# Patient Record
Sex: MALE | Race: WHITE | NOT HISPANIC OR LATINO | Employment: UNEMPLOYED | URBAN - METROPOLITAN AREA
[De-identification: names, ages, dates, MRNs, and addresses within clinical notes are randomized per-mention and may not be internally consistent; named-entity substitution may affect disease eponyms.]

---

## 2021-05-31 ENCOUNTER — RECORDS - HEALTHEAST (OUTPATIENT)
Dept: ADMINISTRATIVE | Facility: CLINIC | Age: 41
End: 2021-05-31

## 2021-06-02 ENCOUNTER — RECORDS - HEALTHEAST (OUTPATIENT)
Dept: ADMINISTRATIVE | Facility: CLINIC | Age: 41
End: 2021-06-02

## 2022-11-01 ENCOUNTER — TRANSFERRED RECORDS (OUTPATIENT)
Dept: HEALTH INFORMATION MANAGEMENT | Facility: CLINIC | Age: 42
End: 2022-11-01

## 2022-11-01 ENCOUNTER — MEDICAL CORRESPONDENCE (OUTPATIENT)
Dept: HEALTH INFORMATION MANAGEMENT | Facility: CLINIC | Age: 42
End: 2022-11-01

## 2022-11-21 ENCOUNTER — TRANSCRIBE ORDERS (OUTPATIENT)
Dept: OTHER | Age: 42
End: 2022-11-21

## 2022-11-21 DIAGNOSIS — D63.8 ANEMIA, CHRONIC DISEASE: ICD-10-CM

## 2022-11-21 DIAGNOSIS — I10 HYPERTENSION, UNSPECIFIED TYPE: Primary | ICD-10-CM

## 2022-11-21 DIAGNOSIS — H40.043 BORDERLINE STEROID-INDUCED GLAUCOMA OF BOTH EYES: ICD-10-CM

## 2023-01-03 ENCOUNTER — TELEPHONE (OUTPATIENT)
Dept: WOUND CARE | Facility: CLINIC | Age: 43
End: 2023-01-03
Payer: COMMERCIAL

## 2023-01-03 NOTE — TELEPHONE ENCOUNTER
"Consult received via fax from [facility] for \"second opinion for nonhealing right gluteal wound s/p fasciocutaneous falp then recurrent abscess pockets.\" History paraplegia.    Please schedule with providers Halina or Litzy at [facility] [place] for next available appointment.    Is patient a RONDA lift? PLEASE INQUIRE WHEN MAKING THE APPOINTMENT AND PUT IN APPOINTMENT NOTES    Routing to  Wound Healing Scheduling.  Fax placed in shredder (as it was only a referral with no other documents)  "

## 2023-01-26 ENCOUNTER — HOSPITAL ENCOUNTER (OUTPATIENT)
Dept: WOUND CARE | Facility: CLINIC | Age: 43
Discharge: HOME | End: 2023-01-26
Attending: SURGERY
Payer: MEDICARE

## 2023-01-26 VITALS — TEMPERATURE: 97.9 F | SYSTOLIC BLOOD PRESSURE: 141 MMHG | HEART RATE: 109 BPM | DIASTOLIC BLOOD PRESSURE: 92 MMHG

## 2023-01-26 DIAGNOSIS — L89.153 PRESSURE INJURY OF SACRAL REGION, STAGE 3 (H): ICD-10-CM

## 2023-01-26 PROCEDURE — 99203 OFFICE O/P NEW LOW 30 MIN: CPT | Performed by: SURGERY

## 2023-01-26 NOTE — ADDENDUM NOTE
Encounter addended by: Jeri Rutherford RN on: 1/26/2023 1:08 PM   Actions taken: Flowsheet accepted, Order list changed

## 2023-01-26 NOTE — PROGRESS NOTES
[facility] [place] Progress Note    Subject: Saqib Bishop consultation for nonhealing low back wound proximal to sacral, stage III, multiple surgical procedures, insensate, nonambulatory, work-related injury in .  Has had cares in the [place], [place].  Does not have a group 2 mattress at home.  Does not utilize cigarettes, nicotine patch.  Sleeps primarily in a decubitus position though occasionally does sleep on his back.  Roho cushion in his chair that is well-inflated and properly maintained.  Colostomy.  Denies fevers chills sweats.    PMH:   Past Medical History:   Diagnosis Date     Degenerative joint disease      Gastro-oesophageal reflux disease      Patient Active Problem List   Diagnosis     Removal of pin, plate, kristi, or screw     Social Hx:   Social History     Socioeconomic History     Marital status: Single     Spouse name: Not on file     Number of children: Not on file     Years of education: Not on file     Highest education level: Not on file   Occupational History     Not on file   Tobacco Use     Smoking status: Former     Types: Cigarettes     Quit date: 2011     Years since quittin.2     Smokeless tobacco: Not on file   Substance and Sexual Activity     Alcohol use: No     Drug use: No     Sexual activity: Not on file   Other Topics Concern     Not on file   Social History Narrative     Not on file     Social Determinants of Health     Financial Resource Strain: Not on file   Food Insecurity: Not on file   Transportation Needs: Not on file   Physical Activity: Not on file   Stress: Not on file   Social Connections: Not on file   Intimate Partner Violence: Not on file   Housing Stability: Not on file       Surgical Hx:   Past Surgical History:   Procedure Laterality Date     BACK SURGERY      lumbar fusion     EXPLORE SPINE, REMOVE HARDWARE, COMBINED  2012    Procedure:COMBINED EXPLORE SPINE, REMOVE HARDWARE; EXPLORE SPINE, REMOVE HARDWARE  L4-S1; Surgeon:FAM GONZALEZ; Location:[facility] OR     ORTHOPEDIC SURGERY      right foot surgery       Allergies:    Allergies   Allergen Reactions     Droperidol Anaphylaxis     Prednisone      vomiting       Medications:   Current Outpatient Medications   Medication     nicotine (NICODERM CQ) 7 MG/24HR patch 2h hr     oxycodone (OXY-IR) 5 MG capsule     oxycodone (OXYCONTIN) 10 MG 12 hr tablet     No current facility-administered medications for this encounter.       Labs: No results for input(s): ALBUMIN, HGB, INR, WBC, A1C, CRP in the last 37967 hours.    Invalid input(s): PREALBUMIN,  GLUCOSE, MICROBIO  No results found for: CR  No results found for: GFRESTIMATED  No results found for: GFRESTBLACK  No results found for: WBC  No results found for: RBC  No results found for: HGB  No results found for: HCT  No components found for: MCT  No results found for: MCV  No results found for: MCH  No results found for: MCHC  No results found for: RDW  No results found for: PLT       Nutrition requirements were discussed with patient today.  Objective:  BP (!) 141/92 (BP Location: Right arm)   Pulse 109   Temp 97.9  F (36.6  C)   Wound (used by OP [facility] only) 01/26/23 0905 Right gluteal (Active)   Base pink;slough 01/26/23 0900   Periwound intact 01/26/23 0900   Periwound Temperature warm 01/26/23 0900   Periwound Skin Turgor soft 01/26/23 0900   Edges open 01/26/23 0900   Length (cm) 0.8 01/26/23 0900   Width (cm) 2.6 01/26/23 0900   Depth (cm) 6.3 01/26/23 0900   Wound (cm^2) 2.08 cm^2 01/26/23 0900   Wound Volume (cm^3) 13.1 cm^3 01/26/23 0900   Drainage Characteristics/Odor tan;serosanguineous 01/26/23 0900   Drainage Amount moderate 01/26/23 0900   Care, Wound non-select wound debridement performed 01/26/23 0900       Incision/Surgical Site 01/13/12 Back (Active)        General:  Patient is alert and orientated, no acute distress.  Conversant.  States 3 pressure ulceration on, depth of approximately 6 cm, no bone  exposure, CT imaging recent reviewed, does not demonstrate osteomyelitis.  No abscess.  No odor.  Mild periwound dermatitis.  No coccyx ulceration.  Wound was filled with insensate for approximately T12 distal.              Impression: Chronic low back pressure ulceration status post multiple flap procedures, no osteomyelitis based on recent CT imaging      Plan:  We will dress the wounds with hypochlorous acid Vashe filling of the wound, approximately 30 cc, then placed gauze, change twice a day to eliminate biofilm.  Group 2 mattress indicated, will work with DME to obtain.  Adjunctive micronutrients, B12, B6, folic acid, Rahul or arginine supplementation, allergic to vitamin D, has kidney stones therefore no vitamin C.  He has a functional well-maintained Roho cushion in his wheelchair.  Advised not to be in his wheelchair for more than 90 minutes 3 times a day.  Begin decubitus position or prone position and group 2 mattress.  Obtain group 2 mattress ASAP.  Patient will return to the clinic in 4 weeks time.   Patient will need group 2, low air loss mattress due to large, stage 3 ulceration on the patient's pelvis that has failed to improve on a normal mattress despite regular wound cares and repositioning. A group 1 mattress will not be adequate to offload these severe ulcerations. Patient has impaired sensation and is unable to reposition independently.      Further instructions from your care team       Saqib Bishop      1980    A DME order was not completed because the supplies are ordered by home care or at a care facility      [facility] - Comes twice per week Phone: [phone]  Fax:[phone]     Medications/supplements to aid in healin. Vitamin B Complex with folic acid take 1 tablet daily   2. Vitamin B 12 1000 mcg daily    Try staying away from sugar.     Rahul Supplement, one packet into your favorite beverage TWICE a day. You may purchase at any [facility]  or online. A website we recommend is [email or web address]. Alternatives to Rahul is Argiment AT, Abintra, Arginaid, or Medline Active Critical Care.    Group 2 Mattress will be ordered for through [facility]    Wound Dressing Change: Right Gluteal wound  -Cleanse wound with Cetaphil liquid or bar soap   -Pour 30cc of vashe into the wound bed and let sit fo 15 minutes  -then place 3 4x4 gauze into the wound to absorb the vashe  -cover with 2 5x9 ABD pad and medipore tape or utilize the Durahug dressing  -change twice daily     Repositioning:    Bed: Reposition MINIMALLY every 1-2 hours in bed to relieve pressure and promote perfusion to tissue.    Chair: When up to the chair, do not sit for longer than one hour total before returning to bed for at least 60 minutes to relieve pressure and promote perfusion to the tissue.  Completely recline/tilt for 15 minutes each hour.  o Sit on a chair cushion when up to the chair.       Patient will need group 2, low air loss mattress due to large, stage 3 ulceration on the patient's pelvis that has failed to improve on a normal mattress despite regular wound cares and repositioning. A group 1 mattress will not be adequate to offload these severe ulcerations. Patient has impaired sensation and is unable to reposition independently.       BABITA Mcghee M.D. January 26, 2023    Call us at [phone] if you have any questions about your wounds, have redness or swelling around your wound, have a fever of 101 or greater or if you have any other problems or concerns. We answer the phone Monday through Friday 8 am to 4 pm, please leave a message as we check the voicemail frequently throughout the day.     If you had a positive experience please indicate that on your patient satisfaction survey form that  [facility] will be sending you.    It was a pleasure meeting with you today.  Thank you for allowing me and my team the privilege of caring for you today.  YOU are the reason  we are here, and I truly hope we provided you with the excellent service you deserve.  Please let us know if there is anything else we can do for you so that we can be sure you are leaving completely satisfied with your care experience.      If you have any billing related questions please call the [facility] Business office at [phone]. The clinic staff does not handle billing related matters.    If you are scheduled to have a follow up appointment, you will receive a reminder call the day before your visit. On the appointment day please arrive 15 minutes prior to your appointment time. If you are unable to keep that appointment, please call the clinic to cancel or reschedule. If you are more than 10 minutes late or greater for your appointment, the clinic policy is that you may be asked to reschedule            David Mcghee MD on 1/26/2023 at 10:34 AM        Dictated using Dragon voice recognition software which may result in transcription errors   Yes

## 2023-01-26 NOTE — ADDENDUM NOTE
Encounter addended by: Keena Roger RN on: 1/26/2023 4:15 PM   Actions taken: Order list changed, Diagnosis association updated

## 2023-01-26 NOTE — PROGRESS NOTES
Patient arrived for wound care visit. Certified Wound Care Nurse time spent evaluating patient record, completed a full evaluation and documented wound(s) & ivette-wound skin; provided recommendation based on treatment plan. Applied dressing, reviewed discharge instructions, patient education, and discussed plan of care with appropriate medical team staff members and patient and/or family members.

## 2023-01-26 NOTE — DISCHARGE INSTRUCTIONS
Saqib Bishop      1980    A DME order was not completed because the supplies are ordered by home care or at a care facility      [facility] - Comes twice per week Phone: [phone]  Fax:[phone]     Medications/supplements to aid in healing:  Vitamin B Complex with folic acid take 1 tablet daily   Vitamin B 12 1000 mcg daily    Try staying away from sugar.     Rahul Supplement, one packet into your favorite beverage TWICE a day. You may purchase at any [facility] or online. A website we recommend is [email or web address] [email or web address]. Alternatives to Rahul is Argiment AT, Abintra, Arginaid, or Medline Active Critical Care.    Group 2 Mattress will be ordered for through [facility]    Wound Dressing Change: Right Gluteal wound  -Cleanse wound with Cetaphil liquid or bar soap   -Pour 30cc of vashe into the wound bed and let sit fo 15 minutes  -then place 3 4x4 gauze into the wound to absorb the vashe  -cover with 2 5x9 ABD pad and medipore tape or utilize the Durahug dressing  -change twice daily     Repositioning:  Bed: Reposition MINIMALLY every 1-2 hours in bed to relieve pressure and promote perfusion to tissue.  Chair: When up to the chair, do not sit for longer than one hour total before returning to bed for at least 60 minutes to relieve pressure and promote perfusion to the tissue.  Completely recline/tilt for 15 minutes each hour.  Sit on a chair cushion when up to the chair.       Patient will need group 2, low air loss mattress due to large, stage 3 ulceration on the patient's pelvis that has failed to improve on a normal mattress despite regular wound cares and repositioning. A group 1 mattress will not be adequate to offload these severe ulcerations. Patient has impaired sensation and is unable to reposition independently.       BABITA Mcghee M.D. January 26, 2023    Call us at [phone] if you have any questions about your wounds, have redness or swelling around your  wound, have a fever of 101 or greater or if you have any other problems or concerns. We answer the phone Monday through Friday 8 am to 4 pm, please leave a message as we check the voicemail frequently throughout the day.     If you had a positive experience please indicate that on your patient satisfaction survey form that [facility] will be sending you.    It was a pleasure meeting with you today.  Thank you for allowing me and my team the privilege of caring for you today.  YOU are the reason we are here, and I truly hope we provided you with the excellent service you deserve.  Please let us know if there is anything else we can do for you so that we can be sure you are leaving completely satisfied with your care experience.      If you have any billing related questions please call the [facility] Business office at [phone]. The clinic staff does not handle billing related matters.    If you are scheduled to have a follow up appointment, you will receive a reminder call the day before your visit. On the appointment day please arrive 15 minutes prior to your appointment time. If you are unable to keep that appointment, please call the clinic to cancel or reschedule. If you are more than 10 minutes late or greater for your appointment, the clinic policy is that you may be asked to reschedule

## 2023-01-30 NOTE — ADDENDUM NOTE
Encounter addended by: Keena Roger RN on: 1/30/2023 8:43 AM   Actions taken: Order list changed, Diagnosis association updated

## 2023-02-02 ENCOUNTER — TELEPHONE (OUTPATIENT)
Dept: WOUND CARE | Facility: CLINIC | Age: 43
End: 2023-02-02
Payer: MEDICARE

## 2023-02-02 DIAGNOSIS — L89.153 PRESSURE INJURY OF SACRAL REGION, STAGE 3 (H): Primary | ICD-10-CM

## 2023-02-02 NOTE — TELEPHONE ENCOUNTER
Returned call to Amanda. She states the patient will no longer be on Medicare starting tomorrow and will be medical assistance. Supplies will able to be ordered tomorrow and the patient wishes for the supplies to be sent to [facility] in [place]. Fax number [phone]. Will order supplies 2/3 and fax to [facility].

## 2023-02-02 NOTE — TELEPHONE ENCOUNTER
Home health care nurse called, [facility] needs prescription for wound care supplies to be able to fill the order.  [place] [phone]

## 2023-02-03 ENCOUNTER — MEDICAL CORRESPONDENCE (OUTPATIENT)
Dept: HEALTH INFORMATION MANAGEMENT | Facility: CLINIC | Age: 43
End: 2023-02-03

## 2023-02-05 ENCOUNTER — HEALTH MAINTENANCE LETTER (OUTPATIENT)
Age: 43
End: 2023-02-05

## 2023-02-06 ENCOUNTER — MEDICAL CORRESPONDENCE (OUTPATIENT)
Dept: HEALTH INFORMATION MANAGEMENT | Facility: CLINIC | Age: 43
End: 2023-02-06
Payer: MEDICARE

## 2023-03-13 ENCOUNTER — HOSPITAL ENCOUNTER (OUTPATIENT)
Dept: WOUND CARE | Facility: CLINIC | Age: 43
Discharge: HOME | End: 2023-03-13
Attending: SURGERY | Admitting: SURGERY
Payer: MEDICARE

## 2023-03-13 VITALS — HEART RATE: 99 BPM | TEMPERATURE: 97 F | DIASTOLIC BLOOD PRESSURE: 77 MMHG | SYSTOLIC BLOOD PRESSURE: 144 MMHG

## 2023-03-13 DIAGNOSIS — T14.8XXA OPEN WOUND: ICD-10-CM

## 2023-03-13 DIAGNOSIS — L89.153 PRESSURE INJURY OF SACRAL REGION, STAGE 3 (H): Primary | ICD-10-CM

## 2023-03-13 PROCEDURE — 97602 WOUND(S) CARE NON-SELECTIVE: CPT

## 2023-03-13 PROCEDURE — 99213 OFFICE O/P EST LOW 20 MIN: CPT | Performed by: SURGERY

## 2023-03-13 NOTE — PROGRESS NOTES
[facility] [place] Progress Note    Subject: Saqib Bishop chronic low back wound Doximity sacral, stage III, multiple previous surgical procedures, insensate in this region, nonambulatory, work-related injury in 2008.  He states he is getting a new mattress as his mattress provide support, we had recommended a group 2 mattress at the original consultation of January 26, 2023.  His primary wheelchair has not been functioning correctly, he is using his alternative wheelchair, they are assessing his cushion on a daily basis, he is attempting to be in his chair more than 60 to 90 minutes 3 times a day.  The Rahul recommendation was too expensive, they are utilizing the remainder of micronutrients to assist in resolution.  Current dressings have been hypochlorous acid moist gauze dressing changes twice per day.    Patient Active Problem List   Diagnosis     Removal of pin, plate, kristi, or screw     Past Medical History:   Diagnosis Date     Degenerative joint disease      Gastro-oesophageal reflux disease      Exam:  BP (!) 144/77 (BP Location: Right arm, Patient Position: Sitting, Cuff Size: Adult Regular)   Pulse 99   Temp 97  F (36.1  C) (Temporal)   Wound (used by OP [facility] only) 01/26/23 0905 Right gluteal (Active)   Thickness/Stage Stage 3 03/13/23 1333   Base pink;slough 03/13/23 1333   Periwound intact 03/13/23 1333   Periwound Temperature warm 03/13/23 1333   Periwound Skin Turgor soft 03/13/23 1333   Edges open 03/13/23 1333   Length (cm) 0.6 03/13/23 1333   Width (cm) 1.2 03/13/23 1333   Depth (cm) 5.5 03/13/23 1333   Wound (cm^2) 0.72 cm^2 03/13/23 1333   Wound Volume (cm^3) 3.96 cm^3 03/13/23 1333   Wound healing % 65.38 03/13/23 1333   Undermining [Depth (cm)/Location] 7-9 o'clock / 6.2cm 03/13/23 1333   Drainage Characteristics/Odor tan;serosanguineous 03/13/23 1333   Drainage Amount scant 03/13/23 1333   Care, Wound non-select wound debridement performed 03/13/23 1333        Incision/Surgical Site 01/13/12 Back (Active)   Wound is noted to be approximately 60% improved, no odor, periwound hygiene intact.  Wound was probed with Q-tips, no bone or tendon exposure.        Impression: Chronic low back ulceration, insensate, paraplegic.    Plan: Transition to feeling wound with hypochlorous acid Vashe for approximately 15 minutes, then application of SkinTegrity hydrogel pH 6.0, perform on a daily basis.  Continue adjunctive micronutrients.  Requires group 2 mattress and group to bed frame, and he states he is obtaining repair to his primary wheelchair, we have discussed not being in the wheelchair greater than 60 to 90 minutes 3 times per day. Patient will return to the clinic in 4 weeks time  Patient will need group 2, low air loss mattress due to large, stage 3 ulceration on the patient's pelvis that has failed to improve on a normal mattress despite regular wound cares and repositioning. A group 1 mattress will not be adequate to offload these severe ulcerations. Patient has impaired sensation and is unable to reposition independently.  Also requires a group 2 mattress frame to support to group 2 mattress given the stage III pressure ulceration.  Wound Dressing Change: Right Gluteal wound  -Irrigate with 10-20mL Vashe  -Pour 10-20mL of vashe into the wound bed and let sit for 15 minutes  -then pat with 4x4 gauze to absorb the vashe  -Insert 5-10mL Skintegrity Gel into wound  -cover with 2 4x4 gauze and medipore tape or utilize the Durahug dressing  change daily    Repositioning:  Bed: Reposition MINIMALLY every 1-2 hours in bed to relieve pressure and promote perfusion to tissue. Avoid pressure to area!  Chair: When up to the chair, do not sit for longer than one hour total before returning to bed for at least 60 minutes to relieve pressure and promote perfusion to the tissue. Completely recline/tilt for 15 minutes each hour. Sit on a chair cushion when up to the chair.      Further  instructions from your care team       Saqib Bishop      1980    A DME order was not completed because the supplies are ordered by home care or at a care facility        Further instructions from your care team       Saqib Bishop      1980    A DME order for supplies has been placed to [facility]. If there are any issues with your order including not receiving the order please call [facility] at [phone] option 3.  However, Medicare may request all supplies go through home care. Please inquire when you .    [facility] - comes twice per week Phone: [phone] Fax: [phone]    Medications/supplements to aid in healing:  Vitamin B Complex with folic acid take 1 tablet daily  Vitamin B 12 1000 mcg daily  Try staying away from sugar.  Rahul Supplement, one packet into your favorite beverage TWICE a day. You may  purchase at any [facility] or online. A website we recommend is  [email or web address]. Alternatives to Rahul is Argiment AT, Abintra, Arginaid, or Medline Active Critical Care.    Group 2 Mattress ordered for through [facility]    Wound Dressing Change: Right Gluteal wound  -Irrigate with 10-20mL Vashe  -Pour 10-20mL of vashe into the wound bed and let sit for 15 minutes  -then pat with 4x4 gauze to absorb the vashe  -Insert 5-10mL Skintegrity Gel into wound  -cover with 2 4x4 gauze and medipore tape or utilize the Durahug dressing  change daily    Repositioning:  Bed: Reposition MINIMALLY every 1-2 hours in bed to relieve pressure and promote perfusion to tissue. Avoid pressure to area!  Chair: When up to the chair, do not sit for longer than one hour total before returning to bed for at least 60 minutes to relieve pressure and promote perfusion to the tissue. Completely recline/tilt for 15 minutes each hour. Sit on a chair cushion when up to the chair.    Patient will need group 2, low air loss mattress due to large, stage 3  ulceration on the patient's pelvis that has failed to  improve on a normal mattress despite regular wound cares and repositioning. A group 1 mattress will not be adequate     BBAITA Mcghee M.D. March 13, 2023    Call us at [phone] if you have any questions about your wounds, have redness or swelling around your wound, have a fever of 101 or greater or if you have any other problems or concerns. We answer the phone Monday through Friday 8 am to 4 pm, please leave a message as we check the voicemail frequently throughout the day.     If you had a positive experience please indicate that on your patient satisfaction survey form that [facility] will be sending you.    It was a pleasure meeting with you today.  Thank you for allowing me and my team the privilege of caring for you today.  YOU are the reason we are here, and I truly hope we provided you with the excellent service you deserve.  Please let us know if there is anything else we can do for you so that we can be sure you are leaving completely satisfied with your care experience.      If you have any billing related questions please call the [facility] Business office at [phone]. The clinic staff does not handle billing related matters.    If you are scheduled to have a follow up appointment, you will receive a reminder call the day before your visit. On the appointment day please arrive 15 minutes prior to your appointment time. If you are unable to keep that appointment, please call the clinic to cancel or reschedule. If you are more than 10 minutes late or greater for your appointment, the clinic policy is that you may be asked to reschedule.          David Mcghee MD on 3/13/2023 at 2:20 PM    Dictated using Dragon voice recognition software which may result in transcription errors

## 2023-03-15 ENCOUNTER — TELEPHONE (OUTPATIENT)
Dept: WOUND CARE | Facility: CLINIC | Age: 43
End: 2023-03-15
Payer: MEDICARE

## 2023-03-15 DIAGNOSIS — L89.153 PRESSURE INJURY OF SACRAL REGION, STAGE 3 (H): Primary | ICD-10-CM

## 2023-03-15 NOTE — TELEPHONE ENCOUNTER
Annie from [facility] called regarding order. They received it but where patient resides is out of their service area. If needed, call Annie at [phone] ext [phone]

## 2023-03-16 NOTE — TELEPHONE ENCOUNTER
Did not hear from [facility]. Hospital bed and group 2 order faxed to [facility].

## 2023-03-31 ENCOUNTER — TELEPHONE (OUTPATIENT)
Dept: WOUND CARE | Facility: CLINIC | Age: 43
End: 2023-03-31
Payer: MEDICARE

## 2023-03-31 NOTE — TELEPHONE ENCOUNTER
[facility] cannot fulfill the skintegrity order as [facility] has been supplying the patients supplies. The patient also has home care. Voicemail left with patient explaining that [facility] cannot provide the skintegrity for him.

## 2023-03-31 NOTE — TELEPHONE ENCOUNTER
Patient having trouble getting Skintegrity from [facility] and is now out.  Looking for assistance in getting some.

## 2023-03-31 NOTE — TELEPHONE ENCOUNTER
Patient called again and was able to speak with him. He bought 1 skintegrity when he was at his last appointment out of pocket. He has not received his 1 that is covered by insurance. He would also like to purchase 3 more out of pocket. Message sent to [facility] to ship these to him.

## 2023-04-03 NOTE — TELEPHONE ENCOUNTER
Patient returned call to the clinic. He did not receive the voicemail left by Keena Roger RN and has a few questions. Please return call to patient to discuss.

## 2023-04-03 NOTE — TELEPHONE ENCOUNTER
Returned call to patient. He states he has not been able to get skintegrity from [facility] because they do not have it available for them to order. Asked [facility] if they can provide the skintegrity only and are awaiting an answer. Will call patient back when [facility] responds.

## 2023-04-04 ENCOUNTER — TELEPHONE (OUTPATIENT)
Dept: WOUND CARE | Facility: CLINIC | Age: 43
End: 2023-04-04
Payer: MEDICARE

## 2023-04-04 NOTE — TELEPHONE ENCOUNTER
Mireille with [facility] is asking if a prescription for the Skintegrity Hydrogel can be sent to the patient's preferred [facility] pharmacy? His DME supplier has been having trouble getting the item. If able, please send it to:    [facility] [place]  [address]  [place] [address]    Phone: [phone]  Fax: [phone]    Mireille can be reached at [phone]

## 2023-04-04 NOTE — TELEPHONE ENCOUNTER
[facility] is able to fill the skintegrity. They called and spoke with the patient.

## 2023-04-04 NOTE — TELEPHONE ENCOUNTER
Returned call to Mireille and informed her [facility] was able to dispense 1 skintegrity to patient. No further questions or concerns.

## 2023-05-01 ENCOUNTER — HOSPITAL ENCOUNTER (OUTPATIENT)
Dept: WOUND CARE | Facility: CLINIC | Age: 43
Discharge: HOME | End: 2023-05-01
Attending: SURGERY | Admitting: SURGERY
Payer: MEDICARE

## 2023-05-01 VITALS — SYSTOLIC BLOOD PRESSURE: 153 MMHG | DIASTOLIC BLOOD PRESSURE: 85 MMHG | HEART RATE: 95 BPM | TEMPERATURE: 98.5 F

## 2023-05-01 DIAGNOSIS — L89.153 PRESSURE INJURY OF SACRAL REGION, STAGE 3 (H): Primary | ICD-10-CM

## 2023-05-01 PROCEDURE — 99214 OFFICE O/P EST MOD 30 MIN: CPT | Performed by: SURGERY

## 2023-05-01 PROCEDURE — 97602 WOUND(S) CARE NON-SELECTIVE: CPT

## 2023-05-01 NOTE — PROGRESS NOTES
[facility] [place] Progress Note    Subject: Saqib Bishop nonhealing low back wound proximal to sacral, stage III, multiple surgical procedures, including flap surgical procedures, extensive scars at the coccyx, sacral region, insensate, nonambulatory, work-related injury in 2008.  Has had cares in the [place], [place].  They live approximately 3 hours north of the [facility], closer to [place].  We have discussed performance of MRI imaging, they would like to do it at [facility] and follow-up with us same day, MRI with and without gadolinium contrast is indicated given that the depth of the wound has not changed despite that the overall volume has continued to decrease.  He still has not received their group 2 mattress despite the fact that this is standard of care for stage III pressure injury, we will reach out again to have this filled    Most recent imaging was performed October 2022, CT imaging.  IMPRESSION:     1.  Interval debridement of a large sacral decubitus ulcer. There is a large open posterior sacral wound which extends to the coccyx. No drainable fluid collection identified. The distal coccyx remains absent and may be resected and/or rotated from osteomyelitis. Rectal contrast was requested but refused by the patient.   2. Mild subcutaneous stranding overlying the left posterior hip. Correlate for any clinical evidence of decubitus ulcer in this location.   3. Left lower quadrant colostomy with large fat and bowel-containing parastomal hernia.   4. Retroperitoneal lymphadenopathy is present but is decreased in size since the prior exam.   5. Dyer catheter.   6. Bilateral nephrolithiasis.     Finalized by: Luis Neal MD on 10/9/2022 12:24 AM CDT      Patient/Procedure Information:    [facility]      Given the persistence of the depth of the wound, concern for potential underlying osteomyelitis, MRI with and without gadolinium enhancement will be  ordered.  He does utilize a Roho cushion in his wheelchair.      Patient Active Problem List   Diagnosis     Removal of pin, plate, kristi, or screw     Past Medical History:   Diagnosis Date     Degenerative joint disease      Gastro-oesophageal reflux disease      Exam:  BP (!) 153/85 (BP Location: Left arm)   Pulse 95   Temp 98.5  F (36.9  C)   Wound (used by OP [facility] only) 01/26/23 0905 Right gluteal (Active)   Thickness/Stage Stage 3 05/01/23 1400   Base pink;slough 05/01/23 1400   Periwound intact 05/01/23 1400   Periwound Temperature warm 05/01/23 1400   Periwound Skin Turgor soft 05/01/23 1400   Edges open 05/01/23 1400   Length (cm) 0.5 05/01/23 1400   Width (cm) 1.5 05/01/23 1400   Depth (cm) 4.5 05/01/23 1400   Wound (cm^2) 0.75 cm^2 05/01/23 1400   Wound Volume (cm^3) 3.38 cm^3 05/01/23 1400   Wound healing % 63.94 05/01/23 1400   Undermining [Depth (cm)/Location] 9 o clock/6.8 cm 05/01/23 1400   Drainage Characteristics/Odor serosanguineous 05/01/23 1400   Drainage Amount moderate 05/01/23 1400   Care, Wound non-select wound debridement performed 03/13/23 1333       Incision/Surgical Site 01/13/12 Back (Active)     Volume of the wound is approximately 15 cc wound filled with Vashe, depth is approximately 7.5 cm with medial based undermining, no clear bone exposure though clinical concern for underlying potential osteomyelitis.  We will surgical scars from previous surgery, he has a colostomy.      Impression: Stage III pressure injury, history of multiple flaps, colostomy, insensate, history of work-related injury, nonambulatory    Plan: Clinical concern for osteomyelitis, obtain MRI with and without gadolinium of coccyx and sacrum, will be performed at [facility] and will follow-up in wound clinic on same day, they traveled approximately 3 hours away, close to [place].  We will dress the wounds with hypochlorous acid Vashe filling of the wound, currently approximately 15 cc, then hydrogel pH 6.0 on  antimicrobial Endoform placed in the wound, fill the remainder of the wound with hydrogel pH 6.0, can change on a daily basis..  Insurance company has failed yet to complete coverage for a group 2 mattress which is standard of care in this situation.  Patient will need group 2, low air loss mattress due to large, stage 3 ulceration on the patient's pelvis that has failed to improve on a normal mattress despite regular wound cares and repositioning. A group 1 mattress will not be adequate to offload these severe ulcerations. Patient has impaired sensation and is unable to reposition independently.  Hayde Yin from [facility] is requesting additional information for the approval of a group 2 pressure mattress,  Height is 5 foot 9 inches, weight 225 pounds  I am certifying as the wound clinic physician that the patient requires positioning in ways that are not feasible in a standard bed to alleviate pain and the pressure resulting in the persistence of the pressure ulceration of his stage III pressure ulceration of the gluteus region in the setting of multiple previous flap surgical procedures, he has had a colostomy.  He is nonambulatory.  I am certifying as the wound clinic physician that the patient requires frequent and/or immediate need for repositioning due to the presence of a stage III pressure ulceration.  Patient will return to the clinic in 4 weeks time      Further instructions from your care team       Saqib Bishop      1980    A DME order was not completed because supplies were not needed    [facility] [employer], Phone: [phone] Fax: [phone]    MRI just prior to next visit.    Medications/supplements to aid in healing:  Vitamin B Complex with folic acid take 1 tablet daily  Vitamin B 12 1000 mcg daily  Rahul Supplement, one packet into your favorite beverage TWICE a day. You may  purchase at any [facility] or online. A website we recommend is  www.[email or web address]. Alternatives to Rahul is Argiment AT, Abintra, Arginaid, or Medline Active Critical Care.  A diet high in protein is important for wound healing, we recommend getting 90 grams of protein per day. Taking protein shakes or bars are a good way to get extra protein in your diet.     Group [facility] Mattress [facility]23. We will contact [facility] about this. Please also call them at [phone].    Wound Dressing Change: Right Gluteal wound  -Irrigate with 10-20mL clean water  -Pour 15mL of Vashe into the wound bed and let sit for 15 minutes  -Then roll on side or pat with 4x4 gauze to absorb excess Vashe  -Apply generous amount of Skintegrity gel to Endoform Antimicrobial, then pack undermining at 9:00   -Cover with two 4x4 gauze and medipore tape or utilize the Durahug dressing  Change every other day    Repositioning:  Bed: Reposition MINIMALLY every 1-2 hours in bed to relieve pressure and promote  perfusion to tissue. Avoid pressure to area!  Chair: When up to the chair, do not sit for longer than one hour total before returning  to bed for at least 60 minutes to relieve pressure and promote perfusion to the tissue.  Completely recline/tilt for 15 minutes each hour. Sit on a chair cushion when up to the chair.    Patient will need group 2, low air loss mattress due to large, stage 3 ulceration on the patient's pelvis that has failed to improve on a normal mattress despite regular wound cares and repositioning. A group 1 mattress will not be adequate to offload these severe ulcerations. Patient has impaired sensation and is unable to reposition independently.      BABITA Mcghee M.D. May 1, 2023    Call us at [phone] if you have any questions about your wounds, have redness or swelling around your wound, have a fever of 101 or greater or if you have any other problems or concerns. We answer the phone Monday through Friday 8 am to 4 pm, please leave a message as we check  the voicemail frequently throughout the day.     If you had a positive experience please indicate that on your patient satisfaction survey form that [facility] will be sending you.    It was a pleasure meeting with you today.  Thank you for allowing me and my team the privilege of caring for you today.  YOU are the reason we are here, and I truly hope we provided you with the excellent service you deserve.  Please let us know if there is anything else we can do for you so that we can be sure you are leaving completely satisfied with your care experience.      If you have any billing related questions please call the [facility] Business office at [phone]. The clinic staff does not handle billing related matters.    If you are scheduled to have a follow up appointment, you will receive a reminder call the day before your visit. On the appointment day please arrive 15 minutes prior to your appointment time. If you are unable to keep that appointment, please call the clinic to cancel or reschedule. If you are more than 10 minutes late or greater for your appointment, the clinic policy is that you may be asked to reschedule.           David Mcghee MD on 5/1/2023 at 2:30 PM    Dictated using Dragon voice recognition software which may result in transcription errors

## 2023-05-01 NOTE — DISCHARGE INSTRUCTIONS
Saqib Bishop      1980    A DME order was not completed because supplies were not needed    [facility] [employer], Phone: [phone] Fax: [phone]    MRI just prior to next visit.    Medications/supplements to aid in healing:  Vitamin B Complex with folic acid take 1 tablet daily  Vitamin B 12 1000 mcg daily  Rahul Supplement, one packet into your favorite beverage TWICE a day. You may  purchase at any [facility] or online. A website we recommend is [email or web address]. Alternatives to Rahul is Argiment AT, Abintra, Arginaid, or Medline Active Critical Care.  A diet high in protein is important for wound healing, we recommend getting 90 grams of protein per day. Taking protein shakes or bars are a good way to get extra protein in your diet.     Group 2 Mattress ordered through [employer] March 2023. We will contact [employer] about this. Please also call them at [phone].    Wound Dressing Change: Right Gluteal wound  -Irrigate with 10-20mL clean water  -Pour 15mL of Vashe into the wound bed and let sit for 15 minutes  -Then roll on side or pat with 4x4 gauze to absorb excess Vashe  -Apply generous amount of Skintegrity gel to Endoform Antimicrobial, then pack undermining at 9:00   -Cover with 1/2 ABD pad and Medipore tape  Change every other day    Repositioning:  Bed: Reposition MINIMALLY every 1-2 hours in bed to relieve pressure and promote  perfusion to tissue. Avoid pressure to area.  Chair: When up to the chair, do not sit for longer than one hour total before returning  to bed for at least 60 minutes to relieve pressure and promote perfusion to the tissue.  Completely recline/tilt for 15 minutes each hour. Sit on a chair cushion when up to the chair.    Patient will need group 2, low air loss mattress due to large, stage 3 ulceration on the patient's pelvis that has failed to improve on a normal mattress despite regular wound cares and repositioning. A group 1 mattress will  not be adequate to offload these severe ulcerations. Patient has impaired sensation and is unable to reposition independently.      BABITA Mcghee M.D. May 1, 2023    Call us at [phone] if you have any questions about your wounds, have redness or swelling around your wound, have a fever of 101 or greater or if you have any other problems or concerns. We answer the phone Monday through Friday 8 am to 4 pm, please leave a message as we check the voicemail frequently throughout the day.     If you had a positive experience please indicate that on your patient satisfaction survey form that [facility] will be sending you.    It was a pleasure meeting with you today.  Thank you for allowing me and my team the privilege of caring for you today.  YOU are the reason we are here, and I truly hope we provided you with the excellent service you deserve.  Please let us know if there is anything else we can do for you so that we can be sure you are leaving completely satisfied with your care experience.      If you have any billing related questions please call the [facility] Business office at [phone]. The clinic staff does not handle billing related matters.    If you are scheduled to have a follow up appointment, you will receive a reminder call the day before your visit. On the appointment day please arrive 15 minutes prior to your appointment time. If you are unable to keep that appointment, please call the clinic to cancel or reschedule. If you are more than 10 minutes late or greater for your appointment, the clinic policy is that you may be asked to reschedule.

## 2023-05-02 ENCOUNTER — TELEPHONE (OUTPATIENT)
Dept: WOUND CARE | Facility: CLINIC | Age: 43
End: 2023-05-02
Payer: MEDICARE

## 2023-05-02 NOTE — TELEPHONE ENCOUNTER
Amanda with [facility] is asking for a prescription for Endoform to be faxed to Raymon Pérez as the patient does not have any (he's never used it). Please send IT ATTN: KENNEDY Pérez fax # [phone]    Amanda phone number: [phone]

## 2023-05-04 ENCOUNTER — TELEPHONE (OUTPATIENT)
Dept: WOUND CARE | Facility: CLINIC | Age: 43
End: 2023-05-04
Payer: MEDICARE

## 2023-05-04 NOTE — TELEPHONE ENCOUNTER
Emailed [facility] on Monday about group 2 mattress & bed that was ordered in March.  Handi responded that patient is outside of their service area.  Order re-printed to be signed then faxed to [facility].  Left message for patient to inform.    Is This A New Presentation, Or A Follow-Up?: Skin Lesion How Severe Is Your Skin Lesion?: mild Has Your Skin Lesion Been Treated?: been treated

## 2023-05-10 ENCOUNTER — MEDICAL CORRESPONDENCE (OUTPATIENT)
Dept: HEALTH INFORMATION MANAGEMENT | Facility: CLINIC | Age: 43
End: 2023-05-10
Payer: MEDICARE

## 2023-05-17 ENCOUNTER — TRANSFERRED RECORDS (OUTPATIENT)
Dept: HEALTH INFORMATION MANAGEMENT | Facility: CLINIC | Age: 43
End: 2023-05-17

## 2023-05-25 ENCOUNTER — HOSPITAL ENCOUNTER (OUTPATIENT)
Dept: MRI IMAGING | Facility: CLINIC | Age: 43
Discharge: HOME | End: 2023-05-25
Attending: SURGERY
Payer: MEDICARE

## 2023-05-25 ENCOUNTER — HOSPITAL ENCOUNTER (OUTPATIENT)
Dept: WOUND CARE | Facility: CLINIC | Age: 43
Discharge: HOME | End: 2023-05-25
Attending: SURGERY
Payer: MEDICARE

## 2023-05-25 VITALS — SYSTOLIC BLOOD PRESSURE: 162 MMHG | HEART RATE: 109 BPM | DIASTOLIC BLOOD PRESSURE: 75 MMHG | TEMPERATURE: 97.7 F

## 2023-05-25 DIAGNOSIS — L89.153 PRESSURE INJURY OF SACRAL REGION, STAGE 3 (H): ICD-10-CM

## 2023-05-25 DIAGNOSIS — L89.153 PRESSURE INJURY OF SACRAL REGION, STAGE 3 (H): Primary | ICD-10-CM

## 2023-05-25 DIAGNOSIS — S81.802A OPEN WOUND OF LOWER LEG, LEFT, INITIAL ENCOUNTER: ICD-10-CM

## 2023-05-25 PROCEDURE — A9585 GADOBUTROL INJECTION: HCPCS | Performed by: SURGERY

## 2023-05-25 PROCEDURE — 72197 MRI PELVIS W/O & W/DYE: CPT | Mod: MG

## 2023-05-25 PROCEDURE — 97602 WOUND(S) CARE NON-SELECTIVE: CPT

## 2023-05-25 PROCEDURE — 99213 OFFICE O/P EST LOW 20 MIN: CPT | Performed by: SURGERY

## 2023-05-25 PROCEDURE — 255N000002 HC RX 255 OP 636: Performed by: SURGERY

## 2023-05-25 RX ORDER — GADOBUTROL 604.72 MG/ML
10 INJECTION INTRAVENOUS ONCE
Status: COMPLETED | OUTPATIENT
Start: 2023-05-25 | End: 2023-05-25

## 2023-05-25 RX ADMIN — GADOBUTROL 10 ML: 604.72 INJECTION INTRAVENOUS at 13:04

## 2023-05-25 NOTE — PROGRESS NOTES
[facility] Wound Healing [place] Progress Note    Subject: Saqib Bishop patient travels from 3 hours away, MRI performed today to evaluate for potential osteomyelitis of the coccyx, sacrum, results not available at 5 PM.  Patient will be contacted by our office on May 26, 2023 regarding results, if osteomyelitis, patient would need to see Vanesa Meade and Dr. Barbara Zavala for further discussion.  He was recently hospitalized for urosepsis, was not on a group 2 mattress during his hospitalization, sacral wound did enlarge and greater undermining and depth and periwound maceration.  Also has a new traumatic injury to the left anterior ankle mortise where he struck his foot on his wheelchair, insensate, chronic left lower extremity lymphedema, chronic dorsiflexion of the foot.  2 mattress has not arrived at his house yet, apparently was also be delivered yesterday, was not delivered.  We have been working on a group 2 mattress since original consultation date of January 26, 2023, group 2 mattress and a stage III pressure ulceration certainly standard of care and should be a covered benefit for maximizing management.    Patient Active Problem List   Diagnosis     Removal of pin, plate, kristi, or screw     Open wound of lower leg, left, initial encounter     Past Medical History:   Diagnosis Date     Degenerative joint disease      Gastro-oesophageal reflux disease      Exam:  BP (!) 162/75 (BP Location: Right arm, Patient Position: Chair)   Pulse 109   Temp 97.7  F (36.5  C) (Temporal)   Wound (used by [facility] only) 01/26/23 0905 Right gluteal pressure injury (Active)   Thickness/Stage Stage 3 05/25/23 1428   Base red 05/25/23 1428   Periwound intact 05/25/23 1428   Periwound Temperature warm 05/25/23 1428   Periwound Skin Turgor soft 05/25/23 1428   Edges open 05/25/23 1428   Length (cm) 0.4 05/25/23 1428   Width (cm) 2.5 05/25/23 1428   Depth (cm) 5.6 05/25/23 1428   Wound (cm^2) 1 cm^2 05/25/23 1428   Wound  Volume (cm^3) 5.6 cm^3 05/25/23 1428   Wound healing % 51.92 05/25/23 1428   Undermining [Depth (cm)/Location] 8.5cm @ 9 o'clock 05/25/23 1428   Drainage Characteristics/Odor serosanguineous 05/25/23 1428   Drainage Amount moderate 05/25/23 1428   Care, Wound non-select wound debridement performed 05/25/23 1428       Wound (used by [facility] only) 05/25/23 1434 Left dorsal foot pressure injury (Active)   Thickness/Stage unstageable 05/25/23 1428   Base necrotic;red;slough 05/25/23 1428   Periwound intact;redness;swelling 05/25/23 1428   Periwound Temperature warm 05/25/23 1428   Periwound Skin Turgor soft 05/25/23 1428   Edges open 05/25/23 1428   Length (cm) 1.9 05/25/23 1428   Width (cm) 4.1 05/25/23 1428   Depth (cm) 0.3 05/25/23 1428   Wound (cm^2) 7.79 cm^2 05/25/23 1428   Wound Volume (cm^3) 2.34 cm^3 05/25/23 1428   Drainage Characteristics/Odor serosanguineous 05/25/23 1428   Drainage Amount moderate 05/25/23 1428   Care, Wound non-select wound debridement performed 05/25/23 1428       Wound (used by [facility] only) 05/25/23 1435 Left medial leg pressure injury (Active)   Thickness/Stage Stage 3 05/25/23 1428   Periwound intact;swelling 05/25/23 1428   Periwound Temperature warm 05/25/23 1428   Periwound Skin Turgor soft 05/25/23 1428   Edges open 05/25/23 1428   Length (cm) 0.6 05/25/23 1428   Width (cm) 0.6 05/25/23 1428   Depth (cm) 0.1 05/25/23 1428   Wound (cm^2) 0.36 cm^2 05/25/23 1428   Wound Volume (cm^3) 0.04 cm^3 05/25/23 1428   Drainage Characteristics/Odor serosanguineous 05/25/23 1428   Drainage Amount moderate 05/25/23 1428   Care, Wound non-select wound debridement performed 05/25/23 1428     Traumatic wound left anterior ankle mortise, no bone or tendon exposure, no undermining, early granulation tissue present, no heel ulceration, palpable left distal anterior tibial artery, chronic left lower extremity lymphedema.  Sacral ulceration has greater depth as compared to prior visit, undermining at the  9 o'clock position, no odor  Patient is a significant ventral hernia which she states has previously been operated on once though has worsened since that time.        Impression: Stage III pressure injury pending results of MRI of the pelvis performed today, large ventral hernia, traumatic left anterior ankle mortise injury, chronic left lower extremity lymphedema, traumatic paraplegic, work-related injury 2008    Plan: Our office will contact him tomorrow regarding results of MRI of the pelvis, if stage IV pressure injury, will need follow-up with Vanesa Meade and Dr. Barbara Zavala.  We will dress the wounds with 1 inch Nu Gauze wick into sacral wound, moistened with hypochlorous acid Vashe, cover with silicone-based dressing given periwound irritation, change twice daily.  Antimicrobial Endoform and PluroGel after hypochlorous acid Vashe application to left anterior ankle mortise injury, can change every other day, EdemaWear yellow stripe base of toe to knee, Tubigrip, elevate leg as able.  We have been requesting a group 2 pressure-relief mattress since 25th 2023, apparently was post be delivered yesterday, has yet to be delivered, family will contact the delivery service 1 more time to try to coordinate.  Consultation [facility], Dr. Nii Mancilla, general surgery for evaluation of large ventral hernia repair..  Patient will return to the clinic in 4 weeks time      Further instructions from your care team       Saqib Bishop      1980    A DME order was not completed because the supplies are ordered by home care or at a care facility    [facility], Phone: [phone] Fax: [phone]      Referral has been sent to [facility] clinics and surgery center for an abdominal wall reconstructive surgery with Dr. Nii Mancilla  Please call to schedule your appointment at [phone].      Medications/supplements to aid in healing:  Vitamin B Complex with folic acid take 1 tablet daily  Vitamin  "B 12 1000 mcg daily  Rahul Supplement, one packet into your favorite beverage TWICE a day. You may  purchase at any [facility] or online. A website we recommend is www.TravelTipz.ru.ClickBus. Alternatives to Rahul is Argiment AT, Abintra, Arginaid, or Medline Active Critical Care.  A diet high in protein is important for wound healing, we recommend getting 90 grams of protein per day. Taking protein shakes or bars are a good way to get extra protein in your diet.      Group 2 Mattress ordered through [facility]. Supposed to drop it off on 5/24 but never showed up. Please contact [facility] to see if their was a delay in shipment.       Wound Dressing Change: Right Gluteal wound  -Tuck 1\" NuGauze strip into depth of wound and undermining at 9 o'clock  -Pour 15mL of Vashe into the wound bed so it wets the Nugauze strip  -Then roll on side or pat with 4x4 gauze to absorb excess Vashe  -Cover with 1/2 ABD pad and Medipore tape (Try Ecylpse dressing and silicone tape products and see if you like them)  -Change twice per day (can pull ABD pad back and just change out the NuGauze strip and then place ABD pad back on for the second dressing change)    Wound Dressing Change: left dorsal foot and left medial lower leg  -Cleanse with mild unscented soap and water (such as Cetaphil, Cerave or Dove)   -After cleansing with mild unscented soap (such as Cetaphil, Cerave or Dove) and water, Apply small amount of VASHE on gauze, lay into wound bed, let sit for 5-10 minutes, remove gauze (do not rinse) then apply dressing:  -apply plurogel to wound bed or to piece of endoform AM  -apply 1/2 piece endoform AM to dorsal foot and 1/4 piece to left medial lower leg  -Pull up yellow stripe edemawear from toes to knee  -Cover with 4x4 gauze or 1/4 ABD pad  -secure with 1 roll gauze and medipore tape  -Apply velcro foot wrap   -change every other day        Then apply Yellow Stripe EdemaWear from toes to knee. " EdemaWear should be worn 24/7 unless bathing/showering or changing the dressing. You will wash and reuse the EdemaWear. DO NOT CUT THE EDEMAWEAR. IF IT IS TOO LONG THEN CUFF THE EDEMAWEAR (the EdemaWear can shrink length wise with washing).      Put plurogel in the fridge where it becomes thinner. You will use less of the plurogel this way, extending the use of the tube, and the plurogel will be more soothing.    To Order more plurogel: Visit www.[employer] or call [phone] to place an order for 0.7 oz (20gm) tube    Repositioning:  Bed: Reposition MINIMALLY every 1-2 hours in bed to relieve pressure and promote  perfusion to tissue. Avoid pressure to area.  Chair: When up to the chair, do not sit for longer than one hour total before returning  to bed for at least 60 minutes to relieve pressure and promote perfusion to the tissue.  Completely recline/tilt for 15 minutes each hour. Sit on a chair cushion when up to the chair.     Patient will need group 2, low air loss mattress due to large, stage 3 ulceration on the patient's pelvis that has failed to improve on a normal mattress despite regular wound cares and repositioning. A group 1 mattress will not be adequate to offload these severe ulcerations. Patient has impaired sensation and is unable to reposition independently.      BABITA Mcghee M.D. May 25, 2023    Call us at [phone] if you have any questions about your wounds, have redness or swelling around your wound, have a fever of 101 or greater or if you have any other problems or concerns. We answer the phone Monday through Friday 8 am to 4 pm, please leave a message as we check the voicemail frequently throughout the day.     If you had a positive experience please indicate that on your patient satisfaction survey form that [facility] will be sending you.    It was a pleasure meeting with you today.  Thank you for allowing me and my team the privilege of caring for you today.  YOU are  the reason we are here, and I truly hope we provided you with the excellent service you deserve.  Please let us know if there is anything else we can do for you so that we can be sure you are leaving completely satisfied with your care experience.      If you have any billing related questions please call the [facility] Business office at [phone]. The clinic staff does not handle billing related matters.    If you are scheduled to have a follow up appointment, you will receive a reminder call the day before your visit. On the appointment day please arrive 15 minutes prior to your appointment time. If you are unable to keep that appointment, please call the clinic to cancel or reschedule. If you are more than 10 minutes late or greater for your appointment, the clinic policy is that you may be asked to reschedule.          Durable Medical Equipment Wound Care Orders     Wound Care Order for DME - ONLY FOR DME   As directed      DME Provider: amari Maza Comment - [phone]    Start Date: 5/25/2023    Wound Supply Order Options: Complex Wound    Optional: .dmewound can be used to pull in order specific information into documentation    Wound Number: Wound 1    Wound 1 Location: Left dorsal foot    Wound 1 Dressing Change Frequency: BID    Wound 1 Length of Need: 30 days    Wound 1 - Dressing Supplies: Tape/Securing    Wound 1 - Tubular Dressing Type: Edema Wear Stocking    Wound 1 - Stocking Type: Regular    Wound 1 - Edema Wear Regular Size: Medium (1 per pkg)    Wound 1 - Edema Wear Reg (Med) Stocking Quantity: 2    Open wound of lower leg, left, initial encounter        David Mcghee MD on 5/25/2023 at 4:54 PM          Dictated using Dragon voice recognition software which may result in transcription errors

## 2023-05-25 NOTE — DISCHARGE INSTRUCTIONS
"Saqib Bishop      1980    A DME order was faxed to [facility]: Fax # [phone]. Home care does not have an open Medicare episode. They are billing under Medical assistance so [facility] should order supplies.    [facility], Phone: [phone] Fax: [phone]      Referral has been sent to [facility] clinics and surgery center for an abdominal wall reconstructive surgery with Dr. Nii Mancilla  Please call to schedule your appointment at [phone].      Medications/supplements to aid in healing:  Vitamin B Complex with folic acid take 1 tablet daily  Vitamin B 12 1000 mcg daily  Rahul Supplement, one packet into your favorite beverage TWICE a day. You may  purchase at any [facility] or online. A website we recommend is www.[employer]. Alternatives to Rahul is Argiment AT, Abintra, Arginaid, or Medline Active Critical Care.  A diet high in protein is important for wound healing, we recommend getting 90 grams of protein per day. Taking protein shakes or bars are a good way to get extra protein in your diet.      Group 2 Mattress ordered through [facility]. Supposed to drop it off on 5/24 but never showed up. Please contact [facility] to see if their was a delay in shipment.       Wound Dressing Change: Right Gluteal wound  -Tuck 1\" NuGauze strip into depth of wound and undermining at 9 o'clock  -Pour 15mL of Vashe into the wound bed so it wets the Nugauze strip  -Then roll on side or pat with 4x4 gauze to absorb excess Vashe  -Cover with 1/2 ABD pad and Medipore tape (Try Ecylpse dressing and silicone tape products and see if you like them)  -Change twice per day (can pull ABD pad back and just change out the NuGauze strip and then place ABD pad back on for the second dressing change)    Wound Dressing Change: left dorsal foot and left medial lower leg  -Cleanse with mild unscented soap and water (such as Cetaphil, Cerave or Dove)   -After cleansing with mild " unscented soap (such as Cetaphil, Cerave or Dove) and water, Apply small amount of VASHE on gauze, lay into wound bed, let sit for 5-10 minutes, remove gauze (do not rinse) then apply dressing:  -apply plurogel to wound bed or to piece of endoform AM  -apply 1/2 piece endoform AM to dorsal foot and 1/4 piece to left medial lower leg  -Pull up yellow stripe edemawear from toes to knee  -Cover with 4x4 gauze or 1/4 ABD pad  -secure with 1 roll gauze and medipore tape  -Apply velcro foot wrap   -change every other day        Then apply Yellow Stripe EdemaWear from toes to knee. EdemaWear should be worn 24/7 unless bathing/showering or changing the dressing. You will wash and reuse the EdemaWear. DO NOT CUT THE EDEMAWEAR. IF IT IS TOO LONG THEN CUFF THE EDEMAWEAR (the EdemaWear can shrink length wise with washing).      Put plurogel in the fridge where it becomes thinner. You will use less of the plurogel this way, extending the use of the tube, and the plurogel will be more soothing.    To Order more plurogel: Visit www.[employer] or call [phone] to place an order for 0.7 oz (20gm) tube    Repositioning:  Bed: Reposition MINIMALLY every 1-2 hours in bed to relieve pressure and promote  perfusion to tissue. Avoid pressure to area.  Chair: When up to the chair, do not sit for longer than one hour total before returning  to bed for at least 60 minutes to relieve pressure and promote perfusion to the tissue.  Completely recline/tilt for 15 minutes each hour. Sit on a chair cushion when up to the chair.     Patient will need group 2, low air loss mattress due to large, stage 3 ulceration on the patient's pelvis that has failed to improve on a normal mattress despite regular wound cares and repositioning. A group 1 mattress will not be adequate to offload these severe ulcerations. Patient has impaired sensation and is unable to reposition independently.      BABITA Mcghee M.D. May 25, 2023    Call us at  [phone] if you have any questions about your wounds, have redness or swelling around your wound, have a fever of 101 or greater or if you have any other problems or concerns. We answer the phone Monday through Friday 8 am to 4 pm, please leave a message as we check the voicemail frequently throughout the day.     If you had a positive experience please indicate that on your patient satisfaction survey form that [facility] will be sending you.    It was a pleasure meeting with you today.  Thank you for allowing me and my team the privilege of caring for you today.  YOU are the reason we are here, and I truly hope we provided you with the excellent service you deserve.  Please let us know if there is anything else we can do for you so that we can be sure you are leaving completely satisfied with your care experience.      If you have any billing related questions please call the [facility] Business office at [phone]. The clinic staff does not handle billing related matters.    If you are scheduled to have a follow up appointment, you will receive a reminder call the day before your visit. On the appointment day please arrive 15 minutes prior to your appointment time. If you are unable to keep that appointment, please call the clinic to cancel or reschedule. If you are more than 10 minutes late or greater for your appointment, the clinic policy is that you may be asked to reschedule.

## 2023-05-30 ENCOUNTER — TELEPHONE (OUTPATIENT)
Dept: WOUND CARE | Facility: CLINIC | Age: 43
End: 2023-05-30
Payer: MEDICARE

## 2023-05-30 DIAGNOSIS — L97.922 ULCER OF LEFT LOWER EXTREMITY WITH FAT LAYER EXPOSED (H): ICD-10-CM

## 2023-05-30 DIAGNOSIS — L89.153 PRESSURE INJURY OF SACRAL REGION, STAGE 3 (H): Primary | ICD-10-CM

## 2023-05-30 DIAGNOSIS — S81.802A OPEN WOUND OF LOWER LEG, LEFT, INITIAL ENCOUNTER: ICD-10-CM

## 2023-05-30 DIAGNOSIS — L97.522 ULCER OF LEFT FOOT WITH FAT LAYER EXPOSED (H): ICD-10-CM

## 2023-05-30 NOTE — TELEPHONE ENCOUNTER
Amanda with [facility] is requesting an order for gauze and 4x4 border gauze to be sent to [facility]. Fax # [phone]    Amanda [phone]

## 2023-05-30 NOTE — TELEPHONE ENCOUNTER
Returned call to Amanda. She states the patient is needing nugauze ordered and the patient and family is requesting a silicone dressing over the ABD to help the dressing stay better. Discussed with Amanda that only 1 secondary dressing is allowed. The patient does prefer the ABD dressing. The only supply needing ordered right now is Nugauze. Order faxed to [facility].

## 2023-05-31 NOTE — TELEPHONE ENCOUNTER
Amanda called the clinic again this morning 5/31/23 stating that she just learned that the patient will need a brand new prescription sent in to [facility] every month before the 3rd of the month. He currently needs all new prescriptions faxed in for any prescriptive supplies he is currently using. Patient is also specifically in need of 1 inch gauze.

## 2023-05-31 NOTE — ADDENDUM NOTE
Encounter addended by: Keena Roger RN on: 5/31/2023 12:31 PM   Actions taken: Edited Discharge Instructions

## 2023-06-13 ENCOUNTER — TELEPHONE (OUTPATIENT)
Dept: WOUND CARE | Facility: CLINIC | Age: 43
End: 2023-06-13
Payer: MEDICARE

## 2023-06-13 NOTE — TELEPHONE ENCOUNTER
Home care nurse called, they have stopped packing the wound and gone back to the gel as he seems to be having a reaction.  Amanda

## 2023-06-26 ENCOUNTER — TRANSFERRED RECORDS (OUTPATIENT)
Dept: HEALTH INFORMATION MANAGEMENT | Facility: CLINIC | Age: 43
End: 2023-06-26

## 2023-07-06 NOTE — TELEPHONE ENCOUNTER
REFERRAL INFORMATION:  Referring Provider: Dr. David Mcghee  Referring Clinic: [facility]  Reason for Visit/Diagnosis: Parastomal Hernia, abdominal wall reconstruction       FUTURE VISIT INFORMATION:  Appointment Date: 7/21/2023  Appointment Time: 10 AM     NOTES RECORD STATUS  DETAILS   OFFICE NOTE from Referring Provider Internal [facility]:  5/25/23, 5/1/23 - WOUND OV with Dr. Mcghee   OFFICE NOTE from Other Specialists Care Everywhere / Internal [facility]:  7/18/23 - WOUND OV with Jayleen Meade RN    [place]:  1/17/23 - PLASTIC OV with Luba Chua NP  10/12/22 - WOUND OV with Anitha Reyes RN    [facility]:  8/18/21 - SURG OV with JENNIFER Atwood   HOSPITAL DISCHARGE SUMMARY/ ED VISITS  Care Everywhere [place]:  6/26/23 - ED OV with Dr. Lewis  5/17/23 - Admission with Dr. yHman  5/17/23 - ED OV with Dr. Loya    [facility]:  10/30/19 - ED OV with Dr. Gomez  * Additional in Care Everywhere   OPERATIVE REPORT Care Everywhere [place]:  9/29/22 - OP Note for RIGHT GLUTEUS I&D with Dr. Howard  8/1/22 - OP Note for RIGHT GLUTEUS WOUND DEBRIDEMENT with Dr. Howard  3/8/22 - OP Note for I&D SACRAL ABSCESS with Dr. Howard  * Additional in Care Everywhere    [facility]:  8/2/21 - OP Note for PARASTOMAL HERNIA REPAIR WITH MESH with Dr. Jeffers  9/13/17 - OP Note for LAPAROSCOPIC CREATION DIVERTING COLOSTOMY, UMBILICAL HERNIA REPAIR with Dr. Aguilera   ENDOSCOPY (EGD)  N/A    PERTINENT LABS Care Everywhere    PATHOLOGY REPORTS (RELATED) Care Everywhere [facility]:  9/29/22 - Right Sacral Skin  8/1/22 - Sacral Skin  1/28/22 - Sacral Skin  - Additional in Care Everywhere   IMAGING (CT, MRI, US, XR)  In process / Internal [place]:  6/26/23 - CT Chest/Abd/Pelvis  5/17/23, 10/6/22, 2/14/22 - CT Abd/Pelvis  5/17/23 - XR Pelvis  3/7/22, 2/11/22 - CT Pelvis  9/26/21 - US Abdomen    [facility]:  5/25/23 - MRI Pelvic     Records Requested    Facility  [facility]  Fax: [phone]    Outcome * 7/6/23 3:32 PM Faxed req to [facility] for images to be pushed to [facility]. - Brissa    07-11-23 spoke w/[facility] to check status but was informed they are not able to push images to us. Faxed request/FedEx label to [phone]. Radha@ 8:37am    07-17-23 [facility] w/[facility] regarding request.      07-27-23 called and spoke to [facility] regarding request. They stated they have not revd it, informed to send again. 4th request for images. Radha@ 11:28am

## 2023-07-18 ENCOUNTER — HOSPITAL ENCOUNTER (OUTPATIENT)
Dept: WOUND CARE | Facility: CLINIC | Age: 43
Discharge: HOME | End: 2023-07-18
Attending: PHYSICIAN ASSISTANT | Admitting: PHYSICIAN ASSISTANT
Payer: MEDICARE

## 2023-07-18 DIAGNOSIS — S81.802A OPEN WOUND OF LOWER LEG, LEFT, INITIAL ENCOUNTER: Primary | ICD-10-CM

## 2023-07-18 DIAGNOSIS — L89.314 PRESSURE INJURY OF RIGHT BUTTOCK, STAGE 4 (H): ICD-10-CM

## 2023-07-18 LAB
CRP SERPL-MCNC: 31.94 MG/L
ERYTHROCYTE [SEDIMENTATION RATE] IN BLOOD BY WESTERGREN METHOD: 30 MM/HR (ref 0–15)

## 2023-07-18 PROCEDURE — 86140 C-REACTIVE PROTEIN: CPT | Performed by: PHYSICIAN ASSISTANT

## 2023-07-18 PROCEDURE — 99215 OFFICE O/P EST HI 40 MIN: CPT | Mod: 25 | Performed by: PHYSICIAN ASSISTANT

## 2023-07-18 PROCEDURE — 11042 DBRDMT SUBQ TIS 1ST 20SQCM/<: CPT | Performed by: PHYSICIAN ASSISTANT

## 2023-07-18 PROCEDURE — 36415 COLL VENOUS BLD VENIPUNCTURE: CPT | Performed by: PHYSICIAN ASSISTANT

## 2023-07-18 PROCEDURE — 85652 RBC SED RATE AUTOMATED: CPT | Performed by: PHYSICIAN ASSISTANT

## 2023-07-18 PROCEDURE — 84134 ASSAY OF PREALBUMIN: CPT | Performed by: PHYSICIAN ASSISTANT

## 2023-07-18 NOTE — DISCHARGE INSTRUCTIONS
"Saqib NEGRETE Angelesmateuszmary      1980    A DME order was faxed to [facility]: Fax # [phone]. Home care does not have an open Medicare episode. They are billing under Medical assistance so [facility] should order supplies.    - [facility], Phone: [phone] Fax: [phone]  Attention Home Care: Please have patient pressure mapped with your home care agency, if you do not have ability to do this, please contact us at [phone] so we can send orders to another location      To do list for possible surgery:  1. Get Group 2 Mattress. Order sent to [employer] Fax #[phone]  2. Get Pressure Mapped with home care or contact wound healing institute if they are unable to map your seat  3. MRI Complete  4.Your nutrition values must be with in normal range. A diet high in protein is important for wound healing, we recommend getting  grams of protein per day. Taking protein shakes or bars are a good way to get extra protein in your diet.   5. Continue to not smoke  6. Colostomy in place  7. Indwelling Catheter  8. Labs drawn today   9. Apt at [facility] and surgery center for an abdominal wall reconstructive surgery with Dr. Nii Mancilla 7-21-23     Wound Dressing Change: Right Gluteal wound  -Cleanse with mild unscented soap (such as Cetaphil, Cerave or Dove) and water  -Apply small amount of VASHE on gauze, lay into wound bed, let sit for 5-10 minutes, remove gauze (do not rinse) then apply dressing: -  -Lightly pack with 1/10 of 4\" AMD roll gauze  -Cover with 1/4 of 6x10\" ABD pad and 2\" Medipore tape   Change 1-2x a day and as needed for soilage     Wound Dressing Change: left medial plantar foot  -Cleanse with mild unscented soap such as Cetaphil, Cerave or Dove) and water  -Apply small amount of VASHE on gauze, lay into wound bed, let sit for 5-10 minutes, remove gauze (do not rinse) then apply dressing:  -Apply 1/10 of one 4x5\" Ioplex to wound bed   -Cover with 1/4 of 6x10\" ABD " "  -Secure with one 4\" conforming roll gauze and 2\" medipore tape  -Apply lower extremity compression from toes to just below the knee  Change once a day and as needed for soilage    Okay to leave left dorsal foot open to air     Repositioning:  Bed: Reposition MINIMALLY every 1-2 hours in bed to relieve pressure and promote  perfusion to tissue. Avoid pressure to area.  Chair: When up to the chair, do not sit for longer than one hour total before returning  to bed for at least 60 minutes to relieve pressure and promote perfusion to the tissue.  Completely recline/tilt for 15 minutes each hour. Sit on a chair cushion when up to the chair.     Group 2 Mattress:  Patient will need group 2, low air loss mattress due to large, stage 3 ulceration on the patient's pelvis that has failed to improve on a normal mattress despite regular wound cares and repositioning. A group 1 mattress will not be adequate to offload these severe ulcerations. Patient has impaired sensation and is unable to reposition independently.     Jayleen Meade PA-C July 18, 2023    Call us at [phone] if you have any questions about your wounds, have redness or swelling around your wound, have a fever of 101 degrees Fahrenheit or greater or if you have any other problems or concerns. We answer the phone Monday through Friday 8 am to 4 pm, please leave a message as we check the voicemail frequently throughout the day.     If you had a positive experience please indicate that on your patient satisfaction survey form that [facility] will be sending you.    It was a pleasure meeting with you today.  Thank you for allowing me and my team the privilege of caring for you today.  YOU are the reason we are here, and I truly hope we provided you with the excellent service you deserve.  Please let us know if there is anything else we can do for you so that we can be sure you are leaving completely satisfied with your care experience.      If you " have any billing related questions please call the [facility] Business office at [phone]. The clinic staff does not handle billing related matters.    If you are scheduled to have a follow up appointment, you will receive a reminder call the day before your visit. On the appointment day please arrive 15 minutes prior to your appointment time. If you are unable to keep that appointment, please call the clinic to cancel or reschedule. If you are more than 10 minutes late or greater for your scheduled appointment time, the clinic policy is that you may be asked to reschedule.

## 2023-07-18 NOTE — PROGRESS NOTES
Patient Active Problem List   Diagnosis     Removal of pin, plate, kristi, or screw     Open wound of lower leg, left, initial encounter     Past Medical History:   Diagnosis Date     Degenerative joint disease      Gastro-oesophageal reflux disease      Labs: No results for input(s): ALBUMIN, HGB, INR, WBC, A1C, CRP in the last 07065 hours.  Invalid input(s): PREALBUMIN,  GLUCOSE, MICROBIO  Nutrition requirements were discussed with patient today.  Vitals:  There were no vitals taken for this visit.  Wound:   Wound (used by OP I only) 01/26/23 0905 Right gluteal pressure injury (Active)   Thickness/Stage Stage 3 07/18/23 1310   Base red 07/18/23 1310   Periwound intact 07/18/23 1310   Periwound Temperature warm 07/18/23 1310   Periwound Skin Turgor soft 07/18/23 1310   Edges open 07/18/23 1310   Length (cm) 0.6 07/18/23 1310   Width (cm) 2.1 07/18/23 1310   Depth (cm) 4.9 07/18/23 1310   Wound (cm^2) 1.26 cm^2 07/18/23 1310   Wound Volume (cm^3) 6.17 cm^3 07/18/23 1310   Wound healing % 39.42 07/18/23 1310   Tunneling [Depth (cm)/Location] 9 o'clock 6.0cm 07/18/23 1310   Undermining [Depth (cm)/Location] 8.5cm @ 9 o'clock 05/25/23 1428   Drainage Characteristics/Odor serosanguineous 07/18/23 1310   Drainage Amount moderate 07/18/23 1310   Care, Wound debrided;chemical cautery applied 07/18/23 1310       Wound (used by OP I only) 05/25/23 1434 Left dorsal foot pressure injury (Active)   Thickness/Stage unstageable 07/18/23 1310   Base necrotic;red;slough 07/18/23 1310   Periwound intact;redness;swelling 07/18/23 1310   Periwound Temperature warm 07/18/23 1310   Periwound Skin Turgor soft 07/18/23 1310   Edges open 07/18/23 1310   Length (cm) 0.5 07/18/23 1310   Width (cm) 0.7 07/18/23 1310   Depth (cm) 0.1 07/18/23 1310   Wound (cm^2) 0.35 cm^2 07/18/23 1310   Wound Volume (cm^3) 0.04 cm^3 07/18/23 1310   Wound healing % 95.51 07/18/23 1310   Drainage Characteristics/Odor serosanguineous 07/18/23 1310   Drainage  Amount moderate 07/18/23 1310   Care, Wound non-select wound debridement performed 07/18/23 1310       Wound (used by OP I only) 07/18/23 1318 Left plantar;medial foot unspecified (Active)   Thickness/Stage unstageable 07/18/23 1310   Base necrotic;slough;red 07/18/23 1310   Periwound pink 07/18/23 1310   Periwound Temperature warm 07/18/23 1310   Periwound Skin Turgor soft 07/18/23 1310   Length (cm) 2 07/18/23 1310   Width (cm) 2.3 07/18/23 1310   Depth (cm) 0.3 07/18/23 1310   Wound (cm^2) 4.6 cm^2 07/18/23 1310   Wound Volume (cm^3) 1.38 cm^3 07/18/23 1310   Drainage Characteristics/Odor serosanguineous 07/18/23 1310   Drainage Amount moderate 07/18/23 1310   Care, Wound debrided;chemical cautery applied 07/18/23 1310   Photo:     Further instructions from your care team       Saqib Bishop      1980    A DME order was faxed to [employer]: Fax # [phone]. Home care does not have an open Medicare episode. They are billing under Medical assistance so [facility] should order supplies.    - [facility], Phone: [phone] Fax: [phone]  Attention Home Care: Please have patient pressure mapped with your home care agency, if you do not have ability to do this, please contact us at [phone] so we can send orders to another location      To do list for possible surgery:  1. Get Group 2 Mattress. Order sent to [employer] Fax #[phone]  2. Get Pressure Mapped with home care or contact wound healing institute if they are unable to map your seat  3. MRI Complete  4.Your nutrition values must be with in normal range. A diet high in protein is important for wound healing, we recommend getting  grams of protein per day. Taking protein shakes or bars are a good way to get extra protein in your diet.   5. Continue to not smoke  6. Colostomy in place  7. Indwelling Catheter  8. Labs drawn today   9. Apt at [facility] and surgery center for an abdominal wall reconstructive  "surgery with Dr. Nii Mancilla 7-21-23     Wound Dressing Change: Right Gluteal wound  -Cleanse with mild unscented soap (such as Cetaphil, Cerave or Dove) and water  -Apply small amount of VASHE on gauze, lay into wound bed, let sit for 5-10 minutes, remove gauze (do not rinse) then apply dressing: -  -Lightly pack with 1/10 of 4\" AMD roll gauze  -Cover with 1/4 of 6x10\" ABD pad and 2\" Medipore tape   Change 1-2x a day and as needed for soilage     Wound Dressing Change: left medial plantar foot  -Cleanse with mild unscented soap such as Cetaphil, Cerave or Dove) and water  -Apply small amount of VASHE on gauze, lay into wound bed, let sit for 5-10 minutes, remove gauze (do not rinse) then apply dressing:  -Apply 1/10 of one 4x5\" Ioplex to wound bed   -Cover with 1/4 of 6x10\" ABD   -Secure with one 4\" conforming roll gauze and 2\" medipore tape  -Apply lower extremity compression from toes to just below the knee  Change once a day and as needed for soilage    Okay to leave left dorsal foot open to air     Repositioning:  Bed: Reposition MINIMALLY every 1-2 hours in bed to relieve pressure and promote  perfusion to tissue. Avoid pressure to area.  Chair: When up to the chair, do not sit for longer than one hour total before returning  to bed for at least 60 minutes to relieve pressure and promote perfusion to the tissue.  Completely recline/tilt for 15 minutes each hour. Sit on a chair cushion when up to the chair.     Group 2 Mattress:  Patient will need group 2, low air loss mattress due to large, stage 3 ulceration on the patient's pelvis that has failed to improve on a normal mattress despite regular wound cares and repositioning. A group 1 mattress will not be adequate to offload these severe ulcerations. Patient has impaired sensation and is unable to reposition independently.     Jayleen Meade PA-C July 18, 2023    "
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
## 2023-07-19 ENCOUNTER — TELEPHONE (OUTPATIENT)
Dept: WOUND CARE | Facility: CLINIC | Age: 43
End: 2023-07-19
Payer: MEDICARE

## 2023-07-19 LAB — PREALB SERPL IA-MCNC: 20 MG/DL (ref 15–45)

## 2023-07-19 NOTE — TELEPHONE ENCOUNTER
Spoke with [employer] and confirmed they do not service patients area. Group 2 mattress order sent to [employer] to see if they are able to order group 2 mattress for patient as they are handling patients wound care supplies at this time.    Awaiting reply from [employer] if able to complete order.   Gudelia Guzman RN

## 2023-07-19 NOTE — TELEPHONE ENCOUNTER
Domitila with [facility] [employer] left a vm 7/18/23 after clinic hours stating that they received the order for the patient but he lives out of their service area. She did request a call back. [phone]

## 2023-07-20 ENCOUNTER — PATIENT OUTREACH (OUTPATIENT)
Dept: SURGERY | Facility: CLINIC | Age: 43
End: 2023-07-20
Payer: MEDICARE

## 2023-07-20 NOTE — PROGRESS NOTES
Patient is scheduled for a clinic visit with Dr. Mancilla on 7/21 which needs to be rescheduled as the provider is out ill.  Patient can be rescheduled to 8/4.  Attempted to reach the patient x 2 with no answer. VM left x 2.  Patient has active MyChart but does not read messages.

## 2023-07-21 ENCOUNTER — PRE VISIT (OUTPATIENT)
Dept: SURGERY | Facility: CLINIC | Age: 43
End: 2023-07-21

## 2023-07-28 NOTE — TELEPHONE ENCOUNTER
REFERRAL INFORMATION:  Referring Provider: Dr. David Mcghee  Referring Clinic: [facility] - Wound  Reason for Visit/Diagnosis: Parastomal Hernia       FUTURE VISIT INFORMATION:  Appointment Date: 8/4/2023  Appointment Time: 1:30 PM     NOTES RECORD STATUS  DETAILS   OFFICE NOTE from Referring Provider Internal [facility]:  5/25/23, 5/1/23 - WOUND OV with Dr. Mcghee   OFFICE NOTE from Other Specialists Care Everywhere / Internal [facility]:  7/18/23 - WOUND OV with Jayleen Meade RN     [facility]:  1/17/23 - PLASTIC OV with Luba Chua NP  10/12/22 - WOUND OV with Anitha Reyes RN     [facility]:  8/18/21 - SURG OV with JENNIFER Atwood   HOSPITAL DISCHARGE SUMMARY/ ED VISITS  Care Everywhere [facility]:  6/26/23 - ED OV with Dr. Lewis  5/17/23 - Admission with Dr. Hyman  5/17/23 - ED OV with Dr. Loya     [facility]:  10/30/19 - ED OV with Dr. Gomez  * Additional in Care Everywhere   OPERATIVE REPORT Care Everywhere [facility]:  9/29/22 - OP Note for RIGHT GLUTEUS I&D with Dr. Howard  8/1/22 - OP Note for RIGHT GLUTEUS WOUND DEBRIDEMENT with Dr. Howard  3/8/22 - OP Note for I&D SACRAL ABSCESS with Dr. Howard  * Additional in Care Everywhere     [facility]:  8/2/21 - OP Note for PARASTOMAL HERNIA REPAIR WITH MESH with Dr. Jeffers  9/13/17 - OP Note for LAPAROSCOPIC CREATION DIVERTING COLOSTOMY, UMBILICAL HERNIA REPAIR with Dr. Aguilera   ENDOSCOPY (EGD)  N/A    PERTINENT LABS Care Everywhere / Internal    PATHOLOGY REPORTS (RELATED) Care Everywhere [facility]:  9/29/22 - Right Sacral Skin  8/1/22 - Sacral Skin  1/28/22 - Sacral Skin  - Additional in Care Everywhere   IMAGING (CT, MRI, US, XR)  recvd / Internal [facility]:  6/26/23 - CT Chest/Abd/Pelvis  5/17/23, 10/6/22, 2/14/22 - CT Abd/Pelvis  5/17/23 - XR Pelvis  3/7/22, 2/11/22 - CT Pelvis  9/26/21 - US Abdomen     [facility]:  5/25/23 - MRI Pelvic     Records Requested    Facility  [facility]  Fax: [phone]   Outcome *  23 12:21 PM Faxed urg req to [facility] for imaging disc to be Fed'Exd out. - Brissa    Trackin    23 recvd images, sent for scanning. Radha@ 12:50pm

## 2023-08-03 ENCOUNTER — PATIENT OUTREACH (OUTPATIENT)
Dept: SURGERY | Facility: CLINIC | Age: 43
End: 2023-08-03
Payer: MEDICARE

## 2023-08-03 NOTE — PROGRESS NOTES
Patient Telephone Reminder Call    Date of call:  08/03/23  Phone numbers:  Home number on file [phone] (home)    Reached patient/confirmed appointment:  Yes  Appointment with:   Dr. Nii Mancilla  Reason for visit:  Hernia  Remind patient that this visit is a consultation only, NO procedure:  No  Can this visit be changed to a video visit:  No

## 2023-08-04 ENCOUNTER — OFFICE VISIT (OUTPATIENT)
Dept: SURGERY | Facility: CLINIC | Age: 43
End: 2023-08-04
Attending: SURGERY
Payer: MEDICARE

## 2023-08-04 ENCOUNTER — PRE VISIT (OUTPATIENT)
Dept: SURGERY | Facility: CLINIC | Age: 43
End: 2023-08-04

## 2023-08-04 VITALS
WEIGHT: 275 LBS | SYSTOLIC BLOOD PRESSURE: 123 MMHG | OXYGEN SATURATION: 96 % | BODY MASS INDEX: 40.73 KG/M2 | HEIGHT: 69 IN | DIASTOLIC BLOOD PRESSURE: 85 MMHG | HEART RATE: 95 BPM

## 2023-08-04 DIAGNOSIS — K43.2 INCISIONAL HERNIA, WITHOUT OBSTRUCTION OR GANGRENE: Primary | ICD-10-CM

## 2023-08-04 PROCEDURE — 99203 OFFICE O/P NEW LOW 30 MIN: CPT | Performed by: SURGERY

## 2023-08-04 RX ORDER — ACETAMINOPHEN 500 MG
1000 TABLET ORAL 3 TIMES DAILY PRN
Status: ON HOLD | COMMUNITY
End: 2023-11-16

## 2023-08-04 RX ORDER — LOSARTAN POTASSIUM AND HYDROCHLOROTHIAZIDE 12.5; 5 MG/1; MG/1
0.5 TABLET ORAL DAILY
COMMUNITY
Start: 2023-05-31

## 2023-08-04 RX ORDER — MAGNESIUM OXIDE 400 MG/1
400 TABLET ORAL DAILY
Status: ON HOLD | COMMUNITY
End: 2023-08-28

## 2023-08-04 RX ORDER — PREDNISOLONE ACETATE 10 MG/ML
1 SUSPENSION/ DROPS OPHTHALMIC 2 TIMES DAILY
COMMUNITY

## 2023-08-04 RX ORDER — LEVOFLOXACIN 750 MG/1
1 TABLET, FILM COATED ORAL
Status: ON HOLD | COMMUNITY
Start: 2022-09-17 | End: 2023-08-28

## 2023-08-04 RX ORDER — DULOXETIN HYDROCHLORIDE 60 MG/1
60 CAPSULE, DELAYED RELEASE ORAL 2 TIMES DAILY
COMMUNITY

## 2023-08-04 RX ORDER — BUSPIRONE HYDROCHLORIDE 10 MG/1
1 TABLET ORAL 2 TIMES DAILY
COMMUNITY
Start: 2023-04-18

## 2023-08-04 RX ORDER — CYCLOBENZAPRINE HCL 10 MG
10 TABLET ORAL 3 TIMES DAILY PRN
COMMUNITY
Start: 2022-11-01

## 2023-08-04 RX ORDER — BUPROPION HYDROCHLORIDE 150 MG/1
450 TABLET ORAL DAILY
Status: ON HOLD | COMMUNITY
End: 2023-11-16

## 2023-08-04 RX ORDER — TAMSULOSIN HYDROCHLORIDE 0.4 MG/1
CAPSULE ORAL
Status: ON HOLD | COMMUNITY
Start: 2023-04-13 | End: 2023-08-28

## 2023-08-04 RX ORDER — LORATADINE 10 MG/1
1 TABLET ORAL DAILY
COMMUNITY

## 2023-08-04 RX ORDER — OMEPRAZOLE 40 MG/1
80 CAPSULE, DELAYED RELEASE ORAL
COMMUNITY

## 2023-08-04 RX ORDER — AMOXICILLIN 250 MG
1 CAPSULE ORAL DAILY
COMMUNITY
Start: 2023-07-03

## 2023-08-04 RX ORDER — PREGABALIN 150 MG/1
150 CAPSULE ORAL 3 TIMES DAILY
Status: ON HOLD | COMMUNITY
End: 2023-09-08

## 2023-08-04 RX ORDER — HYDROXYCHLOROQUINE SULFATE 200 MG/1
2 TABLET, FILM COATED ORAL DAILY
Status: ON HOLD | COMMUNITY
Start: 2023-05-30 | End: 2023-09-14

## 2023-08-04 RX ORDER — IRON POLYSACCHARIDE COMPLEX 150 MG
CAPSULE ORAL
Status: ON HOLD | COMMUNITY
End: 2023-09-14

## 2023-08-04 ASSESSMENT — PAIN SCALES - GENERAL: PAINLEVEL: MODERATE PAIN (5)

## 2023-08-04 NOTE — LETTER
8/4/2023       RE: Saqib Bishop  [id number] [place] [address]       Dear Colleague,    Thank you for referring your patient, Saqib Bishop, to the [facility] GENERAL SURGERY CLINIC [place] at [facility]. Please see a copy of my visit note below.    General Surgery Clinic;    The patient was evaluated in the Surgery Clinic. The patient is a 43-year-old male who presents with a large recurrent parastomal hernia. The patient has recurrent open wounds that continue to break down over the sacrum due to his need to stay in the wheelchair. The patient has a large recurrent parastomal hernia. The patient has not had leakage at the ostomy site. The patient does not have severe pain at the hernia. The patient has not had obstructive symptoms.      PAST MEDICAL HISTORY:  Past Medical History:   Diagnosis Date    Degenerative joint disease     Gastro-oesophageal reflux disease      PAST SURGICAL HISTORY:  Past Surgical History:   Procedure Laterality Date    BACK SURGERY      lumbar fusion    EXPLORE SPINE, REMOVE HARDWARE, COMBINED  1/13/2012    Procedure:COMBINED EXPLORE SPINE, REMOVE HARDWARE; EXPLORE SPINE, REMOVE HARDWARE L4-S1; Surgeon:FAM GONZALEZ; Location:[facility] OR    ORTHOPEDIC SURGERY      right foot surgery     MEDICATIONS:  Current Outpatient Medications   Medication    acetaminophen (TYLENOL) 500 MG tablet    buPROPion (WELLBUTRIN XL) 150 MG 24 hr tablet    busPIRone (BUSPAR) 10 MG tablet    Cranberry 400 MG CAPS    cyclobenzaprine (FLEXERIL) 10 MG tablet    DULoxetine (CYMBALTA) 60 MG capsule    FERREX 150 150 MG capsule    hydroxychloroquine (PLAQUENIL) 200 MG tablet    levofloxacin (LEVAQUIN) 750 MG tablet    loratadine (CLARITIN) 10 MG tablet    losartan-hydrochlorothiazide (HYZAAR) 50-12.5 MG tablet    magnesium oxide 400 MG tablet    naloxone (NARCAN) 4 MG/0.1ML nasal spray    omeprazole (PRILOSEC) 40 MG DR capsule    oxycodone (OXY-IR)  "5 MG capsule    oxycodone (OXYCONTIN) 10 MG 12 hr tablet    polyethylene glycol-propylene glycol (SYSTANE ULTRA) 0.4-0.3 % SOLN ophthalmic solution    prednisoLONE acetate (PRED FORTE) 1 % ophthalmic suspension    pregabalin (LYRICA) 150 MG capsule    senna-docusate (SENOKOT-S/PERICOLACE) 8.6-50 MG tablet    tamsulosin (FLOMAX) 0.4 MG capsule    nicotine (NICODERM CQ) 7 MG/24HR patch 2h hr     No current facility-administered medications for this visit.     ALLERGIES:  Allergies   Allergen Reactions    Adhesive Tape Hives     From tape from surgery    Benzoin Hives and Rash    Droperidol Anaphylaxis    Prednisone      vomiting    Vitamin D Rash     flairs up his sarcoidosis     SOCIAL HISTORY:  Social History     Socioeconomic History    Marital status: Single     Spouse name: None    Number of children: None    Years of education: None    Highest education level: None   Tobacco Use    Smoking status: Former     Types: Cigarettes     Quit date: 2011     Years since quittin.7   Substance and Sexual Activity    Alcohol use: No    Drug use: No     FAMILY HISTORY:  No family history on file.     PHYSICAL EXAM:  Vital Signs: /85 (BP Location: Right arm, Patient Position: Sitting, Cuff Size: Adult Large)   Pulse 95   Ht 1.753 m (5' 9\")   Wt 124.7 kg (275 lb)   SpO2 96%   BMI 40.61 kg/m    ABD: soft and non-tender. The patient has a non-reducible parastomal hernia. There is no erythema, and there are no other hernias.      EXT> warm and well perfused. Excellent muscle mass.    A/P\": The patient will be reassessed in the next 3 to 6 months.  Parastomal hernia would require weight loss of approximately 36 pounds as this would constitute 20% weight loss. The patient is interested in working with the weight loss clinic; he will pursue a virtual meeting with the medical weight loss group at the [facility].        Again, thank you for allowing me to participate in the care of your patient.  "     Sincerely,    Nii Mancilla MD

## 2023-08-04 NOTE — NURSING NOTE
Patient is going to ask his PCP for a referral to an ostomy nurse near his area to help him get fitted for an ostomy binder/belt. He will call the General Surgery Nurse Line if his PCP is not willing to do this for him or if there aren't any ostomy nurses near his area. In that case, he can be scheduled with the CWON at the [facility].

## 2023-08-04 NOTE — PATIENT INSTRUCTIONS
You met with Dr. Nii Mancilla.      Today's visit instructions:    We have referred you to Weight Management. Your goal weight is 220lb (current weigh 275lb). Return to see Dr. Mancilla via video as scheduled.        If you have questions please contact Yamilet RN or Ann RN during regular clinic hours, Monday through Friday 7:30 AM - 4:00 PM, or you can contact us via [employer] at anytime.       If you have urgent needs after-hours, weekends, or holidays please call the hospital at [phone] and ask to speak with our on-call General Surgery Team.    Appointment [phone]  Nurse Advice (Yamilet or Ann): [phone]   Surgery Scheduler (Zahida): [phone]  Fax: [phone]

## 2023-08-04 NOTE — NURSING NOTE
"Chief Complaint   Patient presents with    New Patient     Parastomal hernia       Vitals:    08/04/23 1320   BP: 123/85   BP Location: Right arm   Patient Position: Sitting   Cuff Size: Adult Large   Pulse: 95   SpO2: 96%   Height: 1.753 m (5' 9\")       There is no height or weight on file to calculate BMI.                          Jasmeet David, EMT    "

## 2023-08-06 NOTE — PROGRESS NOTES
General Surgery Clinic;    The patient was evaluated in the Surgery Clinic. The patient is a 43-year-old male who presents with a large recurrent parastomal hernia. The patient has recurrent open wounds that continue to break down over the sacrum due to his need to stay in the wheelchair. The patient has a large recurrent parastomal hernia. The patient has not had leakage at the ostomy site. The patient does not have severe pain at the hernia. The patient has not had obstructive symptoms.      PAST MEDICAL HISTORY:  Past Medical History:   Diagnosis Date    Degenerative joint disease     Gastro-oesophageal reflux disease      PAST SURGICAL HISTORY:  Past Surgical History:   Procedure Laterality Date    BACK SURGERY      lumbar fusion    EXPLORE SPINE, REMOVE HARDWARE, COMBINED  1/13/2012    Procedure:COMBINED EXPLORE SPINE, REMOVE HARDWARE; EXPLORE SPINE, REMOVE HARDWARE L4-S1; Surgeon:FAM GONZALEZ; Location:[facility] OR    ORTHOPEDIC SURGERY      right foot surgery     MEDICATIONS:  Current Outpatient Medications   Medication    acetaminophen (TYLENOL) 500 MG tablet    buPROPion (WELLBUTRIN XL) 150 MG 24 hr tablet    busPIRone (BUSPAR) 10 MG tablet    Cranberry 400 MG CAPS    cyclobenzaprine (FLEXERIL) 10 MG tablet    DULoxetine (CYMBALTA) 60 MG capsule    FERREX 150 150 MG capsule    hydroxychloroquine (PLAQUENIL) 200 MG tablet    levofloxacin (LEVAQUIN) 750 MG tablet    loratadine (CLARITIN) 10 MG tablet    losartan-hydrochlorothiazide (HYZAAR) 50-12.5 MG tablet    magnesium oxide 400 MG tablet    naloxone (NARCAN) 4 MG/0.1ML nasal spray    omeprazole (PRILOSEC) 40 MG DR capsule    oxycodone (OXY-IR) 5 MG capsule    oxycodone (OXYCONTIN) 10 MG 12 hr tablet    polyethylene glycol-propylene glycol (SYSTANE ULTRA) 0.4-0.3 % SOLN ophthalmic solution    prednisoLONE acetate (PRED FORTE) 1 % ophthalmic suspension    pregabalin (LYRICA) 150 MG capsule    senna-docusate (SENOKOT-S/PERICOLACE) 8.6-50 MG tablet     "tamsulosin (FLOMAX) 0.4 MG capsule    nicotine (NICODERM CQ) 7 MG/24HR patch 2h hr     No current facility-administered medications for this visit.     ALLERGIES:  Allergies   Allergen Reactions    Adhesive Tape Hives     From tape from surgery    Benzoin Hives and Rash    Droperidol Anaphylaxis    Prednisone      vomiting    Vitamin D Rash     flairs up his sarcoidosis     SOCIAL HISTORY:  Social History     Socioeconomic History    Marital status: Single     Spouse name: None    Number of children: None    Years of education: None    Highest education level: None   Tobacco Use    Smoking status: Former     Types: Cigarettes     Quit date: 2011     Years since quittin.7   Substance and Sexual Activity    Alcohol use: No    Drug use: No     FAMILY HISTORY:  No family history on file.     PHYSICAL EXAM:  Vital Signs: /85 (BP Location: Right arm, Patient Position: Sitting, Cuff Size: Adult Large)   Pulse 95   Ht 1.753 m (5' 9\")   Wt 124.7 kg (275 lb)   SpO2 96%   BMI 40.61 kg/m    ABD: soft and non-tender. The patient has a non-reducible parastomal hernia. There is no erythema, and there are no other hernias.      EXT> warm and well perfused. Excellent muscle mass.    A/P\": The patient will be reassessed in the next 3 to 6 months.  Parastomal hernia would require weight loss of approximately 36 pounds as this would constitute 20% weight loss. The patient is interested in working with the weight loss clinic; he will pursue a virtual meeting with the medical weight loss group at the [facility].  "

## 2023-08-07 ENCOUNTER — TELEPHONE (OUTPATIENT)
Dept: ENDOCRINOLOGY | Facility: CLINIC | Age: 43
End: 2023-08-07
Payer: MEDICARE

## 2023-08-07 NOTE — TELEPHONE ENCOUNTER
Contacted pt to reschedule due to scheduling error.     Per nurse, needs to reschedule to Viv Traylor, there is an opening slot for 8/10/23 at 12;30pm virtual, please use this slot if pt calls back.     Left k pod#  and also sending myc

## 2023-08-17 ENCOUNTER — HOSPITAL ENCOUNTER (OUTPATIENT)
Dept: WOUND CARE | Facility: CLINIC | Age: 43
Discharge: HOME | End: 2023-08-17
Attending: SURGERY | Admitting: SURGERY
Payer: MEDICARE

## 2023-08-17 VITALS — SYSTOLIC BLOOD PRESSURE: 104 MMHG | TEMPERATURE: 97.1 F | HEART RATE: 91 BPM | DIASTOLIC BLOOD PRESSURE: 67 MMHG

## 2023-08-17 DIAGNOSIS — L89.153 PRESSURE INJURY OF SACRAL REGION, STAGE 3 (H): ICD-10-CM

## 2023-08-17 DIAGNOSIS — S81.802A OPEN WOUND OF LOWER LEG, LEFT, INITIAL ENCOUNTER: Primary | ICD-10-CM

## 2023-08-17 DIAGNOSIS — L89.159 PRESSURE INJURY OF SKIN OF SACRAL REGION, UNSPECIFIED INJURY STAGE: ICD-10-CM

## 2023-08-17 PROCEDURE — 99204 OFFICE O/P NEW MOD 45 MIN: CPT | Performed by: SURGERY

## 2023-08-17 PROCEDURE — 97602 WOUND(S) CARE NON-SELECTIVE: CPT

## 2023-08-17 NOTE — PROGRESS NOTES
Patient Active Problem List   Diagnosis    Removal of pin, plate, kristi, or screw    Open wound of lower leg, left, initial encounter    Pressure ulcer of sacral region, unspecified stage     Past Medical History:   Diagnosis Date    Degenerative joint disease     Gastro-oesophageal reflux disease    Labs: No results for input(s): ALBUMIN, HGB, INR, WBC, A1C, CRP in the last 46276 hours.  Invalid input(s): PREALBUMIN,  GLUCOSE, MICROBIO  Nutrition requirements were discussed with patient today.  Vitals:  /67 (BP Location: Right arm, Patient Position: Chair, Cuff Size: Adult Regular)   Pulse 91   Temp 97.1  F (36.2  C) (Temporal)   Wound:   Wound (used by OP I only) 01/26/23 0905 Right gluteal pressure injury (Active)   Thickness/Stage Stage 3 08/17/23 1423   Base granulating 08/17/23 1423   Periwound intact;contracted 08/17/23 1423   Periwound Temperature warm 08/17/23 1423   Periwound Skin Turgor soft 08/17/23 1423   Edges open;rolled/closed 08/17/23 1423   Length (cm) 0.5 08/17/23 1423   Width (cm) 1.7 08/17/23 1423   Depth (cm) 2.8 08/17/23 1423   Wound (cm^2) 0.85 cm^2 08/17/23 1423   Wound Volume (cm^3) 2.38 cm^3 08/17/23 1423   Wound healing % 59.13 08/17/23 1423   Tunneling [Depth (cm)/Location] 9 oclock/ 7.5 cm 08/17/23 1423   Undermining [Depth (cm)/Location] 8.5cm @ 9 o'clock 05/25/23 1428   Drainage Characteristics/Odor serosanguineous 08/17/23 1423   Drainage Amount moderate 08/17/23 1423   Care, Wound non-select wound debridement performed 08/17/23 1423       Wound (used by OP I only) 05/25/23 1434 Left dorsal foot pressure injury (Active)   Thickness/Stage unstageable 08/17/23 1423   Base necrotic;red;slough 08/17/23 1423   Periwound intact;redness;swelling 08/17/23 1423   Periwound Temperature warm 08/17/23 1423   Periwound Skin Turgor soft 08/17/23 1423   Edges open 08/17/23 1423   Length (cm) 0.5 08/17/23 1423   Width (cm) 0.7 08/17/23 1423   Depth (cm) 0.1 08/17/23 1423   Wound (cm^2)  "0.35 cm^2 08/17/23 1423   Wound Volume (cm^3) 0.04 cm^3 08/17/23 1423   Wound healing % 95.51 08/17/23 1423   Drainage Characteristics/Odor serosanguineous 08/17/23 1423   Drainage Amount moderate 08/17/23 1423   Care, Wound non-select wound debridement performed 08/17/23 1423       Wound (used by OP [facility]I only) 07/18/23 1318 Left plantar;medial foot unspecified (Active)   Thickness/Stage unstageable 08/17/23 1423   Base necrotic;slough;red 08/17/23 1423   Periwound pink 08/17/23 1423   Periwound Temperature warm 08/17/23 1423   Periwound Skin Turgor soft 08/17/23 1423   Edges open 08/17/23 1423   Length (cm) 2 08/17/23 1423   Width (cm) 2.3 08/17/23 1423   Depth (cm) 0.3 08/17/23 1423   Wound (cm^2) 4.6 cm^2 08/17/23 1423   Wound Volume (cm^3) 1.38 cm^3 08/17/23 1423   Wound healing % 0 08/17/23 1423   Drainage Characteristics/Odor serosanguineous 08/17/23 1423   Drainage Amount moderate 08/17/23 1423   Care, Wound non-select wound debridement performed 08/17/23 1423    Photo:   Further instructions from your care team         Saqib Bishop      1980  A DME order was faxed to [facility]: Fax # [phone]. Home care does not have an open Medicare episode. They are billing under Medical assistance so [facility] should order supplies.  [facility], Phone: [phone] Fax: [phone]     To do list for FLAP surgery:  1. Get Group 2 Mattress: [facility] P: [phone]  Fax: [phone]   2. Get Pressure Mapped: Need HARD bottom/ NOT SLING  3. DONE MRI   4.Continue diet high in protein is important for wound healing, we recommend getting  grams of protein per day. Taking protein shakes or bars are a good way to get extra protein in your diet.   5. NO TOBACCO  6. Colostomy in place  7. Indwelling Catheter  8. H&P with  Primary Care MD 30 days prior to surgery  9. Abdominal wall reconstructive surgery with Dr. Nii Mancilla- need to \"lose weight\" before surgery     Wound Dressing Change: " "Right Gluteal wound  -Cleanse with mild unscented soap (such as Cetaphil, Cerave or Dove) and water  -Apply small amount of VASHE on gauze, lay into wound bed, let sit for 5-10 minutes, remove gauze (do not rinse) then apply dressing: -  -Lightly pack with 1/4 of 4\" AMD roll gauze down into 7.5cm pocket  -Cover with 1/2 of 5x9\" ABD pad and 2\" Medipore tape   Change 1-2x a day and as needed for soilage     Wound Dressing Change: Left medial plantar foot  -Cleanse with mild unscented soap such as Cetaphil, Cerave or Dove) and water  -Apply small amount of VASHE on gauze, lay into wound bed, let sit for 5-10 minutes, remove gauze   -Apply 1/10 of one 4x5\" Ioplex to wound bed   -Cover with 1/2 of 5x9\" ABD   -Secure with 1/2 4\" Kerlix roll gauze and 2\" medipore tape  -Apply lower extremity compression from toes to just below the knee  Change twice a day and as needed for excessive drainage      Okay to leave left dorsal foot open to air     Repositioning:  Bed: Reposition MINIMALLY every 1-2 hours in bed to relieve pressure and promote perfusion to tissue. Avoid pressure to area.  Chair: When up to the chair, do not sit for longer than one hour total before returning to bed for at least 60 minutes to relieve pressure and promote perfusion to the tissue. Completely recline/tilt for 15 minutes each hour. Sit on a chair cushion when up to the chair.     Group 2 Mattress: [facility]  Patient will need group 2, low air loss mattress due to large, stage 4 ulceration on the patient's pelvis that has failed to improve on a normal mattress despite regular wound cares and repositioning. A group 1 mattress will not be adequate to offload these severe ulcerations. Patient has impaired sensation and is unable to reposition independently.       Jayashree Zavala M.D. August 17, 2023    "

## 2023-08-17 NOTE — DISCHARGE INSTRUCTIONS
"Saqib Bishop      1980  A DME order was faxed to [facility]: Fax # [phone]. Home care does not have an open Medicare episode. They are billing under Medical assistance so [facility] should order supplies.  [facility], Phone: [phone] Fax: [phone]     To do list for FLAP surgery:  1. Get Group 2 Mattress: [facility] P: [phone]  Fax: [phone]   2. Get Pressure Mapped: Need HARD bottom/ NOT SLING  3. DONE MRI   4.Continue diet high in protein is important for wound healing, we recommend getting  grams of protein per day. Taking protein shakes or bars are a good way to get extra protein in your diet.   5. NO TOBACCO  6. Colostomy in place  7. Indwelling Catheter  8. H&P with  Primary Care MD 30 days prior to surgery  9. Abdominal wall reconstructive surgery with Dr. Nii Mancilla- need to \"lose weight\" before surgery     Wound Dressing Change: Right Gluteal wound  -Cleanse with mild unscented soap (such as Cetaphil, Cerave or Dove) and water  -Apply small amount of VASHE on gauze, lay into wound bed, let sit for 5-10 minutes, remove gauze (do not rinse) then apply dressing: -  -Lightly pack with 1/4 of 4\" AMD roll gauze down into 7.5cm pocket  -Cover with 1/2 of 5x9\" ABD pad and 2\" Medipore tape   Change 1-2x a day and as needed for soilage     Wound Dressing Change: Left medial plantar foot  -Cleanse with mild unscented soap such as Cetaphil, Cerave or Dove) and water  -Apply small amount of VASHE on gauze, lay into wound bed, let sit for 5-10 minutes, remove gauze   -Apply 1/10 of one 4x5\" Ioplex to wound bed   -Cover with 1/2 of 5x9\" ABD   -Secure with 1/2 4\" Kerlix roll gauze and 2\" medipore tape  -Apply lower extremity compression from toes to just below the knee  Change twice a day and as needed for excessive drainage      Okay to leave left dorsal foot open to air     Repositioning:  Bed: Reposition MINIMALLY every 1-2 hours in bed to relieve pressure and " promote perfusion to tissue. Avoid pressure to area.  Chair: When up to the chair, do not sit for longer than one hour total before returning to bed for at least 60 minutes to relieve pressure and promote perfusion to the tissue. Completely recline/tilt for 15 minutes each hour. Sit on a chair cushion when up to the chair.     Group 2 Mattress: [facility]  Patient will need group 2, low air loss mattress due to large, stage 4 ulceration on the patient's pelvis that has failed to improve on a normal mattress despite regular wound cares and repositioning. A group 1 mattress will not be adequate to offload these severe ulcerations. Patient has impaired sensation and is unable to reposition independently.       Jayashree Zavala M.D. August 17, 2023    Call us at [phone] if you have any questions about your wounds, have redness or swelling around your wound, have a fever of 101 degrees Fahrenheit or greater or if you have any other problems or concerns. We answer the phone Monday through Friday 8 am to 4 pm, please leave a message as we check the voicemail frequently throughout the day.     If you had a positive experience please indicate that on your patient satisfaction survey form that [facility] will be sending you. It was a pleasure meeting with you today.  Thank you for allowing me and my team the privilege of caring for you today.  YOU are the reason we are here, and I truly hope we provided you with the excellent service you deserve.  Please let us know if there is anything else we can do for you so that we can be sure you are leaving completely satisfied with your care experience.      If you have any billing related questions please call the [facility] Business office at [phone]. The clinic staff does not handle billing related matters.    If you are scheduled to have a follow up appointment, you will receive a reminder call the day before your visit. On the appointment day please  arrive 15 minutes prior to your appointment time. If you are unable to keep that appointment, please call the clinic to cancel or reschedule. If you are more than 10 minutes late or greater for your scheduled appointment time, the clinic policy is that you may be asked to reschedule.
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
## 2023-08-27 ENCOUNTER — APPOINTMENT (OUTPATIENT)
Dept: GENERAL RADIOLOGY | Facility: CLINIC | Age: 43
DRG: 853 | End: 2023-08-27
Attending: STUDENT IN AN ORGANIZED HEALTH CARE EDUCATION/TRAINING PROGRAM
Payer: MEDICARE

## 2023-08-27 ENCOUNTER — APPOINTMENT (OUTPATIENT)
Dept: GENERAL RADIOLOGY | Facility: CLINIC | Age: 43
DRG: 853 | End: 2023-08-27
Attending: INTERNAL MEDICINE
Payer: MEDICARE

## 2023-08-27 ENCOUNTER — ANESTHESIA EVENT (OUTPATIENT)
Dept: SURGERY | Facility: CLINIC | Age: 43
DRG: 853 | End: 2023-08-27
Payer: MEDICARE

## 2023-08-27 ENCOUNTER — TRANSFERRED RECORDS (OUTPATIENT)
Dept: HEALTH INFORMATION MANAGEMENT | Facility: CLINIC | Age: 43
End: 2023-08-27

## 2023-08-27 ENCOUNTER — ANESTHESIA (OUTPATIENT)
Dept: SURGERY | Facility: CLINIC | Age: 43
DRG: 853 | End: 2023-08-27
Payer: MEDICARE

## 2023-08-27 ENCOUNTER — HOSPITAL ENCOUNTER (OUTPATIENT)
Age: 43
End: 2023-08-27
Payer: MEDICARE

## 2023-08-27 ENCOUNTER — HOSPITAL ENCOUNTER (INPATIENT)
Facility: CLINIC | Age: 43
LOS: 13 days | Discharge: NURSING, REHAB OR LONG-TERM CARE | DRG: 853 | End: 2023-09-09
Attending: INTERNAL MEDICINE | Admitting: SURGERY
Payer: MEDICARE

## 2023-08-27 DIAGNOSIS — R68.2 DRY MOUTH: ICD-10-CM

## 2023-08-27 DIAGNOSIS — G47.00 INSOMNIA, UNSPECIFIED TYPE: ICD-10-CM

## 2023-08-27 DIAGNOSIS — S81.802A OPEN WOUND OF LOWER LEG, LEFT, INITIAL ENCOUNTER: ICD-10-CM

## 2023-08-27 DIAGNOSIS — M72.6 NECROTIZING FASCIITIS OF LOWER LEG (H): Primary | ICD-10-CM

## 2023-08-27 DIAGNOSIS — Z43.3 COLOSTOMY CARE (H): ICD-10-CM

## 2023-08-27 DIAGNOSIS — Z43.3 ATTENTION TO COLOSTOMY (H): ICD-10-CM

## 2023-08-27 DIAGNOSIS — L89.159 PRESSURE INJURY OF SKIN OF SACRAL REGION, UNSPECIFIED INJURY STAGE: ICD-10-CM

## 2023-08-27 DIAGNOSIS — K21.00 GASTROESOPHAGEAL REFLUX DISEASE WITH ESOPHAGITIS, UNSPECIFIED WHETHER HEMORRHAGE: ICD-10-CM

## 2023-08-27 DIAGNOSIS — Z89.512 S/P BKA (BELOW KNEE AMPUTATION), LEFT (H): ICD-10-CM

## 2023-08-27 LAB
ABO/RH(D): NORMAL
ALBUMIN SERPL BCG-MCNC: 2.7 G/DL (ref 3.5–5.2)
ALBUMIN SERPL BCG-MCNC: 2.8 G/DL (ref 3.5–5.2)
ALLEN'S TEST: NO
ALP SERPL-CCNC: 148 U/L (ref 40–129)
ALP SERPL-CCNC: 164 U/L (ref 40–129)
ALT SERPL W P-5'-P-CCNC: 28 U/L (ref 0–70)
ALT SERPL W P-5'-P-CCNC: 29 U/L (ref 0–70)
ALT SERPL-CCNC: 35 U/L (ref 4–50)
ANION GAP SERPL CALCULATED.3IONS-SCNC: 15 MMOL/L (ref 7–15)
ANION GAP SERPL CALCULATED.3IONS-SCNC: 17 MMOL/L (ref 7–15)
ANTIBODY SCREEN: NEGATIVE
APTT PPP: 31 SECONDS (ref 22–38)
AST SERPL W P-5'-P-CCNC: 51 U/L (ref 0–45)
AST SERPL W P-5'-P-CCNC: 52 U/L (ref 0–45)
AST SERPL-CCNC: 57 U/L (ref 17–59)
BASE EXCESS BLDA CALC-SCNC: -7.1 MMOL/L (ref -9–1.8)
BASE EXCESS BLDA CALC-SCNC: -7.9 MMOL/L (ref -9.6–2)
BASOPHILS # BLD AUTO: 0 10E3/UL (ref 0–0.2)
BASOPHILS NFR BLD AUTO: 0 %
BILIRUB DIRECT SERPL-MCNC: 0.61 MG/DL (ref 0–0.3)
BILIRUB SERPL-MCNC: 0.8 MG/DL
BILIRUB SERPL-MCNC: 0.8 MG/DL
BUN SERPL-MCNC: 44.1 MG/DL (ref 6–20)
BUN SERPL-MCNC: 44.3 MG/DL (ref 6–20)
CA-I BLD-MCNC: 4.1 MG/DL (ref 4.4–5.2)
CALCIUM SERPL-MCNC: 7.6 MG/DL (ref 8.6–10)
CALCIUM SERPL-MCNC: 7.9 MG/DL (ref 8.6–10)
CHLORIDE SERPL-SCNC: 93 MMOL/L (ref 98–107)
CHLORIDE SERPL-SCNC: 95 MMOL/L (ref 98–107)
CK SERPL-CCNC: 1339 U/L (ref 39–308)
CREAT SERPL-MCNC: 2.89 MG/DL (ref 0.67–1.17)
CREAT SERPL-MCNC: 2.95 MG/DL (ref 0.67–1.17)
CREATININE (EXTERNAL): 3.4 MG/DL (ref 0.66–1.25)
CRP SERPL-MCNC: 325 MG/L
DEPRECATED HCO3 PLAS-SCNC: 18 MMOL/L (ref 22–29)
DEPRECATED HCO3 PLAS-SCNC: 19 MMOL/L (ref 22–29)
EOSINOPHIL # BLD AUTO: 0.1 10E3/UL (ref 0–0.7)
EOSINOPHIL NFR BLD AUTO: 0 %
ERYTHROCYTE [DISTWIDTH] IN BLOOD BY AUTOMATED COUNT: 16.3 % (ref 10–15)
FIBRINOGEN PPP-MCNC: 863 MG/DL (ref 170–490)
GFR ESTIMATED (EXTERNAL): 22.2 ML/MIN/1.73M2
GFR SERPL CREATININE-BSD FRML MDRD: 26 ML/MIN/1.73M2
GFR SERPL CREATININE-BSD FRML MDRD: 27 ML/MIN/1.73M2
GLUCOSE (EXTERNAL): 54 MG/DL (ref 70–100)
GLUCOSE BLD-MCNC: 86 MG/DL (ref 70–99)
GLUCOSE BLDC GLUCOMTR-MCNC: 58 MG/DL (ref 70–99)
GLUCOSE BLDC GLUCOMTR-MCNC: 82 MG/DL (ref 70–99)
GLUCOSE BLDC GLUCOMTR-MCNC: 98 MG/DL (ref 70–99)
GLUCOSE SERPL-MCNC: 76 MG/DL (ref 70–99)
GLUCOSE SERPL-MCNC: 79 MG/DL (ref 70–99)
HCO3 BLD-SCNC: 20 MMOL/L (ref 21–28)
HCO3 BLDA-SCNC: 19 MMOL/L (ref 21–28)
HCT VFR BLD AUTO: 36.4 % (ref 40–53)
HGB BLD-MCNC: 11.2 G/DL (ref 13.3–17.7)
HGB BLD-MCNC: 11.6 G/DL (ref 13.3–17.7)
IMM GRANULOCYTES # BLD: 0.2 10E3/UL
IMM GRANULOCYTES NFR BLD: 1 %
INR PPP: 1.25 (ref 0.85–1.15)
LACTATE BLD-SCNC: 0.4 MMOL/L
LACTATE SERPL-SCNC: 0.6 MMOL/L (ref 0.7–2)
LYMPHOCYTES # BLD AUTO: 0.9 10E3/UL (ref 0.8–5.3)
LYMPHOCYTES NFR BLD AUTO: 5 %
MAGNESIUM SERPL-MCNC: 1.2 MG/DL (ref 1.7–2.3)
MCH RBC QN AUTO: 25.4 PG (ref 26.5–33)
MCHC RBC AUTO-ENTMCNC: 31.9 G/DL (ref 31.5–36.5)
MCV RBC AUTO: 80 FL (ref 78–100)
MONOCYTES # BLD AUTO: 1.6 10E3/UL (ref 0–1.3)
MONOCYTES NFR BLD AUTO: 10 %
NEUTROPHILS # BLD AUTO: 14 10E3/UL (ref 1.6–8.3)
NEUTROPHILS NFR BLD AUTO: 84 %
NRBC # BLD AUTO: 0 10E3/UL
NRBC BLD AUTO-RTO: 0 /100
O2/TOTAL GAS SETTING VFR VENT: 2 %
O2/TOTAL GAS SETTING VFR VENT: 60 %
OXYHGB MFR BLD: 90 % (ref 92–100)
PCO2 BLD: 46 MM HG (ref 35–45)
PCO2 BLDA: 45 MM HG (ref 35–45)
PH BLD: 7.25 [PH] (ref 7.35–7.45)
PH BLDA: 7.24 [PH] (ref 7.35–7.45)
PHOSPHATE SERPL-MCNC: 4.3 MG/DL (ref 2.5–4.5)
PLATELET # BLD AUTO: 287 10E3/UL (ref 150–450)
PO2 BLD: 67 MM HG (ref 80–105)
PO2 BLDA: 76 MM HG (ref 80–105)
POTASSIUM (EXTERNAL): 4.5 MMOL/L (ref 3.5–5.1)
POTASSIUM BLD-SCNC: 3.4 MMOL/L (ref 3.5–5)
POTASSIUM SERPL-SCNC: 4 MMOL/L (ref 3.4–5.3)
POTASSIUM SERPL-SCNC: 4 MMOL/L (ref 3.4–5.3)
PROT SERPL-MCNC: 6.7 G/DL (ref 6.4–8.3)
PROT SERPL-MCNC: 6.7 G/DL (ref 6.4–8.3)
RBC # BLD AUTO: 4.57 10E6/UL (ref 4.4–5.9)
SARS-COV-2 RNA RESP QL NAA+PROBE: NEGATIVE
SODIUM BLD-SCNC: 130 MMOL/L (ref 133–144)
SODIUM SERPL-SCNC: 128 MMOL/L (ref 136–145)
SODIUM SERPL-SCNC: 129 MMOL/L (ref 136–145)
SPECIMEN EXPIRATION DATE: NORMAL
WBC # BLD AUTO: 16.7 10E3/UL (ref 4–11)

## 2023-08-27 PROCEDURE — 73560 X-RAY EXAM OF KNEE 1 OR 2: CPT | Mod: LT

## 2023-08-27 PROCEDURE — 250N000009 HC RX 250

## 2023-08-27 PROCEDURE — 36556 INSERT NON-TUNNEL CV CATH: CPT | Performed by: INTERNAL MEDICINE

## 2023-08-27 PROCEDURE — 94660 CPAP INITIATION&MGMT: CPT

## 2023-08-27 PROCEDURE — 82962 GLUCOSE BLOOD TEST: CPT

## 2023-08-27 PROCEDURE — 999N000157 HC STATISTIC RCP TIME EA 10 MIN

## 2023-08-27 PROCEDURE — 87635 SARS-COV-2 COVID-19 AMP PRB: CPT

## 2023-08-27 PROCEDURE — 93010 ELECTROCARDIOGRAM REPORT: CPT | Performed by: INTERNAL MEDICINE

## 2023-08-27 PROCEDURE — 999N000065 XR CHEST PORT 1 VIEW

## 2023-08-27 PROCEDURE — 73560 X-RAY EXAM OF KNEE 1 OR 2: CPT | Mod: 26 | Performed by: RADIOLOGY

## 2023-08-27 PROCEDURE — 250N000011 HC RX IP 250 OP 636: Mod: JZ

## 2023-08-27 PROCEDURE — 83605 ASSAY OF LACTIC ACID: CPT

## 2023-08-27 PROCEDURE — 258N000003 HC RX IP 258 OP 636: Performed by: STUDENT IN AN ORGANIZED HEALTH CARE EDUCATION/TRAINING PROGRAM

## 2023-08-27 PROCEDURE — 84100 ASSAY OF PHOSPHORUS: CPT

## 2023-08-27 PROCEDURE — 85730 THROMBOPLASTIN TIME PARTIAL: CPT

## 2023-08-27 PROCEDURE — 360N000076 HC SURGERY LEVEL 3, PER MIN: Performed by: SURGERY

## 2023-08-27 PROCEDURE — 93005 ELECTROCARDIOGRAM TRACING: CPT

## 2023-08-27 PROCEDURE — 258N000003 HC RX IP 258 OP 636

## 2023-08-27 PROCEDURE — 73630 X-RAY EXAM OF FOOT: CPT | Mod: 26 | Performed by: RADIOLOGY

## 2023-08-27 PROCEDURE — 84132 ASSAY OF SERUM POTASSIUM: CPT

## 2023-08-27 PROCEDURE — 02HV33Z INSERTION OF INFUSION DEVICE INTO SUPERIOR VENA CAVA, PERCUTANEOUS APPROACH: ICD-10-PCS | Performed by: INTERNAL MEDICINE

## 2023-08-27 PROCEDURE — 80053 COMPREHEN METABOLIC PANEL: CPT | Performed by: STUDENT IN AN ORGANIZED HEALTH CARE EDUCATION/TRAINING PROGRAM

## 2023-08-27 PROCEDURE — 82310 ASSAY OF CALCIUM: CPT

## 2023-08-27 PROCEDURE — 82550 ASSAY OF CK (CPK): CPT | Performed by: STUDENT IN AN ORGANIZED HEALTH CARE EDUCATION/TRAINING PROGRAM

## 2023-08-27 PROCEDURE — 86850 RBC ANTIBODY SCREEN: CPT

## 2023-08-27 PROCEDURE — 250N000025 HC SEVOFLURANE, PER MIN: Performed by: SURGERY

## 2023-08-27 PROCEDURE — 99291 CRITICAL CARE FIRST HOUR: CPT | Mod: 25 | Performed by: INTERNAL MEDICINE

## 2023-08-27 PROCEDURE — 85610 PROTHROMBIN TIME: CPT

## 2023-08-27 PROCEDURE — 370N000017 HC ANESTHESIA TECHNICAL FEE, PER MIN: Performed by: SURGERY

## 2023-08-27 PROCEDURE — 73590 X-RAY EXAM OF LOWER LEG: CPT | Mod: 26 | Performed by: RADIOLOGY

## 2023-08-27 PROCEDURE — 200N000002 HC R&B ICU UMMC

## 2023-08-27 PROCEDURE — 82248 BILIRUBIN DIRECT: CPT | Performed by: STUDENT IN AN ORGANIZED HEALTH CARE EDUCATION/TRAINING PROGRAM

## 2023-08-27 PROCEDURE — 71045 X-RAY EXAM CHEST 1 VIEW: CPT | Mod: 26 | Performed by: RADIOLOGY

## 2023-08-27 PROCEDURE — 83735 ASSAY OF MAGNESIUM: CPT

## 2023-08-27 PROCEDURE — 73590 X-RAY EXAM OF LOWER LEG: CPT | Mod: LT

## 2023-08-27 PROCEDURE — 85384 FIBRINOGEN ACTIVITY: CPT

## 2023-08-27 PROCEDURE — 82805 BLOOD GASES W/O2 SATURATION: CPT

## 2023-08-27 PROCEDURE — 85025 COMPLETE CBC W/AUTO DIFF WBC: CPT | Performed by: STUDENT IN AN ORGANIZED HEALTH CARE EDUCATION/TRAINING PROGRAM

## 2023-08-27 PROCEDURE — 73630 X-RAY EXAM OF FOOT: CPT | Mod: LT

## 2023-08-27 PROCEDURE — 258N000001 HC RX 258

## 2023-08-27 PROCEDURE — 86140 C-REACTIVE PROTEIN: CPT

## 2023-08-27 PROCEDURE — 99207 PR NO BILLABLE SERVICE THIS VISIT: CPT | Performed by: STUDENT IN AN ORGANIZED HEALTH CARE EDUCATION/TRAINING PROGRAM

## 2023-08-27 PROCEDURE — 250N000009 HC RX 250: Performed by: STUDENT IN AN ORGANIZED HEALTH CARE EDUCATION/TRAINING PROGRAM

## 2023-08-27 PROCEDURE — 272N000001 HC OR GENERAL SUPPLY STERILE: Performed by: SURGERY

## 2023-08-27 RX ORDER — DEXTROSE MONOHYDRATE 25 G/50ML
INJECTION, SOLUTION INTRAVENOUS
Status: COMPLETED
Start: 2023-08-27 | End: 2023-08-27

## 2023-08-27 RX ORDER — ONDANSETRON 4 MG/1
4 TABLET, ORALLY DISINTEGRATING ORAL EVERY 6 HOURS PRN
Status: DISCONTINUED | OUTPATIENT
Start: 2023-08-27 | End: 2023-08-30

## 2023-08-27 RX ORDER — CLINDAMYCIN PHOSPHATE 900 MG/50ML
900 INJECTION, SOLUTION INTRAVENOUS EVERY 8 HOURS
Status: DISCONTINUED | OUTPATIENT
Start: 2023-08-27 | End: 2023-08-31

## 2023-08-27 RX ORDER — NICOTINE POLACRILEX 4 MG
15-30 LOZENGE BUCCAL
Status: DISCONTINUED | OUTPATIENT
Start: 2023-08-27 | End: 2023-09-09 | Stop reason: HOSPADM

## 2023-08-27 RX ORDER — OXYCODONE HYDROCHLORIDE 5 MG/1
5-10 TABLET ORAL
Status: DISCONTINUED | OUTPATIENT
Start: 2023-08-27 | End: 2023-08-28

## 2023-08-27 RX ORDER — SODIUM CHLORIDE, SODIUM LACTATE, POTASSIUM CHLORIDE, CALCIUM CHLORIDE 600; 310; 30; 20 MG/100ML; MG/100ML; MG/100ML; MG/100ML
INJECTION, SOLUTION INTRAVENOUS CONTINUOUS
Status: DISCONTINUED | OUTPATIENT
Start: 2023-08-27 | End: 2023-08-30

## 2023-08-27 RX ORDER — NALOXONE HYDROCHLORIDE 0.4 MG/ML
0.2 INJECTION, SOLUTION INTRAMUSCULAR; INTRAVENOUS; SUBCUTANEOUS
Status: DISCONTINUED | OUTPATIENT
Start: 2023-08-27 | End: 2023-09-09 | Stop reason: HOSPADM

## 2023-08-27 RX ORDER — ACETAMINOPHEN 325 MG/1
325 TABLET ORAL EVERY 8 HOURS PRN
Status: DISCONTINUED | OUTPATIENT
Start: 2023-08-27 | End: 2023-08-28

## 2023-08-27 RX ORDER — ETOMIDATE 2 MG/ML
INJECTION INTRAVENOUS PRN
Status: DISCONTINUED | OUTPATIENT
Start: 2023-08-27 | End: 2023-08-28

## 2023-08-27 RX ORDER — HYDROXYZINE HYDROCHLORIDE 25 MG/1
25-50 TABLET, FILM COATED ORAL 3 TIMES DAILY PRN
Status: DISCONTINUED | OUTPATIENT
Start: 2023-08-27 | End: 2023-08-28

## 2023-08-27 RX ORDER — PROCHLORPERAZINE MALEATE 10 MG
10 TABLET ORAL EVERY 6 HOURS PRN
Status: DISCONTINUED | OUTPATIENT
Start: 2023-08-27 | End: 2023-09-09 | Stop reason: HOSPADM

## 2023-08-27 RX ORDER — NALOXONE HYDROCHLORIDE 0.4 MG/ML
0.4 INJECTION, SOLUTION INTRAMUSCULAR; INTRAVENOUS; SUBCUTANEOUS
Status: DISCONTINUED | OUTPATIENT
Start: 2023-08-27 | End: 2023-09-09 | Stop reason: HOSPADM

## 2023-08-27 RX ORDER — DEXTROSE MONOHYDRATE 25 G/50ML
25-50 INJECTION, SOLUTION INTRAVENOUS
Status: DISCONTINUED | OUTPATIENT
Start: 2023-08-27 | End: 2023-09-09 | Stop reason: HOSPADM

## 2023-08-27 RX ORDER — FLUORIDE TOOTHPASTE
15 TOOTHPASTE DENTAL 4 TIMES DAILY PRN
Status: DISCONTINUED | OUTPATIENT
Start: 2023-08-27 | End: 2023-09-09 | Stop reason: HOSPADM

## 2023-08-27 RX ORDER — PIPERACILLIN SODIUM, TAZOBACTAM SODIUM 3; .375 G/15ML; G/15ML
3.38 INJECTION, POWDER, LYOPHILIZED, FOR SOLUTION INTRAVENOUS EVERY 6 HOURS
Status: DISCONTINUED | OUTPATIENT
Start: 2023-08-27 | End: 2023-08-28

## 2023-08-27 RX ORDER — ACETAMINOPHEN 325 MG/1
975 TABLET ORAL EVERY 8 HOURS
Status: DISCONTINUED | OUTPATIENT
Start: 2023-08-27 | End: 2023-08-28

## 2023-08-27 RX ORDER — NOREPINEPHRINE BITARTRATE 0.06 MG/ML
.01-.6 INJECTION, SOLUTION INTRAVENOUS CONTINUOUS
Status: DISCONTINUED | OUTPATIENT
Start: 2023-08-27 | End: 2023-08-28

## 2023-08-27 RX ORDER — HYDROMORPHONE HCL IN WATER/PF 6 MG/30 ML
.2-.4 PATIENT CONTROLLED ANALGESIA SYRINGE INTRAVENOUS
Status: DISCONTINUED | OUTPATIENT
Start: 2023-08-27 | End: 2023-08-28

## 2023-08-27 RX ORDER — PROCHLORPERAZINE 25 MG
25 SUPPOSITORY, RECTAL RECTAL EVERY 12 HOURS PRN
Status: DISCONTINUED | OUTPATIENT
Start: 2023-08-27 | End: 2023-09-09 | Stop reason: HOSPADM

## 2023-08-27 RX ORDER — LIDOCAINE HYDROCHLORIDE 20 MG/ML
INJECTION, SOLUTION INFILTRATION; PERINEURAL PRN
Status: DISCONTINUED | OUTPATIENT
Start: 2023-08-27 | End: 2023-08-28

## 2023-08-27 RX ORDER — ONDANSETRON 2 MG/ML
4 INJECTION INTRAMUSCULAR; INTRAVENOUS EVERY 6 HOURS PRN
Status: DISCONTINUED | OUTPATIENT
Start: 2023-08-27 | End: 2023-08-30

## 2023-08-27 RX ADMIN — ONDANSETRON 4 MG: 2 INJECTION INTRAMUSCULAR; INTRAVENOUS at 22:22

## 2023-08-27 RX ADMIN — SODIUM CHLORIDE, POTASSIUM CHLORIDE, SODIUM LACTATE AND CALCIUM CHLORIDE: 600; 310; 30; 20 INJECTION, SOLUTION INTRAVENOUS at 23:23

## 2023-08-27 RX ADMIN — LIDOCAINE HYDROCHLORIDE 100 MG: 20 INJECTION, SOLUTION INFILTRATION; PERINEURAL at 23:31

## 2023-08-27 RX ADMIN — PIPERACILLIN AND TAZOBACTAM 3.38 G: 3; .375 INJECTION, POWDER, FOR SOLUTION INTRAVENOUS at 22:25

## 2023-08-27 RX ADMIN — CALCIUM CHLORIDE INJECTION 1 G: 100 INJECTION, SOLUTION INTRAVENOUS at 23:47

## 2023-08-27 RX ADMIN — DEXTROSE MONOHYDRATE 25 ML: 25 INJECTION, SOLUTION INTRAVENOUS at 20:20

## 2023-08-27 RX ADMIN — SODIUM CHLORIDE, POTASSIUM CHLORIDE, SODIUM LACTATE AND CALCIUM CHLORIDE 1000 ML: 600; 310; 30; 20 INJECTION, SOLUTION INTRAVENOUS at 22:47

## 2023-08-27 RX ADMIN — Medication 100 MG: at 23:31

## 2023-08-27 RX ADMIN — Medication 0.03 MCG/KG/MIN: at 22:29

## 2023-08-27 RX ADMIN — ETOMIDATE 16 MG: 40 INJECTION, SOLUTION INTRAVENOUS at 23:31

## 2023-08-27 RX ADMIN — VASOPRESSIN 2.4 UNITS/HR: 20 INJECTION, SOLUTION INTRAMUSCULAR; SUBCUTANEOUS at 22:12

## 2023-08-27 RX ADMIN — SODIUM CHLORIDE, POTASSIUM CHLORIDE, SODIUM LACTATE AND CALCIUM CHLORIDE 1000 ML: 600; 310; 30; 20 INJECTION, SOLUTION INTRAVENOUS at 21:31

## 2023-08-27 RX ADMIN — CLINDAMYCIN IN 5 PERCENT DEXTROSE 900 MG: 18 INJECTION, SOLUTION INTRAVENOUS at 21:50

## 2023-08-27 ASSESSMENT — ACTIVITIES OF DAILY LIVING (ADL)
ADLS_ACUITY_SCORE: 57
ADLS_ACUITY_SCORE: 41

## 2023-08-27 NOTE — PROGRESS NOTES
Transfer Type: [facility]  Transfer Triage Note    Date of call: 08/27/23  Time of call: 5:06 PM    Current Patient Location:  [facility]   Current Level of Care: ED    Vitals: BP:89/44 HR: 114 Temp - afebrile, on room air   Diagnosis: Sepsis 2/2 necrotic wound  Is COVID-19 a concern? No  Reason for requested transfer: Further diagnostic work up, management, and consultation for specialized care   Isolation Needs: Contact    Outside Records: Not available  Additional records may be faxed to [phone].    Transfer accepted: No.    Rationale for declining transfer or suggested follow-up: ER MD is going to contact [facility] and do an ED to ED transfer and admit to [facility].     Recommendations for Management and Stabilization: Given    Additional Comments:     43 yo M     Foot swelling and red. Necrotic, weeping wound - malodorous.     Follows with  wound clinic in [place]. Has dropping BP but normal lactic. Started on vanc and cefepime.         Clarissa Pulliam MD

## 2023-08-28 ENCOUNTER — APPOINTMENT (OUTPATIENT)
Dept: GENERAL RADIOLOGY | Facility: CLINIC | Age: 43
DRG: 853 | End: 2023-08-28
Attending: INTERNAL MEDICINE
Payer: MEDICARE

## 2023-08-28 ENCOUNTER — ANESTHESIA EVENT (OUTPATIENT)
Dept: SURGERY | Facility: CLINIC | Age: 43
DRG: 853 | End: 2023-08-28
Payer: MEDICARE

## 2023-08-28 ENCOUNTER — APPOINTMENT (OUTPATIENT)
Dept: GENERAL RADIOLOGY | Facility: CLINIC | Age: 43
DRG: 853 | End: 2023-08-28
Payer: MEDICARE

## 2023-08-28 LAB
ALLEN'S TEST: ABNORMAL
ALLEN'S TEST: ABNORMAL
ANION GAP SERPL CALCULATED.3IONS-SCNC: 14 MMOL/L (ref 7–15)
BASE EXCESS BLDA CALC-SCNC: -6.7 MMOL/L (ref -9–1.8)
BASE EXCESS BLDA CALC-SCNC: -6.8 MMOL/L (ref -9–1.8)
BASE EXCESS BLDV CALC-SCNC: -4.9 MMOL/L (ref -7.7–1.9)
BUN SERPL-MCNC: 36.9 MG/DL (ref 6–20)
CALCIUM SERPL-MCNC: 7.7 MG/DL (ref 8.6–10)
CHLORIDE SERPL-SCNC: 100 MMOL/L (ref 98–107)
CREAT SERPL-MCNC: 2.25 MG/DL (ref 0.67–1.17)
DEPRECATED HCO3 PLAS-SCNC: 19 MMOL/L (ref 22–29)
ERYTHROCYTE [DISTWIDTH] IN BLOOD BY AUTOMATED COUNT: 16.4 % (ref 10–15)
GFR SERPL CREATININE-BSD FRML MDRD: 36 ML/MIN/1.73M2
GLUCOSE BLDC GLUCOMTR-MCNC: 120 MG/DL (ref 70–99)
GLUCOSE BLDC GLUCOMTR-MCNC: 121 MG/DL (ref 70–99)
GLUCOSE BLDC GLUCOMTR-MCNC: 125 MG/DL (ref 70–99)
GLUCOSE BLDC GLUCOMTR-MCNC: 134 MG/DL (ref 70–99)
GLUCOSE BLDC GLUCOMTR-MCNC: 161 MG/DL (ref 70–99)
GLUCOSE BLDC GLUCOMTR-MCNC: 94 MG/DL (ref 70–99)
GLUCOSE BLDC GLUCOMTR-MCNC: 99 MG/DL (ref 70–99)
GLUCOSE SERPL-MCNC: 112 MG/DL (ref 70–99)
HCO3 BLD-SCNC: 21 MMOL/L (ref 21–28)
HCO3 BLD-SCNC: 21 MMOL/L (ref 21–28)
HCO3 BLDV-SCNC: 23 MMOL/L (ref 21–28)
HCT VFR BLD AUTO: 36.4 % (ref 40–53)
HGB BLD-MCNC: 11.3 G/DL (ref 13.3–17.7)
LACTATE SERPL-SCNC: 0.5 MMOL/L (ref 0.7–2)
MAGNESIUM SERPL-MCNC: 1.2 MG/DL (ref 1.7–2.3)
MAGNESIUM SERPL-MCNC: 2.2 MG/DL (ref 1.7–2.3)
MCH RBC QN AUTO: 25 PG (ref 26.5–33)
MCHC RBC AUTO-ENTMCNC: 31 G/DL (ref 31.5–36.5)
MCV RBC AUTO: 81 FL (ref 78–100)
O2/TOTAL GAS SETTING VFR VENT: 40 %
O2/TOTAL GAS SETTING VFR VENT: 65 %
O2/TOTAL GAS SETTING VFR VENT: 65 %
OXYHGB MFR BLD: 94 % (ref 92–100)
OXYHGB MFR BLD: 97 % (ref 92–100)
OXYHGB MFR BLDV: 83 % (ref 70–75)
PCO2 BLD: 51 MM HG (ref 35–45)
PCO2 BLD: 53 MM HG (ref 35–45)
PCO2 BLDV: 57 MM HG (ref 40–50)
PH BLD: 7.21 [PH] (ref 7.35–7.45)
PH BLD: 7.23 [PH] (ref 7.35–7.45)
PH BLDV: 7.22 [PH] (ref 7.32–7.43)
PHOSPHATE SERPL-MCNC: 5.3 MG/DL (ref 2.5–4.5)
PLATELET # BLD AUTO: 321 10E3/UL (ref 150–450)
PO2 BLD: 120 MM HG (ref 80–105)
PO2 BLD: 92 MM HG (ref 80–105)
PO2 BLDV: 53 MM HG (ref 25–47)
POTASSIUM SERPL-SCNC: 3.6 MMOL/L (ref 3.4–5.3)
RBC # BLD AUTO: 4.52 10E6/UL (ref 4.4–5.9)
SODIUM SERPL-SCNC: 133 MMOL/L (ref 136–145)
SODIUM SERPL-SCNC: 133 MMOL/L (ref 136–145)
SODIUM SERPL-SCNC: 135 MMOL/L (ref 136–145)
SODIUM SERPL-SCNC: 135 MMOL/L (ref 136–145)
SODIUM SERPL-SCNC: 136 MMOL/L (ref 136–145)
SODIUM SERPL-SCNC: 138 MMOL/L (ref 136–145)
SODIUM SERPL-SCNC: 141 MMOL/L (ref 136–145)
VANCOMYCIN SERPL-MCNC: 8.7 UG/ML
WBC # BLD AUTO: 19.1 10E3/UL (ref 4–11)

## 2023-08-28 PROCEDURE — 250N000009 HC RX 250

## 2023-08-28 PROCEDURE — 83735 ASSAY OF MAGNESIUM: CPT | Performed by: SURGERY

## 2023-08-28 PROCEDURE — 999N000157 HC STATISTIC RCP TIME EA 10 MIN

## 2023-08-28 PROCEDURE — 83735 ASSAY OF MAGNESIUM: CPT | Performed by: STUDENT IN AN ORGANIZED HEALTH CARE EDUCATION/TRAINING PROGRAM

## 2023-08-28 PROCEDURE — 250N000011 HC RX IP 250 OP 636: Mod: JZ

## 2023-08-28 PROCEDURE — 84295 ASSAY OF SERUM SODIUM: CPT | Performed by: STUDENT IN AN ORGANIZED HEALTH CARE EDUCATION/TRAINING PROGRAM

## 2023-08-28 PROCEDURE — 250N000011 HC RX IP 250 OP 636

## 2023-08-28 PROCEDURE — 94002 VENT MGMT INPAT INIT DAY: CPT

## 2023-08-28 PROCEDURE — 200N000002 HC R&B ICU UMMC

## 2023-08-28 PROCEDURE — G0463 HOSPITAL OUTPT CLINIC VISIT: HCPCS

## 2023-08-28 PROCEDURE — 999N000065 XR CHEST PORT 1 VIEW

## 2023-08-28 PROCEDURE — 250N000011 HC RX IP 250 OP 636: Performed by: SURGERY

## 2023-08-28 PROCEDURE — 0Y6J0Z3 DETACHMENT AT LEFT LOWER LEG, LOW, OPEN APPROACH: ICD-10-PCS | Performed by: SURGERY

## 2023-08-28 PROCEDURE — 999N000063 XR CHEST PORT 1 VIEW

## 2023-08-28 PROCEDURE — 36415 COLL VENOUS BLD VENIPUNCTURE: CPT | Performed by: STUDENT IN AN ORGANIZED HEALTH CARE EDUCATION/TRAINING PROGRAM

## 2023-08-28 PROCEDURE — 250N000013 HC RX MED GY IP 250 OP 250 PS 637

## 2023-08-28 PROCEDURE — 82805 BLOOD GASES W/O2 SATURATION: CPT

## 2023-08-28 PROCEDURE — 250N000013 HC RX MED GY IP 250 OP 250 PS 637: Performed by: PHARMACIST

## 2023-08-28 PROCEDURE — 71045 X-RAY EXAM CHEST 1 VIEW: CPT | Mod: 26 | Performed by: RADIOLOGY

## 2023-08-28 PROCEDURE — 85027 COMPLETE CBC AUTOMATED: CPT

## 2023-08-28 PROCEDURE — 88311 DECALCIFY TISSUE: CPT | Mod: 26 | Performed by: PATHOLOGY

## 2023-08-28 PROCEDURE — 250N000011 HC RX IP 250 OP 636: Performed by: STUDENT IN AN ORGANIZED HEALTH CARE EDUCATION/TRAINING PROGRAM

## 2023-08-28 PROCEDURE — 87040 BLOOD CULTURE FOR BACTERIA: CPT | Performed by: STUDENT IN AN ORGANIZED HEALTH CARE EDUCATION/TRAINING PROGRAM

## 2023-08-28 PROCEDURE — C9113 INJ PANTOPRAZOLE SODIUM, VIA: HCPCS | Mod: JZ | Performed by: SURGERY

## 2023-08-28 PROCEDURE — 27882 AMPUTATION OF LOWER LEG: CPT | Mod: LT | Performed by: SURGERY

## 2023-08-28 PROCEDURE — 83605 ASSAY OF LACTIC ACID: CPT

## 2023-08-28 PROCEDURE — 999N000065 XR TIBIA & FIBULA PORT LEFT 2 VIEWS

## 2023-08-28 PROCEDURE — 258N000003 HC RX IP 258 OP 636: Performed by: STUDENT IN AN ORGANIZED HEALTH CARE EDUCATION/TRAINING PROGRAM

## 2023-08-28 PROCEDURE — 250N000011 HC RX IP 250 OP 636: Mod: JZ | Performed by: STUDENT IN AN ORGANIZED HEALTH CARE EDUCATION/TRAINING PROGRAM

## 2023-08-28 PROCEDURE — 258N000003 HC RX IP 258 OP 636: Performed by: SURGERY

## 2023-08-28 PROCEDURE — 250N000013 HC RX MED GY IP 250 OP 250 PS 637: Performed by: STUDENT IN AN ORGANIZED HEALTH CARE EDUCATION/TRAINING PROGRAM

## 2023-08-28 PROCEDURE — 3E043XZ INTRODUCTION OF VASOPRESSOR INTO CENTRAL VEIN, PERCUTANEOUS APPROACH: ICD-10-PCS | Performed by: SURGERY

## 2023-08-28 PROCEDURE — 88307 TISSUE EXAM BY PATHOLOGIST: CPT | Mod: 26 | Performed by: PATHOLOGY

## 2023-08-28 PROCEDURE — 80202 ASSAY OF VANCOMYCIN: CPT

## 2023-08-28 PROCEDURE — 88311 DECALCIFY TISSUE: CPT | Mod: TC | Performed by: SURGERY

## 2023-08-28 PROCEDURE — 80051 ELECTROLYTE PANEL: CPT

## 2023-08-28 PROCEDURE — 84100 ASSAY OF PHOSPHORUS: CPT | Performed by: STUDENT IN AN ORGANIZED HEALTH CARE EDUCATION/TRAINING PROGRAM

## 2023-08-28 PROCEDURE — 73590 X-RAY EXAM OF LOWER LEG: CPT | Mod: LT

## 2023-08-28 PROCEDURE — 73590 X-RAY EXAM OF LOWER LEG: CPT | Mod: 26 | Performed by: RADIOLOGY

## 2023-08-28 PROCEDURE — 250N000011 HC RX IP 250 OP 636: Mod: JZ | Performed by: SURGERY

## 2023-08-28 RX ORDER — BUPROPION HYDROCHLORIDE 150 MG/1
450 TABLET ORAL DAILY
Status: DISCONTINUED | OUTPATIENT
Start: 2023-08-28 | End: 2023-09-09 | Stop reason: HOSPADM

## 2023-08-28 RX ORDER — NOREPINEPHRINE BITARTRATE 0.06 MG/ML
.01-.6 INJECTION, SOLUTION INTRAVENOUS CONTINUOUS
Status: DISCONTINUED | OUTPATIENT
Start: 2023-08-28 | End: 2023-08-30

## 2023-08-28 RX ORDER — PROPOFOL 10 MG/ML
5-75 INJECTION, EMULSION INTRAVENOUS CONTINUOUS
Status: DISCONTINUED | OUTPATIENT
Start: 2023-08-28 | End: 2023-08-28

## 2023-08-28 RX ORDER — CALCIUM GLUCONATE 20 MG/ML
2 INJECTION, SOLUTION INTRAVENOUS ONCE
Status: COMPLETED | OUTPATIENT
Start: 2023-08-28 | End: 2023-08-28

## 2023-08-28 RX ORDER — PSEUDOEPHEDRINE HCL 30 MG
1 TABLET ORAL 2 TIMES DAILY
Status: ON HOLD | COMMUNITY
Start: 2023-05-26 | End: 2023-09-14

## 2023-08-28 RX ORDER — OXYCODONE HYDROCHLORIDE 5 MG/1
10 CAPSULE ORAL EVERY 6 HOURS PRN
Status: ON HOLD | COMMUNITY
End: 2023-09-08

## 2023-08-28 RX ORDER — MAGNESIUM SULFATE HEPTAHYDRATE 40 MG/ML
4 INJECTION, SOLUTION INTRAVENOUS ONCE
Status: COMPLETED | OUTPATIENT
Start: 2023-08-28 | End: 2023-08-28

## 2023-08-28 RX ORDER — MAGNESIUM SULFATE HEPTAHYDRATE 40 MG/ML
4 INJECTION, SOLUTION INTRAVENOUS ONCE
Status: DISCONTINUED | OUTPATIENT
Start: 2023-08-28 | End: 2023-08-28

## 2023-08-28 RX ORDER — LIDOCAINE 40 MG/G
CREAM TOPICAL
Status: DISCONTINUED | OUTPATIENT
Start: 2023-08-28 | End: 2023-09-01

## 2023-08-28 RX ORDER — PREDNISOLONE ACETATE 10 MG/ML
1 SUSPENSION/ DROPS OPHTHALMIC 2 TIMES DAILY
Status: DISCONTINUED | OUTPATIENT
Start: 2023-08-28 | End: 2023-09-09 | Stop reason: HOSPADM

## 2023-08-28 RX ORDER — CALCIUM CHLORIDE 100 MG/ML
INJECTION INTRAVENOUS; INTRAVENTRICULAR PRN
Status: DISCONTINUED | OUTPATIENT
Start: 2023-08-27 | End: 2023-08-28

## 2023-08-28 RX ORDER — DULOXETIN HYDROCHLORIDE 60 MG/1
60 CAPSULE, DELAYED RELEASE ORAL 2 TIMES DAILY
Status: DISCONTINUED | OUTPATIENT
Start: 2023-08-28 | End: 2023-09-09 | Stop reason: HOSPADM

## 2023-08-28 RX ORDER — FLUTICASONE PROPIONATE 50 MCG
1 SPRAY, SUSPENSION (ML) NASAL DAILY
COMMUNITY

## 2023-08-28 RX ORDER — AMOXICILLIN 250 MG
2 CAPSULE ORAL 2 TIMES DAILY
Status: DISCONTINUED | OUTPATIENT
Start: 2023-08-28 | End: 2023-08-30

## 2023-08-28 RX ORDER — SODIUM CHLORIDE, SODIUM LACTATE, POTASSIUM CHLORIDE, CALCIUM CHLORIDE 600; 310; 30; 20 MG/100ML; MG/100ML; MG/100ML; MG/100ML
INJECTION, SOLUTION INTRAVENOUS CONTINUOUS PRN
Status: DISCONTINUED | OUTPATIENT
Start: 2023-08-27 | End: 2023-08-28

## 2023-08-28 RX ORDER — OXYCODONE HYDROCHLORIDE 5 MG/1
5-10 TABLET ORAL EVERY 4 HOURS PRN
Status: DISCONTINUED | OUTPATIENT
Start: 2023-08-28 | End: 2023-09-09 | Stop reason: HOSPADM

## 2023-08-28 RX ORDER — HYDROMORPHONE HYDROCHLORIDE 1 MG/ML
0.5 INJECTION, SOLUTION INTRAMUSCULAR; INTRAVENOUS; SUBCUTANEOUS EVERY 6 HOURS PRN
Status: DISCONTINUED | OUTPATIENT
Start: 2023-08-28 | End: 2023-08-28

## 2023-08-28 RX ORDER — POTASSIUM CHLORIDE 29.8 MG/ML
20 INJECTION INTRAVENOUS ONCE
Status: COMPLETED | OUTPATIENT
Start: 2023-08-28 | End: 2023-08-28

## 2023-08-28 RX ORDER — ACETAMINOPHEN 325 MG/1
975 TABLET ORAL EVERY 8 HOURS
Status: DISCONTINUED | OUTPATIENT
Start: 2023-08-28 | End: 2023-08-31

## 2023-08-28 RX ORDER — FENTANYL CITRATE 50 UG/ML
INJECTION, SOLUTION INTRAMUSCULAR; INTRAVENOUS PRN
Status: DISCONTINUED | OUTPATIENT
Start: 2023-08-28 | End: 2023-08-28

## 2023-08-28 RX ORDER — HYDROMORPHONE HYDROCHLORIDE 1 MG/ML
0.5 INJECTION, SOLUTION INTRAMUSCULAR; INTRAVENOUS; SUBCUTANEOUS
Status: DISCONTINUED | OUTPATIENT
Start: 2023-08-28 | End: 2023-08-29

## 2023-08-28 RX ORDER — HEPARIN SODIUM 5000 [USP'U]/.5ML
5000 INJECTION, SOLUTION INTRAVENOUS; SUBCUTANEOUS EVERY 8 HOURS
Status: DISCONTINUED | OUTPATIENT
Start: 2023-08-28 | End: 2023-09-09 | Stop reason: HOSPADM

## 2023-08-28 RX ORDER — ASCORBIC ACID 500 MG
500 TABLET ORAL DAILY
Status: ON HOLD | COMMUNITY
End: 2023-08-28

## 2023-08-28 RX ORDER — CLINDAMYCIN PHOSPHATE 11.9 MG/ML
SOLUTION TOPICAL 2 TIMES DAILY PRN
Status: ON HOLD | COMMUNITY
End: 2023-11-07

## 2023-08-28 RX ORDER — PROPOFOL 10 MG/ML
INJECTION, EMULSION INTRAVENOUS CONTINUOUS PRN
Status: DISCONTINUED | OUTPATIENT
Start: 2023-08-28 | End: 2023-08-28

## 2023-08-28 RX ORDER — CHLORHEXIDINE GLUCONATE ORAL RINSE 1.2 MG/ML
15 SOLUTION DENTAL EVERY 12 HOURS
Status: DISCONTINUED | OUTPATIENT
Start: 2023-08-28 | End: 2023-08-28

## 2023-08-28 RX ORDER — BUSPIRONE HYDROCHLORIDE 10 MG/1
10 TABLET ORAL 2 TIMES DAILY
Status: DISCONTINUED | OUTPATIENT
Start: 2023-08-28 | End: 2023-09-09 | Stop reason: HOSPADM

## 2023-08-28 RX ORDER — OXYCODONE HYDROCHLORIDE 5 MG/1
5 TABLET ORAL EVERY 4 HOURS PRN
Status: DISCONTINUED | OUTPATIENT
Start: 2023-08-28 | End: 2023-08-28

## 2023-08-28 RX ORDER — POLYETHYLENE GLYCOL 3350 17 G/17G
1 POWDER, FOR SOLUTION ORAL 2 TIMES DAILY
Status: ON HOLD | COMMUNITY
End: 2023-09-14

## 2023-08-28 RX ORDER — OXYCODONE HCL 20 MG/1
20 TABLET, FILM COATED, EXTENDED RELEASE ORAL EVERY 12 HOURS
Status: ON HOLD | COMMUNITY
End: 2023-09-08

## 2023-08-28 RX ORDER — PIPERACILLIN SODIUM, TAZOBACTAM SODIUM 4; .5 G/20ML; G/20ML
4.5 INJECTION, POWDER, LYOPHILIZED, FOR SOLUTION INTRAVENOUS EVERY 6 HOURS
Status: DISCONTINUED | OUTPATIENT
Start: 2023-08-28 | End: 2023-09-09 | Stop reason: HOSPADM

## 2023-08-28 RX ADMIN — BUPROPION HYDROCHLORIDE 450 MG: 150 TABLET, FILM COATED, EXTENDED RELEASE ORAL at 14:13

## 2023-08-28 RX ADMIN — HEPARIN SODIUM 5000 UNITS: 5000 INJECTION, SOLUTION INTRAVENOUS; SUBCUTANEOUS at 17:49

## 2023-08-28 RX ADMIN — VANCOMYCIN HYDROCHLORIDE 1750 MG: 1 INJECTION, POWDER, LYOPHILIZED, FOR SOLUTION INTRAVENOUS at 20:00

## 2023-08-28 RX ADMIN — VASOPRESSIN 2.4 UNITS/HR: 20 INJECTION, SOLUTION INTRAMUSCULAR; SUBCUTANEOUS at 05:26

## 2023-08-28 RX ADMIN — PREDNISOLONE ACETATE 1 DROP: 10 SUSPENSION/ DROPS OPHTHALMIC at 12:38

## 2023-08-28 RX ADMIN — ACETAMINOPHEN 975 MG: 325 TABLET, FILM COATED ORAL at 23:57

## 2023-08-28 RX ADMIN — PIPERACILLIN AND TAZOBACTAM 3.38 G: 3; .375 INJECTION, POWDER, FOR SOLUTION INTRAVENOUS at 04:22

## 2023-08-28 RX ADMIN — CHLORHEXIDINE GLUCONATE 15 ML: 1.2 SOLUTION ORAL at 07:41

## 2023-08-28 RX ADMIN — FENTANYL CITRATE 50 MCG: 50 INJECTION, SOLUTION INTRAMUSCULAR; INTRAVENOUS at 01:20

## 2023-08-28 RX ADMIN — CLINDAMYCIN IN 5 PERCENT DEXTROSE 900 MG: 18 INJECTION, SOLUTION INTRAVENOUS at 14:27

## 2023-08-28 RX ADMIN — PROPOFOL 20 MCG/KG/MIN: 10 INJECTION, EMULSION INTRAVENOUS at 03:59

## 2023-08-28 RX ADMIN — PANTOPRAZOLE SODIUM 40 MG: 40 INJECTION, POWDER, FOR SOLUTION INTRAVENOUS at 07:41

## 2023-08-28 RX ADMIN — SODIUM CHLORIDE, POTASSIUM CHLORIDE, SODIUM LACTATE AND CALCIUM CHLORIDE: 600; 310; 30; 20 INJECTION, SOLUTION INTRAVENOUS at 02:05

## 2023-08-28 RX ADMIN — PIPERACILLIN AND TAZOBACTAM 4.5 G: 4; .5 INJECTION, POWDER, FOR SOLUTION INTRAVENOUS at 22:49

## 2023-08-28 RX ADMIN — PHENYLEPHRINE HYDROCHLORIDE 100 MCG: 10 INJECTION INTRAVENOUS at 00:42

## 2023-08-28 RX ADMIN — PREGABALIN 150 MG: 50 CAPSULE ORAL at 14:13

## 2023-08-28 RX ADMIN — CLINDAMYCIN IN 5 PERCENT DEXTROSE 900 MG: 18 INJECTION, SOLUTION INTRAVENOUS at 06:18

## 2023-08-28 RX ADMIN — PROPOFOL 40 MCG/KG/MIN: 10 INJECTION, EMULSION INTRAVENOUS at 01:51

## 2023-08-28 RX ADMIN — ACETAMINOPHEN 975 MG: 325 TABLET, FILM COATED ORAL at 16:28

## 2023-08-28 RX ADMIN — PHENYLEPHRINE HYDROCHLORIDE 100 MCG: 10 INJECTION INTRAVENOUS at 01:01

## 2023-08-28 RX ADMIN — PREDNISOLONE ACETATE 1 DROP: 10 SUSPENSION/ DROPS OPHTHALMIC at 20:14

## 2023-08-28 RX ADMIN — DULOXETINE HYDROCHLORIDE 60 MG: 60 CAPSULE, DELAYED RELEASE ORAL at 20:14

## 2023-08-28 RX ADMIN — Medication 0.03 MCG/KG/MIN: at 16:37

## 2023-08-28 RX ADMIN — PREGABALIN 150 MG: 50 CAPSULE ORAL at 20:14

## 2023-08-28 RX ADMIN — SODIUM CHLORIDE, POTASSIUM CHLORIDE, SODIUM LACTATE AND CALCIUM CHLORIDE: 600; 310; 30; 20 INJECTION, SOLUTION INTRAVENOUS at 05:18

## 2023-08-28 RX ADMIN — MAGNESIUM SULFATE IN WATER 4 G: 40 INJECTION, SOLUTION INTRAVENOUS at 06:13

## 2023-08-28 RX ADMIN — PIPERACILLIN AND TAZOBACTAM 3.38 G: 3; .375 INJECTION, POWDER, FOR SOLUTION INTRAVENOUS at 10:04

## 2023-08-28 RX ADMIN — PROPOFOL 50 MCG/KG/MIN: 10 INJECTION, EMULSION INTRAVENOUS at 01:13

## 2023-08-28 RX ADMIN — FENTANYL CITRATE 50 MCG: 50 INJECTION, SOLUTION INTRAMUSCULAR; INTRAVENOUS at 00:17

## 2023-08-28 RX ADMIN — PIPERACILLIN AND TAZOBACTAM 4.5 G: 4; .5 INJECTION, POWDER, FOR SOLUTION INTRAVENOUS at 16:28

## 2023-08-28 RX ADMIN — MIDAZOLAM 4 MG: 1 INJECTION INTRAMUSCULAR; INTRAVENOUS at 01:20

## 2023-08-28 RX ADMIN — CLINDAMYCIN IN 5 PERCENT DEXTROSE 900 MG: 18 INJECTION, SOLUTION INTRAVENOUS at 22:09

## 2023-08-28 RX ADMIN — HEPARIN SODIUM 5000 UNITS: 5000 INJECTION, SOLUTION INTRAVENOUS; SUBCUTANEOUS at 10:04

## 2023-08-28 RX ADMIN — POLYETHYLENE GLYCOL 400 AND PROPYLENE GLYCOL 1 DROP: 4; 3 SOLUTION/ DROPS OPHTHALMIC at 17:01

## 2023-08-28 RX ADMIN — POTASSIUM CHLORIDE 20 MEQ: 29.8 INJECTION, SOLUTION INTRAVENOUS at 03:59

## 2023-08-28 RX ADMIN — BUSPIRONE HYDROCHLORIDE 10 MG: 10 TABLET ORAL at 20:14

## 2023-08-28 RX ADMIN — Medication 50 MCG/HR: at 02:16

## 2023-08-28 RX ADMIN — CALCIUM GLUCONATE 2 G: 20 INJECTION, SOLUTION INTRAVENOUS at 02:23

## 2023-08-28 RX ADMIN — Medication 15 ML: at 23:57

## 2023-08-28 RX ADMIN — Medication 30 MG: at 00:42

## 2023-08-28 ASSESSMENT — ACTIVITIES OF DAILY LIVING (ADL)
ADLS_ACUITY_SCORE: 57
DIFFICULTY_EATING/SWALLOWING: NO
ADLS_ACUITY_SCORE: 45
ADLS_ACUITY_SCORE: 57
WALKING_OR_CLIMBING_STAIRS: AMBULATION DIFFICULTY, DEPENDENT
TRANSFERRING: 1-->ASSISTANCE (EQUIPMENT/PERSON) NEEDED
ADLS_ACUITY_SCORE: 57
ADLS_ACUITY_SCORE: 45
BATHING: 0-->INDEPENDENT
ADLS_ACUITY_SCORE: 45
DOING_ERRANDS_INDEPENDENTLY_DIFFICULTY: YES
WALKING_OR_CLIMBING_STAIRS_DIFFICULTY: YES
CONCENTRATING,_REMEMBERING_OR_MAKING_DECISIONS_DIFFICULTY: NO
TOILETING_ISSUES: NO
FALL_HISTORY_WITHIN_LAST_SIX_MONTHS: YES
NUMBER_OF_TIMES_PATIENT_HAS_FALLEN_WITHIN_LAST_SIX_MONTHS: 1
DRESSING/BATHING: BATHING DIFFICULTY, ASSISTANCE 1 PERSON
WEAR_GLASSES_OR_BLIND: NO
ADLS_ACUITY_SCORE: 57
TRANSFERRING: 0-->ASSISTANCE NEEDED (DEVELOPMENTALLY APPROPRIATE)
DRESS: 0-->INDEPENDENT
ADLS_ACUITY_SCORE: 57
DRESS: 0-->INDEPENDENT
ADLS_ACUITY_SCORE: 57
DRESSING/BATHING_DIFFICULTY: YES
ADLS_ACUITY_SCORE: 45
CHANGE_IN_FUNCTIONAL_STATUS_SINCE_ONSET_OF_CURRENT_ILLNESS/INJURY: YES

## 2023-08-28 NOTE — BRIEF OP NOTE
[facility]    Brief Operative Note    Pre-operative diagnosis: * No pre-op diagnosis entered *  Post-operative diagnosis Necrotizing soft tissue infection    Procedure: Procedure(s):  Left below knee Guillotine Amputation  Surgeon: Surgeon(s) and Role:     * Sesar Barth MD - Primary     * Hal Mejia MD - Assisting     * Lana Humphrey MD - Resident - Assisting     * Keya Ball MD - Resident - Assisting  Anesthesia: General   Estimated Blood Loss: 200 ml    Drains: None  Specimens:   ID Type Source Tests Collected by Time Destination   1 : Left BKA Tissue Leg, Below Knee, Left SURGICAL PATHOLOGY EXAM Sesar Barth MD 8/28/2023 12:07 AM      Findings:   Large area of necrosis on plantar surface of foot. Purulency found in lateral aspect of foot/ankle. Medial and lateral fascia  readily, edema present but no cloudy fluid within the superficial compartments. Atrophic muscle not reactive to electrocautery.   Complications: None.  Implants: * No implants in log *    Keya Ball MD  PGY-7, General Surgery

## 2023-08-28 NOTE — ANESTHESIA PROCEDURE NOTES
Arterial Line Procedure Note    Pre-Procedure   Staff -        Anesthesiologist:  Ja Martínez MD       Resident/Fellow: Jud Maxwell MD       Performed By: with residents       Procedure performed by resident/fellow/CRNA in presence of a teaching physician.         Location: OR       Pre-Anesthestic Checklist: patient identified, IV checked, risks and benefits discussed, informed consent, monitors and equipment checked, pre-op evaluation and at physician/surgeon's request  Timeout:       Correct Patient: Yes        Correct Procedure: Yes        Correct Site: Yes        Correct Position: Yes   Line Placement:   This line was placed Post Induction  Procedure   Procedure: arterial line       Laterality: left       Insertion Site: radial.  Sterile Prep        Standard elements of sterile barrier followed       Skin prep: Chloraprep  Insertion/Injection        Technique: ultrasound guided        1. Ultrasound was used to evaluate the access site.       2. Artery evaluated via ultrasound for patency/adequacy.       3. Using real-time ultrasound the needle/catheter was observed entering the artery/vein.       Catheter Type/Size: 20 G, 12 cm  Narrative         Secured by: suture       Tegaderm dressing used.       Complications: None apparent,        Arterial waveform: Yes        IBP within 10% of NIBP: Yes

## 2023-08-28 NOTE — PROGRESS NOTES
Orthopaedic Surgery Progress Note 08/28/2023    Subjective  Emergently taken to OR overnight for LLE NSTI. Remains intubated this morning. Remains on x2 pressor support. Remains on broad spectrum antibiotics.     Objective  Temp: 99.2  F (37.3  C) Temp src: Axillary BP: 118/67 Pulse: 70   Resp: 18 SpO2: 98 % O2 Device: Mechanical Ventilator      Exam:  Gen: Intubated, sedated  Resp: Intubated  Cardio: Regular rate via peripheral pulse  MSK:    LLE:  - Wound VAC holding suction  - No new expanding erythema of residual limb  - Neurovascular exam limited by sedation/intubation    VAC output: NR    Recent Labs   Lab 08/28/23  0221 08/27/23  2357 08/27/23  2115   WBC 19.1*  --  16.7*   HGB 11.3* 11.2* 11.6*     --  287       Assessment: Saqib Bishop is a 42 year old male s/p emergent left transtibial guillotine amputation for LLE NSTI. Plan for likely revision amputation within next 48 hours.     Plan:  SICU Primary  Activity: bedrest for now  Weight bearing status: NWB LLE  Antibiotics: Clindamycin, Vacomycin, Zosyn, per SICU  Diet: Regular from ortho perspective when able  DVT prophylaxis: per primary  Drains: Document VAC output  Pain management: Multimodal, per primary  X-rays: complete  Cultures: Surgical pathology results pending  Follow-up: TBD    Disposition: Pending revision amputation, medical stability, pain control    Red Mejia MD  Orthopaedic Surgery PGY-2
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
## 2023-08-28 NOTE — PLAN OF CARE
Major Shift Events:  Pt went down to OR for LBKA, back to unit around 0130 sedated and intubated. RASS of -5, SICU team notified, no new orders. Baseline pt is a paraplegic with some sensation in thighs but none below knees. Uppers prior to surgery 5/5. SR/ST 70s-110s. MAP goal >70 - titrating levo with vaso @ 2.4units/hr. Hypothermic, ngozi hugger applied. CMV settings 65 % with minimal secretions. Lungs dim. Dyer in place with adequate UOP. Colostomy with moderate OP. Wound vac to LLE, scant amount of serosanguinous OP. Mg and phos replaced. On prop and fentanyl for sedation.   Plan: Continue with plan of care.   For vital signs and complete assessments, please see documentation flowsheets.        0615 - pt developed large cuff leak. SICU at bedside. Respiratory unable to fix leak but plans to try and extubate this AM. Sedation turned off.

## 2023-08-28 NOTE — H&P
"  [facility]    H&P Note - General Surgery Service  Date of Admission:  8/27/2023  Admitting Physician: Dr. Barth  Reason for Admission: NSTI of the L Foot    Assessment & Plan: Surgery   Saqib Bishop is a 41 y/o male with paraplegia, chronic sacral decubitus ulcer, s/p flap and treatment for osteomyelitis December 2020, then with dehiscence, open wound and infected sacral decubitus ulceration October 2021. He completed six weeks of abx for osteomyelitis, and is now s/p an elective flap procedure early 2023, which failed and resulted in a chronic sacral ulcer. He now presents as a direct SICU admission d/t NSTI of the LLE, predominately at the foot but extending up the shin.    - Admit to general surgery  - IV Pip-Tazo, Vanc, Clindamycin  - Ongoing resuscitation  - Labs pending  - Plan for incision and drainage, possible amputation in OR tonight  - Consult orthopedic surgery if amputation is deemed necessary  - Remainder of cares per SICU       Drains: None     Code Status: Full Code    Clinically Significant Risk Factors Present on Admission                  # Hypertension: Home medication list includes antihypertensive(s)      # Severe Obesity: Estimated body mass index is 44.86 kg/m  as calculated from the following:    Height as of 8/4/23: 1.753 m (5' 9\").    Weight as of this encounter: 137.8 kg (303 lb 12.7 oz).                The patient's care was discussed with the staff surgeon, Dr. Barth.  Du Villasenor MD  General Surgery Resident  [facility]  Text page via [facility] Paging/Directory    ______________________________________________________________________    Chief Complaint   NSTI of LLE    History is obtained from the patient and the patient's chart.     History of Present Illness   Saqib Bishop is a 41 y/o male with paraplegia, chronic sacral decubitus ulcer, s/p flap and treatment for osteomyelitis " December 2020, then with dehiscence, open wound and infected sacral decubitus ulceration October 2021. He completed six weeks of abx for osteomyelitis, and is now s/p an elective flap procedure early 2023, which failed and resulted in a chronic sacral ulcer.     He intially presented to his regular wound clinic for cares of a chronic L foot wound (2/2 wheelchair trauma), where he was found to be hypotensive. He was directed to his local ED and started n vanc and cefepime d/t worsening infection of the LLE. + Nausea, - Emesis, + Fevers/Chills, Denies specific pains but feels unwell. Reports it started becoming malodorous yesterday.    He now presents as a direct SICU admission d/t NSTI of the LLE, predominately at the foot but extending up the shin.      Past Medical History    Past Medical History:   Diagnosis Date    Degenerative joint disease     Gastro-oesophageal reflux disease        Past Surgical History   Past Surgical History:   Procedure Laterality Date    BACK SURGERY      lumbar fusion    EXPLORE SPINE, REMOVE HARDWARE, COMBINED  1/13/2012    Procedure:COMBINED EXPLORE SPINE, REMOVE HARDWARE; EXPLORE SPINE, REMOVE HARDWARE L4-S1; Surgeon:FAM GONZALEZ; Location:[facility] OR    ORTHOPEDIC SURGERY      right foot surgery       Prior to Admission Medications   I have reviewed this patient's current medications  Current Outpatient Medications on File Prior to Encounter   Medication Sig Dispense Refill    acetaminophen (TYLENOL) 500 MG tablet Take 1,000 mg by mouth      buPROPion (WELLBUTRIN XL) 150 MG 24 hr tablet Take 450 mg by mouth daily      busPIRone (BUSPAR) 10 MG tablet Take 1 tablet by mouth 2 times daily      Cranberry 400 MG CAPS Take 250 mg by mouth      cyclobenzaprine (FLEXERIL) 10 MG tablet Take 10 mg by mouth      DULoxetine (CYMBALTA) 60 MG capsule Take 60 mg by mouth 2 times daily      FERREX 150 150 MG capsule TAKE 1 CAPSULE BY MOUTH ONCE DAILY WITH BREAKFAST      hydroxychloroquine  (PLAQUENIL) 200 MG tablet Take 2 tablets by mouth daily      levofloxacin (LEVAQUIN) 750 MG tablet Take 1 tablet by mouth daily at 2 pm      loratadine (CLARITIN) 10 MG tablet Take 1 tablet by mouth daily      losartan-hydrochlorothiazide (HYZAAR) 50-12.5 MG tablet Take 0.5 tablets by mouth daily      magnesium oxide 400 MG tablet Take 400 mg by mouth      naloxone (NARCAN) 4 MG/0.1ML nasal spray Spray 1 spray (4 mg) into one nostril 1 time; may repeat in opposite nostril in 2 to 3 minutes if person does not respond.      nicotine (NICODERM CQ) 7 MG/24HR patch 2h hr Place 1 patch onto the skin every 24 hours. (Patient not taking: Reported on 2023)      omeprazole (PRILOSEC) 40 MG DR capsule Take 40 mg by mouth      oxycodone (OXY-IR) 5 MG capsule Take 1-2 capsules by mouth every 4 hours as needed. 90 capsule 0    oxycodone (OXYCONTIN) 10 MG 12 hr tablet Take 1 tablet by mouth every 12 hours. 30 tablet 0    polyethylene glycol-propylene glycol (SYSTANE ULTRA) 0.4-0.3 % SOLN ophthalmic solution Apply 1 drop to eye      prednisoLONE acetate (PRED FORTE) 1 % ophthalmic suspension Apply 1 drop to eye      pregabalin (LYRICA) 150 MG capsule Take 150 mg by mouth      senna-docusate (SENOKOT-S/PERICOLACE) 8.6-50 MG tablet Take 2 tablets by mouth 2 times daily      tamsulosin (FLOMAX) 0.4 MG capsule             Review of Systems    The 10 point Review of Systems is negative other than noted in the HPI or here.     Social History   I have reviewed this patient's social history and updated it with pertinent information if needed.  Social History     Tobacco Use    Smoking status: Former     Types: Cigarettes     Quit date: 2011     Years since quittin.8   Substance Use Topics    Alcohol use: No    Drug use: No         Family History           Allergies   Allergies   Allergen Reactions    Adhesive Tape Hives     From tape from surgery    Benzoin Hives and Rash    Droperidol Anaphylaxis    Prednisone      vomiting     Vitamin D Rash     flairs up his sarcoidosis        Physical Exam   Vitals: Most Recent  Ranges (Last 24 hours)   Temp: (!) 101.1  F (38.4  C)  Pulse: 120  BP: 105/89  Resp: 26  SpO2: 95 %  O2 Device:   Temp  Min: 101.1  F (38.4  C)  Max: 101.1  F (38.4  C)  Pulse  Min: 109  Max: 122  BP  Min: 63/51  Max: 127/63  Resp  Min: 18  Max: 26  SpO2  Min: 93 %  Max: 100 %     Physical Exam:  Gen: Appears stated age, Uncomfortable, Diaphoretic, Febrile  HEENT: NC/AT, PERRL, EOMI, Sclera Anicteric, Hearing intact  Neuro: AO, Mildly confused, no focal deficits. Incomplete analgesia and paralysis below the waist.  Psych: Affect appropriate, Behavior appropriate, Cooperative  CV: Normotensive w/ NorEpi, Tachycardic  Pulm: NWOB on NC  ABD: Soft, NT, ND. Colostomy in place.  MSK: Baseline paraesthesia and   Skin: LLE soft tissue infection with areas of purulence and necrosis. Malodorous. + blanching            Data     I have personally reviewed the following data over the past 24 hrs:    16.7 (H)  \   11.6 (L)   / 287     N/A N/A N/A /  82   N/A N/A N/A \       Imaging results reviewed over the past 24 hrs:   Recent Results (from the past 24 hour(s))   XR Chest Port 1 View    Impression    RESIDENT PRELIMINARY INTERPRETATION  Impression: Left IJ PICC with the tip terminating at the distal SVC.

## 2023-08-28 NOTE — ANESTHESIA PROCEDURE NOTES
Airway       Patient location during procedure: OR       Procedure Start/Stop Times: 8/27/2023 11:33 PM  Staff -        Anesthesiologist:  Ja Martínez MD       Resident/Fellow: Suzy Cee MD       Performed By: with residents       Procedure performed by resident/fellow/CRNA in presence of a teaching physician.    Consent for Airway        Urgency: emergent       Consent: The procedure was performed in an emergent situation.  Indications and Patient Condition       Indications for airway management: ivette-procedural       Induction type:RSI       Mask difficulty assessment: 0 - not attempted    Final Airway Details       Final airway type: endotracheal airway       Successful airway: ETT - single and Oral  Endotracheal Airway Details        ETT size (mm): 7.5       Cuffed: yes       Successful intubation technique: video laryngoscopy       VL Blade Size: MAC 4       Grade View of Cords: 1       Adjucts: stylet       Position: Right       Measured from: lips       Secured at (cm): 23       Bite block used: None    Post intubation assessment        Placement verified by: capnometry, equal breath sounds and chest rise        Number of attempts at approach: 1       Number of other approaches attempted: 0       Secured with: pink tape       Ease of procedure: easy       Dentition: Intact    Medication(s) Administered   Medication Administration Time: 8/27/2023 11:33 PM    Additional Comments       Routine intubation.

## 2023-08-28 NOTE — PROGRESS NOTES
Orthopaedic Surgery Progress Note 08/28/2023    S: Remained intubated post op. Weaning pressor requirements. Otherwise no acute events.     O:  Temp: 97.4  F (36.3  C) Temp src: Axillary BP: 118/67 Pulse: 65   Resp: 18 SpO2: 98 % O2 Device: Mechanical Ventilator      Exam:  Gen: No acute distress, resting comfortably in bed.  Resp: Intubated, mechanically ventilated.   MSK:  LLE:  - Faint erythema within previously marked lines, slight recession of erythema posterolaterally.   - Wound/incisional VAC remains in place, holding suction.   - No crepitus, no bullae.     Recent Labs   Lab 08/28/23  0221 08/27/23  2357 08/27/23  2115   WBC 19.1*  --  16.7*   HGB 11.3* 11.2* 11.6*     --  287     Radiographs reviewed.     Assessment: Saqib Bishop is a 42 year old male who presented on 8/27/2023 with NSTI and subsequently underwent transtibial guillotine amputation.     Overnight to this morning was able to come down on norepinephrine this morning, per ICU team, planning to repeat ABG and possibly extubated today.     Pending medical stability, will plan for repeat I&D with possible revision amputation tomorrow, 8/29 with orthopedics. Timing and staff TBD.     Plan:   - Continue broad spectrum antibiotics   - appreciate excellent SICU cares   - NPO at midnight   - Preop labs to be obtained.     If changes in skin, erythema occur, please page orthopedics for repeat evaluation.     Lana Humphrey MD  [phone]

## 2023-08-28 NOTE — PHARMACY-VANCOMYCIN DOSING SERVICE
Pharmacy Vancomycin Note  Date of Service 2023  Patient's  1980   42 year old, male    Indication:  Necrotizing SSTI  Day of Therapy: 2  Current vancomycin regimen:  Intermittent dosing  Current vancomycin monitoring method: Trough (Method 2 = manual dose calculation)  Current vancomycin therapeutic monitoring goal: 15-20 mg/L    Current estimated CrCl = Estimated Creatinine Clearance: 62.1 mL/min (A) (based on SCr of 2.25 mg/dL (H)).    Creatinine for last 3 days  2023:  9:15 PM Creatinine 2.95 mg/dL;  9:15 PM Creatinine 2.89 mg/dL  2023:  2:21 AM Creatinine 2.25 mg/dL    Recent Vancomycin Levels (past 3 days)  2023:  4:25 PM Vancomycin 8.7 ug/mL    Vancomycin IV Administrations (past 72 hours)        No vancomycin orders with administrations in past 72 hours.                    Nephrotoxins and other renal medications (From now, onward)      Start     Dose/Rate Route Frequency Ordered Stop    23  vancomycin (VANCOCIN) 1,750 mg in sodium chloride 0.9 % 500 mL intermittent infusion         1,750 mg  over 120 Minutes Intravenous EVERY 24 HOURS 23 1819      23 1630  norepinephrine (LEVOPHED) 16 mg in  mL infusion MAX CONC CENTRAL LINE         0.01-0.6 mcg/kg/min × 136.8 kg (Dosing Weight)  1.3-77 mL/hr  Intravenous CONTINUOUS 23 1618      23 1600  piperacillin-tazobactam (ZOSYN) 4.5 g vial to attach to  mL bag         4.5 g  over 30 Minutes Intravenous EVERY 6 HOURS 23 1127                 Contrast Orders - past 72 hours (72h ago, onward)      None            Interpretation of levels and current regimen:  Vancomycin level is reflective of subtherapeutic level    Has serum creatinine changed greater than 50% in last 72 hours: No    Urine output:  good urine output    Renal Function: Improving    InsightRX Prediction of Planned New Vancomycin Regimen  Loading dose: N/A  Regimen: 1750 mg IV every 24 hours.  Start time: 18:18 on  08/28/2023  Exposure target: AUC24 (range)400-600 mg/L.hr   AUC24,ss: 492 mg/L.hr  Probability of AUC24 > 400: 82 %  Ctrough,ss: 15.9 mg/L  Probability of Ctrough,ss > 20: 23 %  Probability of nephrotoxicity (Lodise ANNE 2009): 11 %    Plan:  Start IV vancomycin 1750 mg Q24H  Vancomycin monitoring method: AUC  Vancomycin therapeutic monitoring goal: 400-600 mg*h/L  Pharmacy will check vancomycin levels as appropriate in 1-3 Days.  Serum creatinine levels will be ordered daily for the first week of therapy and at least twice weekly for subsequent weeks.    Dallin Corea [facility]

## 2023-08-28 NOTE — OP NOTE
Date of Service: 08/28/23      Surgeon:  Gilda Barth    Assistants:    MD Keya Lemos MD Roberto Guzman, MD      Preoperative Diagnosis:     1.  Necrotizing soft tissue infection of left lower extremity   2. Sepsis      Postoperative Diagnosis:     1.  Necrotizing soft tissue infection of left lower extremity   2. Sepsis      Procedures:   1.  Guillotine Amputation transtibial transfibular       Anesthesia:  General endotracheal.     EBL:  200 mL.    Complications:  None apparent.     Implants: None    Specimens: Gross; left foot    Preoperative antibiotics: Clindamycin, Zosyn, Vancomycin perioperatively     Tourniquet time: 16 minutes and 25 minutes      Indications: Saqib Bishop is a 42 year old male who presented with to an outside hospital this afternoon with a left foot wound. Per report, he injured his left foot approximately two days ago, and noticed worsening pain, erythema, warmth, malodor, and general malaise after the last twenty-four hours. He subsequently presented to an outside emergency department where he was found to be in septic shock, with hypotension, tachycardia, acute kidney failure, and altered mental status. He was subsequently directed admitted to the SICU and found to have a necrotizing soft tissue infection. The decision was thus made to proceed with the operative intervention for the necrotizing soft tissue infection. Orthopedics was asked to be present and available for possible amputation. Emergent consent was performed, additionally family (mother and father) were       Details:  A preoperative brief with the surgical team was performed.  The patient was brought back to the operating room and placed supine.  A safety strap was applied and all bony prominences were well-padded.  General anesthesia was administered.  The patient was prepped and draped in the usual sterile fashion.  Surgical timeout was performed prior to incision confirming the correct patient,  laterality, and procedure to be performed.  All members of the surgical team were in agreement.      A medial incision was made along the distal posterior border of the tibia extending from just proximal to the medial malleolus to the medial forefoot. Electrocautery was used for hemostasis. Dissection was carried down the the level of the fascia where dishwater-like fluid was encountered. Distally the skin, subcutaneous fat and fascia was noted to easily bluntly dissect off the underlying musculature. Next attention was turned to the lateral side was an incision was made centered over the lateral distal fibula extending to the lateral midfoot. Electrocautery was used to achieve hemostasis. Sharp dissection was carried down to the level of the fibula. Dishwater fluid was encountered subcutaneously and noted to dissect distally along the subcutaneous fascia where extensive, malodorous, purulent fluid was encountered. At this time, the decision was made to proceed with an amputation. The two incisions were connected both anteriorly and posteriorly in a fish-mouth fashion. Using a Metzenbaum scissors, the vascular structures medially, anteriorly and posterolaterally were dissected out and subsequently tied of using 0-silk ties. Sharp dissection carried down to both the tibia and fibula using a #10 blade. A Ruvalcaba elevator was used to subperiosteally dissect the soft tissues form the bone. An oscillating saw was used to make a transtibial and transfibular amputation. The amputated left limb was subsequently placed on the back table. At this time, the remaining soft tissues were evaluated for additional subfascial dissection and infection and were found to be intact. Thus, it was felt the level of the amputation was appropriate and not needed to be carried any further. The tourniquet was released and hemostasis was achieved with a series of 0-silk ties and electrocautery. The wound was irrigated with saline. Attention was  then turned to closure. 0-PDS was used to suture the achilles tendon to the tibialis anterior tendon and extensor digitorum tendons to provide soft tissue coverage of the bony end. 0-PDS was then used to loosely approximate the remainder subcutaneous tissue. The incisions were loosely closed with 0-PDS and 2-0 PDS. An incisional VAC was then applied to the residual limb.      Dr. Barth was scrubbed and present for all critical portions of the procedure      Postoperative plan:    - SICU   - Antibiotics per SICU, recommend continuing broad spectrum and clindamycin.   - RTOR to be determined.     Lana Humphrey MD  PGY-4, Orthopaedic Surgery
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
## 2023-08-28 NOTE — ANESTHESIA PREPROCEDURE EVALUATION
Anesthesia Pre-Procedure Evaluation    Patient: Saqib Bishop   MRN: [id number] : 1980        Procedure : Procedure(s):  Incision and drainage lower extremity, combined  Amputate leg below knee          Past Medical History:   Diagnosis Date    Degenerative joint disease     Gastro-oesophageal reflux disease       Past Surgical History:   Procedure Laterality Date    BACK SURGERY      lumbar fusion    EXPLORE SPINE, REMOVE HARDWARE, COMBINED  2012    Procedure:COMBINED EXPLORE SPINE, REMOVE HARDWARE; EXPLORE SPINE, REMOVE HARDWARE L4-S1; Surgeon:FAM GONZALEZ; Location:[facility] OR    ORTHOPEDIC SURGERY      right foot surgery      Allergies   Allergen Reactions    Adhesive Tape Hives     From tape from surgery    Benzoin Hives and Rash    Droperidol Anaphylaxis    Prednisone      vomiting    Vitamin D Rash     flairs up his sarcoidosis      Social History     Tobacco Use    Smoking status: Former     Types: Cigarettes     Quit date: 2011     Years since quittin.8    Smokeless tobacco: Not on file   Substance Use Topics    Alcohol use: No      Wt Readings from Last 1 Encounters:   23 137.8 kg (303 lb 12.7 oz)        Anesthesia Evaluation   Pt has had prior anesthetic. Type: General.        ROS/MED HX  ENT/Pulmonary:  - neg pulmonary ROS     Neurologic:  - neg neurologic ROS     Cardiovascular:  - neg cardiovascular ROS     METS/Exercise Tolerance:     Hematologic:  - neg hematologic  ROS     Musculoskeletal: Comment: S/p lumbar fusion      GI/Hepatic:     (+) GERD,                   Renal/Genitourinary:  - neg Renal ROS     Endo:  - neg endo ROS     Psychiatric/Substance Use:  - neg psychiatric ROS     Infectious Disease: Comment: Necrotizing fasciitis s/p I&D on 23      Malignancy:       Other:  - neg other ROS          Physical Exam    Airway        Mallampati: III   TM distance: > 3 FB   Neck ROM: full   Mouth opening: > 3 cm    Respiratory Devices and Support         Dental        (+) Modest Abnormalities - crowns, retainers, 1 or 2 missing teeth      Cardiovascular   cardiovascular exam normal          Pulmonary   pulmonary exam normal                OUTSIDE LABS:  CBC:   Lab Results   Component Value Date    WBC 16.7 (H) 08/27/2023    HGB 11.6 (L) 08/27/2023    HCT 36.4 (L) 08/27/2023     08/27/2023     BMP:   Lab Results   Component Value Date     (L) 08/27/2023     (L) 08/27/2023    POTASSIUM 4.0 08/27/2023    POTASSIUM 4.0 08/27/2023    CHLORIDE 95 (L) 08/27/2023    CHLORIDE 93 (L) 08/27/2023    CO2 19 (L) 08/27/2023    CO2 18 (L) 08/27/2023    BUN 44.3 (H) 08/27/2023    BUN 44.1 (H) 08/27/2023    CR 2.95 (H) 08/27/2023    CR 2.89 (H) 08/27/2023    GLC 79 08/27/2023    GLC 76 08/27/2023     COAGS:   Lab Results   Component Value Date    PTT 31 08/27/2023    INR 1.25 (H) 08/27/2023    FIBR 863 (H) 08/27/2023     POC: No results found for: BGM, HCG, HCGS  HEPATIC:   Lab Results   Component Value Date    ALBUMIN 2.8 (L) 08/27/2023    ALBUMIN 2.7 (L) 08/27/2023    PROTTOTAL 6.7 08/27/2023    PROTTOTAL 6.7 08/27/2023    ALT 29 08/27/2023    ALT 28 08/27/2023    AST 51 (H) 08/27/2023    AST 52 (H) 08/27/2023    ALKPHOS 148 (H) 08/27/2023    ALKPHOS 164 (H) 08/27/2023    BILITOTAL 0.8 08/27/2023    BILITOTAL 0.8 08/27/2023     OTHER:   Lab Results   Component Value Date    PH 7.25 (L) 08/27/2023    LACT 0.6 (L) 08/27/2023    HILDA 7.9 (L) 08/27/2023    HILDA 7.6 (L) 08/27/2023    PHOS 4.3 08/27/2023    MAG 1.2 (L) 08/27/2023    SED 30 (H) 07/18/2023       Anesthesia Plan    ASA Status:  4, emergent    NPO Status:  ELEVATED Aspiration Risk/Unknown    Anesthesia Type: General.     - Airway: ETT   Induction: Intravenous, RSI.   Maintenance: Balanced.   Techniques and Equipment:     - Airway: Video-Laryngoscope     - Lines/Monitors: 2nd IV, CVL in situ     Consents    Anesthesia Plan(s) and associated risks, benefits, and realistic alternatives discussed. Questions answered and  patient/representative(s) expressed understanding.     - Discussed: Risks, Benefits and Alternatives for BOTH SEDATION and the PROCEDURE were discussed     - Discussed with:  Patient      - Extended Intubation/Ventilatory Support Discussed: No.      - Patient is DNR/DNI Status: No     Use of blood products discussed: No .     Postoperative Care    Pain management: IV analgesics, Oral pain medications, Multi-modal analgesia.   PONV prophylaxis: Ondansetron (or other 5HT-3)     Comments:                MD Kavon Regaladotalib Bishop is a 41 y/o male with paraplegia, chronic sacral decubitus ulcer, s/p flap and treatment for osteomyelitis December 2020, then with dehiscence, open wound and infected sacral decubitus ulceration October 2021. He completed six weeks of abx for osteomyelitis, and is now s/p an elective flap procedure early 2023, which failed and resulted in a chronic sacral ulcer. He now presents as a direct SICU admission d/t NSTI of the LLE, predominately at the foot but extending up the shin.

## 2023-08-28 NOTE — PLAN OF CARE
Admitted/transferred from: OSH  Reason for admission/transfer: Sepsis/necrotizing fascititis   2 RN skin assessment: completed by Nubia GRIFFITHS and Berta HOWARD  Result of skin assessment and interventions/actions: Left lower foot wound/LLE cellulitis, R lower back wound, scattered scabs and bruising  Height, weight, drug calc weight: Done  Patient belongings (see Flowsheet)  MDRO education added to care planYes  ?

## 2023-08-28 NOTE — PROGRESS NOTES
[facility]    Progress Note - EGS Service       Date of Admission:  8/27/2023    Assessment & Plan: Surgery   Saqib Bishop is a 43 y/o male with paraplegia, chronic sacral decubitus ulcer, s/p flap and treatment for osteomyelitis December 2020, then with dehiscence, open wound and infected sacral decubitus ulceration October 2021. He completed six weeks of abx for osteomyelitis, and is now s/p an elective flap procedure early 2023, which failed and resulted in a chronic sacral ulcer. He presented with a NSTI of LLE and is s/p left below the knee guillotine amputation     - multimodal pain mgmt  - wean pressors as tolerated  - CLD, NPO @ [facility]  - continue zosyn, Vanc, Clindamycin  - appreciate SICU cares  - appreciate ortho cares  - RTOR on 8/29       Diet: Advance Diet as Tolerated: Clear Liquid Diet  NPO for Medical/Clinical Reasons Except for: Meds    DVT Prophylaxis: Heparin SQ  Dyer Catheter: PRESENT, indication: Other (Comment) (chronic)  Lines: PRESENT      CVC Triple Lumen Left Internal jugular-Site Assessment: WDL  Arterial Line 08/27/23 Radial-Site Assessment: WDL      Drains: PRESENT          - May have additional drains, review Avatar  Cardiac Monitoring: ACTIVE order. Indication: ICU  Code Status: Full Code      Clinically Significant Risk Factors Present on Admission         # Hyponatremia: Lowest Na = 128 mmol/L in last 2 days, will monitor as appropriate  # Hypocalcemia: Lowest iCa = 4.1 mg/dL in last 2 days, will monitor and replace as appropriate   # Hypomagnesemia: Lowest Mg = 1.2 mg/dL in last 2 days, will replace as needed   # Hypoalbuminemia: Lowest albumin = 2.7 g/dL at 8/27/2023  9:15 PM, will monitor as appropriate  # Coagulation Defect: INR = 1.25 (Ref range: 0.85 - 1.15) and/or PTT = 31 Seconds (Ref range: 22 - 38 Seconds), will monitor for bleeding    # Hypertension: Home medication list includes antihypertensive(s)   # Circulatory Shock:  "currently requiring pressors for blood pressure support  # Acute Respiratory Failure: Documented O2 saturation < 91%.  Continue supplemental oxygen as needed     # Obesity: Estimated body mass index is 39.79 kg/m  as calculated from the following:    Height as of this encounter: 1.854 m (6' 1\").    Weight as of this encounter: 136.8 kg (301 lb 9.4 oz).              Disposition Plan      Expected Discharge Date: 09/03/2023                 The patient's care was discussed with the attending on service, Dr. Mancilla.    Nuzhat Hinton MD  [facility]  Non-urgent messages: Securely message with [employer] (more info)  Text page via [facility] Paging/Directory     ______________________________________________________________________    Interval History   Pt underwent L below the knee guillotine amputation early this morning. He remains on low dose dual pressors and is currently intubated.     Physical Exam   Vital Signs: Temp: 97.4  F (36.3  C) Temp src: Axillary BP: 118/67 Pulse: 79   Resp: 18 SpO2: 100 % O2 Device: Oxymask Oxygen Delivery: 4 LPM  Weight: 301 lbs 9.43 oz  Intake/Output Summary (Last 24 hours) at 8/28/2023 1418  Last data filed at 8/28/2023 1300  Gross per 24 hour   Intake 7616.25 ml   Output 3925 ml   Net 3691.25 ml     GENERAL: NAD, resting comfortably in bed  HEENT: atraumatic, normocephalic, no scleral icterus  CARDIO: normal rate and regular rhythm, no LE edema  PULM: no increased WOB, CTAB  ABD: non-distended, soft, and non-tender  NEURO: CN II-XII grossly intact, no focal neuro deficits  EXT: LLE stump with wound vac in place with serosang output  PSYCH: appropriate affect and behavior    Data     I have personally reviewed the following data over the past 24 hrs:    19.1 (H)  \   11.3 (L)   / 321     135 (L) 100 36.9 (H) /  134 (H)   3.6 19 (L) 2.25 (H) \     ALT: 29; 28 AST: 51 (H); 52 (H) AP: 148 (H); 164 (H) TBILI: 0.8; 0.8   ALB: 2.8 (L); 2.7 (L) TOT " PROTEIN: 6.7; 6.7 LIPASE: N/A     Procal: N/A CRP: 325.00 (H) Lactic Acid: 0.5 (L)       INR:  1.25 (H) PTT:  31   D-dimer:  N/A Fibrinogen:  863 (H)       Imaging results reviewed over the past 24 hrs:   Recent Results (from the past 24 hour(s))   XR Chest Port 1 View    Narrative    Exam: XR CHEST PORT 1 VIEW, 8/27/2023 9:18 PM    Indication: cvc placement    Comparison: Outside CT chest abdomen and pelvis 6/26/2023    Findings: AP portable chest radiograph. Left IJ PICC with the tip  terminating at the distal SVC. Partially visualized spinal hardware  within the mid lower thorax. Trachea is midline. Cardiac silhouette is  within normal limits. Left costophrenic angle is out of the field of  view. Right costophrenic angle is sharp. No appreciable pneumothorax.  No distinct consolidative pulmonary opacities. No acute osseous  abnormalities.      Impression    Impression: Left IJ PICC with the tip terminating at the distal SVC.    I have personally reviewed the examination and initial interpretation  and I agree with the findings.    MAGALIE GONCALVES MD         SYSTEM ID:  [id number]   XR Knee Port Left 1/2 Views    Narrative    EXAM: XR KNEE PORT LEFT 1/2 VIEWS  LOCATION: [facility]  DATE: 8/27/2023    INDICATION: NSTI  COMPARISON: None.      Impression    IMPRESSION: Image osseous structures are demineralized. No acute fracture, dislocation or knee joint effusion. No definite soft tissue gas or radiopaque foreign body. No cortical erosion to suggest underlying osteomyelitis.   XR Tibia & Fibula Port Left 2 Views    Narrative    EXAM: XR TIBIA and FIBULA PORT LEFT 2 VIEWS  LOCATION: [facility]  DATE: 8/27/2023    INDICATION: NSTI  COMPARISON: None.      Impression    IMPRESSION: Image osseous structures are demineralized. Tibia and fibula are intact without acute fractures or cortical erosions. Significant subcutaneous gas is  seen in the plantar soft tissue of the imaged foot.   XR Foot Port Left 3 Views    Narrative    EXAM: XR FOOT PORT LEFT 3 VIEWS  LOCATION: [facility]  DATE: 8/27/2023    INDICATION: NSTI  COMPARISON: None.      Impression    IMPRESSION: Extensive soft tissue gas and swelling noted throughout the left foot and ankle compatible with soft tissue infection. Thinning of the skin at the fifth metatarsal phalangeal joint which may represent an area of ulceration. The fifth proximal   phalanx may be subluxed or dislocated medially at the metatarsal phalangeal joint. Diffuse osteopenia. This and the extensive soft tissue gas limits evaluation for fracture and/or bony erosion / osteomyelitis. If there is clinical concern for these   entities, MRI could be considered for further characterization.    XR Chest Port 1 View    Narrative    XR CHEST PORT 1 VIEW  8/28/2023 2:58 AM     HISTORY:  Endotracheal tube positioning       COMPARISON:  8/27/2023    FINDINGS:   Frontal view of the chest. Endotracheal tube tip projects 4.6 cm above  the danette. Left IJ central venous catheter tip projects over superior  cavoatrial junction.    Stable enlarged cardiac silhouette. Low lung volumes. Mildly increased  bilateral perihilar and bibasilar/retrocardiac interstitial opacities.  Mild blunting of the left costophrenic angle. Partially visualized  thoracic fusion hardware.      Impression    IMPRESSION:   1. Endotracheal tube tip projects 4.6 cm above the danette.  2. Low lung volumes with increased bilateral perihilar and  bibasilar/retrocardiac opacities, which may represent  atelectasis/pulmonary edema.  3. Small left pleural effusion.    I have personally reviewed the examination and initial interpretation  and I agree with the findings.    MAGALIE GONCALVES MD         SYSTEM ID:  [id number]   XR Tibia & Fibula Port Left 2 Views    Narrative    EXAM: XR TIBIA and FIBULA PORT LEFT 2  VIEWS  LOCATION: [facility]  DATE: 8/28/2023    INDICATION: s p amputation  COMPARISON: None.      Impression    IMPRESSION: There are postoperative changes from amputation of the left lower extremity at the level of the distal tibia/fibular diaphysis. No radiographic evidence of osteomyelitis is seen. No fracture. There is some soft tissue swelling over the   visualized lower extremity.   XR Chest Port 1 View    Narrative    EXAM: XR CHEST PORT 1 VIEW  8/28/2023 8:40 AM     HISTORY:  ET tube placement       COMPARISON:  Same-day chest radiograph    TECHNIQUE: Single portable semiupright view of the chest    FINDINGS:   The endotracheal tube tip is in the high thoracic trachea, slightly  retracted from prior. Left IJ cvc with tip in the distal SVC.  Partially visualized spinal fusion hardware.    The trachea is midline. The cardiomediastinal silhouette is stably   enlarged. The lungs. No appreciable pneumothorax or pleural effusion,  with the left costophrenic angle not completely included in the  field-of-view.. Dense retrocardiac opacity. Silhouetting of the left  hemidiaphragm again present. Streaky perihilar and bibasilar opacities  are not significantly changed. Bilateral peribronchial cuffing. No  acute osseous abnormality.      Impression    IMPRESSION:  1. Endotracheal tube tip has been slightly retracted, now on the high  thoracic trachea approximately 8.5 cm above the danette.  2. Low lung volumes with continued perihilar/bibasilar atelectasis.    I have personally reviewed the examination and initial interpretation  and I agree with the findings.    ARELY CRUZ MD         SYSTEM ID:  [id number]

## 2023-08-28 NOTE — CONSULTS
[facility]  ORTHOPAEDIC SURGERY CONSULT - HISTORY AND PHYSICAL    DATE OF CONSULT: 8/28/2023 2:55 AM    REQUESTING PROVIDER: Sesar Barth MD, MD - [facility] Staff.    CC: Left lower extremity necrotizing soft tissue infection    DATE OF INJURY: Unknown, possibly 8/25/2023    HISTORY OF PRESENT ILLNESS:   The orthopaedic surgery service was consulted by Sesar Andres MD for evaluation and treatment recommendations of necrotizing soft tissue infection of the left lower extremity.    Saqib Bishop is a 42 year old male with pmhx of paraplegia, chronic sacral decubitus ulcer status post flap treatment and 2020 with dehiscence and repeat flap coverage and early 2023 who sustained a left lower extremity wound after a recent hospitalization at an outside hospital about 2 months prior to presentation.  Per the patient's parents, the patient left the prior hospitalization with a known wound and is receiving appropriate wound cares.  His father typically checks on the patient given that he has limited ability to care for self given his physical limitations.  He also has a home health care aide that comes and helps him with his daily activities on most days.  According to the father, the wound had been treated appropriately and his sacral wound was also being treated appropriately.  However, there is possibly an incident about 1 week ago when the patient accidentally ran over his own foot with his wheelchair.  They believe that since then his wound had perhaps worsened.  The patient did note to his parents about 2 or 3 days prior to presentation that he began smelling a foul odor which she believed to be coming from his left lower extremity wound.  Given the unexpected change in the appearance of his wound it was recommended to him to seek care at his wound care facility.  The patient did seek care there, but upon evaluation is recommended that he be seen by an emergency department nearby.   When he was evaluated at this emergency department there was concern for necrotizing soft tissue infection that was rapidly progressing and therefore he was transferred to [facility] for escalated level of care.  The patient was admitted through the general surgery service which consulted the orthopedic surgery service for help with evaluation and management of the infection.    By the time of initial evaluation, the patient's mental status was no longer enough to have an appropriate conversation with him.  He was no longer following commands so physical exam was limited.  The patient did appear to be in pain with regards to the left lower extremity in particular in the foot.  According to the family the patient's mental status has been appropriate up until the day of presentation.    PAST MEDICAL HISTORY:   Past Medical History:   Diagnosis Date    Degenerative joint disease     Gastro-oesophageal reflux disease      [Patient denies any personal history of bleeding disorders, clotting disorders, or adverse reactions to anesthesia].    PAST SURGICAL HISTORY:    Past Surgical History:   Procedure Laterality Date    BACK SURGERY      lumbar fusion    EXPLORE SPINE, REMOVE HARDWARE, COMBINED  1/13/2012    Procedure:COMBINED EXPLORE SPINE, REMOVE HARDWARE; EXPLORE SPINE, REMOVE HARDWARE L4-S1; Surgeon:FAM GONZALEZ; Location: OR    ORTHOPEDIC SURGERY      right foot surgery       MEDICATIONS:   Prior to Admission medications    Medication Sig Last Dose Taking? Auth Provider Long Term End Date   acetaminophen (TYLENOL) 500 MG tablet Take 1,000 mg by mouth   Reported, Patient     buPROPion (WELLBUTRIN XL) 150 MG 24 hr tablet Take 450 mg by mouth daily   Reported, Patient Yes    busPIRone (BUSPAR) 10 MG tablet Take 1 tablet by mouth 2 times daily   Reported, Patient     Cranberry 400 MG CAPS Take 250 mg by mouth   Reported, Patient     cyclobenzaprine (FLEXERIL) 10 MG tablet Take 10 mg by mouth   Reported, Patient      DULoxetine (CYMBALTA) 60 MG capsule Take 60 mg by mouth 2 times daily   Reported, Patient     FERREX 150 150 MG capsule TAKE 1 CAPSULE BY MOUTH ONCE DAILY WITH BREAKFAST   Reported, Patient     hydroxychloroquine (PLAQUENIL) 200 MG tablet Take 2 tablets by mouth daily   Reported, Patient     levofloxacin (LEVAQUIN) 750 MG tablet Take 1 tablet by mouth daily at 2 pm   Reported, Patient     loratadine (CLARITIN) 10 MG tablet Take 1 tablet by mouth daily   Reported, Patient     losartan-hydrochlorothiazide (HYZAAR) 50-12.5 MG tablet Take 0.5 tablets by mouth daily   Reported, Patient Yes    magnesium oxide 400 MG tablet Take 400 mg by mouth   Reported, Patient     naloxone (NARCAN) 4 MG/0.1ML nasal spray Spray 1 spray (4 mg) into one nostril 1 time; may repeat in opposite nostril in 2 to 3 minutes if person does not respond.   Reported, Patient     nicotine (NICODERM CQ) 7 MG/24HR patch 2h hr Place 1 patch onto the skin every 24 hours.  Patient not taking: Reported on 8/4/2023   Reported, Patient     omeprazole (PRILOSEC) 40 MG DR capsule Take 40 mg by mouth   Reported, Patient     oxycodone (OXY-IR) 5 MG capsule Take 1-2 capsules by mouth every 4 hours as needed.   Elie Carcamo PA-C     oxycodone (OXYCONTIN) 10 MG 12 hr tablet Take 1 tablet by mouth every 12 hours.   Elie Carcamo PA-C     polyethylene glycol-propylene glycol (SYSTANE ULTRA) 0.4-0.3 % SOLN ophthalmic solution Apply 1 drop to eye   Reported, Patient     prednisoLONE acetate (PRED FORTE) 1 % ophthalmic suspension Apply 1 drop to eye   Reported, Patient     pregabalin (LYRICA) 150 MG capsule Take 150 mg by mouth   Reported, Patient     senna-docusate (SENOKOT-S/PERICOLACE) 8.6-50 MG tablet Take 2 tablets by mouth 2 times daily   Reported, Patient     tamsulosin (FLOMAX) 0.4 MG capsule    Reported, Patient         ALLERGIES:   Adhesive tape, Benzoin, Droperidol, Prednisone, and Vitamin d    SOCIAL HISTORY:   Social History      Socioeconomic History    Marital status: Single     Spouse name: Not on file    Number of children: Not on file    Years of education: Not on file    Highest education level: Not on file   Occupational History    Not on file   Tobacco Use    Smoking status: Former     Types: Cigarettes     Quit date: 2011     Years since quittin.8    Smokeless tobacco: Not on file   Substance and Sexual Activity    Alcohol use: No    Drug use: No    Sexual activity: Not on file   Other Topics Concern    Not on file   Social History Narrative    Not on file     Social Determinants of Health     Financial Resource Strain: Not on file   Food Insecurity: Not on file   Transportation Needs: Not on file   Physical Activity: Not on file   Stress: Not on file   Social Connections: Not on file   Intimate Partner Violence: Not on file   Housing Stability: Not on file     Living situation: Patient lives [place] of the [facility], is independent but has the help of a home health nurse    FAMILY HISTORY:  No family history on file.    Patient denies known family history of bleeding, clotting, or anesthesia related complications.     REVIEW OF SYSTEMS:   10-point reviews of systems was negative except as noted above in the HPI.     PHYSICAL EXAM:   Vitals:    23 0147 23 0200 23 0215 23 0230   BP:       BP Location:       Pulse: 102 101 96 91   Resp: 15 18     Temp:       TempSrc:       SpO2:  93% 96% 100%   Weight:       Height:         General: Laying in SICU bed, minimally interactive, altered mental status  Lungs: Labored breathing is appreciated  Heart/Cardiovascular: Regular pulse, no peripheral cyanosis.  Right Lower Extremity: There is some ecchymosis and ulcerations found within the right foot.  There is also marked edema throughout the right foot.  No signs of [place] necrosis of the tissues.  Motor and sensory exam is very limited secondary to altered mental status.  Pulses are present.  The  toes are warm and well-perfused.  Left Lower Extremity: There is marked edema of the left lower extremity, there is erythematous streaking which is extending proximally to the level of the lower leg.  There is necrotic ulceration over the plantar aspect of the forefoot and the medial aspect of the forefoot and midfoot.  No active purulent drainage from the ulcerations is apparent on exam.  Motor and sensory exam are limited due to the patient's altered mental status.  Pulses are palpable but weak, toes are warm and well-perfused.    LABS:  Hemoglobin   Date Value Ref Range Status   08/28/2023 11.3 (L) 13.3 - 17.7 g/dL Final   08/27/2023 11.6 (L) 13.3 - 17.7 g/dL Final     Hemoglobin POCT   Date Value Ref Range Status   08/27/2023 11.2 (L) 13.3 - 17.7 g/dL Final     WBC Count   Date Value Ref Range Status   08/28/2023 19.1 (H) 4.0 - 11.0 10e3/uL Final     Platelet Count   Date Value Ref Range Status   08/28/2023 321 150 - 450 10e3/uL Final     INR   Date Value Ref Range Status   08/27/2023 1.25 (H) 0.85 - 1.15 Final     Creatinine   Date Value Ref Range Status   08/27/2023 2.95 (H) 0.67 - 1.17 mg/dL Final   08/27/2023 2.89 (H) 0.67 - 1.17 mg/dL Final     Glucose   Date Value Ref Range Status   08/27/2023 79 70 - 99 mg/dL Final   08/27/2023 76 70 - 99 mg/dL Final     GLUCOSE BY METER POCT   Date Value Ref Range Status   08/28/2023 120 (H) 70 - 99 mg/dL Final     Glucose Whole Blood POCT   Date Value Ref Range Status   08/27/2023 86 70 - 99 mg/dL Final     No results found for: CRP  Erythrocyte Sedimentation Rate   Date Value Ref Range Status   07/18/2023 30 (H) 0 - 15 mm/hr Final         IMAGING:  X-ray imaging of the left foot demonstrates subcutaneous gas within the medial aspect of the midfoot.  There are no fractures or dislocations or other osseous abnormalities appreciated    IMPRESSION:   Saqib Bishpo is a 42 year old male with past medical history of paraplegia who is wheelchair-bound and has a chronic  history of sacral decubitus ulcers that have been treated with recurrent flap coverage and antibiotics for osteomyelitis currently well controlled who developed necrotizing soft tissue infection of the left foot and lower leg presumably from worsening of known left foot wound after recent hospitalization.  It is uncertain exactly what caused the rapid decline and worsening of his known foot wounds as these were previously being treated with appropriate wound cares and appeared to be responding well.  However, there is possibly an incidence from about a week ago when the patient ran over his foot that may have set off the events leading up to the necrotizing soft tissue infection.    The orthopedic surgery service was urgently consulted for evaluation and management recommendations given the patient's rapidly progressive condition.  The resident team quickly went to evaluate the patient at the hospital however by this time the patient's mental status was markedly altered and the patient was requiring pressor support and was on broad-spectrum antibiotics given the rapidly progressive infection. Notably, patient had a LRINEC score of 10 at the time of evaluation.  Discussion was had with the general surgery service as they were requesting emergent help in the operating room.  As staff for the orthopedic surgery service was not immediately available, it was suggested to the general surgery service that the patient be taken to the operating room for an irrigation and debridement and that a guillotine left lower extremity amputation be performed if this was judged to be necessary intraoperatively, with the understanding that the orthopedic service could take the patient back to the operating room in the coming days for a revision amputation.  However, it was noted by the general surgery staff that given their lack of comfort level with a left lower extremity amputation they would be unable to take the patient to the  operating room if the orthopedic service was not present during the surgery.  Given that the patient was in a clinically unstable situation and the need for source control the infection was seen as emergently necessary, the orthopedics resident team decided to be present and assist with the irrigation and debridement and subsequent left lower extremity amputation.  The patient underwent this procedure without any further decline in his hemodynamic status.  He subsequently returned to the surgical ICU where care is to continue per ICU management.    RECOMMENDATIONS:   -SICU primary  - Plan for OR: Status post transtibial guillotine amputation, plan for revision amputation in the coming days   -Consent: Pending   -Pre-op labs: Pending   -Medicine clearance: Pending  - Anticoagulation/DVT Prophylaxis: Per SICU  - Antibiotics/Tetanus: Broad-spectrum, per SICU  - X-rays/Imaging: X-ray left tibia, fibula pending  - Activity: Bedrest for now  - Weight bearing: Nonweightbearing left lower extremity  - Pain control: Multimodal, per SICU  - Diet: Regular from Ortho perspective  - Follow-up: To be determined  - Disposition: Pending revision amputation, medical stability, pain control    Assessment and Plan discussed with Dr. Humphrey, orthopedic senior resident.  Staff for this patient is Dr. Gates.    Red Mejia MD  PGY-2  Orthopaedic Surgery

## 2023-08-28 NOTE — ANESTHESIA CARE TRANSFER NOTE
Patient: Saqib Bishop    Procedure: Procedure(s):  Left below knee Guillotine Amputation       Diagnosis: * No pre-op diagnosis entered *  Diagnosis Additional Information: No value filed.    Anesthesia Type:   General     Note:    Oropharynx: endotracheal tube in place  Level of Consciousness: iatrogenic sedation      Independent Airway: airway patency not satisfactory and stable  Dentition: dentition unchanged  Vital Signs Stable: post-procedure vital signs reviewed and stable  Report to RN Given: handoff report given  Patient transferred to: ICU    ICU Handoff: Call for PAUSE to initiate/utilize ICU HANDOFF, Identified Patient, Identified Responsible Provider, Reviewed the Pertinent Medical History, Discussed Surgical Course, Reviewed Intra-OP Anesthesia Management and Issues during Anesthesia, Set Expectations for Post Procedure Period and Allowed Opportunity for Questions and Acknowledgement of Understanding      Vitals:  Vitals Value Taken Time   /57 08/28/23 0134   Temp 37.3  C (99.2  F) 08/28/23 0144   Pulse 102 08/28/23 0146   Resp 15 08/28/23 0146   SpO2 94 % 08/28/23 0146   Vitals shown include unvalidated device data.    Electronically Signed By: Suzy Cee MD  August 28, 2023  1:47 AM

## 2023-08-28 NOTE — PROCEDURES
[facility]    Central line    Date/Time: 8/28/2023 12:07 AM    Performed by: Du Villasenor MD  Authorized by: Betty Goff MD  Indications: vascular access      UNIVERSAL PROTOCOL   Site Marked: Yes  Prior Images Obtained and Reviewed:  Yes  Required items: Required blood products, implants, devices and special equipment available    Patient identity confirmed:  Verbally with patient  NA - No sedation, light sedation, or local anesthesia  Confirmation Checklist:  Relevant allergies, procedure was appropriate and matched the consent or emergent situation and correct equipment/implants were available  Universal Protocol: the Joint Commission Universal Protocol was followed    Preparation: Patient was prepped and draped in usual sterile fashion    ESBL (mL):  2     ANESTHESIA    Local Anesthetic:  Lidocaine 1% without epinephrine  Anesthetic Total (mL):  5      SEDATION    Patient Sedated: No      Preparation: skin prepped with ChloraPrep  Skin prep agent dried: skin prep agent completely dried prior to procedure  Sterile barriers: all five maximum sterile barriers used - cap, mask, sterile gown, sterile gloves, and large sterile sheet  Hand hygiene: hand hygiene performed prior to central venous catheter insertion  Patient position: flat  Catheter type: triple lumen  Catheter size: 7 Fr  Pre-procedure: landmarks identified  Ultrasound guidance: yes  Sterile ultrasound techniques: sterile gel and sterile probe covers were used  Number of attempts: 1  Successful placement: yes  Post-procedure: line sutured and dressing applied  Assessment: blood return through all ports, free fluid flow, placement verified by x-ray and no pneumothorax on x-ray      PROCEDURE    Patient Tolerance:  Patient tolerated the procedure well with no immediate complications  Length of time physician/provider present for 1:1 monitoring during sedation: 0

## 2023-08-28 NOTE — PLAN OF CARE
A/O x4. Obeys commands. Left foot amputation. No sensation below thighs. Intermittent bradycardia @ 40's. MAP goal > 65, maintained with levo. Extubated this AM, on nasal canula 2L. Ostomy bag changed once. Dyer with high urine output. Wound vac on LLE with minimal output. Wound nurse consulted for sacral deep wound cares.     Plan to return to OR @ 1 pm tomorrow 8/29. Initiate NPO at midnight.   Plan of care ongoing.     Nancy Lopez RN on 8/28/2023 at 6:36 PM

## 2023-08-28 NOTE — CONSULTS
[facility]  WO Nurse Inpatient Assessment     Consulted for: Pressure Injury, LLE NSTI, Colostomy    Summary: OR 8/28/23 for Guillotine Amputation transtibial transfibular     Patient History (according to Surgery Resident provider note(s)8/27/23:    Saqib Bishop is a 43 y/o male with paraplegia, chronic sacral decubitus ulcer, s/p flap and treatment for osteomyelitis December 2020, then with dehiscence, open wound and infected sacral decubitus ulceration October 2021. He completed six weeks of abx for osteomyelitis, and is now s/p an elective flap procedure early 2023, which failed and resulted in a chronic sacral ulcer. He now presents as a direct SICU admission d/t NSTI of the LLE, predominately at the foot but extending up the shin.     - Admit to general surgery  - IV Pip-Tazo, Vanc, Clindamycin  - Ongoing resuscitation  - Labs pending  - Plan for incision and drainage, possible amputation in OR tonight  - Consult orthopedic surgery if amputation is deemed necessary  - Remainder of cares per SICU    Per Orthopedics Resident Note 8/27/23:   Saqib Bishop is a 42 year old male s/p emergent left transtibial guillotine amputation for LLE NSTI. Plan for likely revision amputation within next 48 hours.     Per Wound care MD 8/17/23: Pressure Injury of sacral region, stage 4  Assessment:      Areas visualized during today's visit: Focused:, Sacrum/coccyx, Lower extremities , Abdomen, and Colostomy    Wound location: Sacral      Last photo: 8/28/23  Wound due to: Pressure Injury  Wound history/plan of care: Per Plastics MD,Stage 4 of sacral/gluteal cleft pressure injury, appears chronic. Scar tissue present. Large areas of tunneling under small oval shaped opening. Receiving prior cares at [facility] outpatient wound clinic.    Wound base: unable to visualize wound base, wound edges area 100% blanchable  and granulation tissue,      Palpation of the wound  "bed: normal      Drainage: small     Description of drainage: yellow     Measurements (length x width x depth, in cm): 4  x 1.5  x  6.5 cm      Tunneling: up to 7.5 cm from 4 o'clock, 7.3 cm at 9 o'clock, large open cavity under the skin, not able to visulalize     Periwound skin: Erythema- blanchable, Scar tissue, and scattered areas of re-pigmented skin      Color: pink      Temperature: normal   Odor: none  Pain: no grimacing or signs of discomfort, unable to assess due to  falling asleep, and during dressing change, none  Pain interventions prior to dressing change: patient tolerated well, soaking, and slow and gentle cares   Treatment goal: Infection control/prevention, Maintain (prevention of deterioration), Protection, and Prepare wound bed for flap/graft  STATUS: initial assessment  Supplies ordered: ordered 1/4\" Nuguaze packing strip (# [id number]). ABDs, Uliaze tray 4x4 ( # [id number]) Medipore tape  ( # [id number]) and discussed with patient      Wound history/plan of care: OR 8/28/23: emergent left transtibial guillotine amputation for LLE NSTI. Plan for likely revision amputation within next 48 hours. NPWT in place ~125mmHg.     Negative pressure wound therapy applied to: LLE    Wound due to: Surgical Wound   Wound history/plan of care: OR 8/28/23: emergent left transtibial guillotine amputation for LLE NSTI. Plan for likely revision amputation within next 48 hours. NPWT in place ~125mmHg.    Surgical date: 8/28/23   Service following: Orthopedics  Date Negative Pressure Wound Therapy initiated: 8/28/23   Interventions in place: repositioning, pressure redistribution with device or dressing, moisture/incontinence management, offloading, and elevation  Is patient s nutritional status compromised? no   If yes, what interventions are in place? Special diet  Reason for initiating vac therapy? Presence of co-morbidities, High risk of infections, and Need for accelerated granulation " tissue  Which?of?the?following?co-morbidities?apply? Diabetes and Immobility  If diabetic is patient on a diabetic management program? Yes   Is osteomyelitis present in wound? no   If yes what treatments are in place? IV antibiotic cleocin      Wound base: Vac placed in OR, wound base not visualized     Palpation of the wound bed:  Vac not changed 8/28     Drainage: scant      Volume in cannister: <50ml     Last cannister change date: 8/28     Description of drainage: bloody      Measurements (length x width x depth, in cm) not measured 8/28        Periwound skin: Intact, Edematous, and Erythema- blanchable       Color: pink and red , marked per Nursing      Temperature: warm   Odor: none   Pain: no grimacing or signs of discomfort, none   Pain intervention prior to dressing change: N/A  Treatment goal: Drainage control, Infection control/prevention, Maintain (prevention of deterioration), and Protection  STATUS: initial assessment   Supplies ordered: discussed with RN    Number of foam pieces removed from a wound (excluding foam for bridge) :  Vac in place, placed 8/28/23, WOC did not removed at assessment.    Verified this matched the number of foam pieces applied last dressing change: N/A   Number of foam pieces packed into wound (excluding foam for bridge) :  Vac in place, placed 8/28/23, WOC did not removed at assessment.        Assessment of Established end Colostomy:    How long has patient had ostomy: prior to 2016,  Stoma: red, oval, moist, good turgor, flat, and retracted  Mucutaneous junction: intact,  Peristomal skin: hernia , some patchy redness, skin intact  Output: gas, semi-formed, and brown    Is patient independent with ostomy care?: Yes and with minimal assist, post operatively.  Home pouching system: Elena closed end 70mm 2 piece pouching system  Pouching issues and/or educational needs:Stoma assessment, Pouching system assessment , Evaluate leakage issue, and Adjustment of pouching  "plan  Interventions completed today: Stoma assessment, Pouching system assessment , and Evaluate leakage issue  Pouching system in use while hospitalized:  Elena one piece  Supplies location: ordered [place] 70 mm Fecal pouch(# [id number]) 70mm  Flat barrier (# [id number]) and discussed with RN     Treatment Plan:   Sacral wound(s):   Cleanse wound bed with Vashe moistened 4 x 4 guaze, allow to soak wound for 5-10 minutes, no need to rinse or dry.  Cut a piece of 1/4\" Nuguaze packing strip (# [id number]) and pack it to depth of wound approx 7.5cm.  Ensure to use only one piece. Leave a 1-2 inch \"tail\" exposed out of packed wound for ease of removal.   Cover with ABD padthat has been cut in half.   Secure with Medipore tape  ( # [id number]).  Change dressing daily or PRN if soiled or loose.    LLE  wound(s):  Per Orthopedics, possible revision in 48 hours   Negative pressure wound therapy plan:  Wound location: LLE    Change Days:  Per orthopedics     Supplies (including all accessories) used: large Black foam   Cleanse with MicroKlenz prior to replacing VAC    Suction setting: -125   Methods used:  Placed in OR, changes per Orthopedics team,    Staff RN to assess integrity of dressing and ensure suction is set at appropriate level every shift.   Date canister. Chart canister output every shift. Change cannister weekly and PRN if full/occluded     Remove foam dressing and replace with BID normal saline moist gauze dressing if:   -a dressing failure which cannot be repaired within 2 hours   -patient is discharging to home without a home pump   -patient is discharging to a facility outside the local area   -if a dressing is a \"Silver Foam\", remove before Radiation Therapy or MRI     The hospital VAC pump is not to be discharged with the patient.?Ensure to disconnect patient from machine prior to discharge. Then,    - If a home KCI VAC pump has been delivered, connect home cannister to dressing tubing then connect cannister to " home pump and turn on machine    - If transferring to a nearby facility with a KCI vac, can disconnect and clamp tubing then cover end with glove so can be reconnected within 2 hours        LUQ Colostomy  Encourage patient participation with ostomy care,  ~ Empty pouch when 1/3 to 1/2 full,   ~ Notify WOC for ongoing ostomy pouch leakage,  ~ Document stoma output volume, color, consistency EVERY shift  ~ Supplies used    ~ Pouch: Elena 70 FECAL ([id number])   ~ Flange/Barrier/Wafer: [place] 70mm FLAT ([id number])   ~ Accessories: Powder (007105) and No Sting (665537)    WOC follow up plan: Twice weekly, As needed , and Notify WOC if leakage occurs  Bedside RN interventions: Change pouch PRN if leaking using the supplies above, Empty pouch when 1/3 to 1/2 full, ensure to clean pouch outlet after emptying to prevent odor, Notify WOC for ongoing pouch leakage, Document stoma appearance and output volume, color, and consistency every shift, Encourage patient to empty pouch with assist, Assist patient to measure and record output, and Patient unable to participate in cares     Orders: Written      RECOMMEND PRIMARY TEAM ORDER: None, at this time  Education provided: importance of repositioning, plan of care, wound progress, Infection prevention , Moisture management, Hygiene, and Off-loading pressure  Discussed plan of care with: Patient and Nurse  WOC nurse follow-up plan: twice weekly and prn  Notify WOC if wound(s) deteriorate.  Nursing to notify the Provider(s) and re-consult the WOC Nurse if new skin concern.    DATA:     Current support surface: Standard  Low air loss (ELKE pump, Isolibrium, Pulsate, skin guard, etc)  Containment of urine/stool: Incontinent pad in bed, Indwelling catheter, and Colostomy pouch  BMI: Body mass index is 39.79 kg/m .   Active diet order: Orders Placed This Encounter      NPO for Medical/Clinical Reasons Except for: Meds     Output: I/O last 3 completed shifts:  In: 2208.29  [I.V.:2208.29]  Out: 2975 [Urine:2775; Blood:200]     Labs:   Recent Labs   Lab 08/28/23  0221 08/27/23  2357 08/27/23 2115   ALBUMIN  --   --  2.8*  2.7*   HGB 11.3*   < > 11.6*   INR  --   --  1.25*   WBC 19.1*  --  16.7*    < > = values in this interval not displayed.     Pressure injury risk assessment:   Sensory Perception: 3-->slightly limited  Moisture: 3-->occasionally moist  Activity: 1-->bedfast  Mobility: 2-->very limited  Nutrition: 2-->probably inadequate  Friction and Shear: 2-->potential problem  Long Score: 13    Yamilet Marcano RN, BSN, CWON   Pager no longer is use, please contact through [facility] group: [facility] Nurse [place]  Dept. Office Number: [phone]

## 2023-08-28 NOTE — PROGRESS NOTES
Brief Orthopedic Note    Urgently called by alfonso on call regarding assistance/consult from general surgery due to necrotizing soft tissue infection of the left foot. General surgery planning for OR with irrigation and debridement, possible amputation and requesting orthopedics assistance for possible amputation.     Patient is a 42 year old male with paraplegia. Per chart review, patient presented around 4 pm after 24-48 hours of feeling unwell. He reportedly ran over his foot a couple of days ago and since has started to unwell and noticed a left foot wound.     He was emergently admitted to the SICU for sepsis and planning for OR with general surgery for I&D, possible amputation.     Attempted to call and orthopedic staff surgeon on call without return of call/pages.     Discussed case with Dr. Barth who plans to proceed with OR, and orthopedic residents available to assist.     Lana Humphrey MD

## 2023-08-28 NOTE — PHARMACY-ADMISSION MEDICATION HISTORY
Pharmacist Admission Medication History    Admission medication history is complete. The information provided in this note is only as accurate as the sources available at the time of the update.    Medication reconciliation/reorder completed by provider prior to medication history? No    Information Source(s):  [facility].    Pertinent Information: medication list updated per patient [facility], located under media tab on 8/16/2023, and medication fill history.    Changes made to PTA medication list:  Added: docusate  Deleted: lactulose, magnesium, tamsulosin  Changed: None    Medication Affordability: not accessed      Allergies reviewed with patient and updates made in EHR: no, accessed by medical resident.    Medication History Completed By: Berta Sterling [facility] 8/28/2023 10:53 AM    Prior to Admission medications    Medication Sig Last Dose Taking? Auth Provider Long Term End Date   acetaminophen (TYLENOL) 500 MG tablet Take 1,000 mg by mouth 3 times daily as needed for fever or mild pain  Yes Reported, Patient     buPROPion (WELLBUTRIN XL) 150 MG 24 hr tablet Take 450 mg by mouth daily  Yes Reported, Patient Yes    busPIRone (BUSPAR) 10 MG tablet Take 1 tablet by mouth 2 times daily  Yes Reported, Patient     clindamycin (CLEOCIN T) 1 % external solution Apply topically 2 times daily as needed (to face and scalp for acne and folliculitis)  Yes Unknown, Entered By History     Cranberry 400 MG CAPS Take 250 mg by mouth daily  Yes Reported, Patient     cyclobenzaprine (FLEXERIL) 10 MG tablet Take 10 mg by mouth 3 times daily as needed for muscle spasms  Yes Reported, Patient     diphenhydrAMINE-zinc acetate (BENADRYL) 1-0.1 % external cream Apply topically 4 times daily as needed for itching or other (rash)  Yes Unknown, Entered By History      MG capsule Take 1 capsule by mouth 2 times daily  Yes Unknown, Entered By History     DULoxetine  (CYMBALTA) 60 MG capsule Take 60 mg by mouth 2 times daily  Yes Reported, Patient     FERREX 150 150 MG capsule TAKE 1 CAPSULE BY MOUTH ONCE DAILY WITH BREAKFAST  Yes Reported, Patient     fluticasone (FLONASE) 50 MCG/ACT nasal spray Spray 1 spray into both nostrils 2 times daily as needed for rhinitis  Yes Unknown, Entered By History     hydroxychloroquine (PLAQUENIL) 200 MG tablet Take 2 tablets by mouth daily  Yes Reported, Patient     loratadine (CLARITIN) 10 MG tablet Take 1 tablet by mouth daily  Yes Reported, Patient     losartan-hydrochlorothiazide (HYZAAR) 50-12.5 MG tablet Take 0.5 tablets by mouth daily  Yes Reported, Patient Yes    naloxone (NARCAN) 4 MG/0.1ML nasal spray Spray 1 spray (4 mg) into one nostril 1 time; may repeat in opposite nostril in 2 to 3 minutes if person does not respond.  Yes Reported, Patient     omeprazole (PRILOSEC) 40 MG DR capsule Take 40 mg by mouth 2 times daily  Yes Reported, Patient     oxyCODONE (OXY-IR) 5 MG capsule Take 10 mg by mouth every 6 hours as needed for breakthrough pain  Yes Unknown, Entered By History     oxyCODONE (OXYCONTIN) 20 MG 12 hr tablet Take 20 mg by mouth every 12 hours  Yes Unknown, Entered By History     polyethylene glycol (MIRALAX) 17 g packet Take 1 packet by mouth 2 times daily  Yes Unknown, Entered By History     polyethylene glycol-propylene glycol (SYSTANE ULTRA) 0.4-0.3 % SOLN ophthalmic solution Apply 1 drop to eye 4 times daily as needed for dry eyes  Yes Reported, Patient     prednisoLONE acetate (PRED FORTE) 1 % ophthalmic suspension Place 1 drop into both eyes 2 times daily Per taper  Yes Reported, Patient     pregabalin (LYRICA) 150 MG capsule Take 150 mg by mouth 3 times daily  Yes Reported, Patient     senna-docusate (SENOKOT-S/PERICOLACE) 8.6-50 MG tablet Take 2 tablets by mouth 2 times daily  Yes Reported, Patient

## 2023-08-28 NOTE — PHARMACY-VANCOMYCIN DOSING SERVICE
Pharmacy Vancomycin Initial Note  Date of Service 2023  Patient's  1980  42 year old, male    Indication:  Necrotizing STI    Current estimated CrCl = CrCl cannot be calculated (No successful lab value found.).    Creatinine for last 3 days  No results found for requested labs within last 3 days.    Recent Vancomycin Level(s) for last 3 days  No results found for requested labs within last 3 days.      Vancomycin IV Administrations (past 72 hours)        No vancomycin orders with administrations in past 72 hours.                    Nephrotoxins and other renal medications (From now, onward)      Start     Dose/Rate Route Frequency Ordered Stop    23  piperacillin-tazobactam (ZOSYN) 3.375 g vial to attach to  mL bag         3.375 g  over 30 Minutes Intravenous EVERY 6 HOURS 23  norepinephrine (LEVOPHED) 16 mg in  mL infusion MAX CONC CENTRAL LINE         0.01-0.6 mcg/kg/min × 137.8 kg  1.3-77.5 mL/hr  Intravenous CONTINUOUS 23  vasopressin (VASOSTRICT) 20 Units in sodium chloride 0.9 % 100 mL standard conc infusion         2.4 Units/hr  12 mL/hr  Intravenous CONTINUOUS 23  vancomycin place kim - receiving intermittent dosing         1 each Intravenous SEE ADMIN INSTRUCTIONS 23              Contrast Orders - past 72 hours (72h ago, onward)      None                  Plan:  Vancomycin 2500mg IV administered x1 at OSH ED 23 at 1715. Will dose intermittently based on levels for now given DODIE  Vancomycin monitoring method: Trough (Method 2 = manual dose calculation)  Vancomycin therapeutic monitoring goal: 15-20 mg/L  Pharmacy will check vancomycin levels as appropriate in 1-3 Days.    Serum creatinine levels will be ordered daily for the first week of therapy and at least twice weekly for subsequent weeks.      Deon Ibarra, PharmD, BCPS

## 2023-08-28 NOTE — SIGNIFICANT EVENT
Pt had large cuff leak - anesthesia checked tube at bedside & chose not to tube exchange. X ray showed tube was out 10 cm - RT advanced.    At 1015 - pt began only pulling volumes in 100-200s on vent. RT called to bedside & cuff unable to be inflated. Anesthesia called & decision to extubate was made.    Pt extubated to 8L oxy mask.

## 2023-08-28 NOTE — ANESTHESIA POSTPROCEDURE EVALUATION
Patient: Saqib Bishop    Procedure: Procedure(s):  Left below knee Guillotine Amputation       Anesthesia Type:  General    Note:     Postop Pain Control:             Sign Out: unable to assess.   PONV:    Neuro/Psych:             Sign Out: intubated and sedated.   Airway/Respiratory:             Sign Out: AIRWAY IN SITU/Resp. Support               Airway in situ/Resp. Support: ETT                 Reason: Planned Pre-op   CV/Hemodynamics:             Sign Out: Detailed CV status               Blood Pressure: HypOtension               Rate/Rhythm: Tachycardia               Perfusion:  Inotropes   Other NRE: NONE   DID A NON-ROUTINE EVENT OCCUR?            Last vitals:  Vitals:    08/28/23 0144 08/28/23 0145 08/28/23 0147   BP:      Pulse:  104 102   Resp:  16 15   Temp: 37.3  C (99.2  F)     SpO2:          Electronically Signed By: Ja Martínez MD  August 28, 2023  2:08 AM

## 2023-08-29 ENCOUNTER — ANESTHESIA (OUTPATIENT)
Dept: SURGERY | Facility: CLINIC | Age: 43
DRG: 853 | End: 2023-08-29
Payer: MEDICARE

## 2023-08-29 LAB
ANION GAP SERPL CALCULATED.3IONS-SCNC: 12 MMOL/L (ref 7–15)
ATRIAL RATE - MUSE: 112 BPM
BUN SERPL-MCNC: 27.6 MG/DL (ref 6–20)
CALCIUM SERPL-MCNC: 7.8 MG/DL (ref 8.6–10)
CHLORIDE SERPL-SCNC: 107 MMOL/L (ref 98–107)
CREAT SERPL-MCNC: 1.24 MG/DL (ref 0.67–1.17)
DEPRECATED HCO3 PLAS-SCNC: 25 MMOL/L (ref 22–29)
DIASTOLIC BLOOD PRESSURE - MUSE: NORMAL MMHG
ERYTHROCYTE [DISTWIDTH] IN BLOOD BY AUTOMATED COUNT: 16.4 % (ref 10–15)
GFR SERPL CREATININE-BSD FRML MDRD: 74 ML/MIN/1.73M2
GLUCOSE BLDC GLUCOMTR-MCNC: 122 MG/DL (ref 70–99)
GLUCOSE BLDC GLUCOMTR-MCNC: 123 MG/DL (ref 70–99)
GLUCOSE BLDC GLUCOMTR-MCNC: 126 MG/DL (ref 70–99)
GLUCOSE BLDC GLUCOMTR-MCNC: 158 MG/DL (ref 70–99)
GLUCOSE BLDC GLUCOMTR-MCNC: 89 MG/DL (ref 70–99)
GLUCOSE BLDC GLUCOMTR-MCNC: 92 MG/DL (ref 70–99)
GLUCOSE SERPL-MCNC: 144 MG/DL (ref 70–99)
HCT VFR BLD AUTO: 34.8 % (ref 40–53)
HGB BLD-MCNC: 10.7 G/DL (ref 13.3–17.7)
INTERPRETATION ECG - MUSE: NORMAL
MAGNESIUM SERPL-MCNC: 1.9 MG/DL (ref 1.7–2.3)
MCH RBC QN AUTO: 24.7 PG (ref 26.5–33)
MCHC RBC AUTO-ENTMCNC: 30.7 G/DL (ref 31.5–36.5)
MCV RBC AUTO: 80 FL (ref 78–100)
P AXIS - MUSE: 62 DEGREES
PHOSPHATE SERPL-MCNC: 4.4 MG/DL (ref 2.5–4.5)
PLATELET # BLD AUTO: 283 10E3/UL (ref 150–450)
POTASSIUM SERPL-SCNC: 3.8 MMOL/L (ref 3.4–5.3)
PR INTERVAL - MUSE: 182 MS
QRS DURATION - MUSE: 98 MS
QT - MUSE: 318 MS
QTC - MUSE: 434 MS
R AXIS - MUSE: 34 DEGREES
RBC # BLD AUTO: 4.33 10E6/UL (ref 4.4–5.9)
SODIUM SERPL-SCNC: 142 MMOL/L (ref 136–145)
SODIUM SERPL-SCNC: 144 MMOL/L (ref 136–145)
SODIUM SERPL-SCNC: 144 MMOL/L (ref 136–145)
SODIUM SERPL-SCNC: 145 MMOL/L (ref 136–145)
SYSTOLIC BLOOD PRESSURE - MUSE: NORMAL MMHG
T AXIS - MUSE: 49 DEGREES
VENTRICULAR RATE- MUSE: 112 BPM
WBC # BLD AUTO: 6.5 10E3/UL (ref 4–11)

## 2023-08-29 PROCEDURE — 84295 ASSAY OF SERUM SODIUM: CPT | Performed by: STUDENT IN AN ORGANIZED HEALTH CARE EDUCATION/TRAINING PROGRAM

## 2023-08-29 PROCEDURE — 250N000011 HC RX IP 250 OP 636

## 2023-08-29 PROCEDURE — 85027 COMPLETE CBC AUTOMATED: CPT | Performed by: STUDENT IN AN ORGANIZED HEALTH CARE EDUCATION/TRAINING PROGRAM

## 2023-08-29 PROCEDURE — 83735 ASSAY OF MAGNESIUM: CPT | Performed by: STUDENT IN AN ORGANIZED HEALTH CARE EDUCATION/TRAINING PROGRAM

## 2023-08-29 PROCEDURE — 99233 SBSQ HOSP IP/OBS HIGH 50: CPT | Mod: 24 | Performed by: SURGERY

## 2023-08-29 PROCEDURE — 250N000011 HC RX IP 250 OP 636: Performed by: STUDENT IN AN ORGANIZED HEALTH CARE EDUCATION/TRAINING PROGRAM

## 2023-08-29 PROCEDURE — 250N000012 HC RX MED GY IP 250 OP 636 PS 637: Performed by: STUDENT IN AN ORGANIZED HEALTH CARE EDUCATION/TRAINING PROGRAM

## 2023-08-29 PROCEDURE — 250N000013 HC RX MED GY IP 250 OP 250 PS 637

## 2023-08-29 PROCEDURE — 258N000003 HC RX IP 258 OP 636

## 2023-08-29 PROCEDURE — 84100 ASSAY OF PHOSPHORUS: CPT | Performed by: STUDENT IN AN ORGANIZED HEALTH CARE EDUCATION/TRAINING PROGRAM

## 2023-08-29 PROCEDURE — 80048 BASIC METABOLIC PNL TOTAL CA: CPT | Performed by: STUDENT IN AN ORGANIZED HEALTH CARE EDUCATION/TRAINING PROGRAM

## 2023-08-29 PROCEDURE — 99207 PR NO BILLABLE SERVICE THIS VISIT: CPT | Performed by: STUDENT IN AN ORGANIZED HEALTH CARE EDUCATION/TRAINING PROGRAM

## 2023-08-29 PROCEDURE — 200N000002 HC R&B ICU UMMC

## 2023-08-29 PROCEDURE — 250N000013 HC RX MED GY IP 250 OP 250 PS 637: Performed by: PHARMACIST

## 2023-08-29 RX ORDER — POTASSIUM CHLORIDE 750 MG/1
20 TABLET, EXTENDED RELEASE ORAL ONCE
Status: COMPLETED | OUTPATIENT
Start: 2023-08-29 | End: 2023-08-29

## 2023-08-29 RX ORDER — MAGNESIUM SULFATE HEPTAHYDRATE 40 MG/ML
2 INJECTION, SOLUTION INTRAVENOUS ONCE
Status: COMPLETED | OUTPATIENT
Start: 2023-08-29 | End: 2023-08-29

## 2023-08-29 RX ORDER — PANTOPRAZOLE SODIUM 40 MG/1
40 TABLET, DELAYED RELEASE ORAL
Status: DISCONTINUED | OUTPATIENT
Start: 2023-08-29 | End: 2023-09-09 | Stop reason: HOSPADM

## 2023-08-29 RX ORDER — HYDROMORPHONE HCL IN WATER/PF 6 MG/30 ML
.2-.4 PATIENT CONTROLLED ANALGESIA SYRINGE INTRAVENOUS
Status: DISCONTINUED | OUTPATIENT
Start: 2023-08-29 | End: 2023-08-30

## 2023-08-29 RX ADMIN — HEPARIN SODIUM 5000 UNITS: 5000 INJECTION, SOLUTION INTRAVENOUS; SUBCUTANEOUS at 10:28

## 2023-08-29 RX ADMIN — BUSPIRONE HYDROCHLORIDE 10 MG: 10 TABLET ORAL at 20:42

## 2023-08-29 RX ADMIN — PREDNISOLONE ACETATE 1 DROP: 10 SUSPENSION/ DROPS OPHTHALMIC at 20:43

## 2023-08-29 RX ADMIN — OXYCODONE HYDROCHLORIDE 5 MG: 10 TABLET ORAL at 00:16

## 2023-08-29 RX ADMIN — PREDNISOLONE ACETATE 1 DROP: 10 SUSPENSION/ DROPS OPHTHALMIC at 08:13

## 2023-08-29 RX ADMIN — DULOXETINE HYDROCHLORIDE 60 MG: 60 CAPSULE, DELAYED RELEASE ORAL at 08:12

## 2023-08-29 RX ADMIN — BUPROPION HYDROCHLORIDE 450 MG: 150 TABLET, FILM COATED, EXTENDED RELEASE ORAL at 08:10

## 2023-08-29 RX ADMIN — SODIUM CHLORIDE, POTASSIUM CHLORIDE, SODIUM LACTATE AND CALCIUM CHLORIDE: 600; 310; 30; 20 INJECTION, SOLUTION INTRAVENOUS at 12:35

## 2023-08-29 RX ADMIN — ACETAMINOPHEN 975 MG: 325 TABLET, FILM COATED ORAL at 18:27

## 2023-08-29 RX ADMIN — VANCOMYCIN HYDROCHLORIDE 1500 MG: 10 INJECTION, POWDER, LYOPHILIZED, FOR SOLUTION INTRAVENOUS at 12:34

## 2023-08-29 RX ADMIN — SODIUM CHLORIDE, POTASSIUM CHLORIDE, SODIUM LACTATE AND CALCIUM CHLORIDE: 600; 310; 30; 20 INJECTION, SOLUTION INTRAVENOUS at 00:30

## 2023-08-29 RX ADMIN — POTASSIUM CHLORIDE 20 MEQ: 750 TABLET, EXTENDED RELEASE ORAL at 05:31

## 2023-08-29 RX ADMIN — CLINDAMYCIN IN 5 PERCENT DEXTROSE 900 MG: 18 INJECTION, SOLUTION INTRAVENOUS at 14:44

## 2023-08-29 RX ADMIN — PIPERACILLIN AND TAZOBACTAM 4.5 G: 4; .5 INJECTION, POWDER, FOR SOLUTION INTRAVENOUS at 10:28

## 2023-08-29 RX ADMIN — PIPERACILLIN AND TAZOBACTAM 4.5 G: 4; .5 INJECTION, POWDER, FOR SOLUTION INTRAVENOUS at 03:56

## 2023-08-29 RX ADMIN — HEPARIN SODIUM 5000 UNITS: 5000 INJECTION, SOLUTION INTRAVENOUS; SUBCUTANEOUS at 03:09

## 2023-08-29 RX ADMIN — CLINDAMYCIN IN 5 PERCENT DEXTROSE 900 MG: 18 INJECTION, SOLUTION INTRAVENOUS at 05:31

## 2023-08-29 RX ADMIN — OXYCODONE HYDROCHLORIDE 5 MG: 10 TABLET ORAL at 18:27

## 2023-08-29 RX ADMIN — PREGABALIN 150 MG: 50 CAPSULE ORAL at 13:46

## 2023-08-29 RX ADMIN — OXYCODONE HYDROCHLORIDE 5 MG: 10 TABLET ORAL at 12:49

## 2023-08-29 RX ADMIN — OXYCODONE HYDROCHLORIDE 10 MG: 10 TABLET ORAL at 22:45

## 2023-08-29 RX ADMIN — PIPERACILLIN AND TAZOBACTAM 4.5 G: 4; .5 INJECTION, POWDER, FOR SOLUTION INTRAVENOUS at 22:34

## 2023-08-29 RX ADMIN — INSULIN ASPART 1 UNITS: 100 INJECTION, SOLUTION INTRAVENOUS; SUBCUTANEOUS at 04:00

## 2023-08-29 RX ADMIN — MAGNESIUM SULFATE IN WATER 2 G: 40 INJECTION, SOLUTION INTRAVENOUS at 06:06

## 2023-08-29 RX ADMIN — BUSPIRONE HYDROCHLORIDE 10 MG: 10 TABLET ORAL at 08:10

## 2023-08-29 RX ADMIN — HYDROMORPHONE HYDROCHLORIDE 0.5 MG: 1 INJECTION, SOLUTION INTRAMUSCULAR; INTRAVENOUS; SUBCUTANEOUS at 16:26

## 2023-08-29 RX ADMIN — PANTOPRAZOLE SODIUM 40 MG: 40 TABLET, DELAYED RELEASE ORAL at 18:27

## 2023-08-29 RX ADMIN — DULOXETINE HYDROCHLORIDE 60 MG: 60 CAPSULE, DELAYED RELEASE ORAL at 20:43

## 2023-08-29 RX ADMIN — PIPERACILLIN AND TAZOBACTAM 4.5 G: 4; .5 INJECTION, POWDER, FOR SOLUTION INTRAVENOUS at 16:20

## 2023-08-29 RX ADMIN — PREGABALIN 150 MG: 50 CAPSULE ORAL at 20:42

## 2023-08-29 RX ADMIN — PREGABALIN 150 MG: 50 CAPSULE ORAL at 09:46

## 2023-08-29 RX ADMIN — ACETAMINOPHEN 975 MG: 325 TABLET, FILM COATED ORAL at 08:10

## 2023-08-29 RX ADMIN — CLINDAMYCIN IN 5 PERCENT DEXTROSE 900 MG: 18 INJECTION, SOLUTION INTRAVENOUS at 22:31

## 2023-08-29 ASSESSMENT — ACTIVITIES OF DAILY LIVING (ADL)
ADLS_ACUITY_SCORE: 45
ADLS_ACUITY_SCORE: 45
ADLS_ACUITY_SCORE: 43
ADLS_ACUITY_SCORE: 45
ADLS_ACUITY_SCORE: 43
ADLS_ACUITY_SCORE: 45
ADLS_ACUITY_SCORE: 43
ADLS_ACUITY_SCORE: 43
ADLS_ACUITY_SCORE: 45
ADLS_ACUITY_SCORE: 45
ADLS_ACUITY_SCORE: 43
ADLS_ACUITY_SCORE: 45

## 2023-08-29 NOTE — PROGRESS NOTES
"[facility]    Progress Note - EGS Service       Date of Admission:  8/27/2023    Assessment & Plan: Surgery   Saqib Bishop is a 43 y/o male with paraplegia, chronic sacral decubitus ulcer, s/p flap and treatment for osteomyelitis December 2020, then with dehiscence, open wound and infected sacral decubitus ulceration October 2021. He completed six weeks of abx for osteomyelitis, and is now s/p an elective flap procedure early 2023, which failed and resulted in a chronic sacral ulcer. He presented with a NSTI of LLE and is s/p left below the knee guillotine amputation     - multimodal pain mgmt  - wean pressors as tolerated  - continue zosyn and Vanc, and clindamycin   - appreciate SICU cares  - appreciate ortho cares  - RTOR on 8/29       Diet: NPO for Medical/Clinical Reasons Except for: Meds    DVT Prophylaxis: Heparin SQ  Dyer Catheter: PRESENT, indication: Other (Comment) (Chronic)  Lines: PRESENT      CVC Triple Lumen Left Internal jugular-Site Assessment: WDL  Arterial Line 08/27/23 Radial-Site Assessment: WDL      Drains: PRESENT          - May have additional drains, review [facility]  Cardiac Monitoring: ACTIVE order. Indication: ICU  Code Status: Full Code      Clinically Significant Risk Factors         # Hyponatremia: Lowest Na = 128 mmol/L in last 2 days, will monitor as appropriate  # Hypocalcemia: Lowest iCa = 4.1 mg/dL in last 2 days, will monitor and replace as appropriate   # Hypomagnesemia: Lowest Mg = 1.2 mg/dL in last 2 days, will replace as needed   # Hypoalbuminemia: Lowest albumin = 2.7 g/dL at 8/27/2023  9:15 PM, will monitor as appropriate    # Coagulation Defect: INR = 1.25 (Ref range: 0.85 - 1.15) and/or PTT = 31 Seconds (Ref range: 22 - 38 Seconds), will monitor for bleeding                 # Obesity: Estimated body mass index is 39.79 kg/m  as calculated from the following:    Height as of this encounter: 1.854 m (6' 1\").    Weight as " of this encounter: 136.8 kg (301 lb 9.4 oz).  , PRESENT ON ADMISSION            Disposition Plan     Expected Discharge Date: 09/03/2023      Destination: home with family           The patient's care was discussed with the attending on service, Dr. Mancilla.    Nuzhat Hinton MD  [facility]  Non-urgent messages: Securely message with [employer] (more info)  Text page via [facility] Paging/Directory     ______________________________________________________________________    Interval History   NAEO. Pt is currently on pressors.     Physical Exam   Vital Signs: Temp: 97.5  F (36.4  C) Temp src: Oral BP: 104/52 Pulse: 78   Resp: 14 SpO2: 95 % O2 Device: None (Room air) Oxygen Delivery: 2 LPM  Weight: 301 lbs 9.43 oz  Intake/Output Summary (Last 24 hours) at 8/28/2023 1418  Last data filed at 8/28/2023 1300  Gross per 24 hour   Intake 7616.25 ml   Output 3925 ml   Net 3691.25 ml     GENERAL: NAD, resting comfortably in bed  HEENT: atraumatic, normocephalic, no scleral icterus  CARDIO: normal rate and regular rhythm, no LE edema  PULM: no increased WOB  ABD: non-distended, soft, and non-tender  NEURO: CN II-XII grossly intact, no focal neuro deficits  EXT: LLE stump with wound vac in place with serosang output  PSYCH: appropriate affect and behavior    Data     I have personally reviewed the following data over the past 24 hrs:    6.5  \   10.7 (L)   / 283     145 107 27.6 (H) /  122 (H)   3.8 25 1.24 (H) \       Imaging results reviewed over the past 24 hrs:   No results found for this or any previous visit (from the past 24 hour(s)).

## 2023-08-29 NOTE — PLAN OF CARE
Major Shift Events:  Aox4, flat and lethargic most of day. Afebrile. SR. Levo weaned off, MAPs >65. Satting well on RA. NPO beside meds for procedure today. AUO in bui. Colsotomy in place. Refused to sit in chair.       Plan: Continue with POC, notify primary team with any changes in pt condition.       For vital signs and complete assessments, please see documentation flowsheets.

## 2023-08-29 NOTE — PROGRESS NOTES
Orthopaedic Surgery Progress Note 08/29/2023    Subjective  Extubated yesterday. Weaned off pressors yesterday, restarted 0.02 NE overnight. Doing well this AM. Feeling well. Remains afebrile.     Objective  Temp: 97.4  F (36.3  C) Temp src: Oral BP: 90/59 Pulse: 78   Resp: 16 SpO2: 97 % O2 Device: Nasal cannula Oxygen Delivery: 2 LPM    Exam:  Gen: Awake, alert, NAD  Resp: Nonlabored breathing  Cardio: Regular rate via peripheral pulse  MSK:    LLE:  - Wound VAC holding suction  - No new expanding erythema of residual limb, faint erythema within lines. No crepitus on exam. Minimally ttp. No bullae.     VAC output: 0cc    Recent Labs   Lab 08/29/23  0353 08/28/23  0221 08/27/23  2357 08/27/23  2115   WBC 6.5 19.1*  --  16.7*   HGB 10.7* 11.3* 11.2* 11.6*    321  --  287       Assessment: Saqib Bishop is a 42 year old male s/p emergent left transtibial guillotine amputation for LLE NSTI on 8/27-8/28. Plan for revision amputation today w/ Dr. Ellis after clinic (~4 pm).     WBC downtrending, less pressor requirements, extubated, DODIE improving.     If acutely worsening, please page orthopedics to discuss possible earlier OR, otherwise will plan to proceed with revision amputation ad I&D after clinic today.   - NPO for OR today   - Type and screen completed on 8/27   - CBC/BMP complete this AM   - Please check PT/INR for OR today     Plan:  SICU Primary, appreciate excellent cares.   Activity: bedrest for now  Weight bearing status: NWB LLE  Antibiotics: Clindamycin, Vacomycin, Zosyn, per SICU  Diet: NPO for OR  DVT prophylaxis: per primary, hold for OR today.   Drains: Document VAC output  Pain management: Multimodal, per primary  X-rays: complete  Cultures: Surgical pathology results pending  Follow-up: TBD    Disposition: Pending revision amputation, medical stability, pain control    Lana Humphrey MD

## 2023-08-29 NOTE — PHARMACY-VANCOMYCIN DOSING SERVICE
Pharmacy [facility] Dose Change Per Policy    Original Dose Ordered: 1750 mg Q24H  Loading dose: N/A  Regimen: 1750 mg IV every 24 hours.  Start time: 20:00 on 08/29/2023  Exposure target: AUC24 (range)400-600 mg/L.hr   AUC24,ss: 311 mg/L.hr  Probability of AUC24 > 400: 16 %  Ctrough,ss: 8.7 mg/L  Probability of Ctrough,ss > 20: 2 %  Probability of nephrotoxicity (Lodise ANNE 2009): 5 %    Dose Changed To: 1500 mg Q12H  Loading dose: N/A  Regimen: 1500 mg IV every 12 hours.  Start time: 20:00 on 08/29/2023  Exposure target: AUC24 (range)400-600 mg/L.hr   AUC24,ss: 517 mg/L.hr  Probability of AUC24 > 400: 86 %  Ctrough,ss: 17.7 mg/L  Probability of Ctrough,ss > 20: 35 %  Probability of nephrotoxicity (Lodise ANNE 2009): 14 %    This dose change was based on the pharmacist's assessment of this patient's age, weight, concurrent drug therapy, treatment goals, whether patient's creatinine clearance adequately indicates renal function (factoring in age, muscle mass, fluid and clinical status), and, if applicable, prior pharmacokinetic data.    Estimated Creatinine Clearance: 112.7 mL/min (A) (based on SCr of 1.24 mg/dL (H)).  Will continue to follow and modify dosage according to levels, organ function and clinical condition   Berta Sterling [facility] on 8/29/2023 at 12:04 PM

## 2023-08-29 NOTE — ANESTHESIA PREPROCEDURE EVALUATION
Anesthesia Pre-Procedure Evaluation    Patient: Saqib Bishop   MRN: [id number] : 1980        Procedure : Procedure(s):  Amputate revision stump lower extremity - LEFT          Past Medical History:   Diagnosis Date    Degenerative joint disease     Gastro-oesophageal reflux disease       Past Surgical History:   Procedure Laterality Date    BACK SURGERY      lumbar fusion    EXPLORE SPINE, REMOVE HARDWARE, COMBINED  2012    Procedure:COMBINED EXPLORE SPINE, REMOVE HARDWARE; EXPLORE SPINE, REMOVE HARDWARE L4-S1; Surgeon:FAM GONZALEZ; Location:[facility] OR    ORTHOPEDIC SURGERY      right foot surgery      Allergies   Allergen Reactions    Adhesive Tape Hives     From tape from surgery    Benzoin Hives and Rash    Droperidol Anaphylaxis    Prednisone      vomiting    Vitamin D Rash     flairs up his sarcoidosis      Social History     Tobacco Use    Smoking status: Former     Types: Cigarettes     Quit date: 2011     Years since quittin.8    Smokeless tobacco: Not on file   Substance Use Topics    Alcohol use: No      Wt Readings from Last 1 Encounters:   23 136.8 kg (301 lb 9.4 oz)      Saqib Bishop is a 42 year old male with paraplegia, chronic sacral decubitus ulcer, s/p flap and treatment for osteomyelitis 2020, then with dehiscence, open wound and infected sacral decubitus ulceration 2021. He completed six weeks of abx for osteomyelitis, and is now s/p an elective flap procedure early , which failed and resulted in a chronic sacral ulcer. S/p BKA on , requiring wound exploration for concern with infection.        Anesthesia Evaluation   Pt has had prior anesthetic. Type: General.        ROS/MED HX  ENT/Pulmonary: Comment: Acute hypoxic respiratory failure    On 2L NC        Neurologic:     (+) delerium,                    Spinal cord injury,           Cardiovascular:       METS/Exercise Tolerance:     Hematologic:       Musculoskeletal:   (+)   arthritis,             GI/Hepatic: Comment: PTA colostomy    (+) GERD,                   Renal/Genitourinary:       Endo:       Psychiatric/Substance Use:     (+) psychiatric history anxiety and depression  H/O chronic opiod use .     Infectious Disease: Comment: Septic shock       Malignancy:       Other:               OUTSIDE LABS:  CBC:   Lab Results   Component Value Date    WBC 19.1 (H) 08/28/2023    WBC 16.7 (H) 08/27/2023    HGB 11.3 (L) 08/28/2023    HGB 11.2 (L) 08/27/2023    HCT 36.4 (L) 08/28/2023    HCT 36.4 (L) 08/27/2023     08/28/2023     08/27/2023     BMP:   Lab Results   Component Value Date     08/28/2023     08/28/2023    POTASSIUM 3.6 08/28/2023    POTASSIUM 3.4 (L) 08/27/2023    CHLORIDE 100 08/28/2023    CHLORIDE 95 (L) 08/27/2023    CHLORIDE 93 (L) 08/27/2023    CO2 19 (L) 08/28/2023    CO2 19 (L) 08/27/2023    CO2 18 (L) 08/27/2023    BUN 36.9 (H) 08/28/2023    BUN 44.3 (H) 08/27/2023    BUN 44.1 (H) 08/27/2023    CR 2.25 (H) 08/28/2023    CR 2.95 (H) 08/27/2023    CR 2.89 (H) 08/27/2023     (H) 08/28/2023    GLC 94 08/28/2023     COAGS:   Lab Results   Component Value Date    PTT 31 08/27/2023    INR 1.25 (H) 08/27/2023    FIBR 863 (H) 08/27/2023     POC: No results found for: BGM, HCG, HCGS  HEPATIC:   Lab Results   Component Value Date    ALBUMIN 2.8 (L) 08/27/2023    ALBUMIN 2.7 (L) 08/27/2023    PROTTOTAL 6.7 08/27/2023    PROTTOTAL 6.7 08/27/2023    ALT 29 08/27/2023    ALT 28 08/27/2023    AST 51 (H) 08/27/2023    AST 52 (H) 08/27/2023    ALKPHOS 148 (H) 08/27/2023    ALKPHOS 164 (H) 08/27/2023    BILITOTAL 0.8 08/27/2023    BILITOTAL 0.8 08/27/2023     OTHER:   Lab Results   Component Value Date    PH 7.23 (L) 08/28/2023    LACT 0.5 (L) 08/28/2023    HILDA 7.7 (L) 08/28/2023    PHOS 5.3 (H) 08/28/2023    MAG 2.2 08/28/2023    SED 30 (H) 07/18/2023       Anesthesia Plan    ASA Status:  3    NPO Status:  NPO Appropriate    Anesthesia Type: General.     -  Airway: ETT   Induction: Intravenous.   Maintenance: Inhalation.        Consents    Anesthesia Plan(s) and associated risks, benefits, and realistic alternatives discussed. Questions answered and patient/representative(s) expressed understanding.     - Discussed:     - Discussed with:  Patient      - Extended Intubation/Ventilatory Support Discussed: Yes.      - Patient is DNR/DNI Status: No     Use of blood products discussed: Yes.     - Discussed with: Patient.     Postoperative Care    Pain management: IV analgesics.   PONV prophylaxis: Ondansetron (or other 5HT-3), Dexamethasone or Solumedrol     Comments:                Tony Almodovar MD

## 2023-08-29 NOTE — PLAN OF CARE
1233 - 4950  ICU End of Shift Summary. See flowsheets for vital signs and detailed assessment.    Changes this shift:   Patients neuro exams remain intact, intermittent c/o of pain, given PRN oxy, afebrile, lower extremities red and warm to touch, redness within markings. Sinus Rhythm with one janice + hypertensive episode. 2L NC, NPO since midnight, bui in place with adequate uop. Colostomy pouch in place, emptied. Mag and K+ replaced. Levo @ 0.02, LR @ 75ml/hr    Plan: Continue with POC, Return to OR scheduled for today 8/29 @ 1300    Goal Outcome Evaluation:  Outcome Evaluation: Pain controlled with minimal PRN medications, titrating levophedm 2L NC

## 2023-08-29 NOTE — PROGRESS NOTES
Orthopaedic Surgery Progress Note 08/29/2023    Subjective  Extubated and off pressor support. Pain controlled. Aware of plan for revision amputation today.     Objective  Temp: 97.4  F (36.3  C) Temp src: Oral BP: 118/67 Pulse: 79   Resp: 14 SpO2: 98 % O2 Device: Nasal cannula Oxygen Delivery: 2 LPM    Exam:  Gen: Awake, alert, NAD  Resp: Nonlabored breathing  Cardio: Regular rate via peripheral pulse  MSK:    LLE:  - Wound VAC holding suction  - No new expanding erythema of residual limb  - Neurovascular status grossly intact in residual limb    VAC output: 0cc    Recent Labs   Lab 08/29/23  0353 08/28/23  0221 08/27/23  2357 08/27/23  2115   WBC 6.5 19.1*  --  16.7*   HGB 10.7* 11.3* 11.2* 11.6*    321  --  287       Assessment: Saqib Bishop is a 42 year old male s/p emergent left transtibial guillotine amputation for LLE NSTI. Plan for revision amputation today w/ Dr. Ellis.    Plan:  SICU Primary  Activity: bedrest for now  Weight bearing status: NWB LLE  Antibiotics: Clindamycin, Vacomycin, Zosyn, per SICU  Diet: NPO for OR  DVT prophylaxis: per primary  Drains: Document VAC output  Pain management: Multimodal, per primary  X-rays: complete  Cultures: Surgical pathology results pending  Follow-up: TBD    Disposition: Pending revision amputation, medical stability, pain control    Red Mejia MD  Orthopaedic Surgery PGY-2

## 2023-08-29 NOTE — PROGRESS NOTES
Brief Medicine Progress Note    Called for SICU transfer after guillotine amputation with revision from Ortho for osteomyelitis with dehiscence and ulceration. Currently on Vanc/zosyn/clinda. Patient still requiring very low dose norepi for blood pressure support, so unable to take to medicine service as transfer at this point. Discussed possible transfer to medicine primary service with ortho following on 8/30 if patient is able to wean off of norepi overnight.     Clarissa Pluliam MD
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
## 2023-08-30 LAB
ANION GAP SERPL CALCULATED.3IONS-SCNC: 9 MMOL/L (ref 7–15)
APTT PPP: 33 SECONDS (ref 22–38)
BUN SERPL-MCNC: 19.5 MG/DL (ref 6–20)
CALCIUM SERPL-MCNC: 7.9 MG/DL (ref 8.6–10)
CHLORIDE SERPL-SCNC: 109 MMOL/L (ref 98–107)
CREAT SERPL-MCNC: 1.01 MG/DL (ref 0.67–1.17)
DEPRECATED HCO3 PLAS-SCNC: 26 MMOL/L (ref 22–29)
ERYTHROCYTE [DISTWIDTH] IN BLOOD BY AUTOMATED COUNT: 16.7 % (ref 10–15)
GFR SERPL CREATININE-BSD FRML MDRD: >90 ML/MIN/1.73M2
GLUCOSE BLDC GLUCOMTR-MCNC: 111 MG/DL (ref 70–99)
GLUCOSE BLDC GLUCOMTR-MCNC: 112 MG/DL (ref 70–99)
GLUCOSE BLDC GLUCOMTR-MCNC: 116 MG/DL (ref 70–99)
GLUCOSE BLDC GLUCOMTR-MCNC: 118 MG/DL (ref 70–99)
GLUCOSE BLDC GLUCOMTR-MCNC: 82 MG/DL (ref 70–99)
GLUCOSE SERPL-MCNC: 107 MG/DL (ref 70–99)
GRAM STAIN RESULT: NORMAL
GRAM STAIN RESULT: NORMAL
HCT VFR BLD AUTO: 34.3 % (ref 40–53)
HGB BLD-MCNC: 10.6 G/DL (ref 13.3–17.7)
INR PPP: 1.07 (ref 0.85–1.15)
MAGNESIUM SERPL-MCNC: 2 MG/DL (ref 1.7–2.3)
MCH RBC QN AUTO: 24.9 PG (ref 26.5–33)
MCHC RBC AUTO-ENTMCNC: 30.9 G/DL (ref 31.5–36.5)
MCV RBC AUTO: 81 FL (ref 78–100)
PHOSPHATE SERPL-MCNC: 2.9 MG/DL (ref 2.5–4.5)
PLATELET # BLD AUTO: 298 10E3/UL (ref 150–450)
POTASSIUM SERPL-SCNC: 3.5 MMOL/L (ref 3.4–5.3)
RBC # BLD AUTO: 4.26 10E6/UL (ref 4.4–5.9)
SODIUM SERPL-SCNC: 144 MMOL/L (ref 136–145)
WBC # BLD AUTO: 4.3 10E3/UL (ref 4–11)

## 2023-08-30 PROCEDURE — 87102 FUNGUS ISOLATION CULTURE: CPT | Performed by: ORTHOPAEDIC SURGERY

## 2023-08-30 PROCEDURE — 250N000011 HC RX IP 250 OP 636: Performed by: ANESTHESIOLOGY

## 2023-08-30 PROCEDURE — 272N000001 HC OR GENERAL SUPPLY STERILE: Performed by: ORTHOPAEDIC SURGERY

## 2023-08-30 PROCEDURE — 0QDH0ZZ EXTRACTION OF LEFT TIBIA, OPEN APPROACH: ICD-10-PCS | Performed by: ORTHOPAEDIC SURGERY

## 2023-08-30 PROCEDURE — 250N000011 HC RX IP 250 OP 636: Mod: JZ

## 2023-08-30 PROCEDURE — 250N000013 HC RX MED GY IP 250 OP 250 PS 637

## 2023-08-30 PROCEDURE — 87176 TISSUE HOMOGENIZATION CULTR: CPT | Performed by: ORTHOPAEDIC SURGERY

## 2023-08-30 PROCEDURE — 250N000009 HC RX 250: Performed by: ANESTHESIOLOGY

## 2023-08-30 PROCEDURE — 250N000013 HC RX MED GY IP 250 OP 250 PS 637: Performed by: PHARMACIST

## 2023-08-30 PROCEDURE — 360N000075 HC SURGERY LEVEL 2, PER MIN: Performed by: ORTHOPAEDIC SURGERY

## 2023-08-30 PROCEDURE — 85730 THROMBOPLASTIN TIME PARTIAL: CPT

## 2023-08-30 PROCEDURE — 710N000010 HC RECOVERY PHASE 1, LEVEL 2, PER MIN: Performed by: ORTHOPAEDIC SURGERY

## 2023-08-30 PROCEDURE — 250N000011 HC RX IP 250 OP 636: Performed by: ORTHOPAEDIC SURGERY

## 2023-08-30 PROCEDURE — 83735 ASSAY OF MAGNESIUM: CPT | Performed by: STUDENT IN AN ORGANIZED HEALTH CARE EDUCATION/TRAINING PROGRAM

## 2023-08-30 PROCEDURE — 250N000011 HC RX IP 250 OP 636

## 2023-08-30 PROCEDURE — 87075 CULTR BACTERIA EXCEPT BLOOD: CPT | Performed by: ORTHOPAEDIC SURGERY

## 2023-08-30 PROCEDURE — 999N000141 HC STATISTIC PRE-PROCEDURE NURSING ASSESSMENT: Performed by: ORTHOPAEDIC SURGERY

## 2023-08-30 PROCEDURE — 11044 DBRDMT BONE 1ST 20 SQ CM/<: CPT | Mod: 58 | Performed by: ORTHOPAEDIC SURGERY

## 2023-08-30 PROCEDURE — 200N000002 HC R&B ICU UMMC

## 2023-08-30 PROCEDURE — 250N000011 HC RX IP 250 OP 636: Performed by: STUDENT IN AN ORGANIZED HEALTH CARE EDUCATION/TRAINING PROGRAM

## 2023-08-30 PROCEDURE — 999N000127 HC STATISTIC PERIPHERAL IV START W US GUIDANCE

## 2023-08-30 PROCEDURE — 11047 DBRDMT BONE EACH ADDL: CPT | Mod: 58 | Performed by: ORTHOPAEDIC SURGERY

## 2023-08-30 PROCEDURE — 250N000025 HC SEVOFLURANE, PER MIN: Performed by: ORTHOPAEDIC SURGERY

## 2023-08-30 PROCEDURE — 258N000003 HC RX IP 258 OP 636: Performed by: ANESTHESIOLOGY

## 2023-08-30 PROCEDURE — 370N000017 HC ANESTHESIA TECHNICAL FEE, PER MIN: Performed by: ORTHOPAEDIC SURGERY

## 2023-08-30 PROCEDURE — 97606 NEG PRS WND THER DME>50 SQCM: CPT | Mod: 58 | Performed by: ORTHOPAEDIC SURGERY

## 2023-08-30 PROCEDURE — 80048 BASIC METABOLIC PNL TOTAL CA: CPT | Performed by: STUDENT IN AN ORGANIZED HEALTH CARE EDUCATION/TRAINING PROGRAM

## 2023-08-30 PROCEDURE — 85027 COMPLETE CBC AUTOMATED: CPT | Performed by: STUDENT IN AN ORGANIZED HEALTH CARE EDUCATION/TRAINING PROGRAM

## 2023-08-30 PROCEDURE — 0QDK0ZZ EXTRACTION OF LEFT FIBULA, OPEN APPROACH: ICD-10-PCS | Performed by: ORTHOPAEDIC SURGERY

## 2023-08-30 PROCEDURE — 258N000003 HC RX IP 258 OP 636

## 2023-08-30 PROCEDURE — 84100 ASSAY OF PHOSPHORUS: CPT | Performed by: STUDENT IN AN ORGANIZED HEALTH CARE EDUCATION/TRAINING PROGRAM

## 2023-08-30 PROCEDURE — F08G5YZ WOUND MANAGEMENT TREATMENT OF INTEGUMENTARY SYSTEM - LOWER BACK / LOWER EXTREMITY USING OTHER EQUIPMENT: ICD-10-PCS | Performed by: ORTHOPAEDIC SURGERY

## 2023-08-30 PROCEDURE — 87205 SMEAR GRAM STAIN: CPT | Performed by: ORTHOPAEDIC SURGERY

## 2023-08-30 PROCEDURE — 250N000011 HC RX IP 250 OP 636: Mod: JZ | Performed by: STUDENT IN AN ORGANIZED HEALTH CARE EDUCATION/TRAINING PROGRAM

## 2023-08-30 PROCEDURE — 85610 PROTHROMBIN TIME: CPT

## 2023-08-30 PROCEDURE — 99232 SBSQ HOSP IP/OBS MODERATE 35: CPT | Mod: GC | Performed by: STUDENT IN AN ORGANIZED HEALTH CARE EDUCATION/TRAINING PROGRAM

## 2023-08-30 RX ORDER — ONDANSETRON 2 MG/ML
INJECTION INTRAMUSCULAR; INTRAVENOUS PRN
Status: DISCONTINUED | OUTPATIENT
Start: 2023-08-30 | End: 2023-08-30

## 2023-08-30 RX ORDER — LORATADINE 10 MG/1
10 TABLET ORAL DAILY
Status: DISCONTINUED | OUTPATIENT
Start: 2023-08-30 | End: 2023-08-30

## 2023-08-30 RX ORDER — AMOXICILLIN 250 MG
1 CAPSULE ORAL 2 TIMES DAILY
Status: DISCONTINUED | OUTPATIENT
Start: 2023-08-30 | End: 2023-09-09 | Stop reason: HOSPADM

## 2023-08-30 RX ORDER — POTASSIUM CHLORIDE 29.8 MG/ML
20 INJECTION INTRAVENOUS ONCE
Status: COMPLETED | OUTPATIENT
Start: 2023-08-30 | End: 2023-08-30

## 2023-08-30 RX ORDER — PROPOFOL 10 MG/ML
INJECTION, EMULSION INTRAVENOUS PRN
Status: DISCONTINUED | OUTPATIENT
Start: 2023-08-30 | End: 2023-08-30

## 2023-08-30 RX ORDER — BISACODYL 10 MG
10 SUPPOSITORY, RECTAL RECTAL DAILY PRN
Status: DISCONTINUED | OUTPATIENT
Start: 2023-08-30 | End: 2023-09-09 | Stop reason: HOSPADM

## 2023-08-30 RX ORDER — LIDOCAINE HYDROCHLORIDE 20 MG/ML
INJECTION, SOLUTION INFILTRATION; PERINEURAL PRN
Status: DISCONTINUED | OUTPATIENT
Start: 2023-08-30 | End: 2023-08-30

## 2023-08-30 RX ORDER — VANCOMYCIN HYDROCHLORIDE 1 G/20ML
INJECTION, POWDER, LYOPHILIZED, FOR SOLUTION INTRAVENOUS PRN
Status: DISCONTINUED | OUTPATIENT
Start: 2023-08-30 | End: 2023-08-30 | Stop reason: HOSPADM

## 2023-08-30 RX ORDER — ONDANSETRON 4 MG/1
4 TABLET, ORALLY DISINTEGRATING ORAL EVERY 6 HOURS PRN
Status: DISCONTINUED | OUTPATIENT
Start: 2023-08-30 | End: 2023-09-09 | Stop reason: HOSPADM

## 2023-08-30 RX ORDER — LORATADINE 10 MG/1
10 TABLET ORAL DAILY PRN
Status: DISCONTINUED | OUTPATIENT
Start: 2023-08-30 | End: 2023-09-09 | Stop reason: HOSPADM

## 2023-08-30 RX ORDER — SODIUM CHLORIDE, SODIUM LACTATE, POTASSIUM CHLORIDE, CALCIUM CHLORIDE 600; 310; 30; 20 MG/100ML; MG/100ML; MG/100ML; MG/100ML
INJECTION, SOLUTION INTRAVENOUS CONTINUOUS PRN
Status: DISCONTINUED | OUTPATIENT
Start: 2023-08-30 | End: 2023-08-30

## 2023-08-30 RX ORDER — CYCLOBENZAPRINE HCL 5 MG
10 TABLET ORAL 3 TIMES DAILY PRN
Status: DISCONTINUED | OUTPATIENT
Start: 2023-08-30 | End: 2023-09-09 | Stop reason: HOSPADM

## 2023-08-30 RX ORDER — HYDROMORPHONE HCL IN WATER/PF 6 MG/30 ML
.2-.4 PATIENT CONTROLLED ANALGESIA SYRINGE INTRAVENOUS
Status: DISCONTINUED | OUTPATIENT
Start: 2023-08-30 | End: 2023-09-08

## 2023-08-30 RX ORDER — FENTANYL CITRATE 50 UG/ML
INJECTION, SOLUTION INTRAMUSCULAR; INTRAVENOUS PRN
Status: DISCONTINUED | OUTPATIENT
Start: 2023-08-30 | End: 2023-08-30

## 2023-08-30 RX ORDER — MAGNESIUM SULFATE HEPTAHYDRATE 40 MG/ML
2 INJECTION, SOLUTION INTRAVENOUS ONCE
Status: COMPLETED | OUTPATIENT
Start: 2023-08-30 | End: 2023-08-30

## 2023-08-30 RX ORDER — KETAMINE HYDROCHLORIDE 10 MG/ML
INJECTION INTRAMUSCULAR; INTRAVENOUS PRN
Status: DISCONTINUED | OUTPATIENT
Start: 2023-08-30 | End: 2023-08-30

## 2023-08-30 RX ORDER — HYDROXYCHLOROQUINE SULFATE 200 MG/1
400 TABLET, FILM COATED ORAL DAILY
Status: DISCONTINUED | OUTPATIENT
Start: 2023-08-30 | End: 2023-09-09 | Stop reason: HOSPADM

## 2023-08-30 RX ORDER — ONDANSETRON 2 MG/ML
4 INJECTION INTRAMUSCULAR; INTRAVENOUS EVERY 6 HOURS PRN
Status: DISCONTINUED | OUTPATIENT
Start: 2023-08-30 | End: 2023-09-09 | Stop reason: HOSPADM

## 2023-08-30 RX ORDER — POLYETHYLENE GLYCOL 3350 17 G/17G
17 POWDER, FOR SOLUTION ORAL DAILY
Status: DISCONTINUED | OUTPATIENT
Start: 2023-08-31 | End: 2023-09-09 | Stop reason: HOSPADM

## 2023-08-30 RX ORDER — PROCHLORPERAZINE MALEATE 5 MG
10 TABLET ORAL EVERY 6 HOURS PRN
Status: DISCONTINUED | OUTPATIENT
Start: 2023-08-30 | End: 2023-08-30

## 2023-08-30 RX ORDER — FLUTICASONE PROPIONATE 50 MCG
1 SPRAY, SUSPENSION (ML) NASAL 2 TIMES DAILY PRN
Status: DISCONTINUED | OUTPATIENT
Start: 2023-08-30 | End: 2023-09-09 | Stop reason: HOSPADM

## 2023-08-30 RX ORDER — OXYCODONE HCL 20 MG/1
20 TABLET, FILM COATED, EXTENDED RELEASE ORAL EVERY 12 HOURS
Status: DISCONTINUED | OUTPATIENT
Start: 2023-08-30 | End: 2023-09-09 | Stop reason: HOSPADM

## 2023-08-30 RX ADMIN — SODIUM CHLORIDE, POTASSIUM CHLORIDE, SODIUM LACTATE AND CALCIUM CHLORIDE: 600; 310; 30; 20 INJECTION, SOLUTION INTRAVENOUS at 07:55

## 2023-08-30 RX ADMIN — PROPOFOL 50 MG: 10 INJECTION, EMULSION INTRAVENOUS at 09:32

## 2023-08-30 RX ADMIN — DULOXETINE HYDROCHLORIDE 60 MG: 60 CAPSULE, DELAYED RELEASE ORAL at 19:57

## 2023-08-30 RX ADMIN — POTASSIUM CHLORIDE 20 MEQ: 29.8 INJECTION, SOLUTION INTRAVENOUS at 05:22

## 2023-08-30 RX ADMIN — FENTANYL CITRATE 150 MCG: 50 INJECTION, SOLUTION INTRAMUSCULAR; INTRAVENOUS at 08:12

## 2023-08-30 RX ADMIN — PIPERACILLIN AND TAZOBACTAM 4.5 G: 4; .5 INJECTION, POWDER, FOR SOLUTION INTRAVENOUS at 18:00

## 2023-08-30 RX ADMIN — BUPROPION HYDROCHLORIDE 450 MG: 150 TABLET, FILM COATED, EXTENDED RELEASE ORAL at 12:43

## 2023-08-30 RX ADMIN — OXYCODONE HYDROCHLORIDE 5 MG: 10 TABLET ORAL at 16:57

## 2023-08-30 RX ADMIN — HEPARIN SODIUM 5000 UNITS: 5000 INJECTION, SOLUTION INTRAVENOUS; SUBCUTANEOUS at 18:00

## 2023-08-30 RX ADMIN — PHENYLEPHRINE HYDROCHLORIDE 200 MCG: 10 INJECTION INTRAVENOUS at 08:40

## 2023-08-30 RX ADMIN — SENNOSIDES AND DOCUSATE SODIUM 1 TABLET: 50; 8.6 TABLET ORAL at 19:57

## 2023-08-30 RX ADMIN — PHENYLEPHRINE HYDROCHLORIDE 200 MCG: 10 INJECTION INTRAVENOUS at 09:17

## 2023-08-30 RX ADMIN — Medication 50 MG: at 08:14

## 2023-08-30 RX ADMIN — PHENYLEPHRINE HYDROCHLORIDE 200 MCG: 10 INJECTION INTRAVENOUS at 09:06

## 2023-08-30 RX ADMIN — SODIUM CHLORIDE, POTASSIUM CHLORIDE, SODIUM LACTATE AND CALCIUM CHLORIDE: 600; 310; 30; 20 INJECTION, SOLUTION INTRAVENOUS at 09:29

## 2023-08-30 RX ADMIN — VANCOMYCIN HYDROCHLORIDE 1500 MG: 10 INJECTION, POWDER, LYOPHILIZED, FOR SOLUTION INTRAVENOUS at 12:47

## 2023-08-30 RX ADMIN — PIPERACILLIN AND TAZOBACTAM 4.5 G: 4; .5 INJECTION, POWDER, FOR SOLUTION INTRAVENOUS at 12:48

## 2023-08-30 RX ADMIN — CLINDAMYCIN IN 5 PERCENT DEXTROSE 900 MG: 18 INJECTION, SOLUTION INTRAVENOUS at 21:51

## 2023-08-30 RX ADMIN — PREGABALIN 150 MG: 50 CAPSULE ORAL at 19:57

## 2023-08-30 RX ADMIN — PROPOFOL 170 MG: 10 INJECTION, EMULSION INTRAVENOUS at 08:12

## 2023-08-30 RX ADMIN — PREGABALIN 150 MG: 50 CAPSULE ORAL at 14:25

## 2023-08-30 RX ADMIN — PHENYLEPHRINE HYDROCHLORIDE 200 MCG: 10 INJECTION INTRAVENOUS at 09:22

## 2023-08-30 RX ADMIN — MAGNESIUM SULFATE IN WATER 2 G: 40 INJECTION, SOLUTION INTRAVENOUS at 05:24

## 2023-08-30 RX ADMIN — SUGAMMADEX 200 MG: 100 INJECTION, SOLUTION INTRAVENOUS at 09:29

## 2023-08-30 RX ADMIN — FENTANYL CITRATE 100 MCG: 50 INJECTION, SOLUTION INTRAMUSCULAR; INTRAVENOUS at 09:11

## 2023-08-30 RX ADMIN — OXYCODONE HYDROCHLORIDE 5 MG: 10 TABLET ORAL at 12:55

## 2023-08-30 RX ADMIN — PHENYLEPHRINE HYDROCHLORIDE 200 MCG: 10 INJECTION INTRAVENOUS at 09:40

## 2023-08-30 RX ADMIN — CLINDAMYCIN IN 5 PERCENT DEXTROSE 900 MG: 18 INJECTION, SOLUTION INTRAVENOUS at 14:27

## 2023-08-30 RX ADMIN — OXYCODONE HYDROCHLORIDE 20 MG: 20 TABLET, FILM COATED, EXTENDED RELEASE ORAL at 19:57

## 2023-08-30 RX ADMIN — Medication 30 MG: at 08:12

## 2023-08-30 RX ADMIN — HYDROMORPHONE HYDROCHLORIDE 0.4 MG: 0.2 INJECTION, SOLUTION INTRAMUSCULAR; INTRAVENOUS; SUBCUTANEOUS at 06:25

## 2023-08-30 RX ADMIN — LIDOCAINE HYDROCHLORIDE 100 MG: 20 INJECTION, SOLUTION INFILTRATION; PERINEURAL at 08:12

## 2023-08-30 RX ADMIN — ACETAMINOPHEN 975 MG: 325 TABLET, FILM COATED ORAL at 00:13

## 2023-08-30 RX ADMIN — CLINDAMYCIN IN 5 PERCENT DEXTROSE 900 MG: 18 INJECTION, SOLUTION INTRAVENOUS at 06:27

## 2023-08-30 RX ADMIN — PHENYLEPHRINE HYDROCHLORIDE 200 MCG: 10 INJECTION INTRAVENOUS at 08:59

## 2023-08-30 RX ADMIN — PREDNISOLONE ACETATE 1 DROP: 10 SUSPENSION/ DROPS OPHTHALMIC at 19:58

## 2023-08-30 RX ADMIN — ONDANSETRON 4 MG: 2 INJECTION INTRAMUSCULAR; INTRAVENOUS at 09:13

## 2023-08-30 RX ADMIN — MIDAZOLAM 2 MG: 1 INJECTION INTRAMUSCULAR; INTRAVENOUS at 07:52

## 2023-08-30 RX ADMIN — VANCOMYCIN HYDROCHLORIDE 1500 MG: 10 INJECTION, POWDER, LYOPHILIZED, FOR SOLUTION INTRAVENOUS at 01:11

## 2023-08-30 RX ADMIN — PHENYLEPHRINE HYDROCHLORIDE 100 MCG: 10 INJECTION INTRAVENOUS at 09:02

## 2023-08-30 RX ADMIN — BUSPIRONE HYDROCHLORIDE 10 MG: 10 TABLET ORAL at 19:57

## 2023-08-30 RX ADMIN — PHENYLEPHRINE HYDROCHLORIDE 200 MCG: 10 INJECTION INTRAVENOUS at 09:27

## 2023-08-30 RX ADMIN — ACETAMINOPHEN 975 MG: 325 TABLET, FILM COATED ORAL at 16:57

## 2023-08-30 RX ADMIN — PHENYLEPHRINE HYDROCHLORIDE 100 MCG: 10 INJECTION INTRAVENOUS at 09:10

## 2023-08-30 RX ADMIN — PHENYLEPHRINE HYDROCHLORIDE 200 MCG: 10 INJECTION INTRAVENOUS at 08:52

## 2023-08-30 RX ADMIN — PROPOFOL 30 MG: 10 INJECTION, EMULSION INTRAVENOUS at 09:11

## 2023-08-30 RX ADMIN — PANTOPRAZOLE SODIUM 40 MG: 40 TABLET, DELAYED RELEASE ORAL at 16:57

## 2023-08-30 RX ADMIN — PIPERACILLIN AND TAZOBACTAM 4.5 G: 4; .5 INJECTION, POWDER, FOR SOLUTION INTRAVENOUS at 04:17

## 2023-08-30 ASSESSMENT — ACTIVITIES OF DAILY LIVING (ADL)
ADLS_ACUITY_SCORE: 43

## 2023-08-30 NOTE — PROGRESS NOTES
Orthopaedic Surgery Attending    I personally saw and examined this pleasant 42 year old patient this morning preoperatively in the preoperative area. Relevant portions of the chart, imaging, and labs were reviewed. The plan for debridement and irrigation of his left lower extremity amputation wound, with possible VAC dressing change and possible revision left below-knee-amputation, including the nature of the surgery, the risks, benefits, and alternatives, and postoperative plan, were all discussed in layman's terms. All questions were answered. The patient verbalized understanding of our discussion and agreement with the plan. I marked the surgical site with patient participation.

## 2023-08-30 NOTE — PLAN OF CARE
Goal Outcome Evaluation:    Major Shift Events: Aox4, follows commands, able to make needs known. Afebrile. Levo off at midnight. Room air. NPO since midnight for RTOR today. Chronic bui patent with adequate output. Colostomy in place. Pain managed with PRN and scheduled medications. Wound vac in place. LR at 75. Electrolytes replaced per protocol.    Plan: Return to OR today. Manage pain. Continue plan of care and notify team with change in patient condition.     For vital signs and complete assessments, please see documentation flowsheets.

## 2023-08-30 NOTE — ANESTHESIA POSTPROCEDURE EVALUATION
Patient: Saqib Bishop    Procedure: Procedure(s):  Irrigation and debridement lower extremity, combined and wound vac exchange       Anesthesia Type:  General    Note:  Disposition: Admission   Postop Pain Control: Uneventful            Sign Out: Well controlled pain   PONV: No   Neuro/Psych: Uneventful            Sign Out: Acceptable/Baseline neuro status   Airway/Respiratory: Uneventful            Sign Out: Acceptable/Baseline resp. status   CV/Hemodynamics: Uneventful            Sign Out: Acceptable CV status   Other NRE: NONE   DID A NON-ROUTINE EVENT OCCUR? No           Last vitals:  Vitals Value Taken Time   /68 08/30/23 1101   Temp 36.7  C (98.1  F) 08/30/23 1045   Pulse 74 08/30/23 1112   Resp 12 08/30/23 1100   SpO2 95 % 08/30/23 1109   Vitals shown include unvalidated device data.    Electronically Signed By: Christopher J. Behrens, MD  August 30, 2023  1:22 PM

## 2023-08-30 NOTE — BRIEF OP NOTE
[facility]    Brief Operative Note    Pre-operative diagnosis: Necrotizing fasciitis (H) [M72.6]  Post-operative diagnosis Necrotizing fasciitis (H) [M72.6]    Procedure: Procedure(s):  Irrigation and debridement lower extremity, combined and wound vac exchange  Surgeon: Surgeon(s) and Role:     * Etienne Maier MD - Primary     * Wilfred Elaine MD - Resident - Assisting  Anesthesia: * No anesthesia type entered *   Estimated Blood Loss: 25 mL from 8/30/2023  7:53 AM to 8/30/2023  9:54 AM      Drains: VAC  Specimens:   ID Type Source Tests Collected by Time Destination   A : Deep Fascia, Left Leg Tissue Leg, left ANAEROBIC BACTERIAL CULTURE ROUTINE, GRAM STAIN, FUNGAL OR YEAST CULTURE ROUTINE, AEROBIC BACTERIAL CULTURE ROUTINE Etienne Maier MD 8/30/2023  9:07 AM      Findings:   Ongoing cellulitis proximal to level of planned eventual definitive below-knee amputation site .  Complications: None.  Implants: * No implants in log *    Plan:  Continue preoperative cares  Follow wound cultures, tailor abx therapy as needed  VAC to 125 mm Hg continuous  Will need repeat I&D vs revision BKA to definitive level in the next 2-3 days pending medical stability and OR resource availability.  I spoke with the patient's parents immediately postoperatively in the family waiting area. Findings and discussed. All questions answered.      Addendum:  The portion of the surgery involving the bone ends of the tibia and fibula was a non-excisional debridement.  Etienne Maier MD  Attending surgeon  Orthopaedic Surgery  Date of Addendum: 08/31/23

## 2023-08-30 NOTE — PROVIDER NOTIFICATION
[facility] called re: 08/27/2023 Blood Culture results.       [place] 2 MD Dr. Velez Reyes was notified of Gram + Blood Culture. Updated via telephone.

## 2023-08-30 NOTE — PROGRESS NOTES
Orthopaedic Surgery Progress Note 08/30/2023    Subjective  AFVSS. Weaned off Norepi drip ytd. Unable to take to OR ytd due to OR availability. AM WBC 4.3.     Denies numbness, tingling, weakness. Denies fever, chills.     Objective  Temp: 98.1  F (36.7  C) Temp src: Oral BP: 107/58 Pulse: 93   Resp: 18 SpO2: 97 % O2 Device: None (Room air)      Exam:  Gen: Awake, alert, NAD  Resp: Nonlabored breathing  Cardio: Regular rate via peripheral pulse  MSK:    LLE:  - Wound VAC holding suction  - No new expanding erythema of residual limb, faint erythema within lines. No crepitus on exam. Minimally ttp. No bullae.  - Has numbness below knee. Fires quad and intact sensation above knee.      VAC output: 0cc    Recent Labs   Lab 08/30/23  0404 08/29/23  0353 08/28/23  0221   WBC 4.3 6.5 19.1*   HGB 10.6* 10.7* 11.3*    283 321         Assessment: Saqib Bishop is a 42 year old male s/p emergent left transtibial guillotine amputation for LLE NSTI on 8/27-8/28. Plan for revision amputation today w/ Dr. Ellis after clinic (~4 pm).     WBC downtrending, off pressor requirements, extubated    If acutely worsening, please page orthopedics to discuss possible earlier OR, otherwise will plan to proceed with revision amputation and I&D as an add-on to the OR schedule today  - NPO for OR today   - Type and screen completed on 8/27   - CBC/BMP complete this AM   - Please check PT/INR for OR today     Plan:  SICU Primary, appreciate excellent cares.   Activity: bedrest for now  Weight bearing status: NWB LLE  Antibiotics: Clindamycin, Vacomycin, Zosyn, per SICU  Diet: NPO for OR  DVT prophylaxis: per primary, hold for OR today.   Drains: Document VAC output  Pain management: Multimodal, per primary  X-rays: complete  Cultures: Surgical pathology results pending  Follow-up: TBD    Disposition: Pending revision amputation, medical stability, pain control    Respectfully,    Wilfred Elaine MD  Orthopedic Surgery  PGY1  [phone]    Please page me directly with any questions/concerns during regular weekday hours before 5 pm. If there is no response, if it is a weekend, or if it is during evening hours then please page the orthopedic surgery resident on call.

## 2023-08-30 NOTE — ANESTHESIA PROCEDURE NOTES
Airway       Patient location during procedure: OR       Procedure Start/Stop Times: 8/30/2023 8:15 AM  Staff -        CRNA: Arik Cohn APRN CRNA       Performed By: CRNA  Consent for Airway        Urgency: elective  Indications and Patient Condition       Indications for airway management: ivette-procedural       Induction type:intravenous       Mask difficulty assessment: 1 - vent by mask    Final Airway Details       Final airway type: endotracheal airway       Successful airway: ETT - single  Endotracheal Airway Details        ETT size (mm): 8.0       Cuffed: yes       Successful intubation technique: direct laryngoscopy       DL Blade Type: MAC 4       Grade View of Cords: 2       Adjucts: stylet       Position: Right       Measured from: gums/teeth       Secured at (cm): 24       Bite block used: None    Post intubation assessment        Placement verified by: capnometry, equal breath sounds and chest rise        Number of attempts at approach: 1       Number of other approaches attempted: 0       Secured with: pink tape       Ease of procedure: easy       Dentition: Intact and Unchanged    Medication(s) Administered   Medication Administration Time: 8/30/2023 8:15 AM

## 2023-08-30 NOTE — PROGRESS NOTES
Brief orthopedic care note    Due to limited OR availability, unable to move forward with revision amputation tonight. Will tentatively plan for revision amputation LLE tomorrow, 8/30/23. Ok for diet rest of night, NPO after midnight. Rest of cares to resume as previously planned.     Red Mejia MD  Orthopedic Surgery, PGY-2

## 2023-08-30 NOTE — ANESTHESIA CARE TRANSFER NOTE
Patient: Saqib Bishop    Procedure: Procedure(s):  Irrigation and debridement lower extremity, combined and wound vac exchange       Diagnosis: Necrotizing fasciitis (H) [M72.6]  Diagnosis Additional Information: No value filed.    Anesthesia Type:   General     Note:    Oropharynx: oropharynx clear of all foreign objects  Level of Consciousness: drowsy  Oxygen Supplementation: nasal cannula  Level of Supplemental Oxygen (L/min / FiO2): 2  Independent Airway: airway patency satisfactory and stable  Dentition: dentition unchanged  Vital Signs Stable: post-procedure vital signs reviewed and stable  Report to RN Given: handoff report given  Patient transferred to: PACU    Handoff Report: Identifed the Patient, Identified the Reponsible Provider, Reviewed the pertinent medical history, Discussed the surgical course, Reviewed Intra-OP anesthesia mangement and issues during anesthesia, Set expectations for post-procedure period and Allowed opportunity for questions and acknowledgement of understanding      Vitals:  Vitals Value Taken Time   /57 08/30/23 1000   Temp 36.6  C (97.9  F) 08/30/23 1000   Pulse 90 08/30/23 1011   Resp 10 08/30/23 1000   SpO2 94 % 08/30/23 1011   Vitals shown include unvalidated device data.    Electronically Signed By: RL Jung CRNA  August 30, 2023  10:13 AM

## 2023-08-30 NOTE — OP NOTE
DATE OF SURGERY: 08/30/2023.     PREOPERATIVE DIAGNOSIS: Left lower extremity necrotizing fasciitis (status post guillotine amputation through distal leg).    POSTOPERATIVE DIAGNOSIS: Left lower extremity necrotizing fasciitis (status post guillotine amputation through distal leg).    PROCEDURE: Irrigation and debridement of left lower extremity residual limb.     PRIMARY SURGEON: Etienne Maier MD.     ASSISTANT SURGEON: Wilfred Elaine MD.     ANESTHESIA: General endotracheal.     COMPLICATIONS: None apparent intraoperatively or immediately postoperatively.     SIGNIFICANT FINDINGS: No grossly visible purulent or dirty dishwater appearing fluid. Skin erythema significantly receded from previous skin markings, but still proximal to the level of the planned definitive below knee amputation site. Portions of the skin margins appeared not grossly necrotic but relatively devitalized. Bone and muscle appeared grossly clean.  Technique: Sharp.  Instruments: Scalpel, electrocautery, Ruvalcaba elevator, curet.  Nature of debrided tissue: Devitalized, erythematous skin and subcutaneous tissue. Small amounts of loose/friable subcutaneous tissues, muscle, and fascia.  Appearance of wound after debridement: Down to healthy, bleeding tissue.  Area of debridement: One contiguous wound with a medial incision approximately 15 cm long x 3 cm wide x 2 cm deep, a distal / apical roughly circular area approximately 12 cm long x 12 cm wide x 3 cm deep, and a small lateral incision approximately 4 cm long x 2 cm wide x 2 cm deep.  Depth of debridement: Down to and including bone (tibia/fibula).     SPECIMENS: Tissue culture from deep fascia sent to microbiology for gram stain, aerobic culture, anaerobic culture, and fungal culture.    DRAINS: Wound VAC with one WhiteFoam sponge and one tubing, 125 mm Hg continuous.     ESTIMATED BLOOD LOSS: Approximately 25 mL.     INDICATIONS:                          Saqib Bishop is a 42 year old patient  with a history of T12 level spinal cord injury and paraplegia with multiple other associated medical problems, who presented with a necrotizing soft tissue infection of his left lower extremity about 2 months after development of a left foot wound and about 1 week after accidentally running over his own foot with a wheelchair. He underwent an emergent guillotine-type below knee amputation of his left lower extremity through the distal leg level, in anticipation of a planned eventual conversion to a more proximal definitive below knee amputation. He presents now for a planned repeat debridement and irrigation with VAC dressing change versus revision below knee amputation. The diagnosis, prognosis, nature of the recommended procedure, the risks, benefits, and alternatives to surgery, and the postoperative plan were all discussed with the patient preoperatively in layman's terms. The potential need for multiple future procedures was included in this discussion. No guarantees were expressed or implied as to outcome. The patient had the opportunity to have all questions at the time asked and answered appropriately, verbalized understanding of this discussion, and verbalized the wish to proceed with the planned procedure. Written informed consent was obtained.     DESCRIPTION OF PROCEDURE:             The patient, Saqib Bishop, was met in the preoperative area where the correct surgical site was identified with patient participation and marked by the primary surgeon. All questions were answered. The patient was then brought to the operating room and carefully transferred into the supine position on the operating room table with all bony prominences well padded and a safety strap across the waist. The patient was then placed under general anesthesia by the anesthesia team, and an endotracheal tube was placed. He was already on an ongoing course of IV antibiotic therapy preoperatively. A tourniquet was placed on the  proximal left thigh. The patient's left lower extremity was then prepped with betadine scrub and paint and draped free in the usual sterile fashion. The surgeon initials were clearly visualized at the surgical site on the residual left lower extremity. Prior to proceeding with the operation a timeout was held per hospital policy correctly identifying the patient, procedure to be performed, and operative site including laterality. All in the room agreed.    The previous sutures closing the amputation site were removed. The wound margins and the wound itself were thoroughly and carefully inspected. Portions of the skin margins and subcutaneous tissues appeared to be relatively devitalized with superficial slough and blistering, although there appeared to be no [place] necrosis. The skin erythema appeared to be significantly receded distal to the level of the skin markings showing the original proximal extent of the erythema, although there was still erythema in the mid and distal left leg, proximal to the level of the planned eventual definitive below knee amputation site, thus precluding the safe performance of a definitive below knee amputation with wound closure at this time. The decision was made to perform a repeat debridement and irrigation with VAC dressing exchange. There was no malodor, grossly visible purulent fluid, dirty dishwater appearing fluid, or abnormal delamination of fascial planes. The nonviable appearing skin margins and subcutaneous tissues, as well as small amounts of friable/loose muscle and deep fascia were sharply debrided with a scalpel and electrocautery. The remainder of the wound appeared healthy and viable, including the bone ends of the distal tibia and fibula. The ligated neurovascular structures appeared to be appropriately thrombosed with no visible bleeding. The muscle surfaces and bone ends were lightly scrubbed with a Ruvalcaba elevator and curet. The entire wound was thoroughly and  carefully irrigated with 6 L of sterile normal saline using cystoscopy tubing. Meticulous hemostasis was achieved. The skin margins were cleansed and dried, and one WhiteFoam VAC sponge was applied to the wound and dressed.  The tubing was positioned to exit the wound anteriorly. Suction was applied using the patient's existing DME VAC machine with 125 mm Hg continuous suction. A good seal without leak was achieved. Sterile cast padding and an Ace wrap were then applied to keep the tubing off of the skin.     All surgical counts were reported as correct at the end of the case. There was no evidence for neurovascular injury noted. The patient was then carefully and safely transferred to the [facility] in the supine position, extubated, and then taken to the recovery room in stable condition.     I was present and scrubbed in for the entire case.    The patient will need repeat irrigation and debridement(s) with VAC dressing change(s) until the wound is suitable for performing the revision below knee amputation with wound closure at the definitive level. The next procedure will likely be in the next 2-3 days pending medical stability and OR resource availability. He will remain under ICU team care for now with antibiotic therapy, tailoring the antibiotics as needed based on culture results. Continue wound VAC at 125 mm Hg continuous suction. Nonweightbearing and elevate left lower extremity. Keep dressings clean, dry, and intact.       Etienne Maier MD  Attending surgeon  Orthopaedic Surgery

## 2023-08-30 NOTE — PROGRESS NOTES
Culture results ([facility]):    8/27/23 - Blood culture: Aerobic gram positive cocci 3+  8/27/23 - Wound culture: Pseudomonas aeruginosa (susceptible to cefepime, ceftazidime/Avibactam), Ciprofloxacin, Gentamicin, Levofloxacin), Ceftazidime- inducible beta lactamase.    Discussed with the care coordinator to facilitate the documents to be available in EPIC.    KAYLA Betts  PGY1 General Surgery  [facility]
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
## 2023-08-31 ENCOUNTER — APPOINTMENT (OUTPATIENT)
Dept: CARDIOLOGY | Facility: CLINIC | Age: 43
DRG: 853 | End: 2023-08-31
Payer: MEDICARE

## 2023-08-31 LAB
ALBUMIN SERPL BCG-MCNC: 2.4 G/DL (ref 3.5–5.2)
ALP SERPL-CCNC: 126 U/L (ref 40–129)
ALT SERPL W P-5'-P-CCNC: 19 U/L (ref 0–70)
ANION GAP SERPL CALCULATED.3IONS-SCNC: 9 MMOL/L (ref 7–15)
AST SERPL W P-5'-P-CCNC: 31 U/L (ref 0–45)
BASOPHILS # BLD AUTO: 0 10E3/UL (ref 0–0.2)
BASOPHILS NFR BLD AUTO: 1 %
BILIRUB SERPL-MCNC: 0.2 MG/DL
BUN SERPL-MCNC: 14.4 MG/DL (ref 6–20)
BURR CELLS BLD QL SMEAR: ABNORMAL
CA-I BLD-MCNC: 4.1 MG/DL (ref 4.4–5.2)
CALCIUM SERPL-MCNC: 7.7 MG/DL (ref 8.6–10)
CHLORIDE SERPL-SCNC: 112 MMOL/L (ref 98–107)
CREAT SERPL-MCNC: 1.04 MG/DL (ref 0.67–1.17)
DEPRECATED CALCIDIOL+CALCIFEROL SERPL-MC: 15 UG/L (ref 20–75)
DEPRECATED HCO3 PLAS-SCNC: 24 MMOL/L (ref 22–29)
EOSINOPHIL # BLD AUTO: 0.1 10E3/UL (ref 0–0.7)
EOSINOPHIL NFR BLD AUTO: 3 %
ERYTHROCYTE [DISTWIDTH] IN BLOOD BY AUTOMATED COUNT: 17 % (ref 10–15)
GFR SERPL CREATININE-BSD FRML MDRD: >90 ML/MIN/1.73M2
GLUCOSE BLDC GLUCOMTR-MCNC: 103 MG/DL (ref 70–99)
GLUCOSE BLDC GLUCOMTR-MCNC: 105 MG/DL (ref 70–99)
GLUCOSE BLDC GLUCOMTR-MCNC: 109 MG/DL (ref 70–99)
GLUCOSE BLDC GLUCOMTR-MCNC: 115 MG/DL (ref 70–99)
GLUCOSE BLDC GLUCOMTR-MCNC: 125 MG/DL (ref 70–99)
GLUCOSE BLDC GLUCOMTR-MCNC: 134 MG/DL (ref 70–99)
GLUCOSE SERPL-MCNC: 114 MG/DL (ref 70–99)
HCT VFR BLD AUTO: 34.2 % (ref 40–53)
HGB BLD-MCNC: 10 G/DL (ref 13.3–17.7)
IMM GRANULOCYTES # BLD: 0.1 10E3/UL
IMM GRANULOCYTES NFR BLD: 2 %
LVEF ECHO: NORMAL
LYMPHOCYTES # BLD AUTO: 1.1 10E3/UL (ref 0.8–5.3)
LYMPHOCYTES NFR BLD AUTO: 31 %
MAGNESIUM SERPL-MCNC: 1.8 MG/DL (ref 1.7–2.3)
MCH RBC QN AUTO: 24.9 PG (ref 26.5–33)
MCHC RBC AUTO-ENTMCNC: 29.2 G/DL (ref 31.5–36.5)
MCV RBC AUTO: 85 FL (ref 78–100)
MONOCYTES # BLD AUTO: 0.5 10E3/UL (ref 0–1.3)
MONOCYTES NFR BLD AUTO: 14 %
MRSA DNA SPEC QL NAA+PROBE: NEGATIVE
NEUTROPHILS # BLD AUTO: 1.8 10E3/UL (ref 1.6–8.3)
NEUTROPHILS NFR BLD AUTO: 49 %
NRBC # BLD AUTO: 0 10E3/UL
NRBC BLD AUTO-RTO: 0 /100
PATH REPORT.COMMENTS IMP SPEC: NORMAL
PATH REPORT.COMMENTS IMP SPEC: NORMAL
PATH REPORT.FINAL DX SPEC: NORMAL
PATH REPORT.GROSS SPEC: NORMAL
PATH REPORT.MICROSCOPIC SPEC OTHER STN: NORMAL
PATH REPORT.RELEVANT HX SPEC: NORMAL
PHOSPHATE SERPL-MCNC: 3.5 MG/DL (ref 2.5–4.5)
PHOTO IMAGE: NORMAL
PLAT MORPH BLD: ABNORMAL
PLATELET # BLD AUTO: 296 10E3/UL (ref 150–450)
POTASSIUM SERPL-SCNC: 3.9 MMOL/L (ref 3.4–5.3)
PROT SERPL-MCNC: 6.1 G/DL (ref 6.4–8.3)
PTH-INTACT SERPL-MCNC: 44 PG/ML (ref 15–65)
RBC # BLD AUTO: 4.02 10E6/UL (ref 4.4–5.9)
RBC MORPH BLD: ABNORMAL
SA TARGET DNA: POSITIVE
SODIUM SERPL-SCNC: 145 MMOL/L (ref 136–145)
VANCOMYCIN SERPL-MCNC: 25.5 UG/ML
WBC # BLD AUTO: 3.7 10E3/UL (ref 4–11)
WBC # BLD AUTO: 3.7 10E3/UL (ref 4–11)

## 2023-08-31 PROCEDURE — 258N000003 HC RX IP 258 OP 636

## 2023-08-31 PROCEDURE — 250N000011 HC RX IP 250 OP 636: Mod: JZ

## 2023-08-31 PROCEDURE — 82306 VITAMIN D 25 HYDROXY: CPT

## 2023-08-31 PROCEDURE — 250N000011 HC RX IP 250 OP 636: Mod: JZ | Performed by: STUDENT IN AN ORGANIZED HEALTH CARE EDUCATION/TRAINING PROGRAM

## 2023-08-31 PROCEDURE — 82330 ASSAY OF CALCIUM: CPT

## 2023-08-31 PROCEDURE — 87641 MR-STAPH DNA AMP PROBE: CPT

## 2023-08-31 PROCEDURE — 80202 ASSAY OF VANCOMYCIN: CPT | Performed by: SURGERY

## 2023-08-31 PROCEDURE — 99232 SBSQ HOSP IP/OBS MODERATE 35: CPT | Mod: GC | Performed by: STUDENT IN AN ORGANIZED HEALTH CARE EDUCATION/TRAINING PROGRAM

## 2023-08-31 PROCEDURE — 84100 ASSAY OF PHOSPHORUS: CPT | Performed by: STUDENT IN AN ORGANIZED HEALTH CARE EDUCATION/TRAINING PROGRAM

## 2023-08-31 PROCEDURE — 250N000013 HC RX MED GY IP 250 OP 250 PS 637

## 2023-08-31 PROCEDURE — 83970 ASSAY OF PARATHORMONE: CPT

## 2023-08-31 PROCEDURE — 36415 COLL VENOUS BLD VENIPUNCTURE: CPT

## 2023-08-31 PROCEDURE — 255N000002 HC RX 255 OP 636: Performed by: INTERNAL MEDICINE

## 2023-08-31 PROCEDURE — 85027 COMPLETE CBC AUTOMATED: CPT | Performed by: STUDENT IN AN ORGANIZED HEALTH CARE EDUCATION/TRAINING PROGRAM

## 2023-08-31 PROCEDURE — 36415 COLL VENOUS BLD VENIPUNCTURE: CPT | Performed by: SURGERY

## 2023-08-31 PROCEDURE — 999N000208 ECHOCARDIOGRAM COMPLETE

## 2023-08-31 PROCEDURE — 250N000011 HC RX IP 250 OP 636: Performed by: STUDENT IN AN ORGANIZED HEALTH CARE EDUCATION/TRAINING PROGRAM

## 2023-08-31 PROCEDURE — 120N000002 HC R&B MED SURG/OB UMMC

## 2023-08-31 PROCEDURE — 80053 COMPREHEN METABOLIC PANEL: CPT

## 2023-08-31 PROCEDURE — 250N000013 HC RX MED GY IP 250 OP 250 PS 637: Performed by: PHARMACIST

## 2023-08-31 PROCEDURE — 93306 TTE W/DOPPLER COMPLETE: CPT | Mod: 26 | Performed by: INTERNAL MEDICINE

## 2023-08-31 PROCEDURE — G0463 HOSPITAL OUTPT CLINIC VISIT: HCPCS | Mod: 25

## 2023-08-31 PROCEDURE — 250N000011 HC RX IP 250 OP 636: Mod: JZ | Performed by: SURGERY

## 2023-08-31 PROCEDURE — 258N000003 HC RX IP 258 OP 636: Performed by: SURGERY

## 2023-08-31 PROCEDURE — 83735 ASSAY OF MAGNESIUM: CPT | Performed by: STUDENT IN AN ORGANIZED HEALTH CARE EDUCATION/TRAINING PROGRAM

## 2023-08-31 RX ORDER — ACETAMINOPHEN 325 MG/1
650 TABLET ORAL EVERY 4 HOURS PRN
Status: DISCONTINUED | OUTPATIENT
Start: 2023-09-03 | End: 2023-09-08

## 2023-08-31 RX ORDER — LIDOCAINE 40 MG/G
CREAM TOPICAL
Status: DISCONTINUED | OUTPATIENT
Start: 2023-08-31 | End: 2023-09-07

## 2023-08-31 RX ORDER — SODIUM CHLORIDE, SODIUM LACTATE, POTASSIUM CHLORIDE, CALCIUM CHLORIDE 600; 310; 30; 20 MG/100ML; MG/100ML; MG/100ML; MG/100ML
INJECTION, SOLUTION INTRAVENOUS CONTINUOUS
Status: DISCONTINUED | OUTPATIENT
Start: 2023-08-31 | End: 2023-09-09 | Stop reason: HOSPADM

## 2023-08-31 RX ORDER — CALCIUM GLUCONATE 20 MG/ML
2 INJECTION, SOLUTION INTRAVENOUS ONCE
Status: COMPLETED | OUTPATIENT
Start: 2023-08-31 | End: 2023-08-31

## 2023-08-31 RX ORDER — MAGNESIUM SULFATE HEPTAHYDRATE 40 MG/ML
2 INJECTION, SOLUTION INTRAVENOUS ONCE
Status: COMPLETED | OUTPATIENT
Start: 2023-08-31 | End: 2023-08-31

## 2023-08-31 RX ORDER — CALCIUM GLUCONATE 20 MG/ML
2 INJECTION, SOLUTION INTRAVENOUS ONCE
Status: DISCONTINUED | OUTPATIENT
Start: 2023-08-31 | End: 2023-08-31

## 2023-08-31 RX ORDER — ACETAMINOPHEN 325 MG/1
975 TABLET ORAL EVERY 8 HOURS
Status: COMPLETED | OUTPATIENT
Start: 2023-08-31 | End: 2023-09-03

## 2023-08-31 RX ADMIN — OXYCODONE HYDROCHLORIDE 20 MG: 20 TABLET, FILM COATED, EXTENDED RELEASE ORAL at 23:39

## 2023-08-31 RX ADMIN — ACETAMINOPHEN 975 MG: 325 TABLET, FILM COATED ORAL at 15:28

## 2023-08-31 RX ADMIN — DULOXETINE HYDROCHLORIDE 60 MG: 60 CAPSULE, DELAYED RELEASE ORAL at 08:26

## 2023-08-31 RX ADMIN — DULOXETINE HYDROCHLORIDE 60 MG: 60 CAPSULE, DELAYED RELEASE ORAL at 22:15

## 2023-08-31 RX ADMIN — PIPERACILLIN AND TAZOBACTAM 4.5 G: 4; .5 INJECTION, POWDER, FOR SOLUTION INTRAVENOUS at 23:40

## 2023-08-31 RX ADMIN — PANTOPRAZOLE SODIUM 40 MG: 40 TABLET, DELAYED RELEASE ORAL at 08:27

## 2023-08-31 RX ADMIN — OXYCODONE HYDROCHLORIDE 10 MG: 10 TABLET ORAL at 04:31

## 2023-08-31 RX ADMIN — SENNOSIDES AND DOCUSATE SODIUM 1 TABLET: 50; 8.6 TABLET ORAL at 22:16

## 2023-08-31 RX ADMIN — HEPARIN SODIUM 5000 UNITS: 5000 INJECTION, SOLUTION INTRAVENOUS; SUBCUTANEOUS at 01:09

## 2023-08-31 RX ADMIN — CLINDAMYCIN IN 5 PERCENT DEXTROSE 900 MG: 18 INJECTION, SOLUTION INTRAVENOUS at 06:06

## 2023-08-31 RX ADMIN — PIPERACILLIN AND TAZOBACTAM 4.5 G: 4; .5 INJECTION, POWDER, FOR SOLUTION INTRAVENOUS at 06:02

## 2023-08-31 RX ADMIN — ACETAMINOPHEN 975 MG: 325 TABLET, FILM COATED ORAL at 08:27

## 2023-08-31 RX ADMIN — HUMAN ALBUMIN MICROSPHERES AND PERFLUTREN 5 ML: 10; .22 INJECTION, SOLUTION INTRAVENOUS at 09:20

## 2023-08-31 RX ADMIN — PREGABALIN 150 MG: 50 CAPSULE ORAL at 08:27

## 2023-08-31 RX ADMIN — OXYCODONE HYDROCHLORIDE 10 MG: 10 TABLET ORAL at 00:31

## 2023-08-31 RX ADMIN — ACETAMINOPHEN 975 MG: 325 TABLET, FILM COATED ORAL at 00:21

## 2023-08-31 RX ADMIN — VANCOMYCIN HYDROCHLORIDE 1250 MG: 10 INJECTION, POWDER, LYOPHILIZED, FOR SOLUTION INTRAVENOUS at 17:55

## 2023-08-31 RX ADMIN — PIPERACILLIN AND TAZOBACTAM 4.5 G: 4; .5 INJECTION, POWDER, FOR SOLUTION INTRAVENOUS at 00:21

## 2023-08-31 RX ADMIN — HEPARIN SODIUM 5000 UNITS: 5000 INJECTION, SOLUTION INTRAVENOUS; SUBCUTANEOUS at 17:55

## 2023-08-31 RX ADMIN — HEPARIN SODIUM 5000 UNITS: 5000 INJECTION, SOLUTION INTRAVENOUS; SUBCUTANEOUS at 10:10

## 2023-08-31 RX ADMIN — MAGNESIUM SULFATE HEPTAHYDRATE 2 G: 40 INJECTION, SOLUTION INTRAVENOUS at 06:02

## 2023-08-31 RX ADMIN — BUPROPION HYDROCHLORIDE 450 MG: 150 TABLET, FILM COATED, EXTENDED RELEASE ORAL at 08:26

## 2023-08-31 RX ADMIN — PIPERACILLIN AND TAZOBACTAM 4.5 G: 4; .5 INJECTION, POWDER, FOR SOLUTION INTRAVENOUS at 11:16

## 2023-08-31 RX ADMIN — PREGABALIN 150 MG: 50 CAPSULE ORAL at 14:39

## 2023-08-31 RX ADMIN — PIPERACILLIN AND TAZOBACTAM 4.5 G: 4; .5 INJECTION, POWDER, FOR SOLUTION INTRAVENOUS at 17:55

## 2023-08-31 RX ADMIN — OXYCODONE HYDROCHLORIDE 20 MG: 20 TABLET, FILM COATED, EXTENDED RELEASE ORAL at 08:27

## 2023-08-31 RX ADMIN — VANCOMYCIN HYDROCHLORIDE 1500 MG: 10 INJECTION, POWDER, LYOPHILIZED, FOR SOLUTION INTRAVENOUS at 01:05

## 2023-08-31 RX ADMIN — OXYCODONE HYDROCHLORIDE 10 MG: 10 TABLET ORAL at 15:28

## 2023-08-31 RX ADMIN — PREGABALIN 150 MG: 50 CAPSULE ORAL at 22:15

## 2023-08-31 RX ADMIN — PREDNISOLONE ACETATE 1 DROP: 10 SUSPENSION/ DROPS OPHTHALMIC at 22:20

## 2023-08-31 RX ADMIN — SODIUM CHLORIDE, POTASSIUM CHLORIDE, SODIUM LACTATE AND CALCIUM CHLORIDE: 600; 310; 30; 20 INJECTION, SOLUTION INTRAVENOUS at 22:16

## 2023-08-31 RX ADMIN — BUSPIRONE HYDROCHLORIDE 10 MG: 10 TABLET ORAL at 22:16

## 2023-08-31 RX ADMIN — ACETAMINOPHEN 975 MG: 325 TABLET, FILM COATED ORAL at 22:16

## 2023-08-31 RX ADMIN — PANTOPRAZOLE SODIUM 40 MG: 40 TABLET, DELAYED RELEASE ORAL at 15:30

## 2023-08-31 RX ADMIN — PREDNISOLONE ACETATE 1 DROP: 10 SUSPENSION/ DROPS OPHTHALMIC at 08:29

## 2023-08-31 RX ADMIN — CALCIUM GLUCONATE 2 G: 20 INJECTION, SOLUTION INTRAVENOUS at 08:27

## 2023-08-31 RX ADMIN — BUSPIRONE HYDROCHLORIDE 10 MG: 10 TABLET ORAL at 08:27

## 2023-08-31 ASSESSMENT — ACTIVITIES OF DAILY LIVING (ADL)
ADLS_ACUITY_SCORE: 43

## 2023-08-31 NOTE — PLAN OF CARE
Goal Outcome Evaluation:      Plan of Care Reviewed With: patient    Overall Patient Progress: no changeOverall Patient Progress: no change    Outcome Evaluation: Pt with incrased needs for wound healing, see RD note 8/31 for full assessment

## 2023-08-31 NOTE — PLAN OF CARE
Major Shift Events: No acute event. Neuro's unchanged. VSS. RA. Chronic bui with good output.    Plan: Transfer to [facility]. Possible I&D tomorrow.   For vital signs and complete assessments, please see documentation flowsheets.

## 2023-08-31 NOTE — PROGRESS NOTES
Antimicrobial Stewardship Team Note    Antimicrobial Stewardship Program - A joint venture between [facility] and  Physicians to optimize antibiotic management.  NOT a formal consult - Restricted Antimicrobial Review     Patient: Saqib Bishop  MRN: [id number]  Allergies: Adhesive tape, Benzoin, Droperidol, Prednisone, and Vitamin d    Brief Summary: Saqib Bishop is a 42 year old male with PMHx of paraplegia, chronic sacral decubitus ulcer, neurogenic bladder with chronic bui, and treatment for osteomyelitis 12/2020 and 10/2021 s/p a failed flap procedure early 2023. He was transferred from an OSH on 8/27/23 with concerns for NSTI of the LLE.     History of Present Illness: He presented to presented to his wound clinic on 8/27 for his chronic L foot wound 2/2 wheelchair trauma and was found to be hypotensive. He noted that he ran over his foot a couple of days prior to admission. He presented at his local ED in [place], and vancomycin and cefepime were started due to worsening infection of the LLE, nausea, emesis, and fever/chills. He denied specific pains but generally felt unwell. He did report that it was malodorous starting on 8/26/23. The infection was noted as predominately at the foot but extending to the shin with necrosis and purulent drainage. He was transferred to [facility] for escalated care. Upon presentation at [facility], he was febrile at 101.1, tachycardic at 122, and hypotensive at 63/51 requiring vasopressors. Initial labs included a WBC of 16.7, ANC of 14, sCr of 2.95 (baseline around 1), CRP of 325, CK of 1339, and lactic acid of 0.6. Blood cultures on 8/28 and 8/30 are no growth to date. Wound culture on 8/27 at the OSH grew pan-sensitive Pseudomonas. Blood cultures on 8/27 are in progress but grew Gram positive rods and Gram positive bacilli in 2 of 2 tubes. Surgical pathology from 8/28 is pending. Fungal and anaerobic culture from leg tissue on 8/30 is no growth to date. Gram  stain of the tissue showed no organisms and 3+ WBC. MRSA nasal PCR is negative (positive for MSSA). Left knee xray on 8/27 showed no acute fracture, dislocation, or knee joint effusion as well as no definite soft tissue gas or radiopaque foreign body. Xray of the left tibia and fibula showed significant subcutaneous gas in the plantar soft tissue of the imaged foot. Xray of the left foot showed extensive soft tissue gas and swelling noted throughout the left foot and ankle. Thinning of the skin at the fifth metatarsal phalangeal joint. He underwent irrigation and debridement as well as transtibial guillotine LLE amputation on 8/28 and revision on 8/30. Vancomycin was continued and Zosyn and clindamycin were started on 8/27.          Active Anti-infective Medications   (From admission, onward)                 Start     Stop    08/30/23 1400  [Held by provider]  hydroxychloroquine  400 mg,   Oral,   DAILY        (Held by provider since Wed 8/30/2023 at 1336 by Velez Reyes, German, MD.Hold Reason: Other)   Inflammatory disease       --    08/29/23 1300  vancomycin  1,500 mg,   Intravenous,   EVERY 12 HOURS        Skin and Soft Tissue Infection   necrotizing infection       --    08/28/23 1600  piperacillin-tazobactam  4.5 g,   Intravenous,   EVERY 6 HOURS        NSTI       --    08/27/23 2100  clindamycin  900 mg,   Intravenous,   100 mL/hr,   EVERY 8 HOURS        NSTI       --                  Assessment: Gram positive bacteremia in the setting of necrotizing fascitis s/p transtibial guillotine LLE amputation  Today is day 5 of clindamycin, Zosyn, and vancomycin therapy.  He is afebrile, normotensive without pressor use, and otherwise vitally stable. Labs today include a WBC of 3.7, ANC of 1.8, and sCr of 1.04. Provider notes indicate that surgical margins have remained clear of infection without expanding erythema of the limb. Given apparent source control and low suspicion of continued necrosis based on provider  notes, recommend stopping clindamycin. Recommend continuing Zosyn until results of blood cultures at OSH have finalized due to its anaerobic coverage. Given Gram positive bacteremia that is still in progress at OSH, recommend continuing vancomycin. He does have a history of MRSA noted in Care Everywhere on 8/2018 cultured from his buttocks and right ischial bone. Aside from his bui, he does not have history of any lines or prosthetic material prior to admission. Agree with TTE to help rule out endocarditis. If abnormal findings are seen, recommend GWENDOLYN. Recommend daily blood cultures as PICC should not be placed until repeat blood cultures have remained negative for at least 48 hours.     Recommendations:  Stop clindamycin  Continue Zosyn and vancomycin  Agree with obtaining TTE to rule out endocarditis. If abnormal findings are seen, get GWENDOLYN   Get repeat blood cultures daily. PICC should not be placed until blood cultures have remained negative for at least 48 hours    Pharmacy took the following actions: Called/paged provider, Electronic note created.    Discussed with ID Staff: David Ames MD, Queenie Smalls, PharmD, BCIDP, and Solitario Pittman, PharmD    Zahida Velázquez, 2024 PharmD Candidate  Phone: [phone]    Vital Signs/Clinical Features:  Vitals         08/29 0700  08/30 0659 08/30 0700  08/31 0659 08/31 0700  08/31 1240   Most Recent      Temp ( F) 97.5 -  98.1    97.3 -  98.6    97.7 -  98.1     97.7 (36.5) 08/31 1200    Pulse 76 -  104    60 -  97    73 -  75     75 08/31 0800    Resp 12 -  26    10 -  22    14 -  16     16 08/31 1100    BP 89/72 -  118/58    100/63 -  142/76    120/80 -  122/77     122/77 08/31 0800    SpO2 (%) 87 -  100    92 -  100    94 -  99     94 08/31 1100            Labs  Estimated Creatinine Clearance: 134.4 mL/min (based on SCr of 1.04 mg/dL).  Recent Labs   Lab Test 08/27/23  2115 08/28/23  0221 08/29/23  0353 08/30/23  0404 08/31/23  0433   CR 2.95*  2.89* 2.25*  1.24* 1.01 1.04       Recent Labs   Lab Test 08/27/23 2115 08/27/23 2357 08/28/23  0221 08/29/23  0353 08/30/23  0404 08/31/23  0433   WBC 16.7*  --  19.1* 6.5 4.3 3.7*  3.7*   HGB 11.6* 11.2* 11.3* 10.7* 10.6* 10.0*   HCT 36.4*  --  36.4* 34.8* 34.3* 34.2*   MCV 80  --  81 80 81 85     --  321 283 298 296       Recent Labs   Lab Test 08/27/23 2115 08/31/23  0433   BILITOTAL 0.8  0.8 0.2   ALKPHOS 148*  164* 126   ALBUMIN 2.8*  2.7* 2.4*   AST 51*  52* 31   ALT 29  28 19       Recent Labs   Lab Test 07/18/23  1359 08/27/23 2115 08/27/23 2128 08/27/23 2357 08/28/23  0222   LACT  --   --  0.6* 0.4 0.5*   CRPI 31.94* 325.00*  --   --   --    SED 30*  --   --   --   --        Recent Labs   Lab Test 08/28/23  1625   VANCOMYCIN 8.7       Culture Results:  7-Day Micro Results       Procedure Component Value Units Date/Time    MRSA MSSA PCR, Nasal Swab [[id number]] Collected: 08/31/23 0841    Order Status: Completed Lab Status: Final result Updated: 08/31/23 1155    Specimen: Swab from Nares, Bilateral      MRSA Target DNA Negative     SA Target DNA Positive    Narrative:      The [employer]  Xpert SA Nasal Complete assay performed in the GeneXpert  Dx System is a qualitative in vitro diagnostic test designed for rapid detection of Staphylococcus aureus (SA) and methicillin-resistant Staphylococcus aureus (MRSA) from nasal swabs in patients at risk for nasal colonization. The test utilizes automated real-time polymerase chain reaction (PCR) to detect MRSA/SA DNA. The Xpert SA Nasal Complete assay is intended to aid in the prevention and control of MRSA/SA infections in healthcare settings. The assay is not intended to diagnose, guide or monitor treatment for MRSA/SA infections, or provide results of susceptibility to methicillin. A negative result does not preclude MRSA/SA nasal colonization.     Anaerobic Bacterial Culture Routine [[id number]] Collected: 08/30/23 0907    Order Status: Completed Lab  Status: Preliminary result Updated: 23 103    Specimen: Tissue from Leg, left      Culture No anaerobic organisms isolated after 1 day    Gram Stain [[id number]] Collected: 23 09    Order Status: Completed Lab Status: Final result Updated: 23 104    Specimen: Tissue from Leg, left      Gram Stain Result No organisms seen      3+ WBC seen     Comment: Predominantly PMNs       Fungal or Yeast Culture Routine [[id number]] Collected: 23 0907    Order Status: Completed Lab Status: Preliminary result Updated: 23 103    Specimen: Tissue from Leg, left      Culture No growth after 1 day    Tissue Aerobic Bacterial Culture Routine [[id number]] Collected: 23 09    Order Status: Completed Lab Status: Preliminary result Updated: 23    Specimen: Tissue from Leg, left      Culture No growth, less than 1 day    Blood Culture Hand, Left [[id number]]  (Normal) Collected: 23 1033    Order Status: Completed Lab Status: Preliminary result Updated: 23 133    Specimen: Blood from Hand, Left      Culture No growth after 2 days    Blood Culture Arm, Right [[id number]]  (Normal) Collected: 23 1024    Order Status: Completed Lab Status: Preliminary result Updated: 23 133    Specimen: Blood from Arm, Right      Culture No growth after 2 days    Blood Culture Peripheral Blood     Order Status: Canceled Lab Status: No result     Specimen: Peripheral Blood     Blood Culture Peripheral Blood     Order Status: Canceled Lab Status: No result     Specimen: Peripheral Blood                                     Imaging: Echocardiogram Complete    Result Date: 2023  [id number]^VELEZ REYES^[facility] [address] [place] [id number]  Name: SUNG RAMESH MRN: [id number] : 1980 Study Date: 2023 08:59 AM Age: 42 yrs Gender: Male Patient Location:  [id number] Reason For Study: Bacteremia Ordering Physician: VELEZ REYES, GERMAN Performed By: Cathleen Camara  BSA: 2.6 m2 Height: 73 in Weight: 301 lb HR: 65 BP: 122/77 mmHg ______________________________________________________________________________ Procedure Complete Portable Echo Adult. Contrast Optison. Optison (NDC #2440-5203-27) given intravenously. Patient was given 5 ml mixture of 3 ml Optison and 6 ml saline. 5 ml wasted. Optison Expiration 11/19/2024 . Optison Lot # [id number] . ______________________________________________________________________________ Interpretation Summary Left ventricular size, wall motion and function are normal. The ejection fraction is 60-65%. Right ventricular function, chamber size, wall motion, and thickness are normal. IVC diameter >2.1 cm collapsing <50% with sniff suggests a high RA pressure estimated at 15 mmHg or greater. No pericardial effusion is present.  There is no prior study for direct comparison. ______________________________________________________________________________ Left Ventricle Left ventricular size, wall motion and function are normal. The ejection fraction is 60-65%. Left ventricular diastolic function is normal. No regional wall motion abnormalities are seen.  Right Ventricle Right ventricular function, chamber size, wall motion, and thickness are normal.  Atria Both atria appear normal.  Mitral Valve The mitral valve is normal. Trace mitral insufficiency is present.  Aortic Valve Aortic valve is normal in structure and function. The aortic valve is tricuspid. No aortic regurgitation is present.  Tricuspid Valve The tricuspid valve is normal. Trace tricuspid insufficiency is present. The peak velocity of the tricuspid regurgitant jet is not obtainable.  Pulmonic Valve The pulmonic valve is normal.  Vessels The aorta root is normal. IVC diameter >2.1 cm collapsing <50% with sniff suggests a high RA pressure estimated at 15 mmHg or greater. Dilation of  the inferior vena cava is present with abnormal respiratory variation in diameter.  Pericardium No pericardial effusion is present.  Compared to Previous Study There is no prior study for direct comparison. ______________________________________________________________________________ MMode/2D Measurements & Calculations  IVSd: 0.94 cm LVIDd: 5.9 cm LVIDs: 4.4 cm LVPWd: 0.94 cm FS: 25.8 % LV mass(C)d: 219.0 grams LV mass(C)dI: 85.5 grams/m2 Ao root diam: 3.7 cm asc Aorta Diam: 3.7 cm LVOT diam: 2.7 cm LVOT area: 5.7 cm2 Ao root diam Index (cm/m2): 1.4 asc Aorta Diam Index (cm/m2): 1.4 LA Volume (BP): 66.3 ml  LA Volume Index (BP): 25.9 ml/m2 RWT: 0.32 TAPSE: 2.6 cm  Doppler Measurements & Calculations MV E max christopher: 88.3 cm/sec MV A max christopher: 66.0 cm/sec MV E/A: 1.3 MV dec time: 0.21 sec Ao V2 max: 136.0 cm/sec Ao max P.4 mmHg Ao V2 mean: 91.8 cm/sec Ao mean P.0 mmHg Ao V2 VTI: 29.0 cm MARTITA(I,D): 4.8 cm2 MARTITA(V,D): 5.0 cm2 LV V1 max P.7 mmHg LV V1 max: 119.0 cm/sec LV V1 VTI: 24.2 cm SV(LVOT): 138.6 ml SI(LVOT): 54.1 ml/m2 AV Christopher Ratio (DI): 0.88 MARTITA Index (cm2/m2): 1.9 E/E' av.3 Lateral E/e': 5.1 Medial E/e': 7.4 RV S Christopher: 12.5 cm/sec  ______________________________________________________________________________ Report approved by: Hunter HOLT 2023 11:28 AM       XR Chest Port 1 View    Result Date: 2023  EXAM: XR CHEST PORT 1 VIEW  2023 8:40 AM HISTORY:  ET tube placement   COMPARISON:  Same-day chest radiograph TECHNIQUE: Single portable semiupright view of the chest FINDINGS: The endotracheal tube tip is in the high thoracic trachea, slightly retracted from prior. Left IJ cvc with tip in the distal SVC. Partially visualized spinal fusion hardware. The trachea is midline. The cardiomediastinal silhouette is stably enlarged. The lungs. No appreciable pneumothorax or pleural effusion, with the left costophrenic angle not completely included in the field-of-view.. Dense  retrocardiac opacity. Silhouetting of the left hemidiaphragm again present. Streaky perihilar and bibasilar opacities are not significantly changed. Bilateral peribronchial cuffing. No acute osseous abnormality.     IMPRESSION: 1. Endotracheal tube tip has been slightly retracted, now on the high thoracic trachea approximately 8.5 cm above the danette. 2. Low lung volumes with continued perihilar/bibasilar atelectasis. I have personally reviewed the examination and initial interpretation and I agree with the findings. ARELY CRUZ MD   SYSTEM ID:  [id number]    XR Chest Port 1 View    Result Date: 8/28/2023  Exam: XR CHEST PORT 1 VIEW, 8/27/2023 9:18 PM Indication: cvc placement Comparison: Outside CT chest abdomen and pelvis 6/26/2023 Findings: AP portable chest radiograph. Left IJ PICC with the tip terminating at the distal SVC. Partially visualized spinal hardware within the mid lower thorax. Trachea is midline. Cardiac silhouette is within normal limits. Left costophrenic angle is out of the field of view. Right costophrenic angle is sharp. No appreciable pneumothorax. No distinct consolidative pulmonary opacities. No acute osseous abnormalities.     Impression: Left IJ PICC with the tip terminating at the distal SVC. I have personally reviewed the examination and initial interpretation and I agree with the findings. MAGALIE GONCALVES MD   SYSTEM ID:  [id number]    XR Tibia & Fibula Port Left 2 Views    Result Date: 8/28/2023  EXAM: XR TIBIA and FIBULA PORT LEFT 2 VIEWS LOCATION: [facility] DATE: 8/28/2023 INDICATION: s p amputation COMPARISON: None.     IMPRESSION: There are postoperative changes from amputation of the left lower extremity at the level of the distal tibia/fibular diaphysis. No radiographic evidence of osteomyelitis is seen. No fracture. There is some soft tissue swelling over the visualized lower extremity.    XR Chest Port 1 View    Result Date:  8/28/2023  XR CHEST PORT 1 VIEW  8/28/2023 2:58 AM HISTORY:  Endotracheal tube positioning   COMPARISON:  8/27/2023 FINDINGS: Frontal view of the chest. Endotracheal tube tip projects 4.6 cm above the danette. Left IJ central venous catheter tip projects over superior cavoatrial junction. Stable enlarged cardiac silhouette. Low lung volumes. Mildly increased bilateral perihilar and bibasilar/retrocardiac interstitial opacities. Mild blunting of the left costophrenic angle. Partially visualized thoracic fusion hardware.     IMPRESSION: 1. Endotracheal tube tip projects 4.6 cm above the danette. 2. Low lung volumes with increased bilateral perihilar and bibasilar/retrocardiac opacities, which may represent atelectasis/pulmonary edema. 3. Small left pleural effusion. I have personally reviewed the examination and initial interpretation and I agree with the findings. MAGALIE GONCALVES MD   SYSTEM ID:  [id number]    XR Tibia & Fibula Port Left 2 Views    Result Date: 8/27/2023  EXAM: XR TIBIA and FIBULA PORT LEFT 2 VIEWS LOCATION: [facility] DATE: 8/27/2023 INDICATION: NSTI COMPARISON: None.     IMPRESSION: Image osseous structures are demineralized. Tibia and fibula are intact without acute fractures or cortical erosions. Significant subcutaneous gas is seen in the plantar soft tissue of the imaged foot.    XR Foot Port Left 3 Views    Result Date: 8/27/2023  EXAM: XR FOOT PORT LEFT 3 VIEWS LOCATION: [facility] DATE: 8/27/2023 INDICATION: NSTI COMPARISON: None.     IMPRESSION: Extensive soft tissue gas and swelling noted throughout the left foot and ankle compatible with soft tissue infection. Thinning of the skin at the fifth metatarsal phalangeal joint which may represent an area of ulceration. The fifth proximal  phalanx may be subluxed or dislocated medially at the metatarsal phalangeal joint. Diffuse osteopenia. This and the  extensive soft tissue gas limits evaluation for fracture and/or bony erosion / osteomyelitis. If there is clinical concern for these entities, MRI could be considered for further characterization.     XR Knee Port Left 1/2 Views    Result Date: 8/27/2023  EXAM: XR KNEE PORT LEFT 1/2 VIEWS LOCATION: [facility] DATE: 8/27/2023 INDICATION: NSTI COMPARISON: None.     IMPRESSION: Image osseous structures are demineralized. No acute fracture, dislocation or knee joint effusion. No definite soft tissue gas or radiopaque foreign body. No cortical erosion to suggest underlying osteomyelitis.    XR FOOT 1-2 VIEWS LEFT    Result Date: 8/27/2023  XR FOOT 1-2 VIEWS LEFT HISTORY: Infection.  Edema. FINDINGS: Gas within the soft tissues of the lateral foot. Findings concerning for gas producing organism. Destructive change at the fifth distal metatarsal concerning for osteomyelitis. Recommend MRI follow-up. Electronically Signed by Bill Leavitt MD on 8/27/2023 8:08 PM

## 2023-08-31 NOTE — CONSULTS
[facility]  WO Nurse Inpatient Assessment     Consulted for: Pressure Injury, LLE NSTI, Colostomy    Summary: OR 8/28/23 for Guillotine Amputation transtibial transfibular     Patient History (according to Surgery Resident provider note(s)8/27/23:    Saqib Bishop is a 43 y/o male with paraplegia, chronic sacral decubitus ulcer, s/p flap and treatment for osteomyelitis December 2020, then with dehiscence, open wound and infected sacral decubitus ulceration October 2021. He completed six weeks of abx for osteomyelitis, and is now s/p an elective flap procedure early 2023, which failed and resulted in a chronic sacral ulcer. He now presents as a direct SICU admission d/t NSTI of the LLE, predominately at the foot but extending up the shin.     - Admit to general surgery  - IV Pip-Tazo, Vanc, Clindamycin  - Ongoing resuscitation  - Labs pending  - Plan for incision and drainage, possible amputation in OR tonight  - Consult orthopedic surgery if amputation is deemed necessary  - Remainder of cares per SICU    Per Orthopedics Resident Note 8/27/23:   Saqib Bishop is a 42 year old male s/p emergent left transtibial guillotine amputation for LLE NSTI. Plan for likely revision amputation within next 48 hours.     Per Wound care MD 8/17/23: Pressure Injury of sacral region, stage 4  Assessment:      Areas visualized during today's visit: Focused:, Sacrum/coccyx, and Abdomen    Wound location: Sacral      Last photo: 8/31/23  Wound due to: Pressure Injury  Wound history/plan of care: Per Plastics MD,Stage 4 of sacral/gluteal cleft pressure injury, appears chronic. Scar tissue present. Large areas of tunneling under small oval shaped opening. Receiving prior cares at [facility] outpatient wound clinic.    Wound base: unable to visualize wound base, wound edges area 100% non-granular tissue and pale pink smooth tissue ,      Palpation of the wound bed: normal       Drainage: small     Description of drainage: yellow     Measurements (length x width x depth, in cm): 1.7  x 4.5  x  6.5 cm      Tunneling: up to 7.5 cm from 4 o'clock, 7.3 cm at 9 o'clock, large open cavity under the skin, not able to visulalize     Periwound skin: Scar tissue and scattered areas of re-pigmented skin      Color: pink      Temperature: normal   Odor: none  Pain: denies  and during dressing change, none  Pain interventions prior to dressing change: patient tolerated well, soaking, and slow and gentle cares   Treatment goal: Infection control/prevention, Maintain (prevention of deterioration), Protection, and Prepare wound bed for flap/graft  STATUS: stable and follow up  Supplies ordered: at bedside, discussed with RN, and discussed with patient      Wound history/plan of care: OR 8/28/23: emergent left transtibial guillotine amputation for LLE NSTI. Plan for likely revision amputation within next 48 hours. NPWT in place ~125mmHg.     Negative pressure wound therapy applied to: LLE  (NPWT present on 8/31)  Wound due to: Surgical Wound   Wound history/plan of care: OR 8/28/23: emergent left transtibial guillotine amputation for LLE NSTI. Plan for likely revision amputation within next 48 hours. NPWT in place ~125mmHg.    Surgical date: 8/28/23   Service following: Orthopedics  Date Negative Pressure Wound Therapy initiated: 8/28/23   Interventions in place: repositioning, pressure redistribution with device or dressing, moisture/incontinence management, offloading, and elevation  Is patient s nutritional status compromised? no   If yes, what interventions are in place? Special diet  Reason for initiating vac therapy? Presence of co-morbidities, High risk of infections, and Need for accelerated granulation tissue  Which?of?the?following?co-morbidities?apply? Diabetes and Immobility  If diabetic is patient on a diabetic management program? Yes   Is osteomyelitis present in wound? no   If yes what  treatments are in place? IV antibiotic cleocin      Wound base: Vac placed in OR, wound base not visualized     Palpation of the wound bed:  Vac not changed 8/28     Drainage: scant      Volume in cannister: <50ml     Last cannister change date: 8/28     Description of drainage: bloody      Measurements (length x width x depth, in cm) not measured 8/28        Periwound skin: Intact, Edematous, and Erythema- blanchable       Color: pink and red , marked per Nursing      Temperature: warm   Odor: none   Pain: no grimacing or signs of discomfort, none   Pain intervention prior to dressing change: N/A  Treatment goal: Drainage control, Infection control/prevention, Maintain (prevention of deterioration), and Protection  STATUS: initial assessment   Supplies ordered: discussed with RN    Number of foam pieces removed from a wound (excluding foam for bridge) :  Vac in place, placed 8/28/23, WOC did not removed at assessment.    Verified this matched the number of foam pieces applied last dressing change: N/A   Number of foam pieces packed into wound (excluding foam for bridge) :  Vac in place, placed 8/28/23, WOC did not removed at assessment.        Assessment of Established end Colostomy: (not assessed on 8/31- staff to manage) no issues per pt and staff    How long has patient had ostomy: prior to 2016,  Stoma: red, oval, moist, good turgor, flat, and retracted  Mucutaneous junction: intact,  Peristomal skin: hernia , some patchy redness, skin intact  Output: gas, semi-formed, and brown    Is patient independent with ostomy care?: Yes and with minimal assist, post operatively.  Home pouching system: Ozark closed end 70mm 2 piece pouching system  Pouching issues and/or educational needs:Stoma assessment, Pouching system assessment , Evaluate leakage issue, and Adjustment of pouching plan  Interventions completed today: Stoma assessment, Pouching system assessment , and Evaluate leakage issue  Pouching system in use  "while hospitalized:  Elena one piece  Supplies location: ordered Hollster 70 mm Fecal pouch(# [id number]) 70mm  Flat barrier (# [id number]) and discussed with RN     Treatment Plan:   Sacral wound(s):   Cleanse wound bed with Vashe moistened 4 x 4 guaze, allow to soak wound for 5-10 minutes, no need to rinse or dry.  Pack wound with AMD kerlix  Cover with ABD pad that has been cut in half.   Secure with Medipore tape  ( # [id number]).  Change dressing daily or PRN if soiled or loose.    LLE  wound(s):  Per Orthopedics, possible revision in 48 hours   Negative pressure wound therapy plan:  Wound location: LLE    Change Days:  Per orthopedics     Supplies (including all accessories) used: large Black foam   Cleanse with MicroKlenz prior to replacing VAC    Suction setting: -125   Methods used:  Placed in OR, changes per Orthopedics team,    Staff RN to assess integrity of dressing and ensure suction is set at appropriate level every shift.   Date canister. Chart canister output every shift. Change cannister weekly and PRN if full/occluded     Remove foam dressing and replace with BID normal saline moist gauze dressing if:   -a dressing failure which cannot be repaired within 2 hours   -patient is discharging to home without a home pump   -patient is discharging to a facility outside the local area   -if a dressing is a \"Silver Foam\", remove before Radiation Therapy or MRI     The hospital VAC pump is not to be discharged with the patient.?Ensure to disconnect patient from machine prior to discharge. Then,    - If a home KCI VAC pump has been delivered, connect home cannister to dressing tubing then connect cannister to home pump and turn on machine    - If transferring to a nearby facility with a KCI vac, can disconnect and clamp tubing then cover end with glove so can be reconnected within 2 hours        LUQ Colostomy  Encourage patient participation with ostomy care,  ~ Empty pouch when 1/3 to 1/2 full,   ~ Notify WO for " ongoing ostomy pouch leakage,  ~ Document stoma output volume, color, consistency EVERY shift  ~ Supplies used    ~ Pouch: Belden 70 FECAL ([id number])   ~ Flange/Barrier/Wafer: Elena 70mm FLAT ([id number])   ~ Accessories: Powder ([id number]) and No Sting ([id number])    WOC follow up plan: As needed  and Notify WOC if leakage occurs  Bedside RN interventions: Change pouch PRN if leaking using the supplies above, Empty pouch when 1/3 to 1/2 full, ensure to clean pouch outlet after emptying to prevent odor, Notify WOC for ongoing pouch leakage, Document stoma appearance and output volume, color, and consistency every shift, Encourage patient to empty pouch with assist, Assist patient to measure and record output, and Patient unable to participate in cares     Orders: Written      RECOMMEND PRIMARY TEAM ORDER: None, at this time  Education provided: importance of repositioning, plan of care, wound progress, Infection prevention , Moisture management, Hygiene, and Off-loading pressure  Discussed plan of care with: Patient and Nurse  WOC nurse follow-up plan: weekly  Notify WOC if wound(s) deteriorate.  Nursing to notify the Provider(s) and re-consult the WOC Nurse if new skin concern.    DATA:     Current support surface: Standard  Low air loss (ELKE pump, Isolibrium, Pulsate, skin guard, etc)  Containment of urine/stool: Incontinent pad in bed, Indwelling catheter, and Colostomy pouch  BMI: Body mass index is 39.79 kg/m .   Active diet order: Orders Placed This Encounter      Advance Diet as Tolerated: Regular Diet Adult     Output: I/O last 3 completed shifts:  In: 3278 [P.O.:1320; I.V.:1958]  Out: 3745 [Urine:2850; Drains:20; Stool:850; Blood:25]     Labs:   Recent Labs   Lab 08/31/23  0433 08/30/23  0515   ALBUMIN 2.4*  --    HGB 10.0*  --    INR  --  1.07   WBC 3.7*  3.7*  --        Pressure injury risk assessment:   Sensory Perception: 3-->slightly limited  Moisture: 3-->occasionally moist  Activity:  1-->bedfast  Mobility: 2-->very limited  Nutrition: 2-->probably inadequate  Friction and Shear: 2-->potential problem  Long Score: 13    Pager no longer is use, please contact through Spotify   Agustin group: [facility] Nurse [place]  Dept. Office Number: [phone]

## 2023-08-31 NOTE — PLAN OF CARE
Major Shift Events:    Returned from OR around 1130. Aox4. Pain controlled with prn oxy and scheduled dilaudid. No sensation below thighs. Afebrile. VSS. Satting well on RA. Tolerating regular diet. Colostomy and bui in place with good output. To be seen by PT/OT chrissie. Art line and CVC pulled. Mom and stepdad at bedside most the day, supportive of pt and cares. Father updated via phone call.        Plan: Continue with POC, tx to floor when bed becomes available.     For vital signs and complete assessments, please see documentation flowsheets.
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
## 2023-08-31 NOTE — PROGRESS NOTES
Orthopaedic Surgery Progress Note 08/31/2023    Subjective  AFVSS. NAEO. 08/27 OSH wound cx grew Pseudomonas, blood cx grew aerobic gram positive cocci.     Denies numbness, tingling, weakness. Denies fever, chills.     Objective  Temp: 98  F (36.7  C) Temp src: Axillary BP: 114/75 Pulse: 73   Resp: 15 SpO2: 98 % O2 Device: None (Room air) Oxygen Delivery: 2 LPM    Exam:  Gen: Awake, alert, NAD  Resp: Nonlabored breathing  Cardio: Regular rate via peripheral pulse    Drains:  20cc output last 24h  Output by Drain (mL) 08/29/23 0700 - 08/29/23 1459 08/29/23 1500 - 08/29/23 2259 08/29/23 2300 - 08/30/23 0659 08/30/23 0700 - 08/30/23 1459 08/30/23 1500 - 08/30/23 2259 08/30/23 2300 - 08/31/23 0457   Negative Pressure Wound Therapy Leg Left;Lower 0 0 0 20 0         MSK:    LLE:  - Wound VAC holding suction  - No new expanding erythema of residual limb. No crepitus on exam. No ttp. No bullae on skin visible above dressings.  - Has numbness and loss of sensation below knee. Intact sensation above knee.        Recent Labs   Lab 08/30/23  0404 08/29/23  0353 08/28/23  0221   WBC 4.3 6.5 19.1*   HGB 10.6* 10.7* 11.3*    283 321       Cultures:  08/30 Cx Pending  08/28 Cx NGTD    Assessment: Saqib Bishop is a 42 year old male s/p emergent left transtibial guillotine amputation for LLE NSTI on 8/27-8/28. RTOR on 08/30 for revision amputation. Skin erythema was still proximal to level of planned definitive BKA. Irrigation and debridement of residual limb was performed with wound vacc exchange.     We will plan to RTOR on 09/01 or 09/02 for repeat I/D with wound vacc exchange vs definitive BKA. We [place] with Dr. Mancilla [place] [facility] [place].     Plan for today:  Continue abx  Follow cultures  [place] [facility]  Surgical planning    Plan:  General Surgery Primary   Activity: bedrest for now  Weight bearing status: NWB LLE  Antibiotics: Clindamycin, Vacomycin, Zosyn  Diet:  Cleared from our standpoint  DVT prophylaxis: Per primary  Drains: Document VAC output  Pain management: Multimodal, per primary  X-rays: complete  Cultures: 08/28 cx NGTD, 08/30 cx pending  Follow-up: TBD    Disposition: Pending revision amputation, medical stability, pain control    Respectfully,    Wilferd Elaine MD  Orthopedic Surgery PGY1  [phone]    Please page me directly with any questions/concerns during regular weekday hours before 5 pm. If there is no response, if it is a weekend, or if it is during evening hours then please page the orthopedic surgery resident on call.

## 2023-08-31 NOTE — PROGRESS NOTES
"CLINICAL NUTRITION SERVICES - ASSESSMENT NOTE     Nutrition Prescription    Malnutrition Status:    Patient does not meet two of the established criteria necessary for diagnosing malnutrition but is at risk for malnutrition    Recommendations already ordered by Registered Dietitian (RD):  - Expedite cup daily (wound healing supplement)  - Chocolate Ensure Max BID    Future/Additional Recommendations:  - Monitor po adequacy and acceptance of ONS     REASON FOR ASSESSMENT  Saqib Bishop is a/an 42 year old male assessed by the dietitian for Provider Order - Concern for malnutrition    Pt with history of paraplegia, chronic sacral decubitus ulcer, s/p flap and treatment for osteomyelitis December 2020, then with dehiscence, open wound and infected sacral decubitus ulceration October 2021. He completed six weeks of abx for osteomyelitis, and is now s/p an elective flap procedure early 2023, which failed and resulted in a chronic sacral ulcer. History of substance abuse. He presented with a NSTI of LLE and is s/p left below the knee guillotine amputation.     HPI   Multiple OR trips: 8/27-28 emergent amputation, 8/30 revision amputation.  Plan for RTOR 9/1 or 9/2 for repeat I/D with wound vac exchange vs definitive BKA    NUTRITION HISTORY  Pt has not had a meal since admit - review of [employer] reveals one dinner tray was sent 8/28.  Currently awaiting breakfast meal of omelet and milk x 2, tater tots and manriquez. Pt reports his is hungry. Pt provides a vague history and has a flat affect.  Denies appetite or weight changes PTA - unaware of usual body weight.  Has tried ensure in the past, dislikes Rahul.    CURRENT NUTRITION ORDERS  Diet: Regular    LABS  Labs reviewed, BG 110s to 140s.  No hx of DM    MEDICATIONS  Medications reviewed and notable for high resistance novolog    ANTHROPOMETRICS  Height: 185.4 cm (6' 1\")  Most Recent Weight: 136.8 kg (301 lb 9.4 oz)    IBW: 73 kg (adjusted for paraplegia and " BKA)  BMI: Obesity Grade II BMI 35-39.9  Weight History:     Wt Readings from Last 20 Encounters:   08/27/23 136.8 kg (301 lb 9.4 oz)   08/04/23 124.7 kg (275 lb)   01/13/12 102.1 kg (225 lb)       Dosing Weight: 73 kg (IBW)    ASSESSED NUTRITION NEEDS  Estimated Energy Needs: 1825 - 2190 kcals/day (25 - 30 kcals/kg)  Justification: paraplegia and Post-op  Estimated Protein Needs: 146 - 183 grams protein/day (2 - 2.5 grams of pro/kg)  Justification: Post-op and Wound healing  Estimated Fluid Needs: 1 mL/kcal  Justification: Maintenance    PHYSICAL FINDINGS  Non-healing wound  - sacral stage 4  New left BKA     MALNUTRITION  % Intake: </= 50% for >/= 5 days (severe)  % Weight Loss: None noted  Subcutaneous Fat Loss: None observed  Muscle Loss: LE losses consistent with paraplegia  Fluid Accumulation/Edema: Does not meet criteria  Malnutrition Diagnosis: Patient does not meet two of the established criteria necessary for diagnosing malnutrition but is at risk for malnutrition    NUTRITION DIAGNOSIS  Inadequate oral intake related to frequent NPO status in the setting of incresed nutrient needs as evidenced by no po intake since admit 4 days ago.      INTERVENTIONS  Implementation  Nutrition Education: Discussed importance of nutrition in wound healing.  Encouraged protein dense food choices and reviewed available supplements   Medical food supplement therapy     Goals  Patient to consume % of nutritionally adequate meal trays TID, or the equivalent with supplements/snacks.     Monitoring/Evaluation  Progress toward goals will be monitored and evaluated per protocol.  Teresita William, RD, LD, CNSC  [address]E SICU RD pager: [phone]  Ascom: [phone]  Weekend/Holiday RD pager [phone]

## 2023-08-31 NOTE — PLAN OF CARE
Aox4. Pain controlled with prn oxy. No sensation below thighs. Afebrile. VSS. Satting well on RA. Regular diet. Colostomy and bui in place with good output.    Plan: Continue with POC, tx to floor when bed becomes available.

## 2023-08-31 NOTE — PHARMACY-VANCOMYCIN DOSING SERVICE
Pharmacy Vancomycin Note  Date of Service 2023  Patient's  1980   42 year old, male    Indication: Bacteremia and Skin and Soft Tissue Infection  Day of Therapy: 5  Current vancomycin regimen:  1500 mg IV q12h  Current vancomycin monitoring method: AUC  Current vancomycin therapeutic monitoring goal: 400-600 mg*h/L    InsightRX Prediction of Current Vancomycin Regimen  Loading dose: N/A  Regimen: 1500 mg IV every 12 hours.  Start time: 13:54 on 2023  Exposure target: AUC24 (range)400-600 mg/L.hr   AUC24,ss: 626 mg/L.hr  Probability of AUC24 > 400: 100 %  Ctrough,ss: 22 mg/L  Probability of Ctrough,ss > 20: 67 %  Probability of nephrotoxicity (Lodise ANNE ): 21 %    Current estimated CrCl = Estimated Creatinine Clearance: 134.4 mL/min (based on SCr of 1.04 mg/dL).    Creatinine for last 3 days  2023:  3:53 AM Creatinine 1.24 mg/dL  2023:  4:04 AM Creatinine 1.01 mg/dL  2023:  4:33 AM Creatinine 1.04 mg/dL    Recent Vancomycin Levels (past 3 days)  2023:  4:25 PM Vancomycin 8.7 ug/mL  2023: 12:02 PM Vancomycin 25.5 ug/mL    Vancomycin IV Administrations (past 72 hours)                     vancomycin (VANCOCIN) 1,500 mg in 0.9% NaCl 250 mL intermittent infusion (mg) 1,500 mg New Bag 23 0105     1,500 mg New Bag 23 1247     1,500 mg New Bag  0111     1,500 mg New Bag 23 1234    vancomycin (VANCOCIN) 1,750 mg in sodium chloride 0.9 % 500 mL intermittent infusion (mg) 1,750 mg New Bag 23 2000                  Nephrotoxins and other renal medications (From now, onward)      Start     Dose/Rate Route Frequency Ordered Stop    23 1800  vancomycin (VANCOCIN) 1,250 mg in 0.9% NaCl 250 mL intermittent infusion         1,250 mg  over 90 Minutes Intravenous EVERY 12 HOURS 23 1358      23 1600  piperacillin-tazobactam (ZOSYN) 4.5 g vial to attach to  mL bag         4.5 g  over 30 Minutes Intravenous EVERY 6 HOURS 23 7268              Contrast Orders - past 72 hours (72h ago, onward)      Start     Dose/Rate Route Frequency Stop    08/31/23 0930  perflutren diluted 1mL to 2mL with saline (OPTISON) diluted injection 5 mL         5 mL Intravenous ONCE 08/31/23 0920          Interpretation of levels and current regimen:  Vancomycin level is reflective of AUC greater than 600    Has serum creatinine changed greater than 50% in last 72 hours: No    Urine output:  good urine output    Renal Function: Improving    InsightRX Prediction of Planned New Vancomycin Regimen  Loading dose: N/A  Regimen: 1250 mg IV every 12 hours.  Start time: 13:54 on 08/31/2023  Exposure target: AUC24 (range)400-600 mg/L.hr   AUC24,ss: 526 mg/L.hr  Probability of AUC24 > 400: 95 %  Ctrough,ss: 18.5 mg/L  Probability of Ctrough,ss > 20: 36 %  Probability of nephrotoxicity (Lodise ANNE 2009): 15 %    Plan:  Decrease Dose to 1250 mg Q12H  Vancomycin monitoring method: AUC  Vancomycin therapeutic monitoring goal: 400-600 mg*h/L  Pharmacy will check vancomycin levels as appropriate in 1-3 Days.  Serum creatinine levels will be ordered daily for the first week of therapy and at least twice weekly for subsequent weeks.    Berta Sterling, [facility]

## 2023-09-01 LAB
ABO/RH(D): NORMAL
ALBUMIN SERPL BCG-MCNC: 2.5 G/DL (ref 3.5–5.2)
ALP SERPL-CCNC: 125 U/L (ref 40–129)
ALT SERPL W P-5'-P-CCNC: 25 U/L (ref 0–70)
ANION GAP SERPL CALCULATED.3IONS-SCNC: 8 MMOL/L (ref 7–15)
ANTIBODY SCREEN: NEGATIVE
AST SERPL W P-5'-P-CCNC: 32 U/L (ref 0–45)
BILIRUB SERPL-MCNC: 0.2 MG/DL
BUN SERPL-MCNC: 14.4 MG/DL (ref 6–20)
CA-I BLD-MCNC: 4.4 MG/DL (ref 4.4–5.2)
CALCIUM SERPL-MCNC: 8.8 MG/DL (ref 8.6–10)
CHLORIDE SERPL-SCNC: 108 MMOL/L (ref 98–107)
CREAT SERPL-MCNC: 1 MG/DL (ref 0.67–1.17)
CRP SERPL-MCNC: 14.16 MG/L
DEPRECATED HCO3 PLAS-SCNC: 27 MMOL/L (ref 22–29)
ERYTHROCYTE [DISTWIDTH] IN BLOOD BY AUTOMATED COUNT: 16.7 % (ref 10–15)
ERYTHROCYTE [SEDIMENTATION RATE] IN BLOOD BY WESTERGREN METHOD: 78 MM/HR (ref 0–15)
GFR SERPL CREATININE-BSD FRML MDRD: >90 ML/MIN/1.73M2
GLUCOSE BLDC GLUCOMTR-MCNC: 104 MG/DL (ref 70–99)
GLUCOSE BLDC GLUCOMTR-MCNC: 112 MG/DL (ref 70–99)
GLUCOSE BLDC GLUCOMTR-MCNC: 117 MG/DL (ref 70–99)
GLUCOSE BLDC GLUCOMTR-MCNC: 129 MG/DL (ref 70–99)
GLUCOSE BLDC GLUCOMTR-MCNC: 85 MG/DL (ref 70–99)
GLUCOSE SERPL-MCNC: 113 MG/DL (ref 70–99)
HCT VFR BLD AUTO: 34.1 % (ref 40–53)
HGB BLD-MCNC: 10.5 G/DL (ref 13.3–17.7)
MAGNESIUM SERPL-MCNC: 1.5 MG/DL (ref 1.7–2.3)
MAGNESIUM SERPL-MCNC: 2 MG/DL (ref 1.7–2.3)
MCH RBC QN AUTO: 24.8 PG (ref 26.5–33)
MCHC RBC AUTO-ENTMCNC: 30.8 G/DL (ref 31.5–36.5)
MCV RBC AUTO: 81 FL (ref 78–100)
PHOSPHATE SERPL-MCNC: 3.9 MG/DL (ref 2.5–4.5)
PLATELET # BLD AUTO: 328 10E3/UL (ref 150–450)
POTASSIUM SERPL-SCNC: 4.1 MMOL/L (ref 3.4–5.3)
PROT SERPL-MCNC: 6.5 G/DL (ref 6.4–8.3)
RBC # BLD AUTO: 4.23 10E6/UL (ref 4.4–5.9)
SODIUM SERPL-SCNC: 143 MMOL/L (ref 136–145)
SPECIMEN EXPIRATION DATE: NORMAL
WBC # BLD AUTO: 4.5 10E3/UL (ref 4–11)

## 2023-09-01 PROCEDURE — 250N000011 HC RX IP 250 OP 636: Mod: JZ

## 2023-09-01 PROCEDURE — 80053 COMPREHEN METABOLIC PANEL: CPT

## 2023-09-01 PROCEDURE — 250N000013 HC RX MED GY IP 250 OP 250 PS 637

## 2023-09-01 PROCEDURE — 86850 RBC ANTIBODY SCREEN: CPT

## 2023-09-01 PROCEDURE — 83735 ASSAY OF MAGNESIUM: CPT | Performed by: SURGERY

## 2023-09-01 PROCEDURE — 250N000011 HC RX IP 250 OP 636: Mod: JZ | Performed by: SURGERY

## 2023-09-01 PROCEDURE — 99232 SBSQ HOSP IP/OBS MODERATE 35: CPT | Performed by: INTERNAL MEDICINE

## 2023-09-01 PROCEDURE — 85027 COMPLETE CBC AUTOMATED: CPT | Performed by: STUDENT IN AN ORGANIZED HEALTH CARE EDUCATION/TRAINING PROGRAM

## 2023-09-01 PROCEDURE — 120N000002 HC R&B MED SURG/OB UMMC

## 2023-09-01 PROCEDURE — 85652 RBC SED RATE AUTOMATED: CPT

## 2023-09-01 PROCEDURE — 83735 ASSAY OF MAGNESIUM: CPT | Performed by: STUDENT IN AN ORGANIZED HEALTH CARE EDUCATION/TRAINING PROGRAM

## 2023-09-01 PROCEDURE — 82330 ASSAY OF CALCIUM: CPT

## 2023-09-01 PROCEDURE — 36415 COLL VENOUS BLD VENIPUNCTURE: CPT | Performed by: SURGERY

## 2023-09-01 PROCEDURE — 86901 BLOOD TYPING SEROLOGIC RH(D): CPT

## 2023-09-01 PROCEDURE — 36415 COLL VENOUS BLD VENIPUNCTURE: CPT | Performed by: STUDENT IN AN ORGANIZED HEALTH CARE EDUCATION/TRAINING PROGRAM

## 2023-09-01 PROCEDURE — 258N000003 HC RX IP 258 OP 636: Performed by: SURGERY

## 2023-09-01 PROCEDURE — 86140 C-REACTIVE PROTEIN: CPT

## 2023-09-01 PROCEDURE — 250N000011 HC RX IP 250 OP 636: Performed by: STUDENT IN AN ORGANIZED HEALTH CARE EDUCATION/TRAINING PROGRAM

## 2023-09-01 PROCEDURE — 84100 ASSAY OF PHOSPHORUS: CPT | Performed by: STUDENT IN AN ORGANIZED HEALTH CARE EDUCATION/TRAINING PROGRAM

## 2023-09-01 RX ORDER — MAGNESIUM SULFATE HEPTAHYDRATE 40 MG/ML
4 INJECTION, SOLUTION INTRAVENOUS ONCE
Status: COMPLETED | OUTPATIENT
Start: 2023-09-01 | End: 2023-09-01

## 2023-09-01 RX ADMIN — PIPERACILLIN AND TAZOBACTAM 4.5 G: 4; .5 INJECTION, POWDER, FOR SOLUTION INTRAVENOUS at 18:24

## 2023-09-01 RX ADMIN — HEPARIN SODIUM 5000 UNITS: 5000 INJECTION, SOLUTION INTRAVENOUS; SUBCUTANEOUS at 02:23

## 2023-09-01 RX ADMIN — BUSPIRONE HYDROCHLORIDE 10 MG: 10 TABLET ORAL at 10:01

## 2023-09-01 RX ADMIN — ACETAMINOPHEN 975 MG: 325 TABLET, FILM COATED ORAL at 22:41

## 2023-09-01 RX ADMIN — OXYCODONE HYDROCHLORIDE 10 MG: 10 TABLET ORAL at 18:24

## 2023-09-01 RX ADMIN — BUSPIRONE HYDROCHLORIDE 10 MG: 10 TABLET ORAL at 21:14

## 2023-09-01 RX ADMIN — ACETAMINOPHEN 975 MG: 325 TABLET, FILM COATED ORAL at 08:53

## 2023-09-01 RX ADMIN — PIPERACILLIN AND TAZOBACTAM 4.5 G: 4; .5 INJECTION, POWDER, FOR SOLUTION INTRAVENOUS at 11:14

## 2023-09-01 RX ADMIN — PREGABALIN 150 MG: 50 CAPSULE ORAL at 21:14

## 2023-09-01 RX ADMIN — PANTOPRAZOLE SODIUM 40 MG: 40 TABLET, DELAYED RELEASE ORAL at 08:53

## 2023-09-01 RX ADMIN — DULOXETINE HYDROCHLORIDE 60 MG: 60 CAPSULE, DELAYED RELEASE ORAL at 21:14

## 2023-09-01 RX ADMIN — OXYCODONE HYDROCHLORIDE 10 MG: 10 TABLET ORAL at 02:27

## 2023-09-01 RX ADMIN — PREDNISOLONE ACETATE 1 DROP: 10 SUSPENSION/ DROPS OPHTHALMIC at 21:15

## 2023-09-01 RX ADMIN — OXYCODONE HYDROCHLORIDE 20 MG: 20 TABLET, FILM COATED, EXTENDED RELEASE ORAL at 08:54

## 2023-09-01 RX ADMIN — MAGNESIUM SULFATE HEPTAHYDRATE 4 G: 40 INJECTION, SOLUTION INTRAVENOUS at 11:37

## 2023-09-01 RX ADMIN — SENNOSIDES AND DOCUSATE SODIUM 1 TABLET: 50; 8.6 TABLET ORAL at 21:14

## 2023-09-01 RX ADMIN — OXYCODONE HYDROCHLORIDE 10 MG: 10 TABLET ORAL at 22:41

## 2023-09-01 RX ADMIN — VANCOMYCIN HYDROCHLORIDE 1250 MG: 10 INJECTION, POWDER, LYOPHILIZED, FOR SOLUTION INTRAVENOUS at 18:24

## 2023-09-01 RX ADMIN — HEPARIN SODIUM 5000 UNITS: 5000 INJECTION, SOLUTION INTRAVENOUS; SUBCUTANEOUS at 18:32

## 2023-09-01 RX ADMIN — SENNOSIDES AND DOCUSATE SODIUM 1 TABLET: 50; 8.6 TABLET ORAL at 08:53

## 2023-09-01 RX ADMIN — PREGABALIN 150 MG: 50 CAPSULE ORAL at 08:54

## 2023-09-01 RX ADMIN — BUPROPION HYDROCHLORIDE 450 MG: 150 TABLET, FILM COATED, EXTENDED RELEASE ORAL at 08:54

## 2023-09-01 RX ADMIN — VANCOMYCIN HYDROCHLORIDE 1250 MG: 10 INJECTION, POWDER, LYOPHILIZED, FOR SOLUTION INTRAVENOUS at 06:08

## 2023-09-01 RX ADMIN — PREGABALIN 150 MG: 50 CAPSULE ORAL at 14:25

## 2023-09-01 RX ADMIN — OXYCODONE HYDROCHLORIDE 10 MG: 10 TABLET ORAL at 14:25

## 2023-09-01 RX ADMIN — OXYCODONE HYDROCHLORIDE 5 MG: 10 TABLET ORAL at 10:00

## 2023-09-01 RX ADMIN — HEPARIN SODIUM 5000 UNITS: 5000 INJECTION, SOLUTION INTRAVENOUS; SUBCUTANEOUS at 10:01

## 2023-09-01 RX ADMIN — PREDNISOLONE ACETATE 1 DROP: 10 SUSPENSION/ DROPS OPHTHALMIC at 08:58

## 2023-09-01 RX ADMIN — ACETAMINOPHEN 975 MG: 325 TABLET, FILM COATED ORAL at 14:25

## 2023-09-01 RX ADMIN — DULOXETINE HYDROCHLORIDE 60 MG: 60 CAPSULE, DELAYED RELEASE ORAL at 08:53

## 2023-09-01 RX ADMIN — PIPERACILLIN AND TAZOBACTAM 4.5 G: 4; .5 INJECTION, POWDER, FOR SOLUTION INTRAVENOUS at 05:22

## 2023-09-01 RX ADMIN — PANTOPRAZOLE SODIUM 40 MG: 40 TABLET, DELAYED RELEASE ORAL at 18:24

## 2023-09-01 RX ADMIN — OXYCODONE HYDROCHLORIDE 20 MG: 20 TABLET, FILM COATED, EXTENDED RELEASE ORAL at 21:14

## 2023-09-01 ASSESSMENT — ACTIVITIES OF DAILY LIVING (ADL)
ADLS_ACUITY_SCORE: 43

## 2023-09-01 NOTE — CONSULTS
Care Management Initial Consult    General Information  Assessment completed with: Patient, Family,    Type of CM/SW Visit: Initial Assessment    Primary Care Provider verified and updated as needed: Yes   Readmission within the last 30 days: no previous admission in last 30 days      Reason for Consult: discharge planning  Advance Care Planning: Advance Care Planning Reviewed: no concerns identified          Communication Assessment  Patient's communication style: spoken language (English or Bilingual)    Hearing Difficulty or Deaf: no   Wear Glasses or Blind: no    Cognitive  Cognitive/Neuro/Behavioral: WDL  Level of Consciousness: alert  Arousal Level: opens eyes spontaneously  Orientation: oriented x 4  Mood/Behavior: calm, cooperative  Best Language: 0 - No aphasia  Speech: clear, spontaneous, logical    Living Environment:   People in home: alone     Current living Arrangements: mobile home      Able to return to prior arrangements: yes       Family/Social Support:  Care provided by: parent(s), homecare agency  Provides care for: no one, unable/limited ability to care for self  Marital Status: Single             Description of Support System: Supportive, Involved    Support Assessment: Adequate family and caregiver support    Current Resources:   Patient receiving home care services: Yes  Skilled Home Care Services: Skilled Nursing  Community Resources: PCA, Home Care  Equipment currently used at home: shower chair, wheelchair, power  Supplies currently used at home: None    Employment/Financial:  Employment Status: disabled        Financial Concerns:             Does the patient's insurance plan have a 3 day qualifying hospital stay waiver?  No    Lifestyle & Psychosocial Needs:  Social Determinants of Health     Tobacco Use: Medium Risk (8/31/2023)    Patient History     Smoking Tobacco Use: Former     Smokeless Tobacco Use: Unknown     Passive Exposure: Not on file   Alcohol Use: Not on file   Financial  "Resource Strain: Not on file   Food Insecurity: Not on file   Transportation Needs: Not on file   Physical Activity: Not on file   Stress: Not on file   Social Connections: Not on file   Intimate Partner Violence: Not on file   Depression: Not at risk (8/4/2023)    PHQ-2     PHQ-2 Score: 2   Housing Stability: Not on file       Functional Status:  Prior to admission patient needed assistance:   Dependent ADLs:: Wheelchair-independent  Dependent IADLs:: Cooking, Shopping, Meal Preparation, Cleaning       Mental Health Status:  Mental Health Status: Current Concern  Mental Health Management: Other (see comment) (R/t new amputation. Provider notified for referral)    Chemical Dependency Status:  Chemical Dependency Status: No Current Concerns             Values/Beliefs:  Spiritual, Cultural Beliefs, Advent Practices, Values that affect care: no               Additional Information:  Per H&P \"Saqib Bishop is a 42 year old male s/p emergent left transtibial guillotine amputation for LLE NSTI on 8/27-8/28. RTOR on 08/30 for revision amputation. Skin erythema was still proximal to level of planned definitive BKA. Irrigation and debridement of residual limb was performed with wound vacc exchange. We will plan to RTOR on 09/01 or 09/02 for repeat I/D with wound vacc exchange vs definitive BKA. We will update primary as surgical plans are made definitive.\"    RNCC met with pt and pt's parents at bedside to introduce care management role and assess for discharge needs r/t readmission risk score and transfer from OSH. Pt reports he lives alone in a mobile home with his dog, Pola. At baseline, pt uses a wheelchair for mobility. Home is wheelchair accessible. Pt is currently disabled and has income through Social Security.     Pt well connected with services including a home RN through [facility]. PCA through [facility] (5 hours per day, 5 days per week with an additional 2 hours to help with shopping). " Pt's father is his PCA. CADI Waiver. See contact details below. PCP confirmed and updated in chart.     Pt denies financial and CD concerns. Does endorse some MH concerns r/t recent amputation. Provider notified for referral. Pt declined  visit.     Pt may return to OR this week for repeat I&D. Not medically ready for discharge at this time. Pt has transportation benefits through insurance or parents are willing to transport. Pt to decide at time of discharge. Pt has wound vac on at this time. Unsure what discharge plan is for wound care. Care management team will continue to follow.    [facility] - RN  Ph: [phone]  Fax: [phone]    [facility]   [facility]  Ph: [phone]      Samia Khan, CELINA   Nurse Coordinator    Covering for: 5 M/S  Phone: *[phone]    Social Work and Care Management Department       SEARCHABLE in [facility] - search CARE COORDINATOR       [place] ([phone]) Saturday & Sunday; ([phone]) [facility] Recognized Holidays     Units: 5A, 5B & 5C  Pager: [phone]    Units: 6B, 6C & 6D    Pager: [phone]    Units: 7A, 7B, 7C & 7D    Pager: [phone]    Units: 6A & ICU   Pager: [phone]    Units: 5 Ortho, 5MS & WB ED Pager: [phone]    Units: 6MS, 8A & 10 ICU  Pager [phone]

## 2023-09-01 NOTE — PROGRESS NOTES
Orthopaedic Surgery Progress Note 09/01/2023    Subjective  AFVSS. NAEO. [place] [facility] [place]. NPO since 01:30AM in preparation for OR today. WBC today 4.5.    Denies numbness, tingling, weakness. Denies fever, chills.     Objective  Temp: 97.6  F (36.4  C) Temp src: Oral BP: (!) 142/89 Pulse: 74   Resp: 20 SpO2: 96 % O2 Device: None (Room air)      Exam:  Gen: Awake, alert, NAD  Resp: Nonlabored breathing  Cardio: Regular rate via peripheral pulse    Drains:  0cc output last 24h  Output by Drain (mL) 08/30/23 0700 - 08/30/23 1459 08/30/23 1500 - 08/30/23 2259 08/30/23 2300 - 08/31/23 0659 08/31/23 0700 - 08/31/23 1459 08/31/23 1500 - 08/31/23 2259 08/31/23 2300 - 09/01/23 0659 09/01/23 0700 - 09/01/23 0940   Negative Pressure Wound Therapy Leg Left;Lower 20 0  0 0          MSK:    LLE:  - Wound VAC holding suction  - No new expanding erythema of residual limb. No crepitus on exam. No ttp. No bullae on skin visible above dressings.  - Has numbness and loss of sensation below knee. Intact sensation above knee.        Recent Labs   Lab 09/01/23  0818 08/31/23  0433 08/30/23  0404   WBC 4.5 3.7*  3.7* 4.3   HGB 10.5* 10.0* 10.6*    296 298     Cultures:  08/30 Cx NGTD  08/28 Cx NGTD    Assessment: Saqib Bishop is a 42 year old male s/p emergent left transtibial guillotine amputation for LLE NSTI on 8/27-8/28. RTOR on 08/30 for revision amputation. Skin erythema was still proximal to level of planned definitive BKA. Irrigation and debridement of residual limb was performed with wound vacc exchange.     We will plan to RTOR on 09/01 or 09/02 for repeat I/D with wound vacc exchange vs definitive BKA. We will update primary as surgical plans are made definitive.     Plan for today:  Continue abx  Follow cultures  [place] [facility]  Surgical planning    Plan:  General Surgery Primary   Activity: bedrest for now  Weight bearing status: NWB LLE  Antibiotics: Vacomycin and  Zosyn per medicine  Diet: NPO for now but will update primary with surgical plans today  DVT prophylaxis: Per primary  Drains: Document VAC output  Pain management: Multimodal, per primary  X-rays: complete  Cultures: 08/28 cx NGTD, 08/30 cx NGTD  Follow-up: TBD    Disposition: Pending revision amputation, medical stability, pain control    Respectfully,    Wilfred Elaine MD  Orthopedic Surgery PGY1  [phone]    Please page me directly with any questions/concerns during regular weekday hours before 5 pm. If there is no response, if it is a weekend, or if it is during evening hours then please page the orthopedic surgery resident on call.

## 2023-09-01 NOTE — PROGRESS NOTES
A&Ox4.VSS. RA Transferred to [facility]  @1945. Belongings clothes,cell phone, sent with him.

## 2023-09-01 NOTE — PLAN OF CARE
"Goal Outcome Evaluation:    BP (!) 142/89 (BP Location: Right arm)   Pulse 74   Temp 97.6  F (36.4  C) (Oral)   Resp 20   Ht 1.854 m (6' 1\")   Wt 136.8 kg (301 lb 9.4 oz)   SpO2 96%   BMI 39.79 kg/m       Pt remains alert and oriented x4 ; On RA.   Pt reports no SOB, CP, N/T,N/V, Fever and/or chills.  Pt remains bedrest; NWB to LLE. Paraplegic.  Mg replaced today; K and DANA WNL. Labs reordered for AM tomorrow.  Sacral PI wound dressing changes completed as ordered.  Colostomy bag changed today x 1.  BG checks before meals and bedtime now.    Plan: Continue with the current POC/ OR tomorrow for I&D with wound vacc. exchange.NPO after midnight.      "

## 2023-09-01 NOTE — PROGRESS NOTES
Transfer acceptance    Saqib Bishop is a 42 year old male with paraplegia, He now presents as a direct SICU admission d/t NSTI of the LLE, predominately at the foot but extending up the manning. Now s/p L BKA and I&D.  He arrived to  overnight for further ortho cares.    I made him NPO for possible surgery. If no surgery on 9//1, could allow a diet again.    He is having BG checked q4 hr as he had hypoglycemia on arrival to the SICU. While NPO, he can continue to have his BG checked q4 hrs, but as soon as he eats again, this can likely be stopped. I stopped his correction scale, as he doesn't have DM and has only been low with his blood glucoses.    No other changes in his plan.    Madalyn Choudhary MD   9/1/2023

## 2023-09-01 NOTE — PLAN OF CARE
"/74 (BP Location: Left arm, Cuff Size: Adult Large)   Pulse 74   Temp 98.1  F (36.7  C) (Oral)   Resp 20   Ht 1.854 m (6' 1\")   Wt 136.8 kg (301 lb 9.4 oz)   SpO2 94%   BMI 39.79 kg/m      Arrived to the unit from [facility] at 2030.   Neuro:Alert and oriented x 4 . Paraplegic- LLE BKA . No sensation below the knee  Resp/Cardiac:On RA. VSS. On Cont pulse Ox.   2 RN skin assessment done with RN Bill Quiroga.  Skin: Chronic sacral ulcer, wound dressing done 8/31. Daily dressing change. Placed 2 mepilex dressing on RLE. Nose scab . Small marked erythema on residual LLE .   Diet:Has being NPO after 0100 for possible I & D tomorrow .  Drains;Wound vac to the LLE . Chronic bui in draining well .  Lines:2 Right PIVs - lower one  infusing LR continuous. Upper saline locked with IV abx in between    Endocrine: BG checks Q 4 hr for hypoglycemia concern  Activity: Lift device used for transfer    "
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
## 2023-09-02 ENCOUNTER — ANESTHESIA (OUTPATIENT)
Dept: SURGERY | Facility: CLINIC | Age: 43
DRG: 853 | End: 2023-09-02
Payer: MEDICARE

## 2023-09-02 ENCOUNTER — ANESTHESIA EVENT (OUTPATIENT)
Dept: SURGERY | Facility: CLINIC | Age: 43
DRG: 853 | End: 2023-09-02
Payer: MEDICARE

## 2023-09-02 LAB
ALBUMIN SERPL BCG-MCNC: 2.7 G/DL (ref 3.5–5.2)
ALP SERPL-CCNC: 128 U/L (ref 40–129)
ALT SERPL W P-5'-P-CCNC: 22 U/L (ref 0–70)
ANION GAP SERPL CALCULATED.3IONS-SCNC: 8 MMOL/L (ref 7–15)
AST SERPL W P-5'-P-CCNC: 25 U/L (ref 0–45)
BACTERIA BLD CULT: NO GROWTH
BACTERIA BLD CULT: NO GROWTH
BILIRUB SERPL-MCNC: 0.2 MG/DL
BUN SERPL-MCNC: 14.4 MG/DL (ref 6–20)
CALCIUM SERPL-MCNC: 8.7 MG/DL (ref 8.6–10)
CHLORIDE SERPL-SCNC: 107 MMOL/L (ref 98–107)
CREAT SERPL-MCNC: 0.86 MG/DL (ref 0.67–1.17)
CRP SERPL-MCNC: 8.01 MG/L
DEPRECATED HCO3 PLAS-SCNC: 27 MMOL/L (ref 22–29)
ERYTHROCYTE [DISTWIDTH] IN BLOOD BY AUTOMATED COUNT: 16.6 % (ref 10–15)
ERYTHROCYTE [SEDIMENTATION RATE] IN BLOOD BY WESTERGREN METHOD: 59 MM/HR (ref 0–15)
GFR SERPL CREATININE-BSD FRML MDRD: >90 ML/MIN/1.73M2
GLUCOSE BLDC GLUCOMTR-MCNC: 105 MG/DL (ref 70–99)
GLUCOSE BLDC GLUCOMTR-MCNC: 112 MG/DL (ref 70–99)
GLUCOSE BLDC GLUCOMTR-MCNC: 94 MG/DL (ref 70–99)
GLUCOSE SERPL-MCNC: 130 MG/DL (ref 70–99)
HCT VFR BLD AUTO: 35.2 % (ref 40–53)
HGB BLD-MCNC: 11 G/DL (ref 13.3–17.7)
MAGNESIUM SERPL-MCNC: 1.9 MG/DL (ref 1.7–2.3)
MCH RBC QN AUTO: 25.2 PG (ref 26.5–33)
MCHC RBC AUTO-ENTMCNC: 31.3 G/DL (ref 31.5–36.5)
MCV RBC AUTO: 81 FL (ref 78–100)
PHOSPHATE SERPL-MCNC: 3.8 MG/DL (ref 2.5–4.5)
PLATELET # BLD AUTO: 335 10E3/UL (ref 150–450)
POTASSIUM SERPL-SCNC: 4.1 MMOL/L (ref 3.4–5.3)
PROT SERPL-MCNC: 6.7 G/DL (ref 6.4–8.3)
RBC # BLD AUTO: 4.36 10E6/UL (ref 4.4–5.9)
SODIUM SERPL-SCNC: 142 MMOL/L (ref 136–145)
VANCOMYCIN SERPL-MCNC: 26.6 UG/ML
WBC # BLD AUTO: 6.7 10E3/UL (ref 4–11)

## 2023-09-02 PROCEDURE — 11047 DBRDMT BONE EACH ADDL: CPT | Mod: 58 | Performed by: STUDENT IN AN ORGANIZED HEALTH CARE EDUCATION/TRAINING PROGRAM

## 2023-09-02 PROCEDURE — 370N000017 HC ANESTHESIA TECHNICAL FEE, PER MIN: Performed by: STUDENT IN AN ORGANIZED HEALTH CARE EDUCATION/TRAINING PROGRAM

## 2023-09-02 PROCEDURE — 0QBK0ZZ EXCISION OF LEFT FIBULA, OPEN APPROACH: ICD-10-PCS | Performed by: STUDENT IN AN ORGANIZED HEALTH CARE EDUCATION/TRAINING PROGRAM

## 2023-09-02 PROCEDURE — 0QBH0ZZ EXCISION OF LEFT TIBIA, OPEN APPROACH: ICD-10-PCS | Performed by: STUDENT IN AN ORGANIZED HEALTH CARE EDUCATION/TRAINING PROGRAM

## 2023-09-02 PROCEDURE — 250N000013 HC RX MED GY IP 250 OP 250 PS 637

## 2023-09-02 PROCEDURE — 36415 COLL VENOUS BLD VENIPUNCTURE: CPT | Performed by: SURGERY

## 2023-09-02 PROCEDURE — 97606 NEG PRS WND THER DME>50 SQCM: CPT | Mod: 58 | Performed by: STUDENT IN AN ORGANIZED HEALTH CARE EDUCATION/TRAINING PROGRAM

## 2023-09-02 PROCEDURE — 250N000011 HC RX IP 250 OP 636

## 2023-09-02 PROCEDURE — 80202 ASSAY OF VANCOMYCIN: CPT | Performed by: SURGERY

## 2023-09-02 PROCEDURE — 250N000011 HC RX IP 250 OP 636: Performed by: SURGERY

## 2023-09-02 PROCEDURE — 360N000076 HC SURGERY LEVEL 3, PER MIN: Performed by: STUDENT IN AN ORGANIZED HEALTH CARE EDUCATION/TRAINING PROGRAM

## 2023-09-02 PROCEDURE — 99232 SBSQ HOSP IP/OBS MODERATE 35: CPT | Performed by: INTERNAL MEDICINE

## 2023-09-02 PROCEDURE — 250N000011 HC RX IP 250 OP 636: Performed by: STUDENT IN AN ORGANIZED HEALTH CARE EDUCATION/TRAINING PROGRAM

## 2023-09-02 PROCEDURE — 11044 DBRDMT BONE 1ST 20 SQ CM/<: CPT | Mod: 58 | Performed by: STUDENT IN AN ORGANIZED HEALTH CARE EDUCATION/TRAINING PROGRAM

## 2023-09-02 PROCEDURE — 272N000001 HC OR GENERAL SUPPLY STERILE: Performed by: STUDENT IN AN ORGANIZED HEALTH CARE EDUCATION/TRAINING PROGRAM

## 2023-09-02 PROCEDURE — 250N000011 HC RX IP 250 OP 636: Mod: JZ

## 2023-09-02 PROCEDURE — 710N000010 HC RECOVERY PHASE 1, LEVEL 2, PER MIN: Performed by: STUDENT IN AN ORGANIZED HEALTH CARE EDUCATION/TRAINING PROGRAM

## 2023-09-02 PROCEDURE — 250N000025 HC SEVOFLURANE, PER MIN: Performed by: STUDENT IN AN ORGANIZED HEALTH CARE EDUCATION/TRAINING PROGRAM

## 2023-09-02 PROCEDURE — 83735 ASSAY OF MAGNESIUM: CPT | Performed by: STUDENT IN AN ORGANIZED HEALTH CARE EDUCATION/TRAINING PROGRAM

## 2023-09-02 PROCEDURE — 250N000009 HC RX 250: Performed by: NURSE ANESTHETIST, CERTIFIED REGISTERED

## 2023-09-02 PROCEDURE — 80053 COMPREHEN METABOLIC PANEL: CPT

## 2023-09-02 PROCEDURE — 258N000003 HC RX IP 258 OP 636: Performed by: NURSE ANESTHETIST, CERTIFIED REGISTERED

## 2023-09-02 PROCEDURE — 250N000011 HC RX IP 250 OP 636: Performed by: NURSE ANESTHETIST, CERTIFIED REGISTERED

## 2023-09-02 PROCEDURE — 120N000002 HC R&B MED SURG/OB UMMC

## 2023-09-02 PROCEDURE — 85652 RBC SED RATE AUTOMATED: CPT

## 2023-09-02 PROCEDURE — 36415 COLL VENOUS BLD VENIPUNCTURE: CPT

## 2023-09-02 PROCEDURE — 258N000001 HC RX 258: Performed by: STUDENT IN AN ORGANIZED HEALTH CARE EDUCATION/TRAINING PROGRAM

## 2023-09-02 PROCEDURE — 258N000003 HC RX IP 258 OP 636: Performed by: SURGERY

## 2023-09-02 PROCEDURE — 86140 C-REACTIVE PROTEIN: CPT

## 2023-09-02 PROCEDURE — F08G5YZ WOUND MANAGEMENT TREATMENT OF INTEGUMENTARY SYSTEM - LOWER BACK / LOWER EXTREMITY USING OTHER EQUIPMENT: ICD-10-PCS | Performed by: STUDENT IN AN ORGANIZED HEALTH CARE EDUCATION/TRAINING PROGRAM

## 2023-09-02 PROCEDURE — 85014 HEMATOCRIT: CPT | Performed by: STUDENT IN AN ORGANIZED HEALTH CARE EDUCATION/TRAINING PROGRAM

## 2023-09-02 PROCEDURE — 999N000141 HC STATISTIC PRE-PROCEDURE NURSING ASSESSMENT: Performed by: STUDENT IN AN ORGANIZED HEALTH CARE EDUCATION/TRAINING PROGRAM

## 2023-09-02 PROCEDURE — 250N000011 HC RX IP 250 OP 636: Performed by: ANESTHESIOLOGY

## 2023-09-02 PROCEDURE — 84100 ASSAY OF PHOSPHORUS: CPT | Performed by: STUDENT IN AN ORGANIZED HEALTH CARE EDUCATION/TRAINING PROGRAM

## 2023-09-02 RX ORDER — ONDANSETRON 2 MG/ML
INJECTION INTRAMUSCULAR; INTRAVENOUS PRN
Status: DISCONTINUED | OUTPATIENT
Start: 2023-09-02 | End: 2023-09-02

## 2023-09-02 RX ORDER — FENTANYL CITRATE 50 UG/ML
INJECTION, SOLUTION INTRAMUSCULAR; INTRAVENOUS PRN
Status: DISCONTINUED | OUTPATIENT
Start: 2023-09-02 | End: 2023-09-02

## 2023-09-02 RX ORDER — FENTANYL CITRATE 50 UG/ML
25 INJECTION, SOLUTION INTRAMUSCULAR; INTRAVENOUS EVERY 5 MIN PRN
Status: DISCONTINUED | OUTPATIENT
Start: 2023-09-02 | End: 2023-09-02 | Stop reason: HOSPADM

## 2023-09-02 RX ORDER — PROPOFOL 10 MG/ML
INJECTION, EMULSION INTRAVENOUS PRN
Status: DISCONTINUED | OUTPATIENT
Start: 2023-09-02 | End: 2023-09-02

## 2023-09-02 RX ORDER — SODIUM CHLORIDE, SODIUM LACTATE, POTASSIUM CHLORIDE, CALCIUM CHLORIDE 600; 310; 30; 20 MG/100ML; MG/100ML; MG/100ML; MG/100ML
INJECTION, SOLUTION INTRAVENOUS CONTINUOUS
Status: DISCONTINUED | OUTPATIENT
Start: 2023-09-02 | End: 2023-09-02 | Stop reason: HOSPADM

## 2023-09-02 RX ORDER — ONDANSETRON 4 MG/1
4 TABLET, ORALLY DISINTEGRATING ORAL EVERY 30 MIN PRN
Status: DISCONTINUED | OUTPATIENT
Start: 2023-09-02 | End: 2023-09-02 | Stop reason: HOSPADM

## 2023-09-02 RX ORDER — HYDROMORPHONE HYDROCHLORIDE 1 MG/ML
0.2 INJECTION, SOLUTION INTRAMUSCULAR; INTRAVENOUS; SUBCUTANEOUS EVERY 5 MIN PRN
Status: DISCONTINUED | OUTPATIENT
Start: 2023-09-02 | End: 2023-09-02 | Stop reason: HOSPADM

## 2023-09-02 RX ORDER — MAGNESIUM SULFATE HEPTAHYDRATE 40 MG/ML
2 INJECTION, SOLUTION INTRAVENOUS ONCE
Status: COMPLETED | OUTPATIENT
Start: 2023-09-02 | End: 2023-09-02

## 2023-09-02 RX ORDER — LIDOCAINE HYDROCHLORIDE 20 MG/ML
INJECTION, SOLUTION INFILTRATION; PERINEURAL PRN
Status: DISCONTINUED | OUTPATIENT
Start: 2023-09-02 | End: 2023-09-02

## 2023-09-02 RX ORDER — FENTANYL CITRATE 50 UG/ML
50 INJECTION, SOLUTION INTRAMUSCULAR; INTRAVENOUS EVERY 5 MIN PRN
Status: DISCONTINUED | OUTPATIENT
Start: 2023-09-02 | End: 2023-09-02 | Stop reason: HOSPADM

## 2023-09-02 RX ORDER — ONDANSETRON 2 MG/ML
4 INJECTION INTRAMUSCULAR; INTRAVENOUS EVERY 30 MIN PRN
Status: DISCONTINUED | OUTPATIENT
Start: 2023-09-02 | End: 2023-09-02 | Stop reason: HOSPADM

## 2023-09-02 RX ORDER — SODIUM CHLORIDE, SODIUM LACTATE, POTASSIUM CHLORIDE, CALCIUM CHLORIDE 600; 310; 30; 20 MG/100ML; MG/100ML; MG/100ML; MG/100ML
INJECTION, SOLUTION INTRAVENOUS CONTINUOUS PRN
Status: DISCONTINUED | OUTPATIENT
Start: 2023-09-02 | End: 2023-09-02

## 2023-09-02 RX ORDER — DEXMEDETOMIDINE HYDROCHLORIDE 4 UG/ML
INJECTION, SOLUTION INTRAVENOUS PRN
Status: DISCONTINUED | OUTPATIENT
Start: 2023-09-02 | End: 2023-09-02

## 2023-09-02 RX ORDER — GLYCOPYRROLATE 0.2 MG/ML
INJECTION, SOLUTION INTRAMUSCULAR; INTRAVENOUS PRN
Status: DISCONTINUED | OUTPATIENT
Start: 2023-09-02 | End: 2023-09-02

## 2023-09-02 RX ORDER — HYDROMORPHONE HYDROCHLORIDE 1 MG/ML
0.4 INJECTION, SOLUTION INTRAMUSCULAR; INTRAVENOUS; SUBCUTANEOUS EVERY 5 MIN PRN
Status: DISCONTINUED | OUTPATIENT
Start: 2023-09-02 | End: 2023-09-02 | Stop reason: HOSPADM

## 2023-09-02 RX ADMIN — OXYCODONE HYDROCHLORIDE 10 MG: 10 TABLET ORAL at 15:22

## 2023-09-02 RX ADMIN — SUGAMMADEX 100 MG: 100 INJECTION, SOLUTION INTRAVENOUS at 14:49

## 2023-09-02 RX ADMIN — PROPOFOL 50 MG: 10 INJECTION, EMULSION INTRAVENOUS at 13:40

## 2023-09-02 RX ADMIN — VANCOMYCIN HYDROCHLORIDE 1250 MG: 10 INJECTION, POWDER, LYOPHILIZED, FOR SOLUTION INTRAVENOUS at 06:35

## 2023-09-02 RX ADMIN — PIPERACILLIN AND TAZOBACTAM 4.5 G: 4; .5 INJECTION, POWDER, FOR SOLUTION INTRAVENOUS at 06:35

## 2023-09-02 RX ADMIN — LIDOCAINE HYDROCHLORIDE 100 MG: 20 INJECTION, SOLUTION INFILTRATION; PERINEURAL at 13:39

## 2023-09-02 RX ADMIN — PIPERACILLIN AND TAZOBACTAM 4.5 G: 4; .5 INJECTION, POWDER, FOR SOLUTION INTRAVENOUS at 20:01

## 2023-09-02 RX ADMIN — PIPERACILLIN AND TAZOBACTAM 4.5 G: 4; .5 INJECTION, POWDER, FOR SOLUTION INTRAVENOUS at 01:21

## 2023-09-02 RX ADMIN — OXYCODONE HYDROCHLORIDE 10 MG: 10 TABLET ORAL at 09:16

## 2023-09-02 RX ADMIN — HEPARIN SODIUM 5000 UNITS: 5000 INJECTION, SOLUTION INTRAVENOUS; SUBCUTANEOUS at 18:04

## 2023-09-02 RX ADMIN — BUPROPION HYDROCHLORIDE 450 MG: 150 TABLET, FILM COATED, EXTENDED RELEASE ORAL at 08:32

## 2023-09-02 RX ADMIN — BUSPIRONE HYDROCHLORIDE 10 MG: 10 TABLET ORAL at 08:32

## 2023-09-02 RX ADMIN — SENNOSIDES AND DOCUSATE SODIUM 1 TABLET: 50; 8.6 TABLET ORAL at 08:32

## 2023-09-02 RX ADMIN — PHENYLEPHRINE HYDROCHLORIDE 0.5 MCG/KG/MIN: 10 INJECTION INTRAVENOUS at 14:19

## 2023-09-02 RX ADMIN — PHENYLEPHRINE HYDROCHLORIDE 100 MCG: 10 INJECTION INTRAVENOUS at 14:07

## 2023-09-02 RX ADMIN — SENNOSIDES AND DOCUSATE SODIUM 1 TABLET: 50; 8.6 TABLET ORAL at 20:01

## 2023-09-02 RX ADMIN — BUSPIRONE HYDROCHLORIDE 10 MG: 10 TABLET ORAL at 20:01

## 2023-09-02 RX ADMIN — PREDNISOLONE ACETATE 1 DROP: 10 SUSPENSION/ DROPS OPHTHALMIC at 08:35

## 2023-09-02 RX ADMIN — DULOXETINE HYDROCHLORIDE 60 MG: 60 CAPSULE, DELAYED RELEASE ORAL at 08:32

## 2023-09-02 RX ADMIN — GLYCOPYRROLATE 0.1 MG: 0.2 INJECTION, SOLUTION INTRAMUSCULAR; INTRAVENOUS at 13:57

## 2023-09-02 RX ADMIN — ACETAMINOPHEN 975 MG: 325 TABLET, FILM COATED ORAL at 15:18

## 2023-09-02 RX ADMIN — HYDROMORPHONE HYDROCHLORIDE 0.2 MG: 0.2 INJECTION, SOLUTION INTRAMUSCULAR; INTRAVENOUS; SUBCUTANEOUS at 01:21

## 2023-09-02 RX ADMIN — SODIUM CHLORIDE, POTASSIUM CHLORIDE, SODIUM LACTATE AND CALCIUM CHLORIDE: 600; 310; 30; 20 INJECTION, SOLUTION INTRAVENOUS at 13:36

## 2023-09-02 RX ADMIN — VANCOMYCIN HYDROCHLORIDE 1250 MG: 10 INJECTION, POWDER, LYOPHILIZED, FOR SOLUTION INTRAVENOUS at 18:05

## 2023-09-02 RX ADMIN — Medication 8 MCG: at 14:37

## 2023-09-02 RX ADMIN — PANTOPRAZOLE SODIUM 40 MG: 40 TABLET, DELAYED RELEASE ORAL at 16:37

## 2023-09-02 RX ADMIN — HEPARIN SODIUM 5000 UNITS: 5000 INJECTION, SOLUTION INTRAVENOUS; SUBCUTANEOUS at 09:16

## 2023-09-02 RX ADMIN — PREGABALIN 150 MG: 50 CAPSULE ORAL at 20:01

## 2023-09-02 RX ADMIN — MAGNESIUM SULFATE 2 G: 2 INJECTION INTRAVENOUS at 09:17

## 2023-09-02 RX ADMIN — SODIUM CHLORIDE, POTASSIUM CHLORIDE, SODIUM LACTATE AND CALCIUM CHLORIDE: 600; 310; 30; 20 INJECTION, SOLUTION INTRAVENOUS at 14:26

## 2023-09-02 RX ADMIN — SUGAMMADEX 200 MG: 100 INJECTION, SOLUTION INTRAVENOUS at 14:46

## 2023-09-02 RX ADMIN — PANTOPRAZOLE SODIUM 40 MG: 40 TABLET, DELAYED RELEASE ORAL at 08:32

## 2023-09-02 RX ADMIN — ONDANSETRON 4 MG: 2 INJECTION INTRAMUSCULAR; INTRAVENOUS at 14:30

## 2023-09-02 RX ADMIN — HEPARIN SODIUM 5000 UNITS: 5000 INJECTION, SOLUTION INTRAVENOUS; SUBCUTANEOUS at 01:21

## 2023-09-02 RX ADMIN — PROPOFOL 200 MG: 10 INJECTION, EMULSION INTRAVENOUS at 13:39

## 2023-09-02 RX ADMIN — FENTANYL CITRATE 50 MCG: 50 INJECTION, SOLUTION INTRAMUSCULAR; INTRAVENOUS at 13:39

## 2023-09-02 RX ADMIN — ACETAMINOPHEN 975 MG: 325 TABLET, FILM COATED ORAL at 22:21

## 2023-09-02 RX ADMIN — PIPERACILLIN AND TAZOBACTAM 4.5 G: 4; .5 INJECTION, POWDER, FOR SOLUTION INTRAVENOUS at 14:00

## 2023-09-02 RX ADMIN — PHENYLEPHRINE HYDROCHLORIDE 100 MCG: 10 INJECTION INTRAVENOUS at 13:59

## 2023-09-02 RX ADMIN — FENTANYL CITRATE 25 MCG: 50 INJECTION INTRAMUSCULAR; INTRAVENOUS at 15:18

## 2023-09-02 RX ADMIN — DULOXETINE HYDROCHLORIDE 60 MG: 60 CAPSULE, DELAYED RELEASE ORAL at 20:00

## 2023-09-02 RX ADMIN — HYDROMORPHONE HYDROCHLORIDE 0.4 MG: 0.2 INJECTION, SOLUTION INTRAMUSCULAR; INTRAVENOUS; SUBCUTANEOUS at 18:27

## 2023-09-02 RX ADMIN — PHENYLEPHRINE HYDROCHLORIDE 100 MCG: 10 INJECTION INTRAVENOUS at 14:19

## 2023-09-02 RX ADMIN — OXYCODONE HYDROCHLORIDE 10 MG: 10 TABLET ORAL at 20:42

## 2023-09-02 RX ADMIN — Medication 8 MCG: at 14:42

## 2023-09-02 RX ADMIN — PREGABALIN 150 MG: 50 CAPSULE ORAL at 08:32

## 2023-09-02 RX ADMIN — MIDAZOLAM 2 MG: 1 INJECTION INTRAMUSCULAR; INTRAVENOUS at 13:21

## 2023-09-02 RX ADMIN — OXYCODONE HYDROCHLORIDE 20 MG: 20 TABLET, FILM COATED, EXTENDED RELEASE ORAL at 20:00

## 2023-09-02 RX ADMIN — PREDNISOLONE ACETATE 1 DROP: 10 SUSPENSION/ DROPS OPHTHALMIC at 20:41

## 2023-09-02 RX ADMIN — Medication 50 MG: at 13:41

## 2023-09-02 RX ADMIN — OXYCODONE HYDROCHLORIDE 20 MG: 20 TABLET, FILM COATED, EXTENDED RELEASE ORAL at 08:35

## 2023-09-02 RX ADMIN — ACETAMINOPHEN 975 MG: 325 TABLET, FILM COATED ORAL at 06:34

## 2023-09-02 RX ADMIN — FENTANYL CITRATE 50 MCG: 50 INJECTION, SOLUTION INTRAMUSCULAR; INTRAVENOUS at 15:04

## 2023-09-02 ASSESSMENT — ACTIVITIES OF DAILY LIVING (ADL)
ADLS_ACUITY_SCORE: 43
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
## 2023-09-02 NOTE — PLAN OF CARE
"Goal Outcome Evaluation:    /80 (BP Location: Left arm)   Pulse 94   Temp 97.6  F (36.4  C) (Oral)   Resp 14   Ht 1.854 m (6' 1\")   Wt 136.8 kg (301 lb 9.4 oz)   SpO2 92%   BMI 39.79 kg/m       Pt had  I&D his LLE residual limb  and wound vac exchange done today. Arrived from PACU at around 1600.Pt remains alert and oriented x4 ; On RA.   Reports no SOB, CP, N/T,N/V, Fever and/or chills.  Pt remains bedrest; NWB to LLE. Paraplegic.  Mg replaced AM today; K and DANA WNL. Labs reordered for AM tomorrow.  Sacral PI wound dressing changes completed as ordered. Wound vac in place.  BG checks before meals and bedtime now.    Plan: Continue with the current POC/   "

## 2023-09-02 NOTE — ANESTHESIA POSTPROCEDURE EVALUATION
Patient: Saqib Bishop    Procedure: Procedure(s):  Irrigation and Debridement leg below knee, wound vac application       Anesthesia Type:  General    Note:  Disposition: Inpatient   Postop Pain Control: Uneventful            Sign Out: Well controlled pain   PONV: No   Neuro/Psych: Uneventful            Sign Out: Acceptable/Baseline neuro status   Airway/Respiratory: Uneventful            Sign Out: Acceptable/Baseline resp. status   CV/Hemodynamics: Uneventful            Sign Out: Acceptable CV status; No obvious hypovolemia; No obvious fluid overload   Other NRE:    DID A NON-ROUTINE EVENT OCCUR?            Last vitals:  Vitals Value Taken Time   /95 09/02/23 1530   Temp 36.4  C (97.5  F) 09/02/23 1502   Pulse 81 09/02/23 1530   Resp 20 09/02/23 1530   SpO2 92 % 09/02/23 1548   Vitals shown include unvalidated device data.    Electronically Signed By: Dede Oliveira MD  September 2, 2023  5:08 PM

## 2023-09-02 NOTE — PROGRESS NOTES
Orthopaedic Surgery Progress Note 09/02/2023    Subjective  AFVSS. NAEO. Feeling well this AM. Discussed POC. NPO for OR today.     Objective  Temp: 97.8  F (36.6  C) Temp src: Oral BP: (!) 140/88 Pulse: 77   Resp: 18 SpO2: 98 % O2 Device: None (Room air)      Exam:  Gen: Awake, alert, NAD  Resp: Nonlabored breathing  Cardio: Regular rate via peripheral pulse    Drains:  0cc output last 24h  Output by Drain (mL) 08/31/23 0700 - 08/31/23 1459 08/31/23 1500 - 08/31/23 2259 08/31/23 2300 - 09/01/23 0659 09/01/23 0700 - 09/01/23 1459 09/01/23 1500 - 09/01/23 2259 09/01/23 2300 - 09/02/23 0659 09/02/23 0700 - 09/02/23 0740   Negative Pressure Wound Therapy Leg Left;Lower 0 0             MSK:    LLE:  - Wound VAC holding suction  - No new expanding erythema of residual limb. No crepitus on exam. No ttp. No bullae on skin visible above dressings.  - Has numbness and loss of sensation below knee. Intact sensation above knee.        Recent Labs   Lab 09/02/23  0358 09/01/23  0818 08/31/23  0433   WBC 6.7 4.5 3.7*  3.7*   HGB 11.0* 10.5* 10.0*    328 296     Cultures:  08/30 Cx NGTD  08/28 Cx NGTD    Assessment: Saqib Bishop is a 42 year old male s/p emergent left transtibial guillotine amputation for LLE NSTI on 8/27-8/28. RTOR on 08/30 for revision amputation. Skin erythema was still proximal to level of planned definitive BKA. Irrigation and debridement of residual limb was performed with wound vacc exchange.     We will plan to RTOR on 09/02 for repeat I/D with wound vacc exchange vs definitive BKA.   - NPO for OR today  - Preop labs obtained    Plan:  General Surgery Primary   Activity:Per primary, okay for UOOB with assist   Weight bearing status: NWB LLE  Antibiotics: Vacomycin and Zosyn per medicine  Diet: NPO for OR today   DVT prophylaxis: Held for OR today   Drains: Document VAC output  Pain management: Multimodal, per primary  X-rays: complete  Cultures: 08/28 cx NGTD, 08/30 cx NGTD  Follow-up:  TBD    Disposition: Pending revision amputation, medical stability, pain control
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
## 2023-09-02 NOTE — ANESTHESIA PREPROCEDURE EVALUATION
Anesthesia Pre-Procedure Evaluation    Patient: Saqib Bishop   MRN: [id number] : 1980        Procedure : Procedure(s):  Irrigation and Debridement vs Revision Amputate leg below knee          Past Medical History:   Diagnosis Date    Degenerative joint disease     Gastro-oesophageal reflux disease       Past Surgical History:   Procedure Laterality Date    AMPUTATE LEG BELOW KNEE Left 2023    Procedure: Left below knee Guillotine Amputation;  Surgeon: Sesar Barth MD;  Location: [facility] OR    BACK SURGERY      lumbar fusion    EXPLORE SPINE, REMOVE HARDWARE, COMBINED  2012    Procedure:COMBINED EXPLORE SPINE, REMOVE HARDWARE; EXPLORE SPINE, REMOVE HARDWARE L4-S1; Surgeon:FAM GONZALEZ; Location:[facility] OR    IRRIGATION AND DEBRIDEMENT LOWER EXTREMITY, COMBINED Left 2023    Procedure: Irrigation and debridement lower extremity, combined and wound vac exchange;  Surgeon: Etienne Maier MD;  Location: [facility] OR    ORTHOPEDIC SURGERY      right foot surgery      Allergies   Allergen Reactions    Adhesive Tape Hives     From tape from surgery    Benzoin Hives and Rash    Droperidol Anaphylaxis    Prednisone      vomiting    Vitamin D Rash     flairs up his sarcoidosis      Social History     Tobacco Use    Smoking status: Former     Types: Cigarettes     Quit date: 2011     Years since quittin.8    Smokeless tobacco: Not on file   Substance Use Topics    Alcohol use: No      Wt Readings from Last 1 Encounters:   23 136.8 kg (301 lb 9.4 oz)        Anesthesia Evaluation   Pt has had prior anesthetic. Type: General.        ROS/MED HX  ENT/Pulmonary:     (+) sleep apnea, doesn't use CPAP,   IAN risk factors,   obese,                               Neurologic:       Cardiovascular:       METS/Exercise Tolerance:     Hematologic:       Musculoskeletal:       GI/Hepatic:     (+) GERD,                   Renal/Genitourinary:     (+) renal disease, type: ARF, Pt does not require  dialysis,           Endo:     (+)               Obesity,       Psychiatric/Substance Use:     (+) psychiatric history depression and anxiety       Infectious Disease:     (+)   MRSA,         Malignancy:       Other:            Physical Exam    Airway        Mallampati: III   TM distance: > 3 FB   Neck ROM: full   Mouth opening: < 3 cm    Respiratory Devices and Support         Dental       (+) Minor Abnormalities - some fillings, tiny chips      Cardiovascular   cardiovascular exam normal       Rhythm and rate: regular     Pulmonary   pulmonary exam normal                OUTSIDE LABS:  CBC:   Lab Results   Component Value Date    WBC 6.7 09/02/2023    WBC 4.5 09/01/2023    HGB 11.0 (L) 09/02/2023    HGB 10.5 (L) 09/01/2023    HCT 35.2 (L) 09/02/2023    HCT 34.1 (L) 09/01/2023     09/02/2023     09/01/2023     BMP:   Lab Results   Component Value Date     09/02/2023     09/01/2023    POTASSIUM 4.1 09/02/2023    POTASSIUM 4.1 09/01/2023    CHLORIDE 107 09/02/2023    CHLORIDE 108 (H) 09/01/2023    CO2 27 09/02/2023    CO2 27 09/01/2023    BUN 14.4 09/02/2023    BUN 14.4 09/01/2023    CR 0.86 09/02/2023    CR 1.00 09/01/2023     (H) 09/02/2023     (H) 09/02/2023     COAGS:   Lab Results   Component Value Date    PTT 33 08/30/2023    INR 1.07 08/30/2023    FIBR 863 (H) 08/27/2023     POC: No results found for: BGM, HCG, HCGS  HEPATIC:   Lab Results   Component Value Date    ALBUMIN 2.7 (L) 09/02/2023    PROTTOTAL 6.7 09/02/2023    ALT 22 09/02/2023    AST 25 09/02/2023    ALKPHOS 128 09/02/2023    BILITOTAL 0.2 09/02/2023     OTHER:   Lab Results   Component Value Date    PH 7.23 (L) 08/28/2023    LACT 0.5 (L) 08/28/2023    HILDA 8.7 09/02/2023    PHOS 3.8 09/02/2023    MAG 1.9 09/02/2023    SED 59 (H) 09/02/2023       Anesthesia Plan    ASA Status:  3    NPO Status:  NPO Appropriate    Anesthesia Type: General.   Induction: Intravenous.   Maintenance: Balanced.   Techniques and  Equipment:     - Lines/Monitors: 2nd IV     Consents    Anesthesia Plan(s) and associated risks, benefits, and realistic alternatives discussed. Questions answered and patient/representative(s) expressed understanding.     - Discussed:     - Discussed with:  Patient      - Extended Intubation/Ventilatory Support Discussed: No.      - Patient is DNR/DNI Status: No     Use of blood products discussed: No .     Postoperative Care    Pain management: IV analgesics.        Comments:                Dede Oliveira MD

## 2023-09-02 NOTE — ANESTHESIA CARE TRANSFER NOTE
Patient: Saqib Bishop    Procedure: Procedure(s):  Irrigation and Debridement leg below knee, wound vac application       Diagnosis: Necrotizing fasciitis (H) [M72.6]  Diagnosis Additional Information: No value filed.    Anesthesia Type:   General     Note:    Oropharynx: oropharynx clear of all foreign objects and spontaneously breathing  Level of Consciousness: drowsy  Oxygen Supplementation: face mask  Level of Supplemental Oxygen (L/min / FiO2): 8  Independent Airway: airway patency satisfactory and stable  Dentition: dentition unchanged  Vital Signs Stable: post-procedure vital signs reviewed and stable  Report to RN Given: handoff report given  Patient transferred to: PACU    Handoff Report: Identifed the Patient, Identified the Reponsible Provider, Reviewed the pertinent medical history, Discussed the surgical course, Reviewed Intra-OP anesthesia mangement and issues during anesthesia, Set expectations for post-procedure period and Allowed opportunity for questions and acknowledgement of understanding      Vitals:  Vitals Value Taken Time   /75 09/02/23 1502   Temp 36.4C    Pulse 88 09/02/23 1505   Resp 22 09/02/23 1505   SpO2 96 % 09/02/23 1505   Vitals shown include unvalidated device data.    Electronically Signed By: RL Edwards CRNA  September 2, 2023  3:06 PM

## 2023-09-02 NOTE — BRIEF OP NOTE
[facility]    Brief Operative Note    Pre-operative diagnosis: Necrotizing fasciitis (H) [M72.6]  Post-operative diagnosis Same as pre-operative diagnosis    Procedure: Procedure(s):  Irrigation and Debridement leg below knee, wound vac application  Surgeon: Surgeon(s) and Role:     * Juan Rehman MD - Primary     * Joby Kumar MD - Resident - Assisting     * Semaj Ibrahim MD - Resident - Assisting  Anesthesia: General   Estimated Blood Loss: 50mL    Drains: None  Specimens: * No specimens in log *  Findings:   See dictated op report.  Complications: None.  Implants: * No implants in log *      Assessment: Saqib Bishop is a 42 year old male s/p emergent left transtibial guillotine amputation for LLE NSTI on 8/27-8/28. RTOR  for I&D and wound vac exchange on 08/30 and 9/2. Anticipate will able to undergo definitive BKA at next surgery.     Plan:  General Surgery Primary   Activity: Per primary, okay for UOOB with assist   Weight bearing status: NWB LLE  Antibiotics: Vacomycin and Zosyn per medicine  Diet: ADAT  DVT prophylaxis: per primary  Drains: Document VAC output  Pain management: Multimodal, per primary  X-rays: complete  Cultures: 08/28 cx NGTD, 08/30 cx NGTD  Follow-up: TBD     Disposition: RTOR for definitive amputation TBD     Joby Kumar MD  Orthopaedic Surgery, PGY-4  Pager: [phone]

## 2023-09-02 NOTE — PLAN OF CARE
Goal Outcome Evaluation:  PRN oxycodone and dilaudid given.     Orientation:Aox4  Bowel: Dyer  Bladder: Colostomy  Ambulation/Transfers: bedbound  Diet/ Liquids:Regular, NPO at midnight  Tubes/ Lines/ Drains: 2 L PIV TKO  Tube Feeding: n/a   Oxygen: RA  Skin: R heel and foot mepilex, sacral wound, L BKA Stump dressing

## 2023-09-02 NOTE — PHARMACY-VANCOMYCIN DOSING SERVICE
Pharmacy Vancomycin Note  Date of Service 2023  Patient's  1980   42 year old, male    Indication: Bacteremia and Skin and Soft Tissue Infection  Day of Therapy: 7 (since )  Current vancomycin regimen:  1250 mg IV q12h  Current vancomycin monitoring method: AUC  Current vancomycin therapeutic monitoring goal: 400-600 mg*h/L    InsightRX Prediction of Current Vancomycin Regimen  Loading dose: N/A  Regimen: 1250 mg IV every 12 hours.  Start time: 18:35 on 2023  Exposure target: AUC24 (range)400-600 mg/L.hr   AUC24,ss: 562 mg/L.hr  Probability of AUC24 > 400: 100 %  Ctrough,ss: 19.8 mg/L  Probability of Ctrough,ss > 20: 48 %  Probability of nephrotoxicity (Lodise ANNE ): 17 %      Current estimated CrCl = Estimated Creatinine Clearance: 162.5 mL/min (based on SCr of 0.86 mg/dL).    Creatinine for last 3 days  2023:  4:33 AM Creatinine 1.04 mg/dL  2023:  8:18 AM Creatinine 1.00 mg/dL  2023:  3:58 AM Creatinine 0.86 mg/dL    Recent Vancomycin Levels (past 3 days)  2023: 12:02 PM Vancomycin 25.5 ug/mL  2023: 12:59 PM Vancomycin 26.6 ug/mL    Vancomycin IV Administrations (past 72 hours)                     vancomycin (VANCOCIN) 1,250 mg in 0.9% NaCl 250 mL intermittent infusion (mg) 1,250 mg New Bag 23 0635     1,250 mg New Bag 23 1824     1,250 mg New Bag  0608     1,250 mg New Bag 23 1755    vancomycin (VANCOCIN) 1,500 mg in 0.9% NaCl 250 mL intermittent infusion (mg) 1,500 mg New Bag 23 0105                    Nephrotoxins and other renal medications (From now, onward)      Start     Dose/Rate Route Frequency Ordered Stop    23 1800  [Auto Hold]  vancomycin (VANCOCIN) 1,250 mg in 0.9% NaCl 250 mL intermittent infusion        (Auto Hold since Sat 2023 at 1049.Hold Reason: Transfer to a procedural area)    1,250 mg  over 90 Minutes Intravenous EVERY 12 HOURS 23 1358      23 1600  [Auto Hold]  piperacillin-tazobactam  (ZOSYN) 4.5 g vial to attach to  mL bag        (Auto Hold since Sat 9/2/2023 at 1049.Hold Reason: Transfer to a procedural area)    4.5 g  over 30 Minutes Intravenous EVERY 6 HOURS 08/28/23 1127                 Contrast Orders - past 72 hours (72h ago, onward)      Start     Dose/Rate Route Frequency Stop    08/31/23 0930  perflutren diluted 1mL to 2mL with saline (OPTISON) diluted injection 5 mL         5 mL Intravenous ONCE 08/31/23 0920            Interpretation of levels and current regimen:  Vancomycin level is reflective of -600    Has serum creatinine changed greater than 50% in last 72 hours: No    Urine output:  good urine output    Renal Function: Improving      Plan:  Continue Current Dose  Vancomycin monitoring method: AUC  Vancomycin therapeutic monitoring goal: 400-600 mg*h/L  Pharmacy will check vancomycin levels as appropriate in 3-5 Days.  Serum creatinine levels will be ordered daily for the first week of therapy and at least twice weekly for subsequent weeks.    Ivana Woods, [facility]

## 2023-09-02 NOTE — ANESTHESIA PROCEDURE NOTES
Airway       Patient location during procedure: OR       Procedure Start/Stop Times: 9/2/2023 1:46 PM  Staff -        CRNA: Teressa Solomon APRN CRNA       Performed By: CRNAIndications and Patient Condition       Indications for airway management: ivette-procedural       Induction type:intravenous       Mask difficulty assessment: 2 - vent by mask + OA or adjuvant +/- NMBA    Final Airway Details       Final airway type: endotracheal airway       Successful airway: ETT - single  Endotracheal Airway Details        ETT size (mm): 7.5       Successful intubation technique: video laryngoscopy       VL Blade Size: MAC D Blade       Grade View of Cords: 2       Adjucts: stylet       Position: Right       Measured from: lips       Secured at (cm): 23       Bite block used: None    Post intubation assessment        Placement verified by: capnometry and chest rise        Number of attempts at approach: 2 (First DL Grade 3 view; 2nd DL with CMAC D blade Grade 1 view.)       Number of other approaches attempted: 0       Secured with: silk tape       Ease of procedure: easy       Dentition: Unchanged and Intact    Medication(s) Administered   Medication Administration Time: 9/2/2023 1:46 PM

## 2023-09-02 NOTE — PROGRESS NOTES
PACU to Inpatient Nursing Handoff    Patient Saqib Bishop is a 42 year old male who speaks English.   Procedure Procedure(s):  Irrigation and Debridement leg below knee, wound vac application   Surgeon(s) Primary: Juan Rehman MD  Resident - Assisting: Semaj Ibrahim MD; Joby Kumar MD     Allergies   Allergen Reactions    Adhesive Tape Hives     From tape from surgery    Benzoin Hives and Rash    Droperidol Anaphylaxis    Prednisone      vomiting    Vitamin D Rash     flairs up his sarcoidosis       Isolation  Contact     Past Medical History   has a past medical history of Degenerative joint disease and Gastro-oesophageal reflux disease.    Anesthesia General   Dermatome Level     Preop Meds Not applicable   Nerve block Not applicable   Intraop Meds dexmedetomidine (Precedex): 16 mcg total  fentanyl (Sublimaze): 100 mcg total   Local Meds No   Antibiotics piperacillin-tazobactam (Zosyn) - last given at 1400     Pain Patient Currently in Pain: yes   PACU meds  Fentanly 25mcg aqt 1515  Oxycodone 10mg at 1515  Tylenol 975mg at 1515   PCA / epidural No   Capnography Respiratory Monitoring (EtCO2): 15 mmHg   Telemetry ECG Rhythm: Normal sinus rhythm   Inpatient Telemetry Monitor Ordered? No        Labs Glucose Lab Results   Component Value Date    GLC 94 09/02/2023       Hgb Lab Results   Component Value Date    HGB 11.0 09/02/2023       INR Lab Results   Component Value Date    INR 1.07 08/30/2023      PACU Imaging Not applicable     Wound/Incision Negative Pressure Wound Therapy Leg Left;Lower (Active)   Wound Type Surgical 09/02/23 1502   Dressing Pieces Applied (# of Each Type) Black foam;4 09/02/23 1502   Cycle Continuous 09/02/23 1502   Target Pressure (mmHg) 125 09/02/23 1502   Drainage Color/Characteristics Serosanguineous 09/02/23 1502   Number of days: 0       Wound Pressure injury community acquired (Active)   Wound Bed Trumann 08/31/23 1600   Vanessa-wound Assessment Warm;Dusky  08/31/23 1600   Estimated Circumference ( if not measured quarter sized 08/30/23 1800   Drainage Amount Small 08/31/23 1600   Drainage Color/Characteristics UTV 08/31/23 1200   Wound Care/Cleansing Barrier applied ;Wound cleanser 08/31/23 1600   Dressing Moist gauze;Protective barrier 08/31/23 1600   Dressing Status Changed;Clean, dry, intact 08/31/23 1600   Dressing Change Due 09/01/23 08/31/23 1600   Number of days: 6       Wound (used by OP [facility] only) 01/26/23 0905 Right gluteal pressure injury (Active)   Thickness/Stage Stage 3 08/17/23 1423   Base granulating 08/17/23 1423   Periwound intact;contracted 08/17/23 1423   Periwound Temperature warm 08/17/23 1423   Periwound Skin Turgor soft 08/17/23 1423   Edges open;rolled/closed 08/17/23 1423   Length (cm) 0.5 08/17/23 1423   Width (cm) 1.7 08/17/23 1423   Depth (cm) 2.8 08/17/23 1423   Wound (cm^2) 0.85 cm^2 08/17/23 1423   Wound Volume (cm^3) 2.38 cm^3 08/17/23 1423   Wound healing % 59.13 08/17/23 1423   Tunneling [Depth (cm)/Location] 9 oclock/ 7.5 cm 08/17/23 1423   Drainage Characteristics/Odor serosanguineous 08/17/23 1423   Drainage Amount moderate 08/17/23 1423   Care, Wound non-select wound debridement performed 08/17/23 1423   Number of days: 219       Incision/Surgical Site 08/28/23 Left Leg (Active)   Incision Assessment UTV 08/31/23 1600   Vanessa-Incision Assessment Warm 08/30/23 1200   Closure Other (Comment) 08/30/23 1045   Incision Drainage Amount Scant 08/29/23 0400   Drainage Description Serosanguinous 08/31/23 0400   Incision Care Therapy - negative pressure 08/31/23 1600   Dressing Intervention Clean, dry, intact 08/31/23 1600   Number of days: 5       Incision/Surgical Site 09/02/23 Left;Lower Leg (Active)   Dressing Intervention New dressing applied 09/02/23 1446   Number of days: 0      CMS        Equipment Wound Vac   Other LDA         IV Access Peripheral IV 09/02/23 Right Antecubital fossa (Active)   Site Assessment WDL 09/02/23 5894    Line Status Infusing 09/02/23 1502   Dressing Transparent 09/02/23 1502   Phlebitis Scale 0-->no symptoms 09/02/23 1502   Infiltration? no 09/02/23 1502   Number of days: 0      Blood Products Not applicable EBL 50 mL   Intake/Output Date 09/02/23 0700 - 09/03/23 0659   Shift 3922-3071 7141-5329 4078-4807 24 Hour Total   INTAKE   I.V. 1009.58   1009.58   Shift Total(mL/kg) 1009.58(7.38)   1009.58(7.38)   OUTPUT   Urine 1150   1150   Blood 50   50   Shift Total(mL/kg) 1200(8.77)   1200(8.77)   Weight (kg) 136.8 136.8 136.8 136.8      Drains / Dyer Negative Pressure Wound Therapy Leg Left;Lower (Active)   Wound Type Surgical 09/02/23 1502   Dressing Pieces Applied (# of Each Type) Black foam;4 09/02/23 1502   Cycle Continuous 09/02/23 1502   Target Pressure (mmHg) 125 09/02/23 1502   Drainage Color/Characteristics Serosanguineous 09/02/23 1502   Number of days: 0       Colostomy (Active)   Stomal Appliance 2 piece 09/02/23 1502   Stoma Assessment UTV 09/02/23 1502   Peristomal Assessment Ulceration 09/02/23 1502   Stool Amount Small 08/31/23 1600   Output (ml) 0 ml 08/31/23 0400   Number of days: 6       Urethral Catheter 08/27/23 (Active)   Tube Description Positional 09/01/23 1900   Catheter Care Done 09/01/23 1900   Collection Container Standard 09/02/23 1502   Securement Method Securing device (Describe) 09/02/23 1502   Rationale for Continued Use Neurogenic Bladder 09/02/23 1502   Urine Output 650 mL 09/02/23 1100   Number of days: 6      Time of void PreOp Time of Void Prior to Procedure: 1100 (09/02/23 1051)    PostOp Voided (mL): 500ml emptied in OR 1500    Diapered? NA   Bladder Scan  NA    mL (08/31/23 1200)  tolerating sips and water     Vitals    B/P: 137/75  T: 97.5  F (36.4  C)    Temp src: Oral  P:  Pulse: 87 (09/02/23 1515)          R: 12  O2:  SpO2: 93 %    O2 Device: Simple face mask (09/02/23 1518)    Oxygen Delivery: 6 LPM (09/02/23 1518)    FiO2 (%): 2 % (08/28/23 1600)    Family/support  present NA   Patient belongings  Remains in patients room on medsurg   Patient transported on bed   DC meds/scripts (obs/outpt) Not applicable   Inpatient Pain Meds Released? Yes       Special needs/considerations None   Tasks needing completion None       Chrissy Lebron, RN  [facility] [id number]

## 2023-09-03 ENCOUNTER — APPOINTMENT (OUTPATIENT)
Dept: PHYSICAL THERAPY | Facility: CLINIC | Age: 43
DRG: 853 | End: 2023-09-03
Attending: INTERNAL MEDICINE
Payer: MEDICARE

## 2023-09-03 LAB
ALBUMIN SERPL BCG-MCNC: 2.5 G/DL (ref 3.5–5.2)
ALP SERPL-CCNC: 118 U/L (ref 40–129)
ALT SERPL W P-5'-P-CCNC: 20 U/L (ref 0–70)
ANION GAP SERPL CALCULATED.3IONS-SCNC: 7 MMOL/L (ref 7–15)
AST SERPL W P-5'-P-CCNC: 25 U/L (ref 0–45)
BILIRUB SERPL-MCNC: 0.2 MG/DL
BUN SERPL-MCNC: 16.7 MG/DL (ref 6–20)
CALCIUM SERPL-MCNC: 8.6 MG/DL (ref 8.6–10)
CHLORIDE SERPL-SCNC: 105 MMOL/L (ref 98–107)
CREAT SERPL-MCNC: 0.95 MG/DL (ref 0.67–1.17)
CRP SERPL-MCNC: 11.82 MG/L
DEPRECATED HCO3 PLAS-SCNC: 27 MMOL/L (ref 22–29)
ERYTHROCYTE [DISTWIDTH] IN BLOOD BY AUTOMATED COUNT: 17.4 % (ref 10–15)
ERYTHROCYTE [SEDIMENTATION RATE] IN BLOOD BY WESTERGREN METHOD: 1 MM/HR (ref 0–15)
GFR SERPL CREATININE-BSD FRML MDRD: >90 ML/MIN/1.73M2
GLUCOSE BLDC GLUCOMTR-MCNC: 114 MG/DL (ref 70–99)
GLUCOSE BLDC GLUCOMTR-MCNC: 94 MG/DL (ref 70–99)
GLUCOSE BLDC GLUCOMTR-MCNC: 96 MG/DL (ref 70–99)
GLUCOSE BLDC GLUCOMTR-MCNC: 97 MG/DL (ref 70–99)
GLUCOSE SERPL-MCNC: 107 MG/DL (ref 70–99)
HCT VFR BLD AUTO: 34.8 % (ref 40–53)
HGB BLD-MCNC: 10.9 G/DL (ref 13.3–17.7)
MAGNESIUM SERPL-MCNC: 1.6 MG/DL (ref 1.7–2.3)
MCH RBC QN AUTO: 25.6 PG (ref 26.5–33)
MCHC RBC AUTO-ENTMCNC: 31.3 G/DL (ref 31.5–36.5)
MCV RBC AUTO: 82 FL (ref 78–100)
PHOSPHATE SERPL-MCNC: 4 MG/DL (ref 2.5–4.5)
PLATELET # BLD AUTO: 307 10E3/UL (ref 150–450)
POTASSIUM SERPL-SCNC: 4.6 MMOL/L (ref 3.4–5.3)
PROT SERPL-MCNC: 6.7 G/DL (ref 6.4–8.3)
RBC # BLD AUTO: 4.25 10E6/UL (ref 4.4–5.9)
SODIUM SERPL-SCNC: 139 MMOL/L (ref 136–145)
WBC # BLD AUTO: 7.6 10E3/UL (ref 4–11)

## 2023-09-03 PROCEDURE — 97162 PT EVAL MOD COMPLEX 30 MIN: CPT | Mod: GP

## 2023-09-03 PROCEDURE — 250N000011 HC RX IP 250 OP 636: Performed by: SURGERY

## 2023-09-03 PROCEDURE — 97530 THERAPEUTIC ACTIVITIES: CPT | Mod: GP

## 2023-09-03 PROCEDURE — 80053 COMPREHEN METABOLIC PANEL: CPT

## 2023-09-03 PROCEDURE — 250N000011 HC RX IP 250 OP 636: Performed by: STUDENT IN AN ORGANIZED HEALTH CARE EDUCATION/TRAINING PROGRAM

## 2023-09-03 PROCEDURE — 250N000013 HC RX MED GY IP 250 OP 250 PS 637

## 2023-09-03 PROCEDURE — 99232 SBSQ HOSP IP/OBS MODERATE 35: CPT | Performed by: INTERNAL MEDICINE

## 2023-09-03 PROCEDURE — 84100 ASSAY OF PHOSPHORUS: CPT | Performed by: STUDENT IN AN ORGANIZED HEALTH CARE EDUCATION/TRAINING PROGRAM

## 2023-09-03 PROCEDURE — 83735 ASSAY OF MAGNESIUM: CPT | Performed by: STUDENT IN AN ORGANIZED HEALTH CARE EDUCATION/TRAINING PROGRAM

## 2023-09-03 PROCEDURE — 36415 COLL VENOUS BLD VENIPUNCTURE: CPT

## 2023-09-03 PROCEDURE — 250N000011 HC RX IP 250 OP 636: Mod: JZ

## 2023-09-03 PROCEDURE — 120N000002 HC R&B MED SURG/OB UMMC

## 2023-09-03 PROCEDURE — 86140 C-REACTIVE PROTEIN: CPT

## 2023-09-03 PROCEDURE — 99223 1ST HOSP IP/OBS HIGH 75: CPT | Mod: 24 | Performed by: INTERNAL MEDICINE

## 2023-09-03 PROCEDURE — 258N000003 HC RX IP 258 OP 636: Performed by: SURGERY

## 2023-09-03 PROCEDURE — 85027 COMPLETE CBC AUTOMATED: CPT | Performed by: STUDENT IN AN ORGANIZED HEALTH CARE EDUCATION/TRAINING PROGRAM

## 2023-09-03 PROCEDURE — 85652 RBC SED RATE AUTOMATED: CPT

## 2023-09-03 PROCEDURE — 250N000011 HC RX IP 250 OP 636: Mod: JZ | Performed by: INTERNAL MEDICINE

## 2023-09-03 RX ORDER — MAGNESIUM SULFATE HEPTAHYDRATE 40 MG/ML
2 INJECTION, SOLUTION INTRAVENOUS ONCE
Status: COMPLETED | OUTPATIENT
Start: 2023-09-03 | End: 2023-09-03

## 2023-09-03 RX ADMIN — ACETAMINOPHEN 975 MG: 325 TABLET, FILM COATED ORAL at 16:57

## 2023-09-03 RX ADMIN — PREGABALIN 150 MG: 50 CAPSULE ORAL at 08:47

## 2023-09-03 RX ADMIN — HEPARIN SODIUM 5000 UNITS: 5000 INJECTION, SOLUTION INTRAVENOUS; SUBCUTANEOUS at 19:06

## 2023-09-03 RX ADMIN — PIPERACILLIN AND TAZOBACTAM 4.5 G: 4; .5 INJECTION, POWDER, FOR SOLUTION INTRAVENOUS at 13:43

## 2023-09-03 RX ADMIN — OXYCODONE HYDROCHLORIDE 20 MG: 20 TABLET, FILM COATED, EXTENDED RELEASE ORAL at 08:48

## 2023-09-03 RX ADMIN — OXYCODONE HYDROCHLORIDE 10 MG: 10 TABLET ORAL at 13:44

## 2023-09-03 RX ADMIN — PREDNISOLONE ACETATE 1 DROP: 10 SUSPENSION/ DROPS OPHTHALMIC at 20:54

## 2023-09-03 RX ADMIN — PIPERACILLIN AND TAZOBACTAM 4.5 G: 4; .5 INJECTION, POWDER, FOR SOLUTION INTRAVENOUS at 01:15

## 2023-09-03 RX ADMIN — SENNOSIDES AND DOCUSATE SODIUM 1 TABLET: 50; 8.6 TABLET ORAL at 19:51

## 2023-09-03 RX ADMIN — PANTOPRAZOLE SODIUM 40 MG: 40 TABLET, DELAYED RELEASE ORAL at 08:47

## 2023-09-03 RX ADMIN — PIPERACILLIN AND TAZOBACTAM 4.5 G: 4; .5 INJECTION, POWDER, FOR SOLUTION INTRAVENOUS at 08:46

## 2023-09-03 RX ADMIN — OXYCODONE HYDROCHLORIDE 10 MG: 10 TABLET ORAL at 19:06

## 2023-09-03 RX ADMIN — DULOXETINE HYDROCHLORIDE 60 MG: 60 CAPSULE, DELAYED RELEASE ORAL at 08:48

## 2023-09-03 RX ADMIN — PREGABALIN 150 MG: 50 CAPSULE ORAL at 19:50

## 2023-09-03 RX ADMIN — PANTOPRAZOLE SODIUM 40 MG: 40 TABLET, DELAYED RELEASE ORAL at 16:57

## 2023-09-03 RX ADMIN — BUSPIRONE HYDROCHLORIDE 10 MG: 10 TABLET ORAL at 19:51

## 2023-09-03 RX ADMIN — HYDROMORPHONE HYDROCHLORIDE 0.2 MG: 0.2 INJECTION, SOLUTION INTRAMUSCULAR; INTRAVENOUS; SUBCUTANEOUS at 16:57

## 2023-09-03 RX ADMIN — SENNOSIDES AND DOCUSATE SODIUM 1 TABLET: 50; 8.6 TABLET ORAL at 08:48

## 2023-09-03 RX ADMIN — VANCOMYCIN HYDROCHLORIDE 1250 MG: 10 INJECTION, POWDER, LYOPHILIZED, FOR SOLUTION INTRAVENOUS at 19:06

## 2023-09-03 RX ADMIN — ACETAMINOPHEN 975 MG: 325 TABLET, FILM COATED ORAL at 08:47

## 2023-09-03 RX ADMIN — HEPARIN SODIUM 5000 UNITS: 5000 INJECTION, SOLUTION INTRAVENOUS; SUBCUTANEOUS at 09:08

## 2023-09-03 RX ADMIN — OXYCODONE HYDROCHLORIDE 10 MG: 10 TABLET ORAL at 09:08

## 2023-09-03 RX ADMIN — OXYCODONE HYDROCHLORIDE 10 MG: 10 TABLET ORAL at 23:00

## 2023-09-03 RX ADMIN — PREGABALIN 150 MG: 50 CAPSULE ORAL at 13:44

## 2023-09-03 RX ADMIN — BUPROPION HYDROCHLORIDE 450 MG: 150 TABLET, FILM COATED, EXTENDED RELEASE ORAL at 08:48

## 2023-09-03 RX ADMIN — OXYCODONE HYDROCHLORIDE 20 MG: 20 TABLET, FILM COATED, EXTENDED RELEASE ORAL at 19:51

## 2023-09-03 RX ADMIN — MAGNESIUM SULFATE HEPTAHYDRATE 2 G: 40 INJECTION, SOLUTION INTRAVENOUS at 10:19

## 2023-09-03 RX ADMIN — OXYCODONE HYDROCHLORIDE 10 MG: 10 TABLET ORAL at 01:15

## 2023-09-03 RX ADMIN — DULOXETINE HYDROCHLORIDE 60 MG: 60 CAPSULE, DELAYED RELEASE ORAL at 19:50

## 2023-09-03 RX ADMIN — HEPARIN SODIUM 5000 UNITS: 5000 INJECTION, SOLUTION INTRAVENOUS; SUBCUTANEOUS at 01:15

## 2023-09-03 RX ADMIN — PIPERACILLIN AND TAZOBACTAM 4.5 G: 4; .5 INJECTION, POWDER, FOR SOLUTION INTRAVENOUS at 20:52

## 2023-09-03 RX ADMIN — BUSPIRONE HYDROCHLORIDE 10 MG: 10 TABLET ORAL at 08:47

## 2023-09-03 RX ADMIN — VANCOMYCIN HYDROCHLORIDE 1250 MG: 10 INJECTION, POWDER, LYOPHILIZED, FOR SOLUTION INTRAVENOUS at 05:31

## 2023-09-03 RX ADMIN — PREDNISOLONE ACETATE 1 DROP: 10 SUSPENSION/ DROPS OPHTHALMIC at 09:08

## 2023-09-03 ASSESSMENT — ACTIVITIES OF DAILY LIVING (ADL)
ADLS_ACUITY_SCORE: 43

## 2023-09-03 NOTE — PLAN OF CARE
VSS. Slept well overnight.    Lungs clear. Denies SO or chest pain.    A/O x4.    Managed with PRN Oxy.    Not OOB paraplegic. Lift.    Has colostomy & bui for neurogenic bladder.    Woundvac to BKA on L leg.    Continue POC.

## 2023-09-03 NOTE — PROGRESS NOTES
Orthopaedic Surgery Progress Note 09/03/2023    Subjective  AFVSS. NAEO. Doing well.      Objective  Temp: 98.4  F (36.9  C) Temp src: Oral BP: 135/68 Pulse: 70   Resp: 20 SpO2: 93 % O2 Device: None (Room air) Oxygen Delivery: 6 LPM    Exam:  Gen: Sleeping  Resp: Nonlabored breathing  Cardio: Ext wwp     Drains:  Minimal output in cansiter.     MSK:    LLE:  - Wound VAC holding suction      Recent Labs   Lab 09/03/23  0323 09/02/23  0358 09/01/23  0818   WBC 7.6 6.7 4.5   HGB 10.9* 11.0* 10.5*    335 328     Cultures:  08/30 Cx: + Parvimonas micra   08/28 Cx NGTD    Assessment:   Assessment: Saqib Bishop is a 42 year old male s/p emergent left transtibial guillotine amputation for LLE NSTI on 8/27-8/28. RTOR  for I&D and wound vac exchange on 08/30 and 9/2. Anticipate will able to undergo definitive BKA at next surgery.    Cultures from 8/30 + Parvimonas micra, currently on vancomycin and zosyn per medicine.     RTOR TBD.      Plan:  Medicine Primary   Activity: Per primary, okay for UOOB with assist   Weight bearing status: NWB LLE  Antibiotics: Vacomycin and Zosyn per medicine  Diet: ADAT  DVT prophylaxis: per primary, okay to start chemoprophylaxis from orthopedic perspective, on SQH 5000u.   Labs: trend CRP, WBC   Drains: Document VAC output  Pain management: Multimodal, per primary  X-rays: complete  Cultures: 08/28 cx NGTD, 08/30 cx + Parvimonas micra   Follow-up: TBD     Disposition: RTOR for definitive amputation TBD    Lana Humphrey MD

## 2023-09-03 NOTE — CONSULTS
[facility] [place] GENERAL INFECTIOUS DISEASES CONSULTATION     Patient:  Saqib Bishop   Date of birth 1980, Medical record number [id number]  Date of Visit:  09/03/2023  Date of Admission: 8/27/2023  Consult Requested by:Nii Mancilla MD  Reason for Consult:  Blood cultures at outside hospital growing Shigella sonnei          Assessment and Recommendations:     Saqib Bishop is a 41 yo male with  a past medical history of T12 level spinal cord injury and paraplegia, colostomy, DJD, GERD, chronic sacral decubitus ulcer s/p flap and treatment for osteomyelitis Dec-2020, complicated by dehiscence, open wound and sacral decubitus ulceration Oct-2021, s/p an elective flap procedure in early 2023 which failed and resulted in a chronic sacral ulcer.      He presented to [facility] on 27-Aug-2023 with a foul odour and increased drainage from his left foot. He was found to be in septic alo with acute kidney injury and left foot necrotizing fasciitis.  He  was admitted to the SICU at [facility]. He underwent left below knee guillotine amputation on 28-Aug-2023, irrigation and debridement of left lower extremity residual limb on 30-Aug-2023. left leg tissue grew 1+ Parvimonas micra.   He was transferred out of ICU on 30-Aug-2023 and transferred to [facility] on 31-Aug-2023.     Bacteremia ( per OSH lab - no Shigella sonnei isolated )   - Blood cultures done at OSH ( [facility])  turned positive for Shigella sonnei 2/2 on 8/27/23 with gram stain showing 3+ gram positive cocci  ( called and spoke with Microbiology staff JOAO LevinB 1/4 ( felt to be a contaminant) and GPC 2/4 , lab not able to identify and specimen is sent to [place] lab for identificantion. She said there was no Shigella isolated from blood cultures) ? error in care everywhere , furthermore, patient did not have GI symptoms   -  Blood cultures on 8/28/23 done here are negative x 2 so far.     Left foot necrotizing fasciitis   -  s/p left below knee guillotine amputation on 28-Aug-2023  - irrigation and debridement of left lower extremity residual limb on 30-Aug-2023. left leg tissue grew 1+ Parvimonas micra.    - wound culture 8/27/23 - Pseudomonasa aeruginosa     3. Sacral wounds for many years   -  chronic sacral decubitus ulcer s/p flap and treatment for osteomyelitis Dec-2020  - complicated by dehiscence, open wound and sacral decubitus ulceration Oct-2021  -  s/p an elective flap procedure in early 2023 which failed and resulted in a chronic sacral ulcer.     4. Paraplegia    5. s/p colostomy       RECOMMENDATION:  - no Shigella sonnei isolated in OSH lab. unclear how this was reported in care everywhere   - sacral wound deep culture due to persistent drainage  - called micro lab and requested suscptibility testing on Parvimonas   - Continue Zosyn and Vancomycin IV for now  - follow- up blood cxs from OSH - GPC , possibly Parvimonas, an anerobic gram positive cocci.     Plan discussed with Dr Whitfield / hospitalist     Thank you for allowing us to participate in the care of this patient    Spencer Vera MD, M.Med.Sc  Staff, Infectious Diseases          History of Present Illness:     Saqib Bishop is a 41 yo male with  a past medical history of T12 level spinal cord injury and paraplegia, colostomy, DJD, GERD, chronic sacral decubitus ulcer s/p flap and treatment for osteomyelitis Dec-2020, complicated by dehiscence, open wound and sacral decubitus ulceration Oct-2021, s/p an elective flap procedure in early 2023 which failed and resulted in a chronic sacral ulcer.      He presented to [facility] on 27-Aug-2023 with a foul odour and increased drainage from his left foot. He was found to be in septic alo with acute kidney injury and left foot necrotizing fasciitis.  He  was admitted to the SICU at [facility]. He underwent left below knee guillotine amputation on 28-Aug-2023, irrigation and debridement of left  lower extremity residual limb on 30-Aug-2023. left leg tissue grew 1+ Parvimonas micra.   He was transferred out of ICU on 30-Aug-2023 and transferred to [facility] on 31-Aug-2023.      wound culture 8/27/23 - Pseudomonasa aeruginosa   wound culture on 8/30/23 - 1+ Parvimonas micra     Blood cultures done at OSH turned positive for Shigella sonnei 2/2 on 8/27/23 with gram stain showing 3+ gram positive cocci  ( called and spoke with Microbiology staff Poonam GPB 1/4 ( felt to be a contaminant) and [facility]PC 2/4 , lab not able to identify and specimen is sent to [place] lab for identificantion. She said there was no Shigella isolated from blood cultures)     Blood cultures on 8/28/23 done here are negative x 2 so far.     Current antimicrobials : Pip/tazobactam since 8/27/23) and vancomycin IV ( since 8/28/23) , he was initially on Clindamycin IV 8/27-8/31) Pip/tazo 8/27-8/28    Saqib is feeling fine, no fever, chills and pain is controlled. he denies any nausea, vomiting, diarrhea recently. has good appetite.   has sacral wounds for many years. denies headaches minimal discharged from sacral wound. he has PCA/ sister who helps with wound dressing daily and goes to wound clinic every month. he says he will see wound care in [place] in Mid September.      Imagings:  Xray left knee 8/27/23   IMPRESSION: Image osseous structures are demineralized. No acute fracture, dislocation or knee joint effusion. No definite soft tissue gas or radiopaque foreign body. No cortical erosion to suggest underlying osteomyelitis.     Xray left foot 8/27/23  IMPRESSION: Extensive soft tissue gas and swelling noted throughout the left foot and ankle compatible with soft tissue infection. Thinning of the skin at the fifth metatarsal phalangeal joint which may represent an area of ulceration. The fifth proximal   phalanx may be subluxed or dislocated medially at the metatarsal phalangeal joint. Diffuse osteopenia. This and the extensive soft tissue  gas limits evaluation for fracture and/or bony erosion / osteomyelitis. If there is clinical concern for these   entities, MRI could be considered for further characterization.     Xray left  tibia and fibula 8/27/23  IMPRESSION: Image osseous structures are demineralized. Tibia and fibula are intact without acute fractures or cortical erosions. Significant subcutaneous gas is seen in the plantar soft tissue of the imaged foot.     Xray left tibia and fibula 8/28/23  IMPRESSION: There are postoperative changes from amputation of the left lower extremity at the level of the distal tibia/fibular diaphysis. No radiographic evidence of osteomyelitis is seen. No fracture. There is some soft tissue swelling over the   visualized lower extremity.    CXR 8/28/23  IMPRESSION:   1. Endotracheal tube tip projects 4.6 cm above the danette.  2. Low lung volumes with increased bilateral perihilar and bibasilar/retrocardiac opacities, which may represent atelectasis/pulmonary edema.  3. Small left pleural effusion.         Review of Systems:   CONSTITUTIONAL:  No fevers or chills  EYES: negative for icterus  ENT:  negative for hearing loss, tinnitus and sore throat  RESPIRATORY:  negative for cough with sputum and dyspnea  CARDIOVASCULAR:  negative for chest pain, dyspnea  GASTROINTESTINAL:  negative for nausea, vomiting, diarrhea and constipation  GENITOURINARY:  negative for dysuria  HEME:  No easy bruising  INTEGUMENT:  see HPI   NEURO:  Negative for headache         Past Medical History:     Past Medical History:   Diagnosis Date    Degenerative joint disease     Gastro-oesophageal reflux disease             Past Surgical History:     Past Surgical History:   Procedure Laterality Date    AMPUTATE LEG BELOW KNEE Left 8/27/2023    Procedure: Left below knee Guillotine Amputation;  Surgeon: Sesar Barth MD;  Location: [facility] OR    BACK SURGERY      lumbar fusion    EXPLORE SPINE, REMOVE HARDWARE, COMBINED  1/13/2012     Procedure:COMBINED EXPLORE SPINE, REMOVE HARDWARE; EXPLORE SPINE, REMOVE HARDWARE L4-S1; Surgeon:FAM GONZALEZ; Location:[facility] OR    IRRIGATION AND DEBRIDEMENT LOWER EXTREMITY, COMBINED Left 2023    Procedure: Irrigation and debridement lower extremity, combined and wound vac exchange;  Surgeon: Etienne Maier MD;  Location: [facility] OR    ORTHOPEDIC SURGERY      right foot surgery            Family History:   History reviewed. No pertinent family history.         Social History:     Social History     Tobacco Use    Smoking status: Former     Types: Cigarettes     Quit date: 2011     Years since quittin.8    Smokeless tobacco: Not on file   Substance Use Topics    Alcohol use: No     History   Sexual Activity    Sexual activity: Not on file            Current Medications (antimicrobials listed in bold):      acetaminophen  975 mg Oral Q8H    buPROPion  450 mg Oral Daily    busPIRone  10 mg Oral BID    DULoxetine  60 mg Oral BID    heparin ANTICOAGULANT  5,000 Units Subcutaneous Q8H    [Held by provider] hydroxychloroquine  400 mg Oral Daily    oxyCODONE  20 mg Oral Q12H    pantoprazole  40 mg Oral BID AC    piperacillin-tazobactam  4.5 g Intravenous Q6H    polyethylene glycol  17 g Oral Daily    prednisoLONE acetate  1 drop Both Eyes BID    pregabalin  150 mg Oral TID    senna-docusate  1 tablet Oral BID    sodium chloride (PF)  3 mL Intracatheter Q8H    vancomycin  1,250 mg Intravenous Q12H          Allergies:     Allergies   Allergen Reactions    Adhesive Tape Hives     From tape from surgery    Benzoin Hives and Rash    Droperidol Anaphylaxis    Prednisone      vomiting    Vitamin D Rash     flairs up his sarcoidosis          Physical Exam:   Vitals were reviewed  Patient Vitals for the past 24 hrs:   BP Temp Temp src Pulse Resp SpO2   23 0755 134/88 98  F (36.7  C) Oral -- 18 95 %   23 0400 135/68 -- -- 70 20 93 %   23 0000 120/75 98.4  F (36.9  C) Oral 87 16 94 %   23 2242  130/88 97.7  F (36.5  C) Oral 88 18 97 %   09/02/23 1900 118/74 -- -- 76 20 94 %   09/02/23 1800 131/84 -- -- 98 -- 93 %   09/02/23 1730 127/83 -- -- 97 -- 93 %   09/02/23 1712 132/85 -- -- 99 -- 93 %   09/02/23 1630 128/81 -- -- 97 -- --   09/02/23 1559 124/80 97.6  F (36.4  C) Oral 94 14 --   09/02/23 1530 (!) 133/95 -- -- 81 13 92 %   09/02/23 1518 -- -- -- -- -- 93 %   09/02/23 1515 137/75 -- -- 87 12 90 %   09/02/23 1502 113/75 97.5  F (36.4  C) Oral 82 16 96 %     Ranges for his vital signs:  Temp:  [97.5  F (36.4  C)-98.4  F (36.9  C)] 98  F (36.7  C)  Pulse:  [70-99] 70  Resp:  [12-20] 18  BP: (113-137)/(68-95) 134/88  SpO2:  [90 %-97 %] 95 %  Physical Examination:  GENERAL:  well-developed, well-nourished, alert, oriented, in bed in no acute distress.   HEENT:  Head is normocephalic, atraumatic   EYES:  Eyes have anicteric sclerae without conjunctival injection.    ENT:  Oropharynx is moist without exudates or ulcers. Tongue is midline  NECK:  Supple.   LUNGS:  Clear to auscultation bilateral.   CARDIOVASCULAR:  Regular rate and rhythm with no murmurs  ABDOMEN:  Normal bowel sounds, soft, nontender. No appreciable hepatosplenomegaly colostomy. soft stools.   SKIN:  left BKA - site is covered.   Line(s) are in place without any surrounding erythema or exudate. No stigmata of endocarditis.  NEUROLOGIC: paraplegic   ureteral catheter          Laboratory Data:     Inflammatory Markers    Recent Labs   Lab Test 09/03/23  0323 09/02/23  0358 09/01/23  0818 07/18/23  1359   SED 1 59* 78* 30*     Hematology Studies    Recent Labs   Lab Test 09/03/23  0323 09/02/23  0358 09/01/23  0818 08/31/23  0433 08/30/23  0404 08/29/23  0353   WBC 7.6 6.7 4.5 3.7*  3.7* 4.3 6.5   HGB 10.9* 11.0* 10.5* 10.0* 10.6* 10.7*   MCV 82 81 81 85 81 80    335 328 296 298 283     Metabolic Studies     Recent Labs   Lab Test 09/03/23  0323 09/02/23  0358 09/01/23  0818 08/31/23  0433 08/30/23  0404    142 143 145 144    POTASSIUM 4.6 4.1 4.1 3.9 3.5   CHLORIDE 105 107 108* 112* 109*   CO2 27 27 27 24 26   BUN 16.7 14.4 14.4 14.4 19.5   CR 0.95 0.86 1.00 1.04 1.01   GFRESTIMATED >90 >90 >90 >90 >90     Hepatic Studies    Recent Labs   Lab Test 09/03/23  0323 09/02/23  0358 09/01/23  0818 08/31/23  0433 08/27/23  2115   BILITOTAL 0.2 0.2 0.2 0.2 0.8  0.8   ALKPHOS 118 128 125 126 148*  164*   ALBUMIN 2.5* 2.7* 2.5* 2.4* 2.8*  2.7*   AST 25 25 32 31 51*  52*   ALT 20 22 25 19 29  28     Microbiology:  Culture Micro   Date Value Ref Range Status   01/12/2005 No Beta Streptococcus isolated  Final     Vancomycin Levels    Recent Labs   Lab Test 09/02/23  1259 08/31/23  1202 08/28/23  1625   VANCOMYCIN 26.6* 25.5* 8.7     Hepatitis C Testing     Hepatitis C Antibody   Date Value Ref Range Status   04/06/2005 Negative NEG Final

## 2023-09-03 NOTE — PROGRESS NOTES
"   09/03/23 0900   Appointment Info   Signing Clinician's Name / Credentials (PT) Federica Reddy, PT, DPT   Rehab Comments (PT) bring slideboard and w/c   Living Environment   People in Home alone   Current Living Arrangements mobile home   Home Accessibility wheelchair accessible   Transportation Anticipated car, drives self   Living Environment Comments Pt reports his mobile home has a ramp to enter. Pt reports his sister lives nearby and can help him. He receives 5 hours/day of PCA services and has a home care RN who comes 1x/week. Pt reports he has a school-age son who is back living w/ mother for the school year, as well as a 20-year-old daughter who lives out of state.   Self-Care   Equipment Currently Used at Home transfer board;wheelchair, manual   Fall history within last six months yes   Number of times patient has fallen within last six months 3   Activity/Exercise/Self-Care Comment Pt reports he transfers via slideboard at baseline. Pt denies significant injury from his falls. He reports the falls usually happen when transferring. Pt reports the frame of his w/c is broken and he is in the process of getting a new w/c.   General Information   Onset of Illness/Injury or Date of Surgery 08/27/23   Referring Physician Chele Steward MD   Patient/Family Therapy Goals Statement (PT) not stated   Pertinent History of Current Problem (include personal factors and/or comorbidities that impact the POC) Per chart: \"Patient is a 43 y/o man who has a past medical history of T12 level spinal cord injury and paraplegia. Patient has a chronic sacral decubitus ulcer s/p flap and treatment for osteomyelitis Dec-2020, then with dehiscence, open wound and sacral decubitus ulceration Oct-2021. Patient reportedly underwent an elective flap procedure in early 2023 which failed and resulted in a chronic sacral ulcer. Patient presented to [facility] on 27-Aug-2023 with a foul odour and increased welling with drainage " "from his left foot. Patient was found to be septic with acute kidney injury and septic shock. Patient was admitted to the SICU at [facility]. Patient was found to have necrotizing soft tissue infection on the left foot and underwent left below knee guillotine amputation on 28-Aug-2023. Patient underwent irrigation and debridement of left lower extremity residual limb on 30-Aug-2023 for left lower extremity necrotizing fasciitis. Patient was transferred out of ICU on 30-Aug-2023. Patient was transferred to [facility] on 31-Aug-2023. Patient underwent irrigation and debridement of left lower extremity residual limb and wound vac exchange yesterday for left lower extremity necrotizing fasciitis.\"   Existing Precautions/Restrictions fall;weight bearing   Weight-Bearing Status - LUE full weight-bearing   Weight-Bearing Status - RUE full weight-bearing   Weight-Bearing Status - LLE nonweight-bearing   Weight-Bearing Status - RLE full weight-bearing   General Observations Pt is awake and supine in bed w/ HOB elevated   Cognition   Affect/Mental Status (Cognition) WNL   Orientation Status (Cognition) oriented x 4   Follows Commands (Cognition) WNL   Integumentary/Edema   Integumentary/Edema Comments Pt has a wound vac on his L LE covered by bandaging.   Posture    Posture Forward head position;Protracted shoulders   Range of Motion (ROM)   ROM Comment Did not formally assess ROM, but pt's overall ROM is functional for bed mobility. Pt's R ankle rests in PF and inversion when supine in bed and sitting at EOB, limited mobility into DF to neutral.   Strength (Manual Muscle Testing)   Strength (Manual Muscle Testing) strength is WFL   Strength Comments Pt demonstrates UE strength WFL.   Bed Mobility   Comment, (Bed Mobility) supine<>sit SBA   Transfers   Comment, (Transfers) did not assess   Gait/Stairs (Locomotion)   Comment, (Gait/Stairs) did not assess; uses manual w/c at baseline   Balance   Balance Comments Static " sitting balance at EOB maintained w/ SBA.   Sensory Examination   Sensory Perception Comments Pt denies sensory changes in the R LE.   Coordination   Coordination no deficits were identified   Muscle Tone   Muscle Tone Comments hypotonia in B LE 2/2 history of SCI   Clinical Impression   Criteria for Skilled Therapeutic Intervention Yes, treatment indicated   PT Diagnosis (PT) impaired functional mobility   Influenced by the following impairments new L BKA, weakness, pain   Functional limitations due to impairments transfers, w/c mobility   Clinical Presentation (PT Evaluation Complexity) Evolving/Changing   Clinical Presentation Rationale per clinical judgement; anticipate return to OR to undergo definitive BKA   Clinical Decision Making (Complexity) moderate complexity   Planned Therapy Interventions (PT) balance training;bed mobility training;home exercise program;neuromuscular re-education;patient/family education;ROM (range of motion);strengthening;transfer training;wheelchair management/propulsion training;progressive activity/exercise;risk factor education;home program guidelines   Risk & Benefits of therapy have been explained evaluation/treatment results reviewed;care plan/treatment goals reviewed;risks/benefits reviewed;current/potential barriers reviewed;participants voiced agreement with care plan;participants included;patient   Clinical Impression Comments Pt will benefit from skilled inpatient PT to progress safety and independence with functional mobility in the setting of L BKA in order to facilitate return to PLOF.   PT Total Evaluation Time   PT Eval, Moderate Complexity Minutes (70879) 6   Plan of Care Review   Plan of Care Reviewed With patient   Physical Therapy Goals   PT Frequency Daily   PT Predicted Duration/Target Date for Goal Attainment 09/18/23   PT Goals Bed Mobility;Transfers;Wheelchair Mobility   PT: Bed Mobility Independent;Supine to/from sit;Rolling;Within precautions   PT:  Transfers Modified independent;Bed to/from chair;Assistive device  (slideboard)   PT: Wheelchair Mobility 150 feet;Caregiver SBA;manual wheelchair   Interventions   Interventions Quick Adds Therapeutic Activity   Therapeutic Activity   Therapeutic Activities: dynamic activities to improve functional performance Minutes (26935) 23   Treatment Detail/Skilled Intervention Pt in bed upon arrival, agreeable to PT session. Pt reports his slideboard and manual w/c are not here w/ him, which he uses at baseline for OOB mobility. Facilitated supine<>sit w/ SBA, only providing A for line management. Pt demo'd partial LAQ while seated EOB, but primarily only able to swing R LE and slightly move L LE. Pt tolerated sitting EOB x ~6 minutes w/o dizziness or increased pain. Upon returning to supine, dependently repositioned R foot to neutral position as pt's foot initially stuck in inversion; facilitated scooting up in bed w/ verbal cues and SBA for safer positioning of LEs. Also provided verbal and tactile cues for achieving neutral L hip rotation as pt demo'd excess ER initially. Engaged pt in d/c planning discussion and current recommendation for ARU; pt is open to continuing this discussion as he progresses through this hospitalization. Pt is very motivated to return to PLOF and independence. Pt left supine in bed, needs met.   PT Discharge Planning   PT Plan daily; trial slideboard transfer to manual w/c per pt's baseline, flat bed mobility, therex   PT Discharge Recommendation (DC Rec) [facility]-Motivated patient will benefit from intensive, interdisciplinary therapy.  Anticipate will be able to tolerate 3 hours of therapy per day   PT Rationale for DC Rec Pt is currently unsafe to d/c home, but he is very motivated to return to functional independence. Pending progress and safe performance of baseline slideboard transfers, pt may progress to being safe to return home. Recommend ARU at this time to facilitate return  to PLOF.   PT Brief overview of current status SBA-CGA bed mobility   Total Session Time   Timed Code Treatment Minutes 23   Total Session Time (sum of timed and untimed services) 29
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
## 2023-09-04 LAB
ALBUMIN SERPL BCG-MCNC: 3.1 G/DL (ref 3.5–5.2)
ALP SERPL-CCNC: 122 U/L (ref 40–129)
ALT SERPL W P-5'-P-CCNC: 19 U/L (ref 0–70)
ANION GAP SERPL CALCULATED.3IONS-SCNC: 7 MMOL/L (ref 7–15)
AST SERPL W P-5'-P-CCNC: 26 U/L (ref 0–45)
BILIRUB SERPL-MCNC: 0.2 MG/DL
BUN SERPL-MCNC: 17.9 MG/DL (ref 6–20)
CALCIUM SERPL-MCNC: 9.4 MG/DL (ref 8.6–10)
CHLORIDE SERPL-SCNC: 104 MMOL/L (ref 98–107)
CREAT SERPL-MCNC: 0.9 MG/DL (ref 0.67–1.17)
CRP SERPL-MCNC: 12.81 MG/L
DEPRECATED HCO3 PLAS-SCNC: 27 MMOL/L (ref 22–29)
ERYTHROCYTE [DISTWIDTH] IN BLOOD BY AUTOMATED COUNT: 17.8 % (ref 10–15)
ERYTHROCYTE [SEDIMENTATION RATE] IN BLOOD BY WESTERGREN METHOD: 57 MM/HR (ref 0–15)
GFR SERPL CREATININE-BSD FRML MDRD: >90 ML/MIN/1.73M2
GLUCOSE BLDC GLUCOMTR-MCNC: 108 MG/DL (ref 70–99)
GLUCOSE BLDC GLUCOMTR-MCNC: 109 MG/DL (ref 70–99)
GLUCOSE SERPL-MCNC: 100 MG/DL (ref 70–99)
HCT VFR BLD AUTO: 35.7 % (ref 40–53)
HGB BLD-MCNC: 11.2 G/DL (ref 13.3–17.7)
MAGNESIUM SERPL-MCNC: 1.8 MG/DL (ref 1.7–2.3)
MCH RBC QN AUTO: 25.9 PG (ref 26.5–33)
MCHC RBC AUTO-ENTMCNC: 31.4 G/DL (ref 31.5–36.5)
MCV RBC AUTO: 82 FL (ref 78–100)
PHOSPHATE SERPL-MCNC: 4 MG/DL (ref 2.5–4.5)
PLATELET # BLD AUTO: 312 10E3/UL (ref 150–450)
POTASSIUM SERPL-SCNC: 4.7 MMOL/L (ref 3.4–5.3)
PROT SERPL-MCNC: 7.5 G/DL (ref 6.4–8.3)
RBC # BLD AUTO: 4.33 10E6/UL (ref 4.4–5.9)
SODIUM SERPL-SCNC: 138 MMOL/L (ref 136–145)
WBC # BLD AUTO: 6.1 10E3/UL (ref 4–11)

## 2023-09-04 PROCEDURE — 85652 RBC SED RATE AUTOMATED: CPT

## 2023-09-04 PROCEDURE — 250N000013 HC RX MED GY IP 250 OP 250 PS 637

## 2023-09-04 PROCEDURE — 86140 C-REACTIVE PROTEIN: CPT

## 2023-09-04 PROCEDURE — 85014 HEMATOCRIT: CPT | Performed by: STUDENT IN AN ORGANIZED HEALTH CARE EDUCATION/TRAINING PROGRAM

## 2023-09-04 PROCEDURE — 120N000002 HC R&B MED SURG/OB UMMC

## 2023-09-04 PROCEDURE — 83735 ASSAY OF MAGNESIUM: CPT | Performed by: STUDENT IN AN ORGANIZED HEALTH CARE EDUCATION/TRAINING PROGRAM

## 2023-09-04 PROCEDURE — 250N000013 HC RX MED GY IP 250 OP 250 PS 637: Performed by: PEDIATRICS

## 2023-09-04 PROCEDURE — 84100 ASSAY OF PHOSPHORUS: CPT | Performed by: STUDENT IN AN ORGANIZED HEALTH CARE EDUCATION/TRAINING PROGRAM

## 2023-09-04 PROCEDURE — 250N000011 HC RX IP 250 OP 636: Performed by: SURGERY

## 2023-09-04 PROCEDURE — 250N000011 HC RX IP 250 OP 636: Mod: JZ

## 2023-09-04 PROCEDURE — 99232 SBSQ HOSP IP/OBS MODERATE 35: CPT | Performed by: INTERNAL MEDICINE

## 2023-09-04 PROCEDURE — 36415 COLL VENOUS BLD VENIPUNCTURE: CPT | Performed by: STUDENT IN AN ORGANIZED HEALTH CARE EDUCATION/TRAINING PROGRAM

## 2023-09-04 PROCEDURE — 80053 COMPREHEN METABOLIC PANEL: CPT

## 2023-09-04 PROCEDURE — 250N000011 HC RX IP 250 OP 636: Performed by: STUDENT IN AN ORGANIZED HEALTH CARE EDUCATION/TRAINING PROGRAM

## 2023-09-04 PROCEDURE — 258N000003 HC RX IP 258 OP 636: Performed by: SURGERY

## 2023-09-04 RX ORDER — MAGNESIUM OXIDE 400 MG/1
400 TABLET ORAL EVERY 4 HOURS
Status: COMPLETED | OUTPATIENT
Start: 2023-09-04 | End: 2023-09-04

## 2023-09-04 RX ADMIN — BUPROPION HYDROCHLORIDE 450 MG: 150 TABLET, FILM COATED, EXTENDED RELEASE ORAL at 08:08

## 2023-09-04 RX ADMIN — OXYCODONE HYDROCHLORIDE 10 MG: 10 TABLET ORAL at 18:12

## 2023-09-04 RX ADMIN — DULOXETINE HYDROCHLORIDE 60 MG: 60 CAPSULE, DELAYED RELEASE ORAL at 08:08

## 2023-09-04 RX ADMIN — OXYCODONE HYDROCHLORIDE 20 MG: 20 TABLET, FILM COATED, EXTENDED RELEASE ORAL at 08:07

## 2023-09-04 RX ADMIN — PIPERACILLIN AND TAZOBACTAM 4.5 G: 4; .5 INJECTION, POWDER, FOR SOLUTION INTRAVENOUS at 13:52

## 2023-09-04 RX ADMIN — HYDROMORPHONE HYDROCHLORIDE 0.2 MG: 0.2 INJECTION, SOLUTION INTRAMUSCULAR; INTRAVENOUS; SUBCUTANEOUS at 17:03

## 2023-09-04 RX ADMIN — BUSPIRONE HYDROCHLORIDE 10 MG: 10 TABLET ORAL at 21:36

## 2023-09-04 RX ADMIN — PANTOPRAZOLE SODIUM 40 MG: 40 TABLET, DELAYED RELEASE ORAL at 16:11

## 2023-09-04 RX ADMIN — PREDNISOLONE ACETATE 1 DROP: 10 SUSPENSION/ DROPS OPHTHALMIC at 21:50

## 2023-09-04 RX ADMIN — OXYCODONE HYDROCHLORIDE 20 MG: 20 TABLET, FILM COATED, EXTENDED RELEASE ORAL at 21:36

## 2023-09-04 RX ADMIN — HEPARIN SODIUM 5000 UNITS: 5000 INJECTION, SOLUTION INTRAVENOUS; SUBCUTANEOUS at 08:10

## 2023-09-04 RX ADMIN — OXYCODONE HYDROCHLORIDE 10 MG: 10 TABLET ORAL at 13:51

## 2023-09-04 RX ADMIN — BUSPIRONE HYDROCHLORIDE 10 MG: 10 TABLET ORAL at 08:08

## 2023-09-04 RX ADMIN — PREGABALIN 150 MG: 50 CAPSULE ORAL at 13:51

## 2023-09-04 RX ADMIN — DULOXETINE HYDROCHLORIDE 60 MG: 60 CAPSULE, DELAYED RELEASE ORAL at 21:36

## 2023-09-04 RX ADMIN — PIPERACILLIN AND TAZOBACTAM 4.5 G: 4; .5 INJECTION, POWDER, FOR SOLUTION INTRAVENOUS at 08:09

## 2023-09-04 RX ADMIN — VANCOMYCIN HYDROCHLORIDE 1250 MG: 10 INJECTION, POWDER, LYOPHILIZED, FOR SOLUTION INTRAVENOUS at 18:12

## 2023-09-04 RX ADMIN — PREGABALIN 150 MG: 50 CAPSULE ORAL at 21:35

## 2023-09-04 RX ADMIN — SENNOSIDES AND DOCUSATE SODIUM 1 TABLET: 50; 8.6 TABLET ORAL at 08:08

## 2023-09-04 RX ADMIN — SENNOSIDES AND DOCUSATE SODIUM 1 TABLET: 50; 8.6 TABLET ORAL at 21:36

## 2023-09-04 RX ADMIN — PIPERACILLIN AND TAZOBACTAM 4.5 G: 4; .5 INJECTION, POWDER, FOR SOLUTION INTRAVENOUS at 21:37

## 2023-09-04 RX ADMIN — HYDROMORPHONE HYDROCHLORIDE 0.2 MG: 0.2 INJECTION, SOLUTION INTRAMUSCULAR; INTRAVENOUS; SUBCUTANEOUS at 02:09

## 2023-09-04 RX ADMIN — PIPERACILLIN AND TAZOBACTAM 4.5 G: 4; .5 INJECTION, POWDER, FOR SOLUTION INTRAVENOUS at 02:05

## 2023-09-04 RX ADMIN — OXYCODONE HYDROCHLORIDE 10 MG: 10 TABLET ORAL at 23:39

## 2023-09-04 RX ADMIN — MAGNESIUM OXIDE TAB 400 MG (241.3 MG ELEMENTAL MG) 400 MG: 400 (241.3 MG) TAB at 05:51

## 2023-09-04 RX ADMIN — PREDNISOLONE ACETATE 1 DROP: 10 SUSPENSION/ DROPS OPHTHALMIC at 09:28

## 2023-09-04 RX ADMIN — OXYCODONE HYDROCHLORIDE 10 MG: 10 TABLET ORAL at 09:28

## 2023-09-04 RX ADMIN — OXYCODONE HYDROCHLORIDE 10 MG: 10 TABLET ORAL at 05:17

## 2023-09-04 RX ADMIN — HEPARIN SODIUM 5000 UNITS: 5000 INJECTION, SOLUTION INTRAVENOUS; SUBCUTANEOUS at 16:11

## 2023-09-04 RX ADMIN — VANCOMYCIN HYDROCHLORIDE 1250 MG: 10 INJECTION, POWDER, LYOPHILIZED, FOR SOLUTION INTRAVENOUS at 05:11

## 2023-09-04 RX ADMIN — HEPARIN SODIUM 5000 UNITS: 5000 INJECTION, SOLUTION INTRAVENOUS; SUBCUTANEOUS at 23:39

## 2023-09-04 RX ADMIN — PANTOPRAZOLE SODIUM 40 MG: 40 TABLET, DELAYED RELEASE ORAL at 08:08

## 2023-09-04 RX ADMIN — PREGABALIN 150 MG: 50 CAPSULE ORAL at 08:08

## 2023-09-04 RX ADMIN — ACETAMINOPHEN 650 MG: 325 TABLET, FILM COATED ORAL at 18:12

## 2023-09-04 RX ADMIN — MAGNESIUM OXIDE TAB 400 MG (241.3 MG ELEMENTAL MG) 400 MG: 400 (241.3 MG) TAB at 09:28

## 2023-09-04 ASSESSMENT — ACTIVITIES OF DAILY LIVING (ADL)
ADLS_ACUITY_SCORE: 43
ADLS_ACUITY_SCORE: 39
ADLS_ACUITY_SCORE: 43
ADLS_ACUITY_SCORE: 43

## 2023-09-04 NOTE — PROGRESS NOTES
Orthopaedic Surgery Progress Note 09/04/2023    Subjective  AFVSS. NAEO. Doing well.      Objective  Temp: 98.4  F (36.9  C) Temp src: Oral BP: (!) 153/92 Pulse: 76   Resp: 18 SpO2: 98 % O2 Device: None (Room air)      Exam:  Gen: Sleeping  Resp: Nonlabored breathing  Cardio: Ext wwp     Drains:  Minimal output in cansiter.     MSK:    LLE:  - Wound VAC holding suction      Recent Labs   Lab 09/04/23  0408 09/03/23  0323 09/02/23  0358   WBC 6.1 7.6 6.7   HGB 11.2* 10.9* 11.0*    307 335     Cultures:  08/30 Cx: + Parvimonas micra   08/28 Cx NGTD    Assessment:   Assessment: Saqib Bishop is a 42 year old male s/p emergent left transtibial guillotine amputation for LLE NSTI on 8/27-8/28. RTOR  for I&D and wound vac exchange on 08/30 and 9/2. Anticipate will able to undergo definitive BKA at next surgery.    Cultures from 8/30 + Parvimonas micra, currently on vancomycin and zosyn per medicine.     RTOR TBD.      Plan:  Medicine Primary   Activity: Per primary, okay for UOOB with assist   Weight bearing status: NWB LLE  Antibiotics: Vacomycin and Zosyn per medicine/infectious disease  Diet: ADAT  DVT prophylaxis: per primary, okay to start chemoprophylaxis from orthopedic perspective, on SQH 5000u.   Labs: trend CRP, WBC   Drains: Document VAC output  Pain management: Multimodal, per primary  X-rays: complete  Cultures: 08/28 cx NGTD, 08/30 cx + Parvimonas micra   Follow-up: TBD     Disposition: RTOR for definitive amputation TBD    Lana Humphrey MD
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
## 2023-09-04 NOTE — PLAN OF CARE
"Goal Outcome Evaluation:    BP (!) 142/80 (BP Location: Left arm)   Pulse 83   Temp 98.4  F (36.9  C) (Oral)   Resp 16   Ht 1.854 m (6' 1\")   Wt 136.8 kg (301 lb 9.4 oz)   SpO2 96%   BMI 39.79 kg/m       Pt remains alert and oriented x4 ; On RA.   Reports no SOB, CP, N/T,N/V, Fever and/or chills.  Pt remains bedrest; NWB to LLE. Paraplegic.  Pt worked with PT/OT today with plan to try sliding board tranfers next time.  Mg replaced AM today; K and DANA WNL. Labs reordered for AM tomorrow.  Sacral PI wound dressing changes completed as ordered.  Pt rates pain at 5-6/10; Managed with PRN oxycodone and dilaudid.  Wound vac in place; Sanguinous output marked at 150 ml this shift.  BG checks before meals and bedtime.     Plan: Continue with the current POC/     "

## 2023-09-04 NOTE — PLAN OF CARE
No changes overnight. VVS.    Denies SOB or chest pain.     A/O x4. Able to make needs known    Managed with Oxy/ one does of IV Dil.     Not OOB/ Lift. Pt able to reposition independently in bed.    Colostomy & bui in place.    L foot scabs FLAKO, chronic scal ulcer dressing CDI, and BKA to woundvac.    L PIV TKO.    RN Mag protocol ordered. One dose replaced     Continue POC

## 2023-09-04 NOTE — PLAN OF CARE
"Nursing Assessment:  VT: BP (!) 150/88 (BP Location: Left arm, Patient Position: Semi-Anguiano's)   Pulse 80   Temp 97.6  F (36.4  C) (Oral)   Resp 18   Ht 1.854 m (6' 1\")   Wt 138 kg (304 lb 3.8 oz)   SpO2 95%   BMI 40.14 kg/m       Skin: sacral wound dressing completed in AM    GI/: bui patent, cares provided in AM. Colostomy changed by patient independently around 1812    Activity: patient turns well in bed independently    Pain Management:  Lower back pain and left foot pain reported. PRN oxy, IV dilaudid, and tylenol administered    Nursing Plan:  Continue POC, pain management    Discharge Disposition:  pending  "

## 2023-09-04 NOTE — PROGRESS NOTES
Care Management Follow Up    Length of Stay (days): 8    Expected Discharge Date: 09/08/2023     Concerns to be Addressed: Discharge planning       Patient plan of care discussed at interdisciplinary rounds: Yes    Anticipated Discharge Disposition: ARU vs LTACH vs Home with Home Care     Anticipated Discharge Services: PCA    Anticipated Discharge DME: None    Patient/family educated on Medicare website which has current facility and service quality ratings:  Pt declined.    Education Provided on the Discharge Plan: Yes    Patient/Family in Agreement with the Plan: yes    Referrals Placed by CM/SW: n/a    Private pay costs discussed: Not applicable    Additional Information: SW saw that PT currently recommends an ARU stay.    SW met with pt at bedside and presented him with Medicare Care Compare list for ARU near his home zip code. The closest one is [facility] in [place], [place].    Pt reported he's not interested in going to ARU closer to home. He would like to be considered for [facility] AR[facility], if possible.    Liaison for ARU is not available today due to holiday. Unit SW to follow up with [facility] rehab liaison tomorrow.    ANDRES Buckner   9/4/2023       Social Work and Care Management Department     SEARCHABLE in [employer] - search SOCIAL WORK     [place] (0800 - 1630) Saturday and Sunday     Units: 4A, 4C, & 4E Pager: [phone]     Units: 5A, 5B, & 5C Pager: [phone]     Units: 6A & 6B   Pager: [phone]     Units: 6C & 6D Pager: [phone]     Units: 7A & 7B  Pager: [phone]     Units: 7C & 7D Pager: [phone]     Unit: [place] [facility] Pager: [phone]      [facility] (0663-0221) Saturday and Sunday      Units: 5 Ortho, 5 Med/Surg & WB ED  Pager:[phone]     Units: 6 Med/Surg, 8A, & 10A ICU  Pager: [phone]     After hours (1630 - 0000) Saturday & Sunday; (2072-5559) Mon-Fri; ([id number]) [facility] Recognized Holidays     Units: ALL  Pager: [phone]

## 2023-09-05 ENCOUNTER — APPOINTMENT (OUTPATIENT)
Dept: PHYSICAL THERAPY | Facility: CLINIC | Age: 43
DRG: 853 | End: 2023-09-05
Attending: INTERNAL MEDICINE
Payer: MEDICARE

## 2023-09-05 LAB
ALBUMIN SERPL BCG-MCNC: 3.2 G/DL (ref 3.5–5.2)
ALP SERPL-CCNC: 126 U/L (ref 40–129)
ALT SERPL W P-5'-P-CCNC: 21 U/L (ref 0–70)
ANION GAP SERPL CALCULATED.3IONS-SCNC: 11 MMOL/L (ref 7–15)
AST SERPL W P-5'-P-CCNC: 33 U/L (ref 0–45)
BILIRUB SERPL-MCNC: 0.2 MG/DL
BUN SERPL-MCNC: 18.2 MG/DL (ref 6–20)
CALCIUM SERPL-MCNC: 9.3 MG/DL (ref 8.6–10)
CHLORIDE SERPL-SCNC: 107 MMOL/L (ref 98–107)
CREAT SERPL-MCNC: 1.01 MG/DL (ref 0.67–1.17)
CRP SERPL-MCNC: 9.87 MG/L
DEPRECATED HCO3 PLAS-SCNC: 24 MMOL/L (ref 22–29)
ERYTHROCYTE [DISTWIDTH] IN BLOOD BY AUTOMATED COUNT: 18.3 % (ref 10–15)
ERYTHROCYTE [SEDIMENTATION RATE] IN BLOOD BY WESTERGREN METHOD: 1 MM/HR (ref 0–15)
GFR SERPL CREATININE-BSD FRML MDRD: >90 ML/MIN/1.73M2
GLUCOSE BLDC GLUCOMTR-MCNC: 121 MG/DL (ref 70–99)
GLUCOSE SERPL-MCNC: 111 MG/DL (ref 70–99)
HCT VFR BLD AUTO: 36.9 % (ref 40–53)
HGB BLD-MCNC: 11.5 G/DL (ref 13.3–17.7)
MAGNESIUM SERPL-MCNC: 1.6 MG/DL (ref 1.7–2.3)
MAGNESIUM SERPL-MCNC: 1.6 MG/DL (ref 1.7–2.3)
MCH RBC QN AUTO: 26.1 PG (ref 26.5–33)
MCHC RBC AUTO-ENTMCNC: 31.2 G/DL (ref 31.5–36.5)
MCV RBC AUTO: 84 FL (ref 78–100)
PHOSPHATE SERPL-MCNC: 3.8 MG/DL (ref 2.5–4.5)
PHOSPHATE SERPL-MCNC: 4.1 MG/DL (ref 2.5–4.5)
PLATELET # BLD AUTO: 326 10E3/UL (ref 150–450)
POTASSIUM SERPL-SCNC: 4.8 MMOL/L (ref 3.4–5.3)
POTASSIUM SERPL-SCNC: 5 MMOL/L (ref 3.4–5.3)
PROT SERPL-MCNC: 7.4 G/DL (ref 6.4–8.3)
RBC # BLD AUTO: 4.4 10E6/UL (ref 4.4–5.9)
SODIUM SERPL-SCNC: 142 MMOL/L (ref 136–145)
VANCOMYCIN SERPL-MCNC: 21.3 UG/ML
WBC # BLD AUTO: 7 10E3/UL (ref 4–11)

## 2023-09-05 PROCEDURE — 250N000011 HC RX IP 250 OP 636: Mod: JZ

## 2023-09-05 PROCEDURE — 87102 FUNGUS ISOLATION CULTURE: CPT | Performed by: INTERNAL MEDICINE

## 2023-09-05 PROCEDURE — 80053 COMPREHEN METABOLIC PANEL: CPT

## 2023-09-05 PROCEDURE — 80202 ASSAY OF VANCOMYCIN: CPT

## 2023-09-05 PROCEDURE — 84100 ASSAY OF PHOSPHORUS: CPT | Performed by: PEDIATRICS

## 2023-09-05 PROCEDURE — 86140 C-REACTIVE PROTEIN: CPT

## 2023-09-05 PROCEDURE — 87075 CULTR BACTERIA EXCEPT BLOOD: CPT | Performed by: INTERNAL MEDICINE

## 2023-09-05 PROCEDURE — 99232 SBSQ HOSP IP/OBS MODERATE 35: CPT | Mod: 24 | Performed by: INTERNAL MEDICINE

## 2023-09-05 PROCEDURE — 97110 THERAPEUTIC EXERCISES: CPT | Mod: GP

## 2023-09-05 PROCEDURE — 250N000011 HC RX IP 250 OP 636: Performed by: SURGERY

## 2023-09-05 PROCEDURE — 85652 RBC SED RATE AUTOMATED: CPT

## 2023-09-05 PROCEDURE — 97606 NEG PRS WND THER DME>50 SQCM: CPT

## 2023-09-05 PROCEDURE — 84100 ASSAY OF PHOSPHORUS: CPT | Performed by: STUDENT IN AN ORGANIZED HEALTH CARE EDUCATION/TRAINING PROGRAM

## 2023-09-05 PROCEDURE — 83735 ASSAY OF MAGNESIUM: CPT | Performed by: PEDIATRICS

## 2023-09-05 PROCEDURE — 258N000003 HC RX IP 258 OP 636: Performed by: SURGERY

## 2023-09-05 PROCEDURE — 84132 ASSAY OF SERUM POTASSIUM: CPT | Performed by: PEDIATRICS

## 2023-09-05 PROCEDURE — 250N000013 HC RX MED GY IP 250 OP 250 PS 637: Performed by: INTERNAL MEDICINE

## 2023-09-05 PROCEDURE — 97530 THERAPEUTIC ACTIVITIES: CPT | Mod: GP

## 2023-09-05 PROCEDURE — 87070 CULTURE OTHR SPECIMN AEROBIC: CPT | Performed by: INTERNAL MEDICINE

## 2023-09-05 PROCEDURE — 83735 ASSAY OF MAGNESIUM: CPT | Performed by: STUDENT IN AN ORGANIZED HEALTH CARE EDUCATION/TRAINING PROGRAM

## 2023-09-05 PROCEDURE — G0463 HOSPITAL OUTPT CLINIC VISIT: HCPCS | Mod: 25

## 2023-09-05 PROCEDURE — 120N000002 HC R&B MED SURG/OB UMMC

## 2023-09-05 PROCEDURE — 250N000013 HC RX MED GY IP 250 OP 250 PS 637

## 2023-09-05 PROCEDURE — 36415 COLL VENOUS BLD VENIPUNCTURE: CPT | Performed by: STUDENT IN AN ORGANIZED HEALTH CARE EDUCATION/TRAINING PROGRAM

## 2023-09-05 PROCEDURE — 85027 COMPLETE CBC AUTOMATED: CPT | Performed by: STUDENT IN AN ORGANIZED HEALTH CARE EDUCATION/TRAINING PROGRAM

## 2023-09-05 PROCEDURE — 36415 COLL VENOUS BLD VENIPUNCTURE: CPT

## 2023-09-05 PROCEDURE — 250N000011 HC RX IP 250 OP 636: Performed by: STUDENT IN AN ORGANIZED HEALTH CARE EDUCATION/TRAINING PROGRAM

## 2023-09-05 PROCEDURE — 258N000003 HC RX IP 258 OP 636

## 2023-09-05 RX ORDER — HYDROMORPHONE HCL IN WATER/PF 6 MG/30 ML
0.2 PATIENT CONTROLLED ANALGESIA SYRINGE INTRAVENOUS ONCE
Status: COMPLETED | OUTPATIENT
Start: 2023-09-05 | End: 2023-09-05

## 2023-09-05 RX ORDER — CEFAZOLIN SODIUM 1 G/50ML
1750 SOLUTION INTRAVENOUS EVERY 24 HOURS
Status: DISCONTINUED | OUTPATIENT
Start: 2023-09-06 | End: 2023-09-09 | Stop reason: HOSPADM

## 2023-09-05 RX ORDER — MAGNESIUM OXIDE 400 MG/1
400 TABLET ORAL EVERY 4 HOURS
Status: COMPLETED | OUTPATIENT
Start: 2023-09-05 | End: 2023-09-05

## 2023-09-05 RX ORDER — SODIUM CHLORIDE 9 MG/ML
INJECTION, SOLUTION INTRAVENOUS
Status: COMPLETED
Start: 2023-09-05 | End: 2023-09-05

## 2023-09-05 RX ADMIN — PIPERACILLIN AND TAZOBACTAM 4.5 G: 4; .5 INJECTION, POWDER, FOR SOLUTION INTRAVENOUS at 10:39

## 2023-09-05 RX ADMIN — PIPERACILLIN AND TAZOBACTAM 4.5 G: 4; .5 INJECTION, POWDER, FOR SOLUTION INTRAVENOUS at 02:56

## 2023-09-05 RX ADMIN — OXYCODONE HYDROCHLORIDE 10 MG: 10 TABLET ORAL at 15:37

## 2023-09-05 RX ADMIN — DULOXETINE HYDROCHLORIDE 60 MG: 60 CAPSULE, DELAYED RELEASE ORAL at 08:20

## 2023-09-05 RX ADMIN — BUSPIRONE HYDROCHLORIDE 10 MG: 10 TABLET ORAL at 21:02

## 2023-09-05 RX ADMIN — MAGNESIUM OXIDE TAB 400 MG (241.3 MG ELEMENTAL MG) 400 MG: 400 (241.3 MG) TAB at 13:11

## 2023-09-05 RX ADMIN — OXYCODONE HYDROCHLORIDE 10 MG: 10 TABLET ORAL at 09:53

## 2023-09-05 RX ADMIN — DULOXETINE HYDROCHLORIDE 60 MG: 60 CAPSULE, DELAYED RELEASE ORAL at 21:02

## 2023-09-05 RX ADMIN — MAGNESIUM OXIDE TAB 400 MG (241.3 MG ELEMENTAL MG) 400 MG: 400 (241.3 MG) TAB at 08:19

## 2023-09-05 RX ADMIN — PIPERACILLIN AND TAZOBACTAM 4.5 G: 4; .5 INJECTION, POWDER, FOR SOLUTION INTRAVENOUS at 22:23

## 2023-09-05 RX ADMIN — OXYCODONE HYDROCHLORIDE 10 MG: 10 TABLET ORAL at 22:23

## 2023-09-05 RX ADMIN — HEPARIN SODIUM 5000 UNITS: 5000 INJECTION, SOLUTION INTRAVENOUS; SUBCUTANEOUS at 23:27

## 2023-09-05 RX ADMIN — PANTOPRAZOLE SODIUM 40 MG: 40 TABLET, DELAYED RELEASE ORAL at 15:37

## 2023-09-05 RX ADMIN — PREDNISOLONE ACETATE 1 DROP: 10 SUSPENSION/ DROPS OPHTHALMIC at 08:57

## 2023-09-05 RX ADMIN — PREGABALIN 150 MG: 50 CAPSULE ORAL at 21:02

## 2023-09-05 RX ADMIN — SENNOSIDES AND DOCUSATE SODIUM 1 TABLET: 50; 8.6 TABLET ORAL at 08:20

## 2023-09-05 RX ADMIN — PREDNISOLONE ACETATE 1 DROP: 10 SUSPENSION/ DROPS OPHTHALMIC at 21:08

## 2023-09-05 RX ADMIN — OXYCODONE HYDROCHLORIDE 20 MG: 20 TABLET, FILM COATED, EXTENDED RELEASE ORAL at 08:20

## 2023-09-05 RX ADMIN — PIPERACILLIN AND TAZOBACTAM 4.5 G: 4; .5 INJECTION, POWDER, FOR SOLUTION INTRAVENOUS at 16:16

## 2023-09-05 RX ADMIN — HYDROMORPHONE HYDROCHLORIDE 0.2 MG: 0.2 INJECTION, SOLUTION INTRAMUSCULAR; INTRAVENOUS; SUBCUTANEOUS at 11:34

## 2023-09-05 RX ADMIN — HYDROMORPHONE HYDROCHLORIDE 0.2 MG: 0.2 INJECTION, SOLUTION INTRAMUSCULAR; INTRAVENOUS; SUBCUTANEOUS at 12:41

## 2023-09-05 RX ADMIN — SODIUM CHLORIDE 500 ML: 9 INJECTION, SOLUTION INTRAVENOUS at 23:27

## 2023-09-05 RX ADMIN — OXYCODONE HYDROCHLORIDE 10 MG: 10 TABLET ORAL at 04:50

## 2023-09-05 RX ADMIN — PREGABALIN 150 MG: 50 CAPSULE ORAL at 13:11

## 2023-09-05 RX ADMIN — HYDROMORPHONE HYDROCHLORIDE 0.4 MG: 0.2 INJECTION, SOLUTION INTRAMUSCULAR; INTRAVENOUS; SUBCUTANEOUS at 03:03

## 2023-09-05 RX ADMIN — HEPARIN SODIUM 5000 UNITS: 5000 INJECTION, SOLUTION INTRAVENOUS; SUBCUTANEOUS at 15:37

## 2023-09-05 RX ADMIN — PREGABALIN 150 MG: 50 CAPSULE ORAL at 08:20

## 2023-09-05 RX ADMIN — VANCOMYCIN HYDROCHLORIDE 1250 MG: 10 INJECTION, POWDER, LYOPHILIZED, FOR SOLUTION INTRAVENOUS at 07:10

## 2023-09-05 RX ADMIN — HEPARIN SODIUM 5000 UNITS: 5000 INJECTION, SOLUTION INTRAVENOUS; SUBCUTANEOUS at 08:57

## 2023-09-05 RX ADMIN — OXYCODONE HYDROCHLORIDE 20 MG: 20 TABLET, FILM COATED, EXTENDED RELEASE ORAL at 21:03

## 2023-09-05 RX ADMIN — BUPROPION HYDROCHLORIDE 450 MG: 150 TABLET, FILM COATED, EXTENDED RELEASE ORAL at 08:58

## 2023-09-05 RX ADMIN — PANTOPRAZOLE SODIUM 40 MG: 40 TABLET, DELAYED RELEASE ORAL at 08:20

## 2023-09-05 RX ADMIN — BUSPIRONE HYDROCHLORIDE 10 MG: 10 TABLET ORAL at 08:21

## 2023-09-05 ASSESSMENT — ACTIVITIES OF DAILY LIVING (ADL)
ADLS_ACUITY_SCORE: 39

## 2023-09-05 NOTE — CARE PLAN
NURSING ASSESSMENT  Patient is AOX4, Cognition same as baseline. Patient able to let needs known. VSS within normal limit at RA.     PAIN AND ACTIVITY  Patient reported Moderate generalized back and LLE pain. PRN pain management ADM, Patient voice some relief. Staff encouraged patient to get up but Patient refused. Patient was able to shift body weigh independently.     SKIN  Dressing change completed on Sacral wound scant amount of serosanguineous noted on previous dressing. Wound culture specimen sent to labs. Dressing on LLE changed by WOC nurse.  IV line changed by IV nurse. New line in place on  RA is Patent.     EDUCATION:  Provide on alternative non medical pain management . Patient voice understanding and declined offers.

## 2023-09-05 NOTE — PROGRESS NOTES
[facility] GENERAL INFECTIOUS DISEASES PROGRESS NOTE     Patient:  Saqib Bishop   Date of birth 1980, Medical record number [id number]  Date of Visit:  09/05/2023  Date of Admission: 8/27/2023  Consult Requested by:Nii Mancilla MD  Reason for Consult:  Blood cultures at outside hospital growing Shigella sonnei          Assessment and Recommendations:     Saqib Bishop is a 43 yo male with  a past medical history of T12 level spinal cord injury and paraplegia, colostomy, DJD, GERD, chronic sacral decubitus ulcer s/p flap and treatment for osteomyelitis Dec-2020, complicated by dehiscence, open wound and sacral decubitus ulceration Oct-2021, s/p an elective flap procedure in early 2023 which failed and resulted in a chronic sacral ulcer.      He presented to [facility] on 27-Aug-2023 with a foul odour and increased drainage from his left foot. He was found to be in septic alo with acute kidney injury and left foot necrotizing fasciitis.  He  was admitted to the SICU at [facility]. He underwent left below knee guillotine amputation on 28-Aug-2023, irrigation and debridement of left lower extremity residual limb on 30-Aug-2023. left leg tissue grew 1+ Parvimonas micra.   He was transferred out of ICU on 30-Aug-2023 and transferred to [facility] on 31-Aug-2023.     Bacteremia ( per OSH lab - no Shigella sonnei isolated )   - Blood cultures done at OSH ( [facility])  turned positive for Shigella sonnei 2/2 on 8/27/23 with gram stain showing 3+ gram positive cocci  ( called and spoke with Microbiology staff Poonam GPB 1/4 ( felt to be a contaminant) and GPC 2/4 , lab not able to identify and specimen is sent to [place] lab for identificantion. She said there was no Shigella isolated from blood cultures) ? error in care everywhere , furthermore, patient did not have GI symptoms   -  Blood cultures on 8/28/23 done here are negative x 2 so far.     Left foot necrotizing fasciitis   -  s/p left below knee guillotine amputation on 28-Aug-2023  - irrigation and debridement of left lower extremity residual limb on 30-Aug-2023. left leg tissue grew 1+ Parvimonas micra.    - wound culture 8/27/23 - Pseudomonasa aeruginosa   - RTOR this week or irrigation and debridement of LLE with vacc exchange vs. definitive BKA Thursday, 09/07 per Ortho    3. Sacral wounds for many years with pain   -  chronic sacral decubitus ulcer s/p flap and treatment for osteomyelitis Dec-2020  - complicated by dehiscence, open wound and sacral decubitus ulceration Oct-2021  -  s/p an elective flap procedure in early 2023 which failed and resulted in a chronic sacral ulcer.     4. Paraplegia    5. s/p colostomy       RECOMMENDATION:  - no Shigella sonnei isolated in OSH lab. unclear how this was reported in care everywhere   - worsening pain in left groin and lower back. pls sacral wound deep culture due to persistent drainage - gram stain, aerobic, anaerobic, fungal culture.   - called micro lab and requested suscptibility testing on Parvimonas   - Continue Zosyn and Vancomycin IV for now  - follow- up blood cxs from OSH - GPC , possibly Parvimonas, an anerobic gram positive cocci.   - continue wound care     Spencer Vera MD, M.Med.Sc  Staff, Infectious Diseases     Interval History  Saqib c/o pain in left groin and lower back. no fever, chills. no abdominal pain,            History of Present Illness:     Saqib Bishop is a 43 yo male with  a past medical history of T12 level spinal cord injury and paraplegia, colostomy, DJD, GERD, chronic sacral decubitus ulcer s/p flap and treatment for osteomyelitis Dec-2020, complicated by dehiscence, open wound and sacral decubitus ulceration Oct-2021, s/p an elective flap procedure in early 2023 which failed and resulted in a chronic sacral ulcer.      He presented to [facility] on 27-Aug-2023 with a foul odour and increased drainage from his left foot. He was  found to be in septic alo with acute kidney injury and left foot necrotizing fasciitis.  He  was admitted to the SICU at [facility]. He underwent left below knee guillotine amputation on 28-Aug-2023, irrigation and debridement of left lower extremity residual limb on 30-Aug-2023. left leg tissue grew 1+ Parvimonas micra.   He was transferred out of ICU on 30-Aug-2023 and transferred to [facility] on 31-Aug-2023.      wound culture 8/27/23 - Pseudomonasa aeruginosa   wound culture on 8/30/23 - 1+ Parvimonas micra     Blood cultures done at OSH turned positive for Shigella sonnei 2/2 on 8/27/23 with gram stain showing 3+ gram positive cocci  ( called and spoke with Microbiology staff GLENNY Levin 1/4 ( felt to be a contaminant) and OLIVIA 2/4 , lab not able to identify and specimen is sent to [facility] lab for identificantion. She said there was no Shigella isolated from blood cultures)     Blood cultures on 8/28/23 done here are negative x 2 so far.     Current antimicrobials : Pip/tazobactam since 8/27/23) and vancomycin IV ( since 8/28/23) , he was initially on Clindamycin IV 8/27-8/31) Pip/tazo 8/27-8/28    Saqib is feeling fine, no fever, chills and pain is controlled. he denies any nausea, vomiting, diarrhea recently. has good appetite.   has sacral wounds for many years. denies headaches minimal discharged from sacral wound. he has PCA/ sister who helps with wound dressing daily and goes to wound clinic every month. he says he will see wound care in [place] in Mid September.      Imagings:  Xray left knee 8/27/23   IMPRESSION: Image osseous structures are demineralized. No acute fracture, dislocation or knee joint effusion. No definite soft tissue gas or radiopaque foreign body. No cortical erosion to suggest underlying osteomyelitis.     Xray left foot 8/27/23  IMPRESSION: Extensive soft tissue gas and swelling noted throughout the left foot and ankle compatible with soft tissue infection. Thinning of the skin  at the fifth metatarsal phalangeal joint which may represent an area of ulceration. The fifth proximal   phalanx may be subluxed or dislocated medially at the metatarsal phalangeal joint. Diffuse osteopenia. This and the extensive soft tissue gas limits evaluation for fracture and/or bony erosion / osteomyelitis. If there is clinical concern for these   entities, MRI could be considered for further characterization.     Xray left  tibia and fibula 8/27/23  IMPRESSION: Image osseous structures are demineralized. Tibia and fibula are intact without acute fractures or cortical erosions. Significant subcutaneous gas is seen in the plantar soft tissue of the imaged foot.     Xray left tibia and fibula 8/28/23  IMPRESSION: There are postoperative changes from amputation of the left lower extremity at the level of the distal tibia/fibular diaphysis. No radiographic evidence of osteomyelitis is seen. No fracture. There is some soft tissue swelling over the   visualized lower extremity.    CXR 8/28/23  IMPRESSION:   1. Endotracheal tube tip projects 4.6 cm above the danette.  2. Low lung volumes with increased bilateral perihilar and bibasilar/retrocardiac opacities, which may represent atelectasis/pulmonary edema.  3. Small left pleural effusion.         Review of Systems:   CONSTITUTIONAL:  No fevers or chills  EYES: negative for icterus  ENT:  negative for hearing loss, tinnitus and sore throat  RESPIRATORY:  negative for cough with sputum and dyspnea  CARDIOVASCULAR:  negative for chest pain, dyspnea  GASTROINTESTINAL:  negative for nausea, vomiting, diarrhea and constipation  GENITOURINARY:  negative for dysuria  HEME:  No easy bruising  INTEGUMENT:  see HPI   NEURO:  Negative for headache         Past Medical History:     Past Medical History:   Diagnosis Date    Degenerative joint disease     Gastro-oesophageal reflux disease             Past Surgical History:     Past Surgical History:   Procedure Laterality Date     AMPUTATE LEG BELOW KNEE Left 2023    Procedure: Left below knee Guillotine Amputation;  Surgeon: Sesar Barth MD;  Location: [facility] OR    BACK SURGERY      lumbar fusion    EXPLORE SPINE, REMOVE HARDWARE, COMBINED  2012    Procedure:COMBINED EXPLORE SPINE, REMOVE HARDWARE; EXPLORE SPINE, REMOVE HARDWARE L4-S1; Surgeon:FAM GONZALEZ; Location:[facility] OR    IRRIGATION AND DEBRIDEMENT LOWER EXTREMITY, COMBINED Left 2023    Procedure: Irrigation and debridement lower extremity, combined and wound vac exchange;  Surgeon: Etienne Maier MD;  Location: [facility] OR    ORTHOPEDIC SURGERY      right foot surgery            Family History:   History reviewed. No pertinent family history.         Social History:     Social History     Tobacco Use    Smoking status: Former     Types: Cigarettes     Quit date: 2011     Years since quittin.8    Smokeless tobacco: Not on file   Substance Use Topics    Alcohol use: No     History   Sexual Activity    Sexual activity: Not on file            Current Medications (antimicrobials listed in bold):      buPROPion  450 mg Oral Daily    busPIRone  10 mg Oral BID    DULoxetine  60 mg Oral BID    heparin ANTICOAGULANT  5,000 Units Subcutaneous Q8H    [Held by provider] hydroxychloroquine  400 mg Oral Daily    magnesium oxide  400 mg Oral Q4H    oxyCODONE  20 mg Oral Q12H    pantoprazole  40 mg Oral BID AC    piperacillin-tazobactam  4.5 g Intravenous Q6H    polyethylene glycol  17 g Oral Daily    prednisoLONE acetate  1 drop Both Eyes BID    pregabalin  150 mg Oral TID    senna-docusate  1 tablet Oral BID    sodium chloride (PF)  3 mL Intracatheter Q8H    [START ON 2023] vancomycin  1,750 mg Intravenous Q24H          Allergies:     Allergies   Allergen Reactions    Adhesive Tape Hives     From tape from surgery    Benzoin Hives and Rash    Droperidol Anaphylaxis    Prednisone      vomiting    Vitamin D Rash     flairs up his sarcoidosis          Physical Exam:    Vitals were reviewed  Patient Vitals for the past 24 hrs:   BP Temp Temp src Pulse Resp SpO2   09/04/23 1351 (!) 150/88 97.6  F (36.4  C) Oral 80 18 95 %     Ranges for his vital signs:  Temp:  [97.6  F (36.4  C)] 97.6  F (36.4  C)  Pulse:  [80] 80  Resp:  [18] 18  BP: (150)/(88) 150/88  SpO2:  [95 %] 95 %  Physical Examination:  GENERAL:  well-developed, well-nourished, alert, oriented, in bed in no acute distress.   HEENT:  Head is normocephalic, atraumatic   EYES:  Eyes have anicteric sclerae without conjunctival injection.    NECK:  Supple.   LUNGS:  Clear to auscultation bilateral.   CARDIOVASCULAR:  Regular rate and rhythm with no murmurs  ABDOMEN:  Normal bowel sounds, soft, nontender. No appreciable hepatosplenomegaly colostomy. soft stools.   SKIN:  left BKA - site is covered.   Line(s) are in place without any surrounding erythema or exudate.   NEUROLOGIC: paraplegic   ureteral catheter          Laboratory Data:     Inflammatory Markers    Recent Labs   Lab Test 09/05/23  0029 09/04/23  0408 09/03/23  0323 09/02/23  0358 09/01/23  0818 07/18/23  1359   SED 1 57* 1 59* 78* 30*     Hematology Studies    Recent Labs   Lab Test 09/05/23  0029 09/04/23  0408 09/03/23  0323 09/02/23  0358 09/01/23  0818 08/31/23  0433   WBC 7.0 6.1 7.6 6.7 4.5 3.7*  3.7*   HGB 11.5* 11.2* 10.9* 11.0* 10.5* 10.0*   MCV 84 82 82 81 81 85    312 307 335 328 296     Metabolic Studies     Recent Labs   Lab Test 09/05/23  0636 09/05/23  0029 09/04/23  0408 09/03/23  0323 09/02/23  0358 09/01/23  0818   NA  --  142 138 139 142 143   POTASSIUM 4.8 5.0 4.7 4.6 4.1 4.1   CHLORIDE  --  107 104 105 107 108*   CO2  --  24 27 27 27 27   BUN  --  18.2 17.9 16.7 14.4 14.4   CR  --  1.01 0.90 0.95 0.86 1.00   GFRESTIMATED  --  >90 >90 >90 >90 >90     Hepatic Studies    Recent Labs   Lab Test 09/05/23  0029 09/04/23  0408 09/03/23  0323 09/02/23  0358 09/01/23  0818 08/31/23  0433   BILITOTAL 0.2 0.2 0.2 0.2 0.2 0.2   ALKPHOS 126 122  118 128 125 126   ALBUMIN 3.2* 3.1* 2.5* 2.7* 2.5* 2.4*   AST 33 26 25 25 32 31   ALT 21 19 20 22 25 19     Microbiology:  Culture Micro   Date Value Ref Range Status   01/12/2005 No Beta Streptococcus isolated  Final     Vancomycin Levels    Recent Labs   Lab Test 09/05/23  0636 09/02/23  1259 08/31/23  1202   VANCOMYCIN 21.3 26.6* 25.5*     Hepatitis C Testing     Hepatitis C Antibody   Date Value Ref Range Status   04/06/2005 Negative NEG Final

## 2023-09-05 NOTE — PROGRESS NOTES
Orthopaedic Surgery Progress Note 09/05/2023    Subjective  AFVSS. NAEO. Denies fever, chills. Denies any new numbness, tingling, weakness. Had BM ytd.     Objective  Temp: 97.6  F (36.4  C) Temp src: Oral BP: (!) 150/88 Pulse: 80   Resp: 18 SpO2: 95 % O2 Device: None (Room air)      Exam:  Gen: Sleeping  Resp: Nonlabored breathing  Cardio: Ext wwp     Drains:  250cc output in canister.     MSK:    LLE:  - Wound VAC holding suction and appropriate  - No sensation to light touch below knee, intact in femoral/LFCN above the knee  - Dressings c/d/I      Recent Labs   Lab 09/05/23  0029 09/04/23  0408 09/03/23  0323   WBC 7.0 6.1 7.6   HGB 11.5* 11.2* 10.9*    312 307       Cultures:  08/30 Cx: + Parvimonas micra   08/28 Cx NGTD    Assessment:   Assessment: Saqib Bishop is a 42 year old male s/p emergent left transtibial guillotine amputation for LLE NSTI on 8/27-8/28. RTOR  for I&D and wound vac exchange on 08/30 and 9/2. Anticipate will able to undergo definitive BKA at next surgery.    Cultures from 8/30 + Parvimonas micra and anaerobic gram positive bacilli in broth, currently on vancomycin and zosyn per medicine.     Plan for today:  - Surgical Planning, likely RTOR for irrigation and debridement of LLE with vacc exchange vs. definitive BKA Thursday, 09/07  - NPO at MN 09/06 in anticipation for OR 09/07  - Please hold 09/07 AM SQ Heparin dose in anticipation of OR     Plan:  Medicine Primary   Activity: Per primary, okay for UOOB with assist   Weight bearing status: NWB LLE  Antibiotics: Vacomycin and Zosyn per medicine/infectious disease  Diet: NPO at MN 09/06  DVT prophylaxis: per primary, okay to start chemoprophylaxis from orthopedic perspective, on SQH 5000u. Please hold 09/07 AM dose  Labs: trend CRP, WBC   Drains: Document VAC output  Pain management: Multimodal, per primary  X-rays: complete  Cultures: 08/28 cx NGTD, 08/30 cx + Parvimonas micra   Follow-up: TBD     Disposition: RTOR for  definitive amputation TBD    Respectfully,    Wilfred Elaine MD  Orthopedic Surgery PGY1  [phone]    Please page me directly with any questions/concerns during regular weekday hours before 5 pm. If there is no response, if it is a weekend, or if it is during evening hours then please page the orthopedic surgery resident on call.
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
## 2023-09-05 NOTE — CARE PLAN
"  Shift: 1900 - 0730    BP (!) 150/88 (BP Location: Left arm, Patient Position: Semi-Anguiano's)   Pulse 80   Temp 97.6  F (36.4  C) (Oral)   Resp 18   Ht 1.854 m (6' 1\")   Wt 138 kg (304 lb 3.8 oz)   SpO2 95%   BMI 40.14 kg/m      Patient is alert and oriented x 4 this shift, denies SOB,numbness and tingling, VSS, pain managed with Oxycodone and dilaudid this shift. Colostomy and bui in place. Left PIV infusing intermittent abx. NPO midnight 9/6  Pt. Is able call appropriately using call light and able to make needs known. No acute change this shift. Continue with POC  "
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
## 2023-09-05 NOTE — PHARMACY-VANCOMYCIN DOSING SERVICE
Pharmacy Vancomycin Note  Date of Service 2023  Patient's  1980   42 year old, male    Indication: Skin and Soft Tissue Infection  Day of Therapy: Started on 23  Current vancomycin regimen:  1250 mg IV q12h  Current vancomycin monitoring method: AUC  Current vancomycin therapeutic monitoring goal: 400-600 mg*h/L    InsightRX Prediction of Current Vancomycin Regimen  Loading dose: N/A  Regimen: 1250 mg IV every 12 hours.  Start time: 19:10 on 2023  Exposure target: AUC24 (range)400-600 mg/L.hr   AUC24,ss: 627 mg/L.hr  Probability of AUC24 > 400: 100 %  Ctrough,ss: 22.4 mg/L  Probability of Ctrough,ss > 20: 97 %  Probability of nephrotoxicity (Lodise ANNE ): 22 %    Current estimated CrCl = Estimated Creatinine Clearance: 138.9 mL/min (based on SCr of 1.01 mg/dL).    Creatinine for last 3 days  9/3/2023:  3:23 AM Creatinine 0.95 mg/dL  2023:  4:08 AM Creatinine 0.90 mg/dL  2023: 12:29 AM Creatinine 1.01 mg/dL    Recent Vancomycin Levels (past 3 days)  2023: 12:59 PM Vancomycin 26.6 ug/mL  2023:  6:36 AM Vancomycin 21.3 ug/mL    Vancomycin IV Administrations (past 72 hours)                     vancomycin (VANCOCIN) 1,250 mg in 0.9% NaCl 250 mL intermittent infusion (mg) 1,250 mg New Bag 23 0710     1,250 mg New Bag 23 1812     1,250 mg New Bag  0511     1,250 mg New Bag 23 1906     1,250 mg New Bag  0531     1,250 mg New Bag 23 1805                    Nephrotoxins and other renal medications (From now, onward)      Start     Dose/Rate Route Frequency Ordered Stop    23 1800  vancomycin (VANCOCIN) 1,250 mg in 0.9% NaCl 250 mL intermittent infusion         1,250 mg  over 90 Minutes Intravenous EVERY 12 HOURS 23 1358      23 1600  piperacillin-tazobactam (ZOSYN) 4.5 g vial to attach to  mL bag         4.5 g  over 30 Minutes Intravenous EVERY 6 HOURS 23 1127                 Contrast Orders - past 72 hours (72h ago,  onward)      None            Interpretation of levels and current regimen:  Vancomycin level is reflective of AUC greater than 600    Has serum creatinine changed greater than 50% in last 72 hours: No    Urine output:  unable to determine    Renal Function: Stable    InsightRX Prediction of Planned New Vancomycin Regimen  Loading dose: N/A  Regimen: 1750 mg IV every 24 hours.  Start time: 19:10 on 09/05/2023  Exposure target: AUC24 (range)400-600 mg/L.hr   AUC24,ss: 466 mg/L.hr  Probability of AUC24 > 400: 100 %  Ctrough,ss: 14.3 mg/L  Probability of Ctrough,ss > 20: 1 %  Probability of nephrotoxicity (Lodise ANNE 2009): 9 %    Plan:  Decrease Dose to 1750 mg IV q24h  Vancomycin monitoring method: AUC  Vancomycin therapeutic monitoring goal: 400-600 mg*h/L  Pharmacy will check vancomycin levels as appropriate in 1-3 Days.  Serum creatinine levels will be ordered a minimum of twice weekly.    Shantell David, PharmD, BCPS

## 2023-09-06 LAB
ALBUMIN SERPL BCG-MCNC: 3.6 G/DL (ref 3.5–5.2)
ALP SERPL-CCNC: 128 U/L (ref 40–129)
ALT SERPL W P-5'-P-CCNC: 22 U/L (ref 0–70)
ANION GAP SERPL CALCULATED.3IONS-SCNC: 11 MMOL/L (ref 7–15)
AST SERPL W P-5'-P-CCNC: 31 U/L (ref 0–45)
BACTERIA TISS BX CULT: ABNORMAL
BACTERIA TISS BX CULT: ABNORMAL
BILIRUB SERPL-MCNC: 0.2 MG/DL
BUN SERPL-MCNC: 22 MG/DL (ref 6–20)
CALCIUM SERPL-MCNC: 10 MG/DL (ref 8.6–10)
CHLORIDE SERPL-SCNC: 103 MMOL/L (ref 98–107)
CREAT SERPL-MCNC: 1.05 MG/DL (ref 0.67–1.17)
CRP SERPL-MCNC: 7.98 MG/L
DEPRECATED HCO3 PLAS-SCNC: 24 MMOL/L (ref 22–29)
ERYTHROCYTE [DISTWIDTH] IN BLOOD BY AUTOMATED COUNT: 18.5 % (ref 10–15)
ERYTHROCYTE [SEDIMENTATION RATE] IN BLOOD BY WESTERGREN METHOD: 46 MM/HR (ref 0–15)
GFR SERPL CREATININE-BSD FRML MDRD: >90 ML/MIN/1.73M2
GLUCOSE BLDC GLUCOMTR-MCNC: 93 MG/DL (ref 70–99)
GLUCOSE BLDC GLUCOMTR-MCNC: 99 MG/DL (ref 70–99)
GLUCOSE SERPL-MCNC: 98 MG/DL (ref 70–99)
HBA1C MFR BLD: 5.5 %
HCT VFR BLD AUTO: 38.8 % (ref 40–53)
HGB BLD-MCNC: 12 G/DL (ref 13.3–17.7)
MAGNESIUM SERPL-MCNC: 1.6 MG/DL (ref 1.7–2.3)
MCH RBC QN AUTO: 25.9 PG (ref 26.5–33)
MCHC RBC AUTO-ENTMCNC: 30.9 G/DL (ref 31.5–36.5)
MCV RBC AUTO: 84 FL (ref 78–100)
PHOSPHATE SERPL-MCNC: 4.6 MG/DL (ref 2.5–4.5)
PLATELET # BLD AUTO: 363 10E3/UL (ref 150–450)
POTASSIUM SERPL-SCNC: 5.1 MMOL/L (ref 3.4–5.3)
PROT SERPL-MCNC: 7.9 G/DL (ref 6.4–8.3)
RBC # BLD AUTO: 4.63 10E6/UL (ref 4.4–5.9)
SODIUM SERPL-SCNC: 138 MMOL/L (ref 136–145)
WBC # BLD AUTO: 8.8 10E3/UL (ref 4–11)

## 2023-09-06 PROCEDURE — 250N000013 HC RX MED GY IP 250 OP 250 PS 637

## 2023-09-06 PROCEDURE — 36415 COLL VENOUS BLD VENIPUNCTURE: CPT | Performed by: INTERNAL MEDICINE

## 2023-09-06 PROCEDURE — 84100 ASSAY OF PHOSPHORUS: CPT | Performed by: STUDENT IN AN ORGANIZED HEALTH CARE EDUCATION/TRAINING PROGRAM

## 2023-09-06 PROCEDURE — 99233 SBSQ HOSP IP/OBS HIGH 50: CPT | Performed by: INTERNAL MEDICINE

## 2023-09-06 PROCEDURE — 83735 ASSAY OF MAGNESIUM: CPT | Performed by: STUDENT IN AN ORGANIZED HEALTH CARE EDUCATION/TRAINING PROGRAM

## 2023-09-06 PROCEDURE — 86140 C-REACTIVE PROTEIN: CPT

## 2023-09-06 PROCEDURE — 120N000002 HC R&B MED SURG/OB UMMC

## 2023-09-06 PROCEDURE — 250N000011 HC RX IP 250 OP 636: Mod: JZ

## 2023-09-06 PROCEDURE — 85652 RBC SED RATE AUTOMATED: CPT

## 2023-09-06 PROCEDURE — 250N000013 HC RX MED GY IP 250 OP 250 PS 637: Performed by: INTERNAL MEDICINE

## 2023-09-06 PROCEDURE — 999N000127 HC STATISTIC PERIPHERAL IV START W US GUIDANCE

## 2023-09-06 PROCEDURE — 36415 COLL VENOUS BLD VENIPUNCTURE: CPT

## 2023-09-06 PROCEDURE — 85014 HEMATOCRIT: CPT | Performed by: STUDENT IN AN ORGANIZED HEALTH CARE EDUCATION/TRAINING PROGRAM

## 2023-09-06 PROCEDURE — 83036 HEMOGLOBIN GLYCOSYLATED A1C: CPT | Performed by: INTERNAL MEDICINE

## 2023-09-06 PROCEDURE — 258N000003 HC RX IP 258 OP 636: Performed by: SURGERY

## 2023-09-06 PROCEDURE — 80053 COMPREHEN METABOLIC PANEL: CPT

## 2023-09-06 PROCEDURE — 250N000011 HC RX IP 250 OP 636: Performed by: SURGERY

## 2023-09-06 RX ORDER — MAGNESIUM OXIDE 400 MG/1
400 TABLET ORAL EVERY 4 HOURS
Status: COMPLETED | OUTPATIENT
Start: 2023-09-06 | End: 2023-09-06

## 2023-09-06 RX ADMIN — HYDROMORPHONE HYDROCHLORIDE 0.2 MG: 0.2 INJECTION, SOLUTION INTRAMUSCULAR; INTRAVENOUS; SUBCUTANEOUS at 10:29

## 2023-09-06 RX ADMIN — PIPERACILLIN AND TAZOBACTAM 4.5 G: 4; .5 INJECTION, POWDER, FOR SOLUTION INTRAVENOUS at 12:10

## 2023-09-06 RX ADMIN — PREDNISOLONE ACETATE 1 DROP: 10 SUSPENSION/ DROPS OPHTHALMIC at 20:25

## 2023-09-06 RX ADMIN — PIPERACILLIN AND TAZOBACTAM 4.5 G: 4; .5 INJECTION, POWDER, FOR SOLUTION INTRAVENOUS at 23:02

## 2023-09-06 RX ADMIN — DULOXETINE HYDROCHLORIDE 60 MG: 60 CAPSULE, DELAYED RELEASE ORAL at 19:29

## 2023-09-06 RX ADMIN — HYDROMORPHONE HYDROCHLORIDE 0.4 MG: 0.2 INJECTION, SOLUTION INTRAMUSCULAR; INTRAVENOUS; SUBCUTANEOUS at 22:57

## 2023-09-06 RX ADMIN — ACETAMINOPHEN 650 MG: 325 TABLET, FILM COATED ORAL at 04:12

## 2023-09-06 RX ADMIN — SENNOSIDES AND DOCUSATE SODIUM 1 TABLET: 50; 8.6 TABLET ORAL at 19:29

## 2023-09-06 RX ADMIN — DULOXETINE HYDROCHLORIDE 60 MG: 60 CAPSULE, DELAYED RELEASE ORAL at 09:01

## 2023-09-06 RX ADMIN — BUSPIRONE HYDROCHLORIDE 10 MG: 10 TABLET ORAL at 07:59

## 2023-09-06 RX ADMIN — OXYCODONE HYDROCHLORIDE 10 MG: 10 TABLET ORAL at 19:29

## 2023-09-06 RX ADMIN — MAGNESIUM OXIDE TAB 400 MG (241.3 MG ELEMENTAL MG) 400 MG: 400 (241.3 MG) TAB at 14:08

## 2023-09-06 RX ADMIN — OXYCODONE HYDROCHLORIDE 10 MG: 10 TABLET ORAL at 01:37

## 2023-09-06 RX ADMIN — OXYCODONE HYDROCHLORIDE 20 MG: 20 TABLET, FILM COATED, EXTENDED RELEASE ORAL at 19:29

## 2023-09-06 RX ADMIN — OXYCODONE HYDROCHLORIDE 5 MG: 10 TABLET ORAL at 06:21

## 2023-09-06 RX ADMIN — PREGABALIN 150 MG: 50 CAPSULE ORAL at 14:08

## 2023-09-06 RX ADMIN — VANCOMYCIN HYDROCHLORIDE 1750 MG: 10 INJECTION, POWDER, LYOPHILIZED, FOR SOLUTION INTRAVENOUS at 06:16

## 2023-09-06 RX ADMIN — PIPERACILLIN AND TAZOBACTAM 4.5 G: 4; .5 INJECTION, POWDER, FOR SOLUTION INTRAVENOUS at 18:30

## 2023-09-06 RX ADMIN — PANTOPRAZOLE SODIUM 40 MG: 40 TABLET, DELAYED RELEASE ORAL at 07:58

## 2023-09-06 RX ADMIN — BUSPIRONE HYDROCHLORIDE 10 MG: 10 TABLET ORAL at 19:29

## 2023-09-06 RX ADMIN — OXYCODONE HYDROCHLORIDE 10 MG: 10 TABLET ORAL at 12:10

## 2023-09-06 RX ADMIN — PIPERACILLIN AND TAZOBACTAM 4.5 G: 4; .5 INJECTION, POWDER, FOR SOLUTION INTRAVENOUS at 04:12

## 2023-09-06 RX ADMIN — OXYCODONE HYDROCHLORIDE 20 MG: 20 TABLET, FILM COATED, EXTENDED RELEASE ORAL at 07:59

## 2023-09-06 RX ADMIN — PREDNISOLONE ACETATE 1 DROP: 10 SUSPENSION/ DROPS OPHTHALMIC at 07:58

## 2023-09-06 RX ADMIN — BUPROPION HYDROCHLORIDE 450 MG: 150 TABLET, FILM COATED, EXTENDED RELEASE ORAL at 07:58

## 2023-09-06 RX ADMIN — PANTOPRAZOLE SODIUM 40 MG: 40 TABLET, DELAYED RELEASE ORAL at 16:07

## 2023-09-06 RX ADMIN — MAGNESIUM OXIDE TAB 400 MG (241.3 MG ELEMENTAL MG) 400 MG: 400 (241.3 MG) TAB at 10:30

## 2023-09-06 RX ADMIN — OXYCODONE HYDROCHLORIDE 10 MG: 10 TABLET ORAL at 16:07

## 2023-09-06 RX ADMIN — HYDROMORPHONE HYDROCHLORIDE 0.2 MG: 0.2 INJECTION, SOLUTION INTRAMUSCULAR; INTRAVENOUS; SUBCUTANEOUS at 04:12

## 2023-09-06 RX ADMIN — SENNOSIDES AND DOCUSATE SODIUM 1 TABLET: 50; 8.6 TABLET ORAL at 07:59

## 2023-09-06 RX ADMIN — ACETAMINOPHEN 650 MG: 325 TABLET, FILM COATED ORAL at 22:57

## 2023-09-06 RX ADMIN — PREGABALIN 150 MG: 50 CAPSULE ORAL at 07:58

## 2023-09-06 RX ADMIN — PREGABALIN 150 MG: 50 CAPSULE ORAL at 19:30

## 2023-09-06 ASSESSMENT — ACTIVITIES OF DAILY LIVING (ADL)
ADLS_ACUITY_SCORE: 39

## 2023-09-06 NOTE — PLAN OF CARE
Received alert and oriented x4, in room air  Denies N/V, no SOB, no sensation B/L Lower ext as baseline  Complained of pain, with PRN Oxycodone, Tylenol  Left foot amputee - wound dressing is clean and intact with wound vac. Sacral wound - no drainage  With Colostomy - intact & Dyer catheter, draining well  NPO post MN, for I&D. CHG bath provided  Able to move on the bed for repositioning  PIV line at right hand, saline locked  Keep on the same care plan.

## 2023-09-06 NOTE — PROGRESS NOTES
Orthopaedic Surgery Progress Note 09/06/2023    Subjective  AFVSS. NAEO. 09/05 WBC 7 CRP 9.9 ESR 1. WOC exchanged vacc ytd. Denies fever, chills. Denies any new numbness, tingling, weakness. Had BM ytd.     Objective  Temp: 97.7  F (36.5  C) Temp src: Oral BP: (!) 152/90 Pulse: 90   Resp: 18 SpO2: 95 % O2 Device: None (Room air)      Exam:  Gen: Sleeping  Resp: Nonlabored breathing  Cardio: Ext wwp     Drains:  300cc output in canister.     MSK:    LLE:  - Wound VAC holding suction and appropriate  - No sensation to light touch below knee, intact in femoral/LFCN above the knee  - Dressings c/d/I      Recent Labs   Lab 09/05/23  0029 09/04/23  0408 09/03/23  0323   WBC 7.0 6.1 7.6   HGB 11.5* 11.2* 10.9*    312 307       Cultures:  08/30 Cx: + Parvimonas micra   08/28 Cx NGTD    Assessment:   Assessment: Saqib Bishop is a 42 year old male s/p emergent left transtibial guillotine amputation for LLE NSTI on 8/27-8/28. RTOR  for I&D and wound vac exchange on 08/30 and 9/2. Anticipate will able to undergo definitive BKA at next surgery.    Cultures from 8/30 + Parvimonas micra and anaerobic gram positive bacilli in broth, currently on vancomycin and zosyn per medicine.     Plan for today:  - RTOR for irrigation and debridement of LLE with vacc exchange vs. definitive BKA tomorrow  - NPO at MN in anticipation for OR  - Please hold 09/07 AM SQ Heparin dose in anticipation of OR     Plan:  Medicine Primary   Activity: Per primary, okay for UOOB with assist   Weight bearing status: NWB LLE  Antibiotics: Vacomycin and Zosyn per medicine/infectious disease  Diet: NPO at MN   DVT prophylaxis: per primary, okay to start chemoprophylaxis from orthopedic perspective, on SQH 5000u. Please hold 09/07 AM dose  Labs: trend CRP, WBC   Drains: Document VAC output  Pain management: Multimodal, per primary  X-rays: complete  Cultures: 08/28 cx NGTD, 08/30 cx + Parvimonas micra   Follow-up: TBD     Disposition: RTOR for  definitive amputation 09/07    Respectfully,    Wilfred Elaine MD  Orthopedic Surgery PGY1  [phone]    Please page me directly with any questions/concerns during regular weekday hours before 5 pm. If there is no response, if it is a weekend, or if it is during evening hours then please page the orthopedic surgery resident on call.
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
## 2023-09-06 NOTE — PROGRESS NOTES
Care Management Follow Up    Length of Stay (days): 10    Expected Discharge Date: TBD pending surgery      Concerns to be Addressed: Discharge planning     Patient plan of care discussed at interdisciplinary rounds: Yes    Anticipated Discharge Disposition: Acute Rehab     Anticipated Discharge Services: None  Anticipated Discharge DME: None    Patient/family educated on Medicare website which has current facility and service quality ratings: No  Education Provided on the Discharge Plan: No, did not speak with patient at this time  Patient/Family in Agreement with the Plan: Yes    Referrals Placed by CM/SW: Not applicable   Private pay costs discussed: Not applicable    Additional Information:  Social work informed in IDT rounds that patient is not medically ready as he will need to undergo a surgery. This likely will take place tomorrow. Per [facility], they will continue to follow patient for ARU placement. Social work will continue to follow.     JARAD Camargo, LGSW  5 Med Surg and 10 ICU   [facility]  Phone: [phone]  Pager: [phone]

## 2023-09-06 NOTE — CARE PLAN
NURSING ASSESSMENT  Patient is AOX4, Cognition same as baseline. Patient able to let needs known. VSS within normal limit at RA.      PAIN AND ACTIVITY  Patient reported Moderate generalized back and LLE pain. PRN pain management ADM, Patient voice some relief. Staff encouraged patient to get up but Patient refused. Patient was able to shift body weigh independently.      SKIN  Dressing change completed on Sacral wound scant amount of serous drainage noted on previous dressing. Dressing on LLE clean and intact. Wound VACC in place and canister changed during shift.  PIV line changed by IV nurse x2 during shift. Current PIV in place in RA is patent and infusing.       EDUCATION:  Provide on alternative non medical pain management . Patient voice understanding and declined offers.      PLAN:  Patient is scheduled for Surgery tomorrow and CBG bath  and NPO protocol needs to be completed prior . Patient and Family updated. Will continue to monitor.

## 2023-09-07 ENCOUNTER — ANESTHESIA EVENT (OUTPATIENT)
Dept: SURGERY | Facility: CLINIC | Age: 43
DRG: 853 | End: 2023-09-07
Payer: MEDICARE

## 2023-09-07 ENCOUNTER — ANESTHESIA (OUTPATIENT)
Dept: SURGERY | Facility: CLINIC | Age: 43
DRG: 853 | End: 2023-09-07
Payer: MEDICARE

## 2023-09-07 LAB
ALBUMIN SERPL BCG-MCNC: 3.4 G/DL (ref 3.5–5.2)
ALP SERPL-CCNC: 141 U/L (ref 40–129)
ALT SERPL W P-5'-P-CCNC: 23 U/L (ref 0–70)
ANION GAP SERPL CALCULATED.3IONS-SCNC: 10 MMOL/L (ref 7–15)
AST SERPL W P-5'-P-CCNC: 31 U/L (ref 0–45)
BACTERIA ABSC ANAEROBE+AEROBE CULT: NO GROWTH
BILIRUB SERPL-MCNC: 0.2 MG/DL
BUN SERPL-MCNC: 27.8 MG/DL (ref 6–20)
CALCIUM SERPL-MCNC: 9.7 MG/DL (ref 8.6–10)
CHLORIDE SERPL-SCNC: 101 MMOL/L (ref 98–107)
CREAT SERPL-MCNC: 1.14 MG/DL (ref 0.67–1.17)
CRP SERPL-MCNC: 23.98 MG/L
DEPRECATED HCO3 PLAS-SCNC: 26 MMOL/L (ref 22–29)
EGFRCR SERPLBLD CKD-EPI 2021: 82 ML/MIN/1.73M2
ERYTHROCYTE [DISTWIDTH] IN BLOOD BY AUTOMATED COUNT: 19 % (ref 10–15)
ERYTHROCYTE [SEDIMENTATION RATE] IN BLOOD BY WESTERGREN METHOD: 1 MM/HR (ref 0–15)
GLUCOSE SERPL-MCNC: 93 MG/DL (ref 70–99)
HCT VFR BLD AUTO: 40.6 % (ref 40–53)
HGB BLD-MCNC: 12.4 G/DL (ref 13.3–17.7)
MAGNESIUM SERPL-MCNC: 1.6 MG/DL (ref 1.7–2.3)
MCH RBC QN AUTO: 26.2 PG (ref 26.5–33)
MCHC RBC AUTO-ENTMCNC: 30.5 G/DL (ref 31.5–36.5)
MCV RBC AUTO: 86 FL (ref 78–100)
PHOSPHATE SERPL-MCNC: 5 MG/DL (ref 2.5–4.5)
PLATELET # BLD AUTO: 357 10E3/UL (ref 150–450)
POTASSIUM SERPL-SCNC: 4.7 MMOL/L (ref 3.4–5.3)
PROT SERPL-MCNC: 7.9 G/DL (ref 6.4–8.3)
RBC # BLD AUTO: 4.73 10E6/UL (ref 4.4–5.9)
SODIUM SERPL-SCNC: 137 MMOL/L (ref 136–145)
WBC # BLD AUTO: 7.8 10E3/UL (ref 4–11)

## 2023-09-07 PROCEDURE — 250N000013 HC RX MED GY IP 250 OP 250 PS 637

## 2023-09-07 PROCEDURE — 250N000011 HC RX IP 250 OP 636: Mod: JZ

## 2023-09-07 PROCEDURE — 710N000011 HC RECOVERY PHASE 1, LEVEL 3, PER MIN: Performed by: ORTHOPAEDIC SURGERY

## 2023-09-07 PROCEDURE — 36415 COLL VENOUS BLD VENIPUNCTURE: CPT

## 2023-09-07 PROCEDURE — 250N000011 HC RX IP 250 OP 636

## 2023-09-07 PROCEDURE — 84100 ASSAY OF PHOSPHORUS: CPT | Performed by: STUDENT IN AN ORGANIZED HEALTH CARE EDUCATION/TRAINING PROGRAM

## 2023-09-07 PROCEDURE — 0Y6J0Z3 DETACHMENT AT LEFT LOWER LEG, LOW, OPEN APPROACH: ICD-10-PCS | Performed by: ORTHOPAEDIC SURGERY

## 2023-09-07 PROCEDURE — 999N000141 HC STATISTIC PRE-PROCEDURE NURSING ASSESSMENT: Performed by: ORTHOPAEDIC SURGERY

## 2023-09-07 PROCEDURE — 120N000002 HC R&B MED SURG/OB UMMC

## 2023-09-07 PROCEDURE — 85014 HEMATOCRIT: CPT | Performed by: STUDENT IN AN ORGANIZED HEALTH CARE EDUCATION/TRAINING PROGRAM

## 2023-09-07 PROCEDURE — 88307 TISSUE EXAM BY PATHOLOGIST: CPT | Mod: TC | Performed by: ORTHOPAEDIC SURGERY

## 2023-09-07 PROCEDURE — 360N000076 HC SURGERY LEVEL 3, PER MIN: Performed by: ORTHOPAEDIC SURGERY

## 2023-09-07 PROCEDURE — 258N000003 HC RX IP 258 OP 636: Performed by: SURGERY

## 2023-09-07 PROCEDURE — 272N000001 HC OR GENERAL SUPPLY STERILE: Performed by: ORTHOPAEDIC SURGERY

## 2023-09-07 PROCEDURE — 250N000011 HC RX IP 250 OP 636: Performed by: ANESTHESIOLOGY

## 2023-09-07 PROCEDURE — 86140 C-REACTIVE PROTEIN: CPT

## 2023-09-07 PROCEDURE — 258N000003 HC RX IP 258 OP 636

## 2023-09-07 PROCEDURE — 80053 COMPREHEN METABOLIC PANEL: CPT

## 2023-09-07 PROCEDURE — 370N000017 HC ANESTHESIA TECHNICAL FEE, PER MIN: Performed by: ORTHOPAEDIC SURGERY

## 2023-09-07 PROCEDURE — 258N000001 HC RX 258: Performed by: ORTHOPAEDIC SURGERY

## 2023-09-07 PROCEDURE — 85652 RBC SED RATE AUTOMATED: CPT

## 2023-09-07 PROCEDURE — 250N000025 HC SEVOFLURANE, PER MIN: Performed by: ORTHOPAEDIC SURGERY

## 2023-09-07 PROCEDURE — 250N000011 HC RX IP 250 OP 636: Performed by: SURGERY

## 2023-09-07 PROCEDURE — 83735 ASSAY OF MAGNESIUM: CPT | Performed by: STUDENT IN AN ORGANIZED HEALTH CARE EDUCATION/TRAINING PROGRAM

## 2023-09-07 PROCEDURE — 250N000009 HC RX 250

## 2023-09-07 RX ORDER — HYDROMORPHONE HYDROCHLORIDE 1 MG/ML
0.4 INJECTION, SOLUTION INTRAMUSCULAR; INTRAVENOUS; SUBCUTANEOUS EVERY 5 MIN PRN
Status: DISCONTINUED | OUTPATIENT
Start: 2023-09-07 | End: 2023-09-07 | Stop reason: HOSPADM

## 2023-09-07 RX ORDER — FENTANYL CITRATE 50 UG/ML
INJECTION, SOLUTION INTRAMUSCULAR; INTRAVENOUS PRN
Status: DISCONTINUED | OUTPATIENT
Start: 2023-09-07 | End: 2023-09-07

## 2023-09-07 RX ORDER — LIDOCAINE 40 MG/G
CREAM TOPICAL
Status: DISCONTINUED | OUTPATIENT
Start: 2023-09-07 | End: 2023-09-09 | Stop reason: HOSPADM

## 2023-09-07 RX ORDER — HYDROMORPHONE HYDROCHLORIDE 1 MG/ML
0.2 INJECTION, SOLUTION INTRAMUSCULAR; INTRAVENOUS; SUBCUTANEOUS
Status: DISCONTINUED | OUTPATIENT
Start: 2023-09-07 | End: 2023-09-09 | Stop reason: HOSPADM

## 2023-09-07 RX ORDER — POLYETHYLENE GLYCOL 3350 17 G/17G
17 POWDER, FOR SOLUTION ORAL DAILY
Status: DISCONTINUED | OUTPATIENT
Start: 2023-09-08 | End: 2023-09-07

## 2023-09-07 RX ORDER — VASOPRESSIN IN 0.9 % NACL 2 UNIT/2ML
SYRINGE (ML) INTRAVENOUS PRN
Status: DISCONTINUED | OUTPATIENT
Start: 2023-09-07 | End: 2023-09-07

## 2023-09-07 RX ORDER — GLYCOPYRROLATE 0.2 MG/ML
INJECTION, SOLUTION INTRAMUSCULAR; INTRAVENOUS PRN
Status: DISCONTINUED | OUTPATIENT
Start: 2023-09-07 | End: 2023-09-07

## 2023-09-07 RX ORDER — BISACODYL 10 MG
10 SUPPOSITORY, RECTAL RECTAL DAILY PRN
Status: DISCONTINUED | OUTPATIENT
Start: 2023-09-07 | End: 2023-09-07

## 2023-09-07 RX ORDER — ONDANSETRON 4 MG/1
4 TABLET, ORALLY DISINTEGRATING ORAL EVERY 30 MIN PRN
Status: DISCONTINUED | OUTPATIENT
Start: 2023-09-07 | End: 2023-09-07 | Stop reason: HOSPADM

## 2023-09-07 RX ORDER — ONDANSETRON 2 MG/ML
INJECTION INTRAMUSCULAR; INTRAVENOUS PRN
Status: DISCONTINUED | OUTPATIENT
Start: 2023-09-07 | End: 2023-09-07

## 2023-09-07 RX ORDER — PROPOFOL 10 MG/ML
INJECTION, EMULSION INTRAVENOUS PRN
Status: DISCONTINUED | OUTPATIENT
Start: 2023-09-07 | End: 2023-09-07

## 2023-09-07 RX ORDER — METHOCARBAMOL 750 MG/1
750 TABLET, FILM COATED ORAL EVERY 6 HOURS PRN
Status: DISCONTINUED | OUTPATIENT
Start: 2023-09-07 | End: 2023-09-09 | Stop reason: HOSPADM

## 2023-09-07 RX ORDER — ACETAMINOPHEN 325 MG/1
650 TABLET ORAL EVERY 4 HOURS PRN
Status: DISCONTINUED | OUTPATIENT
Start: 2023-09-10 | End: 2023-09-09 | Stop reason: HOSPADM

## 2023-09-07 RX ORDER — CEFAZOLIN SODIUM 1 G/3ML
INJECTION, POWDER, FOR SOLUTION INTRAMUSCULAR; INTRAVENOUS PRN
Status: DISCONTINUED | OUTPATIENT
Start: 2023-09-07 | End: 2023-09-07

## 2023-09-07 RX ORDER — FENTANYL CITRATE 50 UG/ML
25 INJECTION, SOLUTION INTRAMUSCULAR; INTRAVENOUS EVERY 5 MIN PRN
Status: DISCONTINUED | OUTPATIENT
Start: 2023-09-07 | End: 2023-09-07 | Stop reason: HOSPADM

## 2023-09-07 RX ORDER — LIDOCAINE HYDROCHLORIDE 20 MG/ML
INJECTION, SOLUTION INFILTRATION; PERINEURAL PRN
Status: DISCONTINUED | OUTPATIENT
Start: 2023-09-07 | End: 2023-09-07

## 2023-09-07 RX ORDER — SODIUM CHLORIDE, SODIUM LACTATE, POTASSIUM CHLORIDE, CALCIUM CHLORIDE 600; 310; 30; 20 MG/100ML; MG/100ML; MG/100ML; MG/100ML
INJECTION, SOLUTION INTRAVENOUS CONTINUOUS PRN
Status: DISCONTINUED | OUTPATIENT
Start: 2023-09-07 | End: 2023-09-07

## 2023-09-07 RX ORDER — EPHEDRINE SULFATE 50 MG/ML
INJECTION, SOLUTION INTRAMUSCULAR; INTRAVENOUS; SUBCUTANEOUS PRN
Status: DISCONTINUED | OUTPATIENT
Start: 2023-09-07 | End: 2023-09-07

## 2023-09-07 RX ORDER — FENTANYL CITRATE 50 UG/ML
50 INJECTION, SOLUTION INTRAMUSCULAR; INTRAVENOUS EVERY 5 MIN PRN
Status: DISCONTINUED | OUTPATIENT
Start: 2023-09-07 | End: 2023-09-07 | Stop reason: HOSPADM

## 2023-09-07 RX ORDER — PROCHLORPERAZINE MALEATE 10 MG
10 TABLET ORAL EVERY 6 HOURS PRN
Status: DISCONTINUED | OUTPATIENT
Start: 2023-09-07 | End: 2023-09-07

## 2023-09-07 RX ORDER — ONDANSETRON 2 MG/ML
4 INJECTION INTRAMUSCULAR; INTRAVENOUS EVERY 30 MIN PRN
Status: DISCONTINUED | OUTPATIENT
Start: 2023-09-07 | End: 2023-09-07 | Stop reason: HOSPADM

## 2023-09-07 RX ORDER — GABAPENTIN 100 MG/1
100 CAPSULE ORAL 3 TIMES DAILY
Status: DISCONTINUED | OUTPATIENT
Start: 2023-09-07 | End: 2023-09-09 | Stop reason: HOSPADM

## 2023-09-07 RX ORDER — ONDANSETRON 4 MG/1
4 TABLET, ORALLY DISINTEGRATING ORAL EVERY 6 HOURS PRN
Status: DISCONTINUED | OUTPATIENT
Start: 2023-09-07 | End: 2023-09-07

## 2023-09-07 RX ORDER — SODIUM CHLORIDE, SODIUM LACTATE, POTASSIUM CHLORIDE, CALCIUM CHLORIDE 600; 310; 30; 20 MG/100ML; MG/100ML; MG/100ML; MG/100ML
INJECTION, SOLUTION INTRAVENOUS CONTINUOUS
Status: DISCONTINUED | OUTPATIENT
Start: 2023-09-07 | End: 2023-09-07

## 2023-09-07 RX ORDER — SODIUM CHLORIDE, SODIUM LACTATE, POTASSIUM CHLORIDE, CALCIUM CHLORIDE 600; 310; 30; 20 MG/100ML; MG/100ML; MG/100ML; MG/100ML
INJECTION, SOLUTION INTRAVENOUS CONTINUOUS
Status: DISCONTINUED | OUTPATIENT
Start: 2023-09-07 | End: 2023-09-07 | Stop reason: HOSPADM

## 2023-09-07 RX ORDER — ACETAMINOPHEN 325 MG/1
975 TABLET ORAL EVERY 8 HOURS
Status: DISCONTINUED | OUTPATIENT
Start: 2023-09-07 | End: 2023-09-09 | Stop reason: HOSPADM

## 2023-09-07 RX ORDER — HYDROMORPHONE HYDROCHLORIDE 1 MG/ML
0.4 INJECTION, SOLUTION INTRAMUSCULAR; INTRAVENOUS; SUBCUTANEOUS
Status: DISCONTINUED | OUTPATIENT
Start: 2023-09-07 | End: 2023-09-09 | Stop reason: HOSPADM

## 2023-09-07 RX ORDER — CEFAZOLIN SODIUM 2 G/100ML
2 INJECTION, SOLUTION INTRAVENOUS EVERY 8 HOURS
Status: COMPLETED | OUTPATIENT
Start: 2023-09-07 | End: 2023-09-08

## 2023-09-07 RX ORDER — HYDROMORPHONE HYDROCHLORIDE 1 MG/ML
0.2 INJECTION, SOLUTION INTRAMUSCULAR; INTRAVENOUS; SUBCUTANEOUS EVERY 5 MIN PRN
Status: DISCONTINUED | OUTPATIENT
Start: 2023-09-07 | End: 2023-09-07 | Stop reason: HOSPADM

## 2023-09-07 RX ORDER — HYDROMORPHONE HYDROCHLORIDE 2 MG/1
4 TABLET ORAL EVERY 4 HOURS PRN
Status: DISCONTINUED | OUTPATIENT
Start: 2023-09-07 | End: 2023-09-09 | Stop reason: HOSPADM

## 2023-09-07 RX ORDER — MAGNESIUM OXIDE 400 MG/1
400 TABLET ORAL EVERY 4 HOURS
Status: ACTIVE | OUTPATIENT
Start: 2023-09-07 | End: 2023-09-07

## 2023-09-07 RX ORDER — ONDANSETRON 2 MG/ML
4 INJECTION INTRAMUSCULAR; INTRAVENOUS EVERY 6 HOURS PRN
Status: DISCONTINUED | OUTPATIENT
Start: 2023-09-07 | End: 2023-09-07

## 2023-09-07 RX ORDER — AMOXICILLIN 250 MG
1 CAPSULE ORAL 2 TIMES DAILY
Status: DISCONTINUED | OUTPATIENT
Start: 2023-09-07 | End: 2023-09-07

## 2023-09-07 RX ORDER — HYDROMORPHONE HYDROCHLORIDE 2 MG/1
2 TABLET ORAL EVERY 4 HOURS PRN
Status: DISCONTINUED | OUTPATIENT
Start: 2023-09-07 | End: 2023-09-09 | Stop reason: HOSPADM

## 2023-09-07 RX ORDER — PHENYLEPHRINE HCL IN 0.9% NACL 50MG/250ML
.5-1.25 PLASTIC BAG, INJECTION (ML) INTRAVENOUS CONTINUOUS
Status: DISCONTINUED | OUTPATIENT
Start: 2023-09-07 | End: 2023-09-07 | Stop reason: HOSPADM

## 2023-09-07 RX ORDER — NAPROXEN 250 MG/1
250 TABLET ORAL EVERY 12 HOURS PRN
Status: DISCONTINUED | OUTPATIENT
Start: 2023-09-07 | End: 2023-09-09 | Stop reason: HOSPADM

## 2023-09-07 RX ADMIN — BUPROPION HYDROCHLORIDE 450 MG: 150 TABLET, FILM COATED, EXTENDED RELEASE ORAL at 08:52

## 2023-09-07 RX ADMIN — DULOXETINE HYDROCHLORIDE 60 MG: 60 CAPSULE, DELAYED RELEASE ORAL at 08:52

## 2023-09-07 RX ADMIN — CEFAZOLIN 3 G: 1 INJECTION, POWDER, FOR SOLUTION INTRAMUSCULAR; INTRAVENOUS at 12:00

## 2023-09-07 RX ADMIN — Medication 0.7 MCG/KG/MIN: at 12:54

## 2023-09-07 RX ADMIN — SODIUM CHLORIDE, POTASSIUM CHLORIDE, SODIUM LACTATE AND CALCIUM CHLORIDE: 600; 310; 30; 20 INJECTION, SOLUTION INTRAVENOUS at 11:48

## 2023-09-07 RX ADMIN — SUGAMMADEX 250 MG: 100 INJECTION, SOLUTION INTRAVENOUS at 14:17

## 2023-09-07 RX ADMIN — PIPERACILLIN AND TAZOBACTAM 4.5 G: 4; .5 INJECTION, POWDER, FOR SOLUTION INTRAVENOUS at 14:43

## 2023-09-07 RX ADMIN — FENTANYL CITRATE 50 MCG: 50 INJECTION, SOLUTION INTRAMUSCULAR; INTRAVENOUS at 11:44

## 2023-09-07 RX ADMIN — SENNOSIDES AND DOCUSATE SODIUM 1 TABLET: 50; 8.6 TABLET ORAL at 21:23

## 2023-09-07 RX ADMIN — PHENYLEPHRINE HYDROCHLORIDE 100 MCG: 10 INJECTION INTRAVENOUS at 11:59

## 2023-09-07 RX ADMIN — ACETAMINOPHEN 650 MG: 325 TABLET, FILM COATED ORAL at 15:18

## 2023-09-07 RX ADMIN — PANTOPRAZOLE SODIUM 40 MG: 40 TABLET, DELAYED RELEASE ORAL at 16:30

## 2023-09-07 RX ADMIN — PHENYLEPHRINE HYDROCHLORIDE 100 MCG: 10 INJECTION INTRAVENOUS at 12:37

## 2023-09-07 RX ADMIN — PHENYLEPHRINE HYDROCHLORIDE 100 MCG: 10 INJECTION INTRAVENOUS at 12:23

## 2023-09-07 RX ADMIN — Medication 10 MG: at 12:59

## 2023-09-07 RX ADMIN — PIPERACILLIN AND TAZOBACTAM 4.5 G: 4; .5 INJECTION, POWDER, FOR SOLUTION INTRAVENOUS at 05:33

## 2023-09-07 RX ADMIN — PREGABALIN 150 MG: 50 CAPSULE ORAL at 21:23

## 2023-09-07 RX ADMIN — Medication 7.5 MG: at 12:02

## 2023-09-07 RX ADMIN — OXYCODONE HYDROCHLORIDE 20 MG: 20 TABLET, FILM COATED, EXTENDED RELEASE ORAL at 08:52

## 2023-09-07 RX ADMIN — GABAPENTIN 100 MG: 100 CAPSULE ORAL at 16:30

## 2023-09-07 RX ADMIN — OXYCODONE HYDROCHLORIDE 10 MG: 10 TABLET ORAL at 00:21

## 2023-09-07 RX ADMIN — HYDROMORPHONE HYDROCHLORIDE 0.4 MG: 1 INJECTION, SOLUTION INTRAMUSCULAR; INTRAVENOUS; SUBCUTANEOUS at 14:57

## 2023-09-07 RX ADMIN — GABAPENTIN 100 MG: 100 CAPSULE ORAL at 21:22

## 2023-09-07 RX ADMIN — HYDROMORPHONE HYDROCHLORIDE 0.4 MG: 1 INJECTION, SOLUTION INTRAMUSCULAR; INTRAVENOUS; SUBCUTANEOUS at 15:09

## 2023-09-07 RX ADMIN — PANTOPRAZOLE SODIUM 40 MG: 40 TABLET, DELAYED RELEASE ORAL at 08:53

## 2023-09-07 RX ADMIN — OXYCODONE HYDROCHLORIDE 10 MG: 10 TABLET ORAL at 22:27

## 2023-09-07 RX ADMIN — Medication 5 MG: at 12:37

## 2023-09-07 RX ADMIN — ONDANSETRON 4 MG: 2 INJECTION INTRAMUSCULAR; INTRAVENOUS at 13:11

## 2023-09-07 RX ADMIN — OXYCODONE HYDROCHLORIDE 10 MG: 10 TABLET ORAL at 15:19

## 2023-09-07 RX ADMIN — CEFAZOLIN SODIUM 2 G: 2 INJECTION, SOLUTION INTRAVENOUS at 21:13

## 2023-09-07 RX ADMIN — SENNOSIDES AND DOCUSATE SODIUM 1 TABLET: 50; 8.6 TABLET ORAL at 08:53

## 2023-09-07 RX ADMIN — PROPOFOL 200 MG: 10 INJECTION, EMULSION INTRAVENOUS at 11:44

## 2023-09-07 RX ADMIN — Medication 5 MG: at 12:42

## 2023-09-07 RX ADMIN — PHENYLEPHRINE HYDROCHLORIDE 200 MCG: 10 INJECTION INTRAVENOUS at 12:29

## 2023-09-07 RX ADMIN — PREGABALIN 150 MG: 50 CAPSULE ORAL at 08:53

## 2023-09-07 RX ADMIN — GLYCOPYRROLATE 0.2 MG: 0.2 INJECTION, SOLUTION INTRAMUSCULAR; INTRAVENOUS at 12:47

## 2023-09-07 RX ADMIN — PHENYLEPHRINE HYDROCHLORIDE 200 MCG: 10 INJECTION INTRAVENOUS at 12:06

## 2023-09-07 RX ADMIN — SODIUM CHLORIDE, POTASSIUM CHLORIDE, SODIUM LACTATE AND CALCIUM CHLORIDE: 600; 310; 30; 20 INJECTION, SOLUTION INTRAVENOUS at 12:50

## 2023-09-07 RX ADMIN — PREDNISOLONE ACETATE 1 DROP: 10 SUSPENSION/ DROPS OPHTHALMIC at 08:53

## 2023-09-07 RX ADMIN — HYDROMORPHONE HYDROCHLORIDE 0.4 MG: 0.2 INJECTION, SOLUTION INTRAMUSCULAR; INTRAVENOUS; SUBCUTANEOUS at 05:41

## 2023-09-07 RX ADMIN — PIPERACILLIN AND TAZOBACTAM 4.5 G: 4; .5 INJECTION, POWDER, FOR SOLUTION INTRAVENOUS at 22:15

## 2023-09-07 RX ADMIN — Medication 1 UNITS: at 12:46

## 2023-09-07 RX ADMIN — VANCOMYCIN HYDROCHLORIDE 1750 MG: 10 INJECTION, POWDER, LYOPHILIZED, FOR SOLUTION INTRAVENOUS at 07:19

## 2023-09-07 RX ADMIN — HYDROMORPHONE HYDROCHLORIDE 4 MG: 2 TABLET ORAL at 18:32

## 2023-09-07 RX ADMIN — PHENYLEPHRINE HYDROCHLORIDE 100 MCG: 10 INJECTION INTRAVENOUS at 12:47

## 2023-09-07 RX ADMIN — LIDOCAINE HYDROCHLORIDE 100 MG: 20 INJECTION, SOLUTION INFILTRATION; PERINEURAL at 11:44

## 2023-09-07 RX ADMIN — BUSPIRONE HYDROCHLORIDE 10 MG: 10 TABLET ORAL at 21:22

## 2023-09-07 RX ADMIN — OXYCODONE HYDROCHLORIDE 10 MG: 10 TABLET ORAL at 04:49

## 2023-09-07 RX ADMIN — PHENYLEPHRINE HYDROCHLORIDE 200 MCG: 10 INJECTION INTRAVENOUS at 12:08

## 2023-09-07 RX ADMIN — OXYCODONE HYDROCHLORIDE 10 MG: 10 TABLET ORAL at 09:48

## 2023-09-07 RX ADMIN — Medication 7.5 MG: at 12:20

## 2023-09-07 RX ADMIN — PHENYLEPHRINE HYDROCHLORIDE 100 MCG: 10 INJECTION INTRAVENOUS at 12:56

## 2023-09-07 RX ADMIN — BUSPIRONE HYDROCHLORIDE 10 MG: 10 TABLET ORAL at 08:53

## 2023-09-07 RX ADMIN — PHENYLEPHRINE HYDROCHLORIDE 100 MCG: 10 INJECTION INTRAVENOUS at 12:42

## 2023-09-07 RX ADMIN — PROPOFOL 50 MG: 10 INJECTION, EMULSION INTRAVENOUS at 11:47

## 2023-09-07 RX ADMIN — SODIUM CHLORIDE, POTASSIUM CHLORIDE, SODIUM LACTATE AND CALCIUM CHLORIDE: 600; 310; 30; 20 INJECTION, SOLUTION INTRAVENOUS at 11:45

## 2023-09-07 RX ADMIN — DULOXETINE HYDROCHLORIDE 60 MG: 60 CAPSULE, DELAYED RELEASE ORAL at 21:23

## 2023-09-07 RX ADMIN — GLYCOPYRROLATE 0.2 MG: 0.2 INJECTION, SOLUTION INTRAMUSCULAR; INTRAVENOUS at 12:44

## 2023-09-07 RX ADMIN — Medication 1 UNITS: at 12:10

## 2023-09-07 RX ADMIN — Medication 70 MG: at 11:45

## 2023-09-07 RX ADMIN — PHENYLEPHRINE HYDROCHLORIDE 100 MCG: 10 INJECTION INTRAVENOUS at 11:53

## 2023-09-07 RX ADMIN — PREDNISOLONE ACETATE 1 DROP: 10 SUSPENSION/ DROPS OPHTHALMIC at 22:21

## 2023-09-07 RX ADMIN — OXYCODONE HYDROCHLORIDE 20 MG: 20 TABLET, FILM COATED, EXTENDED RELEASE ORAL at 21:22

## 2023-09-07 ASSESSMENT — ACTIVITIES OF DAILY LIVING (ADL)
ADLS_ACUITY_SCORE: 39

## 2023-09-07 NOTE — ANESTHESIA PROCEDURE NOTES
Airway       Patient location during procedure: OR       Procedure Start/Stop Times: 9/7/2023 11:50 AM  Staff -        CRNA: Crystal Fierro APRN CRNA       Performed By: CRNA  Consent for Airway        Urgency: elective  Indications and Patient Condition       Indications for airway management: ivette-procedural       Induction type:intravenous       Mask difficulty assessment: 2 - vent by mask + OA or adjuvant +/- NMBA    Final Airway Details       Final airway type: endotracheal airway       Successful airway: ETT - single and Oral  Endotracheal Airway Details        ETT size (mm): 8.0       Cuffed: yes       Successful intubation technique: video laryngoscopy       VL Blade Size: Glidescope 4       Grade View of Cords: 1       Adjucts: stylet (rigid stylet needed for introduction of tube)       Position: Right       Measured from: gums/teeth       Secured at (cm): 24       Bite block used: None    Post intubation assessment        Placement verified by: capnometry, equal breath sounds and chest rise        Number of attempts at approach: 1       Secured with: silk tape and commercial tube kim       Ease of procedure: easy       Dentition: Intact and Unchanged    Medication(s) Administered   Medication Administration Time: 9/7/2023 11:50 AM

## 2023-09-07 NOTE — OP NOTE
DATE OF SURGERY: 9/7/2023    PREOPERATIVE DIAGNOSIS: Necrotizing fasciitis of lower leg          POSTOPERATIVE DIAGNOSIS Same    PROCEDURES:  22 modifier: This is a revision procedure and the patient has had 3 previous debridements of the lower extremity with significant soft tissue and skin loss which complicated performing the flap because the flap itself was somewhat smaller than normal due to this tissue loss and it did complicate creating an appropriate flap and increased our total operative time and technical difficulty of the revision amputation and thus I have added a 22 modifier to the procedure.  1. Sharp Excisional Irrigation and Debridement down to muscle fascia and bone of left lower extremity.  2. Left Below Knee Amputation.    PRIMARY SURGEON: Semaj Gates MD    FIRST ASSISTANT: Bogdan Carlton PGY1    ANESTHESIA: General Endotracheal    COMPLICATIONS:  None.    SPECIMENS: None.    ESTIMATED BLOOD LOSS: 50 mL    INDICATIONS:                          Saqib Bishop is a 42 year old male who elected surgical treatment, and understood the indications for this surgery, as well as its risks, benefits, and alternatives as documented in the pre-operative H&P.  Specifically, we reviewed the risks and benefits of the surgery in detail. The risks include, but are not limited to, the general risks associated with anesthesia, including death, pulmonary embolism, DVT, stroke, myocardial infarction, pneumonia, and urinary tract infection. Additional risks specific to the surgery include the risk of infection,risk of neuroma and stump pain, need for revision surgery in the future due to one of the above issues, or risk of incomplete symptom relief. Saqib Bishop understands the risks of the surgery and wishes to proceed.  No Guarantees were given.       DESCRIPTION OF PROCEDURE:           Saqib Bishop was taken to the operating room, where the Anesthesiology Service induced satisfactory general  anesthesia. Ancef was given IV.  Venous thromboembolic prophylaxis was performed with sequential devices.  A Dyer catheter was placed under standard sterile techniques.      The patient was placed supine on a standard operative table with a bump under the left hip.  A tourniquet was applied to the left thigh.  We prepped and draped with Betadine and then held a multidisciplinary timeout in which verify the correct patient and procedure.  All team members were in agreement.    We began by doing a thorough irrigation and debridement of the residual left stump.  The patient had previously had a guillotine amputation and several debridements by my partners.  We irrigated 3 L of normal saline through the wound of all doing sharp excisional debridement of any loose necrotic tissue down to muscle fascia and bone with a combination of scalpel Ruvalcaba and rongeur.  Overall the wound was fairly healthy and I felt that it was safe to close it.  The total surface area of this was roughly 7 cm x 8 cm x 6 cm in length width and depth respectively.    After making sure that the stump and was clean, we then went about mapping our planned amputation.  We measured 15 cm from the medial tibial plateau and marked the skin.  He had a complicated soft tissue wound from his previous debridements and thus I had to somewhat except the available skin flap because he already had some skin missing up on a fair amount of space on the medial aspect of the leg.  So had to simply combine the skin incisions to except the available skin flap.  We raise the leg and exsanguinated it to 250 mmHg.  I incised down with a 10 blade then through the skin along the planned flap.  We then subperiosteally elevated along the tibia and exposed the tibia.  I created a periosteal flap to be able to close to at the end of the case.  We then used an oscillating saw to cut through the tibia.  We then exposed the fibula and put retractors around it and I cut the fibula  with an oscillating saw a roughly 1 cm short of where we cut the tibia.    We then placed a bone hook into the tibia and I used an amputation knife to resect the soft tissue envelope from the bone and cut the remaining distal extremity free from the limb to complete the amputation.    We then held pressure and released the tourniquet.  Total tourniquet time was roughly 15 minutes.    I then went about identifying and tying off his vessels including the posterior tibial artery.  I identified the tibial nerve which was cut and transected behind the tibia to try and prevent a neuroma from forming.  We controlled any bleeding throughout this area.    We then went about closing the posterior soft tissue flap.  As a noted above, the available skin flap was somewhat limited by the previous debridements and the patient has had a previous longitudinal incision extending approximately 15 cm up the medial aspect of the leg with some skin loss from this.  We had preserved all the skin that we could in the amputated the tibia, but the remainder the skin was somewhat narrow and less of a flap than is ideal.  As result we took great care and closing the wound.  We serially closed certain spots and then removed muscle tissue as necessary to debulk the flap and then would serially closed again with additional sutures.  Because of the complex nature of this closure and the fact that it did take additional operative time and had more technical difficulty than usual added a 22 modifier to the form of this procedure.  Fortunately by doing this in a very careful and sequential way we were able to close the flap up over the tibia with a series of interrupted 1 Vicryl sutures and then 2-0 Vicryl for the dermis followed by 3-0 nylon for the skin to close the flap over the end of the tibia and fibula.    Sterile dressing was then applied.  The patient was a extubated and taken to recovery in stable condition.    I was present and scrubbed  for the entire procedure.    Semaj Gates MD

## 2023-09-07 NOTE — PROGRESS NOTES
Orthopaedic Surgery Progress Note 09/07/2023    Subjective  AFVSS. NAEO. 09/07 WBC 7.8 CRP 24 ESR 1. Denies fever, chills. Denies any new numbness, tingling, weakness.     Objective  Temp: 98.2  F (36.8  C) Temp src: Oral BP: 123/79 Pulse: 107   Resp: 18 SpO2: 97 % O2 Device: None (Room air)      Exam:  Gen: Sleeping  Resp: Nonlabored breathing  Cardio: Ext wwp     Drains:  Minimal output in canister.     MSK:    LLE:  - Wound VAC holding suction and appropriate  - No sensation to light touch below knee, intact in femoral/LFCN above the knee  - Dressings c/d/I      Recent Labs   Lab 09/07/23  0423 09/06/23  0702 09/05/23  0029   WBC 7.8 8.8 7.0   HGB 12.4* 12.0* 11.5*    363 326       Cultures:  08/30 Cx: + Parvimonas micra   08/28 Cx NGTD    Assessment:   Assessment: Saqib Bishop is a 42 year old male s/p emergent left transtibial guillotine amputation for LLE NSTI on 8/27-8/28. RTOR  for I&D and wound vac exchange on 08/30 and 9/2. Anticipate will able to undergo definitive BKA at next surgery.    Cultures from 8/30 + Parvimonas micra and anaerobic gram positive bacilli in broth, currently on vancomycin and zosyn per medicine.     Plan for today:  - RTOR today for irrigation and debridement of LLE with vacc exchange vs. definitive BKA   - Remain NPO  - Continue holding SQ Heparin dose in anticipation of OR     Plan:  Medicine Primary   Activity: Per primary, okay for UOOB with assist   Weight bearing status: NWB LLE  Antibiotics: Vacomycin and Zosyn per medicine/infectious disease  Diet: NPO for OR today  DVT prophylaxis: per primary, okay to start chemoprophylaxis from orthopedic perspective, on SQH 5000u. Please hold today AM dose  Labs: trend CRP, WBC   Drains: Document VAC output  Pain management: Multimodal, per primary  X-rays: complete  Cultures: 08/28 cx NGTD, 08/30 cx + Parvimonas micra   Follow-up: TBD     Disposition: RTOR for definitive amputation today    Respectfully,    Wilfred Elaine  MD  Orthopedic Surgery PGY1  [phone]    Please page me directly with any questions/concerns during regular weekday hours before 5 pm. If there is no response, if it is a weekend, or if it is during evening hours then please page the orthopedic surgery resident on call.

## 2023-09-07 NOTE — PLAN OF CARE
"/77 (BP Location: Left arm)   Pulse 89   Temp 98  F (36.7  C) (Oral)   Resp 18   Ht 1.854 m (6' 1\")   Wt 138 kg (304 lb 3.8 oz)   SpO2 92%   BMI 40.14 kg/m      A&Ox4. Pain managed with scheduled and PRN oxycodone and dilaudid. Stable on RA. Colostomy present. Dyer in place. Able to sit up and reposition independently in bed. LLE surgical drsg CDI. Wound vac present. Sacral wound drsg changed per order instructions. Pt went to OR @1200. Returned to unit @1600. No acute changes at this time.    Pt noted that he is allergic to silk tape and most other adhesive. Ensure only Primapore tape is utilized.     Rosenda Trevino RN   "

## 2023-09-07 NOTE — ANESTHESIA POSTPROCEDURE EVALUATION
Patient: Saqib Bishop    Procedure: Procedure(s):  IRRIGATION AND DEBRIDEMENT, LEFT LOWER EXTREMITY WITH WOUND VAC Removal  Below Left Knee Amputation       Anesthesia Type:  General    Note:  Disposition: Admission   Postop Pain Control: Challenging            Challenges/Interventions: Acute Pain            Sign Out: Well controlled pain   PONV: No   Neuro/Psych: Uneventful            Sign Out: Acceptable/Baseline neuro status   Airway/Respiratory: Uneventful            Sign Out: O2 supplementation               Oxygen: Nasal Cannula   CV/Hemodynamics: Uneventful            Sign Out: Acceptable CV status; No obvious hypovolemia; No obvious fluid overload   Other NRE: NONE   DID A NON-ROUTINE EVENT OCCUR? No           Last vitals:  Vitals Value Taken Time   /67 09/07/23 1516   Temp 36.8  C (98.2  F) 09/07/23 1433   Pulse 112 09/07/23 1518   Resp 22 09/07/23 1518   SpO2 94 % 09/07/23 1518   Vitals shown include unvalidated device data.    Electronically Signed By: Cody Brown MD  September 7, 2023  3:19 PM

## 2023-09-07 NOTE — PROGRESS NOTES
CLINICAL NUTRITION SERVICES - BRIEF NOTE  Pt is due for follow up assessment today, but is in the OR, RD will provide full follow up tomorrow.    Deneen Singh RD, CNSC, LD  [facility] 5 Med/Surg RD pager: [phone]

## 2023-09-07 NOTE — ANESTHESIA CARE TRANSFER NOTE
Patient: Saqib Bishop    Procedure: Procedure(s):  IRRIGATION AND DEBRIDEMENT, LEFT LOWER EXTREMITY WITH WOUND VAC Removal  Below Left Knee Amputation       Diagnosis: Necrotizing fasciitis of lower leg (H) [M72.6]  Diagnosis Additional Information: No value filed.    Anesthesia Type:   General     Note:    Oropharynx: oropharynx clear of all foreign objects and spontaneously breathing  Level of Consciousness: awake  Oxygen Supplementation: face mask  Level of Supplemental Oxygen (L/min / FiO2): 5  Independent Airway: airway patency satisfactory and stable  Dentition: dentition unchanged  Vital Signs Stable: post-procedure vital signs reviewed and stable  Report to RN Given: handoff report given  Patient transferred to: PACU    Handoff Report: Identifed the Patient, Identified the Reponsible Provider, Reviewed the pertinent medical history, Discussed the surgical course, Reviewed Intra-OP anesthesia mangement and issues during anesthesia, Set expectations for post-procedure period and Allowed opportunity for questions and acknowledgement of understanding      Vitals:  Vitals Value Taken Time   /54 09/07/23 1435   Temp 36.3    Pulse 111 09/07/23 1442   Resp 11 09/07/23 1442   SpO2 94 % 09/07/23 1442   Vitals shown include unvalidated device data.    Electronically Signed By: RL Rice CRNA  September 7, 2023  2:44 PM

## 2023-09-07 NOTE — BRIEF OP NOTE
[facility]    Brief Operative Note    Pre-operative diagnosis: Necrotizing fasciitis of lower leg (H) [M72.6]  Post-operative diagnosis Same as pre-operative diagnosis    Procedure: Procedure(s):  IRRIGATION AND DEBRIDEMENT, LEFT LOWER EXTREMITY WITH WOUND VAC Removal  Below Left Knee Amputation  Surgeon: Surgeon(s) and Role:     * Semaj Gates MD - Primary     * Semaj Ibrahim MD - Resident - Assisting  Anesthesia: General   Estimated Blood Loss: Less than 50 ml    Drains: None  Specimens:   ID Type Source Tests Collected by Time Destination   1 :  Tissue Leg, Below Knee, Left SURGICAL PATHOLOGY EXAM Semaj Gates MD 9/7/2023 12:37 PM      Findings:   None.  Complications: None.  Implants: * No implants in log *    Plan:  Medicine Primary   Activity: Per primary, okay for UOOB with assist   Weight bearing status: NWB LLE  Antibiotics: Vacomycin and Zosyn per medicine/infectious disease  Diet: NPO for OR today  DVT prophylaxis: per primary, okay to start chemoprophylaxis from orthopedic perspective, on SQH 5000u.  Labs: trend CRP, WBC   Restrictions: No pressure on LLE, elevate at all times  Wound Care: Dressing change per ortho on POD5. Dressing change in clinic, okay to shower  Drains: None  Bracing: Stump protector to be placed by orthotics as soon as available  Diet: Clear liquids and ADAT  Radiographs: None  Disposition: Pending clinical course  Follow-up: With Dr. Gates in 2 weeks for wound check if discharged. Suture removal in 10-14 days    --  Bogdan Ibrahim MD, PhD  Orthopedic Surgery PGY-1  [phone]    Please page me directly with any questions/concerns during regular weekday hours before 5pm. If there is no response, if it is a weekend, or if it is during evening hours, then please page the orthopedic surgery resident on call.

## 2023-09-07 NOTE — PROGRESS NOTES
Patient has been in the OR all day.  Unable to fully evaluate patient in person.  Will continue to follow.

## 2023-09-07 NOTE — ANESTHESIA PREPROCEDURE EVALUATION
Anesthesia Pre-Procedure Evaluation    Patient: Saqib Bishop   MRN: [id number] : 1980        Procedure : Procedure(s):  IRRIGATION AND DEBRIDEMENT, LEFT LOWER EXTREMITY WITH WOUND VACC EXCHANGE VS DEFINITIVE Below Left Knee Amputation  VS DEFINITIVE Below Left Knee Amputation          Past Medical History:   Diagnosis Date    Degenerative joint disease     Gastro-oesophageal reflux disease       Past Surgical History:   Procedure Laterality Date    AMPUTATE LEG BELOW KNEE Left 2023    Procedure: Left below knee Guillotine Amputation;  Surgeon: Sesar Barth MD;  Location: [facility] OR    AMPUTATE LEG BELOW KNEE Left 2023    Procedure: Irrigation and Debridement leg below knee, wound vac application, Left;  Surgeon: Juan Rehman MD;  Location: [facility] OR    BACK SURGERY      lumbar fusion    EXPLORE SPINE, REMOVE HARDWARE, COMBINED  2012    Procedure:COMBINED EXPLORE SPINE, REMOVE HARDWARE; EXPLORE SPINE, REMOVE HARDWARE L4-S1; Surgeon:FAM GONZALEZ; Location:[facility] OR    IRRIGATION AND DEBRIDEMENT LOWER EXTREMITY, COMBINED Left 2023    Procedure: Irrigation and debridement lower extremity, combined and wound vac exchange;  Surgeon: Etienne Maeir MD;  Location: [facility] OR    ORTHOPEDIC SURGERY      right foot surgery      Allergies   Allergen Reactions    Adhesive Tape Hives     From tape from surgery    Benzoin Hives and Rash    Droperidol Anaphylaxis    Prednisone      vomiting    Vitamin D Rash     flairs up his sarcoidosis      Social History     Tobacco Use    Smoking status: Former     Types: Cigarettes     Quit date: 2011     Years since quittin.8    Smokeless tobacco: Not on file   Substance Use Topics    Alcohol use: No      Wt Readings from Last 1 Encounters:   23 138 kg (304 lb 3.8 oz)        Anesthesia Evaluation   Pt has had prior anesthetic. Type: General.        ROS/MED HX  ENT/Pulmonary:     (+) sleep apnea, doesn't use CPAP,   IAN risk factors,    obese,                               Neurologic:       Cardiovascular:     (+)  - -   -  - -                                 Previous cardiac testing   Echo: Date: 8/2023 Results:  Left ventricular size, wall motion and function are normal. The ejection  fraction is 60-65%.  Right ventricular function, chamber size, wall motion, and thickness are  normal.  IVC diameter >2.1 cm collapsing <50% with sniff suggests a high RA pressure  estimated at 15 mmHg or greater.  No pericardial effusion is present.     There is no prior study for direct comparison    Stress Test:  Date: Results:    ECG Reviewed:  Date: Results:    Cath:  Date: Results:      METS/Exercise Tolerance:     Hematologic:       Musculoskeletal:       GI/Hepatic:     (+) GERD,                   Renal/Genitourinary:     (+) renal disease, type: ARF, Pt does not require dialysis,           Endo:     (+)               Obesity,       Psychiatric/Substance Use:     (+) psychiatric history depression and anxiety       Infectious Disease:     (+)   MRSA,         Malignancy:       Other:            Physical Exam    Airway        Mallampati: II   TM distance: > 3 FB   Neck ROM: full   Mouth opening: > 3 cm    Respiratory Devices and Support         Dental       (+) Minor Abnormalities - some fillings, tiny chips      Cardiovascular          Rhythm and rate: regular and normal     Pulmonary           breath sounds clear to auscultation           OUTSIDE LABS:  CBC:   Lab Results   Component Value Date    WBC 7.8 09/07/2023    WBC 8.8 09/06/2023    HGB 12.4 (L) 09/07/2023    HGB 12.0 (L) 09/06/2023    HCT 40.6 09/07/2023    HCT 38.8 (L) 09/06/2023     09/07/2023     09/06/2023     BMP:   Lab Results   Component Value Date     09/07/2023     09/06/2023    POTASSIUM 4.7 09/07/2023    POTASSIUM 5.1 09/06/2023    CHLORIDE 101 09/07/2023    CHLORIDE 103 09/06/2023    CO2 26 09/07/2023    CO2 24 09/06/2023    BUN 27.8 (H) 09/07/2023    BUN 22.0  (H) 09/06/2023    CR 1.14 09/07/2023    CR 1.05 09/06/2023    GLC 93 09/07/2023    GLC 93 09/06/2023     COAGS:   Lab Results   Component Value Date    PTT 33 08/30/2023    INR 1.07 08/30/2023    FIBR 863 (H) 08/27/2023     POC: No results found for: BGM, HCG, HCGS  HEPATIC:   Lab Results   Component Value Date    ALBUMIN 3.4 (L) 09/07/2023    PROTTOTAL 7.9 09/07/2023    ALT 23 09/07/2023    AST 31 09/07/2023    ALKPHOS 141 (H) 09/07/2023    BILITOTAL 0.2 09/07/2023     OTHER:   Lab Results   Component Value Date    PH 7.23 (L) 08/28/2023    LACT 0.5 (L) 08/28/2023    A1C 5.5 09/06/2023    HILDA 9.7 09/07/2023    PHOS 5.0 (H) 09/07/2023    MAG 1.6 (L) 09/07/2023    SED 1 09/07/2023       Anesthesia Plan    ASA Status:  3       Anesthesia Type: General.   Induction: Intravenous.   Maintenance: Balanced.   Techniques and Equipment:     - Lines/Monitors: 2nd IV     Consents    Anesthesia Plan(s) and associated risks, benefits, and realistic alternatives discussed. Questions answered and patient/representative(s) expressed understanding.     - Discussed: Risks, Benefits and Alternatives for BOTH SEDATION and the PROCEDURE were discussed     - Discussed with:  Patient      - Extended Intubation/Ventilatory Support Discussed: No.      - Patient is DNR/DNI Status: No     Use of blood products discussed: Yes.     - Discussed with: Patient.     - Consented: consented to blood products            Reason for refusal: other.     Postoperative Care    Pain management: IV analgesics.   PONV prophylaxis: Ondansetron (or other 5HT-3), Dexamethasone or Solumedrol     Comments:                Cody Brown MD

## 2023-09-07 NOTE — OR NURSING
PACU to Inpatient Nursing Handoff    Patient Saqib Bishop is a 42 year old male who speaks English.   Procedure Procedure(s):  IRRIGATION AND DEBRIDEMENT, LEFT LOWER EXTREMITY WITH WOUND VAC Removal  Below Left Knee Amputation   Surgeon(s) Primary: Semaj Gates MD  Resident - Assisting: Semaj Ibrahim MD     Allergies   Allergen Reactions    Adhesive Tape Hives     From tape from surgery    Benzoin Hives and Rash    Droperidol Anaphylaxis    Prednisone      vomiting    Vitamin D Rash     flairs up his sarcoidosis       Isolation  contact    Past Medical History   has a past medical history of Degenerative joint disease and Gastro-oesophageal reflux disease.    Anesthesia General   Dermatome Level  NA   Preop Meds Not applicable   Nerve block Not applicable   Intraop Meds fentanyl (Sublimaze): 50 mcg total  ondansetron (Zofran): last given at 1311   Local Meds No   Antibiotics cefazolin (Ancef) - last given at 1200  piperacillin-tazobactam (Zosyn) - last given at 1435     Pain Patient Currently in Pain: yes   PACU meds  acetaminophen (Tylenol): 650 mg (total dose) last given at 1518   hydromorphone (Dilaudid): 0.8 mg (total dose) last given at 1509   oxycodone (Roxicodone): 10 mg (total dose) last given at 1518    PCA / epidural No   Capnography Respiratory Monitoring (EtCO2): 15 mmHg   Telemetry ECG Rhythm: Sinus tachycardia   Inpatient Telemetry Monitor Ordered? No        Labs Glucose Lab Results   Component Value Date    GLC 93 09/07/2023    GLC 93 09/06/2023       Hgb Lab Results   Component Value Date    HGB 12.4 09/07/2023       INR Lab Results   Component Value Date    INR 1.07 08/30/2023      PACU Imaging Not applicable     Wound/Incision Wound Pressure injury community acquired (Active)   Wound Bed Clean non-granular 09/06/23 1100   Vanessa-wound Assessment Other (Comment) 09/06/23 1100   Estimated Circumference ( if not measured quarter sized 08/30/23 1800   Drainage Amount  Scant 09/06/23 1100   Drainage Color/Characteristics Serous 09/06/23 1100   Wound Care/Cleansing Wound cleanser 09/06/23 1100   Dressing Dry gauze;Per Plan of Care 09/06/23 1100   Amount of Packing Added 1 09/06/23 1100   Amount of Packing Removed 1 09/06/23 1100   Packing removed by Nurse 09/06/23 1100   Dressing Status Clean, dry, intact 09/07/23 0100   Dressing Change Due 09/08/23 09/07/23 0900   Number of days: 11       Wound (used by OP [facility] only) 01/26/23 0905 Right gluteal pressure injury (Active)   Thickness/Stage Stage 3 08/17/23 1423   Base granulating 08/17/23 1423   Periwound intact;contracted 08/17/23 1423   Periwound Temperature warm 08/17/23 1423   Periwound Skin Turgor soft 08/17/23 1423   Edges open;rolled/closed 08/17/23 1423   Length (cm) 0.5 08/17/23 1423   Width (cm) 1.7 08/17/23 1423   Depth (cm) 2.8 08/17/23 1423   Wound (cm^2) 0.85 cm^2 08/17/23 1423   Wound Volume (cm^3) 2.38 cm^3 08/17/23 1423   Wound healing % 59.13 08/17/23 1423   Tunneling [Depth (cm)/Location] 9 oclock/ 7.5 cm 08/17/23 1423   Drainage Characteristics/Odor serosanguineous 08/17/23 1423   Drainage Amount moderate 08/17/23 1423   Care, Wound non-select wound debridement performed 08/17/23 1423   Number of days: 224       Incision/Surgical Site 09/07/23 Left;Lower Leg (Active)   Incision Assessment UTV 09/07/23 1433   Closure Sutures;Approximated 09/07/23 1410   Incision Drainage Amount None 09/07/23 1433   Dressing Intervention Clean, dry, intact 09/07/23 1433   Number of days: 0      CMS     Baseline   Equipment Not applicable   Other LDA       IV Access Peripheral IV 09/06/23 Anterior;Right Upper forearm (Active)   Site Assessment WDL 09/07/23 1433   Line Status Saline locked 09/07/23 1433   Dressing Transparent 09/07/23 1433   Dressing Status clean;dry;intact 09/07/23 1433   Dressing Intervention New dressing  09/06/23 1546   Line Intervention Flushed 09/07/23 1433   Phlebitis Scale 0-->no symptoms 09/07/23 1437    Infiltration? no 09/07/23 1433   Number of days: 1       Peripheral IV 09/07/23 Left Lower forearm (Active)   Site Assessment WDL 09/07/23 1433   Line Status Infusing 09/07/23 1433   Dressing Transparent 09/07/23 1433   Dressing Status clean;dry;intact 09/07/23 1433   Phlebitis Scale 0-->no symptoms 09/07/23 1433   Infiltration? no 09/07/23 1433   Number of days: 0      Blood Products Not applicable EBL 50 mL   Intake/Output Date 09/07/23 0700 - 09/08/23 0659   Shift 0505-0771 3667-2125 0819-0357 24 Hour Total   INTAKE   I.V. 1200   1200   IV Piggyback 100   100   Shift Total(mL/kg) 1300(9.42)   1300(9.42)   OUTPUT   Urine 900   900   Blood 50   50   Shift Total(mL/kg) 950(6.88)   950(6.88)   Weight (kg) 138 138 138 138      Drains / Dyer Colostomy (Active)   Stomal Appliance 2 piece 09/07/23 1433   Stoma Assessment Omar 09/07/23 0100   Peristomal Assessment Intact 09/07/23 1433   Stool Amount Large 09/07/23 0100   Output (ml) 0 ml 08/31/23 0400   Number of days: 11       Urethral Catheter 08/27/23 (Active)   Tube Description Positional 09/07/23 1433   Catheter Care Catheter wipes 09/07/23 0900   Collection Container Standard 09/07/23 1433   Securement Method Securing device (Describe) 09/07/23 1433   Rationale for Continued Use Retention 09/07/23 1433   Urine Output 450 mL 09/07/23 1433   Number of days: 11      Time of void PreOp Time of Void Prior to Procedure: 1100 (09/02/23 1051)    PostOp Dyer in place    Diapered? No   Bladder Scan  NA    mL (09/06/23 2233)  crackers, water, and milk     Vitals    B/P: 106/67  T: 98.2  F (36.8  C)    Temp src: Axillary  P:  Pulse: 112 (09/07/23 1519)          R: 22  O2:  SpO2: 94 %    O2 Device: Nasal cannula (09/07/23 1445)    Oxygen Delivery: 3 LPM (09/07/23 1445)    FiO2 (%): 2 % (08/28/23 1600)    Family/support present Mother- updated via volunteer   Patient belongings  None with patient   Patient transported on bed   DC meds/scripts (obs/outpt) Not applicable    Inpatient Pain Meds Released? Yes       Special needs/considerations None   Tasks needing completion None       Zakiya Zaragoza, RN  [facility] [phone]

## 2023-09-07 NOTE — PLAN OF CARE
Received alert and oriented x4, in room air  Denies N/V, no sensation B/L Lower ext   Complained of pain, with PRN Oxycodone, Tylenol  Left foot amputee - wound dressing is clean and intact with wound vac. Sacral wound is intact.  Colostomy pouch, changed   Dyer catheter, draining well  NPO post MN, for I&D. CHG bath provided  Able to move on the bed by himself for repositioning  PIV line at right hand, saline locked  Call bell at bedside  Keep on the same care plan.  No acute changes on this shift.

## 2023-09-08 ENCOUNTER — APPOINTMENT (OUTPATIENT)
Dept: PHYSICAL THERAPY | Facility: CLINIC | Age: 43
DRG: 853 | End: 2023-09-08
Attending: INTERNAL MEDICINE
Payer: MEDICARE

## 2023-09-08 ENCOUNTER — TELEPHONE (OUTPATIENT)
Dept: WOUND CARE | Facility: CLINIC | Age: 43
End: 2023-09-08
Payer: MEDICARE

## 2023-09-08 ENCOUNTER — APPOINTMENT (OUTPATIENT)
Dept: OCCUPATIONAL THERAPY | Facility: CLINIC | Age: 43
DRG: 853 | End: 2023-09-08
Attending: INTERNAL MEDICINE
Payer: MEDICARE

## 2023-09-08 LAB
ALBUMIN SERPL BCG-MCNC: 3.3 G/DL (ref 3.5–5.2)
ALP SERPL-CCNC: 124 U/L (ref 40–129)
ALT SERPL W P-5'-P-CCNC: 19 U/L (ref 0–70)
ANION GAP SERPL CALCULATED.3IONS-SCNC: 10 MMOL/L (ref 7–15)
AST SERPL W P-5'-P-CCNC: 27 U/L (ref 0–45)
BACTERIA TISS BX CULT: ABNORMAL
BILIRUB SERPL-MCNC: <0.2 MG/DL
BLAIMP ISLT/SPM QL: NOT DETECTED
BLAKPC ISLT/SPM QL: NOT DETECTED
BLAOXA-48 ISLT/SPM QL: NOT DETECTED
BLAVIM ISLT/SPM QL: NOT DETECTED
BUN SERPL-MCNC: 24.8 MG/DL (ref 6–20)
CALCIUM SERPL-MCNC: 9.2 MG/DL (ref 8.6–10)
CHLORIDE SERPL-SCNC: 102 MMOL/L (ref 98–107)
CREAT SERPL-MCNC: 1.03 MG/DL (ref 0.67–1.17)
CRP SERPL-MCNC: 28.05 MG/L
DEPRECATED HCO3 PLAS-SCNC: 27 MMOL/L (ref 22–29)
EGFRCR SERPLBLD CKD-EPI 2021: >90 ML/MIN/1.73M2
ERYTHROCYTE [DISTWIDTH] IN BLOOD BY AUTOMATED COUNT: 18.5 % (ref 10–15)
ERYTHROCYTE [SEDIMENTATION RATE] IN BLOOD BY WESTERGREN METHOD: 47 MM/HR (ref 0–15)
GLUCOSE SERPL-MCNC: 147 MG/DL (ref 70–99)
HCT VFR BLD AUTO: 37.2 % (ref 40–53)
HGB BLD-MCNC: 11.3 G/DL (ref 13.3–17.7)
MAGNESIUM SERPL-MCNC: 1.7 MG/DL (ref 1.7–2.3)
MCH RBC QN AUTO: 26 PG (ref 26.5–33)
MCHC RBC AUTO-ENTMCNC: 30.4 G/DL (ref 31.5–36.5)
MCV RBC AUTO: 86 FL (ref 78–100)
NDM TARGET DNA: NOT DETECTED
PHOSPHATE SERPL-MCNC: 2.9 MG/DL (ref 2.5–4.5)
PLATELET # BLD AUTO: 365 10E3/UL (ref 150–450)
POTASSIUM SERPL-SCNC: 4.4 MMOL/L (ref 3.4–5.3)
PROT SERPL-MCNC: 7.4 G/DL (ref 6.4–8.3)
RBC # BLD AUTO: 4.35 10E6/UL (ref 4.4–5.9)
SODIUM SERPL-SCNC: 139 MMOL/L (ref 136–145)
WBC # BLD AUTO: 11.1 10E3/UL (ref 4–11)

## 2023-09-08 PROCEDURE — 250N000011 HC RX IP 250 OP 636: Mod: JZ

## 2023-09-08 PROCEDURE — 97165 OT EVAL LOW COMPLEX 30 MIN: CPT | Mod: GO

## 2023-09-08 PROCEDURE — 99233 SBSQ HOSP IP/OBS HIGH 50: CPT | Mod: 24 | Performed by: INTERNAL MEDICINE

## 2023-09-08 PROCEDURE — 250N000011 HC RX IP 250 OP 636: Performed by: STUDENT IN AN ORGANIZED HEALTH CARE EDUCATION/TRAINING PROGRAM

## 2023-09-08 PROCEDURE — 120N000002 HC R&B MED SURG/OB UMMC

## 2023-09-08 PROCEDURE — 99222 1ST HOSP IP/OBS MODERATE 55: CPT

## 2023-09-08 PROCEDURE — 250N000011 HC RX IP 250 OP 636: Performed by: SURGERY

## 2023-09-08 PROCEDURE — 99233 SBSQ HOSP IP/OBS HIGH 50: CPT | Performed by: INTERNAL MEDICINE

## 2023-09-08 PROCEDURE — L5688 BK WAIST BELT WEBBING: HCPCS

## 2023-09-08 PROCEDURE — 250N000013 HC RX MED GY IP 250 OP 250 PS 637: Performed by: STUDENT IN AN ORGANIZED HEALTH CARE EDUCATION/TRAINING PROGRAM

## 2023-09-08 PROCEDURE — 36415 COLL VENOUS BLD VENIPUNCTURE: CPT

## 2023-09-08 PROCEDURE — 97110 THERAPEUTIC EXERCISES: CPT | Mod: GP

## 2023-09-08 PROCEDURE — 250N000013 HC RX MED GY IP 250 OP 250 PS 637

## 2023-09-08 PROCEDURE — 87798 DETECT AGENT NOS DNA AMP: CPT | Performed by: INTERNAL MEDICINE

## 2023-09-08 PROCEDURE — 83735 ASSAY OF MAGNESIUM: CPT | Performed by: STUDENT IN AN ORGANIZED HEALTH CARE EDUCATION/TRAINING PROGRAM

## 2023-09-08 PROCEDURE — 86140 C-REACTIVE PROTEIN: CPT

## 2023-09-08 PROCEDURE — 85027 COMPLETE CBC AUTOMATED: CPT | Performed by: STUDENT IN AN ORGANIZED HEALTH CARE EDUCATION/TRAINING PROGRAM

## 2023-09-08 PROCEDURE — 97530 THERAPEUTIC ACTIVITIES: CPT | Mod: GP

## 2023-09-08 PROCEDURE — 97535 SELF CARE MNGMENT TRAINING: CPT | Mod: GO

## 2023-09-08 PROCEDURE — G0463 HOSPITAL OUTPT CLINIC VISIT: HCPCS

## 2023-09-08 PROCEDURE — 85652 RBC SED RATE AUTOMATED: CPT

## 2023-09-08 PROCEDURE — 258N000003 HC RX IP 258 OP 636: Performed by: SURGERY

## 2023-09-08 PROCEDURE — L5450 POSTOP APP NON-WGT BEAR DSG: HCPCS

## 2023-09-08 PROCEDURE — 84100 ASSAY OF PHOSPHORUS: CPT | Performed by: STUDENT IN AN ORGANIZED HEALTH CARE EDUCATION/TRAINING PROGRAM

## 2023-09-08 PROCEDURE — 80053 COMPREHEN METABOLIC PANEL: CPT

## 2023-09-08 RX ORDER — PREGABALIN 150 MG/1
150 CAPSULE ORAL 3 TIMES DAILY
Qty: 30 CAPSULE | Refills: 0 | Status: ON HOLD | OUTPATIENT
Start: 2023-09-08 | End: 2024-06-05

## 2023-09-08 RX ORDER — OXYCODONE HYDROCHLORIDE 5 MG/1
10 TABLET ORAL EVERY 4 HOURS PRN
Qty: 30 TABLET | Refills: 0 | Status: ON HOLD | OUTPATIENT
Start: 2023-09-08 | End: 2023-11-18

## 2023-09-08 RX ORDER — MAGNESIUM OXIDE 400 MG/1
400 TABLET ORAL EVERY 4 HOURS
Status: COMPLETED | OUTPATIENT
Start: 2023-09-08 | End: 2023-09-08

## 2023-09-08 RX ORDER — OXYCODONE HCL 20 MG/1
20 TABLET, FILM COATED, EXTENDED RELEASE ORAL EVERY 12 HOURS
Qty: 20 TABLET | Refills: 0 | Status: SHIPPED | OUTPATIENT
Start: 2023-09-08

## 2023-09-08 RX ADMIN — DULOXETINE HYDROCHLORIDE 60 MG: 60 CAPSULE, DELAYED RELEASE ORAL at 21:33

## 2023-09-08 RX ADMIN — BUSPIRONE HYDROCHLORIDE 10 MG: 10 TABLET ORAL at 21:33

## 2023-09-08 RX ADMIN — DULOXETINE HYDROCHLORIDE 60 MG: 60 CAPSULE, DELAYED RELEASE ORAL at 09:12

## 2023-09-08 RX ADMIN — GABAPENTIN 100 MG: 100 CAPSULE ORAL at 09:11

## 2023-09-08 RX ADMIN — PANTOPRAZOLE SODIUM 40 MG: 40 TABLET, DELAYED RELEASE ORAL at 09:12

## 2023-09-08 RX ADMIN — PIPERACILLIN AND TAZOBACTAM 4.5 G: 4; .5 INJECTION, POWDER, FOR SOLUTION INTRAVENOUS at 17:32

## 2023-09-08 RX ADMIN — OXYCODONE HYDROCHLORIDE 10 MG: 10 TABLET ORAL at 10:56

## 2023-09-08 RX ADMIN — BUPROPION HYDROCHLORIDE 450 MG: 150 TABLET, FILM COATED, EXTENDED RELEASE ORAL at 09:11

## 2023-09-08 RX ADMIN — GABAPENTIN 100 MG: 100 CAPSULE ORAL at 21:33

## 2023-09-08 RX ADMIN — OXYCODONE HYDROCHLORIDE 10 MG: 10 TABLET ORAL at 21:54

## 2023-09-08 RX ADMIN — VANCOMYCIN HYDROCHLORIDE 1750 MG: 10 INJECTION, POWDER, LYOPHILIZED, FOR SOLUTION INTRAVENOUS at 09:12

## 2023-09-08 RX ADMIN — OXYCODONE HYDROCHLORIDE 10 MG: 10 TABLET ORAL at 15:53

## 2023-09-08 RX ADMIN — OXYCODONE HYDROCHLORIDE 10 MG: 10 TABLET ORAL at 04:42

## 2023-09-08 RX ADMIN — HYDROMORPHONE HYDROCHLORIDE 4 MG: 2 TABLET ORAL at 00:26

## 2023-09-08 RX ADMIN — PIPERACILLIN AND TAZOBACTAM 4.5 G: 4; .5 INJECTION, POWDER, FOR SOLUTION INTRAVENOUS at 04:48

## 2023-09-08 RX ADMIN — PANTOPRAZOLE SODIUM 40 MG: 40 TABLET, DELAYED RELEASE ORAL at 15:53

## 2023-09-08 RX ADMIN — PREDNISOLONE ACETATE 1 DROP: 10 SUSPENSION/ DROPS OPHTHALMIC at 09:15

## 2023-09-08 RX ADMIN — PIPERACILLIN AND TAZOBACTAM 4.5 G: 4; .5 INJECTION, POWDER, FOR SOLUTION INTRAVENOUS at 13:31

## 2023-09-08 RX ADMIN — PREGABALIN 150 MG: 50 CAPSULE ORAL at 21:32

## 2023-09-08 RX ADMIN — SENNOSIDES AND DOCUSATE SODIUM 1 TABLET: 50; 8.6 TABLET ORAL at 09:12

## 2023-09-08 RX ADMIN — GABAPENTIN 100 MG: 100 CAPSULE ORAL at 13:31

## 2023-09-08 RX ADMIN — MAGNESIUM OXIDE TAB 400 MG (241.3 MG ELEMENTAL MG) 400 MG: 400 (241.3 MG) TAB at 13:31

## 2023-09-08 RX ADMIN — MAGNESIUM OXIDE TAB 400 MG (241.3 MG ELEMENTAL MG) 400 MG: 400 (241.3 MG) TAB at 09:12

## 2023-09-08 RX ADMIN — ACETAMINOPHEN 975 MG: 325 TABLET, FILM COATED ORAL at 00:26

## 2023-09-08 RX ADMIN — ACETAMINOPHEN 975 MG: 325 TABLET, FILM COATED ORAL at 15:26

## 2023-09-08 RX ADMIN — PIPERACILLIN AND TAZOBACTAM 4.5 G: 4; .5 INJECTION, POWDER, FOR SOLUTION INTRAVENOUS at 21:44

## 2023-09-08 RX ADMIN — CEFAZOLIN SODIUM 2 G: 2 INJECTION, SOLUTION INTRAVENOUS at 00:28

## 2023-09-08 RX ADMIN — ACETAMINOPHEN 975 MG: 325 TABLET, FILM COATED ORAL at 09:10

## 2023-09-08 RX ADMIN — BUSPIRONE HYDROCHLORIDE 10 MG: 10 TABLET ORAL at 09:12

## 2023-09-08 RX ADMIN — HEPARIN SODIUM 5000 UNITS: 5000 INJECTION, SOLUTION INTRAVENOUS; SUBCUTANEOUS at 15:26

## 2023-09-08 RX ADMIN — OXYCODONE HYDROCHLORIDE 20 MG: 20 TABLET, FILM COATED, EXTENDED RELEASE ORAL at 09:12

## 2023-09-08 RX ADMIN — OXYCODONE HYDROCHLORIDE 20 MG: 20 TABLET, FILM COATED, EXTENDED RELEASE ORAL at 21:33

## 2023-09-08 RX ADMIN — PREDNISOLONE ACETATE 1 DROP: 10 SUSPENSION/ DROPS OPHTHALMIC at 21:44

## 2023-09-08 RX ADMIN — SENNOSIDES AND DOCUSATE SODIUM 1 TABLET: 50; 8.6 TABLET ORAL at 21:34

## 2023-09-08 RX ADMIN — PREGABALIN 150 MG: 50 CAPSULE ORAL at 15:26

## 2023-09-08 RX ADMIN — HYDROMORPHONE HYDROCHLORIDE 4 MG: 2 TABLET ORAL at 18:28

## 2023-09-08 RX ADMIN — PREGABALIN 150 MG: 50 CAPSULE ORAL at 09:10

## 2023-09-08 ASSESSMENT — ACTIVITIES OF DAILY LIVING (ADL)
ADLS_ACUITY_SCORE: 39

## 2023-09-08 NOTE — PROGRESS NOTES
"S: Pt seen at [facility] hosp room  with nurse in good spirits. O: I see the EPIC order for a BK protector. Patient still had Ace wrap on and was not given the  socks due to pulling on the skin when applying and adjusting as it slips down during the day and needs adjustments or \"pulling up\" approx 6 times per day. Ace wrap can be used if compression is needed at this time. Shrinkers can be ordered at any time once the patient's limb can tolerate the pulling and tugging of the  socks once healed and no doubts of skin integrity are seen. A: Patients skin integrity seems to be an issue so he was fit with a Rooke BK protector with ridged panel to reduce knee flexion contracture. This BK protector is better for patients with a propensity for skin break down vs a hard plastic non breathable protector. Instructions were given to all and seemed to be understood. The manufacturers instruction sheet with pictures was also given in the \"Information folder\" in his "[employer] bag with our business card attached. The waist belt was also put in the bag so it doesn't get lost. The patient stated he was looking forward to prosthetic fitting. The Rooke BK protector can be removed and re-applied at any time but worn as much as possible ( to reduce knee flexion contracture) and any time he would be at risk of falling or injuring the residual limb. The washing instructions are on the sheet in the bag as well. Machine wash and air dry. P: The plan is to wait until skin integrity is appropriate for the  socks and re visit the need for them then. G: The goal is to reduce knee flexion contractures and reduce injury if he were to fall on his residual limb.  Electronically signed Anant Fiore CPO, LPO.  "

## 2023-09-08 NOTE — PROGRESS NOTES
[facility] PROGRESS NOTE     Patient:  Saqib Bishop   Date of birth 1980, Medical record number [id number]  Date of Visit:  09/08/2023  Date of Admission: 8/27/2023  Consult Requested by:Nii Mancilla MD  Reason for Consult:  Blood cultures at outside hospital growing Shigella sonnei          Assessment and Recommendations:     Saqib Bishop is a 41 yo male with  a past medical history of T12 level spinal cord injury and paraplegia, colostomy, DJD, GERD, chronic sacral decubitus ulcer s/p flap and treatment for osteomyelitis Dec-2020, complicated by dehiscence, open wound and sacral decubitus ulceration Oct-2021, s/p an elective flap procedure in early 2023 which failed and resulted in a chronic sacral ulcer.      He presented to [facility] on 27-Aug-2023 with a foul odour and increased drainage from his left foot. He was found to be in septic alo with acute kidney injury and left foot necrotizing fasciitis.  He  was admitted to the SICU at [facility]. He underwent left below knee guillotine amputation on 28-Aug-2023, irrigation and debridement of left lower extremity residual limb on 30-Aug-2023. left leg tissue grew 1+ Parvimonas micra.   He was transferred out of ICU on 30-Aug-2023 and transferred to [facility] on 31-Aug-2023.     Bacteremia - Gemella morbillorum ( per OSH lab - NO Shigella sonnei isolated )   - Blood cultures done on 8/27/23  at [facility] ( [facility])  turned positive for Shigella sonnei 2/2  with gram stain showing 3+ gram positive cocci  ( called and spoke with Microbiology staff JOAO LevinB 1/4 ( felt to be a contaminant) and GPC 2/4 -both in anaerobic bottle, identified as Gemella morbillorum ( at [place]) , Gemella is a facultative anaerobe, part of normal valeriy in the oral cavity, upper respi tract and GI. It has the potential to cause severe infection such as necrotizing fasciitis as in this case  . sensitive to PCN   - OSH  lab - NO Shigella isolated from blood cultures) ? error in care everywhere , furthermore, patient did not have GI symptoms   -  Blood cultures on 8/28/23 done here are negative x 2 so far.   - TTE 8/31/23 - no mention of endocarditis    Left foot necrotizing fasciitis   - had infected wound 1-2 months   - s/p left below knee guillotine amputation on 28-Aug-2023  - irrigation and debridement of left lower extremity residual limb on 30-Aug-2023. left leg tissue grew 1+ Parvimonas micra.    - wound culture 8/27/23 - Pseudomonasa aeruginosa   - 9/7/23 - irrigation and debridement of LLE with BKA     3. Sacral wounds for many years with pain   -  chronic sacral decubitus ulcer s/p flap and treatment for osteomyelitis Dec-2020  - complicated by dehiscence, open wound and sacral decubitus ulceration Oct-2021  -  s/p an elective flap procedure in early 2023 which failed and resulted in a chronic sacral ulcer.   - 9/5/23 - sacral wound culture - no aerobic cultue, anaerobic cx - Staph epidermidis in broth only     4. Paraplegia    5. s/p colostomy       RECOMMENDATION:  - Continue Zosyn and Vancomycin IV for now   - will treat blood infection x 14 days from 8/28/23   - continue wound care   - called [facility] micro lab to fax over the blood cx 8/27/23- result     Spencer Vera MD, M.Med.Sc  Staff, Infectious Diseases     Interval History  pt is feeling better , less pain. no fever . tolerates antibiotics            History of Present Illness:     Saqib Bishop is a 41 yo male with  a past medical history of T12 level spinal cord injury and paraplegia, colostomy, DJD, GERD, chronic sacral decubitus ulcer s/p flap and treatment for osteomyelitis Dec-2020, complicated by dehiscence, open wound and sacral decubitus ulceration Oct-2021, s/p an elective flap procedure in early 2023 which failed and resulted in a chronic sacral ulcer.      He presented to [facility] on 27-Aug-2023 with a foul odour and  increased drainage from his left foot. He was found to be in septic alo with acute kidney injury and left foot necrotizing fasciitis.  He  was admitted to the SICU at [facility]. He underwent left below knee guillotine amputation on 28-Aug-2023, irrigation and debridement of left lower extremity residual limb on 30-Aug-2023. left leg tissue grew 1+ Parvimonas micra.   He was transferred out of ICU on 30-Aug-2023 and transferred to [facility] on 31-Aug-2023.      wound culture 8/27/23 - Pseudomonasa aeruginosa   wound culture on 8/30/23 - 1+ Parvimonas micra     Blood cultures done at OSH turned positive for Shigella sonnei 2/2 on 8/27/23 with gram stain showing 3+ gram positive cocci  ( called and spoke with Microbiology staff Poonam GPB 1/4 ( felt to be a contaminant) and GPC 2/4 , lab not able to identify and specimen is sent to [place] lab for identificantion. She said there was no Shigella isolated from blood cultures)     Blood cultures on 8/28/23 done here are negative x 2 so far.     Current antimicrobials : Pip/tazobactam since 8/27/23) and vancomycin IV ( since 8/28/23) , he was initially on Clindamycin IV 8/27-8/31) Pip/tazo 8/27-8/28    Saqib is feeling fine, no fever, chills and pain is controlled. he denies any nausea, vomiting, diarrhea recently. has good appetite.   has sacral wounds for many years. denies headaches minimal discharged from sacral wound. he has PCA/ sister who helps with wound dressing daily and goes to wound clinic every month. he says he will see wound care in [place] in Mid September.      Imagings:  Xray left knee 8/27/23   IMPRESSION: Image osseous structures are demineralized. No acute fracture, dislocation or knee joint effusion. No definite soft tissue gas or radiopaque foreign body. No cortical erosion to suggest underlying osteomyelitis.     Xray left foot 8/27/23  IMPRESSION: Extensive soft tissue gas and swelling noted throughout the left foot and ankle compatible  with soft tissue infection. Thinning of the skin at the fifth metatarsal phalangeal joint which may represent an area of ulceration. The fifth proximal   phalanx may be subluxed or dislocated medially at the metatarsal phalangeal joint. Diffuse osteopenia. This and the extensive soft tissue gas limits evaluation for fracture and/or bony erosion / osteomyelitis. If there is clinical concern for these   entities, MRI could be considered for further characterization.     Xray left  tibia and fibula 8/27/23  IMPRESSION: Image osseous structures are demineralized. Tibia and fibula are intact without acute fractures or cortical erosions. Significant subcutaneous gas is seen in the plantar soft tissue of the imaged foot.     Xray left tibia and fibula 8/28/23  IMPRESSION: There are postoperative changes from amputation of the left lower extremity at the level of the distal tibia/fibular diaphysis. No radiographic evidence of osteomyelitis is seen. No fracture. There is some soft tissue swelling over the   visualized lower extremity.    CXR 8/28/23  IMPRESSION:   1. Endotracheal tube tip projects 4.6 cm above the danette.  2. Low lung volumes with increased bilateral perihilar and bibasilar/retrocardiac opacities, which may represent atelectasis/pulmonary edema.  3. Small left pleural effusion.         Review of Systems:   CONSTITUTIONAL:  No fevers or chills  EYES: negative for icterus  ENT:  negative for hearing loss, tinnitus and sore throat  RESPIRATORY:  negative for cough with sputum and dyspnea  CARDIOVASCULAR:  negative for chest pain, dyspnea  GASTROINTESTINAL:  negative for nausea, vomiting, diarrhea and constipation  GENITOURINARY:  negative for dysuria  HEME:  No easy bruising  INTEGUMENT:  see HPI   NEURO:  Negative for headache         Past Medical History:     Past Medical History:   Diagnosis Date    Degenerative joint disease     Gastro-oesophageal reflux disease             Past Surgical History:     Past  Surgical History:   Procedure Laterality Date    AMPUTATE LEG BELOW KNEE Left 2023    Procedure: Left below knee Guillotine Amputation;  Surgeon: Sesar Barth MD;  Location: [facility] OR    AMPUTATE LEG BELOW KNEE Left 2023    Procedure: Irrigation and Debridement leg below knee, wound vac application, Left;  Surgeon: Juan Rehman MD;  Location: [facility] OR    BACK SURGERY      lumbar fusion    EXPLORE SPINE, REMOVE HARDWARE, COMBINED  2012    Procedure:COMBINED EXPLORE SPINE, REMOVE HARDWARE; EXPLORE SPINE, REMOVE HARDWARE L4-S1; Surgeon:FAM GONZALEZ; Location:[facility] OR    IRRIGATION AND DEBRIDEMENT LOWER EXTREMITY, COMBINED Left 2023    Procedure: Irrigation and debridement lower extremity, combined and wound vac exchange;  Surgeon: Etienne Maier MD;  Location: [facility] OR    ORTHOPEDIC SURGERY      right foot surgery            Family History:   History reviewed. No pertinent family history.         Social History:     Social History     Tobacco Use    Smoking status: Former     Types: Cigarettes     Quit date: 2011     Years since quittin.8    Smokeless tobacco: Not on file   Substance Use Topics    Alcohol use: No     History   Sexual Activity    Sexual activity: Not on file            Current Medications (antimicrobials listed in bold):      acetaminophen  975 mg Oral Q8H    buPROPion  450 mg Oral Daily    busPIRone  10 mg Oral BID    DULoxetine  60 mg Oral BID    gabapentin  100 mg Oral TID    heparin ANTICOAGULANT  5,000 Units Subcutaneous Q8H    [Held by provider] hydroxychloroquine  400 mg Oral Daily    oxyCODONE  20 mg Oral Q12H    pantoprazole  40 mg Oral BID AC    piperacillin-tazobactam  4.5 g Intravenous Q6H    polyethylene glycol  17 g Oral Daily    prednisoLONE acetate  1 drop Both Eyes BID    pregabalin  150 mg Oral TID    senna-docusate  1 tablet Oral BID    sodium chloride (PF)  3 mL Intracatheter Q8H    sodium chloride (PF)  3 mL Intracatheter Q8H    vancomycin   1,750 mg Intravenous Q24H          Allergies:     Allergies   Allergen Reactions    Adhesive Tape Hives     From tape from surgery    Benzoin Hives and Rash    Droperidol Anaphylaxis    Prednisone      vomiting    Vitamin D Rash     flairs up his sarcoidosis          Physical Exam:   Vitals were reviewed  Patient Vitals for the past 24 hrs:   BP Temp Temp src Pulse Resp SpO2   09/08/23 1600 -- -- -- -- -- 94 %   09/08/23 1400 -- -- -- -- -- 94 %   09/08/23 1200 -- -- -- -- -- 92 %   09/08/23 1000 -- -- -- -- -- 95 %   09/08/23 0900 125/59 99.2  F (37.3  C) Oral 115 18 96 %   09/08/23 0400 -- -- -- -- -- 95 %   09/08/23 0200 -- -- -- -- -- 93 %   09/08/23 0000 -- -- -- -- -- 93 %   09/07/23 2354 -- 98  F (36.7  C) Oral 104 16 --   09/07/23 2200 -- -- -- -- -- 96 %   09/07/23 2000 (!) 82/45 -- -- -- -- 93 %   09/07/23 1800 97/63 -- -- -- -- 90 %     Ranges for his vital signs:  Temp:  [98  F (36.7  C)-99.2  F (37.3  C)] 99.2  F (37.3  C)  Pulse:  [104-115] 115  Resp:  [16-18] 18  BP: ()/(45-63) 125/59  SpO2:  [90 %-96 %] 94 %  Physical Examination:  GENERAL:  well-developed, well-nourished, alert, oriented, in bed in no acute distress.   HEENT:  Head is normocephalic, atraumatic   EYES:  Eyes have anicteric sclerae without conjunctival injection.    NECK:  Supple.   LUNGS:  Clear to auscultation bilateral.   CARDIOVASCULAR:  Regular rate and rhythm with no murmurs  ABDOMEN:  Normal bowel sounds, soft, nontender. No appreciable hepatosplenomegaly colostomy. soft stools.   SKIN:  left BKA - site is covered.   Line(s) are in place without any surrounding erythema or exudate.   NEUROLOGIC: paraplegic   ureteral catheter          Laboratory Data:     Inflammatory Markers    Recent Labs   Lab Test 09/08/23  0424 09/07/23  0423 09/06/23  0702 09/05/23  0029 09/04/23  0408 09/03/23  0323 09/02/23  0358 09/01/23  0818   SED 47* 1 46* 1 57* 1 59* 78*     Hematology Studies    Recent Labs   Lab Test 09/08/23  0424  09/07/23  0423 09/06/23  0702 09/05/23  0029 09/04/23  0408 09/03/23  0323   WBC 11.1* 7.8 8.8 7.0 6.1 7.6   HGB 11.3* 12.4* 12.0* 11.5* 11.2* 10.9*   MCV 86 86 84 84 82 82    357 363 326 312 307     Metabolic Studies     Recent Labs   Lab Test 09/08/23  0424 09/07/23  0423 09/06/23  0702 09/05/23  0636 09/05/23  0029 09/04/23  0408    137 138  --  142 138   POTASSIUM 4.4 4.7 5.1 4.8 5.0 4.7   CHLORIDE 102 101 103  --  107 104   CO2 27 26 24  --  24 27   BUN 24.8* 27.8* 22.0*  --  18.2 17.9   CR 1.03 1.14 1.05  --  1.01 0.90   GFRESTIMATED >90 82 >90  --  >90 >90     Hepatic Studies    Recent Labs   Lab Test 09/08/23  0424 09/07/23  0423 09/06/23  0702 09/05/23  0029 09/04/23  0408 09/03/23  0323   BILITOTAL <0.2 0.2 0.2 0.2 0.2 0.2   ALKPHOS 124 141* 128 126 122 118   ALBUMIN 3.3* 3.4* 3.6 3.2* 3.1* 2.5*   AST 27 31 31 33 26 25   ALT 19 23 22 21 19 20     Microbiology:  Culture Micro   Date Value Ref Range Status   01/12/2005 No Beta Streptococcus isolated  Final     Vancomycin Levels    Recent Labs   Lab Test 09/05/23  0636 09/02/23  1259 08/31/23  1202   VANCOMYCIN 21.3 26.6* 25.5*     Hepatitis C Testing     Hepatitis C Antibody   Date Value Ref Range Status   04/06/2005 Negative NEG Final
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
## 2023-09-08 NOTE — PROGRESS NOTES
Orthopaedic Surgery Progress Note 09/08/2023    Subjective  AFVSS. NAEO. Had one episode of hyptension to 82/45. AM Hgb 11.3. 09/08 WBC 11.1 CRP 28.05 ESR 47. Denies fever, chills. Has pain shooting up from surgical site that has kept him up overnight. Requests melatonin and better pain control.      Objective  Temp: 98  F (36.7  C) Temp src: Oral BP: 108/63 Pulse: 104   Resp: 16 SpO2: 94 % O2 Device: None (Room air) Oxygen Delivery: 2 LPM    Exam:  Gen: Sleeping  Resp: Nonlabored breathing  Cardio: Ext wwp     MSK:    LLE:  - SILT femoral/LFCN. SILT saph/tibial below knee but decreases distally, closer to surgical site  - Dressings c/d/I      Recent Labs   Lab 09/08/23  0424 09/07/23  0423 09/06/23  0702   WBC 11.1* 7.8 8.8   HGB 11.3* 12.4* 12.0*    357 363       Cultures:  08/30 Cx: + Parvimonas micra   08/28 Cx NGTD    Assessment:   Assessment: Saqib Bishop is a 42 year old male s/p emergent left transtibial guillotine amputation for LLE NSTI on 8/27-8/28. RTOR  for I&D and wound vac exchange on 08/30 and 9/2. Now s/p definitive BKA on 09/07 with Dr. Gates    Cultures from 8/30 + Parvimonas micra and anaerobic gram positive bacilli in broth, currently on vancomycin and zosyn per medicine.     Plan for today:  - Stump protector with orthotics  - Work with PT/OT  - Pain management consult     Plan:  Medicine Primary   Activity: Okay for UOOB with assist. No pressure on LLE, elevate at all times  Weight bearing status: NWB LLE  Antibiotics: Vacomycin and Zosyn per medicine/infectious disease  Diet: Regular diet  DVT prophylaxis: per primary, okay to restart chemoprophylaxis from orthopedic perspective, SQH 5000u  Dressings: Dressing change per ortho on POD5 (09/12). Dressing change in clinic, okay to shower   Bracing: Stump protector to be placed by orthotics  Drains: None  Pain management: Multimodal, per primary  X-rays: complete  Cultures: 08/28 cx NGTD, 08/30 cx + Parvimonas micra   Follow-up: 2  andrew (09/22) with Dr. Gatse for wound check if discharged. Suture removal in 10-14 days     Disposition: Pending progress with PT/OT, pain management discharge to home vs TCU    Respectfully,    Wilfred Elaine MD  Orthopedic Surgery PGY1  [phone]    Please page me directly with any questions/concerns during regular weekday hours before 5 pm. If there is no response, if it is a weekend, or if it is during evening hours then please page the orthopedic surgery resident on call.

## 2023-09-08 NOTE — CARE PLAN
HEENT: Scab on nose  Neuro: A&Ox4. Paraplegia. Denies N/T in hands. Pain 7/10, Tylenol, Oxy, Gabapentin given.   Cardiac: Tachycardia  Respiratory: Denies SOB, O2 >93% on RA  Skin: R buttocks PI dressing changed 9/8 CDI, LLE dressing CDI.  GI/: Dyer catheter, yellow/clear output. Colostomy intact, cares + appliance change completed by pt. Abdomen bulging/irregular d/t hernia.  LDA: L PIV dislodged, removed. R PIV infusing intermittent ABX, Vanco rate slowed d/t pt complaint of burning in arm. Pt stated burning sensation stopped after rate slowed by 100 ml/hr.   Diet: Reg diet, appetite good. RN managed K/Mg/P, Mg replaced.   Activity: L BKA
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
## 2023-09-08 NOTE — PROGRESS NOTES
CLINICAL NUTRITION SERVICES - REASSESSMENT NOTE     Nutrition Prescription    RECOMMENDATIONS FOR MDs/PROVIDERS TO ORDER:  None    Malnutrition Status:    Patient does not meet two of the established criteria necessary for diagnosing malnutrition    Recommendations already ordered by Registered Dietitian (RD):  Discontinue Expedite Cup per pt request  Continue Ensure Max Protein BID as ordered     Future/Additional Recommendations:  Continue to monitor meal/supplement intakes, wt/lab trends      EVALUATION OF THE PROGRESS TOWARD GOALS   Diet: Regular  Supplement: Chocolate Ensure Max Protein BID at 10 am and HS, Expedite Cup once daily at 2 pm  Intake: % per flow sheets, noted pt has diet held when has to return to OR        NEW FINDINGS   MAR reviewed. Labs reviewed. Pt now s/p definitive BKA on 9/07. RD visited pt at bedside, notes has good appetite, taking meals TID, notes some foods being brought in by family for menu fatigue, also has outside snacks at bedside as well, reviewed supplements, pt dislikes Expedite cup so request that be discontinued but likes Ensure and will continue to take, RD agreeing, encouraged pt to prioritize protein at meals and continue Ensure Max BID.     09/04/23 0517 138 kg (304 lb 3.8 oz) --   08/27/23 2231 136.8 kg (301 lb 9.4 oz) --   08/27/23 2000 137.8 kg (303 lb 12.7 oz) Bed scale     08/04/23 124.7 kg (275 lb)     122.5 kg (270 lb) 01/18/2023     Weight assessment: Unsure of accuracy of bed scale trend noting pt now s/p BKA.     MALNUTRITION  % Intake: Decreased intake does not meet criteria  % Weight Loss: Difficult to assess as noted above   Subcutaneous Fat Loss: None observed  Muscle Loss: None observed  Fluid Accumulation/Edema: Mild per flow sheets   Malnutrition Diagnosis: Patient does not meet two of the established criteria necessary for diagnosing malnutrition    Previous Goals   Patient to consume % of nutritionally adequate meal trays TID, or the  equivalent with supplements/snacks   Evaluation: Likely met now that pt no longer having diet holds for surgery     Previous Nutrition Diagnosis  Inadequate oral intake related to frequent NPO status in the setting of increased nutrient needs as evidenced by no po intake since admit 4 days ago   Evaluation: Improving, diagnosis adjusted to below     CURRENT NUTRITION DIAGNOSIS  Increased nutrient needs related to post op healing as evidenced by therapeutic recommendations       INTERVENTIONS  Implementation  Medical food supplement therapy - adjusted as above per pt request     Goals  Patient to consume % of nutritionally adequate meal trays TID, or the equivalent with supplements/snacks.    Monitoring/Evaluation  Progress toward goals will be monitored and evaluated per protocol.     Deneen Singh RD, CNSC, LD  [facility] [address] Med/Surg RD pager: [phone]

## 2023-09-08 NOTE — PLAN OF CARE
"/63 (BP Location: Right arm)   Pulse 104   Temp 98  F (36.7  C) (Oral)   Resp 16   Ht 1.854 m (6' 1\")   Wt 138 kg (304 lb 3.8 oz)   SpO2 94%   BMI 40.14 kg/m     VS: /63 (BP Location: Right arm)   Pulse 104   Temp 98  F (36.7  C) (Oral)   Resp 16   Ht 1.854 m (6' 1\")   Wt 138 kg (304 lb 3.8 oz)   SpO2 94%   BMI 40.14 kg/m      O2: Sating >90% on RA. Lung sounds clear. Denies chest pain and SOB.   Output: Voids spontaneously and adequately via Dyer catheter, see flowsheet.   Last BM: Bowel sounds active x4. Passing flatus. Colostomy    Activity: Sits up at the edge of the bed.   Skin: L AKA wound   Pain: Pain was managed with Tylenol, Oxycodone, .    CMS: A&Ox4. Denies N/T.    Dressing: Dressing to L C/D/I.   Diet: Regular. Appetite was good.  Denies N/V.    LDA: PIV to R is S/L. L Piv infusing                                                                               Equipment: IV pole, and personal belongings. Call light within reach and uses appropriately.   Plan:  TBD   Additional Info: Continue POC.             "

## 2023-09-08 NOTE — PROGRESS NOTES
CARE MANAGEMENT    9/8-CHW called to [place] [facility] [place] as per SW and Rounds, the Patient's admission date will be delayed a couple of days due to his surgery on tomorrow.  Vanesa reports that they continue to follow and wait for an status update on Monday.        Sally ELIZABETH-[facility] Worker

## 2023-09-08 NOTE — PROGRESS NOTES
"Occupational Therapy Evaluation       09/08/23 1000   Appointment Info   Signing Clinician's Name / Credentials (OT) Nubiacaitlin Zarate OTR/L   Living Environment   People in Home alone   Current Living Arrangements mobile home   Home Accessibility wheelchair accessible   Living Environment Comments Pt reported he lives in accessible mobile home, has shower chair and does not transfer onto toilet. Pt has PCA asssit 5 hrs/day and sister who lives nearby can provide assist intermittently.   Self-Care   Usual Activity Tolerance moderate   Current Activity Tolerance fair   Equipment Currently Used at Home transfer board;wheelchair, manual   Fall history within last six months yes   Number of times patient has fallen within last six months 3   Activity/Exercise/Self-Care Comment Pt reported he is mod IND with ADLs at baseline. Transfers with slideboard from chair to shower chair, does not transfer onto toilet.   Instrumental Activities of Daily Living (IADL)   IADL Comments PCA assist   General Information   Onset of Illness/Injury or Date of Surgery 08/27/23   Referring Physician Semaj Ibrahim MD   Patient/Family Therapy Goal Statement (OT) to return home   Additional Occupational Profile Info/Pertinent History of Current Problem Per chart, \"Patient is a 41 y/o man who has a past medical history of T12 level spinal cord injury and paraplegia. Patient has a chronic sacral decubitus ulcer s/p flap and treatment for osteomyelitis Dec-2020, then with dehiscence, open wound and sacral decubitus ulceration Oct-2021. Patient reportedly underwent an elective flap procedure in early 2023 which failed and resulted in a chronic sacral ulcer.      Patient presented to [facility] on 27-Aug-2023 with a foul odour and increased welling with drainage from his left foot. Patient was found to be septic with acute kidney injury and septic shock. Patient was admitted to the SICU at [facility]. 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 Patient underwent irrigation and debridement of left lower extremity residual limb on 30-Aug-2023 for left lower extremity necrotizing fasciitis. Patient was transferred out of ICU on 30-Aug-2023. Patient was transferred to [facility] on 31-Aug-2023.      Patient underwent irrigation and debridement of left lower extremity residual limb and wound vac exchange yesterday for left lower extremity necrotizing fasciitis.\"  s/p L BKA 9/7/2023   Existing Precautions/Restrictions fall;weight bearing   Left Upper Extremity (Weight-bearing Status) full weight-bearing (FWB)   Right Upper Extremity (Weight-bearing Status) full weight-bearing (FWB)   Left Lower Extremity (Weight-bearing Status) non weight-bearing (NWB)   Cognitive Status Examination   Orientation Status orientation to person, place and time   Affect/Mental Status (Cognitive) WFL   Follows Commands WFL   Cognitive Status Comments Appears at baseline. Pt conversationally appropriate, demonstrated good understanding of current functioning.   Visual Perception   Visual Impairment/Limitations WFL   Sensory   Sensory Comments Pt reported no sensory changes   Pain Assessment   Patient Currently in Pain Yes, see Vital Sign flowsheet   Posture   Posture not impaired   Range of Motion Comprehensive   General Range of Motion bilateral upper extremity ROM WFL   Strength Comprehensive (MMT)   Comment, General Manual Muscle Testing (MMT) Assessment BUE WFL   Coordination   Upper Extremity Coordination No deficits were identified   Bed Mobility   Bed Mobility supine-sit;sit-supine   Supine-Sit [place] (Bed Mobility) modified independence   Sit-Supine [place] (Bed Mobility) modified independence   Balance   Balance Comments Sitting balance intact   Activities of Daily Living   BADL Assessment/Intervention bathing;upper body dressing;lower " body dressing;grooming;toileting   Bathing Assessment/Intervention   [place] Level (Bathing) moderate assist (50% patient effort)   Comment, (Bathing) Per clinical judgement   Assistive Devices (Bathing) shower chair   Upper Body Dressing Assessment/Training   Comment, (Upper Body Dressing) Per clinical judgement   [place] Level (Upper Body Dressing) modified independence   Lower Body Dressing Assessment/Training   [place] Level (Lower Body Dressing) maximum assist (25% patient effort)   Grooming Assessment/Training   [place] Level (Grooming) set up   Comment, (Grooming) Per clinical judgement   Toileting   Comment, (Toileting) Per clinical judgement   [place] Level (Toileting) minimum assist (75% patient effort)   Clinical Impression   Criteria for Skilled Therapeutic Interventions Met (OT) Yes, treatment indicated   OT Diagnosis decreased ADL and IADL independence   OT Problem List-Impairments impacting ADL problems related to;activity tolerance impaired;post-surgical precautions   Assessment of Occupational Performance 3-5 Performance Deficits   Identified Performance Deficits ADL including dressing, bathing, and toileting; IADLs   Planned Therapy Interventions (OT) ADL retraining;IADL retraining;transfer training;progressive activity/exercise   Clinical Decision Making Complexity (OT) low complexity   Risk & Benefits of therapy have been explained evaluation/treatment results reviewed;care plan/treatment goals reviewed;risks/benefits reviewed;current/potential barriers reviewed;participants voiced agreement with care plan;participants included;patient   Clinical Impression Comments Pt presents below functional baseline, limited primarily by decreased activity tolerance and post surgical precautions from new BKA. Pt would benefit from continued OT to progress ADL independence and facilitate safe discharge planning.   OT Total Evaluation Time   OT Eval, Low Complexity Minutes (70053) 10    OT Goals   Therapy Frequency (OT) 5 times/wk   OT Predicted Duration/Target Date for Goal Attainment 09/22/23   OT Goals Hygiene/Grooming;Lower Body Dressing;Lower Body Bathing;Bed Mobility;Transfers   OT: Hygiene/Grooming modified independent   OT: Lower Body Dressing Modified independent   OT: Lower Body Bathing Modified independent   OT: Bed Mobility Modified independent   OT: Transfer Modified independent  (shower transfer)   Self-Care/Home Management   Self-Care/Home Mgmt/ADL, Compensatory, Meal Prep Minutes (03438) 13   Symptoms Noted During/After Treatment (Meal Preparation/Planning Training) fatigue   Treatment Detail/Skilled Intervention OT: Following evaluation, treatment indicated. Facilitated ADL for increased IND and activity tolerance. Pt sat EoB mod IND. Completed LB dressing as well as donning doffing limb protector. Pt completed with min A. Pt declined slideboard transfer to  due to pain. Educated pt on recommendation to wear limb protector as much as possible, pt demonstrated understanding. Pt returned to supine mod IND, left with call light within reach, all needs met.   OT Discharge Planning   OT Plan OT: slideboard to ,  level ADLs, shower transfer, LB dressing (supine vs in chair)   OT Discharge Recommendation (DC Rec) [facility] patient will benefit from intensive, interdisciplinary therapy.  Anticipate will be able to tolerate 3 hours of therapy per day;home with assist;home with home care occupational therapy   OT Rationale for DC Rec Pt presents below functional baseline, limited primarily by decreased activity tolerance and post surgical precautions from new BKA. Pt would benefit from continued rehab. Pending length of stay, pt may progress to being safe to discharge to home with home OT, as pt is w/c based at baseline, has good support, and has DME needs met.   OT Brief overview of current status mod IND bed mobility, min A LB dressing   Total Session Time    Timed Code Treatment Minutes 13   Total Session Time (sum of timed and untimed services) 23

## 2023-09-08 NOTE — TELEPHONE ENCOUNTER
Patient currently admitted to [facility] for amputation LLE for necrotizing Fasciitis.  Text sent out to notify and cancel appointment with Dr Zavala on 9/14/23 at [facility].   Recommended that Patient should contact the [facility] nurse on [place] to notify Dr Zavala or Plastics team to determine need for surgical debridement of right gluteal wound.

## 2023-09-09 ENCOUNTER — HOSPITAL ENCOUNTER (INPATIENT)
Facility: CLINIC | Age: 43
LOS: 6 days | Discharge: HOME HEALTH | DRG: 559 | End: 2023-09-15
Attending: PHYSICAL MEDICINE & REHABILITATION | Admitting: PHYSICAL MEDICINE & REHABILITATION
Payer: MEDICARE

## 2023-09-09 VITALS
OXYGEN SATURATION: 98 % | HEIGHT: 73 IN | TEMPERATURE: 97.9 F | RESPIRATION RATE: 16 BRPM | HEART RATE: 82 BPM | DIASTOLIC BLOOD PRESSURE: 87 MMHG | SYSTOLIC BLOOD PRESSURE: 155 MMHG | BODY MASS INDEX: 40.32 KG/M2 | WEIGHT: 304.24 LBS

## 2023-09-09 DIAGNOSIS — Z89.512 S/P BELOW KNEE AMPUTATION, LEFT (H): ICD-10-CM

## 2023-09-09 DIAGNOSIS — L89.154 PRESSURE INJURY OF SACRAL REGION, STAGE 4 (H): Primary | ICD-10-CM

## 2023-09-09 DIAGNOSIS — F43.21 ADJUSTMENT DISORDER WITH DEPRESSED MOOD: ICD-10-CM

## 2023-09-09 DIAGNOSIS — Z43.3 COLOSTOMY CARE (H): ICD-10-CM

## 2023-09-09 LAB
ALBUMIN SERPL BCG-MCNC: 3.3 G/DL (ref 3.5–5.2)
ALP SERPL-CCNC: 134 U/L (ref 40–129)
ALT SERPL W P-5'-P-CCNC: 16 U/L (ref 0–70)
ANION GAP SERPL CALCULATED.3IONS-SCNC: 9 MMOL/L (ref 7–15)
AST SERPL W P-5'-P-CCNC: 22 U/L (ref 0–45)
BILIRUB SERPL-MCNC: 0.2 MG/DL
BUN SERPL-MCNC: 23.5 MG/DL (ref 6–20)
CALCIUM SERPL-MCNC: 9.4 MG/DL (ref 8.6–10)
CHLORIDE SERPL-SCNC: 106 MMOL/L (ref 98–107)
CREAT SERPL-MCNC: 1.01 MG/DL (ref 0.67–1.17)
CRP SERPL-MCNC: 24.37 MG/L
DEPRECATED HCO3 PLAS-SCNC: 26 MMOL/L (ref 22–29)
EGFRCR SERPLBLD CKD-EPI 2021: >90 ML/MIN/1.73M2
ERYTHROCYTE [DISTWIDTH] IN BLOOD BY AUTOMATED COUNT: 19 % (ref 10–15)
ERYTHROCYTE [SEDIMENTATION RATE] IN BLOOD BY WESTERGREN METHOD: 55 MM/HR (ref 0–15)
GLUCOSE BLDC GLUCOMTR-MCNC: 134 MG/DL (ref 70–99)
GLUCOSE BLDC GLUCOMTR-MCNC: 136 MG/DL (ref 70–99)
GLUCOSE SERPL-MCNC: 137 MG/DL (ref 70–99)
HCT VFR BLD AUTO: 38.6 % (ref 40–53)
HGB BLD-MCNC: 11.5 G/DL (ref 13.3–17.7)
MAGNESIUM SERPL-MCNC: 1.6 MG/DL (ref 1.7–2.3)
MCH RBC QN AUTO: 26 PG (ref 26.5–33)
MCHC RBC AUTO-ENTMCNC: 29.8 G/DL (ref 31.5–36.5)
MCV RBC AUTO: 87 FL (ref 78–100)
PHOSPHATE SERPL-MCNC: 3.2 MG/DL (ref 2.5–4.5)
PLATELET # BLD AUTO: 296 10E3/UL (ref 150–450)
POTASSIUM SERPL-SCNC: 4.3 MMOL/L (ref 3.4–5.3)
PROT SERPL-MCNC: 7.7 G/DL (ref 6.4–8.3)
RBC # BLD AUTO: 4.43 10E6/UL (ref 4.4–5.9)
SODIUM SERPL-SCNC: 141 MMOL/L (ref 136–145)
VANCOMYCIN SERPL-MCNC: 11.4 UG/ML
WBC # BLD AUTO: 9.1 10E3/UL (ref 4–11)

## 2023-09-09 PROCEDURE — 85027 COMPLETE CBC AUTOMATED: CPT | Performed by: STUDENT IN AN ORGANIZED HEALTH CARE EDUCATION/TRAINING PROGRAM

## 2023-09-09 PROCEDURE — 85652 RBC SED RATE AUTOMATED: CPT

## 2023-09-09 PROCEDURE — 250N000013 HC RX MED GY IP 250 OP 250 PS 637

## 2023-09-09 PROCEDURE — 83735 ASSAY OF MAGNESIUM: CPT | Performed by: STUDENT IN AN ORGANIZED HEALTH CARE EDUCATION/TRAINING PROGRAM

## 2023-09-09 PROCEDURE — 250N000009 HC RX 250: Performed by: PHYSICIAN ASSISTANT

## 2023-09-09 PROCEDURE — 250N000013 HC RX MED GY IP 250 OP 250 PS 637: Performed by: SURGERY

## 2023-09-09 PROCEDURE — 258N000003 HC RX IP 258 OP 636: Performed by: SURGERY

## 2023-09-09 PROCEDURE — 80053 COMPREHEN METABOLIC PANEL: CPT

## 2023-09-09 PROCEDURE — 128N000003 HC R&B REHAB

## 2023-09-09 PROCEDURE — 250N000011 HC RX IP 250 OP 636: Performed by: STUDENT IN AN ORGANIZED HEALTH CARE EDUCATION/TRAINING PROGRAM

## 2023-09-09 PROCEDURE — 84100 ASSAY OF PHOSPHORUS: CPT | Performed by: STUDENT IN AN ORGANIZED HEALTH CARE EDUCATION/TRAINING PROGRAM

## 2023-09-09 PROCEDURE — 99239 HOSP IP/OBS DSCHRG MGMT >30: CPT | Performed by: INTERNAL MEDICINE

## 2023-09-09 PROCEDURE — 250N000011 HC RX IP 250 OP 636: Mod: JZ

## 2023-09-09 PROCEDURE — 86140 C-REACTIVE PROTEIN: CPT

## 2023-09-09 PROCEDURE — 99232 SBSQ HOSP IP/OBS MODERATE 35: CPT | Mod: 24 | Performed by: INTERNAL MEDICINE

## 2023-09-09 PROCEDURE — 250N000013 HC RX MED GY IP 250 OP 250 PS 637: Performed by: PHYSICIAN ASSISTANT

## 2023-09-09 PROCEDURE — 36415 COLL VENOUS BLD VENIPUNCTURE: CPT

## 2023-09-09 PROCEDURE — 250N000011 HC RX IP 250 OP 636: Mod: JZ | Performed by: PHYSICIAN ASSISTANT

## 2023-09-09 PROCEDURE — 80202 ASSAY OF VANCOMYCIN: CPT

## 2023-09-09 PROCEDURE — 250N000011 HC RX IP 250 OP 636: Performed by: SURGERY

## 2023-09-09 RX ORDER — ACETAMINOPHEN 325 MG/1
975 TABLET ORAL EVERY 8 HOURS
Status: DISCONTINUED | OUTPATIENT
Start: 2023-09-09 | End: 2023-09-15 | Stop reason: HOSPADM

## 2023-09-09 RX ORDER — METHOCARBAMOL 750 MG/1
750 TABLET, FILM COATED ORAL EVERY 6 HOURS PRN
Status: DISCONTINUED | OUTPATIENT
Start: 2023-09-09 | End: 2023-09-11

## 2023-09-09 RX ORDER — DULOXETIN HYDROCHLORIDE 30 MG/1
60 CAPSULE, DELAYED RELEASE ORAL 2 TIMES DAILY
Status: DISCONTINUED | OUTPATIENT
Start: 2023-09-09 | End: 2023-09-15 | Stop reason: HOSPADM

## 2023-09-09 RX ORDER — HEPARIN SODIUM 5000 [USP'U]/.5ML
5000 INJECTION, SOLUTION INTRAVENOUS; SUBCUTANEOUS EVERY 12 HOURS
Status: DISCONTINUED | OUTPATIENT
Start: 2023-09-09 | End: 2023-09-15 | Stop reason: HOSPADM

## 2023-09-09 RX ORDER — PREDNISOLONE ACETATE 10 MG/ML
1 SUSPENSION/ DROPS OPHTHALMIC 2 TIMES DAILY
Status: DISCONTINUED | OUTPATIENT
Start: 2023-09-09 | End: 2023-09-15 | Stop reason: HOSPADM

## 2023-09-09 RX ORDER — FLUORIDE TOOTHPASTE
15 TOOTHPASTE DENTAL 4 TIMES DAILY PRN
Status: CANCELLED | DISCHARGE
Start: 2023-09-09

## 2023-09-09 RX ORDER — BUSPIRONE HYDROCHLORIDE 5 MG/1
10 TABLET ORAL 2 TIMES DAILY
Status: DISCONTINUED | OUTPATIENT
Start: 2023-09-09 | End: 2023-09-15 | Stop reason: HOSPADM

## 2023-09-09 RX ORDER — BUPROPION HYDROCHLORIDE 150 MG/1
450 TABLET ORAL DAILY
Status: DISCONTINUED | OUTPATIENT
Start: 2023-09-10 | End: 2023-09-15 | Stop reason: HOSPADM

## 2023-09-09 RX ORDER — FLUORIDE TOOTHPASTE
15 TOOTHPASTE DENTAL 4 TIMES DAILY PRN
Status: ON HOLD
Start: 2023-09-09 | End: 2023-09-14

## 2023-09-09 RX ORDER — CEFAZOLIN SODIUM 1 G/50ML
1750 SOLUTION INTRAVENOUS EVERY 24 HOURS
Status: DISCONTINUED | OUTPATIENT
Start: 2023-09-10 | End: 2023-09-12

## 2023-09-09 RX ORDER — GABAPENTIN 100 MG/1
100 CAPSULE ORAL 3 TIMES DAILY
Status: DISCONTINUED | OUTPATIENT
Start: 2023-09-09 | End: 2023-09-09

## 2023-09-09 RX ORDER — PREGABALIN 75 MG/1
150 CAPSULE ORAL 3 TIMES DAILY
Status: DISCONTINUED | OUTPATIENT
Start: 2023-09-09 | End: 2023-09-15 | Stop reason: HOSPADM

## 2023-09-09 RX ORDER — OXYCODONE HYDROCHLORIDE 5 MG/1
5-10 TABLET ORAL EVERY 4 HOURS PRN
Status: DISCONTINUED | OUTPATIENT
Start: 2023-09-09 | End: 2023-09-15 | Stop reason: HOSPADM

## 2023-09-09 RX ORDER — CEFAZOLIN SODIUM 1 G/50ML
1750 SOLUTION INTRAVENOUS EVERY 24 HOURS
Status: ACTIVE
Start: 2023-09-10 | End: 2023-09-14

## 2023-09-09 RX ORDER — PANTOPRAZOLE SODIUM 40 MG/1
40 TABLET, DELAYED RELEASE ORAL
Status: ON HOLD
Start: 2023-09-09 | End: 2023-09-14

## 2023-09-09 RX ORDER — HEPARIN SODIUM 5000 [USP'U]/.5ML
5000 INJECTION, SOLUTION INTRAVENOUS; SUBCUTANEOUS EVERY 8 HOURS
Status: ON HOLD
Start: 2023-09-09 | End: 2023-09-14

## 2023-09-09 RX ORDER — PANTOPRAZOLE SODIUM 40 MG/1
40 TABLET, DELAYED RELEASE ORAL
Status: CANCELLED | DISCHARGE
Start: 2023-09-09

## 2023-09-09 RX ORDER — PANTOPRAZOLE SODIUM 40 MG/1
40 TABLET, DELAYED RELEASE ORAL
Status: DISCONTINUED | OUTPATIENT
Start: 2023-09-09 | End: 2023-09-15 | Stop reason: HOSPADM

## 2023-09-09 RX ORDER — PIPERACILLIN SODIUM, TAZOBACTAM SODIUM 4; .5 G/20ML; G/20ML
4.5 INJECTION, POWDER, LYOPHILIZED, FOR SOLUTION INTRAVENOUS EVERY 6 HOURS
Qty: 320 ML | Refills: 0 | Status: ACTIVE
Start: 2023-09-09 | End: 2023-09-14

## 2023-09-09 RX ORDER — HEPARIN SODIUM 5000 [USP'U]/.5ML
5000 INJECTION, SOLUTION INTRAVENOUS; SUBCUTANEOUS EVERY 8 HOURS
Status: CANCELLED | DISCHARGE
Start: 2023-09-09

## 2023-09-09 RX ORDER — PIPERACILLIN SODIUM, TAZOBACTAM SODIUM 4; .5 G/20ML; G/20ML
4.5 INJECTION, POWDER, LYOPHILIZED, FOR SOLUTION INTRAVENOUS EVERY 6 HOURS
Status: COMPLETED | OUTPATIENT
Start: 2023-09-09 | End: 2023-09-13

## 2023-09-09 RX ORDER — MAGNESIUM OXIDE 400 MG/1
400 TABLET ORAL EVERY 4 HOURS
Status: COMPLETED | OUTPATIENT
Start: 2023-09-09 | End: 2023-09-09

## 2023-09-09 RX ORDER — POLYETHYLENE GLYCOL 3350 17 G/17G
17 POWDER, FOR SOLUTION ORAL DAILY
Status: DISCONTINUED | OUTPATIENT
Start: 2023-09-10 | End: 2023-09-15 | Stop reason: HOSPADM

## 2023-09-09 RX ORDER — METHOCARBAMOL 750 MG/1
750 TABLET, FILM COATED ORAL EVERY 6 HOURS PRN
Status: ON HOLD
Start: 2023-09-09 | End: 2023-09-14

## 2023-09-09 RX ORDER — AMOXICILLIN 250 MG
1 CAPSULE ORAL 2 TIMES DAILY
Status: DISCONTINUED | OUTPATIENT
Start: 2023-09-09 | End: 2023-09-15 | Stop reason: HOSPADM

## 2023-09-09 RX ORDER — OXYCODONE HCL 20 MG/1
20 TABLET, FILM COATED, EXTENDED RELEASE ORAL EVERY 12 HOURS
Status: DISCONTINUED | OUTPATIENT
Start: 2023-09-09 | End: 2023-09-15 | Stop reason: HOSPADM

## 2023-09-09 RX ADMIN — BUSPIRONE HYDROCHLORIDE 10 MG: 10 TABLET ORAL at 08:44

## 2023-09-09 RX ADMIN — PIPERACILLIN AND TAZOBACTAM 4.5 G: 4; .5 INJECTION, POWDER, FOR SOLUTION INTRAVENOUS at 23:54

## 2023-09-09 RX ADMIN — PANTOPRAZOLE SODIUM 40 MG: 40 TABLET, DELAYED RELEASE ORAL at 06:38

## 2023-09-09 RX ADMIN — OXYCODONE HYDROCHLORIDE 10 MG: 10 TABLET ORAL at 03:09

## 2023-09-09 RX ADMIN — HYDROMORPHONE HYDROCHLORIDE 4 MG: 2 TABLET ORAL at 09:15

## 2023-09-09 RX ADMIN — PREGABALIN 150 MG: 50 CAPSULE ORAL at 08:43

## 2023-09-09 RX ADMIN — PANTOPRAZOLE SODIUM 40 MG: 40 TABLET, DELAYED RELEASE ORAL at 17:24

## 2023-09-09 RX ADMIN — HYDROMORPHONE HYDROCHLORIDE 4 MG: 2 TABLET ORAL at 13:28

## 2023-09-09 RX ADMIN — DULOXETINE HYDROCHLORIDE 60 MG: 60 CAPSULE, DELAYED RELEASE ORAL at 08:53

## 2023-09-09 RX ADMIN — ACETAMINOPHEN 975 MG: 325 TABLET, FILM COATED ORAL at 08:43

## 2023-09-09 RX ADMIN — HEPARIN SODIUM 5000 UNITS: 5000 INJECTION, SOLUTION INTRAVENOUS; SUBCUTANEOUS at 00:35

## 2023-09-09 RX ADMIN — OXYCODONE HYDROCHLORIDE 10 MG: 10 TABLET ORAL at 11:11

## 2023-09-09 RX ADMIN — PIPERACILLIN AND TAZOBACTAM 4.5 G: 4; .5 INJECTION, POWDER, FOR SOLUTION INTRAVENOUS at 03:36

## 2023-09-09 RX ADMIN — PIPERACILLIN AND TAZOBACTAM 4.5 G: 4; .5 INJECTION, POWDER, FOR SOLUTION INTRAVENOUS at 11:11

## 2023-09-09 RX ADMIN — SENNOSIDES AND DOCUSATE SODIUM 1 TABLET: 50; 8.6 TABLET ORAL at 08:43

## 2023-09-09 RX ADMIN — PREGABALIN 150 MG: 50 CAPSULE ORAL at 13:28

## 2023-09-09 RX ADMIN — OXYCODONE HYDROCHLORIDE 20 MG: 20 TABLET, FILM COATED, EXTENDED RELEASE ORAL at 20:34

## 2023-09-09 RX ADMIN — HEPARIN SODIUM 5000 UNITS: 5000 INJECTION, SOLUTION INTRAVENOUS; SUBCUTANEOUS at 20:38

## 2023-09-09 RX ADMIN — ACETAMINOPHEN 975 MG: 325 TABLET, FILM COATED ORAL at 00:34

## 2023-09-09 RX ADMIN — BUSPIRONE HYDROCHLORIDE 10 MG: 5 TABLET ORAL at 20:34

## 2023-09-09 RX ADMIN — PIPERACILLIN AND TAZOBACTAM 4.5 G: 4; .5 INJECTION, POWDER, FOR SOLUTION INTRAVENOUS at 18:42

## 2023-09-09 RX ADMIN — OXYCODONE HYDROCHLORIDE 20 MG: 20 TABLET, FILM COATED, EXTENDED RELEASE ORAL at 08:44

## 2023-09-09 RX ADMIN — VANCOMYCIN HYDROCHLORIDE 1750 MG: 10 INJECTION, POWDER, LYOPHILIZED, FOR SOLUTION INTRAVENOUS at 06:38

## 2023-09-09 RX ADMIN — PREDNISOLONE ACETATE 1 DROP: 10 SUSPENSION/ DROPS OPHTHALMIC at 20:48

## 2023-09-09 RX ADMIN — BUPROPION HYDROCHLORIDE 450 MG: 150 TABLET, FILM COATED, EXTENDED RELEASE ORAL at 08:43

## 2023-09-09 RX ADMIN — MAGNESIUM OXIDE TAB 400 MG (241.3 MG ELEMENTAL MG) 400 MG: 400 (241.3 MG) TAB at 11:11

## 2023-09-09 RX ADMIN — DULOXETINE HYDROCHLORIDE 60 MG: 30 CAPSULE, DELAYED RELEASE ORAL at 20:35

## 2023-09-09 RX ADMIN — OXYCODONE HYDROCHLORIDE 5 MG: 5 TABLET ORAL at 17:24

## 2023-09-09 RX ADMIN — HEPARIN SODIUM 5000 UNITS: 5000 INJECTION, SOLUTION INTRAVENOUS; SUBCUTANEOUS at 08:43

## 2023-09-09 RX ADMIN — Medication 2 MG: at 20:47

## 2023-09-09 RX ADMIN — SENNOSIDES AND DOCUSATE SODIUM 1 TABLET: 50; 8.6 TABLET ORAL at 20:48

## 2023-09-09 RX ADMIN — POLYETHYLENE GLYCOL 3350 17 G: 17 POWDER, FOR SOLUTION ORAL at 08:44

## 2023-09-09 RX ADMIN — MAGNESIUM OXIDE TAB 400 MG (241.3 MG ELEMENTAL MG) 400 MG: 400 (241.3 MG) TAB at 08:43

## 2023-09-09 RX ADMIN — GABAPENTIN 100 MG: 100 CAPSULE ORAL at 08:44

## 2023-09-09 RX ADMIN — ACETAMINOPHEN 975 MG: 325 TABLET, FILM COATED ORAL at 17:24

## 2023-09-09 RX ADMIN — OXYCODONE HYDROCHLORIDE 5 MG: 5 TABLET ORAL at 17:36

## 2023-09-09 RX ADMIN — PREDNISOLONE ACETATE 1 DROP: 10 SUSPENSION/ DROPS OPHTHALMIC at 11:11

## 2023-09-09 ASSESSMENT — ACTIVITIES OF DAILY LIVING (ADL)
WEAR_GLASSES_OR_BLIND: NO
ADLS_ACUITY_SCORE: 39
ADLS_ACUITY_SCORE: 24
WALKING_OR_CLIMBING_STAIRS_DIFFICULTY: NO
DRESSING/BATHING_DIFFICULTY: NO
ADLS_ACUITY_SCORE: 24
NUMBER_OF_TIMES_PATIENT_HAS_FALLEN_WITHIN_LAST_SIX_MONTHS: 4
ADLS_ACUITY_SCORE: 39
ADLS_ACUITY_SCORE: 24
CHANGE_IN_FUNCTIONAL_STATUS_SINCE_ONSET_OF_CURRENT_ILLNESS/INJURY: YES
CONCENTRATING,_REMEMBERING_OR_MAKING_DECISIONS_DIFFICULTY: NO
FALL_HISTORY_WITHIN_LAST_SIX_MONTHS: YES
ADLS_ACUITY_SCORE: 39
DIFFICULTY_EATING/SWALLOWING: NO
EQUIPMENT_CURRENTLY_USED_AT_HOME: WHEELCHAIR, MANUAL
ADLS_ACUITY_SCORE: 24
ADLS_ACUITY_SCORE: 24
DOING_ERRANDS_INDEPENDENTLY_DIFFICULTY: NO
TOILETING_ISSUES: NO
ADLS_ACUITY_SCORE: 39
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
## 2023-09-09 NOTE — PHARMACY-VANCOMYCIN DOSING SERVICE
Pharmacy Vancomycin Note  Date of Service 2023  Patient's  1980   42 year old, male    Indication: Skin and Soft Tissue Infection  Day of Therapy: Since 23  Current vancomycin regimen:  1750 mg IV q24h  Current vancomycin monitoring method: AUC  Current vancomycin therapeutic monitoring goal: 400-600 mg*h/L    Insight[employer] Prediction of Current Vancomycin Regimen  Loading dose: N/A  Regimen: 1750 mg IV every 24 hours.  Start time: 06:38 on 09/10/2023  Exposure target: AUC24 (range)400-600 mg/L.hr   AUC24,ss: 521 mg/L.hr  Probability of AUC24 > 400: 97 %  Ctrough,ss: 8.9 mg/L  Probability of Ctrough,ss > 20: 0 %  Probability of nephrotoxicity (Lodise ANNE ): 5 %    Current estimated CrCl = Estimated Creatinine Clearance: 138.9 mL/min (based on SCr of 1.01 mg/dL).    Creatinine for last 3 days  2023:  4:23 AM Creatinine 1.14 mg/dL  2023:  4:24 AM Creatinine 1.03 mg/dL  2023:  4:54 AM Creatinine 1.01 mg/dL    Recent Vancomycin Levels (past 3 days)  2023:  4:54 AM Vancomycin 11.4 ug/mL    Vancomycin IV Administrations (past 72 hours)                     vancomycin (VANCOCIN) 1,750 mg in sodium chloride 0.9 % 500 mL intermittent infusion (mg) 1,750 mg Given 23 0638     1,750 mg Given 23 0912     1,750 mg Given 23 0719                    Nephrotoxins and other renal medications (From now, onward)      Start     Dose/Rate Route Frequency Ordered Stop    23 1530  naproxen (NAPROSYN) tablet 250 mg         250 mg Oral EVERY 12 HOURS PRN 23 1530      23 0700  vancomycin (VANCOCIN) 1,750 mg in sodium chloride 0.9 % 500 mL intermittent infusion         1,750 mg  over 2 Hours Intravenous EVERY 24 HOURS 23 0810      23 1600  piperacillin-tazobactam (ZOSYN) 4.5 g vial to attach to  mL bag         4.5 g  over 30 Minutes Intravenous EVERY 6 HOURS 23 1127                 Contrast Orders - past 72 hours (72h ago, onward)      None             Interpretation of levels and current regimen:  Vancomycin level is reflective of -600    Has serum creatinine changed greater than 50% in last 72 hours: No    Urine output:  good urine output    Renal Function: Stable    Plan:  Continue Current Dose  Vancomycin monitoring method: AUC  Vancomycin therapeutic monitoring goal: 400-600 mg*h/L  Pharmacy will check vancomycin levels as appropriate in 1-3 Days.  Serum creatinine levels will be ordered a minimum of twice weekly.    Shantell David, RicaD, BCPS

## 2023-09-09 NOTE — PLAN OF CARE
"/74 (BP Location: Right arm)   Pulse 101   Temp 97.6  F (36.4  C) (Axillary)   Resp 18   Ht 1.854 m (6' 1\")   Wt 138 kg (304 lb 3.8 oz)   SpO2 99%   BMI 40.14 kg/m      Patient alert and oriented. Slept through this shift. Pain managed with PRN Oxycodone with effective results. Voiding adequately via Dyer catheter without any concern. Colostomy intact with adequate output. Patient managing colostomy cares. Left AKA, dressing CDI. Right and left PIV infusing NS and antibiotics. No adverse reaction noted this shift. Resting in bed with call light within reach. Continue with current plan of care.  "

## 2023-09-09 NOTE — PROGRESS NOTES
Orthopaedic Surgery Progress Note 09/09/2023    Subjective  AFVSS. NAEON. AM Hgb 11.5, WBC 9.1. CRP 24 from 28. Denies fever, chills.  Pain controlled.    Objective  Temp: 97.6  F (36.4  C) Temp src: Axillary BP: 112/74 Pulse: 101   Resp: 18 SpO2: 99 % O2 Device: None (Room air)      Exam:  Gen: Resting,  Resp: Nonlabored breathing  Cardio: Ext wwp     MSK:    LLE:  - SILT femoral/LFCN. SILT saph/tibial below knee but decreases distally, closer to surgical site  - Able to flex hip lightly  - Dressings c/d/I  - Stump protector in place      Recent Labs   Lab 09/09/23  0454 09/08/23  0424 09/07/23  0423   WBC 9.1 11.1* 7.8   HGB 11.5* 11.3* 12.4*    365 357     Cultures:  08/30 Cx: + Parvimonas micra   08/28 Cx NGTD    Assessment:   Assessment: Saqib Bishop is a 42 year old male s/p emergent left transtibial guillotine amputation for LLE NSTI on 8/27-8/28. RTOR  for I&D and wound vac exchange on 08/30 and 9/2. Now s/p definitive BKA on 09/07 with Dr. Gates    Cultures from 8/30 + Parvimonas micra and anaerobic gram positive bacilli in broth, currently on vancomycin and zosyn per medicine.     Plan for today:  - Work with PT/OT  - Pain management consult    Ortho will continue to follow peripherally. He will need his stitches removed on approximately 9/18-9/20. Please page ortho if he is still inpatient.     Plan:  Medicine Primary   Activity: Okay for UOOB with assist. No pressure on LLE, elevate at all times  Weight bearing status: NWB LLE  Antibiotics: Vacomycin and Zosyn per medicine/infectious disease  Diet: Regular diet  DVT prophylaxis: per primary, okay to restart chemoprophylaxis from orthopedic perspective, SQH 5000u  Dressings: Dressing change per ortho on POD5 (09/12). Dressing change in clinic, okay to shower   Bracing: Stump protector to be placed by orthotics  Drains: None  Pain management: Multimodal, per primary  X-rays: complete  Cultures: 08/28 cx NGTD, 08/30 cx + Parvimonas micra    Follow-up: 2 weeks (09/22) with Dr. Gates for wound check if discharged. Suture removal in 10-14 days     Disposition: Pending progress with PT/OT, pain management discharge to home vs TCU    --  Bogdan Ibrahim MD, PhD  Orthopedic Surgery PGY-1  [phone]    Please page me directly with any questions/concerns during regular weekday hours before 5pm. If there is no response, if it is a weekend, or if it is during evening hours, then please page the orthopedic surgery resident on call.

## 2023-09-09 NOTE — CONSULTS
Pain Service Consultation Note  [facility]      Patient Name: Saqib Bishop  MRN: [id number]   Age: 42 year old  Sex: male  Date: September 8, 2023                                      Reviewed: Yes    Referring Provider:  Wilfred Elaine  Referring Service:  Orthopedics  Reason for Consultation: Pain Management    Assessment/Recommendations:  42 year old male paraplegic s/p emergent left transtibial guillotine amputation for LLE NSTI on 8/27-8/28.  Pain is poorly controlled at this time, postoperatively.  Currently taking Oxycontin 20 mg bid in addition to oxycodone po q6 and dilaudid po.  He is also prescribed gabapentin and pregabalin simultaneously     Plan:   1.  Discontinue gabapentin  2.  Continue Oxycontin 20 mg bid  3.  Prescribe OxrIR 10-15 mg q4h prn  4.  Discontinue po dilaudid  5.  May continue IV dilaudid for severe pain        Thank you for the opportunity to participate in the care of Saqib Bishop  Pain Service will continue to follow.      Primary Service Contacted with Recommendations? No    Ismael Rojas MD  9/8/2023      Chief Complaint:  Post-operative pain      History of Present Illness:  Saqib Bishop is a 41 y/o male with paraplegia, chronic sacral decubitus ulcer, s/p flap and treatment for osteomyelitis December 2020, then with dehiscence, open wound and infected sacral decubitus ulceration October 2021. He completed six weeks of abx for osteomyelitis, and is now s/p an elective flap procedure early 2023, which failed and resulted in a chronic sacral ulcer.  The pain is reported to be acute, , located in the leg, and radiates to the left thigh.  Current pain is rated at 8/10 and goal is 3/10. . The patient has opioid tolerance. No opioid induced side effects noted.         Pain Assessment:   Description of Pain: burning  Location: left leg  Current Pain: 8/10  Goal: 3/10  Baseline Pain Level: 0/10  Onset:post-surgical   Relieving/exacerbating  factors: opioids   Radiating: yes   Localized: no  Constant: yes  Intermittent: no    Per [place]  review pulled from system on 23. Last refill on 2023, indicating the following analgesic usage: 105 MME.     Pain Medications/Prescriber: Bernardo Robin    Past Medical History:  Past Medical History:   Diagnosis Date     Degenerative joint disease      Gastro-oesophageal reflux disease          Family History:    History reviewed. No pertinent family history.    Social History:  Social History     Tobacco Use     Smoking status: Former     Types: Cigarettes     Quit date: 2011     Years since quittin.8     Smokeless tobacco: Not on file   Substance Use Topics     Alcohol use: No               Review of Systems:  Complete ROS reviewed. Unless otherwise noted, all other systems found to be negative.        Laboratory Results:  Recent Labs   Lab Test 23  0424 23  0433 23  0515   INR  --   --  1.07      < >  --    PTT  --   --  33   BUN 24.8*   < >  --     < > = values in this interval not displayed.       Allergies:  Allergies   Allergen Reactions     Adhesive Tape Hives     From tape from surgery     Benzoin Hives and Rash     Droperidol Anaphylaxis     Prednisone      vomiting     Vitamin D Rash     flairs up his sarcoidosis         Current Pain Related Medications:  Medications related to Pain Management (From now, onward)    Start     Dose/Rate Route Frequency Ordered Stop    09/10/23 0000  acetaminophen (TYLENOL) tablet 650 mg         650 mg Oral EVERY 4 HOURS PRN 23 1530      23 0000  oxyCODONE (OXYCONTIN) 20 MG 12 hr tablet         20 mg Oral EVERY 12 HOURS 23 1333      23 0000  oxyCODONE (ROXICODONE) 5 MG tablet         10 mg Oral EVERY 4 HOURS PRN 23 1333      23 0000  pregabalin (LYRICA) 150 MG capsule         150 mg Oral 3 TIMES DAILY 23 1333      23 1644  lidocaine 1 % 0.1-1 mL         0.1-1 mL Other EVERY 1 HOUR PRN  09/07/23 1644      09/07/23 1644  lidocaine (LMX4) cream          Topical EVERY 1 HOUR PRN 09/07/23 1644      09/07/23 1644  magnesium hydroxide (MILK OF MAGNESIA) suspension 30 mL         30 mL Oral DAILY PRN 09/07/23 1644      09/07/23 1600  acetaminophen (TYLENOL) tablet 975 mg         975 mg Oral EVERY 8 HOURS 09/07/23 1530 09/10/23 1559    09/07/23 1600  gabapentin (NEURONTIN) capsule 100 mg         100 mg Oral 3 TIMES DAILY 09/07/23 1530      09/07/23 1530  HYDROmorphone (PF) (DILAUDID) injection 0.2 mg        See Hyperspace for full Linked Orders Report.    0.2 mg Intravenous EVERY 2 HOURS PRN 09/07/23 1530      09/07/23 1530  HYDROmorphone (PF) (DILAUDID) injection 0.4 mg        See Hyperspace for full Linked Orders Report.    0.4 mg Intravenous EVERY 2 HOURS PRN 09/07/23 1530      09/07/23 1530  HYDROmorphone (DILAUDID) tablet 2 mg        See Hyperspace for full Linked Orders Report.    2 mg Oral EVERY 4 HOURS PRN 09/07/23 1530      09/07/23 1530  HYDROmorphone (DILAUDID) tablet 4 mg        See Hyperspace for full Linked Orders Report.    4 mg Oral EVERY 4 HOURS PRN 09/07/23 1530      09/07/23 1530  naproxen (NAPROSYN) tablet 250 mg         250 mg Oral EVERY 12 HOURS PRN 09/07/23 1530      09/07/23 1530  methocarbamol (ROBAXIN) tablet 750 mg         750 mg Oral EVERY 6 HOURS PRN 09/07/23 1530      08/31/23 0800  polyethylene glycol (MIRALAX) Packet 17 g         17 g Oral DAILY 08/30/23 1336      08/30/23 2000  senna-docusate (SENOKOT-S/PERICOLACE) 8.6-50 MG per tablet 1 tablet         1 tablet Oral 2 TIMES DAILY 08/30/23 1336      08/30/23 2000  oxyCODONE (oxyCONTIN) 12 hr tablet 20 mg        Note to Pharmacy: PTA Sig:Take 20 mg by mouth every 12 hours      20 mg Oral EVERY 12 HOURS 08/30/23 1643      08/30/23 1337  loratadine (CLARITIN) tablet 10 mg        Note to Pharmacy: DORIE Sig:Take 1 tablet by mouth daily      10 mg Oral DAILY PRN 08/30/23 1338      08/30/23 1336  bisacodyl (DULCOLAX) suppository 10  "mg         10 mg Rectal DAILY PRN 08/30/23 1336      08/30/23 1330  [Held by provider]  cyclobenzaprine (FLEXERIL) tablet 10 mg        (Held by provider since Wed 8/30/2023 at 1454 by Velez Reyes, German, MD.Hold Reason: Other)   Note to Pharmacy: PTA Sig:Take 10 mg by mouth 3 times daily as needed for muscle spasms      10 mg Oral 3 TIMES DAILY PRN 08/30/23 1335      08/28/23 2000  DULoxetine (CYMBALTA) DR capsule 60 mg        Note to Pharmacy: PTA Sig:Take 60 mg by mouth 2 times daily      60 mg Oral 2 TIMES DAILY 08/28/23 1312      08/28/23 2000  busPIRone (BUSPAR) tablet 10 mg        Note to Pharmacy: PTA Sig:Take 1 tablet by mouth 2 times daily      10 mg Oral 2 TIMES DAILY 08/28/23 1312      08/28/23 1400  pregabalin (LYRICA) capsule 150 mg        Note to Pharmacy: PTA Sig:Take 150 mg by mouth 3 times daily      150 mg Oral 3 TIMES DAILY 08/28/23 1312      08/28/23 1313  oxyCODONE (ROXICODONE) tablet 5-10 mg         5-10 mg Oral EVERY 4 HOURS PRN 08/28/23 1314              Physical Exam:  Vitals: /59 (BP Location: Right arm, Patient Position: Sitting, Cuff Size: Adult Large)   Pulse 115   Temp 99.2  F (37.3  C) (Oral)   Resp 18   Ht 1.854 m (6' 1\")   Wt 138 kg (304 lb 3.8 oz)   SpO2 94%   BMI 40.14 kg/m      Physical Exam:   Patient is in NAD  CONSTITUTIONAL/GENERAL APPEARANCE:  NAD  EYES: EOMI, sclera anicteric, PERRLA  ENT/NECK: atraumatic, lips and oral mucous membranes dry  RESPIRATORY: non-labored breathing. No cough, wheeze  CV: RRR, no audible rubs, gallops, or murmurs  ABDOMEN: Soft, non-tender, non-distended  MUSCULOSKELETAL/BACK/SPINE/EXTREMITIES: Moves all extremities purposefully.  No edema or obvious joint deformities   NEURO: Alert and Oriented x3. Answers questions appropriately  SKIN/VASCULAR EXAM:  No jaundice, no visible rashes or lesions      45 MINUTES SPENT BY ME on the date of service doing chart review, history, exam, documentation & further activities per the " "note.      Acute Inpatient Pain Service [facility]  Hours of pain coverage 24/7   Page via [employer]- Please Page the Pain Team Via [facility] [place]: \"PAIN MANAGEMENT ACUTE INPATIENT/ [facility]\"            "

## 2023-09-09 NOTE — CARE PLAN
HEENT: Scab on nose  Neuro: A&Ox4. Paraplegia. Denies N/T in hands, RLE. Pain 7/10, managed with Tylenol, Oxy, Gabapentin, Dilaudid.   Cardiac: Tachycardia  Respiratory: Denies SOB, O2 >93% on RA  Skin: R buttocks PI dressing changed 9/8 CDI, LLE stump wrapped, brace on.   GI/: Dyer catheter, yellow/clear output, cares completed by pt. Colostomy intact, cares + appliance change completed by pt. Abdomen bulging/irregular d/t hernia.  LDA: . R PIV infusing intermittent ABX. 2nd R PIV placed on hand, SL.   Diet: Reg diet, appetite good. RN managed K/Mg/P, Mg replaced.   Activity: L BKA, bedfast.    Pt to transfer to [facility] @ 1400.

## 2023-09-09 NOTE — PROGRESS NOTES
"Care Management Discharge Note    Discharge Date: 9/9/23     Discharge Disposition: ARU    [facility]  [address]  [place] [address]  [phone]    Discharge Services: None    Discharge DME: None    Discharge Transportation: Rollover at 1:45 pm    Private pay costs discussed: Not applicable    Does the patient's insurance plan have a 3 day qualifying hospital stay waiver?  No    PAS Confirmation Code: N/A     Patient/family educated on Medicare website which has current facility and service quality ratings: N/A    Education Provided on the Discharge Plan: Yes    Persons Notified of Discharge Plans: Charge Nurse, Bedside Nurse, MD, Patient, ARU staff     Patient/Family in Agreement with the Plan: Yes    Handoff Referral Completed: Yes    Additional Information:    IMM and PAS not needed. Patient to discharge to ARU via staff \"rollover\" at 1:45 pm. Hand off to patient's primary care physician completed.     DEACON Pandey, MSW  Adult Acute Care Float   Pager [phone]        "

## 2023-09-09 NOTE — PLAN OF CARE
Goal Outcome Evaluation:    Patient was admitted this afternoon from. Is alert and oriented.Able to sea call light and make his needs known. Assist ofx 2 with liko for transfers. Is paraplegic with no sensation wait down. Hs stump to the E r/t BKA and has a stump protector in place. Patient was oriented to the room and had some initial assessment done. Patient was helped to get settle, and the rest was passed on to the next shift nurse to continue with the admission process.
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
## 2023-09-09 NOTE — PLAN OF CARE
Occupational Therapy Discharge Summary    Reason for therapy discharge:    Discharged to acute rehabilitation facility.    Progress towards therapy goal(s). See goals on Care Plan in Psychiatric electronic health record for goal details.  Goals not met.  Barriers to achieving goals:   discharge from facility.    Therapy recommendation(s):    Continued therapy is recommended.  Rationale/Recommendations:  to maximize safety and IND with slide board transfers and ADLs.

## 2023-09-09 NOTE — INTERIM SUMMARY
[facility] Acute Rehab Center Pre-Admission Screen    Referral Source:  [facility] MED SURG -45  Admit date to referring facility: 8/27/2023    Physical Medicine and Rehab Consult Completed: No    Rehab Diagnosis:    Amputation 05.4 Unilateral LE BKA: s/p left below knee amputation d/t left lower extremity necrotizing soft tissue infection, comorbid condition: T12 spinal cord injury w/ paraplegia    Justification for Acute Inpatient Rehabilitation  Saqib Bishop is a 42 year old male w/ PMH of T12 SCI w/ paraplegia, chronic sacral ulcer, who presented to OSH ED on 8/27/23 w/ septic shock, DODIE, and left lower extremity necrotizing soft tissue infection. He was transferred to [facility] SICU for further mgmt. He underwent left below knee guillotine amputation on 8/28/2023.  Patient underwent irrigation and debridement of left lower extremity residual limb and wound vac exchange on 8/30/23 and 9/2/2023 for left lower extremity necrotizing fasciitis. Patient was transferred to [facility] on 8/31/2023. He is now s/p definitive L BKA on 9/7/2023. He has also required medical mgmt of his bacteremia and IV Abx needs, pain, and DODIE. He is now medically stable and ready to discharge to acute inpatient rehab.   The patient requires transfer to [facility] for intensive therapies not available in a lesser level of care including PT/OT, ongoing medical management at least 3 days per week, and rehabilitative nursing care.  He requires the specialization of rehab services, PMR, and rehab nurses on acute rehab to address his new functional impairments and medical needs in setting of a new L BKA, which is further complicated by his baseline T12 spinal cord injury and paraplegia. The patient requires an intensive inpatient rehab program to address the following acute impairments: impaired strength, hypotonia in BLEs, impaired range of motion, pain, and new L BKA. These impairments are impacting his ability  to safely and independently perform bed mobility, bed > chair sliding board transfers, w/c based mobility, ADLs and IADLs. He has a w/c accessible home and was previously mod IND for mobility w/ use of w/c and sliding board transfers. He is currently requiring physical assist for all transfers and will benefit from further therapies for training on how to modify transfers and ADLs in setting of new L BKA.     Current Active Medical Management Needs/Risks for Clinical Complications  The patient requires the high level of rehabilitation physician supervision that accompanies the provision of intensive rehabilitation therapy.  The patient needs the services of the rehabilitation physician to assess the patient medically and functionally and to modify the course of treatment as needed to maximize the patient's capacity to benefit from the rehabilitation process.  The patient requires physician oversight at least 3x/wk to medically manage and assess:  L below knee amputation:  Pt needs ongoing instruction in safe positioning of L LE as well as how to safely modify mobility and ADLs in setting of L BKA and prior SCI. Suture removal advised in 10-14 days from amputation. Promote acceptance of altered body image and coping with lifestyle changes. Provided ongoing education regarding edema management, residual limb shaping, prosthetics and adaptive equipment as indicated.  Ace Wrap is most appropriate at this time to assist w/ L BKA compression per orthotist.   Integument: pt at risk for delayed wound healing and further skin breakdown in setting of impaired sensation and prior T12 SCI. Will need frequent assessment of residual limb integument for signs of healing vs infection. Pt also fit w/ Rooke BK protector to reduce knee flexion contracture formation. He is also at risk for skin breakdown w/ use of stump protector.   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 Pt needs daily wound care to sacral wound - Cleanse wound bed with Vashe moistened 4 x 4 guaze, allow to soak wound for 5-10 minutes, no need to rinse or dry. Pack wound with AMD kerlix. Cover with ABD pad that has been cut in half. Secure with Medipore tape ( # [id number]). Change dressing daily or PRN if soiled or loose. Use caution w/ sliding board transfers to ensure no further friction to sacral ulcer.   Pain: pt w/ poorly controlled pain post-operatively. Pain Team consulted on 9/8/2023 to assist w/ pain mgmt. Pt currently on Oxycontin, Oxycodone, PO dilaudid. Pt also on Pregabalin and Tylenol. Pt also at risk for phantom limb pain. Patient will need ongoing assessment and adjustment of pain medications for optimal participation in therapies. Note pt has history of substance abuse, so advise caution w/ opioids and careful monitoring of his pain medication needs.   Neurogenic bowel and neurogenic bladder: pt w/ baseline colostomy - assist w/ bowel mgmt in pt at risk for opioid induced constipation. Pt currently w/ chronic bui in place. Pt at risk for UTI in setting of bui.   ID needs in setting of bacteremia and left foot necrotizing fasciitis: IV Zosyn Q6 and IV Vancomycin Q24 - will treat blood infection x 14 days from 8/28/23.He is at risk for post-operative wound infection, non-healing incision, skin breakdown.   DODIE 2/2 sepsis: pt needs ongoing assessment of renal function, fluid, and electrolyte balance.  Mental health: pt at risk for exacerbation of baseline mood and sleep disorders (adjustment disorder, anxiety, MDD, insomnia, and substance abuse). Consult health psychology as needed to assist w/ coping and adjustment to new lifestyle changes in setting of L BKA. Pt on Bupropion, Buspirone, Duloxetine and Melatonin. Pt also w/ metabolic encephalopathy during hospitalization - pt at risk for confusion and dizziness, sedation w/ use of pain medications.   He is at risk for DVT in  setting of recent amputation, impaired mobility, and SCI - currently on subcutaneous Heparin.     Past Medical/Surgical History  Surgery in the past 100 days: Yes  Additional relevant past medical history: paraplegia, chronic sacral decubitus ulcer, s/p flap and treatment for osteomyelitis, chronic sacral ulcer, DJD, GERD, substance abuse, neurogenic bowel and bladder, colostomy and chronic bui    Level of Functioning Prior to Admission:  LIVING ENVIRONMENT  People in Home: alone  Current Living Arrangements: mobile home  Home Accessibility: wheelchair accessible  Transportation Anticipated: other (see comments) (Rollover)  Living Environment Comments: Pt reported he lives in accessible mobile home, has shower chair and does not transfer onto toilet. Pt has PCA asssit 5 hrs/day and sister who lives nearby can provide assist intermittently.    SELF-CARE  Usual Activity Tolerance: moderate  Equipment Currently Used at Home: transfer board, wheelchair, manual  Activity/Exercise/Self-Care Comment: Pt reported he is mod IND with ADLs at baseline. Transfers with slideboard from chair to shower chair, does not transfer onto toilet.    Level of Function: GG Scale (Section GG Functional Ability and Goals; CMS's COLON Version 3.0 Manual effective 10.1.2019):  PT Current Function Goals for Rehab   Bed Rolling 4 Supervision or touching assitance 6 Independent   Supine to Sit 4 Supervision or touching assitance 6 Independent   Sit to Stand 88 Not attempted due to safety Not Applicable   Transfer 88 Not attempted due to safety 6 Independent   Ambulation 1 Dependent Not Applicable   Stairs 88 Not attempted due to safety Not Applicable     OT Current Function Goals for Rehab   Feeding 5 Setup or clean-up assistance 6 Independent   Grooming 5 Setup or clean-up assistance 6 Independent   Bathing 3 Partial/moderate assistance 6 Independent   Upper Body Dressing Not completed 6 Independent   Lower Body Dressing 3 Partial/moderate  assistance 6 Independent   Toileting 3 Partial/moderate assistance 6 Independent   Toilet Transfer Not completed 6 Independent   Tub/Shower Transfer 88 Not attempted due to safety 6 Independent   Cognition Not Assessed Independent     SLP Current Function Goals for Rehab   Swallow Not Impaired Not applicable   Communication Not Impaired Not applicable     Current Diet:  0-Thin and 7-Regular    Summary Statement:  Saqib Bishop is a 42 year old male w/ prior T12 SCI w/ paraplegia who is now s/p L BKA. He needs intensive rehab therapies that specialize in SCI and amputations to maximize his functional independence w/ mobility and ADLs, as well as close medical management to ensure full return of function and to allow return home w/ PCA services.     Expected Therapies and Services Required During Inpatient Rehab Admission  Intensity of Therapy: Patient requires intensive therapies not available in a lesser level of care. Patient is motivated, making gains, and can tolerate 3 hours of therapy a day.  Physical Therapy: 90 minutes per day, 7 days a week for 10 days  Occupational Therapy: 90 minutes per day, 7 days a week for 10 days  Speech and Language Therapy: no SLP needs  Rehabilitation Nursing Needs: Patient requires 24 hour Rehab Nursing to manage wound care, bowel program, bladder program, vitals, medication education, tone management, positioning, carryover of new rehab techniques, care coordination, skin integrity, pain management, post-surgical incision care to promote healing and prevent infection, and provide safe environment for patient at falls risk.    Precautions/restrictions/special needs:  Precautions: fall precautions, NWB LLE  Restrictions: NWB LLE  Special Needs: lift, isolation, and IVs    Expected Level of Improvement: Mod IND bed mobility, sliding board transfers, w/c based mobility and ADLs.   Expected Length of time to achieve: 10 days    Anticipated Discharge Needs:  Anticipated Discharge  Destination: Home  Anticipated Discharge Support: Family member and PCA  24/7 support available : Unknown  Identified caregiver(s):  PCA 5 hrs per day; + sister can assist intermittently  Anticipated Discharge Needs: Home with homecare; pending ID recommendations - pt may need ongoing IV Abx (has home coverage)    Identified challenges/barriers: medical complexity, pain    Liaison signature/date/time:    Physician statement of review and agreement:  I have reviewed and am in agreement of the need for IRF stay to address above functional and medical needs. In addition to above statements address, Patient requires intensive active and ongoing therapeutic intervention and multiple therapies; Patient requires medical supervision; Expected to actively participate in the intensive rehab program; Sufficiently stable to actively participate; Expectation for measurable improvement in functional capacity or adaption to impairments.    MD signature/date/time:

## 2023-09-09 NOTE — PROGRESS NOTES
[facility] GENERAL INFECTIOUS DISEASES PROGRESS NOTE     Patient:  Saqib Bisohp   Date of birth 1980, Medical record number [id number]  Date of Visit:  09/09/2023  Date of Admission: 8/27/2023  Consult Requested by:Nii Mancilla MD  Reason for Consult:  Blood cultures at outside hospital growing Shigella sonnei          Assessment and Recommendations:     Saqib Bishop is a 41 yo male with  a past medical history of T12 level spinal cord injury and paraplegia, colostomy, DJD, GERD, chronic sacral decubitus ulcer s/p flap and treatment for osteomyelitis Dec-2020, complicated by dehiscence, open wound and sacral decubitus ulceration Oct-2021, s/p an elective flap procedure in early 2023 which failed and resulted in a chronic sacral ulcer.      He presented to [facility] on 27-Aug-2023 with a foul odour and increased drainage from his left foot. He was found to be in septic alo with acute kidney injury and left foot necrotizing fasciitis.  He  was admitted to the SICU at [facility]. He underwent left below knee guillotine amputation on 28-Aug-2023, irrigation and debridement of left lower extremity residual limb on 30-Aug-2023. left leg tissue grew 1+ Parvimonas micra.   He was transferred out of ICU on 30-Aug-2023 and transferred to [facility] on 31-Aug-2023.     Bacteremia - Gemella morbillorum ( per OSH lab - NO Shigella sonnei isolated )   - Blood cultures done on 8/27/23  at [facility] ( [facility])  turned positive for Shigella sonnei 2/2  with gram stain showing 3+ gram positive cocci  ( called and spoke with Microbiology staff JOAO LevinB 1/4 ( felt to be a contaminant) and GPC 2/4 -both in anaerobic bottle, identified as Gemella morbillorum ( at [facility]) , Gemella is a facultative anaerobe, part of normal valeriy in the oral cavity, upper respi tract and GI. It has the potential to cause severe infection such as necrotizing fasciitis as in this case  . sensitive to PCN   - OSH  lab - NO Shigella isolated from blood cultures) ? error in care everywhere , furthermore, patient did not have GI symptoms   -  Blood cultures on 8/28/23 done here are negative x 2 so far.   - TTE 8/31/23 - no mention of endocarditis    Left foot necrotizing fasciitis   - had infected wound 1-2 months   - s/p left below knee guillotine amputation on 28-Aug-2023  - irrigation and debridement of left lower extremity residual limb on 30-Aug-2023. left leg tissue grew 1+ Parvimonas micra.    - wound culture 8/27/23 - Pseudomonasa aeruginosa   - 9/7/23 - irrigation and debridement of LLE with BKA     3. Sacral wounds for many years with pain   -  chronic sacral decubitus ulcer s/p flap and treatment for osteomyelitis Dec-2020  - complicated by dehiscence, open wound and sacral decubitus ulceration Oct-2021  -  s/p an elective flap procedure in early 2023 which failed and resulted in a chronic sacral ulcer.   - 9/5/23 - sacral wound culture - no aerobic cultue, anaerobic cx - Staph epidermidis in broth only     4. Paraplegia    5. s/p colostomy       RECOMMENDATION:  - Continue Zosyn and Vancomycin IV until Wednesday - 7 days post op BKA and to complete 14 days of bacteremia. will also help w sarcal wound   - continue wound care   - plan to DC to ARU today     Plan discussed with Primary team, RN and Saqib. He will follow-up with Plastic sx for sacral wound and will call to schedule appt with ID if needed     ID will sign off now     Spencer Vera MD, M.Med.Sc  Staff, Infectious Diseases     Interval History  pt is feeling better , less pain. no fever . tolerates antibiotics            History of Present Illness:     Saqib Bishop is a 41 yo male with  a past medical history of T12 level spinal cord injury and paraplegia, colostomy, DJD, GERD, chronic sacral decubitus ulcer s/p flap and treatment for osteomyelitis Dec-2020, complicated by dehiscence, open wound and sacral decubitus ulceration Oct-2021, s/p  an elective flap procedure in early 2023 which failed and resulted in a chronic sacral ulcer.      He presented to [facility] on 27-Aug-2023 with a foul odour and increased drainage from his left foot. He was found to be in septic alo with acute kidney injury and left foot necrotizing fasciitis.  He  was admitted to the SICU at [facility]. He underwent left below knee guillotine amputation on 28-Aug-2023, irrigation and debridement of left lower extremity residual limb on 30-Aug-2023. left leg tissue grew 1+ Parvimonas micra.   He was transferred out of ICU on 30-Aug-2023 and transferred to [facility] on 31-Aug-2023.      wound culture 8/27/23 - Pseudomonasa aeruginosa   wound culture on 8/30/23 - 1+ Parvimonas micra     Blood cultures done at OSH turned positive for Shigella sonnei 2/2 on 8/27/23 with gram stain showing 3+ gram positive cocci  ( called and spoke with Microbiology staff Poonam GPB 1/4 ( felt to be a contaminant) and GPC 2/4 , lab not able to identify and specimen is sent to [facility] lab for identificantion. She said there was no Shigella isolated from blood cultures)     Blood cultures on 8/28/23 done here are negative x 2 so far.     Current antimicrobials : Pip/tazobactam since 8/27/23) and vancomycin IV ( since 8/28/23) , he was initially on Clindamycin IV 8/27-8/31) Pip/tazo 8/27-8/28    Saqib is feeling fine, no fever, chills and pain is controlled. he denies any nausea, vomiting, diarrhea recently. has good appetite.   has sacral wounds for many years. denies headaches minimal discharged from sacral wound. he has PCA/ sister who helps with wound dressing daily and goes to wound clinic every month. he says he will see wound care in [place] in Mid September.      Imagings:  Xray left knee 8/27/23   IMPRESSION: Image osseous structures are demineralized. No acute fracture, dislocation or knee joint effusion. No definite soft tissue gas or radiopaque foreign body. No cortical erosion  to suggest underlying osteomyelitis.     Xray left foot 8/27/23  IMPRESSION: Extensive soft tissue gas and swelling noted throughout the left foot and ankle compatible with soft tissue infection. Thinning of the skin at the fifth metatarsal phalangeal joint which may represent an area of ulceration. The fifth proximal   phalanx may be subluxed or dislocated medially at the metatarsal phalangeal joint. Diffuse osteopenia. This and the extensive soft tissue gas limits evaluation for fracture and/or bony erosion / osteomyelitis. If there is clinical concern for these   entities, MRI could be considered for further characterization.     Xray left  tibia and fibula 8/27/23  IMPRESSION: Image osseous structures are demineralized. Tibia and fibula are intact without acute fractures or cortical erosions. Significant subcutaneous gas is seen in the plantar soft tissue of the imaged foot.     Xray left tibia and fibula 8/28/23  IMPRESSION: There are postoperative changes from amputation of the left lower extremity at the level of the distal tibia/fibular diaphysis. No radiographic evidence of osteomyelitis is seen. No fracture. There is some soft tissue swelling over the   visualized lower extremity.    CXR 8/28/23  IMPRESSION:   1. Endotracheal tube tip projects 4.6 cm above the danette.  2. Low lung volumes with increased bilateral perihilar and bibasilar/retrocardiac opacities, which may represent atelectasis/pulmonary edema.  3. Small left pleural effusion.         Review of Systems:   CONSTITUTIONAL:  No fevers or chills  EYES: negative for icterus  ENT:  negative for hearing loss, tinnitus and sore throat  RESPIRATORY:  negative for cough with sputum and dyspnea  CARDIOVASCULAR:  negative for chest pain, dyspnea  GASTROINTESTINAL:  negative for nausea, vomiting, diarrhea and constipation  GENITOURINARY:  negative for dysuria  HEME:  No easy bruising  INTEGUMENT:  see HPI   NEURO:  Negative for headache         Past  Medical History:     Past Medical History:   Diagnosis Date    Degenerative joint disease     Gastro-oesophageal reflux disease             Past Surgical History:     Past Surgical History:   Procedure Laterality Date    AMPUTATE LEG BELOW KNEE Left 2023    Procedure: Left below knee Guillotine Amputation;  Surgeon: Sesar Barth MD;  Location: [facility] OR    AMPUTATE LEG BELOW KNEE Left 2023    Procedure: Irrigation and Debridement leg below knee, wound vac application, Left;  Surgeon: Juan Rehman MD;  Location: [facility] OR    BACK SURGERY      lumbar fusion    EXPLORE SPINE, REMOVE HARDWARE, COMBINED  2012    Procedure:COMBINED EXPLORE SPINE, REMOVE HARDWARE; EXPLORE SPINE, REMOVE HARDWARE L4-S1; Surgeon:FAM GONZALEZ; Location:[facility] OR    IRRIGATION AND DEBRIDEMENT LOWER EXTREMITY, COMBINED Left 2023    Procedure: Irrigation and debridement lower extremity, combined and wound vac exchange;  Surgeon: Etienne Maier MD;  Location: [facility] OR    ORTHOPEDIC SURGERY      right foot surgery            Family History:   History reviewed. No pertinent family history.         Social History:     Social History     Tobacco Use    Smoking status: Former     Types: Cigarettes     Quit date: 2011     Years since quittin.8    Smokeless tobacco: Not on file   Substance Use Topics    Alcohol use: No     History   Sexual Activity    Sexual activity: Not on file            Current Medications (antimicrobials listed in bold):      acetaminophen  975 mg Oral Q8H    buPROPion  450 mg Oral Daily    busPIRone  10 mg Oral BID    DULoxetine  60 mg Oral BID    gabapentin  100 mg Oral TID    heparin ANTICOAGULANT  5,000 Units Subcutaneous Q8H    [Held by provider] hydroxychloroquine  400 mg Oral Daily    oxyCODONE  20 mg Oral Q12H    pantoprazole  40 mg Oral BID AC    piperacillin-tazobactam  4.5 g Intravenous Q6H    polyethylene glycol  17 g Oral Daily    prednisoLONE acetate  1 drop Both Eyes BID     pregabalin  150 mg Oral TID    senna-docusate  1 tablet Oral BID    sodium chloride (PF)  3 mL Intracatheter Q8H    sodium chloride (PF)  3 mL Intracatheter Q8H    vancomycin  1,750 mg Intravenous Q24H          Allergies:     Allergies   Allergen Reactions    Adhesive Tape Hives     From tape from surgery    Benzoin Hives and Rash    Droperidol Anaphylaxis    Prednisone      vomiting    Vitamin D Rash     flairs up his sarcoidosis          Physical Exam:   Vitals were reviewed  Patient Vitals for the past 24 hrs:   BP Temp Temp src Pulse Resp SpO2   09/09/23 0813 (!) 155/87 97.9  F (36.6  C) Oral 82 16 98 %   09/08/23 2317 112/74 97.6  F (36.4  C) Axillary 101 18 99 %   09/08/23 1600 -- -- -- -- -- 94 %   09/08/23 1400 -- -- -- -- -- 94 %     Ranges for his vital signs:  Temp:  [97.6  F (36.4  C)-97.9  F (36.6  C)] 97.9  F (36.6  C)  Pulse:  [] 82  Resp:  [16-18] 16  BP: (112-155)/(74-87) 155/87  SpO2:  [94 %-99 %] 98 %  Physical Examination:  GENERAL:  well-developed, well-nourished, alert, oriented, in bed in no acute distress.   HEENT:  Head is normocephalic, atraumatic   EYES:  Eyes have anicteric sclerae without conjunctival injection.    NECK:  Supple.   LUNGS: breathing comfortably on room air   ABDOMEN:  colostomy. +  SKIN:  left BKA - surgical site is healing. no significant drainage . minimal redness, healing well   Line(s) are in place without any surrounding erythema or exudate.   NEUROLOGIC: paraplegic   ureteral catheter          Laboratory Data:     Inflammatory Markers    Recent Labs   Lab Test 09/09/23  0454 09/08/23  0424 09/07/23  0423 09/06/23  0702 09/05/23  0029 09/04/23  0408 09/03/23  0323 09/02/23  0358   SED 55* 47* 1 46* 1 57* 1 59*     Hematology Studies    Recent Labs   Lab Test 09/09/23  0454 09/08/23  0424 09/07/23  0423 09/06/23  0702 09/05/23  0029 09/04/23  0408   WBC 9.1 11.1* 7.8 8.8 7.0 6.1   HGB 11.5* 11.3* 12.4* 12.0* 11.5* 11.2*   MCV 87 86 86 84 84 82    365 357  363 326 312     Metabolic Studies     Recent Labs   Lab Test 09/09/23  0454 09/08/23  0424 09/07/23  0423 09/06/23  0702 09/05/23  0636 09/05/23  0029    139 137 138  --  142   POTASSIUM 4.3 4.4 4.7 5.1 4.8 5.0   CHLORIDE 106 102 101 103  --  107   CO2 26 27 26 24  --  24   BUN 23.5* 24.8* 27.8* 22.0*  --  18.2   CR 1.01 1.03 1.14 1.05  --  1.01   GFRESTIMATED >90 >90 82 >90  --  >90     Hepatic Studies    Recent Labs   Lab Test 09/09/23  0454 09/08/23  0424 09/07/23  0423 09/06/23  0702 09/05/23  0029 09/04/23  0408   BILITOTAL 0.2 <0.2 0.2 0.2 0.2 0.2   ALKPHOS 134* 124 141* 128 126 122   ALBUMIN 3.3* 3.3* 3.4* 3.6 3.2* 3.1*   AST 22 27 31 31 33 26   ALT 16 19 23 22 21 19     Microbiology:  Culture Micro   Date Value Ref Range Status   01/12/2005 No Beta Streptococcus isolated  Final     Vancomycin Levels    Recent Labs   Lab Test 09/09/23  0454 09/05/23  0636 09/02/23  1259   VANCOMYCIN 11.4 21.3 26.6*     Hepatitis C Testing     Hepatitis C Antibody   Date Value Ref Range Status   04/06/2005 Negative NEG Final

## 2023-09-09 NOTE — PLAN OF CARE
Physical Therapy Discharge Summary    Reason for therapy discharge:    Discharged to acute rehabilitation facility.    Progress towards therapy goal(s). See goals on Care Plan in [facility] electronic health record for goal details.  Goals not met.  Barriers to achieving goals:   discharge from facility.    Therapy recommendation(s):    Continued therapy is recommended.  Rationale/Recommendations:  rec continued therapy via ARU to progress functional ind and mobility.

## 2023-09-10 ENCOUNTER — APPOINTMENT (OUTPATIENT)
Dept: OCCUPATIONAL THERAPY | Facility: CLINIC | Age: 43
DRG: 559 | End: 2023-09-10
Attending: PHYSICAL MEDICINE & REHABILITATION
Payer: MEDICARE

## 2023-09-10 ENCOUNTER — APPOINTMENT (OUTPATIENT)
Dept: PHYSICAL THERAPY | Facility: CLINIC | Age: 43
DRG: 559 | End: 2023-09-10
Attending: PHYSICAL MEDICINE & REHABILITATION
Payer: MEDICARE

## 2023-09-10 LAB — GLUCOSE BLDC GLUCOMTR-MCNC: 105 MG/DL (ref 70–99)

## 2023-09-10 PROCEDURE — 99222 1ST HOSP IP/OBS MODERATE 55: CPT | Mod: FS | Performed by: PHYSICIAN ASSISTANT

## 2023-09-10 PROCEDURE — 128N000003 HC R&B REHAB

## 2023-09-10 PROCEDURE — 97535 SELF CARE MNGMENT TRAINING: CPT | Mod: GO

## 2023-09-10 PROCEDURE — 97166 OT EVAL MOD COMPLEX 45 MIN: CPT | Mod: GO

## 2023-09-10 PROCEDURE — 258N000003 HC RX IP 258 OP 636: Performed by: PHYSICIAN ASSISTANT

## 2023-09-10 PROCEDURE — 97162 PT EVAL MOD COMPLEX 30 MIN: CPT | Mod: GP

## 2023-09-10 PROCEDURE — 97530 THERAPEUTIC ACTIVITIES: CPT | Mod: GO

## 2023-09-10 PROCEDURE — 250N000013 HC RX MED GY IP 250 OP 250 PS 637: Performed by: PHYSICIAN ASSISTANT

## 2023-09-10 PROCEDURE — 97530 THERAPEUTIC ACTIVITIES: CPT | Mod: GP

## 2023-09-10 PROCEDURE — 250N000011 HC RX IP 250 OP 636: Mod: JZ | Performed by: PHYSICIAN ASSISTANT

## 2023-09-10 RX ADMIN — ACETAMINOPHEN 975 MG: 325 TABLET, FILM COATED ORAL at 00:03

## 2023-09-10 RX ADMIN — HEPARIN SODIUM 5000 UNITS: 5000 INJECTION, SOLUTION INTRAVENOUS; SUBCUTANEOUS at 08:00

## 2023-09-10 RX ADMIN — OXYCODONE HYDROCHLORIDE 10 MG: 5 TABLET ORAL at 09:52

## 2023-09-10 RX ADMIN — Medication 2 MG: at 18:59

## 2023-09-10 RX ADMIN — PREGABALIN 150 MG: 75 CAPSULE ORAL at 16:32

## 2023-09-10 RX ADMIN — PIPERACILLIN AND TAZOBACTAM 4.5 G: 4; .5 INJECTION, POWDER, FOR SOLUTION INTRAVENOUS at 16:32

## 2023-09-10 RX ADMIN — PIPERACILLIN AND TAZOBACTAM 4.5 G: 4; .5 INJECTION, POWDER, FOR SOLUTION INTRAVENOUS at 05:56

## 2023-09-10 RX ADMIN — OXYCODONE HYDROCHLORIDE 10 MG: 5 TABLET ORAL at 16:31

## 2023-09-10 RX ADMIN — BUSPIRONE HYDROCHLORIDE 10 MG: 5 TABLET ORAL at 21:27

## 2023-09-10 RX ADMIN — PREDNISOLONE ACETATE 1 DROP: 10 SUSPENSION/ DROPS OPHTHALMIC at 22:36

## 2023-09-10 RX ADMIN — ACETAMINOPHEN 975 MG: 325 TABLET, FILM COATED ORAL at 08:00

## 2023-09-10 RX ADMIN — OXYCODONE HYDROCHLORIDE 20 MG: 20 TABLET, FILM COATED, EXTENDED RELEASE ORAL at 07:59

## 2023-09-10 RX ADMIN — SENNOSIDES AND DOCUSATE SODIUM 1 TABLET: 50; 8.6 TABLET ORAL at 21:29

## 2023-09-10 RX ADMIN — PREGABALIN 150 MG: 75 CAPSULE ORAL at 09:52

## 2023-09-10 RX ADMIN — BUPROPION HYDROCHLORIDE 450 MG: 150 TABLET, FILM COATED, EXTENDED RELEASE ORAL at 07:59

## 2023-09-10 RX ADMIN — Medication 5 MG: at 21:32

## 2023-09-10 RX ADMIN — OXYCODONE HYDROCHLORIDE 10 MG: 5 TABLET ORAL at 21:30

## 2023-09-10 RX ADMIN — OXYCODONE HYDROCHLORIDE 10 MG: 5 TABLET ORAL at 00:03

## 2023-09-10 RX ADMIN — PANTOPRAZOLE SODIUM 40 MG: 40 TABLET, DELAYED RELEASE ORAL at 16:32

## 2023-09-10 RX ADMIN — DULOXETINE HYDROCHLORIDE 60 MG: 30 CAPSULE, DELAYED RELEASE ORAL at 08:00

## 2023-09-10 RX ADMIN — SENNOSIDES AND DOCUSATE SODIUM 1 TABLET: 50; 8.6 TABLET ORAL at 08:00

## 2023-09-10 RX ADMIN — DULOXETINE HYDROCHLORIDE 60 MG: 30 CAPSULE, DELAYED RELEASE ORAL at 21:27

## 2023-09-10 RX ADMIN — PANTOPRAZOLE SODIUM 40 MG: 40 TABLET, DELAYED RELEASE ORAL at 06:36

## 2023-09-10 RX ADMIN — OXYCODONE HYDROCHLORIDE 20 MG: 20 TABLET, FILM COATED, EXTENDED RELEASE ORAL at 21:27

## 2023-09-10 RX ADMIN — PREDNISOLONE ACETATE 1 DROP: 10 SUSPENSION/ DROPS OPHTHALMIC at 12:35

## 2023-09-10 RX ADMIN — Medication 2 MG: at 05:03

## 2023-09-10 RX ADMIN — PREGABALIN 150 MG: 75 CAPSULE ORAL at 21:28

## 2023-09-10 RX ADMIN — ACETAMINOPHEN 975 MG: 325 TABLET, FILM COATED ORAL at 16:31

## 2023-09-10 RX ADMIN — METHOCARBAMOL 750 MG: 750 TABLET ORAL at 03:10

## 2023-09-10 RX ADMIN — BUSPIRONE HYDROCHLORIDE 10 MG: 5 TABLET ORAL at 08:00

## 2023-09-10 RX ADMIN — PIPERACILLIN AND TAZOBACTAM 4.5 G: 4; .5 INJECTION, POWDER, FOR SOLUTION INTRAVENOUS at 22:36

## 2023-09-10 RX ADMIN — HEPARIN SODIUM 5000 UNITS: 5000 INJECTION, SOLUTION INTRAVENOUS; SUBCUTANEOUS at 21:33

## 2023-09-10 RX ADMIN — PIPERACILLIN AND TAZOBACTAM 4.5 G: 4; .5 INJECTION, POWDER, FOR SOLUTION INTRAVENOUS at 12:09

## 2023-09-10 RX ADMIN — Medication 2 MG: at 12:35

## 2023-09-10 RX ADMIN — VANCOMYCIN HYDROCHLORIDE 1750 MG: 10 INJECTION, POWDER, LYOPHILIZED, FOR SOLUTION INTRAVENOUS at 08:03

## 2023-09-10 ASSESSMENT — ACTIVITIES OF DAILY LIVING (ADL)
ADLS_ACUITY_SCORE: 24
ADLS_ACUITY_SCORE: 25
ADLS_ACUITY_SCORE: 24
BADLS,_PREVIOUS_FUNCTIONAL_LEVEL: PARTIAL ASSISTANCE
ADLS_ACUITY_SCORE: 25
ADLS_ACUITY_SCORE: 24
IADLS,_PREVIOUS_FUNCTIONAL_LEVEL: PARTIAL ASSISTANCE
ADLS_ACUITY_SCORE: 25
PREVIOUS_RESPONSIBILITIES: FINANCES;MEAL PREP
ADLS_ACUITY_SCORE: 24

## 2023-09-10 NOTE — PROGRESS NOTES
CLINICAL NUTRITION SERVICES - ASSESSMENT NOTE    Information obtained from chart review and phone calls as RD off-site.      Nutrition Prescription    RECOMMENDATIONS FOR MDs/PROVIDERS TO ORDER:  None    Malnutrition Status:    Unable to determine due to lack of physical exam however pt at risk of malnutrition.     Recommendations already ordered by Registered Dietitian (RD):  Encouraged continued adequate intakes  Continue current supplement    Future/Additional Recommendations:  Monitor labs, intakes, and weight trends.     REASON FOR ASSESSMENT  Saqib Bishop is a/an 42 year old male assessed by the dietitian for Provider Order - assess/optimize nutrition     NUTRITION/MEDICAL HISTORY  History of T12 level spinal cord injury and paraplegia, neurogenic bowel s/p colostomy, neurogenic bladder with chronic indwelling bui, hypertension, DJD, GERD, obesity, sarcoidosis, depression/anxiety, insomnia, chronic pain syndrome, chronic sacral decubitus ulcer s/p flap and treatment for osteomyelitis (Dec 2020) c/b dehiscence and recurrent infection (Oct 2021 s/p repeat IV antibiotics x6 weeks) now s/p failed elective flap procedure (early 2023), and recent left lower extremity wound who was admitted on 8/27/23 with left lower extremity necrotizing soft tissue infection and sepsis s/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  He is now admitted to ARU on 9/9/23 for multidisciplinary rehabilitation and ongoing medical management.     FINDINGS  RD spoke with pt over the phone.Pt reports good appetite, no problems chewing.swallowing, no nausea/vomiting diarrhea. Pt still likes the Ensure Max twice a day. Does not want the jello but states he is still getting it. No further questions or concerns today.     CURRENT NUTRITION  "ORDERS  Diet: Regular  Snacks/supplements: Ensure Max protein BID     Intake/Tolerance: % of documented meals during hospital admission    LABS   09/07/23 04:23 09/08/23 04:24 09/09/23 04:54   Sodium 137 139 141   Potassium 4.7 4.4 4.3   Urea Nitrogen 27.8 (H) 24.8 (H) 23.5 (H)   Creatinine 1.14 1.03 1.01   Magnesium 1.6 (L) 1.7 1.6 (L)   Phosphorus 5.0 (H) 2.9 3.2   Alkaline Phosphatase 141 (H) 124 134 (H)   CRP Inflammation 23.98 (H) 28.05 (H) 24.37 (H)   Glucose 93 147 (H) 137 (H)     MEDICATIONS  Medications reviewed: mag ox, oxycodone, protonix, zosyn, miralax given 9/9, senna, vancomycin, dilaudid PRN, oxycodone PRN    ANTHROPOMETRIC  Height: 185.4 cm (6' 1\")  Most Recent Weight: 125.9 kg (277 lb 9 oz)  83.6 kg  IBW: 79 kg (159% IBW)  BMI: Obesity Grade II BMI 35-39.9  Weight History: Pt with limited weight history prior to admission with 27 lb (8.9 %) weight loss following amputation.  Wt Readings from Last Encounters:   09/09/23 125.9 kg (277 lb 9 oz) - bed scale   09/04/23 138 kg (304 lb 3.8 oz)   08/27/23 137.8 kg (303 lb 12.7 oz) - Bed sclae      08/04/23 124.7 kg (275 lb)   05/18/23 137.4 kg (302 lb 14.6 oz)      01/13/12 102.1 kg (225 lb)     Dosing Weight: 94 kg - adjusted body weight using most recent weight and iBW 83.6 kg    ASSESSED NUTRITION NEEDS  Estimated Energy Needs: 3780-8875 kcals/day (25 - 30 kcals/kg)  Justification: Maintenance  Estimated Protein Needs: 113-141 grams protein/day (1.2 - 1.5 grams of pro/kg)  Justification: Increased needs  Estimated Fluid Needs: 1 ml/kcal or per provider pending fluid status    PHYSICAL FINDINGS  See malnutrition section below.   L BKA, gluteal pressure wound, Colostomy    MALNUTRITION  % Intake: Decreased intake likely does not meet criteria  % Weight Loss:  Weight loss expected s/p BKA. Further weight loss does not meet criteria.  Subcutaneous Fat Loss: Unable to assess  Muscle Loss: Unable to assess  Fluid Accumulation/Edema: None noted "   Malnutrition Diagnosis: Unable to determine due to lack of physical exam however pt at risk of malnutrition.     NUTRITION DIAGNOSIS  Increased nutrient needs related to post op healing as evidenced by therapeutic recommendations       INTERVENTIONS  Implementation  Nutrition Education: Role of RD in care   Medical food supplement therapy - continue as ordered    Goals  Patient to consume % of nutritionally adequate meal trays TID, or the equivalent with supplements/snacks.     Monitoring/Evaluation  Progress toward goals will be monitored and evaluated per protocol.    Shaniqua Delcid MS, RDN, LD  RD pager: [phone]  WB Weekend/Holiday Pager: [phone]

## 2023-09-10 NOTE — PLAN OF CARE
"Discharge Planner Post-Acute Rehab PT:     Discharge Plan: Home w/ OP PT     Precautions: NWB on L LE, limb protector OOB    Current Status:  Bed Mobility: SBA w/ bed rails  Transfer: Slide board w/ CGA, limb protector on  Gait: Wc based at baseline  Stairs: N/A  Balance: seated balance EOB IND  Outcome Measures:    Assessment:  Pt presents s/p L BKA, in presence of PMH of T12 SCI wtih baseline impairments related to LE strength/sensation. Pt requiring increased assist in functional mobility presently given altered JENIFER following BKA and deconditioning from inactivity leading to hospitalization. Pt will benefit from IP rehab for progressing towards baseline Vernon level transfers, ELOS 7 days to meet goals for Vernon.     Other Barriers to Discharge (DME, Family Training, etc):   DME- amputee limb support pad, pt has wc but endorses it is \"broken\", in process of acquiring new wc, will require follow up on this after pt brings current chair to unit.    Family training- potentially with sister for car transfer, pending progress    "

## 2023-09-10 NOTE — DISCHARGE INSTRUCTIONS
Please establish with psychology follow up for emotional support    PCP  You are scheduled to see Dr. Robin on 9/26/2023 at 10:00am. Suture Removal scheduled for 9/18 at 1:30pm [phone] if you need to reschedule    Address [address] [place] [address]     Phone   [phone]    Ortho  You are scheduled to see Dr. Maier on 10/6/2023 at 2:20pm.    Address [address] [facility]   4th Floor   [facility] [address]     Phone   [phone]     Wound Healing  You are scheduled to see Dr. Meade on 10/10/2023 at 2:40pm.    Address [address]   [address] [place] [address]     Phone   [phone]     PM&R Amputee  Someone from the clinic will give you a call to get you scheduled with Dr. Christopher Guillen    Address  [address] [place] [address]    Phone   [phone]      Home Health Care:   [facility] ([place], [place])  PH: [phone]  Fax: [phone]  Nurse, physical therapy, and occupational therapy     CADI :   John Ph: [phone]    Amputation Resources:   [employer]  [email or web address]/  PH: [phone]  [facility] Alfred Mera PH: [phone]  [email or web address]  Local agency with support, resources, financial assistance, home modifications and education    Balance Amputation Support Group   Tuesday nights at 6:30pm  [facility]  [address] [place] [address]  Held by: Anna Lebron PH: [phone] (a bilateral amputee as well, great resource and contact for additional support)     Amputation Coalition   https://www.amputee-coalition.org/resources/[place]-[email or web address]/  PH: [phone]  National agency where you can find support groups, connect with peers and get education

## 2023-09-10 NOTE — PLAN OF CARE
Goal Outcome Evaluation:  Patient alert and oriented x 4. Regular diet pills whole has whole assist of 2 with govo. Patient has BKA which was dressed today and on the  coccyx has wound that was dressed today has PIV on the right  patient had two and one was pulled out since it out already out. Patient c/o of pain and was given oxycodone prn   and hydromophon.

## 2023-09-10 NOTE — PLAN OF CARE
Goal Outcome Evaluation:       Patient alert and oriented X 4. Able to make his needs known . Assist of 2 with govo lift. Regular thin liquid. wound dressing completed.Patient on abx intravenous no side effect noted.Will continue to monitor.Patient had oxycodone and hydromophone for pain with good effect.

## 2023-09-10 NOTE — PLAN OF CARE
09/10/23 1000   Appointment Info   Signing Clinician's Name / Credentials (OT) Erendira Rao, OTR/L       Present no   Living Environment   People in Home child(danilo), dependent   Current Living Arrangements mobile home   Home Accessibility wheelchair accessible   Transportation Anticipated family or friend will provide   Living Environment Comments Pt lives with 8 year old son, sister lives across the street. Pt has 5 hrs/7 days a week PCA. RN coming 1x time a week for nursing cares. Mobile home with ramp, standard height toilets in bathroom, pt has colostomy bag. Walk in shower 1 inch lip, pt has a shower bench, grab bars near toilet, grab bars in shower, pt has trapeze sling above bed.   Self-Care   Usual Activity Tolerance moderate   Current Activity Tolerance fair   Regular Exercise No   Equipment Currently Used at Home colostomy/ostomy supplies;grab bar, tub/shower;grab bar, toilet;shower chair;walker, standard   Fall history within last six months yes   Number of times patient has fallen within last six months 4   Activity/Exercise/Self-Care Comment Pt slide boards at baseline d/t pmh of paraplegia, uses manual wc which has motor assist for out of home. PCA assists daily with cleaning/laundry/cooking and sometimes dressing. Pt has assistance for out of home mobility (navigating curbs, etc).   Instrumental Activities of Daily Living (IADL)   Previous Responsibilities finances;meal prep   IADL Comments Pt recieving A with most IADLs at baseline   Previous Level of Function/Home Environm   Bathing/Grooming, Premorbid Functional Level partial assistance   Dressing, Premorbid Functional Level partial assistance   Eating/Feeding, Premorbid Functional Level independent   Toileting, Premorbid Functional Level independent;partial assistance   BADLs, Premorbid Functional Level partial assistance   IADLs, Premorbid Functional Level partial assistance   Bed Mobility, Premorbid Functional Level  "independent   Transfers, Premorbid Functional Level independent   Household Ambulation, Premorbid Functional Level independent   Stairs, Premorbid Functional Level not applicable (see comment)   Community Ambulation, Premorbid Functional Level partial assistance   General Information   Onset of Illness/Injury or Date of Surgery 08/27/23   Referring Physician Bill Pollack, DO   Patient/Family Therapy Goal Statement (OT) Learn how to transfer more independently, increased independence   Additional Occupational Profile Info/Pertinent History of Current Problem Per H&P \"Saqib Bishop is a 42 year old male with a past medical history of T12 level spinal cord injury and paraplegia, neurogenic bowel s/p colostomy, neurogenic bladder with chronic indwelling bui, hypertension, DJD, GERD, obesity, sarcoidosis, depression/anxiety, insomnia, chronic pain syndrome, chronic sacral decubitus ulcer s/p flap and treatment for osteomyelitis (Dec 2020) c/b dehiscence and recurrent infection (Oct 2021 s/p repeat IV antibiotics x6 weeks) now s/p failed elective flap procedure (early 2023), and recent left lower extremity wound who was admitted on 8/27/23 with left lower extremity necrotizing soft tissue infection and sepsis s/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  He is now admitted to ARU on 9/9/23 for multidisciplinary rehabilitation and ongoing medical management.\"   Performance Patterns (Routines, Roles, Habits) father, brother,   Existing Precautions/Restrictions weight bearing;fall  (No WB LLE)   Limitations/Impairments sensory   Left Upper Extremity (Weight-bearing Status) full weight-bearing (FWB)   Right Upper Extremity (Weight-bearing Status) full weight-bearing (FWB)   Left Lower Extremity (Weight-bearing Status) non " weight-bearing (NWB)   Right Lower Extremity (Weight-bearing Status) full weight-bearing (FWB)   Cognitive Status Examination   Orientation Status orientation to person, place and time   Affect/Mental Status (Cognitive) WNL   Visual Perception   Visual Impairment/Limitations corrective lenses for reading   Sensory   Sensory Quick Adds left LE   Pain Assessment   Patient Currently in Pain Yes, see Vital Sign flowsheet  (6/10 in back and LLE)   Posture   Posture not impaired   Range of Motion Comprehensive   General Range of Motion bilateral upper extremity ROM WNL   Strength Comprehensive (MMT)   General Manual Muscle Testing (MMT) Assessment no strength deficits identified   Comment, General Manual Muscle Testing (MMT) Assessment 5/5 for all UE MMT   Muscle Tone Assessment   Muscle Tone Quick Adds No deficits were identified   Coordination   Upper Extremity Coordination No deficits were identified   Clinical Impression   Criteria for Skilled Therapeutic Interventions Met (OT) Yes, treatment indicated   OT Diagnosis Pt below baseline with ADLs/IADLs   OT Problem List-Impairments impacting ADL problems related to;activity tolerance impaired;balance;mobility;sensation;post-surgical precautions   ADL comments/analysis Pt below baseline with ADLs/IADLs   Assessment of Occupational Performance 5 or more Performance Deficits   Identified Performance Deficits bathing, transfers, UB dressing, LB drssing, meal prep, functional mobility   Planned Therapy Interventions (OT) ADL retraining;IADL retraining;bed mobility training;groups;strengthening;transfer training;home program guidelines;progressive activity/exercise;risk factor education   Clinical Decision Making Complexity (OT) moderate complexity   Anticipated Equipment Needs Upon Discharge (OT) shower chair   Risk & Benefits of therapy have been explained evaluation/treatment results reviewed;care plan/treatment goals reviewed;risks/benefits reviewed;current/potential  barriers reviewed;participants voiced agreement with care plan;participants included;patient   Clinical Impression Comments Saqib Bishop is a 42 year old male with a past medical history of T12 level spinal cord injury and paraplegia, neurogenic bowel s/p colostomy, neurogenic bladder with chronic indwelling bui, hypertension, BKA on 9/7 with hospital course complicated by bacteremia. Pt appears willing and motivated to participate in therapy. He lives alone with 8 year old son (sister lives next door and was helping him at baseline). Pt is below baseline with ADLs and IADLs following L BKA. Pt would benefit from skilled OT services to return to PLOF. ELOS 7 days.   OT Total Evaluation Time   OT Eval, Moderate Complexity Minutes (19898) 30   OT Goals   Therapy Frequency (OT) Daily   OT Predicted Duration/Target Date for Goal Attainment 09/16/23   OT Goals Hygiene/Grooming;Upper Body Dressing;Upper Body Bathing;Lower Body Bathing;Lower Body Dressing;Bed Mobility;Transfers;Toilet Transfer/Toileting;Meal Preparation;OT Goal 1;OT Goal 2   OT: Hygiene/Grooming modified independent   OT: Upper Body Dressing Modified independent   OT: Lower Body Dressing Modified independent   OT: Upper Body Bathing Modified independent   OT: Lower Body Bathing Supervision/stand-by assist   OT: Bed Mobility Modified independent   OT: Transfer Modified independent   OT: Toilet Transfer/Toileting cleaning and garment management;Modified independent   OT: Meal Preparation Modified independent   OT: Goal 1 Pt will be mod I for home ex program to improve strength and activty tolerance   OT: Goal 2 Pt will be mod I for shower transfer   Self-Care/Home Management   Self-Care/Home Mgmt/ADL, Compensatory, Meal Prep Minutes (71518) 30   Treatment Detail/Skilled Intervention OT: Provided environmental room modifications for increaed w/c mobility in room. Pt completed g/h and oral cares at sink in w/c. See below for more details.   OT Discharge  Planning   OT Plan OT: Complete ggs. Pt can take w/c to shower room. slideboard to shower bench.   Total Session Time   Timed Code Treatment Minutes 30   Total Session Time (sum of timed and untimed services) 60   Post Acute Settings Only   What unit is patient on? Acute Rehab   OT - Acute Rehab Center Time   Individual Time (minutes) - enter zero if not applicable - OT 60   Group Time (minutes) - enter zero if not applicable  - OT 0   Concurrent Time (minutes) - enter zero if not applicable  - OT 0   Co-Treatment Time (minutes) - enter zero if not applicable  - OT 0   ARC Total Session Time (minutes) - OT 60   Oral Hygiene   Describe performance OT: set up A in w/c at sink   Grooming (except oral cares)   Grooming Task Performed Hair grooming;Washing hands;Washing face   Grooming Comment OT: Set up A at sink in w/c   Chair/bed-to-chair Transfer   Describe performance OT: MIN A slideboard transfer from w/c to bed.

## 2023-09-10 NOTE — PROGRESS NOTES
09/10/23 0815   Appointment Info   Signing Clinician's Name / Credentials (PT) Gudelia Huynh DPT   Living Environment   People in Home alone   Current Living Arrangements mobile home   Home Accessibility wheelchair accessible   Transportation Anticipated family or friend will provide   Living Environment Comments Pt lives alone, has 5hr /day PCA support, RN coming 1x/wk for wound cares. Pt lives in mobile home, ramp to enter. Has trapeeze sling above bed, grab bars near toilet/ shower.   Self-Care   Usual Activity Tolerance moderate   Current Activity Tolerance moderate   Regular Exercise No   Equipment Currently Used at Home wheelchair, manual  (with motor assist)   Fall history within last six months yes   Activity/Exercise/Self-Care Comment Pt slide boards at baseline d/t pmh of paraplegia, uses manual wc which has motor assist for out of home. PCA assists daily with cleaning/laundry/cooking and sometimes dressing. Pt has assistance for out of home mobility (navigating curbs, etc).   Post-Acute Assessment Only   Post-Acute Functional Assessment See below   General Information   Onset of Illness/Injury or Date of Surgery 09/07/23   Referring Physician Dr. Pollack   Patient/Family Therapy Goals Statement (PT) Return home   Pertinent History of Current Problem (include personal factors and/or comorbidities that impact the POC) Pt presents s/p L BKA following infection, in presence of PMH of T12 SCI paraplegia.   Existing Precautions/Restrictions weight bearing;fall   Weight-Bearing Status - LLE nonweight-bearing   Cognition   Affect/Mental Status (Cognition) WFL   Orientation Status (Cognition) oriented x 4   Pain Assessment   Patient Currently in Pain Yes, see Vital Sign flowsheet  (phantom pains- L LE burning)   Integumentary/Edema   Integumentary/Edema Comments L LE BKA incision site not visualized in session d/t bandaging. Per EMR pt also has sacral wound. Abdominal hernia (pt reports he was suppposed to  get abdominal binder previously but never did)   Posture    Posture Protracted shoulders;Forward head position   Range of Motion (ROM)   ROM Comment R ankle lacks neuteral DF, restricted to -10 degrees from neuteral   Strength (Manual Muscle Testing)   Strength Comments LE paraplegia consistant with pt PMH of T12 SCI. Pt with some proximal hip flexion/abduction strength however <2+/5   Balance   Balance Comments Seated balance- able to reach outside JENIFER, shift dynamicly as demonstrated by scooting EOB   Sensory Examination   Sensory Perception Comments LE Sensation impairments consistant wtih T12 SCI, pt endorses absent below knees, endorses some senation in upper thighs   Coordination   Coordination no deficits were identified   Muscle Tone   Muscle Tone Comments LE hypotonia consistant wtih pt PMH of T12 SCI   Clinical Impression   Criteria for Skilled Therapeutic Intervention Yes, treatment indicated   PT Diagnosis (PT) Force production deficit, sensation impairment, altered seated balance s/p L BKA   Influenced by the following impairments Force production deficit, sensation impairment, altered seated balance s/p L BKA, decreased activity tolerance   Functional limitations due to impairments bed mobility, transfers, car transfer, curb   Clinical Presentation (PT Evaluation Complexity) Evolving/Changing   Clinical Presentation Rationale Pt with >3 body structure function impairments, evolving healing stages of wounds   Clinical Decision Making (Complexity) moderate complexity   Planned Therapy Interventions (PT) balance training;bed mobility training;home exercise program;patient/family education;manual therapy techniques;ROM (range of motion);prosthetic fitting/training;stretching;strengthening;transfer training;wheelchair management/propulsion training;progressive activity/exercise;risk factor education;home program guidelines   Anticipated Equipment Needs at Discharge (PT)   (amputee limb support attatchment)    Risk & Benefits of therapy have been explained evaluation/treatment results reviewed;care plan/treatment goals reviewed;risks/benefits reviewed;current/potential barriers reviewed;participants voiced agreement with care plan;participants included;patient   Clinical Impression Comments Pt presents s/p L BKA, in presence of PMH of T12 SCI wtih baseline impairments related to LE strength/sensation. Pt requiring increased assist in functional mobility presently given altered JENIFER following BKA and deconditioning from inactivity leading to hospitalization. Pt will benefit from IP rehab for progressing towards baseline Vernon level transfers, ELOS 7 days to meet goals for Vernon.   PT Total Evaluation Time   PT Eval, Moderate Complexity Minutes (81405) 30   Physical Therapy Goals   PT Frequency Daily   PT Predicted Duration/Target Date for Goal Attainment 09/16/23   PT Goals Transfers;Aerobic Activity;PT Goal 1;PT Goal 2   PT: Transfers Modified independent;Bed to/from chair;Assistive device;Within precautions  (slide board)   PT: Perform aerobic activity with stable cardiovascular response 15 minutes;continuous activity   PT: Goal 1 Car transfer SBA w/ slide board   PT: Goal 2 State positioning recommendations for L BKA   Interventions   Interventions Quick Adds Therapeutic Activity   Therapeutic Activity   Therapeutic Activities: dynamic activities to improve functional performance Minutes (16806) 15   Treatment Detail/Skilled Intervention Fitted pt with amputee limb support attatchment to wc in room, aquired slide board. Following initial assessment of mobility (see GG), progressed to tx with wc mobility for increased activity tolerance. Oriented pt to unit.   PT Discharge Planning   PT Plan Issue positioning handout for LE (not LE exercises given pt paraplegia). Slide board transfers from various heights, wc mobility   Total Session Time   Timed Code Treatment Minutes 15   Total Session Time (sum of timed and untimed  services) 45   PT - Acute Rehab Center Time   Individual Time (minutes) - enter zero if not applicable - PT 60  (45 mod eval, 15 TA)   Group Time (minutes) - enter zero if not applicable  - PT 0   Concurrent Time (minutes) - enter zero if not applicable  - PT 0   Co-Treatment Time (minutes) - enter zero if not applicable  - PT 0   ARC Total Session Time (minutes) - PT 60

## 2023-09-10 NOTE — PLAN OF CARE
Discharge Planner Post-Acute Rehab OT:      Discharge Plan: home with 8 year old son, sister to assist as needed. OP vs. HH OT services.      Precautions: LBKA, Use L limb protector, falls      Current Status:  ADLs:  Mobility: slideboard w/ CGA and limb protector on. W/c based.   Grooming: set up A washing hands at sink in w/c   Dressing:   U/b dressing: TBD  L/B dressing:  TBD  Feet: TBD   Bathing: TBD  Toileting: pt uses urinal and colostomy at baseline.   IADLs: tba  Vision/Cognition: TBD     Assessment: Saqib Bishop is a 42 year old male with a past medical history of T12 level spinal cord injury and paraplegia, neurogenic bowel s/p colostomy, neurogenic bladder with chronic indwelling bui, hypertension, BKA on 9/7 with hospital course complicated by bacteremia. Pt appears willing and motivated to participate in therapy. He lives alone with 8 year old son (sister lives next door and was helping him at baseline). Pt is below baseline with ADLs and IADLs following L BKA. Pt would benefit from skilled OT services to return to PLOF. ELOS 7 days.         Barriers to Discharge (DME, Family Training, etc): Pt lives in a trailer home with a ramp for w/c access. Pt has an overhead trapeze above bed, grab bars in walk in shower, pt has a shower chair but reports it is broken/cracked, pt's sister lives across the street and is able to provide assistance as needed. Pt reports he has a new bed ordered.     DME and family training

## 2023-09-10 NOTE — PHARMACY
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 No  Pending Cultures/Microbiological Tests: no      Inpatient ID involved in developing OPAT plan: Yes - discharge OPAT plan has no changes from ID provider, Dr. Spencer Vera, OPAT plan charted on 9/9/2023    Outpatient ID Follow-up: Referred to another provider for follow-up  Designated Provider: Dr. Bernardo Robin (PCP)    Antimicrobial Regimen / Route Anticipated  Duration Start Date Stop /  Reassess Date   Piperacillin + Tazobactam 4.5 g every 6 hours/IV 14 days for bacteremia, 7 days s/p BKA on 9/7 8/27/2023 9/13/2023   Vancomycin IV 1750 mg every 24 hours/IV 14 days for bacteremia, 7 days s/p BKA on 9/7 8/27/2023 9/13/2023     Laboratory Tests and Monitoring Frequency:  (No laboratory monitoring warranted for IV antibiotic therapy with intended post-hospitalization course of < 5 days)      Therapeutic Drug Monitoring: Vancomycin   - Drug: Vancomycin   - Goal(s): -600   - Level Type & Frequency: Anticipate not needing to obtain serum vancomycin level for therapeutic drug monitoring post-hospitalization with planned end of 9/13 with stable renal function   - Level(s) last checked date: 9/9/2023    Imaging/Miscellaneous Monitoring: None    ID Pharmacist Interventions: None                          Queenie Smalls, PharmD,  BCIDP  Pager: [phone]

## 2023-09-10 NOTE — PLAN OF CARE
FOCUS/GOAL  Medical management    ASSESSMENT, INTERVENTIONS AND CONTINUING PLAN FOR GOAL:  Tachycardic at 109 ( asymptomatic). Reporting  pain on residual lumb, PRN oxycodone, robaxin and Dilaudid  given this shift. Dyer patent, drained yellow urine. Colostomy cdi. Small BM  inside. Patient stated will empty it himself.( Trained). Encouraged to call  for assistance  when ready. Removed, infiltrate. On call paged for new line,  Vancomycin delayed.  Will continue with POC.  Goal Outcome Evaluation:           Overall Patient Progress: no changeOverall Patient Progress: no change    Outcome Evaluation: New admit. Ongoing evaluation.

## 2023-09-10 NOTE — CONSULTS
"    Social Work: Initial Assessment with Discharge Plan    Patient Name: Saqib Bishop  : 1980  Age: 42 year old  MRN: [id number]  Completed assessment with: Chart review and interview with patient   Admitted to ARU: 2023    Presenting Information   Date of SW assessment: September 10, 2023  Health Care Directive: Declined completing and Health Care Directive Agent (if patient not able to make decisions)  Primary Health Care Agent: Patient/self   Secondary Health Care Agent: Adult children NOK   Living Situation: Lives in a mobile home with ramped entrance. Son (7 y/o) 'pretty much\" lives with pt and is being cared for by pt's sister while in the hospital. W/c accessible. Dog Radar, family caring for while in the hospital. Sister lives across the street and is pt's PCA.   Previous Functional Status: Pt was modified independent with slide board transfers and wheelchair-based mobility in his home, but needed assist in community.  Patient notes that his sister serves as PCA, helps with a lot of things for him, including some aspects of dressing, bathing set-up, IADLs. Not working.   PTA, received 5 hours of PCA services a day, 5 days a week.  Per OP OT, PCA services include shower set up, laundry, housekeeping, shopping, meal prep and driving to/from appointments. Has nursing services that assist with medication management. Independent with finances.   DME available: transfer board, wheelchair, manual   Patient and family understanding of hospitalization: Appropriate   Cultural/Language/Spiritual Considerations: 43 y/o male, english-speaking, , and Islam not listed.     Physical Health  Reason for admission: Amputation 05.4 Unilateral LE BKA: s/p left below knee amputation d/t left lower extremity necrotizing soft tissue infection, comorbid condition: T12 spinal cord injury w/ paraplegia     Justification for Acute Inpatient Rehabilitation  Saqib Bishop is a 42 year old male w/ PMH of " T12 SCI w/ paraplegia, chronic sacral ulcer, who presented to OSH ED on 8/27/23 w/ septic shock, DODIE, and left lower extremity necrotizing soft tissue infection. He was transferred to [facility] SICU for further mgmt. He underwent left below knee guillotine amputation on 8/28/2023.  Patient underwent irrigation and debridement of left lower extremity residual limb and wound vac exchange on 8/30/23 and 9/2/2023 for left lower extremity necrotizing fasciitis. Patient was transferred to [facility] on 8/31/2023. He is now s/p definitive L BKA on 9/7/2023. He has also required medical mgmt of his bacteremia and IV Abx needs, pain, and DODIE. He is now medically stable and ready to discharge to acute inpatient rehab.   The patient requires transfer to [facility] for intensive therapies not available in a lesser level of care including PT/OT, ongoing medical management at least 3 days per week, and rehabilitative nursing care.  He requires the specialization of rehab services, PMR, and rehab nurses on acute rehab to address his new functional impairments and medical needs in setting of a new L BKA, which is further complicated by his baseline T12 spinal cord injury and paraplegia. The patient requires an intensive inpatient rehab program to address the following acute impairments: impaired strength, hypotonia in BLEs, impaired range of motion, pain, and new L BKA. These impairments are impacting his ability to safely and independently perform bed mobility, bed > chair sliding board transfers, w/c based mobility, ADLs and IADLs. He has a w/c accessible home and was previously mod IND for mobility w/ use of w/c and sliding board transfers. He is currently requiring physical assist for all transfers and will benefit from further therapies for training on how to modify transfers and ADLs in setting of new L BKA.     Provider Information   Primary Care Physician:   [facility] will schedule PCP apt at discharge.   :  None reported     Mental Health/Chemical Dependency:   Diagnosis: Depression/anxiety  Alcohol/Tobacco/Narcotis: No concerns reported. Chronic pain.   Support/Services in Place: None reported.   Services Needed/Recommended: [facility]ce and Health Psychology support while on ARU available.   Sexuality/Intimacy: Not discussed     Support System  Marital Status: Single.   Family support: Multiple family members-son 7 y/o and dtr 21 y/o, nephew, sister (pt's PCA and lives across the street, brother-in-law, mom, and dad.   Other support available: See above.     Community Resources  Current in home services: Pt well connected with services including a home RN through [facility]. PCA through [facility] (5 hours per day, 5 days per week with an additional 2 hours to help with shopping). Pt's father is his PCA. CADI Waiver. See contact details below. PCP confirmed and updated in chart.      [facility] - RN  Ph: [phone], Fax: [phone]  [facility]   John, Ph: [phone]    Financial/Employment/Education  Employment Status: Disability, not working PTA (not since before his injury in 2015)   Income Source: SSDI  Education: Not discussed   Financial Concerns:  None reported   Insurance: Medicare and Medicaid     Discharge Plan   Patient and family discharge goal: TBD, pending progress  Provided Education on discharge plan: Evaluations and discharge recommendations pending.   Patient agreeable to discharge plan:  Pending further discussion. Evaluations and discharge recommendations pending.   Provided education and attained signature for Medicare IM and IRF Patient Rights and Privacy Information provided to patient : YES  Provided patient with [facility] Resources: N/A  Barriers to discharge: None identified     Discharge Recommendations   Disposition: See above   Transportation Needs: Patient, family/friends, paid transport, insurance transport (if applicable)  "    Additional comments   Discharge JAD SIMONS 10 days.     SW will remain available and continue to follow as needs arise.     -------------------------------------------------------------------------------------------------------------  YANETH Pain Assessment    Pain Effect on Sleep  Over the past 5 days, how much of the time has pain made it hard for you to sleep at night?\"    4. Almost constantly    Pain Interference with Therapy Activities  \"Over the past 5 days, how often have you limited your participation in rehabilitation therapy sessions due to pain?\"  1. Rarely or not at all    Pain Interference with Day-to-Day Activities  \"Over the past 5 days, how often have you limited your day-to-day activities (excluding rehabilitation therapy sessions) because of pain?\"  1. Rarely or not at all  -------------------------------------------------------------------------------------------------------------    [facility], Acute Inpatient Rehab Unit   [address] [place] [address], [address] [place] [address]  Phone: [phone], Fax: [phone], Pager: [phone]             "

## 2023-09-11 ENCOUNTER — APPOINTMENT (OUTPATIENT)
Dept: OCCUPATIONAL THERAPY | Facility: CLINIC | Age: 43
DRG: 559 | End: 2023-09-11
Attending: PHYSICAL MEDICINE & REHABILITATION
Payer: MEDICARE

## 2023-09-11 ENCOUNTER — APPOINTMENT (OUTPATIENT)
Dept: PHYSICAL THERAPY | Facility: CLINIC | Age: 43
DRG: 559 | End: 2023-09-11
Attending: PHYSICAL MEDICINE & REHABILITATION
Payer: MEDICARE

## 2023-09-11 LAB
ALBUMIN SERPL BCG-MCNC: 3.2 G/DL (ref 3.5–5.2)
ALP SERPL-CCNC: 139 U/L (ref 40–129)
ALT SERPL W P-5'-P-CCNC: 22 U/L (ref 0–70)
ANION GAP SERPL CALCULATED.3IONS-SCNC: 11 MMOL/L (ref 7–15)
AST SERPL W P-5'-P-CCNC: 35 U/L (ref 0–45)
BASOPHILS # BLD AUTO: 0.1 10E3/UL (ref 0–0.2)
BASOPHILS NFR BLD AUTO: 1 %
BILIRUB SERPL-MCNC: 0.2 MG/DL
BUN SERPL-MCNC: 25.7 MG/DL (ref 6–20)
CALCIUM SERPL-MCNC: 9.5 MG/DL (ref 8.6–10)
CHLORIDE SERPL-SCNC: 106 MMOL/L (ref 98–107)
CREAT SERPL-MCNC: 0.93 MG/DL (ref 0.67–1.17)
CRP SERPL-MCNC: 22.37 MG/L
DEPRECATED HCO3 PLAS-SCNC: 25 MMOL/L (ref 22–29)
EGFRCR SERPLBLD CKD-EPI 2021: >90 ML/MIN/1.73M2
EOSINOPHIL # BLD AUTO: 0.2 10E3/UL (ref 0–0.7)
EOSINOPHIL NFR BLD AUTO: 4 %
ERYTHROCYTE [DISTWIDTH] IN BLOOD BY AUTOMATED COUNT: 18.6 % (ref 10–15)
GLUCOSE SERPL-MCNC: 121 MG/DL (ref 70–99)
HCT VFR BLD AUTO: 37.4 % (ref 40–53)
HGB BLD-MCNC: 11.3 G/DL (ref 13.3–17.7)
IMM GRANULOCYTES # BLD: 0 10E3/UL
IMM GRANULOCYTES NFR BLD: 0 %
LYMPHOCYTES # BLD AUTO: 0.8 10E3/UL (ref 0.8–5.3)
LYMPHOCYTES NFR BLD AUTO: 18 %
MAGNESIUM SERPL-MCNC: 2 MG/DL (ref 1.7–2.3)
MCH RBC QN AUTO: 25.8 PG (ref 26.5–33)
MCHC RBC AUTO-ENTMCNC: 30.2 G/DL (ref 31.5–36.5)
MCV RBC AUTO: 85 FL (ref 78–100)
MONOCYTES # BLD AUTO: 0.7 10E3/UL (ref 0–1.3)
MONOCYTES NFR BLD AUTO: 14 %
NEUTROPHILS # BLD AUTO: 2.9 10E3/UL (ref 1.6–8.3)
NEUTROPHILS NFR BLD AUTO: 63 %
NRBC # BLD AUTO: 0 10E3/UL
NRBC BLD AUTO-RTO: 0 /100
PHOSPHATE SERPL-MCNC: 5 MG/DL (ref 2.5–4.5)
PLATELET # BLD AUTO: 318 10E3/UL (ref 150–450)
POTASSIUM SERPL-SCNC: 4.1 MMOL/L (ref 3.4–5.3)
PROT SERPL-MCNC: 7.7 G/DL (ref 6.4–8.3)
RBC # BLD AUTO: 4.38 10E6/UL (ref 4.4–5.9)
SODIUM SERPL-SCNC: 142 MMOL/L (ref 136–145)
WBC # BLD AUTO: 4.7 10E3/UL (ref 4–11)

## 2023-09-11 PROCEDURE — 250N000013 HC RX MED GY IP 250 OP 250 PS 637: Performed by: PHYSICIAN ASSISTANT

## 2023-09-11 PROCEDURE — 85025 COMPLETE CBC W/AUTO DIFF WBC: CPT | Performed by: PHYSICIAN ASSISTANT

## 2023-09-11 PROCEDURE — 86140 C-REACTIVE PROTEIN: CPT | Performed by: PHYSICIAN ASSISTANT

## 2023-09-11 PROCEDURE — 84100 ASSAY OF PHOSPHORUS: CPT | Performed by: PHYSICIAN ASSISTANT

## 2023-09-11 PROCEDURE — 128N000003 HC R&B REHAB

## 2023-09-11 PROCEDURE — 258N000003 HC RX IP 258 OP 636: Performed by: PHYSICIAN ASSISTANT

## 2023-09-11 PROCEDURE — 80053 COMPREHEN METABOLIC PANEL: CPT | Performed by: PHYSICIAN ASSISTANT

## 2023-09-11 PROCEDURE — 97110 THERAPEUTIC EXERCISES: CPT | Mod: GO

## 2023-09-11 PROCEDURE — 97535 SELF CARE MNGMENT TRAINING: CPT | Mod: GO

## 2023-09-11 PROCEDURE — 99233 SBSQ HOSP IP/OBS HIGH 50: CPT | Performed by: PHYSICIAN ASSISTANT

## 2023-09-11 PROCEDURE — 97530 THERAPEUTIC ACTIVITIES: CPT | Mod: GP

## 2023-09-11 PROCEDURE — 36415 COLL VENOUS BLD VENIPUNCTURE: CPT | Performed by: PHYSICIAN ASSISTANT

## 2023-09-11 PROCEDURE — 97112 NEUROMUSCULAR REEDUCATION: CPT | Mod: GP

## 2023-09-11 PROCEDURE — 83735 ASSAY OF MAGNESIUM: CPT | Performed by: PHYSICIAN ASSISTANT

## 2023-09-11 PROCEDURE — 258N000003 HC RX IP 258 OP 636

## 2023-09-11 PROCEDURE — 250N000011 HC RX IP 250 OP 636: Performed by: PHYSICIAN ASSISTANT

## 2023-09-11 RX ORDER — CYCLOBENZAPRINE HCL 10 MG
10 TABLET ORAL 3 TIMES DAILY PRN
Status: DISCONTINUED | OUTPATIENT
Start: 2023-09-11 | End: 2023-09-15 | Stop reason: HOSPADM

## 2023-09-11 RX ORDER — LIDOCAINE 40 MG/G
CREAM TOPICAL
Status: DISCONTINUED | OUTPATIENT
Start: 2023-09-11 | End: 2023-09-15 | Stop reason: HOSPADM

## 2023-09-11 RX ORDER — SODIUM CHLORIDE 9 MG/ML
INJECTION, SOLUTION INTRAVENOUS
Status: COMPLETED
Start: 2023-09-11 | End: 2023-09-11

## 2023-09-11 RX ADMIN — SENNOSIDES AND DOCUSATE SODIUM 1 TABLET: 50; 8.6 TABLET ORAL at 21:41

## 2023-09-11 RX ADMIN — OXYCODONE HYDROCHLORIDE 10 MG: 5 TABLET ORAL at 06:29

## 2023-09-11 RX ADMIN — OXYCODONE HYDROCHLORIDE 20 MG: 20 TABLET, FILM COATED, EXTENDED RELEASE ORAL at 09:33

## 2023-09-11 RX ADMIN — PIPERACILLIN AND TAZOBACTAM 4.5 G: 4; .5 INJECTION, POWDER, FOR SOLUTION INTRAVENOUS at 11:52

## 2023-09-11 RX ADMIN — Medication 2 MG: at 19:44

## 2023-09-11 RX ADMIN — PREGABALIN 150 MG: 75 CAPSULE ORAL at 14:11

## 2023-09-11 RX ADMIN — OXYCODONE HYDROCHLORIDE 10 MG: 5 TABLET ORAL at 21:41

## 2023-09-11 RX ADMIN — PIPERACILLIN AND TAZOBACTAM 4.5 G: 4; .5 INJECTION, POWDER, FOR SOLUTION INTRAVENOUS at 17:09

## 2023-09-11 RX ADMIN — ACETAMINOPHEN 975 MG: 325 TABLET, FILM COATED ORAL at 09:32

## 2023-09-11 RX ADMIN — PANTOPRAZOLE SODIUM 40 MG: 40 TABLET, DELAYED RELEASE ORAL at 16:35

## 2023-09-11 RX ADMIN — DULOXETINE HYDROCHLORIDE 60 MG: 30 CAPSULE, DELAYED RELEASE ORAL at 20:48

## 2023-09-11 RX ADMIN — PANTOPRAZOLE SODIUM 40 MG: 40 TABLET, DELAYED RELEASE ORAL at 05:06

## 2023-09-11 RX ADMIN — ACETAMINOPHEN 975 MG: 325 TABLET, FILM COATED ORAL at 00:22

## 2023-09-11 RX ADMIN — DULOXETINE HYDROCHLORIDE 60 MG: 30 CAPSULE, DELAYED RELEASE ORAL at 09:34

## 2023-09-11 RX ADMIN — Medication 2 MG: at 01:21

## 2023-09-11 RX ADMIN — PREGABALIN 150 MG: 75 CAPSULE ORAL at 09:34

## 2023-09-11 RX ADMIN — PREGABALIN 150 MG: 75 CAPSULE ORAL at 20:49

## 2023-09-11 RX ADMIN — PIPERACILLIN AND TAZOBACTAM 4.5 G: 4; .5 INJECTION, POWDER, FOR SOLUTION INTRAVENOUS at 22:55

## 2023-09-11 RX ADMIN — Medication 2 MG: at 23:54

## 2023-09-11 RX ADMIN — HEPARIN SODIUM 5000 UNITS: 5000 INJECTION, SOLUTION INTRAVENOUS; SUBCUTANEOUS at 09:35

## 2023-09-11 RX ADMIN — Medication 2 MG: at 09:43

## 2023-09-11 RX ADMIN — PREDNISOLONE ACETATE 1 DROP: 10 SUSPENSION/ DROPS OPHTHALMIC at 09:39

## 2023-09-11 RX ADMIN — BUSPIRONE HYDROCHLORIDE 10 MG: 5 TABLET ORAL at 20:49

## 2023-09-11 RX ADMIN — PREDNISOLONE ACETATE 1 DROP: 10 SUSPENSION/ DROPS OPHTHALMIC at 21:40

## 2023-09-11 RX ADMIN — SODIUM CHLORIDE 500 ML: 9 INJECTION, SOLUTION INTRAVENOUS at 11:52

## 2023-09-11 RX ADMIN — PIPERACILLIN AND TAZOBACTAM 4.5 G: 4; .5 INJECTION, POWDER, FOR SOLUTION INTRAVENOUS at 04:58

## 2023-09-11 RX ADMIN — BUPROPION HYDROCHLORIDE 450 MG: 150 TABLET, FILM COATED, EXTENDED RELEASE ORAL at 09:34

## 2023-09-11 RX ADMIN — VANCOMYCIN HYDROCHLORIDE 1750 MG: 10 INJECTION, POWDER, LYOPHILIZED, FOR SOLUTION INTRAVENOUS at 06:31

## 2023-09-11 RX ADMIN — BUSPIRONE HYDROCHLORIDE 10 MG: 5 TABLET ORAL at 09:34

## 2023-09-11 RX ADMIN — HEPARIN SODIUM 5000 UNITS: 5000 INJECTION, SOLUTION INTRAVENOUS; SUBCUTANEOUS at 20:50

## 2023-09-11 RX ADMIN — METHOCARBAMOL 750 MG: 750 TABLET ORAL at 00:22

## 2023-09-11 RX ADMIN — OXYCODONE HYDROCHLORIDE 10 MG: 5 TABLET ORAL at 12:18

## 2023-09-11 RX ADMIN — Medication 2 MG: at 15:05

## 2023-09-11 RX ADMIN — OXYCODONE HYDROCHLORIDE 10 MG: 5 TABLET ORAL at 16:35

## 2023-09-11 RX ADMIN — OXYCODONE HYDROCHLORIDE 20 MG: 20 TABLET, FILM COATED, EXTENDED RELEASE ORAL at 20:49

## 2023-09-11 RX ADMIN — ACETAMINOPHEN 975 MG: 325 TABLET, FILM COATED ORAL at 17:07

## 2023-09-11 ASSESSMENT — ACTIVITIES OF DAILY LIVING (ADL)
ADLS_ACUITY_SCORE: 25
ADLS_ACUITY_SCORE: 29
ADLS_ACUITY_SCORE: 25
ADLS_ACUITY_SCORE: 29
ADLS_ACUITY_SCORE: 29

## 2023-09-11 NOTE — PHARMACY-MEDICATION REGIMEN REVIEW
Pharmacy Medication Regimen Review  Saqib Bishop is a 42 year old male who is currently in the Acute Rehab Unit.    Assessment: All medications have an appropriate indications, durations and no unnecessary use was found    Plan:   Continue current medications as ordered.     Attending provider will be sent this note for review.  If there are any emergent issues noted above, pharmacist will contact provider directly by phone.      Pharmacy will periodically review the resident's medication regimen for any PRN medications not administered in > 72 hours and discontinue them. The pharmacist will discuss gradual dose reductions of psychopharmacologic medications with interdisciplinary team on a regular basis.    Please contact pharmacy if the above does not answer specific medication questions/concerns.    Background:  A pharmacist has reviewed all medications and pertinent medical history today.  Medications were reviewed for appropriate use and any irregularities found are listed with recommendations.      Current Facility-Administered Medications:     acetaminophen (TYLENOL) tablet 975 mg, 975 mg, Oral, Q8H, Monika Mullen PA-C, 975 mg at 09/11/23 0932    buPROPion (WELLBUTRIN XL) 24 hr tablet 450 mg, 450 mg, Oral, Daily, Monika Mullen PA-C, 450 mg at 09/11/23 0934    busPIRone (BUSPAR) tablet 10 mg, 10 mg, Oral, BID, Monika Mullen PA-C, 10 mg at 09/11/23 0934    cyclobenzaprine (FLEXERIL) tablet 10 mg, 10 mg, Oral, TID PRN, Gudelia Almonte PA    DULoxetine (CYMBALTA) DR capsule 60 mg, 60 mg, Oral, BID, Monika Mullen PA-C, 60 mg at 09/11/23 0934    heparin ANTICOAGULANT injection 5,000 Units, 5,000 Units, Subcutaneous, Q12H, Monika Mullen PA-C, 5,000 Units at 09/11/23 0935    HYDROmorphone (DILAUDID) half-tab 2 mg, 2 mg, Oral, Q4H PRN, Monika Mullen PA-C, 2 mg at 09/11/23 0943    melatonin tablet 5 mg, 5 mg, Oral, At Bedtime PRN, Monika Mullen PA-C, 5 mg at  09/10/23 2132    oxyCODONE (oxyCONTIN) 12 hr tablet 20 mg, 20 mg, Oral, Q12H, Monika Mullen PA-C, 20 mg at 09/11/23 0933    oxyCODONE (ROXICODONE) tablet 5-10 mg, 5-10 mg, Oral, Q4H PRN, Monika Mullen PA-C, 10 mg at 09/11/23 0629    pantoprazole (PROTONIX) EC tablet 40 mg, 40 mg, Oral, BID AC, Monika Mullen PA-C, 40 mg at 09/11/23 0506    piperacillin-tazobactam (ZOSYN) 4.5 g vial to attach to  mL bag, 4.5 g, Intravenous, Q6H, Monika Mullen PA-C, 4.5 g at 09/11/23 0458    polyethylene glycol (MIRALAX) Packet 17 g, 17 g, Oral, Daily, Monika Mullen PA-C    polyethylene glycol-propylene glycol (SYSTANE ULTRA) ophthalmic solution 1 drop, 1 drop, Both Eyes, 4x Daily PRN, Monika Mullen PA-C    prednisoLONE acetate (PRED FORTE) 1 % ophthalmic susp 1 drop, 1 drop, Both Eyes, BID, Monika Mullen PA-C, 1 drop at 09/11/23 0939    pregabalin (LYRICA) capsule 150 mg, 150 mg, Oral, TID, Monika Mullen PA-C, 150 mg at 09/11/23 0934    senna-docusate (SENOKOT-S/PERICOLACE) 8.6-50 MG per tablet 1 tablet, 1 tablet, Oral, BID, Monika Mullen PA-C, 1 tablet at 09/10/23 2129    vancomycin (VANCOCIN) 1,750 mg in sodium chloride 0.9 % 500 mL intermittent infusion, 1,750 mg, Intravenous, Q24H, Gudelia Almonte PA 1,750 mg at 09/11/23 0631  No current outpatient prescriptions on file.   PMH: paraplegia, chronic sacral decubitus ulcer s/p two failed flap procedures (2020, 2023)+ 6 week osteo course (2021), neurogenic bladder w/ indwelling cath, sarcoidosis (on hydroxychloroquine), LUQ Colostomy, MDD, anxiety

## 2023-09-11 NOTE — PLAN OF CARE
Discharge Planner Post-Acute Rehab OT:      Discharge Plan: Home with young son, PCA family support. OP OT    Precautions: SCI 8 years prior, Left BKA - Rigid Rooke boot OOB, wound on sacrum, falls     Current Status:  ADLs:  Mobility: Wc-based at baseline. Transfer slideboard CGA and A for set up and removal.  Grooming: IND after set up following A to navigate wc into/out of BR.   Dressing: U/B-  SBA. L/B- SBA doff weightshifting in bed. Assess don. Feet: Dependent baseline.  Bathing: Transfer CGA SBT wc <> ETB. A to wash/dry R foot, able to wash/dry remainder with set up.  Toileting: Colostomy and catheter baseline.  IADLs: IND financial mgmt and meal prep. IADL A from sister acting as PCA for 5 hours/day.   Vision/Cognition: Appears WFL, continue to monitor.     Assessment: Completed shower assessment, see above. Pt declined need for WOC education for family as non-PCA sister planning to provide ride on day of discharge but agreeable to create videos of education on phone to ensure carryover.       Barriers to Discharge (DME, Family Training, etc): Pt lives in a trailer home with a ramp for w/c access. Pt has an overhead trapeze above bed, grab bars in walk in shower, pt has a shower chair but reports it is broken/cracked. Sister pt's sister lives across the street and acts as PCA. Pt reports he has a new hospital bed ordered with pressure relief mattress.     DME:   - ETB - OT to issue through [facility] pending confirmation of MA coverage  - reacher - pt to purchase online  - residual limb wc rest - PT to order    Family training - TBD, pt nearing baseline. Pt declined need for WOC education for family as non-PCA sister planning to provide ride on day of discharge but agreeable to create videos of education on phone to ensure carryover.

## 2023-09-11 NOTE — PLAN OF CARE
"Discharge Planner Post-Acute Rehab PT:     Discharge Plan: Home w/ OP PT     Precautions: NWB on L LE, limb protector OOB    Current Status:  Bed Mobility: SBA w/ bed rails  Transfer: Slide board w/ CGA, limb protector on  Gait: Wc based at baseline  Stairs: N/A  Balance: seated balance EOB IND  Outcome Measures:    Assessment: Pt continues to progress in slide board transfer independence, and residual limb wrapping requiring less cuing. Pt personal wc not arrived to unit to assess feasibility of adding residual limb support to his personal chair.     Other Barriers to Discharge (DME, Family Training, etc):   DME- amputee limb support pad, pt has wc but endorses it is \"broken\", in process of acquiring new wc, will require follow up on this after pt brings current chair to unit on 9/11. Pt endorses will need shower chair.   Family training- potentially with sister on residual limb wrapping.     " Refill liothyronine 25mcg last filled 2/2/18

## 2023-09-11 NOTE — PLAN OF CARE
"  VS: BP (!) 147/90 (BP Location: Left arm)   Pulse 98   Temp 98.2  F (36.8  C) (Oral)   Resp 18   Ht 1.854 m (6' 1\")   Wt 125.9 kg (277 lb 9 oz)   SpO2 98%   BMI 36.62 kg/m       O2: 98% on RA.   Output: Pt has bui cath for neurogenic bladder, colostomy bag.    Last BM: Pt self managed colostomy bag. Bag changed this AM.   Activity: Pt is independent in bed. A2 Golvo lift.    Skin: L BKA. Pt has wound son coccyx. Writer changed dressing per WOC orders on AM shift after pt's shower with OT.   Pain: Pain managed with scheduled extended release Oxycodone and PRN oxycodone and Dilaudid.   CMS: AOX4. Denies numbness, tingling, CP, SOB, dizziness.   Dressing: CDI   Diet: Regualr diet, thin liquids, takes pills whole with water.   LDA: L PIV SL.   Plan: Continue POC                         "

## 2023-09-11 NOTE — PLAN OF CARE
Goal Outcome Evaluation:  Patient alert and oriented X 4 ,  patient on regular diet . with a new BKA , he is on abx Vanco and Zoysin. Assit of 1 with gait belt with sliding board for transfer. He C/o of pain many time requesting for pain pill he has oxycodone scheduled  prn and delaudid prn as well.

## 2023-09-11 NOTE — PLAN OF CARE
Goal Outcome Evaluation:    Patient alert and oriented, able to make needs known, uses the call light. Lower back pain reported, patient requested for Robaxin and oxycodone this shift, also with scheduled Tylenol. Appeared asleep on nursing rounds, turns and repositions independently in bed, even and non-labored respirations. Offered if colostomy bag needed to be emptied, patient refused. Dyer bag emptied, output documented. Safety measures in place, call light in reach, contact precautions maintained, safety checks completed.    Continue with POC.
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
## 2023-09-12 ENCOUNTER — APPOINTMENT (OUTPATIENT)
Dept: OCCUPATIONAL THERAPY | Facility: CLINIC | Age: 43
DRG: 559 | End: 2023-09-12
Attending: PHYSICAL MEDICINE & REHABILITATION
Payer: MEDICARE

## 2023-09-12 ENCOUNTER — APPOINTMENT (OUTPATIENT)
Dept: PHYSICAL THERAPY | Facility: CLINIC | Age: 43
DRG: 559 | End: 2023-09-12
Attending: PHYSICAL MEDICINE & REHABILITATION
Payer: MEDICARE

## 2023-09-12 LAB
BACTERIA ABSC ANAEROBE+AEROBE CULT: ABNORMAL
BACTERIA ABSC ANAEROBE+AEROBE CULT: ABNORMAL
VANCOMYCIN SERPL-MCNC: 8.9 UG/ML

## 2023-09-12 PROCEDURE — 250N000013 HC RX MED GY IP 250 OP 250 PS 637: Performed by: PHYSICIAN ASSISTANT

## 2023-09-12 PROCEDURE — 97535 SELF CARE MNGMENT TRAINING: CPT | Mod: GO

## 2023-09-12 PROCEDURE — 97110 THERAPEUTIC EXERCISES: CPT | Mod: GP

## 2023-09-12 PROCEDURE — 250N000011 HC RX IP 250 OP 636: Performed by: PHYSICAL MEDICINE & REHABILITATION

## 2023-09-12 PROCEDURE — 250N000011 HC RX IP 250 OP 636: Performed by: PHYSICIAN ASSISTANT

## 2023-09-12 PROCEDURE — 97110 THERAPEUTIC EXERCISES: CPT | Mod: GO

## 2023-09-12 PROCEDURE — 99233 SBSQ HOSP IP/OBS HIGH 50: CPT | Mod: FS | Performed by: PHYSICIAN ASSISTANT

## 2023-09-12 PROCEDURE — 97530 THERAPEUTIC ACTIVITIES: CPT | Mod: GP

## 2023-09-12 PROCEDURE — 80202 ASSAY OF VANCOMYCIN: CPT | Performed by: PHYSICAL MEDICINE & REHABILITATION

## 2023-09-12 PROCEDURE — 258N000003 HC RX IP 258 OP 636: Performed by: PHYSICIAN ASSISTANT

## 2023-09-12 PROCEDURE — 128N000003 HC R&B REHAB

## 2023-09-12 PROCEDURE — 36415 COLL VENOUS BLD VENIPUNCTURE: CPT | Performed by: PHYSICAL MEDICINE & REHABILITATION

## 2023-09-12 PROCEDURE — 97112 NEUROMUSCULAR REEDUCATION: CPT | Mod: GP

## 2023-09-12 PROCEDURE — G0463 HOSPITAL OUTPT CLINIC VISIT: HCPCS

## 2023-09-12 PROCEDURE — 258N000003 HC RX IP 258 OP 636: Performed by: PHYSICAL MEDICINE & REHABILITATION

## 2023-09-12 RX ORDER — NALOXONE HYDROCHLORIDE 0.4 MG/ML
0.4 INJECTION, SOLUTION INTRAMUSCULAR; INTRAVENOUS; SUBCUTANEOUS
Status: DISCONTINUED | OUTPATIENT
Start: 2023-09-12 | End: 2023-09-15 | Stop reason: HOSPADM

## 2023-09-12 RX ORDER — LOSARTAN POTASSIUM 25 MG/1
25 TABLET ORAL DAILY
Status: DISCONTINUED | OUTPATIENT
Start: 2023-09-13 | End: 2023-09-15 | Stop reason: HOSPADM

## 2023-09-12 RX ORDER — NALOXONE HYDROCHLORIDE 0.4 MG/ML
0.2 INJECTION, SOLUTION INTRAMUSCULAR; INTRAVENOUS; SUBCUTANEOUS
Status: DISCONTINUED | OUTPATIENT
Start: 2023-09-12 | End: 2023-09-15 | Stop reason: HOSPADM

## 2023-09-12 RX ORDER — CEFAZOLIN SODIUM 1 G/50ML
1250 SOLUTION INTRAVENOUS EVERY 12 HOURS
Status: COMPLETED | OUTPATIENT
Start: 2023-09-12 | End: 2023-09-13

## 2023-09-12 RX ADMIN — ACETAMINOPHEN 975 MG: 325 TABLET, FILM COATED ORAL at 16:42

## 2023-09-12 RX ADMIN — SENNOSIDES AND DOCUSATE SODIUM 1 TABLET: 50; 8.6 TABLET ORAL at 09:14

## 2023-09-12 RX ADMIN — OXYCODONE HYDROCHLORIDE 20 MG: 20 TABLET, FILM COATED, EXTENDED RELEASE ORAL at 20:17

## 2023-09-12 RX ADMIN — PANTOPRAZOLE SODIUM 40 MG: 40 TABLET, DELAYED RELEASE ORAL at 16:43

## 2023-09-12 RX ADMIN — PREGABALIN 150 MG: 75 CAPSULE ORAL at 09:13

## 2023-09-12 RX ADMIN — OXYCODONE HYDROCHLORIDE 10 MG: 5 TABLET ORAL at 02:28

## 2023-09-12 RX ADMIN — VANCOMYCIN HYDROCHLORIDE 1250 MG: 1 INJECTION, POWDER, LYOPHILIZED, FOR SOLUTION INTRAVENOUS at 20:24

## 2023-09-12 RX ADMIN — OXYCODONE HYDROCHLORIDE 10 MG: 5 TABLET ORAL at 12:33

## 2023-09-12 RX ADMIN — OXYCODONE HYDROCHLORIDE 10 MG: 5 TABLET ORAL at 16:42

## 2023-09-12 RX ADMIN — PREGABALIN 150 MG: 75 CAPSULE ORAL at 14:04

## 2023-09-12 RX ADMIN — PIPERACILLIN AND TAZOBACTAM 4.5 G: 4; .5 INJECTION, POWDER, FOR SOLUTION INTRAVENOUS at 16:54

## 2023-09-12 RX ADMIN — VANCOMYCIN HYDROCHLORIDE 1750 MG: 10 INJECTION, POWDER, LYOPHILIZED, FOR SOLUTION INTRAVENOUS at 06:49

## 2023-09-12 RX ADMIN — HEPARIN SODIUM 5000 UNITS: 5000 INJECTION, SOLUTION INTRAVENOUS; SUBCUTANEOUS at 09:14

## 2023-09-12 RX ADMIN — SENNOSIDES AND DOCUSATE SODIUM 1 TABLET: 50; 8.6 TABLET ORAL at 21:39

## 2023-09-12 RX ADMIN — PIPERACILLIN AND TAZOBACTAM 4.5 G: 4; .5 INJECTION, POWDER, FOR SOLUTION INTRAVENOUS at 22:52

## 2023-09-12 RX ADMIN — BUPROPION HYDROCHLORIDE 450 MG: 150 TABLET, FILM COATED, EXTENDED RELEASE ORAL at 09:14

## 2023-09-12 RX ADMIN — DULOXETINE HYDROCHLORIDE 60 MG: 30 CAPSULE, DELAYED RELEASE ORAL at 21:39

## 2023-09-12 RX ADMIN — HEPARIN SODIUM 5000 UNITS: 5000 INJECTION, SOLUTION INTRAVENOUS; SUBCUTANEOUS at 21:37

## 2023-09-12 RX ADMIN — ACETAMINOPHEN 975 MG: 325 TABLET, FILM COATED ORAL at 01:04

## 2023-09-12 RX ADMIN — PREDNISOLONE ACETATE 1 DROP: 10 SUSPENSION/ DROPS OPHTHALMIC at 21:39

## 2023-09-12 RX ADMIN — PANTOPRAZOLE SODIUM 40 MG: 40 TABLET, DELAYED RELEASE ORAL at 06:49

## 2023-09-12 RX ADMIN — PIPERACILLIN AND TAZOBACTAM 4.5 G: 4; .5 INJECTION, POWDER, FOR SOLUTION INTRAVENOUS at 05:26

## 2023-09-12 RX ADMIN — PREDNISOLONE ACETATE 1 DROP: 10 SUSPENSION/ DROPS OPHTHALMIC at 09:25

## 2023-09-12 RX ADMIN — BUSPIRONE HYDROCHLORIDE 10 MG: 5 TABLET ORAL at 09:13

## 2023-09-12 RX ADMIN — Medication 2 MG: at 09:25

## 2023-09-12 RX ADMIN — Medication 2 MG: at 05:24

## 2023-09-12 RX ADMIN — ACETAMINOPHEN 975 MG: 325 TABLET, FILM COATED ORAL at 23:46

## 2023-09-12 RX ADMIN — PIPERACILLIN AND TAZOBACTAM 4.5 G: 4; .5 INJECTION, POWDER, FOR SOLUTION INTRAVENOUS at 10:33

## 2023-09-12 RX ADMIN — ACETAMINOPHEN 975 MG: 325 TABLET, FILM COATED ORAL at 09:14

## 2023-09-12 RX ADMIN — OXYCODONE HYDROCHLORIDE 20 MG: 20 TABLET, FILM COATED, EXTENDED RELEASE ORAL at 09:14

## 2023-09-12 RX ADMIN — BUSPIRONE HYDROCHLORIDE 10 MG: 5 TABLET ORAL at 21:39

## 2023-09-12 RX ADMIN — DULOXETINE HYDROCHLORIDE 60 MG: 30 CAPSULE, DELAYED RELEASE ORAL at 09:13

## 2023-09-12 RX ADMIN — PREGABALIN 150 MG: 75 CAPSULE ORAL at 20:17

## 2023-09-12 RX ADMIN — Medication 12.5 MG: at 09:14

## 2023-09-12 ASSESSMENT — ACTIVITIES OF DAILY LIVING (ADL)
ADLS_ACUITY_SCORE: 29
ADLS_ACUITY_SCORE: 29
ADLS_ACUITY_SCORE: 28
ADLS_ACUITY_SCORE: 29
ADLS_ACUITY_SCORE: 29
ADLS_ACUITY_SCORE: 28
ADLS_ACUITY_SCORE: 29
ADLS_ACUITY_SCORE: 28
ADLS_ACUITY_SCORE: 28
ADLS_ACUITY_SCORE: 29
ADLS_ACUITY_SCORE: 28
ADLS_ACUITY_SCORE: 28

## 2023-09-12 NOTE — PLAN OF CARE
Goal Outcome Evaluation:      Plan of Care Reviewed With: patient    Overall Patient Progress: no change    Outcome Evaluation: Pt. alert oriented, pleasant, and observed not sleeping the whole shift, complaints of pain, asking for PRN medication regularly. L PIV patent, due IV. medications given. L BKA dressing changed.  Dyer draining adequate amount of urine, colostomy @ LUQ.Safety rounding done, call light within reach, bed alarm on. Continue with current POC.

## 2023-09-12 NOTE — PHARMACY-VANCOMYCIN DOSING SERVICE
Pharmacy Vancomycin Note  Date of Service 2023  Patient's  1980   42 year old, male    Indication: Skin and Soft Tissue Infection  Day of Therapy: Started 23   Current vancomycin regimen:  1750 mg IV q24h  Current vancomycin monitoring method: AUC  Current vancomycin therapeutic monitoring goal: 400-600 mg*h/L    InsightRX Prediction of Current Vancomycin Regimen  Loading dose: N/A  Regimen: 1750 mg IV every 24 hours.  Start time: 06:49 on 2023  Exposure target: AUC24 (range)400-600 mg/L.hr   AUC24,ss: 327 mg/L.hr  Probability of AUC24 > 400: 11 %  Ctrough,ss: 8.3 mg/L  Probability of Ctrough,ss > 20: 0 %  Probability of nephrotoxicity (Lodise ANNE ): 5 %    Current estimated CrCl = Estimated Creatinine Clearance: 143.9 mL/min (based on SCr of 0.93 mg/dL).    Creatinine for last 3 days  2023:  6:18 AM Creatinine 0.93 mg/dL    Recent Vancomycin Levels (past 3 days)  2023:  6:38 AM Vancomycin 8.9 ug/mL    Vancomycin IV Administrations (past 72 hours)                     vancomycin (VANCOCIN) 1,750 mg in sodium chloride 0.9 % 500 mL intermittent infusion (mg) 1,750 mg Given 23 0649     1,750 mg Given 23 0631     1,750 mg Given 09/10/23 0803                    Nephrotoxins and other renal medications (From now, onward)      Start     Dose/Rate Route Frequency Ordered Stop    23  vancomycin (VANCOCIN) 1,250 mg in sodium chloride 0.9 % 500 mL intermittent infusion        Note to Pharmacy: PTA Sig:Inject 1,750 mg into the vein every 24 hours for 3 days      1,250 mg  over 2 Hours Intravenous EVERY 12 HOURS 23 1004 23 0759    23 1700  piperacillin-tazobactam (ZOSYN) 4.5 g vial to attach to  mL bag        Note to Pharmacy: PTA Sig:Inject 4.5 g into the vein every 6 hours for 4 days      4.5 g  over 30 Minutes Intravenous EVERY 6 HOURS 23 1455 23 1659               Contrast Orders - past 72 hours (72h ago, onward)       None            Interpretation of levels and current regimen:  Vancomycin level is reflective of AUC less than 400    Has serum creatinine changed greater than 50% in last 72 hours: No    Urine output:  good urine output    Renal Function: Stable    InsightRX Prediction of Planned New Vancomycin Regimen  Loading dose: N/A  Regimen: 1250 mg IV every 12 hours.  Start time: 06:49 on 09/13/2023  Exposure target: AUC24 (range)400-600 mg/L.hr   AUC24,ss: 460 mg/L.hr  Probability of AUC24 > 400: 79 %  Ctrough,ss: 14.9 mg/L  Probability of Ctrough,ss > 20: 3 %  Probability of nephrotoxicity (Lodise ANNE 2009): 10 %    Plan:  Increase Dose to 1250 mg q12h  Vancomycin monitoring method: AUC  Vancomycin therapeutic monitoring goal: 400-600 mg*h/L  Pharmacy will check vancomycin levels as appropriate in 1-3 Days.  Serum creatinine levels will be ordered a minimum of twice weekly.    Shantell David, PharmD, BCPS

## 2023-09-12 NOTE — PROGRESS NOTES
Individualized Overall Plan Of Care (IOPOC)      Rehab diagnosis/Impairment Group Code: Amputation 05.4 unilateral le bka: s/p left below knee amputation d/t left lower extremity necrotizing soft tissue infection, comorbid condition: t12 spinal cord injury w/ paraplegia  S/p below knee amputation, left (h)       Expected functional outcome: reach a level of mod I     Clinical Impression Comments: s/p L BKA with functional decline    Mobility: Pt presents s/p L BKA, in presence of PMH of T12 SCI wtih baseline impairments related to LE strength/sensation. Pt requiring increased assist in functional mobility presently given altered JENIFER following BKA and deconditioning from inactivity leading to hospitalization. Pt will benefit from IP rehab for progressing towards baseline [place] level transfers    ADL: Saqib Bishop is a 42 year old male with a past medical history of T12 level spinal cord injury and paraplegia, neurogenic bowel s/p colostomy, neurogenic bladder with chronic indwelling bui, hypertension, BKA on 9/7 with hospital course complicated by bacteremia. Pt appears willing and motivated to participate in therapy. He lives alone with 8 year old son (sister lives next door and was helping him at baseline). Pt is below baseline with ADLs and IADLs following L BKA. Pt would benefit from skilled OT services to return to PLOF. ELOS 7 days.    Communication/Cognition/Swallow:   Orders Placed This Encounter      Combination Diet Regular Diet        Intensity of therapy:   PT 90 minutes, Daily, for 10 days   OT 90 minutes, Daily, for 10 days       Orthotics stump protector  Education wound care  Neuropsychology Testing: No        Medical Prognosis: good       Physician summary statement: s/p L BKA with functional decline, goal is to reach a level of mod I     Discharge destination: prior home  Discharge rehabilitation needs: home care, RN, PT, and OT      Estimated length of stay: 10 days       Rehabilitation  Physician Bill Pollack, DO

## 2023-09-12 NOTE — PLAN OF CARE
Discharge Planner Post-Acute Rehab PT:     Discharge Plan: Home w/ OP PT     Precautions: NWB on L LE, limb protector OOB    Current Status:  Bed Mobility: mod I  Transfer: mod I slide board, limb protector on  Gait: Wc based at baseline  Stairs: N/A  Balance: seated balance EOB IND  Outcome Measures:    Assessment: Pt's personal w/c has arrived and unable to add residual limb support to the chair at this time. Primary PT called w/c company and they will have someone come out to add one to his chair. Pt continues to progress independence with slideboard transfers, notes he was unable to perform residual limb wrapping this morning.    Other Barriers to Discharge (DME, Family Training, etc):   DME- Sending orders to primary w/c company for servicing amputee lewis support pad. Pt endorses will need shower chair.   Family training-  plan to send videos to sister on residual limb wrapping.
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
## 2023-09-12 NOTE — PHARMACY-ADMISSION MEDICATION HISTORY
Please see Admission Medication History note completed by pharmacist on 8/28/23 under previous encounter at [facility]  for information regarding prior to admission medications.    Shantell David, PharmD, BCPS

## 2023-09-12 NOTE — PLAN OF CARE
Discharge Planner Post-Acute Rehab OT:      Discharge Plan: Home with young son, PCA family support. OP OT    Precautions: SCI 8 years prior, Left BKA - Rigid Rooke boot OOB, wound on sacrum, falls     Current Status:  ADLs:  Mobility: Wc-based at baseline. Transfer slideboard CGA and A for set up and removal.  Grooming: IND after set up following A to navigate wc into/out of BR.   Dressing: U/B-  SBA. L/B- SBA doff weightshifting in bed. Assess don. Feet: Dependent baseline.  Bathing: Transfer CGA SBT wc <> ETB. A to wash/dry R foot, able to wash/dry remainder with set up.  Toileting: Colostomy and catheter baseline.  IADLs: IND financial mgmt and meal prep. IADL A from sister acting as PCA for 5 hours/day.   Vision/Cognition: Appears WFL, continue to monitor.     Assessment: Continued focus on functional tx to  increase IND for ADLs in room. Pt demonstrating decreased insight into safety deficits with w/c set up for transfer from EOB>w/c requiring CGA with SB. With therapist assisted w/c set up, pt able to safely transfer with SB.        Barriers to Discharge (DME, Family Training, etc): Pt lives in a trailer home with a ramp for w/c access. Pt has an overhead trapeze above bed, grab bars in walk in shower, pt has a shower chair but reports it is broken/cracked. Sister pt's sister lives across the street and acts as PCA. Pt reports he has a new hospital bed ordered with pressure relief mattress.     DME:   - ETB - OT to issue through [facility] pending confirmation of [place] coverage  - reacher - pt to purchase online  - residual limb wc rest - PT to order    Family training - TBD, pt nearing baseline. Pt declined need for WOC education for family as non-PCA sister planning to provide ride on day of discharge but agreeable to create videos of education on phone to ensure carryover.

## 2023-09-12 NOTE — PROGRESS NOTES
Referral for RN, PT, OT sent to [facility] to resume HC with [facility]. Pt had [facility] PTA and expressed desire to resume at time of SW assessment. Will continue to follow. Targeting discharge home Friday 09/15/23. Wound care education with pt and family needed. RN to send a few days worth of supplies at discharge. HC RN will order additional supplies at home. Sticky note for RN entered by this writer.     Home Health Care:   [facility]   Ph: [phone]  Fax: [phone]  Nurse, physical therapy, and occupational therapy     CADI :   John, Ph: [phone]  -------------------------------------------------------------------------------------    ADDENDUM: [facility] declined. [facility] outsourcing. Will continue to follow.     Yuni Martinez [facility]   [facility]   Direct Phone: [phone]  I   Pager: [phone]  I  Fax: [phone]

## 2023-09-12 NOTE — PLAN OF CARE
Goal Outcome Evaluation:      Plan of Care Reviewed With: patient  Overall Patient Progress: no change    Outcome Evaluation: Pt. is alert and oriented x 4 , pleasant and cooperative with cares. C/o Lower back pain, Prn oxycodone and Dilaudid given and pt verbalized relief. Assist of x2 with Golvo lift for transfers.Pt has left piv patent and saline locked. Dyer cares done. Call light within arm's reach . continue POC.

## 2023-09-12 NOTE — PLAN OF CARE
Discharge Planner Post-Acute Rehab PT:     Discharge Plan: Home w/ OP PT     Precautions: NWB on L LE, limb protector OOB    Current Status:  Bed Mobility: mod I  Transfer: SBA slide board, limb protector on  Gait: Wc based at baseline  Stairs: N/A  Balance: seated balance EOB IND  Outcome Measures:    Assessment: Pt's personal w/c has arrived and unable to add residual limb support to the chair at this time. Primary PT called w/c company and they will have someone come out to add one to his chair. Pt continues to progress independence with slideboard transfers, notes he was unable to perform residual limb wrapping this morning.    Other Barriers to Discharge (DME, Family Training, etc):   DME- Sending orders to primary w/c company for servicing amputee lewis support pad. Pt endorses will need shower chair.   Family training-  plan to send videos to sister on residual limb wrapping.

## 2023-09-12 NOTE — PROGRESS NOTES
[facility] Nurse Inpatient Assessment     Consulted for: Sacral pressure Injury, colostomy (long established)    Summary: Patient to return to OR on 9/7/2023 for I&D vs definitive BKA    Patient History (according to Surgery Resident provider note(s)8/27/23:      Saqib Bishop is a 42 year old male with past medical history of T12 level spinal cord injury and paraplegia, neurogenic bowel s/p colostomy, neurogenic bladder with chronic indwelling bui, hypertension, DJD, GERD, obesity, sarcoidosis, depression/anxiety, insomnia, chronic pain syndrome, chronic sacral decubitus ulcer s/p flap and treatment for osteomyelitis (Dec 2020) c/b dehiscence and recurrent infection (Oct 2021 s/p repeat IV antibiotics x6 weeks) now s/p failed elective flap procedure (early 2023), and recent left lower extremity wound who was admitted on 8/27/23 as transfer from outside hospital with left lower extremity necrotizing soft tissue infection and sepsis.     Patient was started on broad-spectrum antibiotics and underwent guillotine transtibial amputation on 8/28/23 with Dr. Barth, followed by I&D of residual limb and wound vac placement on 8/30/23 with Dr. Maier, and again I&D with wound vac exchange on 9/2/23 with Dr. Rehman.  He then underwent I&D and left below knee amputation on 9/7 with Dr. Gates.     Blood cultures from outside hospital with Gemella morbillorum bacteremia.  Repeat blood cultures negative.  Wound culture on 8/27 with Pseudomnoas aeruginosa and cultures from 8/30/23 positive for Parvimonas micra.  ID was consulted, recommended to continue IV antibiotics until 7 days post-op BKA, which would also be 14 days of treatment for bacteremia.     Hospital course was further complicated by intermittent bradycardia, acute post-operative on chronic pain, DODIE, encephalopathy, and multiple electrolyte abnormalities.     During acute hospitalization, patient was seen  and evaluated by PT and OT, who collectively recommended that patient would benefit from ongoing therapies in the acute inpatient rehabilitation setting.      In review of the therapy notes, patient is currently requiring physical assist for all transfers and will benefit from further therapies for training on how to modify transfers and ADLs in setting of new L BKA.       Upon arrival to the rehab unit, patient is accompanied by his son, nephew, sister, and brother-in-law.  He reports to be feeling ok overall.  Notes pain at surgical site rated about 7/10, fairly constant.  Is also noting some phantom limb sensations.  He has chronic back pain as well as some pain as sacral wound.  He reports that he has not been sleeping well, lives in very quiet environment so hospital is a big change.  Overall mood is good.  Family notes that he has spent most time in bed over the past several years due to sacral wound.       Assessment:      Areas visualized during today's visit: sacrum    Wound location: Sacral      Last photo:9/8/23, photo did not save   Wound due to: Pressure Injury  Wound history/plan of care: Per Plastics MD,Stage 4 of sacral/gluteal cleft pressure injury, appears chronic. Scar tissue present. Large areas of tunneling under small oval shaped opening. Receiving prior cares at [facility] outpatient wound clinic.    Wound base: unable to visualize wound base, wound edges area 100% non-granular tissue and yellow slimy over , less slimy today     Palpation of the wound bed: normal      Drainage: small     Description of drainage: yellow     Measurements (length x width x depth, in cm): 2.1 x 1.3  x  6.5 cm      Tunneling: up to 4.8 cm from 4 o'clock, 6 cm at 9 o'clock, large open cavity under the skin, not able to visulalize     Periwound skin: Scar tissue and scattered areas of re-pigmented skin      Color: pink      Temperature: normal   Odor: none  Pain: denies  and during dressing change, none  Pain  interventions prior to dressing change: patient tolerated well, soaking, and slow and gentle cares   Treatment goal: Infection control/prevention, Maintain (prevention of deterioration), Protection, and Prepare wound bed for flap/graft  STATUS: stable and follow up  Supplies ordered: at bedside, discussed with RN, and discussed with patient      Assessment of Established end Colostomy: last assessed on 8/28- patient able to manage    How long has patient had ostomy: prior to 2016,  Stoma: red, oval, moist, good turgor, flat, and retracted  Mucutaneous junction: intact,  Peristomal skin: hernia , some patchy redness, skin intact  Output: gas, semi-formed, and brown    Is patient independent with ostomy care?: Yes and with minimal assist, post operatively.  Home pouching system: Elena closed end 70mm 2 piece pouching system  Pouching issues and/or educational needs:Stoma assessment, Pouching system assessment , Evaluate leakage issue, and Adjustment of pouching plan  Interventions completed today: Stoma assessment, Pouching system assessment , and Evaluate leakage issue  Pouching system in use while hospitalized:  Elnea one piece  Supplies location: ordered Hollster 70 mm Fecal pouch(# [id number]) 70mm  Flat barrier (# [id number]) and discussed with RN    9/8: pt had surgery 9/7 with closure, will defer LLE wound to ortho.   9/12: pt has enough ostomy supplies. Pt does not have AMD gauze in room, 5 rolls ordered.      Treatment Plan:     Sacral wound(s): Daily and PRN   Cleanse wound bed with Vashe moistened 4 x 4 guaze, allow to soak wound for 5-10 minutes, no need to rinse or dry.  Pack wound with AMD kerlix(order#[id number]) Ensure to pack into undermined area, wound is bigger than what you see.   Cover with ABD pad that has been cut in half.   Secure with Medipore tape  ( # [id number]).  Change dressing daily or PRN if soiled or loose.    LUQ Colostomy  Encourage patient participation with ostomy care,  ~ Empty pouch when  1/3 to 1/2 full,   ~ Notify WOC for ongoing ostomy pouch leakage,  ~ Document stoma output volume, color, consistency EVERY shift  ~ Supplies used    ~ Pouch: Elena 70 FECAL ([id number])   ~ Flange/Barrier/Wafer: Elena 70mm FLAT ([id number])   ~ Accessories: Powder ([id number]) and No Sting ([id number])    WOC follow up plan: As needed  and Notify WOC if leakage occurs  Bedside RN interventions: Change pouch PRN if leaking using the supplies above, Empty pouch when 1/3 to 1/2 full, ensure to clean pouch outlet after emptying to prevent odor, Notify WOC for ongoing pouch leakage, Document stoma appearance and output volume, color, and consistency every shift, Encourage patient to empty pouch with assist, Assist patient to measure and record output, and Patient unable to participate in cares     Orders: Reviewed    RECOMMEND PRIMARY TEAM ORDER: None, at this time  Education provided: importance of repositioning, plan of care, wound progress, Infection prevention , Moisture management, Hygiene, and Off-loading pressure  Discussed plan of care with: Patient and Nurse  WOC nurse follow-up plan: weekly  Notify WOC if wound(s) deteriorate.  Nursing to notify the Provider(s) and re-consult the WOC Nurse if new skin concern.    DATA:     Current support surface: Standard  Low air loss (ELKE pump, Isolibrium, Pulsate, skin guard, etc)  Containment of urine/stool: Incontinent pad in bed, Indwelling catheter, and Colostomy pouch  BMI: Body mass index is 36.62 kg/m .   Active diet order: Orders Placed This Encounter      Combination Diet Regular Diet     Output: I/O last 3 completed shifts:  In: 360 [P.O.:360]  Out: 2050 [Urine:2050]     Labs:   Recent Labs   Lab 09/11/23  0618 09/07/23  0423 09/06/23  0815   ALBUMIN 3.2*   < >  --    HGB 11.3*   < >  --    WBC 4.7   < >  --    A1C  --   --  5.5    < > = values in this interval not displayed.       Pressure injury risk assessment:   Sensory Perception: 3-->slightly limited  Moisture:  4-->rarely moist  Activity: 1-->bedfast  Mobility: 2-->very limited  Nutrition: 3-->adequate  Friction and Shear: 2-->potential problem  Long Score: 15    Frannie Dumas RN CWOCN   Pager no longer is use, please contact through [facility] group: [facility] Nurse [facility]  Dept. Office Number: [phone]

## 2023-09-13 ENCOUNTER — APPOINTMENT (OUTPATIENT)
Dept: OCCUPATIONAL THERAPY | Facility: CLINIC | Age: 43
DRG: 559 | End: 2023-09-13
Attending: PHYSICAL MEDICINE & REHABILITATION
Payer: MEDICARE

## 2023-09-13 ENCOUNTER — APPOINTMENT (OUTPATIENT)
Dept: PHYSICAL THERAPY | Facility: CLINIC | Age: 43
DRG: 559 | End: 2023-09-13
Attending: PHYSICAL MEDICINE & REHABILITATION
Payer: MEDICARE

## 2023-09-13 PROCEDURE — 258N000003 HC RX IP 258 OP 636

## 2023-09-13 PROCEDURE — 128N000003 HC R&B REHAB

## 2023-09-13 PROCEDURE — 97110 THERAPEUTIC EXERCISES: CPT | Mod: GO

## 2023-09-13 PROCEDURE — 250N000011 HC RX IP 250 OP 636: Mod: JZ | Performed by: PHYSICIAN ASSISTANT

## 2023-09-13 PROCEDURE — 250N000013 HC RX MED GY IP 250 OP 250 PS 637: Performed by: PHYSICIAN ASSISTANT

## 2023-09-13 PROCEDURE — 97110 THERAPEUTIC EXERCISES: CPT | Mod: GP | Performed by: REHABILITATION PRACTITIONER

## 2023-09-13 PROCEDURE — 250N000011 HC RX IP 250 OP 636: Performed by: PHYSICAL MEDICINE & REHABILITATION

## 2023-09-13 PROCEDURE — 97530 THERAPEUTIC ACTIVITIES: CPT | Mod: GP | Performed by: REHABILITATION PRACTITIONER

## 2023-09-13 PROCEDURE — 97110 THERAPEUTIC EXERCISES: CPT | Mod: GP | Performed by: PHYSICAL THERAPIST

## 2023-09-13 PROCEDURE — 97530 THERAPEUTIC ACTIVITIES: CPT | Mod: GP | Performed by: PHYSICAL THERAPIST

## 2023-09-13 PROCEDURE — 258N000003 HC RX IP 258 OP 636: Performed by: PHYSICAL MEDICINE & REHABILITATION

## 2023-09-13 PROCEDURE — 99233 SBSQ HOSP IP/OBS HIGH 50: CPT | Mod: FS | Performed by: PHYSICIAN ASSISTANT

## 2023-09-13 RX ORDER — SODIUM CHLORIDE 9 MG/ML
INJECTION, SOLUTION INTRAVENOUS
Status: COMPLETED
Start: 2023-09-13 | End: 2023-09-13

## 2023-09-13 RX ADMIN — HEPARIN SODIUM 5000 UNITS: 5000 INJECTION, SOLUTION INTRAVENOUS; SUBCUTANEOUS at 21:10

## 2023-09-13 RX ADMIN — OXYCODONE HYDROCHLORIDE 20 MG: 20 TABLET, FILM COATED, EXTENDED RELEASE ORAL at 09:56

## 2023-09-13 RX ADMIN — PREDNISOLONE ACETATE 1 DROP: 10 SUSPENSION/ DROPS OPHTHALMIC at 10:02

## 2023-09-13 RX ADMIN — ACETAMINOPHEN 975 MG: 325 TABLET, FILM COATED ORAL at 23:48

## 2023-09-13 RX ADMIN — PIPERACILLIN AND TAZOBACTAM 4.5 G: 4; .5 INJECTION, POWDER, FOR SOLUTION INTRAVENOUS at 04:59

## 2023-09-13 RX ADMIN — BUPROPION HYDROCHLORIDE 450 MG: 150 TABLET, FILM COATED, EXTENDED RELEASE ORAL at 09:57

## 2023-09-13 RX ADMIN — OXYCODONE HYDROCHLORIDE 10 MG: 5 TABLET ORAL at 02:13

## 2023-09-13 RX ADMIN — PREGABALIN 150 MG: 75 CAPSULE ORAL at 14:24

## 2023-09-13 RX ADMIN — PANTOPRAZOLE SODIUM 40 MG: 40 TABLET, DELAYED RELEASE ORAL at 16:30

## 2023-09-13 RX ADMIN — OXYCODONE HYDROCHLORIDE 10 MG: 5 TABLET ORAL at 12:40

## 2023-09-13 RX ADMIN — VANCOMYCIN HYDROCHLORIDE 1250 MG: 1 INJECTION, POWDER, LYOPHILIZED, FOR SOLUTION INTRAVENOUS at 21:02

## 2023-09-13 RX ADMIN — ACETAMINOPHEN 975 MG: 325 TABLET, FILM COATED ORAL at 09:58

## 2023-09-13 RX ADMIN — SODIUM CHLORIDE 500 ML: 9 INJECTION, SOLUTION INTRAVENOUS at 10:01

## 2023-09-13 RX ADMIN — DULOXETINE HYDROCHLORIDE 60 MG: 30 CAPSULE, DELAYED RELEASE ORAL at 09:59

## 2023-09-13 RX ADMIN — PIPERACILLIN AND TAZOBACTAM 4.5 G: 4; .5 INJECTION, POWDER, FOR SOLUTION INTRAVENOUS at 12:40

## 2023-09-13 RX ADMIN — OXYCODONE HYDROCHLORIDE 10 MG: 5 TABLET ORAL at 08:27

## 2023-09-13 RX ADMIN — OXYCODONE HYDROCHLORIDE 20 MG: 20 TABLET, FILM COATED, EXTENDED RELEASE ORAL at 21:08

## 2023-09-13 RX ADMIN — PREDNISOLONE ACETATE 1 DROP: 10 SUSPENSION/ DROPS OPHTHALMIC at 21:10

## 2023-09-13 RX ADMIN — SENNOSIDES AND DOCUSATE SODIUM 1 TABLET: 50; 8.6 TABLET ORAL at 21:07

## 2023-09-13 RX ADMIN — VANCOMYCIN HYDROCHLORIDE 1250 MG: 1 INJECTION, POWDER, LYOPHILIZED, FOR SOLUTION INTRAVENOUS at 10:00

## 2023-09-13 RX ADMIN — BUSPIRONE HYDROCHLORIDE 10 MG: 5 TABLET ORAL at 21:07

## 2023-09-13 RX ADMIN — LOSARTAN POTASSIUM 25 MG: 25 TABLET, FILM COATED ORAL at 09:58

## 2023-09-13 RX ADMIN — DULOXETINE HYDROCHLORIDE 60 MG: 30 CAPSULE, DELAYED RELEASE ORAL at 21:07

## 2023-09-13 RX ADMIN — Medication 2 MG: at 11:06

## 2023-09-13 RX ADMIN — PREGABALIN 150 MG: 75 CAPSULE ORAL at 09:59

## 2023-09-13 RX ADMIN — HEPARIN SODIUM 5000 UNITS: 5000 INJECTION, SOLUTION INTRAVENOUS; SUBCUTANEOUS at 09:56

## 2023-09-13 RX ADMIN — ACETAMINOPHEN 975 MG: 325 TABLET, FILM COATED ORAL at 16:30

## 2023-09-13 RX ADMIN — PANTOPRAZOLE SODIUM 40 MG: 40 TABLET, DELAYED RELEASE ORAL at 05:34

## 2023-09-13 RX ADMIN — BUSPIRONE HYDROCHLORIDE 10 MG: 5 TABLET ORAL at 09:59

## 2023-09-13 RX ADMIN — PREGABALIN 150 MG: 75 CAPSULE ORAL at 21:07

## 2023-09-13 RX ADMIN — OXYCODONE HYDROCHLORIDE 10 MG: 5 TABLET ORAL at 17:01

## 2023-09-13 ASSESSMENT — ACTIVITIES OF DAILY LIVING (ADL)
ADLS_ACUITY_SCORE: 28

## 2023-09-13 NOTE — PLAN OF CARE
Discharge Planner Post-Acute Rehab PT:     Discharge Plan: Home w/ OP PT     Precautions: NWB on L LE, limb protector OOB    Current Status:  Bed Mobility: mod I  Transfer: SBA slide board, limb protector on  Gait: Wc based at baseline  Stairs: N/A  Balance: seated balance EOB IND  Outcome Measures:    Assessment: Pt's personal w/c has arrived and unable to add residual limb support to the chair at this time. Primary PT called w/c company and they will have someone come out to add one to his chair. Pt declined to demo transfer at this time due to fatigue. Pt ed on HEP and position tech for support of LLE. Review HEP pt able to demo well.     Other Barriers to Discharge (DME, Family Training, etc):   DME- Sending orders to primary w/c [email or web address] for servicing amputee lewis support pad. Pt endorses will need shower chair.   Family training-  plan to send videos to sister on residual limb wrapping.

## 2023-09-13 NOTE — PLAN OF CARE
Goal Outcome Evaluation:      Plan of Care Reviewed With: patient    Overall Patient Progress: no changeOverall Patient Progress: no change    Outcome Evaluation: Pt. alert and oriented x4, c/o pain through out the shift PRN and scheduled oxycodone/ lyrica utilized, ineffective as per pt. report, but able to take nap almost immediately post oxycodone administration. PIV patent. I.V meds given. Pt managed colostomy cares. Dyer cather done by pt at bedtime. Dressing to the R-BKA CDI. Writer unable to assess the R-gluteal wound dressing; pt refused. Pt refused bed alarm.

## 2023-09-13 NOTE — PLAN OF CARE
Discharge Planner Post-Acute Rehab OT:      Discharge Plan: Home with young son, PCA family support. OP OT    Precautions: SCI 8 years prior, Left BKA - Rigid Rooke boot OOB, wound on sacrum, falls     Current Status:  ADLs:  Mobility: Wc-based at baseline. Transfer slideboard CGA and A for set up and removal.  Grooming: IND after set up following A to navigate wc into/out of BR.   Dressing: U/B-  SBA. L/B- SBA doff weightshifting in bed. Assess don. Feet: Dependent baseline.  Bathing: Transfer CGA SBT wc <> ETB. A to wash/dry R foot, able to wash/dry remainder with set up.  Toileting: Colostomy and catheter baseline.  IADLs: IND financial mgmt and meal prep. IADL A from sister acting as PCA for 5 hours/day.   Vision/Cognition: Appears WFL, continue to monitor.     Assessment: ot focus on education of shoulder stabalization and strengthening of B shoulders pt able to carrry out hep will issue handout in pm session and demonstrates ability to carry out ex in room supine PM ot focxus on bed mobility with using l leg flexion at hip to move leg over than leaving leg down to side and twisitng at waist, educated in body mechanics with engagoing l leg for righting self with automatic reactions with hip and knee flexion to support self while reaching, pt initated hep with b theraband ex with holster in door way and given handout        Barriers to Discharge (DME, Family Training, etc): Pt lives in a trailer home with a ramp for w/c access. Pt has an overhead trapeze above bed, grab bars in walk in shower, pt has a shower chair but reports it is broken/cracked. Sister pt's sister lives across the street and acts as PCA. Pt reports he has a new hospital bed ordered with pressure relief mattress.     DME:   - ETB - OT to issue through [facility] pending confirmation of MA coverage  - reacher - pt to purchase online  - residual limb wc rest - PT to order    Family training - TBD, pt nearing baseline. Pt declined need for WOC  education for family as non-PCA sister planning to provide ride on day of discharge but agreeable to create videos of education on phone to ensure carryover.

## 2023-09-13 NOTE — PLAN OF CARE
"           VS: BP (!) 144/73 (BP Location: Left arm)   Pulse 94   Temp 96.9  F (36.1  C) (Oral)   Resp 18   Ht 1.854 m (6' 1\")   Wt 125.9 kg (277 lb 9 oz)   SpO2 94%   BMI 36.62 kg/m       O2: 94% on RA.   Output: Pt has bui cath for neurogenic bladder, colostomy bag. Pt manages colostomy and leg bag.   Last BM: Pt self managed colostomy bag. Bag changed this AM.   Activity: Pt is independent in bed. A1 w/ transfers   Skin: L BKA. Pt has wounds on coccyx. Pt reinforced dressing on coccyx this AM.    Pain: Pain managed with scheduled extended release Oxycodone and PRN oxycodone and Dilaudid.   CMS: AOX4. Denies numbness, tingling, CP, SOB, dizziness.   Dressing: CDI on BKA. Dressing due for change on coccxy.    Diet: Regualr diet, thin liquids, takes pills whole with water.   LDA: L JESUS RONDON.   Plan: Continue POC                     "

## 2023-09-13 NOTE — PLAN OF CARE
FOCUS/GOAL  Medical management    ASSESSMENT, INTERVENTIONS AND CONTINUING PLAN FOR GOAL:  Pt is alert and oriented. Use call light appropriately. Pt has seen sleeping after taking his pain medications. His colostomy bag is half full, pt prefers to change the bag  later rather than emptying it. Per pt, he change his colostomy independently.Dyer catheter is draining. Prn oxycodone 10 mg given for pain. IV abx given via left PIV. Cont w/ POC.  Goal Outcome Evaluation:
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
## 2023-09-14 ENCOUNTER — APPOINTMENT (OUTPATIENT)
Dept: OCCUPATIONAL THERAPY | Facility: CLINIC | Age: 43
DRG: 559 | End: 2023-09-14
Attending: PHYSICAL MEDICINE & REHABILITATION
Payer: MEDICARE

## 2023-09-14 ENCOUNTER — APPOINTMENT (OUTPATIENT)
Dept: PHYSICAL THERAPY | Facility: CLINIC | Age: 43
DRG: 559 | End: 2023-09-14
Attending: PHYSICAL MEDICINE & REHABILITATION
Payer: MEDICARE

## 2023-09-14 LAB
ANION GAP SERPL CALCULATED.3IONS-SCNC: 10 MMOL/L (ref 7–15)
BASOPHILS # BLD AUTO: 0.1 10E3/UL (ref 0–0.2)
BASOPHILS NFR BLD AUTO: 2 %
BUN SERPL-MCNC: 16.4 MG/DL (ref 6–20)
CALCIUM SERPL-MCNC: 9.1 MG/DL (ref 8.6–10)
CHLORIDE SERPL-SCNC: 106 MMOL/L (ref 98–107)
CREAT SERPL-MCNC: 0.8 MG/DL (ref 0.67–1.17)
DEPRECATED HCO3 PLAS-SCNC: 25 MMOL/L (ref 22–29)
EGFRCR SERPLBLD CKD-EPI 2021: >90 ML/MIN/1.73M2
EOSINOPHIL # BLD AUTO: 0.2 10E3/UL (ref 0–0.7)
EOSINOPHIL NFR BLD AUTO: 5 %
ERYTHROCYTE [DISTWIDTH] IN BLOOD BY AUTOMATED COUNT: 17.9 % (ref 10–15)
GLUCOSE SERPL-MCNC: 101 MG/DL (ref 70–99)
HCT VFR BLD AUTO: 38.5 % (ref 40–53)
HGB BLD-MCNC: 11.6 G/DL (ref 13.3–17.7)
IMM GRANULOCYTES # BLD: 0 10E3/UL
IMM GRANULOCYTES NFR BLD: 0 %
LYMPHOCYTES # BLD AUTO: 0.9 10E3/UL (ref 0.8–5.3)
LYMPHOCYTES NFR BLD AUTO: 21 %
MAGNESIUM SERPL-MCNC: 1.6 MG/DL (ref 1.7–2.3)
MCH RBC QN AUTO: 25.3 PG (ref 26.5–33)
MCHC RBC AUTO-ENTMCNC: 30.1 G/DL (ref 31.5–36.5)
MCV RBC AUTO: 84 FL (ref 78–100)
MONOCYTES # BLD AUTO: 0.6 10E3/UL (ref 0–1.3)
MONOCYTES NFR BLD AUTO: 14 %
NEUTROPHILS # BLD AUTO: 2.4 10E3/UL (ref 1.6–8.3)
NEUTROPHILS NFR BLD AUTO: 58 %
NRBC # BLD AUTO: 0 10E3/UL
NRBC BLD AUTO-RTO: 0 /100
PHOSPHATE SERPL-MCNC: 3.4 MG/DL (ref 2.5–4.5)
PLATELET # BLD AUTO: 291 10E3/UL (ref 150–450)
POTASSIUM SERPL-SCNC: 4 MMOL/L (ref 3.4–5.3)
RBC # BLD AUTO: 4.59 10E6/UL (ref 4.4–5.9)
SODIUM SERPL-SCNC: 141 MMOL/L (ref 136–145)
WBC # BLD AUTO: 4.1 10E3/UL (ref 4–11)

## 2023-09-14 PROCEDURE — 999N000125 HC STATISTIC PATIENT MED CONFERENCE < 30 MIN

## 2023-09-14 PROCEDURE — 99232 SBSQ HOSP IP/OBS MODERATE 35: CPT | Mod: FS | Performed by: PHYSICIAN ASSISTANT

## 2023-09-14 PROCEDURE — 97535 SELF CARE MNGMENT TRAINING: CPT | Mod: GO

## 2023-09-14 PROCEDURE — 128N000003 HC R&B REHAB

## 2023-09-14 PROCEDURE — 85025 COMPLETE CBC W/AUTO DIFF WBC: CPT | Performed by: PHYSICIAN ASSISTANT

## 2023-09-14 PROCEDURE — 250N000011 HC RX IP 250 OP 636: Performed by: PHYSICIAN ASSISTANT

## 2023-09-14 PROCEDURE — 36415 COLL VENOUS BLD VENIPUNCTURE: CPT | Performed by: PHYSICIAN ASSISTANT

## 2023-09-14 PROCEDURE — 97110 THERAPEUTIC EXERCISES: CPT | Mod: GO

## 2023-09-14 PROCEDURE — 97530 THERAPEUTIC ACTIVITIES: CPT | Mod: GP

## 2023-09-14 PROCEDURE — 250N000013 HC RX MED GY IP 250 OP 250 PS 637: Performed by: PHYSICIAN ASSISTANT

## 2023-09-14 PROCEDURE — 97530 THERAPEUTIC ACTIVITIES: CPT | Mod: GO

## 2023-09-14 PROCEDURE — 83735 ASSAY OF MAGNESIUM: CPT | Performed by: PHYSICIAN ASSISTANT

## 2023-09-14 PROCEDURE — 80048 BASIC METABOLIC PNL TOTAL CA: CPT | Performed by: PHYSICIAN ASSISTANT

## 2023-09-14 PROCEDURE — 999N000150 HC STATISTIC PT MED CONFERENCE < 30 MIN

## 2023-09-14 PROCEDURE — 84100 ASSAY OF PHOSPHORUS: CPT | Performed by: PHYSICIAN ASSISTANT

## 2023-09-14 RX ORDER — POLYETHYLENE GLYCOL 3350 17 G/17G
17 POWDER, FOR SOLUTION ORAL 2 TIMES DAILY PRN
Qty: 510 G
Start: 2023-09-14

## 2023-09-14 RX ORDER — HYDROCHLOROTHIAZIDE 12.5 MG/1
12.5 TABLET ORAL DAILY
Status: DISCONTINUED | OUTPATIENT
Start: 2023-09-15 | End: 2023-09-15 | Stop reason: HOSPADM

## 2023-09-14 RX ORDER — HYDROXYCHLOROQUINE SULFATE 200 MG/1
TABLET, FILM COATED ORAL
Status: ON HOLD
Start: 2023-09-14 | End: 2023-11-16

## 2023-09-14 RX ADMIN — PREGABALIN 150 MG: 75 CAPSULE ORAL at 21:04

## 2023-09-14 RX ADMIN — DULOXETINE HYDROCHLORIDE 60 MG: 30 CAPSULE, DELAYED RELEASE ORAL at 08:13

## 2023-09-14 RX ADMIN — LOSARTAN POTASSIUM 25 MG: 25 TABLET, FILM COATED ORAL at 08:13

## 2023-09-14 RX ADMIN — OXYCODONE HYDROCHLORIDE 10 MG: 5 TABLET ORAL at 10:45

## 2023-09-14 RX ADMIN — PANTOPRAZOLE SODIUM 40 MG: 40 TABLET, DELAYED RELEASE ORAL at 16:05

## 2023-09-14 RX ADMIN — OXYCODONE HYDROCHLORIDE 20 MG: 20 TABLET, FILM COATED, EXTENDED RELEASE ORAL at 21:04

## 2023-09-14 RX ADMIN — OXYCODONE HYDROCHLORIDE 10 MG: 5 TABLET ORAL at 00:03

## 2023-09-14 RX ADMIN — PREGABALIN 150 MG: 75 CAPSULE ORAL at 08:13

## 2023-09-14 RX ADMIN — OXYCODONE HYDROCHLORIDE 10 MG: 5 TABLET ORAL at 16:04

## 2023-09-14 RX ADMIN — OXYCODONE HYDROCHLORIDE 5 MG: 5 TABLET ORAL at 22:06

## 2023-09-14 RX ADMIN — PANTOPRAZOLE SODIUM 40 MG: 40 TABLET, DELAYED RELEASE ORAL at 05:34

## 2023-09-14 RX ADMIN — OXYCODONE HYDROCHLORIDE 10 MG: 5 TABLET ORAL at 05:41

## 2023-09-14 RX ADMIN — ACETAMINOPHEN 975 MG: 325 TABLET, FILM COATED ORAL at 16:05

## 2023-09-14 RX ADMIN — HEPARIN SODIUM 5000 UNITS: 5000 INJECTION, SOLUTION INTRAVENOUS; SUBCUTANEOUS at 08:14

## 2023-09-14 RX ADMIN — Medication 2 MG: at 12:55

## 2023-09-14 RX ADMIN — BUSPIRONE HYDROCHLORIDE 10 MG: 5 TABLET ORAL at 21:05

## 2023-09-14 RX ADMIN — BUSPIRONE HYDROCHLORIDE 10 MG: 5 TABLET ORAL at 08:13

## 2023-09-14 RX ADMIN — OXYCODONE HYDROCHLORIDE 20 MG: 20 TABLET, FILM COATED, EXTENDED RELEASE ORAL at 08:14

## 2023-09-14 RX ADMIN — PREDNISOLONE ACETATE 1 DROP: 10 SUSPENSION/ DROPS OPHTHALMIC at 21:03

## 2023-09-14 RX ADMIN — PREDNISOLONE ACETATE 1 DROP: 10 SUSPENSION/ DROPS OPHTHALMIC at 08:15

## 2023-09-14 RX ADMIN — SENNOSIDES AND DOCUSATE SODIUM 1 TABLET: 50; 8.6 TABLET ORAL at 21:04

## 2023-09-14 RX ADMIN — ACETAMINOPHEN 975 MG: 325 TABLET, FILM COATED ORAL at 08:14

## 2023-09-14 RX ADMIN — DULOXETINE HYDROCHLORIDE 60 MG: 30 CAPSULE, DELAYED RELEASE ORAL at 21:04

## 2023-09-14 RX ADMIN — HEPARIN SODIUM 5000 UNITS: 5000 INJECTION, SOLUTION INTRAVENOUS; SUBCUTANEOUS at 21:05

## 2023-09-14 RX ADMIN — Medication 2 MG: at 18:31

## 2023-09-14 RX ADMIN — BUPROPION HYDROCHLORIDE 450 MG: 150 TABLET, FILM COATED, EXTENDED RELEASE ORAL at 08:13

## 2023-09-14 RX ADMIN — PREGABALIN 150 MG: 75 CAPSULE ORAL at 13:33

## 2023-09-14 ASSESSMENT — ACTIVITIES OF DAILY LIVING (ADL)
ADLS_ACUITY_SCORE: 28
ADLS_ACUITY_SCORE: 27
ADLS_ACUITY_SCORE: 28

## 2023-09-14 NOTE — PLAN OF CARE
Writer met with patient and provided spinal cord injury resources for [employer] and [employer]. Patient interested in peer group meetings and participating in more activities, as patient states he has not had any interaction with anyone who also has a spinal cord injury. Provided 1:1 and will plan on meeting with patient tomorrow to provide more resources prior to discharge.     Frannie Hubbard, RN, BSN, CRRN  [facility] Patient Care Supervisor  Acute Rehabilitation Unit  PH: [phone]  Pager: [phone]
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
## 2023-09-14 NOTE — PLAN OF CARE
Goal Outcome Evaluation: Progressing  Patient's sacral dressing was changed after he showered this am.  No education needed for this dressing as he has had it for a long time and managed at home by family.  PIV was accidentally pulled out by patient this am.  Patient managed his bui and colostomy independently, no education needed as he had them prior to admission.  Left BKA dressing instructions video taped this afternoon so patient can show his family who will perform the dressing change.  PT also recorded on video how to wrap the leg.  Supplies for sacrum and leg dressing changes prepared and placed in labeled bags on his windowsill.  Patient states he feels ready to discharge tomorrow.
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
## 2023-09-14 NOTE — PLAN OF CARE
Goal Outcome Evaluation:      Plan of Care Reviewed With: patient      Outcome Evaluation:no change     Patient AxOx4 ;complained of pain on Left stump area and wound on buttocks area. PRN oxycodone given. Scheduled oxycodone given. Wound cares on buttocks done per POC. Stump protector on left leg. NWB LLE. Patient with chronic bui and colostomy on LUQ; patient self manages these. Patient changed pouch this evening.

## 2023-09-14 NOTE — PLAN OF CARE
Physical Therapy Discharge Summary    Reason for therapy discharge:    Discharged to home with home therapy.    Progress towards therapy goal(s). See goals on Care Plan in [facility] electronic health record for goal details.  Goals met    Therapy recommendation(s):    Continued therapy is recommended.  Rationale/Recommendations:  Recommend HH PT for home safety eval, and continuing progress in ease of functional mobility.      Per conversation with pt personal wc company ([employer]), they have received the orders for addition of residual limb support pad, and are agreeable to servicing pt's current custom chair to add this early next week at pt home. Pt educated on this, provided contact information, and has plan in place for supporting L limb in the few days until arrival.

## 2023-09-14 NOTE — PROGRESS NOTES
Obtained additional amuptation support group resources and spoke/provided update to pt CADI CM. Informed CADI CM that SW recommends pt follow-up with psychology support in the community and requested that the CADI CM assist and follow-up pt with locating those services. Made a plan to email CADI CM pt's discharge ppwk. CADI CM updated on HC agency and given the direct number. CADI CM denied additional needs, questions, or concerns.     Met with pt. Updated him on discussion with CADI CM and provided pt with additional amputation resources and book from [employer] ([employer]).  Pt appreciative and denied additional questions or concerns. Home tomorrow. Family ride. SW will fax discharge ppwk to HC and email ppwk to CADI CM and remain available if additional needs arise.     CADI :   John PH: [phone], E: [email or web address]     Home Health Care:   [facility] ([place], [place])  PH: [phone]  Fax: [phone]  RN, PT, OT      PCP Information:   Bernardo Robin MD    [facility]   [address] [place] [address]   PH: [phone]     DEACON Frias   [facility]   Direct Phone: [phone]  I   Pager: [phone]  I  Fax: [phone]

## 2023-09-14 NOTE — PROGRESS NOTES
Occupational Therapy Discharge Summary    Reason for therapy discharge:    Discharged to home with young son, supportive family IADL A and home therapy.    Progress towards therapy goal(s). See goals on Care Plan in [facility] electronic health record for goal details.  Goals met    Pt progressed to near baseline prior level of function.       Mobility: Mod I slideboard transfers.  Grooming: Mod I  Dressing: Mod I UB and LB. IND rigid rooke boot limb protector for LLE when bed-based, requires A in other environment. A for RLE footwear at baseline.  Bathing: Transfer SBA SBT wc <> ETB. A to wash/dry R foot, able to wash/dry remainder of body with set up.  Toileting: IND colostomy and catheter baseline.  IADLs: IND financial mgmt and meal prep and schedule mgmt. IADL A from sister acting as PCA for 5 hours/day.   Vision/Cognition: WNL    Therapy recommendation(s):    Continued therapy is recommended.  Rationale/Recommendations:  Pt will benefit from continued skilled occupational therapy for home safety assessment, progress independence in skin integrity monitoring and mgmt routine, progress trunk and UB strength, UB ergonomics to promote joint integrity and reduce risk for repetitive stress injuries common in -based populations, and progress  IADL independence in home environment. Pt provided UB strength theraband HEP.    Precautions: SCI 8 years prior, Left BKA - Rigid Rooke boot OOB, wound on sacrum, falls    DME: Pt lives in a trailer home with a ramp for w/c access. Owns overhead trapeze above bed, grab bars in shower, new hospital bed ordered with pressure relief mattress. Recommend pt acquire replacement tub bench for use in walk in shower d/t instability of current shower seat.

## 2023-09-14 NOTE — PLAN OF CARE
FOCUS/GOAL  Medical management    ASSESSMENT, INTERVENTIONS AND CONTINUING PLAN FOR GOAL:  Pt requested his prn oxycodone 10 mg twice this shift for back pain. Decline warm packs. Dyer catheter is draining well. Colostomy bag is intact. Pt is alert per baseline and use call light. Cont w/ POC.  Goal Outcome Evaluation:

## 2023-09-15 VITALS
HEIGHT: 73 IN | HEART RATE: 63 BPM | DIASTOLIC BLOOD PRESSURE: 87 MMHG | WEIGHT: 277.56 LBS | BODY MASS INDEX: 36.79 KG/M2 | SYSTOLIC BLOOD PRESSURE: 156 MMHG | TEMPERATURE: 96.9 F | OXYGEN SATURATION: 97 % | RESPIRATION RATE: 16 BRPM

## 2023-09-15 PROCEDURE — 250N000013 HC RX MED GY IP 250 OP 250 PS 637: Performed by: PHYSICIAN ASSISTANT

## 2023-09-15 PROCEDURE — 250N000011 HC RX IP 250 OP 636: Performed by: PHYSICIAN ASSISTANT

## 2023-09-15 PROCEDURE — 99239 HOSP IP/OBS DSCHRG MGMT >30: CPT | Mod: FS | Performed by: PHYSICAL MEDICINE & REHABILITATION

## 2023-09-15 RX ADMIN — HYDROCHLOROTHIAZIDE 12.5 MG: 12.5 TABLET ORAL at 09:19

## 2023-09-15 RX ADMIN — PANTOPRAZOLE SODIUM 40 MG: 40 TABLET, DELAYED RELEASE ORAL at 06:18

## 2023-09-15 RX ADMIN — BUPROPION HYDROCHLORIDE 450 MG: 150 TABLET, FILM COATED, EXTENDED RELEASE ORAL at 09:19

## 2023-09-15 RX ADMIN — OXYCODONE HYDROCHLORIDE 20 MG: 20 TABLET, FILM COATED, EXTENDED RELEASE ORAL at 09:17

## 2023-09-15 RX ADMIN — ACETAMINOPHEN 975 MG: 325 TABLET, FILM COATED ORAL at 09:18

## 2023-09-15 RX ADMIN — OXYCODONE HYDROCHLORIDE 10 MG: 5 TABLET ORAL at 10:22

## 2023-09-15 RX ADMIN — LOSARTAN POTASSIUM 25 MG: 25 TABLET, FILM COATED ORAL at 09:20

## 2023-09-15 RX ADMIN — PREDNISOLONE ACETATE 1 DROP: 10 SUSPENSION/ DROPS OPHTHALMIC at 09:29

## 2023-09-15 RX ADMIN — Medication 2 MG: at 09:29

## 2023-09-15 RX ADMIN — ACETAMINOPHEN 975 MG: 325 TABLET, FILM COATED ORAL at 01:27

## 2023-09-15 RX ADMIN — HEPARIN SODIUM 5000 UNITS: 5000 INJECTION, SOLUTION INTRAVENOUS; SUBCUTANEOUS at 09:17

## 2023-09-15 RX ADMIN — OXYCODONE HYDROCHLORIDE 10 MG: 5 TABLET ORAL at 02:47

## 2023-09-15 RX ADMIN — DULOXETINE HYDROCHLORIDE 60 MG: 30 CAPSULE, DELAYED RELEASE ORAL at 09:19

## 2023-09-15 RX ADMIN — BUSPIRONE HYDROCHLORIDE 10 MG: 5 TABLET ORAL at 09:20

## 2023-09-15 RX ADMIN — PREGABALIN 150 MG: 75 CAPSULE ORAL at 09:17

## 2023-09-15 ASSESSMENT — ACTIVITIES OF DAILY LIVING (ADL)
ADLS_ACUITY_SCORE: 27

## 2023-09-15 NOTE — PLAN OF CARE
Goal Outcome Evaluation:       Patient discharge to home at 1040H via wheelchair with family member,   discharge instruction reviewed and discharge papers given to patient.

## 2023-09-15 NOTE — PLAN OF CARE
Goal Outcome Evaluation:      Plan of Care Reviewed With: patient  Overall Patient Progress: no change    Outcome Evaluation: Patient AxOX4. Constantly complains of pain on lower back; PRN dilaudid; oxycodone and scheduled tylenol and oxycontin given. Patient refused flexeril when offered. Dressing on wounds CDI. Stump protector in place on left stump. Bui in place; secured and intact. Colostomy in place ; intact and stooling. Self manages cares on bui and colostomy. Patient with good appetite.

## 2023-09-15 NOTE — PLAN OF CARE
FOCUS/GOAL  Pain management, Discharge planning, and Safety management    ASSESSMENT, INTERVENTIONS AND CONTINUING PLAN FOR GOAL:  Pt is alert and oriented x4.  Discharges today.  Transfers assist of 1 to slideboard to wheelchair.  On contact precautions for MRSA PIV taken out by pt.  Has chronic pain.  10mg of oxycodone was given @ 0250 pain rated as a 7 or 8 out of 10.  Pt has a colostomy bag, and a Dyer catheter in place which he manages independently.  Has wound on sacrum which is tunneling per report.       Goal Outcome Evaluation:

## 2023-09-15 NOTE — PROGRESS NOTES
Updated clinicals and discharge orders faxed to HC agency and securely scanned/emailed to CADI CM. Contact below. Pt denied additional needs. Home today.     CAD :   John, PH: [phone], E: [email or web address]     Home Health Care:   [facility] ([place], [place])  PH: [phone]  Fax: [phone]  RN, PT, OT      PCP Information:   Bernardo Robin MD    [facility]   [address] [place] [address]   PH: [phone]     DEACON Frias   [facility]   Direct Phone: [phone]  I   Pager: [phone]  I  Fax: [phone]

## 2023-09-15 NOTE — PROGRESS NOTES
Writer obtained email address from patient to add him to the distribution list to [facility] SCI Support Group. Writer did provide email to the support group so patient can be added as another resource to him.     Frannie Hubbard RN, BSN, CRRN  [facility] Patient Care Supervisor  Acute Rehabilitation Unit  PH: [phone]  Pager: [phone]
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
## 2023-09-18 ENCOUNTER — PATIENT OUTREACH (OUTPATIENT)
Dept: CARE COORDINATION | Facility: CLINIC | Age: 43
End: 2023-09-18
Payer: MEDICARE

## 2023-09-18 ENCOUNTER — MEDICAL CORRESPONDENCE (OUTPATIENT)
Dept: HEALTH INFORMATION MANAGEMENT | Facility: CLINIC | Age: 43
End: 2023-09-18

## 2023-09-18 LAB
PATH REPORT.COMMENTS IMP SPEC: NORMAL
PATH REPORT.COMMENTS IMP SPEC: NORMAL
PATH REPORT.FINAL DX SPEC: NORMAL
PATH REPORT.GROSS SPEC: NORMAL
PATH REPORT.MICROSCOPIC SPEC OTHER STN: NORMAL
PATH REPORT.RELEVANT HX SPEC: NORMAL
PHOTO IMAGE: NORMAL

## 2023-09-18 PROCEDURE — 88307 TISSUE EXAM BY PATHOLOGIST: CPT | Mod: 26 | Performed by: PATHOLOGY

## 2023-09-18 PROCEDURE — 88311 DECALCIFY TISSUE: CPT | Mod: 26 | Performed by: PATHOLOGY

## 2023-09-18 NOTE — PROGRESS NOTES
[facility] Care [facility] [place]    Background: Transitional Care Management program identified per system criteria and reviewed by [facility] team for possible outreach.    Assessment: Upon chart review, [facility] Team member will not proceed with patient outreach related to this episode of Transitional Care Management program due to reason below:    Patient has a follow up appointment with an appropriate provider today for hospital discharge    Per AVS: PCP  You are scheduled to see Dr. Robin on 9/26/2023 at 10:00am. Suture Removal scheduled for 9/18 at 1:30pm    Plan: Transitional Care Management episode addressed appropriately per reason noted above.      LISA Ritchie  [facility] [place], [facility]    *Connected Care Resource Team does NOT follow patient ongoing. Referrals are identified based on internal discharge reports and the outreach is to ensure patient has an understanding of their discharge instructions.

## 2023-09-25 ENCOUNTER — DOCUMENTATION ONLY (OUTPATIENT)
Dept: ORTHOPEDICS | Facility: CLINIC | Age: 43
End: 2023-09-25
Payer: MEDICARE

## 2023-09-25 NOTE — PROGRESS NOTES
LT BK new amputee, amputation on 9-8-23.  Patient called on phone and said his staples or stitches are out and no open areas on his limb. He is wanting to come down from the [place] area to me for his prosthesis. I asked him to start wrapping or using shrinkers on his limb in anticipation for the [facility] so he can start shaping his limb for the prosthesis. I told him about the process and amputee clinic and location. He seemed to understand. I told him that Anjana would call him and set him up for the [facility] in the next few weeks so we can start prosthetic fitting as soon as OK'd by surgeon or PM&R doctor and insurance company. Anant Fiore CPO, LPO.

## 2023-09-27 LAB — BACTERIA TISS BX CULT: NO GROWTH

## 2023-10-02 ENCOUNTER — TELEPHONE (OUTPATIENT)
Dept: ORTHOPEDICS | Facility: CLINIC | Age: 43
End: 2023-10-02
Payer: MEDICARE

## 2023-10-02 NOTE — TELEPHONE ENCOUNTER
RN called patient to discuss wound. Patient stating he is able to send a photo via [employer]. Patient went to ED in [place], [place] as was prescribed amoxicillin for his infection, cultures came back positive for MSSA.    RN verbalized and confirmed appointment on 10/06 with patient.     Morena Walter RN

## 2023-10-02 NOTE — TELEPHONE ENCOUNTER
[facility] Call Center    Phone Message    May a detailed message be left on voicemail: yes     Reason for Call: Other: Patient calling to request a call back from care team. He was in the ER last night due to amputated leg wound not closing. Please call back at [phone].     Action Taken: Message routed to:  [facility] [phone]    Travel Screening: Not Applicable

## 2023-10-02 NOTE — TELEPHONE ENCOUNTER
DIAGNOSIS: Left Lower Extremity - Hx of BKA   APPOINTMENT DATE: 10/06/2023   NOTES STATUS DETAILS   OFFICE NOTE from other specialist EPIC    OPERATIVE REPORT Internal 09/07/2023, 09/02/2023, 08/30/2023, 08/29/2023, 08/28/2023 - Left Lower Extremity I&D    08/27/2023 - LT Below Knee Guillotine Amputation   MEDICATION LIST Internal    LABS     PHOTOGRAPHS Internal Media Tab   XRAYS (IMAGES & REPORTS) Internal PACS

## 2023-10-03 LAB — BACTERIA ABSC ANAEROBE+AEROBE CULT: NO GROWTH

## 2023-10-04 ENCOUNTER — VIRTUAL VISIT (OUTPATIENT)
Dept: SURGERY | Facility: CLINIC | Age: 43
End: 2023-10-04
Payer: MEDICARE

## 2023-10-04 VITALS — BODY MASS INDEX: 28.49 KG/M2 | WEIGHT: 215 LBS | HEIGHT: 73 IN

## 2023-10-04 DIAGNOSIS — K43.5 PARASTOMAL HERNIA WITHOUT OBSTRUCTION OR GANGRENE: Primary | ICD-10-CM

## 2023-10-04 PROCEDURE — 99024 POSTOP FOLLOW-UP VISIT: CPT | Mod: 95 | Performed by: SURGERY

## 2023-10-04 ASSESSMENT — PAIN SCALES - GENERAL: PAINLEVEL: MODERATE PAIN (4)

## 2023-10-04 NOTE — NURSING NOTE
"Chief Complaint   Patient presents with    RECHECK     2 month follow up       Vitals:    10/04/23 0909   Weight: 97.5 kg (215 lb)   Height: 1.854 m (6' 1\")       Body mass index is 28.37 kg/m .                          Jasmeet David, EMT    "

## 2023-10-04 NOTE — PROGRESS NOTES
Saqib Bishop is a 42 year old who is being evaluated via a billable video visit.      How would you like to obtain your AVS? MyChart  If the video visit is dropped, the invitation should be resent by: Text to cell phone:   Will anyone else be joining your video visit? No  If patient encounters technical issues they should call [phone]    During this virtual visit the patient is located in [place], patient verifies this as the location during the entirety of this visit.     Video-Visit Details  Video Start Time: 11:10 AM    Type of service:  Video Visit    Video End Time:11:27 AM    Originating Location (pt. Location): Home    Distant Location (provider location):  On-site    Platform used for Video Visit: SAUL Maynard 10/4/2023      9:09 AM

## 2023-10-04 NOTE — LETTER
10/4/2023       RE: Saqib Bishop  [address]  [place] [address]     Dear Colleague,    Thank you for referring your patient, Saqib Bishop, to the [facility] [place] [facility]. Please see a copy of my visit note below.    Saqib Bishop is a 42 year old who is being evaluated via a billable video visit.      How would you like to obtain your AVS? MyChart  If the video visit is dropped, the invitation should be resent by: Text to cell phone:   Will anyone else be joining your video visit? No  If patient encounters technical issues they should call [phone]    During this virtual visit the patient is located in [place], patient verifies this as the location during the entirety of this visit.         Again, thank you for allowing me to participate in the care of your patient.      Sincerely,    iNi Mancilla MD

## 2023-10-04 NOTE — PATIENT INSTRUCTIONS
You met with Dr. Nii Mancilla.      Today's visit instructions:    Dr. Mancilla recommends waiting to repair your parastomal hernia until you are fully recovered from your recent amputation. All of your wounds need to be completely healed prior to surgery.     Please see an ostomy nurse in your area to be fitted for an ostomy binder to help with discomfort from your hernia. If you want to see our ostomy nurse here at the [facility], please call the Nurse Advice line listed below.        If you have questions please contact Yamilet RN or Ann RN during regular clinic hours, Monday through Friday 7:30 AM - 4:00 PM, or you can contact us via [employer] at anytime.       If you have urgent needs after-hours, weekends, or holidays please call the hospital at [phone] and ask to speak with our on-call General Surgery Team.    Appointment [phone]  Nurse Advice (Yamilet or Ann): [phone]   Surgery Scheduler (Zahida): [phone]  Fax: [phone]

## 2023-10-06 ENCOUNTER — OFFICE VISIT (OUTPATIENT)
Dept: ORTHOPEDICS | Facility: CLINIC | Age: 43
End: 2023-10-06
Payer: MEDICARE

## 2023-10-06 ENCOUNTER — PRE VISIT (OUTPATIENT)
Dept: ORTHOPEDICS | Facility: CLINIC | Age: 43
End: 2023-10-06

## 2023-10-06 DIAGNOSIS — Z89.512 S/P BELOW KNEE AMPUTATION, LEFT (H): ICD-10-CM

## 2023-10-06 DIAGNOSIS — M72.6 NECROTIZING FASCIITIS OF LOWER LEG (H): Primary | ICD-10-CM

## 2023-10-06 PROCEDURE — 99024 POSTOP FOLLOW-UP VISIT: CPT | Performed by: ORTHOPAEDIC SURGERY
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
## 2023-10-06 NOTE — NURSING NOTE
Reason For Visit:   Chief Complaint   Patient presents with    RECHECK     Follow up on left lower extremity irrigation and debridement of BKA DOS 8/30/23       There were no vitals taken for this visit.    Pain Assessment  Patient Currently in Pain: Yes  0-10 Pain Scale: 4  Primary Pain Location: Leg (left)  Pain Descriptors: Sore, Intermittent  Alleviating Factors: Pain medication    Lisa Cherry, ATC
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
## 2023-10-06 NOTE — PROCEDURES
General Surgery Clinic    This was a virtual visit due to COVID-19 risks. The patient was in [place] at the time of his visit. The patient is a 42-year-old man who presents with a large recurrent parastomal hernia. The patient required a BKA for deep soft tissue infection of the foot. The patient is recovering from that surgery at this time. He has been very successful in losing weight. The patient states that he now weighs 210 pounds. The patient continues to have recurrent open wounds that continue to break down over the sacrum due to his need to stay in the wheelchair. The patient has a large recurrent parastomal hernia. The patient has not had leakage at the ostomy site. The patient does not have severe pain at the hernia. The patient has not had obstructive symptoms.     PAST MEDICAL HISTORY:  Past Medical History:   Diagnosis Date    Degenerative joint disease     Gastro-oesophageal reflux disease      PAST SURGICAL HISTORY:  Past Surgical History:   Procedure Laterality Date    AMPUTATE LEG BELOW KNEE Left 8/27/2023    Procedure: Left below knee Guillotine Amputation;  Surgeon: Sesar Barth MD;  Location: [facility] OR    AMPUTATE LEG BELOW KNEE Left 9/2/2023    Procedure: Irrigation and Debridement leg below knee, wound vac application, Left;  Surgeon: Juan Rehman MD;  Location: [facility] OR    AMPUTATE LEG BELOW KNEE Left 9/7/2023    Procedure: Below Left Knee Amputation;  Surgeon: Semaj Gates MD;  Location: [facility] OR    BACK SURGERY      lumbar fusion    EXPLORE SPINE, REMOVE HARDWARE, COMBINED  1/13/2012    Procedure:COMBINED EXPLORE SPINE, REMOVE HARDWARE; EXPLORE SPINE, REMOVE HARDWARE L4-S1; Surgeon:FAM GONZALEZ; Location:[facility] OR    IRRIGATION AND DEBRIDEMENT LOWER EXTREMITY, COMBINED Left 8/30/2023    Procedure: Irrigation and debridement lower extremity, combined and wound vac exchange;  Surgeon: Etienne Maier MD;  Location: [facility] OR    IRRIGATION AND DEBRIDEMENT LOWER EXTREMITY,  COMBINED Left 2023    Procedure: IRRIGATION AND DEBRIDEMENT, LEFT LOWER EXTREMITY WITH WOUND VAC Removal;  Surgeon: Semaj Gates MD;  Location: [facility] OR    ORTHOPEDIC SURGERY      right foot surgery        MEDICATIONS:  Current Outpatient Medications   Medication    acetaminophen (TYLENOL) 500 MG tablet    buPROPion (WELLBUTRIN XL) 150 MG 24 hr tablet    busPIRone (BUSPAR) 10 MG tablet    clindamycin (CLEOCIN T) 1 % external solution    Cranberry 400 MG CAPS    cyclobenzaprine (FLEXERIL) 10 MG tablet    diphenhydrAMINE-zinc acetate (BENADRYL) 1-0.1 % external cream    DULoxetine (CYMBALTA) 60 MG capsule    fluticasone (FLONASE) 50 MCG/ACT nasal spray    hydroxychloroquine (PLAQUENIL) 200 MG tablet    loratadine (CLARITIN) 10 MG tablet    losartan-hydrochlorothiazide (HYZAAR) 50-12.5 MG tablet    melatonin 5 MG tablet    omeprazole (PRILOSEC) 40 MG DR capsule    oxyCODONE (OXYCONTIN) 20 MG 12 hr tablet    oxyCODONE (ROXICODONE) 5 MG tablet    polyethylene glycol (MIRALAX) 17 GM/Dose powder    polyethylene glycol-propylene glycol (SYSTANE ULTRA) 0.4-0.3 % SOLN ophthalmic solution    prednisoLONE acetate (PRED FORTE) 1 % ophthalmic suspension    pregabalin (LYRICA) 150 MG capsule    senna-docusate (SENOKOT-S/PERICOLACE) 8.6-50 MG tablet     No current facility-administered medications for this visit.        ALLERGIES:  Allergies   Allergen Reactions    Adhesive Tape Hives     From tape from surgery    Benzoin Hives and Rash    Droperidol Anaphylaxis    Prednisone      vomiting    Vitamin D Rash     flairs up his sarcoidosis        SOCIAL HISTORY:  Social History     Socioeconomic History    Marital status: Single     Spouse name: None    Number of children: None    Years of education: None    Highest education level: None   Tobacco Use    Smoking status: Former     Types: Cigarettes     Quit date: 2011     Years since quittin.9   Substance and Sexual Activity    Alcohol use: No    Drug use: No        A/P: The patient is recovering from his most recent surgery. He will be reassessed at some point after the 1st of the year 2024. The patient will call or return if there are issues in the interim related to his parastomal hernia.      Nii Mancilla MD, PhD

## 2023-10-10 ENCOUNTER — HOSPITAL ENCOUNTER (OUTPATIENT)
Dept: WOUND CARE | Facility: CLINIC | Age: 43
Discharge: HOME | End: 2023-10-10
Attending: PHYSICIAN ASSISTANT | Admitting: PHYSICIAN ASSISTANT
Payer: MEDICARE

## 2023-10-10 VITALS — DIASTOLIC BLOOD PRESSURE: 89 MMHG | HEART RATE: 105 BPM | SYSTOLIC BLOOD PRESSURE: 145 MMHG | TEMPERATURE: 98.2 F

## 2023-10-10 DIAGNOSIS — L89.154 PRESSURE INJURY OF SACRAL REGION, STAGE 4 (H): Primary | ICD-10-CM

## 2023-10-10 PROCEDURE — 97602 WOUND(S) CARE NON-SELECTIVE: CPT

## 2023-10-10 PROCEDURE — 99214 OFFICE O/P EST MOD 30 MIN: CPT | Performed by: PHYSICIAN ASSISTANT

## 2023-10-10 NOTE — DISCHARGE INSTRUCTIONS
"Saqib CADEN Dario      1980    Dressing changes outside of clinic are being performed by Family    [facility] Phone [phone] Fax [phone] - Brielle is seeing patient under a Medicare episode so supplies should not be ordered    Try [facility] in [place] and/or [facility] in [place] for in-person wheelchair shopping. 10/10/23    To do list for FLAP surgery:  1. Get Group 2 Mattress: [employer] P: [phone] Fax: [phone] done   2. Get Pressure Mapped: Need HARD bottom chair/ NOT SLING seat; patient to pursue thru [facility] per discussion 10/10/23 needed  3. Need new wheelchair; pending decision; need hard bottom chair seat not sling bottom seat; sometime in October per 10/10/23 visit  3. DONE MRI  4.Continue diet high in protein is important for wound healing, we recommend getting  grams of protein per day. Taking protein shakes or bars are a good way to get extra protein in your diet.  5. NO TOBACCO  6. Colostomy in place  7. Indwelling Catheter  8. H&P with Primary Care MD 30 days prior to surgery  9. Abdominal wall reconstructive surgery with Dr. Nii Mancilla- need to \"lose weight\" before surgery    Wound Dressing Change: Right Gluteal wound  -Cleanse with mild unscented soap (such as Cetaphil, Cerave or Dove) and water  -Apply small amount of vinegar soak  (recipe below) on gauze, lay into wound bed, let sit for 5-10 minutes, remove gauze (do not rinse) then:  -Using a Qtip, lightly pack with about 1/6th of 4\" AMD roll gauze or one 4x4 AMD gauze, extend into tunnel and filling cavity loosely leave tail out of wound for retrieval, use on single piece of gauze to avoid losing in wound  -Cover with 1/2 of 5x9\" ABD pad and 2\" Medipore tape  Change 1-2x a day and as needed for soilage    How to prepare the vinegar solution:  1. Mix 2 tablespoons of white household vinegar with 2 cups of water  2. Store in the refrigerator and use for up to 5 days "     Repositioning:  Bed: Reposition MINIMALLY every 1-2 hours in bed to relieve pressure and promote perfusion to tissue. Avoid pressure to area.  Chair: When up to the chair, do not sit for longer than one hour total before returning  to bed for at least 60 minutes to relieve pressure and promote perfusion to the tissue.  Completely recline/tilt for 15 minutes each hour. Sit on a chair cushion when up to the chair.  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 A group 1 mattress will not be adequate to offload these severe ulcerations. Patient has impaired sensation and is unable to reposition independently.       Jayleen Meade PA-C October 10, 2023    Call us at [phone] if you have any questions about your wounds, have redness or swelling around your wound, have a fever of 101 degrees Fahrenheit or greater or if you have any other problems or concerns. We answer the phone Monday through Friday 8 am to 4 pm, please leave a message as we check the voicemail frequently throughout the day.     If you had a positive experience please indicate that on your patient satisfaction survey form that [facility] will be sending you.    It was a pleasure meeting with you today.  Thank you for allowing me and my team the privilege of caring for you today.  YOU are the reason we are here, and I truly hope we provided you with the excellent service you deserve.  Please let us know if there is anything else we can do for you so that we can be sure you are leaving completely satisfied with your care experience.      If you have any billing related questions please call the [facility] office at [phone]. The clinic staff does not handle billing related matters.    If you are scheduled to have a follow up  appointment, you will receive a reminder call the day before your visit. On the appointment day please arrive 15 minutes prior to your appointment time. If you are unable to keep that appointment, please call the clinic to cancel or reschedule. If you are more than 10 minutes late or greater for your scheduled appointment time, the clinic policy is that you may be asked to reschedule.
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
## 2023-10-11 ENCOUNTER — TELEPHONE (OUTPATIENT)
Dept: WOUND CARE | Facility: CLINIC | Age: 43
End: 2023-10-11
Payer: MEDICARE

## 2023-10-11 NOTE — TELEPHONE ENCOUNTER
Returned call to Hermelinda. She reports [facility] now is the home care agency working with the patient and they have him under a Medicare episode. AVS updated to this. Fax sent to [facility] to inform them not to fill the order since [facility] is ordering supplies with the Medicare episode. Hermelinda is able to pull the AVS information from care everywhere so no need to send the updated AVS to [facility].

## 2023-10-11 NOTE — TELEPHONE ENCOUNTER
Annie with [facility]  received a fax for dressing change orders for the patient, however they do not have him in their service at all.     If needed, Annie can be reached at [phone]

## 2023-10-11 NOTE — ADDENDUM NOTE
Encounter addended by: Keena Roger RN on: 10/11/2023 12:28 PM   Actions taken: Edited Discharge Instructions

## 2023-10-11 NOTE — TELEPHONE ENCOUNTER
Hermelinda  opt [facility] from [facility] called, confused about home care and looking to speak to some one

## 2023-10-24 ENCOUNTER — VIRTUAL VISIT (OUTPATIENT)
Dept: ENDOCRINOLOGY | Facility: CLINIC | Age: 43
End: 2023-10-24
Payer: MEDICARE

## 2023-10-24 VITALS — BODY MASS INDEX: 33.86 KG/M2 | HEIGHT: 72 IN | WEIGHT: 250 LBS

## 2023-10-24 DIAGNOSIS — E66.811 CLASS 1 OBESITY WITH SERIOUS COMORBIDITY AND BODY MASS INDEX (BMI) OF 32.0 TO 32.9 IN ADULT, UNSPECIFIED OBESITY TYPE: Primary | ICD-10-CM

## 2023-10-24 PROBLEM — H18.429 BAND-SHAPED KERATOPATHY: Status: ACTIVE | Noted: 2018-12-04

## 2023-10-24 PROBLEM — K43.9 HERNIA, VENTRAL: Status: ACTIVE | Noted: 2022-08-03

## 2023-10-24 PROBLEM — M46.28 SACRAL OSTEOMYELITIS (H): Status: ACTIVE | Noted: 2020-01-13

## 2023-10-24 PROBLEM — E83.42 HYPOMAGNESEMIA: Status: RESOLVED | Noted: 2022-08-03 | Resolved: 2023-10-24

## 2023-10-24 PROBLEM — S31.819D: Status: ACTIVE | Noted: 2022-10-26

## 2023-10-24 PROBLEM — E27.49 SECONDARY HYPOCORTISOLISM (H): Status: ACTIVE | Noted: 2017-01-31

## 2023-10-24 PROBLEM — G82.20 PARAPLEGIA, UNSPECIFIED (H): Status: ACTIVE | Noted: 2017-09-22

## 2023-10-24 PROBLEM — L89.153 PRESSURE ULCER OF COCCYGEAL REGION, STAGE III (H): Status: ACTIVE | Noted: 2019-08-14

## 2023-10-24 PROBLEM — H53.2 DIPLOPIA: Status: ACTIVE | Noted: 2022-08-03

## 2023-10-24 PROBLEM — K43.5 PARASTOMAL HERNIA WITHOUT OBSTRUCTION OR GANGRENE: Status: ACTIVE | Noted: 2021-05-26

## 2023-10-24 PROBLEM — I10 HYPERTENSION: Status: ACTIVE | Noted: 2021-10-10

## 2023-10-24 PROBLEM — B37.81 CANDIDA ESOPHAGITIS (H): Status: RESOLVED | Noted: 2022-08-03 | Resolved: 2023-10-24

## 2023-10-24 PROBLEM — F11.90 CHRONIC, CONTINUOUS USE OF OPIOIDS: Status: ACTIVE | Noted: 2022-12-20

## 2023-10-24 PROBLEM — L02.91 CUTANEOUS ABSCESS, UNSPECIFIED: Status: ACTIVE | Noted: 2017-09-22

## 2023-10-24 PROBLEM — T81.31XA WOUND DEHISCENCE, SURGICAL, INITIAL ENCOUNTER: Status: ACTIVE | Noted: 2019-08-28

## 2023-10-24 PROBLEM — M62.81 MUSCLE WEAKNESS (GENERALIZED): Status: ACTIVE | Noted: 2017-09-22

## 2023-10-24 PROBLEM — M48.50XA COMPRESSION FRACTURE OF VERTEBRA (H): Status: ACTIVE | Noted: 2022-08-03

## 2023-10-24 PROBLEM — L89.324 DECUBITUS ULCER OF LEFT ISCHIAL AREA, STAGE IV (H): Status: ACTIVE | Noted: 2019-07-18

## 2023-10-24 PROBLEM — L89.154 DECUBITUS ULCER OF COCCYGEAL REGION, STAGE 4 (H): Status: ACTIVE | Noted: 2019-10-30

## 2023-10-24 PROBLEM — E83.52 HYPERCALCEMIA: Status: RESOLVED | Noted: 2022-08-03 | Resolved: 2023-10-24

## 2023-10-24 PROBLEM — Z93.3 COLOSTOMY PRESENT (H): Status: ACTIVE | Noted: 2021-10-10

## 2023-10-24 PROBLEM — F33.0 MILD EPISODE OF RECURRENT MAJOR DEPRESSIVE DISORDER (H): Status: ACTIVE | Noted: 2021-10-10

## 2023-10-24 PROBLEM — G25.81 RESTLESS LEGS SYNDROME: Status: ACTIVE | Noted: 2017-09-25

## 2023-10-24 PROBLEM — E66.9 OBESITY (BMI 30-39.9): Status: ACTIVE | Noted: 2023-08-22

## 2023-10-24 PROBLEM — D63.8 ANEMIA, CHRONIC DISEASE: Status: RESOLVED | Noted: 2021-10-10 | Resolved: 2023-10-24

## 2023-10-24 PROBLEM — J30.9 ALLERGIC RHINITIS, UNSPECIFIED: Status: ACTIVE | Noted: 2017-09-25

## 2023-10-24 PROBLEM — E87.6 HYPOKALEMIA: Status: RESOLVED | Noted: 2022-08-03 | Resolved: 2023-10-24

## 2023-10-24 PROBLEM — L98.499 SKIN ULCER (H): Status: ACTIVE | Noted: 2019-10-30

## 2023-10-24 PROBLEM — H26.9 CATARACT: Status: ACTIVE | Noted: 2022-08-03

## 2023-10-24 PROBLEM — G57.93 NEUROPATHIC PAIN OF BOTH LEGS: Status: ACTIVE | Noted: 2017-08-11

## 2023-10-24 PROCEDURE — 99215 OFFICE O/P EST HI 40 MIN: CPT | Mod: VID

## 2023-10-24 RX ORDER — LACTULOSE 10 G/15ML
10 SOLUTION ORAL
Status: ON HOLD | COMMUNITY
Start: 2023-07-03 | End: 2023-11-07

## 2023-10-24 RX ORDER — TAMSULOSIN HYDROCHLORIDE 0.4 MG/1
1 CAPSULE ORAL DAILY
Status: ON HOLD | COMMUNITY
Start: 2023-04-13 | End: 2023-11-07

## 2023-10-24 RX ORDER — SEMAGLUTIDE 0.5 MG/.5ML
0.5 INJECTION, SOLUTION SUBCUTANEOUS WEEKLY
Qty: 2 ML | Refills: 2 | Status: ON HOLD | OUTPATIENT
Start: 2023-11-23 | End: 2024-06-05

## 2023-10-24 RX ORDER — OXYBUTYNIN CHLORIDE 10 MG/1
10 TABLET, EXTENDED RELEASE ORAL
Status: ON HOLD | COMMUNITY
End: 2023-11-07

## 2023-10-24 RX ORDER — HYOSCYAMINE SULFATE 16 OZ
SOLUTION MISCELLANEOUS
Status: ON HOLD | COMMUNITY
End: 2023-11-07

## 2023-10-24 RX ORDER — SEMAGLUTIDE 0.25 MG/.5ML
0.25 INJECTION, SOLUTION SUBCUTANEOUS WEEKLY
Qty: 2 ML | Refills: 0 | Status: ON HOLD | OUTPATIENT
Start: 2023-10-24 | End: 2024-06-05

## 2023-10-24 ASSESSMENT — PAIN SCALES - GENERAL: PAINLEVEL: MODERATE PAIN (5)

## 2023-10-24 NOTE — NURSING NOTE
Is the patient currently in the state of [place]? YES    Visit mode:VIDEO    If the visit is dropped, the patient can be reconnected by: VIDEO VISIT: Text to cell phone:   No relevant phone numbers on file.       Will anyone else be joining the visit? NO  (If patient encounters technical issues they should call [phone]    How would you like to obtain your AVS? MyChart    Are changes needed to the allergy or medication list? No    Reason for visit: Consult    Shazia CARTAGENA
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
## 2023-10-27 PROBLEM — E66.811 CLASS 1 OBESITY WITH SERIOUS COMORBIDITY AND BODY MASS INDEX (BMI) OF 32.0 TO 32.9 IN ADULT, UNSPECIFIED OBESITY TYPE: Status: ACTIVE | Noted: 2023-08-22
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
## 2023-10-27 NOTE — PATIENT INSTRUCTIONS
"Greg Cerrato, it was nice to meet you today!  Thank you for allowing us the privilege of caring for you. We hope we provided you with the excellent service you deserve.   Please let us know if there is anything else we can do for you so that we can be sure you are completely satisfied with your care experience.    To ensure the quality of our services you may be receiving a patient satisfaction survey from an independent patient satisfaction monitoring company.    The greatest compliment you can give is a \"Likely to Recommend\"    Your visit was with J LUIS DIAL PA-C today.    Instructions per today's visit:     Start Ozempic 0.25mg once weekly for 4 weeks, then increase to 0.5mg once weekly once available.   Wegovy was not covered by insurance  Follow up with Lauren Bloch in 6 weeks   Follow up with J Luis Watts in 3 months     ___________________________________________________________________________  Important contact and scheduling information:  Please call our contact center at [phone] to schedule your next appointments.  For any nursing questions or concerns call Marie Blakely LPN at [phone] or Brissa Meraz RN at [phone]  Please call during clinic hours Monday through Friday 8:00a - 4:00p if you have questions or you can contact us via [employer] at anytime and we will reply during clinic hours.    Lab results will be communicated through My Chart or letter (if My Chart not used). Please call the clinic if you have not received communication after 1 week or if you have any questions.?  Clinic Fax: [phone]  __________________________________________________________________________    If labs were ordered today:    Please make an appointment to have them drawn at your convenience.     To schedule the Lab Appointment using [employer]:  Select \"Schedule an Appointment\"  Select \"Lab Only\"  For \"A couple of questions\", select \"Other\"  For \"Which locations work for you?, select the location and set up the " appointment    To schedule by phone call [phone] to schedule a lab only appointment at any [facility] lab.  ___________________________________________________________________________  Work with A [facility] !  Virtual Sessions are Available through [facility] Weight Management Clinics    To learn more, call to schedule a free, Health  Q&A appointment: [phone]     What is Health Coaching?  Do you know what you are supposed to do, but you just aren't doing it?  Then, HEALTH COACHING may help you!   Get unstuck and move forward with the support of a professionally trained NBC-HWC (National Board-Certified Health and ) who uses evidence-based approaches to help you move forward with healthy lifestyle changes in the areas of weight loss, stress management and overall well-being.    Health Coaches help you identify goals that will work best for you. Health Coaches provide support and encouragement with overcoming barriers and help you to find inspiration and motivation to lead a healthy lifestyle.    Option one:  Health Coaching 3-Pack; Three, 30-minute Health Coaching Visits, for $99  Visits are done virtually (phone or video)  This is a self pay service; we do not accept insurance for ernesto coaching.    Option two:   The 24 week Plan; 11 Health Coaching Visits, and a 7 months subscription to [employer]-- on-demand fitness, nutrition and mindfulness classes, for $499 (employee discounts may be available). Participants will also meet regularly with a weight management Medical Provider and a Registered/Licensed Dietician.  This is a self-pay service; we do not accept insurance for health coaching.    To Schedule a free Health  Q&A appointment to learn more,  call [phone].  ____________________________________________________________________  [facility]  Healthy Lifestyle Group    Healthy Lifestyle Group  This is a 60 minute  "virtual coaching group for those who want to lead a healthier lifestyle. Come together to set goals and overcome barriers in a supportive group environment. We will address the four pillars of health--nutrition, exercise, sleep and emotional well-being.  This group is highly recommended for those who are participating in the 24 week Healthy Lifestyle Plan and our Health Coaching sessions.    WHEN: This group meets the first Friday of the month, 12:30 PM - 1:30 PM online, via a zoom meeting.      FACILITATOR: Led by National Board Certified Health and , Shaniqua Chavez, [facility].    TO REGISTER: Please call the Call Center at [phone] to register. You will get an appointment to attend in [place]. Fifteen minutes prior to the meeting, complete the e-check in and you will get the link to join the meeting.  There is no charge to attend this group and space is limited.      2023 and 2024 Meeting Topics and Dates:    November 3: Introduction to Mindfulness (Learn simple and effective mindfulness practices and how it can benefit you)    December 8: Let's Talk (guided discussion on our wins and challenges)    January 5: New Years Vision: Manifest your Best 2024! (Guided imagery,  journaling and discussion)    February 2: Let's Talk    March 1: 10 Percent Happier by Shankar Montgomery (Book Bites; a guided discussion on the nuggets of wisdom from favorite wellness books; no need to read the book but highly encouraged)    April 5: Let's Talk    May 3: \"Essentialism; The Disciplined Pursuit of Less by Adam Saba (book bites discussion)    June 7: Let's Talk    July 5: NO MEETING, off for the 4th of July Holiday    August 2: The Blue Zones, Secrets for Living a Longer Life by Shankar Gilbert (book bites discussion)      If you would like bariatric surgery specific support group info please let your care team know.         Thank you,   [facility] Comprehensive Weight Management Team        OZEMPIC (semaglutide)    We " "are starting a GLP-1 (Glucagon-like Peptide-1) medication called semaglutide (aka Ozempic). One of the ways it works is by slowing down the rate that food leaves your stomach. You feel pichardo and will eat less. It also helps regulate hormones that can help improve your blood sugars and weight.     Ozempic has been studied for safety and effect on weight loss in adults without diabetes. It is currently FDA approved for diabetes and is considered \"off label\" use for weight loss.    Dosing for this medication:   Month 1- Inject 0.25 mg weekly  Month 2- Inject 0.5mg weekly    Side effects of GLP- Medications include: The most common side effects are all GI related and consist of: nausea, constipation, diarrhea, burping, or gassiness. Patients are advised to eat slowly and less, and nausea typically passes if people can stick it out.     The risk of pancreatitis (inflammation of the pancreas) has been associated with this type of medication, but is very rare.  If you have had pancreatitis in the past, this medication may not be for you. Please let us know about any past history of pancreas problems.    Symptoms of pancreatitis include: Pain in your upper stomach area which may travel to your back and be worse after eating. Your stomach area may be tender to the touch.  You may have vomiting or nausea and/or have a fever. If you should develop any of these symptoms, stop the medication and contact your primary care doctor. They will do a blood test to check for pancreatitis.       Ozempic should be discontinued for ?2 months prior to becoming pregnant.       There is a small chance you may have some low blood sugar after taking the medication.   The signs of low blood sugar are:  Weakness  Shaky   Hungry  Sweating  Confusion      See below for ways to treat low blood sugar without adding in lots of extra calories.      Treating Low Blood Sugar    If you have symptoms of low blood sugar (sweating, shaking, dizzy, confused) " eat 15 grams of carbs and wait 15 minutes:    Glucose Tabs are best for sugars under 70 -  Dex4 or BD Glucose tablets are good, you will need to take 3-4 of these to equal 15 grams.     One small box of raisins  4 oz fruit juice box or   cup fruit juice  1 small apple  1 small banana    cup canned fruit in water    English muffin or a slice of bread with jelly   1 low fat frozen waffle with sugar-free syrup    cup cottage cheese with   cup frozen or fresh blueberries  1 cup skim or low-fat milk    cup whole grain cereal  4-6 crackers such as Triscuits      This medication is usually not covered by insurance and can be quite expensive. Sometimes a prior authorization is required, which may take up to 1-2 weeks for an insurance company to make a decision if they will cover the medication. Please be patient, you will be notified after a decision has been made.    For any questions or concerns please send a [place] message to our team or call our weight management call center at [phone] during regular business hours. For questions during evenings or weekends your messages will be addressed during the next business day.  For emergencies please call 911 or seek immediate medical care.      (Do not stop taking it if you don't think it's working. For some people it works without them knowing it.)     In order to get refills of this or any medication we prescribe you must be seen in the medical weight mgmt clinic every 2-4 months. Please have your pharmacy fax a refill request to [phone].

## 2023-11-01 ENCOUNTER — TELEPHONE (OUTPATIENT)
Dept: ENDOCRINOLOGY | Facility: CLINIC | Age: 43
End: 2023-11-01
Payer: MEDICARE

## 2023-11-06 ENCOUNTER — TELEPHONE (OUTPATIENT)
Dept: ORTHOPEDICS | Facility: CLINIC | Age: 43
End: 2023-11-06
Payer: MEDICARE

## 2023-11-06 NOTE — TELEPHONE ENCOUNTER
ATC consulted with Dr. Maier and his advice was to come to [facility] ED if this is something they are advised to have done urgently.  This would need to be a surgical I&D at the hospital and not something treated in the ortho clinic.  ATC relayed this messaging to patient's mother who had called in regarding this and she is in agreement and understanding of the plan and advice from Dr. Maier.  She says they will plan to come to [place] tomorrow morning.  ATC advised to keep us updated if anything changes via phone call or MyChart.      Bill Smith, ATC

## 2023-11-06 NOTE — TELEPHONE ENCOUNTER
[facility] Call Center    Phone Message    May a detailed message be left on voicemail: yes     Reason for Call: Other: Patient's Mother Kathleen is calling because patient has a puss pocket on injury that needs to be drained.  Dr Maier wants to see him sooner than his appt on Friday 11/10.  Please call Mother Kathleen to get him scheduled.  Thank you.     Action Taken: Other: [phone]    Travel Screening: Not Applicable

## 2023-11-06 NOTE — TELEPHONE ENCOUNTER
"ATC returned call to obtain more information.  Patient's mother states her son went to hospital in [place], [place] and they concluded that patient \"needed to call down to Dr. Maier's staff and make an appointment for early this week to have fluid drained.\"  ATC explained that we don't have earlier clinic appointments in the orthopedic department for Dr. Maier, but that perhaps they meant they need to come down to the hospital here for this procedure.  ATC will consult with colleagues and supervisors to better understand the plan and reach back out to patient/patient's mother.    Bill Smith, ATC    "

## 2023-11-07 ENCOUNTER — HOSPITAL ENCOUNTER (INPATIENT)
Facility: CLINIC | Age: 43
LOS: 11 days | Discharge: HOME HEALTH | DRG: 492 | End: 2023-11-18
Attending: EMERGENCY MEDICINE | Admitting: PEDIATRICS
Payer: MEDICARE

## 2023-11-07 DIAGNOSIS — L89.154 PRESSURE INJURY OF SACRAL REGION, STAGE 4 (H): Primary | ICD-10-CM

## 2023-11-07 DIAGNOSIS — Z89.512 S/P BKA (BELOW KNEE AMPUTATION), LEFT (H): ICD-10-CM

## 2023-11-07 DIAGNOSIS — S81.802A OPEN WOUND OF LOWER LEG, LEFT, INITIAL ENCOUNTER: ICD-10-CM

## 2023-11-07 DIAGNOSIS — T87.44: ICD-10-CM

## 2023-11-07 LAB
ABO/RH(D): NORMAL
ALBUMIN SERPL BCG-MCNC: 3.4 G/DL (ref 3.5–5.2)
ALP SERPL-CCNC: 133 U/L (ref 40–129)
ALT SERPL W P-5'-P-CCNC: 21 U/L (ref 0–70)
ANION GAP SERPL CALCULATED.3IONS-SCNC: 8 MMOL/L (ref 7–15)
ANTIBODY SCREEN: NEGATIVE
APTT PPP: 33 SECONDS (ref 22–38)
AST SERPL W P-5'-P-CCNC: 37 U/L (ref 0–45)
BASOPHILS # BLD AUTO: 0 10E3/UL (ref 0–0.2)
BASOPHILS NFR BLD AUTO: 1 %
BILIRUB SERPL-MCNC: 0.2 MG/DL
BUN SERPL-MCNC: 13.7 MG/DL (ref 6–20)
CALCIUM SERPL-MCNC: 8.9 MG/DL (ref 8.6–10)
CHLORIDE SERPL-SCNC: 103 MMOL/L (ref 98–107)
CREAT SERPL-MCNC: 0.86 MG/DL (ref 0.67–1.17)
CRP SERPL-MCNC: 30.14 MG/L
DEPRECATED HCO3 PLAS-SCNC: 30 MMOL/L (ref 22–29)
EGFRCR SERPLBLD CKD-EPI 2021: >90 ML/MIN/1.73M2
EOSINOPHIL # BLD AUTO: 0.2 10E3/UL (ref 0–0.7)
EOSINOPHIL NFR BLD AUTO: 4 %
ERYTHROCYTE [DISTWIDTH] IN BLOOD BY AUTOMATED COUNT: 15.9 % (ref 10–15)
ERYTHROCYTE [SEDIMENTATION RATE] IN BLOOD BY WESTERGREN METHOD: 46 MM/HR (ref 0–15)
GLUCOSE SERPL-MCNC: 116 MG/DL (ref 70–99)
HCT VFR BLD AUTO: 42.5 % (ref 40–53)
HGB BLD-MCNC: 13 G/DL (ref 13.3–17.7)
IMM GRANULOCYTES # BLD: 0 10E3/UL
IMM GRANULOCYTES NFR BLD: 1 %
INR PPP: 1.06 (ref 0.85–1.15)
LYMPHOCYTES # BLD AUTO: 0.9 10E3/UL (ref 0.8–5.3)
LYMPHOCYTES NFR BLD AUTO: 23 %
MCH RBC QN AUTO: 23.9 PG (ref 26.5–33)
MCHC RBC AUTO-ENTMCNC: 30.6 G/DL (ref 31.5–36.5)
MCV RBC AUTO: 78 FL (ref 78–100)
MONOCYTES # BLD AUTO: 0.5 10E3/UL (ref 0–1.3)
MONOCYTES NFR BLD AUTO: 11 %
NEUTROPHILS # BLD AUTO: 2.5 10E3/UL (ref 1.6–8.3)
NEUTROPHILS NFR BLD AUTO: 60 %
NRBC # BLD AUTO: 0 10E3/UL
NRBC BLD AUTO-RTO: 0 /100
PLATELET # BLD AUTO: 281 10E3/UL (ref 150–450)
POTASSIUM SERPL-SCNC: 4 MMOL/L (ref 3.4–5.3)
PROT SERPL-MCNC: 7.9 G/DL (ref 6.4–8.3)
RBC # BLD AUTO: 5.44 10E6/UL (ref 4.4–5.9)
SODIUM SERPL-SCNC: 141 MMOL/L (ref 135–145)
SPECIMEN EXPIRATION DATE: NORMAL
WBC # BLD AUTO: 4.1 10E3/UL (ref 4–11)

## 2023-11-07 PROCEDURE — 99285 EMERGENCY DEPT VISIT HI MDM: CPT | Mod: 25

## 2023-11-07 PROCEDURE — 250N000011 HC RX IP 250 OP 636: Performed by: PEDIATRICS

## 2023-11-07 PROCEDURE — 36415 COLL VENOUS BLD VENIPUNCTURE: CPT | Performed by: EMERGENCY MEDICINE

## 2023-11-07 PROCEDURE — 258N000003 HC RX IP 258 OP 636

## 2023-11-07 PROCEDURE — 99207 PR NO BILLABLE SERVICE THIS VISIT: CPT | Performed by: ORTHOPAEDIC SURGERY

## 2023-11-07 PROCEDURE — 250N000013 HC RX MED GY IP 250 OP 250 PS 637

## 2023-11-07 PROCEDURE — 86140 C-REACTIVE PROTEIN: CPT | Performed by: EMERGENCY MEDICINE

## 2023-11-07 PROCEDURE — 120N000002 HC R&B MED SURG/OB UMMC

## 2023-11-07 PROCEDURE — 85652 RBC SED RATE AUTOMATED: CPT

## 2023-11-07 PROCEDURE — 86901 BLOOD TYPING SEROLOGIC RH(D): CPT | Performed by: EMERGENCY MEDICINE

## 2023-11-07 PROCEDURE — 99285 EMERGENCY DEPT VISIT HI MDM: CPT | Performed by: EMERGENCY MEDICINE

## 2023-11-07 PROCEDURE — 85025 COMPLETE CBC W/AUTO DIFF WBC: CPT | Performed by: EMERGENCY MEDICINE

## 2023-11-07 PROCEDURE — 87040 BLOOD CULTURE FOR BACTERIA: CPT | Performed by: EMERGENCY MEDICINE

## 2023-11-07 PROCEDURE — 82310 ASSAY OF CALCIUM: CPT | Performed by: EMERGENCY MEDICINE

## 2023-11-07 PROCEDURE — 85730 THROMBOPLASTIN TIME PARTIAL: CPT

## 2023-11-07 PROCEDURE — 250N000013 HC RX MED GY IP 250 OP 250 PS 637: Performed by: PEDIATRICS

## 2023-11-07 PROCEDURE — 258N000003 HC RX IP 258 OP 636: Performed by: PEDIATRICS

## 2023-11-07 PROCEDURE — 250N000011 HC RX IP 250 OP 636: Performed by: EMERGENCY MEDICINE

## 2023-11-07 PROCEDURE — 85610 PROTHROMBIN TIME: CPT

## 2023-11-07 PROCEDURE — 99223 1ST HOSP IP/OBS HIGH 75: CPT | Mod: AI | Performed by: PEDIATRICS

## 2023-11-07 PROCEDURE — 250N000013 HC RX MED GY IP 250 OP 250 PS 637: Performed by: EMERGENCY MEDICINE

## 2023-11-07 PROCEDURE — 258N000003 HC RX IP 258 OP 636: Performed by: EMERGENCY MEDICINE

## 2023-11-07 RX ORDER — SODIUM CHLORIDE, SODIUM LACTATE, POTASSIUM CHLORIDE, CALCIUM CHLORIDE 600; 310; 30; 20 MG/100ML; MG/100ML; MG/100ML; MG/100ML
INJECTION, SOLUTION INTRAVENOUS CONTINUOUS
Status: DISCONTINUED | OUTPATIENT
Start: 2023-11-07 | End: 2023-11-09

## 2023-11-07 RX ORDER — FLUTICASONE PROPIONATE 50 MCG
1 SPRAY, SUSPENSION (ML) NASAL 2 TIMES DAILY PRN
Status: DISCONTINUED | OUTPATIENT
Start: 2023-11-07 | End: 2023-11-18 | Stop reason: HOSPADM

## 2023-11-07 RX ORDER — NALOXONE HYDROCHLORIDE 0.4 MG/ML
0.2 INJECTION, SOLUTION INTRAMUSCULAR; INTRAVENOUS; SUBCUTANEOUS
Status: DISCONTINUED | OUTPATIENT
Start: 2023-11-07 | End: 2023-11-18 | Stop reason: HOSPADM

## 2023-11-07 RX ORDER — CLINDAMYCIN PHOSPHATE 11.9 MG/ML
SOLUTION TOPICAL 2 TIMES DAILY PRN
Status: DISCONTINUED | OUTPATIENT
Start: 2023-11-07 | End: 2023-11-07

## 2023-11-07 RX ORDER — TAMSULOSIN HYDROCHLORIDE 0.4 MG/1
0.4 CAPSULE ORAL DAILY
Status: DISCONTINUED | OUTPATIENT
Start: 2023-11-07 | End: 2023-11-07

## 2023-11-07 RX ORDER — NALOXONE HYDROCHLORIDE 0.4 MG/ML
0.4 INJECTION, SOLUTION INTRAMUSCULAR; INTRAVENOUS; SUBCUTANEOUS
Status: DISCONTINUED | OUTPATIENT
Start: 2023-11-07 | End: 2023-11-18 | Stop reason: HOSPADM

## 2023-11-07 RX ORDER — OXYCODONE HCL 20 MG/1
20 TABLET, FILM COATED, EXTENDED RELEASE ORAL EVERY 12 HOURS
Status: DISCONTINUED | OUTPATIENT
Start: 2023-11-07 | End: 2023-11-18 | Stop reason: HOSPADM

## 2023-11-07 RX ORDER — ONDANSETRON 2 MG/ML
4 INJECTION INTRAMUSCULAR; INTRAVENOUS EVERY 6 HOURS PRN
Status: DISCONTINUED | OUTPATIENT
Start: 2023-11-07 | End: 2023-11-15

## 2023-11-07 RX ORDER — CYCLOBENZAPRINE HCL 5 MG
10 TABLET ORAL 3 TIMES DAILY PRN
Status: DISCONTINUED | OUTPATIENT
Start: 2023-11-07 | End: 2023-11-18 | Stop reason: HOSPADM

## 2023-11-07 RX ORDER — OXYCODONE HYDROCHLORIDE 5 MG/1
5 TABLET ORAL ONCE
Status: COMPLETED | OUTPATIENT
Start: 2023-11-07 | End: 2023-11-07

## 2023-11-07 RX ORDER — LOSARTAN POTASSIUM AND HYDROCHLOROTHIAZIDE 12.5; 5 MG/1; MG/1
1 TABLET ORAL DAILY
Status: DISCONTINUED | OUTPATIENT
Start: 2023-11-07 | End: 2023-11-08

## 2023-11-07 RX ORDER — POLYETHYLENE GLYCOL 3350 17 G/17G
17 POWDER, FOR SOLUTION ORAL 2 TIMES DAILY PRN
Status: DISCONTINUED | OUTPATIENT
Start: 2023-11-07 | End: 2023-11-18 | Stop reason: HOSPADM

## 2023-11-07 RX ORDER — PREGABALIN 75 MG/1
150 CAPSULE ORAL 3 TIMES DAILY
Status: DISCONTINUED | OUTPATIENT
Start: 2023-11-08 | End: 2023-11-18 | Stop reason: HOSPADM

## 2023-11-07 RX ORDER — OXYCODONE HYDROCHLORIDE 5 MG/1
5 TABLET ORAL EVERY 4 HOURS PRN
Status: DISCONTINUED | OUTPATIENT
Start: 2023-11-07 | End: 2023-11-07

## 2023-11-07 RX ORDER — OXYBUTYNIN CHLORIDE 10 MG/1
10 TABLET, EXTENDED RELEASE ORAL AT BEDTIME
Status: DISCONTINUED | OUTPATIENT
Start: 2023-11-07 | End: 2023-11-07

## 2023-11-07 RX ORDER — BUSPIRONE HYDROCHLORIDE 10 MG/1
10 TABLET ORAL 2 TIMES DAILY
Status: DISCONTINUED | OUTPATIENT
Start: 2023-11-08 | End: 2023-11-18 | Stop reason: HOSPADM

## 2023-11-07 RX ORDER — CEFEPIME HYDROCHLORIDE 1 G/1
1 INJECTION, POWDER, FOR SOLUTION INTRAMUSCULAR; INTRAVENOUS EVERY 8 HOURS
Status: DISCONTINUED | OUTPATIENT
Start: 2023-11-07 | End: 2023-11-17

## 2023-11-07 RX ORDER — AMOXICILLIN 250 MG
2 CAPSULE ORAL 2 TIMES DAILY PRN
Status: DISCONTINUED | OUTPATIENT
Start: 2023-11-07 | End: 2023-11-18 | Stop reason: HOSPADM

## 2023-11-07 RX ORDER — OXYCODONE HYDROCHLORIDE 10 MG/1
10 TABLET ORAL EVERY 4 HOURS PRN
Status: DISCONTINUED | OUTPATIENT
Start: 2023-11-07 | End: 2023-11-07

## 2023-11-07 RX ORDER — HYDROMORPHONE HYDROCHLORIDE 1 MG/ML
0.5 INJECTION, SOLUTION INTRAMUSCULAR; INTRAVENOUS; SUBCUTANEOUS EVERY 30 MIN PRN
Status: COMPLETED | OUTPATIENT
Start: 2023-11-07 | End: 2023-11-08

## 2023-11-07 RX ORDER — SODIUM CHLORIDE 9 MG/ML
INJECTION, SOLUTION INTRAVENOUS CONTINUOUS
Status: DISCONTINUED | OUTPATIENT
Start: 2023-11-07 | End: 2023-11-07

## 2023-11-07 RX ORDER — OXYCODONE HYDROCHLORIDE 10 MG/1
10 TABLET ORAL EVERY 6 HOURS PRN
Status: DISCONTINUED | OUTPATIENT
Start: 2023-11-07 | End: 2023-11-08

## 2023-11-07 RX ORDER — ONDANSETRON 4 MG/1
4 TABLET, ORALLY DISINTEGRATING ORAL EVERY 6 HOURS PRN
Status: DISCONTINUED | OUTPATIENT
Start: 2023-11-07 | End: 2023-11-15

## 2023-11-07 RX ORDER — LORATADINE 10 MG/1
10 TABLET ORAL DAILY
Status: DISCONTINUED | OUTPATIENT
Start: 2023-11-08 | End: 2023-11-18 | Stop reason: HOSPADM

## 2023-11-07 RX ORDER — OXYCODONE HYDROCHLORIDE 5 MG/1
5 TABLET ORAL EVERY 6 HOURS PRN
Status: DISCONTINUED | OUTPATIENT
Start: 2023-11-07 | End: 2023-11-08

## 2023-11-07 RX ORDER — SODIUM CHLORIDE 9 MG/ML
INJECTION, SOLUTION INTRAVENOUS
Status: COMPLETED
Start: 2023-11-07 | End: 2023-11-07

## 2023-11-07 RX ORDER — ACETAMINOPHEN 325 MG/1
975 TABLET ORAL EVERY 8 HOURS
Status: DISCONTINUED | OUTPATIENT
Start: 2023-11-07 | End: 2023-11-15

## 2023-11-07 RX ORDER — DULOXETIN HYDROCHLORIDE 60 MG/1
120 CAPSULE, DELAYED RELEASE ORAL DAILY
Status: DISCONTINUED | OUTPATIENT
Start: 2023-11-08 | End: 2023-11-18 | Stop reason: HOSPADM

## 2023-11-07 RX ADMIN — SODIUM CHLORIDE: 9 INJECTION, SOLUTION INTRAVENOUS at 17:23

## 2023-11-07 RX ADMIN — HYDROMORPHONE HYDROCHLORIDE 0.5 MG: 1 INJECTION, SOLUTION INTRAMUSCULAR; INTRAVENOUS; SUBCUTANEOUS at 19:20

## 2023-11-07 RX ADMIN — VANCOMYCIN HYDROCHLORIDE 1500 MG: 10 INJECTION, POWDER, LYOPHILIZED, FOR SOLUTION INTRAVENOUS at 15:42

## 2023-11-07 RX ADMIN — SODIUM CHLORIDE 500 ML: 9 INJECTION, SOLUTION INTRAVENOUS at 13:37

## 2023-11-07 RX ADMIN — Medication 500 ML: at 13:37

## 2023-11-07 RX ADMIN — OXYCODONE HYDROCHLORIDE 10 MG: 10 TABLET ORAL at 21:48

## 2023-11-07 RX ADMIN — CEFEPIME 1 G: 1 INJECTION, POWDER, FOR SOLUTION INTRAMUSCULAR; INTRAVENOUS at 14:30

## 2023-11-07 RX ADMIN — SODIUM CHLORIDE, POTASSIUM CHLORIDE, SODIUM LACTATE AND CALCIUM CHLORIDE: 600; 310; 30; 20 INJECTION, SOLUTION INTRAVENOUS at 19:17

## 2023-11-07 RX ADMIN — CEFEPIME 1 G: 1 INJECTION, POWDER, FOR SOLUTION INTRAMUSCULAR; INTRAVENOUS at 21:49

## 2023-11-07 RX ADMIN — OXYCODONE HYDROCHLORIDE 20 MG: 20 TABLET, FILM COATED, EXTENDED RELEASE ORAL at 23:05

## 2023-11-07 RX ADMIN — SODIUM CHLORIDE, POTASSIUM CHLORIDE, SODIUM LACTATE AND CALCIUM CHLORIDE: 600; 310; 30; 20 INJECTION, SOLUTION INTRAVENOUS at 21:48

## 2023-11-07 RX ADMIN — HYDROMORPHONE HYDROCHLORIDE 0.5 MG: 1 INJECTION, SOLUTION INTRAMUSCULAR; INTRAVENOUS; SUBCUTANEOUS at 13:38

## 2023-11-07 RX ADMIN — OXYCODONE HYDROCHLORIDE 5 MG: 5 TABLET ORAL at 14:30

## 2023-11-07 ASSESSMENT — ACTIVITIES OF DAILY LIVING (ADL)
ADLS_ACUITY_SCORE: 24
ADLS_ACUITY_SCORE: 41

## 2023-11-07 NOTE — ED TRIAGE NOTES
Lt stump infection x 1 week. Reddish drainage.     Triage Assessment (Adult)       Row Name 11/07/23 1226          Triage Assessment    Airway WDL WDL        Respiratory WDL    Respiratory WDL WDL        Skin Circulation/Temperature WDL    Skin Circulation/Temperature WDL X;all     Skin Temperature warm        Cardiac WDL    Cardiac WDL WDL        Peripheral/Neurovascular WDL    Peripheral Neurovascular WDL WDL        Cognitive/Neuro/Behavioral WDL    Cognitive/Neuro/Behavioral WDL WDL

## 2023-11-07 NOTE — H&P
[facility]    History and Physical - Hospitalist Service, GOLD TEAM 22       Date of Admission:  11/7/2023    Assessment & Plan      Saqib Bishop is a 42 year old male with a history of degenerative joint disease, GERD, and necrotizing fasciitis s/p L BKA admitted on 11/7/2023 with cellulitis of the L BKA stump. He requires hospitalization for IV antibiotics and orthopedic surgery intervention.     # L BKA stump cellulitis  # Soft tissue abscess  # History of MRSA infection  # History of pseudomonas infection  Presented with about a week of increased erythema and purulent drainage from L BKA site. Seen at OSH ED ~ 4 days prior to presentation and initially started on bactrim and levofloxacin. His symptoms progressed, and he presented for further evaluation. Seen by Ortho in the ED, who recommended admission for antibiotics and surgical management.   - Ortho consulted, appreciate recs  - Plan for OR in AM  - Continue cefepime (11/7- )  - Start vancomycin (11/7- ), pharmacy to dose   - Pain control   - Tylenol 975 mg q8h   - Oxycodone 5-10 mg q4h prn    - Flexeril 10 mg TID prn  - NPO at midnight  - Senna and miralax ordered while on opioids   - Trend CBC and CRP  - PT/OT consults  - WOC consult    # L BKA  - Continue home pregabalin 150 mg TID    # Mood disorder  - Continue home wellbutrin 450 mg daily, buspar 10 mg BID, duloxetine 60 mg BID    # BPH  - Continue home oxybutynin 10 mg at bedtime  - Continue home tamsulosin 0.4 mg daily     # GERD  - Continue home omeprazole 40 mg BID          Diet: Combination Diet Moderate Consistent Carb (60 g CHO per Meal) Diet  NPO per Anesthesia Guidelines for Procedure/Surgery Except for: Meds  DVT Prophylaxis: Pneumatic Compression Devices  Dyer Catheter: Not present  Lines: None     Cardiac Monitoring: None  Code Status: Full Code    Clinically Significant Risk Factors Present on Admission              # Hypoalbuminemia:  Lowest albumin = 3.4 g/dL at 11/7/2023  1:26 PM, will monitor as appropriate     # Hypertension: Noted on problem list      # Obesity: Estimated body mass index is 31.19 kg/m  as calculated from the following:    Height as of this encounter: 1.829 m (6').    Weight as of this encounter: 104.3 kg (230 lb).              Disposition Plan      Expected Discharge Date: 11/09/2023                  PRESTON STERN MD  Hospitalist Service, [address]  Securely message with Ping4 (more info)  Text page via [facility] Paging/Directory   See signed in provider for up to date coverage information    ______________________________________________________________________    Chief Complaint   BKA stump infection    History is obtained from the patient and his parents    History of Present Illness   Saqib Bishop is a 42 year old male ith a history of degenerative joint disease, GERD, and necrotizing fasciitis s/p L BKA admitted on 11/7/2023 with cellulitis of the L BKA stump. Approximately 1 week prior to presentation, he began to have worsening erythema and drainage from his stump. He has not had fevers, chills, sweats, nausea, vomiting, or decreased appetite during this time. He went to the ED where he lives in [place], [place], and he was discharged on bactrim and levofloxacin. His symptoms continued to progress, so he came to the ED for further evaluation.     ED Course:  In the ED, he was afebrile with vitals wnl. Labs were notable for WBC 13.0, Hgb 13.0, bicarb 30, and CRP 30.14. CT showed a soft tissue abscess adjacent to the distal stump with no overt evidence of osteomyelitis. Ortho was consulted and recommended admission for antibiotics and procedure. He was started on cefepime and IVF and admitted for further management.       Past Medical History    Past Medical History:   Diagnosis Date    Brain injury with brief loss of consciousness (H) 12/14/2015    Candida  esophagitis (H) 08/03/2022    Degenerative joint disease     Gastro-oesophageal reflux disease     Hypercalcemia 08/03/2022    Hypokalemia 08/03/2022    Hypomagnesemia 08/03/2022       Past Surgical History   Past Surgical History:   Procedure Laterality Date    AMPUTATE LEG BELOW KNEE Left 8/27/2023    Procedure: Left below knee Guillotine Amputation;  Surgeon: Sesar Barth MD;  Location: [facility] OR    AMPUTATE LEG BELOW KNEE Left 9/2/2023    Procedure: Irrigation and Debridement leg below knee, wound vac application, Left;  Surgeon: Juan Rehman MD;  Location: [facility] OR    AMPUTATE LEG BELOW KNEE Left 9/7/2023    Procedure: Below Left Knee Amputation;  Surgeon: Semaj Gates MD;  Location: [facility] OR    BACK SURGERY      lumbar fusion    EXPLORE SPINE, REMOVE HARDWARE, COMBINED  1/13/2012    Procedure:COMBINED EXPLORE SPINE, REMOVE HARDWARE; EXPLORE SPINE, REMOVE HARDWARE L4-S1; Surgeon:FAM GONZALEZ; Location:[facility] OR    IRRIGATION AND DEBRIDEMENT LOWER EXTREMITY, COMBINED Left 8/30/2023    Procedure: Irrigation and debridement lower extremity, combined and wound vac exchange;  Surgeon: Etienne Maier MD;  Location: [facility] OR    IRRIGATION AND DEBRIDEMENT LOWER EXTREMITY, COMBINED Left 9/7/2023    Procedure: IRRIGATION AND DEBRIDEMENT, LEFT LOWER EXTREMITY WITH WOUND VAC Removal;  Surgeon: Semaj Gates MD;  Location: [facility] OR    ORTHOPEDIC SURGERY      right foot surgery       Prior to Admission Medications   Prior to Admission Medications   Prescriptions Last Dose Informant Patient Reported? Taking?   CONSTULOSE 10 GM/15ML solution   Yes No   Sig: Take 10 g by mouth   Cranberry 400 MG CAPS   Yes No   Sig: Take 250 mg by mouth daily   DULoxetine (CYMBALTA) 60 MG capsule   Yes No   Sig: Take 60 mg by mouth 2 times daily   Semaglutide-Weight Management (WEGOVY) 0.25 MG/0.5ML pen   No No   Sig: Inject 0.25 mg Subcutaneous once a week For 4 weeks   Semaglutide-Weight Management (WEGOVY)  0.5 MG/0.5ML pen   No No   Sig: Inject 0.5 mg Subcutaneous once a week After completing 4 weeks of 0.25mg dose   acetaminophen (TYLENOL) 500 MG tablet   Yes No   Sig: Take 1,000 mg by mouth 3 times daily as needed for fever or mild pain   buPROPion (WELLBUTRIN XL) 150 MG 24 hr tablet   Yes No   Sig: Take 450 mg by mouth daily   busPIRone (BUSPAR) 10 MG tablet   Yes No   Sig: Take 1 tablet by mouth 2 times daily   clindamycin (CLEOCIN T) 1 % external solution   Yes No   Sig: Apply topically 2 times daily as needed (to face and scalp for acne and folliculitis)   cyclobenzaprine (FLEXERIL) 10 MG tablet   Yes No   Sig: Take 10 mg by mouth 3 times daily as needed for muscle spasms   diphenhydrAMINE-zinc acetate (BENADRYL) 1-0.1 % external cream   Yes No   Sig: Apply topically 4 times daily as needed for itching or other (rash)   fluticasone (FLONASE) 50 MCG/ACT nasal spray   Yes No   Sig: Spray 1 spray into both nostrils 2 times daily as needed for rhinitis   hydroxychloroquine (PLAQUENIL) 200 MG tablet   No No   Sig: Resumption timing per recs of ortho.   loratadine (CLARITIN) 10 MG tablet   Yes No   Sig: Take 1 tablet by mouth daily   losartan-hydrochlorothiazide (HYZAAR) 50-12.5 MG tablet   Yes No   Sig: Take 0.5 tablets by mouth daily   melatonin 5 MG tablet   No No   Sig: Take 1 tablet (5 mg) by mouth nightly as needed for sleep   omeprazole (PRILOSEC) 40 MG DR capsule   Yes No   Sig: Take 40 mg by mouth 2 times daily   oxyBUTYnin ER (DITROPAN XL) 10 MG 24 hr tablet   Yes No   Sig: Take 10 mg by mouth   oxyCODONE (OXYCONTIN) 20 MG 12 hr tablet   No No   Sig: Take 1 tablet (20 mg) by mouth every 12 hours   oxyCODONE (ROXICODONE) 5 MG tablet   No No   Sig: Take 2 tablets (10 mg) by mouth every 4 hours as needed for moderate pain   polyethylene glycol (MIRALAX) 17 GM/Dose powder   No No   Sig: Take 17 g by mouth 2 times daily as needed for constipation   polyethylene glycol-propylene glycol (SYSTANE ULTRA) 0.4-0.3 %  SOLN ophthalmic solution   Yes No   Sig: Apply 1 drop to eye 4 times daily as needed for dry eyes   prednisoLONE acetate (PRED FORTE) 1 % ophthalmic suspension   Yes No   Sig: Place 1 drop into both eyes 2 times daily Per taper   pregabalin (LYRICA) 150 MG capsule   No No   Sig: Take 1 capsule (150 mg) by mouth 3 times daily   semaglutide (OZEMPIC) 2 MG/3ML pen   No No   Sig: Inject 0.25mg subcutaneous every 7 days for 4 weeks, then increase to inject 0.5mg subcutaneous every 7 days.   senna-docusate (SENOKOT-S/PERICOLACE) 8.6-50 MG tablet   Yes No   Sig: Take 2 tablets by mouth 2 times daily   sodium hypochlorite external solution   Yes No   Sig: Apply 15 mL topically four times a day.   tamsulosin (FLOMAX) 0.4 MG capsule   Yes No   Sig: Take 1 capsule by mouth daily      Facility-Administered Medications: None        Allergies   Allergies   Allergen Reactions    Adhesive Tape Hives     From tape from surgery    Benzoin Hives and Rash    Droperidol Anaphylaxis    Prednisone      vomiting    Vitamin D Rash     flairs up his sarcoidosis        Physical Exam   Vital Signs: Temp: 98.2  F (36.8  C) Temp src: Oral BP: 126/79 Pulse: 97   Resp: 16 SpO2: 97 % O2 Device: None (Room air)    Weight: 230 lbs 0 oz    Constitutional: awake, alert, cooperative, no apparent distress, and appears stated age  Eyes: extra-ocular muscles intact and sclera clear  ENT: normocepalic, without obvious abnormality, MMM, neck supple  Respiratory: No increased work of breathing, good air exchange, clear to auscultation bilaterally, no crackles or wheezing  Cardiovascular: regular rate and rhythm, normal S1 and S2, no murmur noted, and no edema  GI: normal bowel sounds, soft, non-distended, and non-tender  Skin/MSK: L BKA stump with areas of purulent fluid extravasation, erythema, and edema (see photos in chart)  Neurologic: Awake, alert, oriented, moving all extremities, no focal deficits    Medical Decision Making             Data     I have  personally reviewed the following data over the past 24 hrs:    4.1  \   13.0 (L)   / 281     141 103 13.7 /  116 (H)   4.0 30 (H) 0.86 \     ALT: 21 AST: 37 AP: 133 (H) TBILI: 0.2   ALB: 3.4 (L) TOT PROTEIN: 7.9 LIPASE: N/A     Procal: N/A CRP: 30.14 (H) Lactic Acid: N/A       INR:  1.06 PTT:  33   D-dimer:  N/A Fibrinogen:  N/A       Imaging results reviewed over the past 24 hrs:   No results found for this or any previous visit (from the past 24 hour(s)).

## 2023-11-07 NOTE — ED PROVIDER NOTES
ED PROVIDER NOTE  [facility]  [facility]      History     Chief Complaint   Patient presents with    Wound Infection     Lt stump infection x 1 week. Reddish drainage.     The history is provided by the patient and medical records.     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  Patient underwent amputation in August of this year for necrotizing fasciitis and has been having problems with drainage from the stump since that time.  The patient was seen at an outside facility 4 days ago at which point he had a white count of 4.6 with fairly normal chemistries but had a wound culture of his stump that revealed Pseudomonas.  The patient underwent CT imaging of his stump which revealed a possible abscess of the stump with possible osteomyelitis.  Orthopedics apparently called the patient and told him to come to the ER for further evaluation and probable hospitalization.  Patient denies any fevers, any vomiting, and presents here for evaluation.    I have reviewed the Medications, Allergies, Past Medical and Surgical History, and Social History in the [facility] system.    Past Medical History:   Diagnosis Date    Brain injury with brief loss of consciousness (H) 12/14/2015    Candida esophagitis (H) 08/03/2022    Degenerative joint disease     Gastro-oesophageal reflux disease     Hypercalcemia 08/03/2022    Hypokalemia 08/03/2022    Hypomagnesemia 08/03/2022       Past Surgical History:   Procedure Laterality Date    AMPUTATE LEG BELOW KNEE Left 8/27/2023    Procedure: Left below knee Guillotine Amputation;  Surgeon: Sesar Barth MD;  Location: [facility] OR    AMPUTATE LEG BELOW KNEE Left 9/2/2023    Procedure: Irrigation and Debridement leg below knee, wound vac application, Left;  Surgeon: Juan Rehman MD;  [facility]: [facility] OR    AMPUTATE LEG BELOW KNEE Left 9/7/2023    Procedure: Below Left Knee Amputation;   Surgeon: Semaj Gates MD;  [facility]: [facility] OR    BACK SURGERY      lumbar fusion    EXPLORE SPINE, REMOVE HARDWARE, COMBINED  1/13/2012    Procedure:COMBINED EXPLORE SPINE, REMOVE HARDWARE; EXPLORE SPINE, REMOVE HARDWARE L4-S1; Surgeon:FAM GONZALEZ; Location:[facility] OR    IRRIGATION AND DEBRIDEMENT LOWER EXTREMITY, COMBINED Left 8/30/2023    Procedure: Irrigation and debridement lower extremity, combined and wound vac exchange;  Surgeon: Etienne Maier MD;  Location: [facility] OR    IRRIGATION AND DEBRIDEMENT LOWER EXTREMITY, COMBINED Left 9/7/2023    Procedure: IRRIGATION AND DEBRIDEMENT, LEFT LOWER EXTREMITY WITH WOUND VAC Removal;  Surgeon: Semaj Gates MD;  [facility]    ORTHOPEDIC SURGERY      right foot surgery         Dose / Directions   acetaminophen 500 MG tablet  Commonly known as: TYLENOL      Dose: 1,000 mg  Take 1,000 mg by mouth 3 times daily as needed for fever or mild pain  Refills: 0     buPROPion 150 MG 24 hr tablet  Commonly known as: WELLBUTRIN XL      Dose: 450 mg  Take 450 mg by mouth daily  Refills: 0     busPIRone 10 MG tablet  Commonly known as: BUSPAR      Dose: 1 tablet  Take 1 tablet by mouth 2 times daily  Refills: 0     clindamycin 1 % external solution  Commonly known as: CLEOCIN T      Apply topically 2 times daily as needed (to face and scalp for acne and folliculitis)  Refills: 0     Constulose 10 GM/15ML solution  Generic drug: lactulose      Dose: 10 g  Take 10 g by mouth  Refills: 0     cyclobenzaprine 10 MG tablet  Commonly known as: FLEXERIL      Dose: 10 mg  Take 10 mg by mouth 3 times daily as needed for muscle spasms  Refills: 0     diphenhydrAMINE-zinc acetate 1-0.1 % external cream  Commonly known as: BENADRYL      Apply topically 4 times daily as needed for itching or other (rash)  Refills: 0     DULoxetine 60 MG capsule  Commonly known as: CYMBALTA      Dose: 60 mg  Take 60 mg by mouth 2 times daily  Refills: 0     fluticasone 50 MCG/ACT nasal  spray  Commonly known as: FLONASE      Dose: 1 spray  Spray 1 spray into both nostrils 2 times daily as needed for rhinitis  Refills: 0     hydroxychloroquine 200 MG tablet  Commonly known as: PLAQUENIL  Used for: S/P below knee amputation, left (H)      Resumption timing per recs of ortho.  Refills: 0     loratadine 10 MG tablet  Commonly known as: CLARITIN      Dose: 1 tablet  Take 1 tablet by mouth daily  Refills: 0     losartan-hydrochlorothiazide 50-12.5 MG tablet  Commonly known as: HYZAAR      Dose: 0.5 tablet  Take 0.5 tablets by mouth daily  Refills: 0     omeprazole 40 MG DR capsule  Commonly known as: PriLOSEC      Dose: 40 mg  Take 40 mg by mouth 2 times daily  Refills: 0     oxyBUTYnin ER 10 MG 24 hr tablet  Commonly known as: DITROPAN XL      Dose: 10 mg  Take 10 mg by mouth  Refills: 0     * oxyCODONE 20 MG 12 hr tablet  Commonly known as: oxyCONTIN  Used for: S/P BKA (below knee amputation), left (H)      Dose: 20 mg  Take 1 tablet (20 mg) by mouth every 12 hours  Quantity: 20 tablet  Refills: 0     * oxyCODONE 5 MG tablet  Commonly known as: ROXICODONE  Used for: S/P BKA (below knee amputation), left (H)      Dose: 10 mg  Take 2 tablets (10 mg) by mouth every 4 hours as needed for moderate pain  Quantity: 30 tablet  Refills: 0     polyethylene glycol 17 GM/Dose powder  Commonly known as: MIRALAX  Used for: Colostomy care (H)      Dose: 17 g  Take 17 g by mouth 2 times daily as needed for constipation  Quantity: 510 g  Refills: 0     polyethylene glycol-propylene glycol 0.4-0.3 % Soln ophthalmic solution  Commonly known as: SYSTANE ULTRA      Dose: 1 drop  Apply 1 drop to eye 4 times daily as needed for dry eyes  Refills: 0     prednisoLONE acetate 1 % ophthalmic suspension  Commonly known as: PRED FORTE      Dose: 1 drop  Place 1 drop into both eyes 2 times daily Per taper  Refills: 0     pregabalin 150 MG capsule  Commonly known as: LYRICA  Used for: Necrotizing fasciitis of lower leg (H), S/P BKA  (below knee amputation), left (H)      Dose: 150 mg  Take 1 capsule (150 mg) by mouth 3 times daily  Quantity: 30 capsule  Refills: 0     semaglutide 2 MG/3ML pen  Commonly known as: OZEMPIC  Used for: Class 1 obesity with serious comorbidity and body mass index (BMI) of 32.0 to 32.9 in adult, unspecified obesity type      Inject 0.25mg subcutaneous every 7 days for 4 weeks, then increase to inject 0.5mg subcutaneous every 7 days.  Quantity: 3 mL  Refills: 2     senna-docusate 8.6-50 MG tablet  Commonly known as: SENOKOT-S/PERICOLACE      Dose: 2 tablet  Take 2 tablets by mouth 2 times daily  Refills: 0     sodium hypochlorite external solution  Generic drug: sodium hypochlorite      Apply 15 mL topically four times a day.  Refills: 0     tamsulosin 0.4 MG capsule  Commonly known as: FLOMAX      Dose: 1 capsule  Take 1 capsule by mouth daily  Refills: 0     * Wegovy 0.25 MG/0.5ML pen  Used for: Class 1 obesity with serious comorbidity and body mass index (BMI) of 32.0 to 32.9 in adult, unspecified obesity type  Generic drug: Semaglutide-Weight Management      Dose: 0.25 mg  Inject 0.25 mg Subcutaneous once a week For 4 weeks  Quantity: 2 mL  Refills: 0     * Wegovy 0.5 MG/0.5ML pen  Used for: Class 1 obesity with serious comorbidity and body mass index (BMI) of 32.0 to 32.9 in adult, unspecified obesity type  Generic drug: Semaglutide-Weight Management      Dose: 0.5 mg  Start taking on: November 23, 2023  Inject 0.5 mg Subcutaneous once a week After completing 4 weeks of 0.25mg dose  Quantity: 2 mL  Refills: 2           * This list has 4 medication(s) that are the same as other medications prescribed for you. Read the directions carefully, and ask your doctor or other care provider to review them with you.                CONTINUE these medicines which have NOT CHANGED        Dose / Directions   Cranberry 400 MG Caps      Dose: 250 mg  Take 250 mg by mouth daily  Refills: 0     melatonin 5 MG tablet  Used for:  Insomnia, unspecified type      Dose: 5 mg  Take 1 tablet (5 mg) by mouth nightly as needed for sleep  Refills: 0              Past medical history, past surgical history, medications, and allergies were reviewed with the patient. Additional pertinent items: None    No family history on file.    Social History     Tobacco Use    Smoking status: Former     Types: Cigarettes     Quit date: 2011     Years since quittin.0    Smokeless tobacco: Not on file   Substance Use Topics    Alcohol use: No     Social history was reviewed with the patient. Additional pertinent items: None    Allergies   Allergen Reactions    Adhesive Tape Hives     From tape from surgery    Benzoin Hives and Rash    Droperidol Anaphylaxis    Prednisone      vomiting    Vitamin D Rash     flairs up his sarcoidosis       Review of Systems  A medically appropriate review of systems was performed with pertinent positives and negatives noted in the HPI, and all other systems negative.    Physical Exam   BP: 126/79  Pulse: 97  Temp: 98.2  F (36.8  C)  Resp: 16  Height: 182.9 cm (6')  Weight: 104.3 kg (230 lb)  SpO2: 97 %      Physical Exam    ED Course     ED Course as of 23 1343   Tue 2023   1313 Case discussed with Orthopedics   1320 Spoke with [facility] imaging department, they are pushing images to our PACs system [phone]     Procedures         Case discussed with orthopedics based on all the information known and they recommended hospitalization to the medicine service with them consulting.    Outside sensitivities done on the Pseudomonas revealed multiple resistances but it is sensitive to cefepime.    Orders Placed This Encounter   Procedures    CRP inflammation    Comprehensive metabolic panel    CBC with platelets and differential    Peripheral IV catheter    Admit to Inpatient    CBC with platelets differential     Medications   sodium chloride (PF) 0.9% PF flush 3 mL ( Intracatheter Canceled Entry 23  0178)   sodium chloride (PF) 0.9% PF flush 3 mL (has no administration in time range)   sodium chloride 0.9% BOLUS 500 mL (500 mLs Intravenous $New Bag 11/7/23 1337)   sodium chloride 0.9 % infusion (has no administration in time range)   HYDROmorphone (PF) (DILAUDID) injection 0.5 mg (0.5 mg Intravenous $Given 11/7/23 1338)   ceFEPIme (MAXIPIME) 1 g vial to attach to  mL bag for ADULTS or NS 50 mL bag for PEDS (has no administration in time range)      Critical care was not performed.     Medical Decision Making  The patient's presentation was of moderate complexity (an acute illness with systemic symptoms).    The patient's evaluation involved:  ordering and/or review of 3+ test(s) in this encounter (see above with most tests pending at the time of this dictation)  discussion of management or test interpretation with another health professional (Case discussed with orthopedics)    The patient's management necessitated high risk (a decision regarding hospitalization).      Assessments & Plan (with Medical Decision Making)     I have reviewed the nursing notes.  At this time patient be admitted to medicine for IV antibiotics with orthopedics consulting    I have reviewed the findings, diagnosis, plan and need for follow up with the patient.        Final diagnoses:   Infection of amputation stump of left lower extremity (H) - pseudomonas       UVALDO VERA MD    11/7/2023   [facility] EMERGENCY DEPARTMENT       Uvaldo Vera MD  11/07/23 2795

## 2023-11-07 NOTE — CONSULTS
[facility]  Orthopedic Surgery Consult    Name: Saqib Bishop  Age: 42 year old  MRN: [id number]  YOB: 1980    Reason for Consult: Left BKA infection    Requesting Provider: Tommy Delgado MD    Assessment and Plan:     Assessment:  Saqib Bishop is a 42-year-old man with past medical history of left lower extremity necrotizing fasciitis status post left guillotine BKA with vascular surgery on 08/27, that required I&D on 08/30/2023 with Dr. Maier, who required subsequent revisions of the BKA on 09/02 and 09/07 with Dr. Rehman and Dr. Gates respectively, who has a soft tissue infection of the BKA site positive for Pseudomonas.  He had positive cultures drawn on 11/3.  CT shows soft tissue abscess, does not appear to show obvious osteomyelitis.  He is not not septic and otherwise feeling well.  I do think the degree of infection is concerning enough to at least require IV antibiotics at this time.  There is likely a possibility that he will need formal I&D in the OR with a possible wound revision.  We will discuss with staff the indication for doing this urgently versus initiating medical management and planning this for down the road.    Plan:  Admit to medicine  - Plan for OR: I&D of the LLE, possible wound vac, possible vancomycin beads  - Anticoagulation/DVT prophylaxis: Per primary, plan to hold overnight for possible OR tomorrow  - Antibiotics: Vanc/Cefepime, periop Ancef  - Imaging: Completed  - Activity: As tolerated with weightbearing restriction  - Weight bearing: NWB LLE  - Pain control: Per primary  - Diet: N.p.o. overnight for possible OR  - Follow-up: TBD  - Disposition: Admit to the medicine service for antibiotics    Staff: Etienne Maier MD    Respectfully,    José Manuel Gavin MD  Orthopedic Surgery PGY1  [phone]    Please page me directly with any questions/concerns during regular weekday hours before 5 pm. If there is no response, if it is a  weekend, or if it is during evening hours then please page the orthopedic surgery resident on call.    History of Present Illness:     Patient was seen and examined by me. History, PMH, Meds, SH, complete ROS (10 organ systems) and PE reviewed with patient and prior medical records.      Saqib Bishop is a 42 year old male with LLE necrotizing fascitis s/p left guilotine BKA 08/27/23 with Vascular who underwent an I&D on 08/30/23 with Dr. Maier, followed by BKA revisions on 09/02 and 09/07/23 with Dr. Rehman and Dr. Gates respectively who returns due to new drainage from the BKA site found to be positive for polymicrobial infection that had pseudomonas after evaluation at an OSH. Patient was discharged home after evaluation on 11/03/23 with Levofloxacin and Bactrim with plan for outpatient follow-up. Was advised after the results of the cultures to be seen sooner for further evaluation.  States today that he has no increased pain in the limb.  He has not had any fevers or chills.  States that the drainage from the wound has not changed drastically from his previous admission.  His father saw the wound and this caused him concern enough to get him to go to the [facility] emergency department.  No acute worsening of his phantom limb pains.     Past Medical History:     Past Medical History:   Diagnosis Date    Brain injury with brief loss of consciousness (H) 12/14/2015    Candida esophagitis (H) 08/03/2022    Degenerative joint disease     Gastro-oesophageal reflux disease     Hypercalcemia 08/03/2022    Hypokalemia 08/03/2022    Hypomagnesemia 08/03/2022       Past Surgical History:     Past Surgical History:   Procedure Laterality Date    AMPUTATE LEG BELOW KNEE Left 8/27/2023    Procedure: Left below knee Guillotine Amputation;  Surgeon: Sesar Barth MD;  Location: [facility] OR    AMPUTATE LEG BELOW KNEE Left 9/2/2023    Procedure: Irrigation and Debridement leg below knee, wound vac application, Left;   Surgeon: Juan Rehman MD;  [facility]    AMPUTATE LEG BELOW KNEE Left 2023    Procedure: Below Left Knee Amputation;  Surgeon: Semaj Gates MD;  [facility]    BACK SURGERY      lumbar fusion    EXPLORE SPINE, REMOVE HARDWARE, COMBINED  2012    Procedure:COMBINED EXPLORE SPINE, REMOVE HARDWARE; EXPLORE SPINE, REMOVE HARDWARE L4-S1; Surgeon:FAM GONZALEZ; Location:[facility] OR    IRRIGATION AND DEBRIDEMENT LOWER EXTREMITY, COMBINED Left 2023    Procedure: Irrigation and debridement lower extremity, combined and wound vac exchange;  Surgeon: Etienne Maier MD;  Location: [facility] OR    IRRIGATION AND DEBRIDEMENT LOWER EXTREMITY, COMBINED Left 2023    Procedure: IRRIGATION AND DEBRIDEMENT, LEFT LOWER EXTREMITY WITH WOUND VAC Removal;  Surgeon: Semaj Gates MD;  [facility]    ORTHOPEDIC SURGERY      right foot surgery       Social History:   Lives independently in a house in [place], [place]  Uses a wheelchair for mobilization  Former smoker  Denies alcohol use  No other drug use  Social History     Socioeconomic History    Marital status: Single   Tobacco Use    Smoking status: Former     Types: Cigarettes     Quit date: 2011     Years since quittin.0   Substance and Sexual Activity    Alcohol use: No    Drug use: No       Family History:   Denies personal or family history of anesthesia complications or clotting disorders.    Medications:     Current Facility-Administered Medications   Medication    ceFEPIme (MAXIPIME) 1 g vial to attach to  mL bag for ADULTS or NS 50 mL bag for PEDS    HYDROmorphone (PF) (DILAUDID) injection 0.5 mg    oxyCODONE (ROXICODONE) tablet 5 mg    pharmacy alert - intermittent dosing    sodium chloride (PF) 0.9% PF flush 3 mL    sodium chloride (PF) 0.9% PF flush 3 mL    sodium chloride 0.9 % infusion    sodium chloride 0.9% BOLUS 500 mL     Current Outpatient Medications   Medication Sig    acetaminophen  (TYLENOL) 500 MG tablet Take 1,000 mg by mouth 3 times daily as needed for fever or mild pain    buPROPion (WELLBUTRIN XL) 150 MG 24 hr tablet Take 450 mg by mouth daily    busPIRone (BUSPAR) 10 MG tablet Take 1 tablet by mouth 2 times daily    clindamycin (CLEOCIN T) 1 % external solution Apply topically 2 times daily as needed (to face and scalp for acne and folliculitis)    CONSTULOSE 10 GM/15ML solution Take 10 g by mouth    Cranberry 400 MG CAPS Take 250 mg by mouth daily    cyclobenzaprine (FLEXERIL) 10 MG tablet Take 10 mg by mouth 3 times daily as needed for muscle spasms    diphenhydrAMINE-zinc acetate (BENADRYL) 1-0.1 % external cream Apply topically 4 times daily as needed for itching or other (rash)    DULoxetine (CYMBALTA) 60 MG capsule Take 60 mg by mouth 2 times daily    fluticasone (FLONASE) 50 MCG/ACT nasal spray Spray 1 spray into both nostrils 2 times daily as needed for rhinitis    hydroxychloroquine (PLAQUENIL) 200 MG tablet Resumption timing per recs of ortho.    loratadine (CLARITIN) 10 MG tablet Take 1 tablet by mouth daily    losartan-hydrochlorothiazide (HYZAAR) 50-12.5 MG tablet Take 0.5 tablets by mouth daily    melatonin 5 MG tablet Take 1 tablet (5 mg) by mouth nightly as needed for sleep    omeprazole (PRILOSEC) 40 MG DR capsule Take 40 mg by mouth 2 times daily    oxyBUTYnin ER (DITROPAN XL) 10 MG 24 hr tablet Take 10 mg by mouth    oxyCODONE (OXYCONTIN) 20 MG 12 hr tablet Take 1 tablet (20 mg) by mouth every 12 hours    oxyCODONE (ROXICODONE) 5 MG tablet Take 2 tablets (10 mg) by mouth every 4 hours as needed for moderate pain    polyethylene glycol (MIRALAX) 17 GM/Dose powder Take 17 g by mouth 2 times daily as needed for constipation    polyethylene glycol-propylene glycol (SYSTANE ULTRA) 0.4-0.3 % SOLN ophthalmic solution Apply 1 drop to eye 4 times daily as needed for dry eyes    prednisoLONE acetate (PRED FORTE) 1 % ophthalmic suspension Place 1 drop into both eyes 2 times daily  Per taper    pregabalin (LYRICA) 150 MG capsule Take 1 capsule (150 mg) by mouth 3 times daily    semaglutide (OZEMPIC) 2 MG/3ML pen Inject 0.25mg subcutaneous every 7 days for 4 weeks, then increase to inject 0.5mg subcutaneous every 7 days.    Semaglutide-Weight Management (WEGOVY) 0.25 MG/0.5ML pen Inject 0.25 mg Subcutaneous once a week For 4 weeks    [START ON 11/23/2023] Semaglutide-Weight Management (WEGOVY) 0.5 MG/0.5ML pen Inject 0.5 mg Subcutaneous once a week After completing 4 weeks of 0.25mg dose    senna-docusate (SENOKOT-S/PERICOLACE) 8.6-50 MG tablet Take 2 tablets by mouth 2 times daily    sodium hypochlorite external solution Apply 15 mL topically four times a day.    tamsulosin (FLOMAX) 0.4 MG capsule Take 1 capsule by mouth daily       Allergies:     Allergies   Allergen Reactions    Adhesive Tape Hives     From tape from surgery    Benzoin Hives and Rash    Droperidol Anaphylaxis    Prednisone      vomiting    Vitamin D Rash     flairs up his sarcoidosis       Review of Systems:     A comprehensive 10 point review of systems (constitutional, ENT, cardiac, peripheral vascular, respiratory, GI, , musculoskeletal, skin, neurological) was performed and found to be negative except as described in this note.      Physical Exam:     Vital Signs: /79   Pulse 97   Temp 98.2  F (36.8  C) (Oral)   Resp 16   Ht 1.829 m (6')   Wt 104.3 kg (230 lb)   SpO2 97%   BMI 31.19 kg/m    General: Resting comfortably seated in wheelchair, awake, alert, no apparent distress, appears stated age.    Musculoskeletal:  LLE: Below-knee amputation with adequate soft tissue padding.  No palpable prominence of the tibia.  The previous BKA incision appears to have areas of dehiscence, particularly over the anterior aspect of the incision.  There is mild amount of surrounding erythema.  No expressible drainage, but obvious purulence over the anterior aspect.  There is a superficial wound with a small amount of  early granulation tissue over the lateral aspect of the incision there is diffuse superficial skin where throughout the rest of the incision area.  Small amount of drainage at the medial aspect.  Able to flex and extend the knee.  Compartments are soft and compressible.  No area of palpable fluctuance.  No wounds over the posterior or distal aspect of the stump.  Stump is well perfused.     Imaging:     Review of the CT of the left lower extremity demonstrates no obvious osteomyelitis.  There is what appears to be a fluid collection in the distal soft tissues.  No acute fractures or other bony abnormalities.    Data:     CBC:  Lab Results   Component Value Date    WBC 4.1 09/14/2023    HGB 11.6 (L) 09/14/2023     09/14/2023     BMP:  Lab Results   Component Value Date     11/07/2023    POTASSIUM 4.0 11/07/2023    CHLORIDE 103 11/07/2023    CO2 30 (H) 11/07/2023    BUN 13.7 11/07/2023    CR 0.86 11/07/2023    ANIONGAP 8 11/07/2023    HILDA 8.9 11/07/2023     (H) 11/07/2023     Inflammatory Markers:  Lab Results   Component Value Date    WBC 4.1 09/14/2023    WBC 4.7 09/11/2023    WBC 9.1 09/09/2023    SED 55 (H) 09/09/2023    SED 47 (H) 09/08/2023    SED 1 09/07/2023   CRP: 30.14    Cultures at outside hospital are positive for Pseudomonas aeruginosa resistant to Zosyn.  Did have 1+ gram-positive cocci.

## 2023-11-07 NOTE — ED NOTES
[facility]   ED Nurse to Floor Handoff     Sqaib Bishop is a 42 year old male who speaks English and lives alone,  in a home  They arrived in the ED by car from home    ED Chief Complaint: Wound Infection (Lt stump infection x 1 week. Reddish drainage.)    ED Dx;   Final diagnoses:   Infection of amputation stump of left lower extremity (H) - pseudomonas         Needed?: No    Allergies:   Allergies   Allergen Reactions    Adhesive Tape Hives     From tape from surgery    Benzoin Hives and Rash    Droperidol Anaphylaxis    Prednisone      vomiting    Vitamin D Rash     flairs up his sarcoidosis   .  Past Medical Hx:   Past Medical History:   Diagnosis Date    Brain injury with brief loss of consciousness (H) 12/14/2015    Candida esophagitis (H) 08/03/2022    Degenerative joint disease     Gastro-oesophageal reflux disease     Hypercalcemia 08/03/2022    Hypokalemia 08/03/2022    Hypomagnesemia 08/03/2022      Baseline Mental status: WDL  Current Mental Status changes: at basesline    Infection present or suspected this encounter: yes skin/wound/contact  Sepsis suspected: No  Isolation type: No active isolations  Patient tested for COVID 19 prior to admission: NO     Activity level - Baseline/Home:  Wheelchair  Activity Level - Current:   Wheelchair    Bariatric equipment needed?: No    In the ED these meds were given:   Medications   sodium chloride (PF) 0.9% PF flush 3 mL ( Intracatheter Canceled Entry 11/7/23 1338)   sodium chloride (PF) 0.9% PF flush 3 mL (has no administration in time range)   sodium chloride 0.9 % infusion (has no administration in time range)   HYDROmorphone (PF) (DILAUDID) injection 0.5 mg (0.5 mg Intravenous $Given 11/7/23 1338)   ceFEPIme (MAXIPIME) 1 g vial to attach to  mL bag for ADULTS or NS 50 mL bag for PEDS (0 g Intravenous Stopped 11/7/23 5592)   vancomycin (VANCOCIN) 1,500 mg in 0.9% NaCl 250 mL intermittent  infusion (1,500 mg Intravenous $New Bag 11/7/23 5962)   sodium chloride 0.9% BOLUS 500 mL (500 mLs Intravenous $New Bag 11/7/23 1337)   oxyCODONE (ROXICODONE) tablet 5 mg (5 mg Oral $Given 11/7/23 1430)       Drips running?  Yes    Home pump  No    Current LDAs  Peripheral IV 11/07/23 Right Antecubital fossa (Active)   Number of days: 0       Colostomy (Active)   Number of days: 72       Wound Pressure injury community acquired (Active)   Number of days: 72       Wound (used by OP [facility] only) 01/26/23 0905 Right sacral pressure injury (Active)   Number of days: 285       Incision/Surgical Site 09/07/23 Left;Lower Leg (Active)   Number of days: 61       Labs results:   Labs Ordered and Resulted from Time of ED Arrival to Time of ED Departure   CRP INFLAMMATION - Abnormal       Result Value    CRP Inflammation 30.14 (*)    COMPREHENSIVE METABOLIC PANEL - Abnormal    Sodium 141      Potassium 4.0      Carbon Dioxide (CO2) 30 (*)     Anion Gap 8      Urea Nitrogen 13.7      Creatinine 0.86      GFR Estimate >90      Calcium 8.9      Chloride 103      Glucose 116 (*)     Alkaline Phosphatase 133 (*)     AST 37      ALT 21      Protein Total 7.9      Albumin 3.4 (*)     Bilirubin Total 0.2     CBC WITH PLATELETS AND DIFFERENTIAL - Abnormal    WBC Count 4.1      RBC Count 5.44      Hemoglobin 13.0 (*)     Hematocrit 42.5      MCV 78      MCH 23.9 (*)     MCHC 30.6 (*)     RDW 15.9 (*)     Platelet Count 281      % Neutrophils 60      % Lymphocytes 23      % Monocytes 11      % Eosinophils 4      % Basophils 1      % Immature Granulocytes 1      NRBCs per 100 WBC 0      Absolute Neutrophils 2.5      Absolute Lymphocytes 0.9      Absolute Monocytes 0.5      Absolute Eosinophils 0.2      Absolute Basophils 0.0      Absolute Immature Granulocytes 0.0      Absolute NRBCs 0.0     PARTIAL THROMBOPLASTIN TIME - Normal    aPTT 33     INR   ERYTHROCYTE SEDIMENTATION RATE AUTO   TYPE AND SCREEN, ADULT   RED BLOOD CELLS HAVE AVAILABLE  (UNIT)   BLOOD CULTURE   ABO/RH TYPE AND SCREEN       Imaging Studies: No results found for this or any previous visit (from the past 24 hour(s)).    Recent vital signs:   /79   Pulse 97   Temp 98.2  F (36.8  C) (Oral)   Resp 16   Ht 1.829 m (6')   Wt 104.3 kg (230 lb)   SpO2 97%   BMI 31.19 kg/m      [place] Coma Scale Score: 15 (11/07/23 1232)       Cardiac Rhythm: Other not on monitor  Pt needs tele? No  Skin/wound Issues:  BKA wound infection    Code Status: Full Code    Pain control: poor    Nausea control: good    Abnormal labs/tests/findings requiring intervention: labs pending    Family present during ED course? Yes   Family Comments/Social Situation comments: parents at bedside    Tasks needing completion:  primary fluids    Yareli Corbin RN  [facility] --   [phone] [facility]

## 2023-11-07 NOTE — PHARMACY-VANCOMYCIN DOSING SERVICE
Pharmacy Vancomycin Initial Note  Date of Service 2023  Patient's  1980  42 year old, male    Indication: Skin and Soft Tissue Infection    Current estimated CrCl = Estimated Creatinine Clearance: 139.8 mL/min (based on SCr of 0.86 mg/dL).    Creatinine for last 3 days  2023:  1:26 PM Creatinine 0.86 mg/dL    Recent Vancomycin Level(s) for last 3 days  No results found for requested labs within last 3 days.      Vancomycin IV Administrations (past 72 hours)        No vancomycin orders with administrations in past 72 hours.                    Nephrotoxins and other renal medications (From now, onward)      Start     Dose/Rate Route Frequency Ordered Stop    23 1510  vancomycin (VANCOCIN) 1,500 mg in 0.9% NaCl 250 mL intermittent infusion         1,500 mg  over 90 Minutes Intravenous EVERY 12 HOURS 23 1507              Contrast Orders - past 72 hours (72h ago, onward)      None            Insight[employer] Prediction of Planned Initial Vancomycin Regimen  Loading dose: N/A  Regimen: 1500 mg IV every 12 hours.  Start time: 15:10 on 2023  Exposure target: AUC24 (range)400-600 mg/L.hr   AUC24,ss: 546 mg/L.hr  Probability of AUC24 > 400: 81 %  Ctrough,ss: 16.9 mg/L  Probability of Ctrough,ss > 20: 36 %  Probability of nephrotoxicity (Lodise ANNE ): 13 %       Plan:  Start vancomycin  1500 mg IV q12h.   Vancomycin monitoring method: AUC  Vancomycin therapeutic monitoring goal: 400-600 mg*h/L  Pharmacy will check vancomycin levels as appropriate in 1-3 Days.    Serum creatinine levels will be ordered daily for the first week of therapy and at least twice weekly for subsequent weeks.      MARIJA HAQUE [facility]

## 2023-11-08 ENCOUNTER — ANESTHESIA (OUTPATIENT)
Dept: SURGERY | Facility: CLINIC | Age: 43
DRG: 492 | End: 2023-11-08
Payer: MEDICARE

## 2023-11-08 ENCOUNTER — ANESTHESIA EVENT (OUTPATIENT)
Dept: SURGERY | Facility: CLINIC | Age: 43
DRG: 492 | End: 2023-11-08
Payer: MEDICARE

## 2023-11-08 LAB
ANION GAP SERPL CALCULATED.3IONS-SCNC: 4 MMOL/L (ref 7–15)
BASOPHILS # BLD AUTO: 0 10E3/UL (ref 0–0.2)
BASOPHILS NFR BLD AUTO: 1 %
BUN SERPL-MCNC: 15.3 MG/DL (ref 6–20)
CALCIUM SERPL-MCNC: 8.4 MG/DL (ref 8.6–10)
CHLORIDE SERPL-SCNC: 105 MMOL/L (ref 98–107)
CREAT SERPL-MCNC: 0.99 MG/DL (ref 0.67–1.17)
CRP SERPL-MCNC: 19.36 MG/L
DEPRECATED HCO3 PLAS-SCNC: 31 MMOL/L (ref 22–29)
EGFRCR SERPLBLD CKD-EPI 2021: >90 ML/MIN/1.73M2
EOSINOPHIL # BLD AUTO: 0.2 10E3/UL (ref 0–0.7)
EOSINOPHIL NFR BLD AUTO: 4 %
ERYTHROCYTE [DISTWIDTH] IN BLOOD BY AUTOMATED COUNT: 15.8 % (ref 10–15)
GLUCOSE SERPL-MCNC: 102 MG/DL (ref 70–99)
HCT VFR BLD AUTO: 40.1 % (ref 40–53)
HGB BLD-MCNC: 12.1 G/DL (ref 13.3–17.7)
IMM GRANULOCYTES # BLD: 0 10E3/UL
IMM GRANULOCYTES NFR BLD: 0 %
LYMPHOCYTES # BLD AUTO: 0.7 10E3/UL (ref 0.8–5.3)
LYMPHOCYTES NFR BLD AUTO: 20 %
MCH RBC QN AUTO: 24 PG (ref 26.5–33)
MCHC RBC AUTO-ENTMCNC: 30.2 G/DL (ref 31.5–36.5)
MCV RBC AUTO: 80 FL (ref 78–100)
MONOCYTES # BLD AUTO: 0.5 10E3/UL (ref 0–1.3)
MONOCYTES NFR BLD AUTO: 14 %
NEUTROPHILS # BLD AUTO: 2.2 10E3/UL (ref 1.6–8.3)
NEUTROPHILS NFR BLD AUTO: 61 %
NRBC # BLD AUTO: 0 10E3/UL
NRBC BLD AUTO-RTO: 0 /100
PLATELET # BLD AUTO: 225 10E3/UL (ref 150–450)
POTASSIUM SERPL-SCNC: 4.1 MMOL/L (ref 3.4–5.3)
RBC # BLD AUTO: 5.04 10E6/UL (ref 4.4–5.9)
SODIUM SERPL-SCNC: 140 MMOL/L (ref 135–145)
WBC # BLD AUTO: 3.6 10E3/UL (ref 4–11)

## 2023-11-08 PROCEDURE — 250N000009 HC RX 250: Performed by: NURSE ANESTHETIST, CERTIFIED REGISTERED

## 2023-11-08 PROCEDURE — 250N000025 HC SEVOFLURANE, PER MIN: Performed by: ORTHOPAEDIC SURGERY

## 2023-11-08 PROCEDURE — 87102 FUNGUS ISOLATION CULTURE: CPT | Performed by: ORTHOPAEDIC SURGERY

## 2023-11-08 PROCEDURE — 80048 BASIC METABOLIC PNL TOTAL CA: CPT | Performed by: PEDIATRICS

## 2023-11-08 PROCEDURE — 360N000075 HC SURGERY LEVEL 2, PER MIN: Performed by: ORTHOPAEDIC SURGERY

## 2023-11-08 PROCEDURE — 86140 C-REACTIVE PROTEIN: CPT | Performed by: PEDIATRICS

## 2023-11-08 PROCEDURE — 120N000002 HC R&B MED SURG/OB UMMC

## 2023-11-08 PROCEDURE — 85025 COMPLETE CBC W/AUTO DIFF WBC: CPT | Performed by: PEDIATRICS

## 2023-11-08 PROCEDURE — 258N000001 HC RX 258: Performed by: ORTHOPAEDIC SURGERY

## 2023-11-08 PROCEDURE — 250N000011 HC RX IP 250 OP 636: Performed by: ORTHOPAEDIC SURGERY

## 2023-11-08 PROCEDURE — 272N000001 HC OR GENERAL SUPPLY STERILE: Performed by: ORTHOPAEDIC SURGERY

## 2023-11-08 PROCEDURE — 710N000010 HC RECOVERY PHASE 1, LEVEL 2, PER MIN: Performed by: ORTHOPAEDIC SURGERY

## 2023-11-08 PROCEDURE — 250N000011 HC RX IP 250 OP 636: Performed by: EMERGENCY MEDICINE

## 2023-11-08 PROCEDURE — 370N000017 HC ANESTHESIA TECHNICAL FEE, PER MIN: Performed by: ORTHOPAEDIC SURGERY

## 2023-11-08 PROCEDURE — 11981 INSERTION DRUG DLVR IMPLANT: CPT | Mod: 78 | Performed by: ORTHOPAEDIC SURGERY

## 2023-11-08 PROCEDURE — 99233 SBSQ HOSP IP/OBS HIGH 50: CPT | Performed by: PEDIATRICS

## 2023-11-08 PROCEDURE — 258N000003 HC RX IP 258 OP 636: Performed by: PEDIATRICS

## 2023-11-08 PROCEDURE — 87075 CULTR BACTERIA EXCEPT BLOOD: CPT | Performed by: ORTHOPAEDIC SURGERY

## 2023-11-08 PROCEDURE — 258N000003 HC RX IP 258 OP 636: Performed by: NURSE ANESTHETIST, CERTIFIED REGISTERED

## 2023-11-08 PROCEDURE — 258N000003 HC RX IP 258 OP 636: Performed by: EMERGENCY MEDICINE

## 2023-11-08 PROCEDURE — 36415 COLL VENOUS BLD VENIPUNCTURE: CPT | Performed by: PEDIATRICS

## 2023-11-08 PROCEDURE — 250N000013 HC RX MED GY IP 250 OP 250 PS 637: Performed by: STUDENT IN AN ORGANIZED HEALTH CARE EDUCATION/TRAINING PROGRAM

## 2023-11-08 PROCEDURE — 250N000011 HC RX IP 250 OP 636: Performed by: NURSE ANESTHETIST, CERTIFIED REGISTERED

## 2023-11-08 PROCEDURE — 99223 1ST HOSP IP/OBS HIGH 75: CPT | Performed by: STUDENT IN AN ORGANIZED HEALTH CARE EDUCATION/TRAINING PROGRAM

## 2023-11-08 PROCEDURE — 250N000009 HC RX 250: Performed by: ORTHOPAEDIC SURGERY

## 2023-11-08 PROCEDURE — 87176 TISSUE HOMOGENIZATION CULTR: CPT | Performed by: ORTHOPAEDIC SURGERY

## 2023-11-08 PROCEDURE — 3E013GC INTRODUCTION OF OTHER THERAPEUTIC SUBSTANCE INTO SUBCUTANEOUS TISSUE, PERCUTANEOUS APPROACH: ICD-10-PCS | Performed by: ORTHOPAEDIC SURGERY

## 2023-11-08 PROCEDURE — C1713 ANCHOR/SCREW BN/BN,TIS/BN: HCPCS | Performed by: ORTHOPAEDIC SURGERY

## 2023-11-08 PROCEDURE — 250N000011 HC RX IP 250 OP 636: Performed by: PEDIATRICS

## 2023-11-08 PROCEDURE — 250N000013 HC RX MED GY IP 250 OP 250 PS 637: Performed by: PEDIATRICS

## 2023-11-08 PROCEDURE — 250N000011 HC RX IP 250 OP 636: Performed by: INTERNAL MEDICINE

## 2023-11-08 PROCEDURE — 999N000141 HC STATISTIC PRE-PROCEDURE NURSING ASSESSMENT: Performed by: ORTHOPAEDIC SURGERY

## 2023-11-08 PROCEDURE — 250N000011 HC RX IP 250 OP 636: Performed by: STUDENT IN AN ORGANIZED HEALTH CARE EDUCATION/TRAINING PROGRAM

## 2023-11-08 PROCEDURE — 250N000013 HC RX MED GY IP 250 OP 250 PS 637

## 2023-11-08 PROCEDURE — G0463 HOSPITAL OUTPT CLINIC VISIT: HCPCS

## 2023-11-08 PROCEDURE — 0QBH0ZZ EXCISION OF LEFT TIBIA, OPEN APPROACH: ICD-10-PCS | Performed by: ORTHOPAEDIC SURGERY

## 2023-11-08 DEVICE — BONE CEMENT SIMPLEX W/TOBRAMYCIN 6197-9-001
Type: IMPLANTABLE DEVICE | Site: LEG | Status: NON-FUNCTIONAL
Removed: 2023-11-15

## 2023-11-08 RX ORDER — MAGNESIUM HYDROXIDE 1200 MG/15ML
LIQUID ORAL PRN
Status: DISCONTINUED | OUTPATIENT
Start: 2023-11-08 | End: 2023-11-08 | Stop reason: HOSPADM

## 2023-11-08 RX ORDER — OXYCODONE HYDROCHLORIDE 5 MG/1
5 TABLET ORAL EVERY 4 HOURS PRN
Status: DISCONTINUED | OUTPATIENT
Start: 2023-11-08 | End: 2023-11-09

## 2023-11-08 RX ORDER — ONDANSETRON 2 MG/ML
INJECTION INTRAMUSCULAR; INTRAVENOUS PRN
Status: DISCONTINUED | OUTPATIENT
Start: 2023-11-08 | End: 2023-11-08

## 2023-11-08 RX ORDER — HYDROMORPHONE HYDROCHLORIDE 1 MG/ML
0.2 INJECTION, SOLUTION INTRAMUSCULAR; INTRAVENOUS; SUBCUTANEOUS EVERY 5 MIN PRN
Status: DISCONTINUED | OUTPATIENT
Start: 2023-11-08 | End: 2023-11-08 | Stop reason: HOSPADM

## 2023-11-08 RX ORDER — OXYCODONE HYDROCHLORIDE 10 MG/1
10 TABLET ORAL EVERY 4 HOURS PRN
Status: DISCONTINUED | OUTPATIENT
Start: 2023-11-08 | End: 2023-11-09

## 2023-11-08 RX ORDER — SODIUM CHLORIDE, SODIUM LACTATE, POTASSIUM CHLORIDE, CALCIUM CHLORIDE 600; 310; 30; 20 MG/100ML; MG/100ML; MG/100ML; MG/100ML
INJECTION, SOLUTION INTRAVENOUS CONTINUOUS
Status: DISCONTINUED | OUTPATIENT
Start: 2023-11-08 | End: 2023-11-08 | Stop reason: HOSPADM

## 2023-11-08 RX ORDER — ONDANSETRON 4 MG/1
4 TABLET, ORALLY DISINTEGRATING ORAL EVERY 30 MIN PRN
Status: DISCONTINUED | OUTPATIENT
Start: 2023-11-08 | End: 2023-11-08 | Stop reason: HOSPADM

## 2023-11-08 RX ORDER — LIDOCAINE HYDROCHLORIDE 20 MG/ML
INJECTION, SOLUTION INFILTRATION; PERINEURAL PRN
Status: DISCONTINUED | OUTPATIENT
Start: 2023-11-08 | End: 2023-11-08

## 2023-11-08 RX ORDER — PROPOFOL 10 MG/ML
INJECTION, EMULSION INTRAVENOUS PRN
Status: DISCONTINUED | OUTPATIENT
Start: 2023-11-08 | End: 2023-11-08

## 2023-11-08 RX ORDER — CEFAZOLIN SODIUM 2 G/100ML
2 INJECTION, SOLUTION INTRAVENOUS SEE ADMIN INSTRUCTIONS
Status: DISCONTINUED | OUTPATIENT
Start: 2023-11-08 | End: 2023-11-08 | Stop reason: HOSPADM

## 2023-11-08 RX ORDER — ONDANSETRON 2 MG/ML
4 INJECTION INTRAMUSCULAR; INTRAVENOUS EVERY 30 MIN PRN
Status: DISCONTINUED | OUTPATIENT
Start: 2023-11-08 | End: 2023-11-08 | Stop reason: HOSPADM

## 2023-11-08 RX ORDER — CEFAZOLIN SODIUM 2 G/100ML
2 INJECTION, SOLUTION INTRAVENOUS
Status: DISCONTINUED | OUTPATIENT
Start: 2023-11-08 | End: 2023-11-08 | Stop reason: HOSPADM

## 2023-11-08 RX ORDER — DEXAMETHASONE SODIUM PHOSPHATE 4 MG/ML
INJECTION, SOLUTION INTRA-ARTICULAR; INTRALESIONAL; INTRAMUSCULAR; INTRAVENOUS; SOFT TISSUE PRN
Status: DISCONTINUED | OUTPATIENT
Start: 2023-11-08 | End: 2023-11-08

## 2023-11-08 RX ORDER — OXYCODONE HYDROCHLORIDE 10 MG/1
10 TABLET ORAL
Status: DISCONTINUED | OUTPATIENT
Start: 2023-11-08 | End: 2023-11-08 | Stop reason: HOSPADM

## 2023-11-08 RX ORDER — VANCOMYCIN HYDROCHLORIDE 1 G/20ML
INJECTION, POWDER, LYOPHILIZED, FOR SOLUTION INTRAVENOUS PRN
Status: DISCONTINUED | OUTPATIENT
Start: 2023-11-08 | End: 2023-11-08 | Stop reason: HOSPADM

## 2023-11-08 RX ORDER — OXYCODONE HYDROCHLORIDE 5 MG/1
5 TABLET ORAL
Status: DISCONTINUED | OUTPATIENT
Start: 2023-11-08 | End: 2023-11-08 | Stop reason: HOSPADM

## 2023-11-08 RX ORDER — HYDROCHLOROTHIAZIDE 12.5 MG/1
6.25 TABLET ORAL DAILY
Status: DISCONTINUED | OUTPATIENT
Start: 2023-11-08 | End: 2023-11-18 | Stop reason: HOSPADM

## 2023-11-08 RX ORDER — ONDANSETRON 2 MG/ML
4 INJECTION INTRAMUSCULAR; INTRAVENOUS EVERY 30 MIN PRN
Status: DISCONTINUED | OUTPATIENT
Start: 2023-11-08 | End: 2023-11-08

## 2023-11-08 RX ORDER — HYDROMORPHONE HYDROCHLORIDE 1 MG/ML
0.5 INJECTION, SOLUTION INTRAMUSCULAR; INTRAVENOUS; SUBCUTANEOUS ONCE
Status: COMPLETED | OUTPATIENT
Start: 2023-11-08 | End: 2023-11-08

## 2023-11-08 RX ORDER — FENTANYL CITRATE 50 UG/ML
50 INJECTION, SOLUTION INTRAMUSCULAR; INTRAVENOUS EVERY 5 MIN PRN
Status: DISCONTINUED | OUTPATIENT
Start: 2023-11-08 | End: 2023-11-08 | Stop reason: HOSPADM

## 2023-11-08 RX ORDER — EPHEDRINE SULFATE 50 MG/ML
INJECTION, SOLUTION INTRAMUSCULAR; INTRAVENOUS; SUBCUTANEOUS PRN
Status: DISCONTINUED | OUTPATIENT
Start: 2023-11-08 | End: 2023-11-08

## 2023-11-08 RX ORDER — ONDANSETRON 4 MG/1
4 TABLET, ORALLY DISINTEGRATING ORAL EVERY 30 MIN PRN
Status: DISCONTINUED | OUTPATIENT
Start: 2023-11-08 | End: 2023-11-08

## 2023-11-08 RX ORDER — LOSARTAN POTASSIUM 25 MG/1
25 TABLET ORAL DAILY
Status: DISCONTINUED | OUTPATIENT
Start: 2023-11-08 | End: 2023-11-09

## 2023-11-08 RX ORDER — METRONIDAZOLE 500 MG/1
500 TABLET ORAL 3 TIMES DAILY
Status: DISCONTINUED | OUTPATIENT
Start: 2023-11-08 | End: 2023-11-18 | Stop reason: HOSPADM

## 2023-11-08 RX ORDER — FENTANYL CITRATE 50 UG/ML
25 INJECTION, SOLUTION INTRAMUSCULAR; INTRAVENOUS EVERY 5 MIN PRN
Status: DISCONTINUED | OUTPATIENT
Start: 2023-11-08 | End: 2023-11-08 | Stop reason: HOSPADM

## 2023-11-08 RX ORDER — HYDROMORPHONE HYDROCHLORIDE 1 MG/ML
0.4 INJECTION, SOLUTION INTRAMUSCULAR; INTRAVENOUS; SUBCUTANEOUS EVERY 5 MIN PRN
Status: DISCONTINUED | OUTPATIENT
Start: 2023-11-08 | End: 2023-11-08 | Stop reason: HOSPADM

## 2023-11-08 RX ADMIN — OXYCODONE HYDROCHLORIDE 20 MG: 20 TABLET, FILM COATED, EXTENDED RELEASE ORAL at 10:58

## 2023-11-08 RX ADMIN — ACETAMINOPHEN 975 MG: 325 TABLET, FILM COATED ORAL at 01:23

## 2023-11-08 RX ADMIN — CEFEPIME 1 G: 1 INJECTION, POWDER, FOR SOLUTION INTRAMUSCULAR; INTRAVENOUS at 13:34

## 2023-11-08 RX ADMIN — ACETAMINOPHEN 975 MG: 325 TABLET, FILM COATED ORAL at 09:39

## 2023-11-08 RX ADMIN — OMEPRAZOLE 40 MG: 20 CAPSULE, DELAYED RELEASE ORAL at 08:05

## 2023-11-08 RX ADMIN — PHENYLEPHRINE HYDROCHLORIDE 100 MCG: 10 INJECTION INTRAVENOUS at 17:40

## 2023-11-08 RX ADMIN — PHENYLEPHRINE HYDROCHLORIDE 150 MCG: 10 INJECTION INTRAVENOUS at 17:57

## 2023-11-08 RX ADMIN — BUSPIRONE HYDROCHLORIDE 10 MG: 10 TABLET ORAL at 20:53

## 2023-11-08 RX ADMIN — HYDROMORPHONE HYDROCHLORIDE 0.5 MG: 1 INJECTION, SOLUTION INTRAMUSCULAR; INTRAVENOUS; SUBCUTANEOUS at 01:28

## 2023-11-08 RX ADMIN — OXYCODONE HYDROCHLORIDE 10 MG: 10 TABLET ORAL at 09:38

## 2023-11-08 RX ADMIN — Medication 10 MG: at 17:43

## 2023-11-08 RX ADMIN — OXYCODONE HYDROCHLORIDE 20 MG: 20 TABLET, FILM COATED, EXTENDED RELEASE ORAL at 22:50

## 2023-11-08 RX ADMIN — OXYCODONE HYDROCHLORIDE 10 MG: 10 TABLET ORAL at 19:09

## 2023-11-08 RX ADMIN — SUGAMMADEX 200 MG: 100 INJECTION, SOLUTION INTRAVENOUS at 18:10

## 2023-11-08 RX ADMIN — PHENYLEPHRINE HYDROCHLORIDE 100 MCG: 10 INJECTION INTRAVENOUS at 17:15

## 2023-11-08 RX ADMIN — PREGABALIN 150 MG: 75 CAPSULE ORAL at 08:05

## 2023-11-08 RX ADMIN — LIDOCAINE HYDROCHLORIDE 100 MG: 20 INJECTION, SOLUTION INFILTRATION; PERINEURAL at 16:58

## 2023-11-08 RX ADMIN — OXYCODONE HYDROCHLORIDE 10 MG: 10 TABLET ORAL at 03:27

## 2023-11-08 RX ADMIN — SODIUM CHLORIDE, POTASSIUM CHLORIDE, SODIUM LACTATE AND CALCIUM CHLORIDE: 600; 310; 30; 20 INJECTION, SOLUTION INTRAVENOUS at 17:42

## 2023-11-08 RX ADMIN — SODIUM CHLORIDE, POTASSIUM CHLORIDE, SODIUM LACTATE AND CALCIUM CHLORIDE: 600; 310; 30; 20 INJECTION, SOLUTION INTRAVENOUS at 08:06

## 2023-11-08 RX ADMIN — ONDANSETRON 4 MG: 2 INJECTION INTRAMUSCULAR; INTRAVENOUS at 17:53

## 2023-11-08 RX ADMIN — OMEPRAZOLE 40 MG: 20 CAPSULE, DELAYED RELEASE ORAL at 20:53

## 2023-11-08 RX ADMIN — OXYCODONE HYDROCHLORIDE 10 MG: 10 TABLET ORAL at 13:41

## 2023-11-08 RX ADMIN — PHENYLEPHRINE HYDROCHLORIDE 150 MCG: 10 INJECTION INTRAVENOUS at 17:36

## 2023-11-08 RX ADMIN — VANCOMYCIN HYDROCHLORIDE 1500 MG: 10 INJECTION, POWDER, LYOPHILIZED, FOR SOLUTION INTRAVENOUS at 14:25

## 2023-11-08 RX ADMIN — METRONIDAZOLE 500 MG: 500 TABLET ORAL at 20:53

## 2023-11-08 RX ADMIN — PREGABALIN 150 MG: 75 CAPSULE ORAL at 20:53

## 2023-11-08 RX ADMIN — BUSPIRONE HYDROCHLORIDE 10 MG: 10 TABLET ORAL at 08:05

## 2023-11-08 RX ADMIN — MIDAZOLAM 2 MG: 1 INJECTION INTRAMUSCULAR; INTRAVENOUS at 16:41

## 2023-11-08 RX ADMIN — PREGABALIN 150 MG: 75 CAPSULE ORAL at 14:24

## 2023-11-08 RX ADMIN — HYDROMORPHONE HYDROCHLORIDE 0.5 MG: 1 INJECTION, SOLUTION INTRAMUSCULAR; INTRAVENOUS; SUBCUTANEOUS at 06:16

## 2023-11-08 RX ADMIN — BUPROPION HYDROCHLORIDE 450 MG: 150 TABLET, FILM COATED, EXTENDED RELEASE ORAL at 08:05

## 2023-11-08 RX ADMIN — DEXAMETHASONE SODIUM PHOSPHATE 4 MG: 4 INJECTION, SOLUTION INTRA-ARTICULAR; INTRALESIONAL; INTRAMUSCULAR; INTRAVENOUS; SOFT TISSUE at 17:13

## 2023-11-08 RX ADMIN — Medication 50 MG: at 17:00

## 2023-11-08 RX ADMIN — LORATADINE 10 MG: 10 TABLET ORAL at 08:06

## 2023-11-08 RX ADMIN — PHENYLEPHRINE HYDROCHLORIDE 100 MCG: 10 INJECTION INTRAVENOUS at 17:09

## 2023-11-08 RX ADMIN — PROPOFOL 200 MG: 10 INJECTION, EMULSION INTRAVENOUS at 16:59

## 2023-11-08 RX ADMIN — PHENYLEPHRINE HYDROCHLORIDE 200 MCG: 10 INJECTION INTRAVENOUS at 17:27

## 2023-11-08 RX ADMIN — CEFEPIME 1 G: 1 INJECTION, POWDER, FOR SOLUTION INTRAMUSCULAR; INTRAVENOUS at 20:53

## 2023-11-08 RX ADMIN — PHENYLEPHRINE HYDROCHLORIDE 200 MCG: 10 INJECTION INTRAVENOUS at 17:49

## 2023-11-08 RX ADMIN — VANCOMYCIN HYDROCHLORIDE 1500 MG: 10 INJECTION, POWDER, LYOPHILIZED, FOR SOLUTION INTRAVENOUS at 03:28

## 2023-11-08 RX ADMIN — CEFEPIME 1 G: 1 INJECTION, POWDER, FOR SOLUTION INTRAMUSCULAR; INTRAVENOUS at 05:29

## 2023-11-08 RX ADMIN — DULOXETINE HYDROCHLORIDE 120 MG: 60 CAPSULE, DELAYED RELEASE ORAL at 08:05

## 2023-11-08 ASSESSMENT — ACTIVITIES OF DAILY LIVING (ADL)
ADLS_ACUITY_SCORE: 28
ADLS_ACUITY_SCORE: 29
ADLS_ACUITY_SCORE: 34
ADLS_ACUITY_SCORE: 29
ADLS_ACUITY_SCORE: 32
ADLS_ACUITY_SCORE: 28
ADLS_ACUITY_SCORE: 29
ADLS_ACUITY_SCORE: 29
ADLS_ACUITY_SCORE: 28
ADLS_ACUITY_SCORE: 28

## 2023-11-08 NOTE — PROGRESS NOTES
Orthopedic Surgery Progress Note: 11/08/2023    Subjective:   No acute events overnight. Pain well-controlled. No concerns this AM.     Objective:   /71 (BP Location: Right arm, Patient Position: Sitting, Cuff Size: Adult Regular)   Pulse 102   Temp 98  F (36.7  C) (Oral)   Resp 16   Ht 1.829 m (6')   Wt 104.3 kg (230 lb)   SpO2 99%   BMI 31.19 kg/m    I/O this shift:  In: -   Out: 450 [Urine:450]  General: NAD. Resting comfortably in bed.  Respiratory: Nonlabored breathing  Musculoskeletal:  LLE: BKA with wound dehiscence, no active drainage. Warm and well perfuse.     Laboratory Data:  Lab Results   Component Value Date    WBC 3.6 (L) 11/08/2023    HGB 12.1 (L) 11/08/2023     11/08/2023    INR 1.06 11/07/2023         Assessment & Plan:   Saqib Bishop is a 42 year old male  with past medical history of left lower extremity necrotizing fasciitis status post left guillotine BKA with vascular surgery on 08/27, that required I&D on 08/30/2023 with Dr. Maier, who required subsequent revisions of the BKA on 09/02 and 09/07 with Dr. Rehman and Dr. Gates respectively, who has a soft tissue infection of the BKA site positive for Pseudomonas. Patient is otherwise stable, but would likely benefit from formal I&D. Working on coordinating OR timing and surgeon availability, with possibility of surgery later today.     Plan for Today:  Possible OR today pending availability of staff  NPO until finalized OR decision  Consent for surgery  Pain control  Continue Vanc/Cefepime for antibiotic    Medicine Primary  - Plan for OR: I&D of the LLE, possible wound vac, possible vancomycin beads  - Anticoagulation/DVT prophylaxis: Per primary, plan to hold overnight for possible OR tomorrow  - Antibiotics: Vanc/Cefepime, periop Ancef  - Imaging: Completed  - Activity: As tolerated with weightbearing restriction  - Weight bearing: NWB LLE  - Pain control: Per primary  - Diet: N.p.o. overnight for possible OR  -  Follow-up: TBD  - Disposition: Admit to the medicine service for antibiotics     Staff: Etienne Maier MD    Orthopedic surgery staff for this patient is Dr. Maier.    ------------------------------------------------------------------------------------------    Respectfully,    José Manuel Gavin MD  Orthopedic Surgery PGY1  [phone]    Please page me directly with any questions/concerns during regular weekday hours before 5 pm. If there is no response, if it is a weekend, or if it is during evening hours then please page the orthopedic surgery resident on call.      FOLLOWUP:    Future Appointments   Date Time Provider Department Center   11/8/2023  1:00 PM Nory Burrows OT [facility] [place]   11/8/2023  2:15 PM Lane Paul, PT [facility] [place]   11/10/2023  8:20 AM Etienne Maier MD [facility]   11/20/2023 12:30 PM Nii Mancilla MD [facility]   12/21/2023  1:15 PM Ne Zavala MD [facility]   1/4/2024  1:30 PM Bloch, Lauren Turner, [facility]   3/6/2024  1:00 PM Viv Traylor PA-C [facility]

## 2023-11-08 NOTE — CONSULTS
[facility]  WOC Nurse Inpatient Assessment     Consulted for: R IT (per RN notes)    Summary: Wound location is sacrum, present on admit, pt has established ostomy  WOC will defer to ortho at this time for BKA care. Please consult WOC in the future if desired.     Patient History (according to provider note(s):      Saqib Bishop is a 42-year-old man with past medical history of left lower extremity necrotizing fasciitis status post left guillotine BKA with vascular surgery on 08/27, that required I&D on 08/30/2023 with Dr. Maier, who required subsequent revisions of the BKA on 09/02 and 09/07 with Dr. Rehman and Dr. Gates respectively, who has a soft tissue infection of the BKA site positive for Pseudomonas.  He had positive cultures drawn on 11/3.  CT shows soft tissue abscess, does not appear to show obvious osteomyelitis.  He is not not septic and otherwise feeling well.  I do think the degree of infection is concerning enough to at least require IV antibiotics at this time.  There is likely a possibility that he will need formal I&D in the OR with a possible wound revision.  We will discuss with staff the indication for doing this urgently versus initiating medical management and planning this for down the road.     Assessment:      Areas visualized during today's visit: Focused:    Pressure Injury Location: Sacrum    Last photo: 11/8/23  Wound type: Pressure Injury     Pressure Injury Stage: 4, present on admission   Wound history/plan of care: Present on admit. Seen on previous admit, last WOC assessment 9/12.  Wound base: unable to visualize  base     Palpation of the wound bed: normal      Drainage: moderate     Description of drainage: serosanguinous     Measurements (length x width x depth, in cm) 2.1  x 1.5  x  6.5 cm      Tunneling N/A     Undermining up to 5 cm from 4-9 o'clock  Periwound skin: Intact      Color: normal and consistent with surrounding  tissue      Temperature: normal   Odor: none  Pain: denies , none  Pain intervention prior to dressing change: slow and gentle cares   Treatment goal: Increase granulation  STATUS: initial assessment  Supplies ordered: discussed with RN and discussed with patient       Colostomy: pt able to manage independently.     Treatment Plan:     Sacral wound(s): Daily and PRN   Pack wound with Vashe dampened kerlix to pack into wound bed.   Ensure to pack into undermined area, wound is bigger than what you see.   Cover with mepilex  Change dressing daily or PRN     LUQ Colostomy  Encourage patient participation with ostomy care,  ~ Empty pouch when 1/3 to 1/2 full,   ~ Notify WOC for ongoing ostomy pouch leakage,  ~ Document stoma output volume, color, consistency EVERY shift  ~ Supplies used               ~ Pouch: Elena 70 FECAL ([id number])              ~ Flange/Barrier/Wafer: Elena 70mm FLAT ([id number])              ~ Accessories: Powder ([id number]) and No Sting ([id number])    Orders: Written    RECOMMEND PRIMARY TEAM ORDER: None, at this time  Education provided: importance of repositioning, plan of care, and wound progress  Discussed plan of care with: Patient and Nurse  WOC nurse follow-up plan: weekly  Notify WOC if wound(s) deteriorate.  Nursing to notify the Provider(s) and re-consult the WOC Nurse if new skin concern.    DATA:     Current support surface: Standard  Standard gel/foam mattress (IsoFlex, Atmos air, etc)  Containment of urine/stool: Colostomy pouch  BMI: Body mass index is 31.19 kg/m .   Active diet order: Orders Placed This Encounter      NPO per Anesthesia Guidelines for Procedure/Surgery Except for: Meds     Output: I/O last 3 completed shifts:  In: 880 [IV Piggyback:880]  Out: 450 [Urine:450]     Labs:   Recent Labs   Lab 11/08/23  0548 11/07/23  1539 11/07/23  1539 11/07/23  1326   ALBUMIN  --   --   --  3.4*   HGB 12.1*   < > 13.0*  --    INR  --   --  1.06  --    WBC 3.6*   < > 4.1  --     < > =  values in this interval not displayed.     Pressure injury risk assessment:   Sensory Perception: 3-->slightly limited  Moisture: 3-->occasionally moist  Activity: 2-->chairfast  Mobility: 2-->very limited  Nutrition: 3-->adequate  Friction and Shear: 2-->potential problem  Long Score: 15    Antonia Vann RN CWOCN  Pager no longer is use, please contact through [employer]: [facility]  Dept. Office Number: [phone]

## 2023-11-08 NOTE — ANESTHESIA PREPROCEDURE EVALUATION
Anesthesia Pre-Procedure Evaluation    Patient: Saqib Bishop   MRN: [id number] : 1980        Procedure : Procedure(s):  LEFT LOWER EXTREMITY IRRIGATION AND DEBRIDEMENT, POSSIBLE WOUND VAC PLACEMENT, POSSIBLE VANCOMYCIN BEAD PLACEMENT          Past Medical History:   Diagnosis Date     Brain injury with brief loss of consciousness (H) 2015     Candida esophagitis (H) 2022     Degenerative joint disease      Gastro-oesophageal reflux disease      Hypercalcemia 2022     Hypokalemia 2022     Hypomagnesemia 2022      Past Surgical History:   Procedure Laterality Date     AMPUTATE LEG BELOW KNEE Left 2023    Procedure: Left below knee Guillotine Amputation;  Surgeon: Sesar Barth MD;  Location: [facility] OR     AMPUTATE LEG BELOW KNEE Left 2023    Procedure: Irrigation and Debridement leg below knee, wound vac application, Left;  Surgeon: Juan Rehman MD;  Location: [facility] OR     AMPUTATE LEG BELOW KNEE Left 2023    Procedure: Below Left Knee Amputation;  Surgeon: Semaj Gates MD;  Location: [facility] OR     BACK SURGERY      lumbar fusion     EXPLORE SPINE, REMOVE HARDWARE, COMBINED  2012    Procedure:COMBINED EXPLORE SPINE, REMOVE HARDWARE; EXPLORE SPINE, REMOVE HARDWARE L4-S1; Surgeon:FAM GONZALEZ; Location:[facility] OR     IRRIGATION AND DEBRIDEMENT LOWER EXTREMITY, COMBINED Left 2023    Procedure: Irrigation and debridement lower extremity, combined and wound vac exchange;  Surgeon: Etienne Maier MD;  Location: [facility] OR     IRRIGATION AND DEBRIDEMENT LOWER EXTREMITY, COMBINED Left 2023    Procedure: IRRIGATION AND DEBRIDEMENT, LEFT LOWER EXTREMITY WITH WOUND VAC Removal;  Surgeon: Semaj Gates MD;  Location: [facility] OR     ORTHOPEDIC SURGERY      right foot surgery      Allergies   Allergen Reactions     Adhesive Tape Hives     From tape from surgery     Benzoin Hives and Rash     Droperidol Anaphylaxis     Prednisone       vomiting     Vitamin D (Calciferol) Rash     flairs up his sarcoidosis      Social History     Tobacco Use     Smoking status: Former     Types: Cigarettes     Quit date: 2011     Years since quittin.0     Smokeless tobacco: Not on file   Substance Use Topics     Alcohol use: No      Wt Readings from Last 1 Encounters:   23 104.3 kg (230 lb)        Anesthesia Evaluation   Pt has had prior anesthetic.     History of anesthetic complications       ROS/MED HX  ENT/Pulmonary:       Neurologic:     (+)                     Spinal cord injury,           Cardiovascular:     (+)  hypertension- -   -  - -                                      METS/Exercise Tolerance:     Hematologic:       Musculoskeletal:       GI/Hepatic:     (+) GERD,                   Renal/Genitourinary:       Endo:     (+)               Obesity,       Psychiatric/Substance Use:     (+) psychiatric history 1 and depression       Infectious Disease:       Malignancy:       Other:          Physical Exam    Airway        Mallampati: II   TM distance: > 3 FB   Neck ROM: full   Mouth opening: > 3 cm    Respiratory Devices and Support         Dental       (+) Minor Abnormalities - some fillings, tiny chips      Cardiovascular   cardiovascular exam normal       Rhythm and rate: regular and normal     Pulmonary   pulmonary exam normal        breath sounds clear to auscultation       OUTSIDE LABS:  CBC:   Lab Results   Component Value Date    WBC 3.6 (L) 2023    WBC 4.1 2023    HGB 12.1 (L) 2023    HGB 13.0 (L) 2023    HCT 40.1 2023    HCT 42.5 2023     2023     2023     BMP:   Lab Results   Component Value Date     2023     2023    POTASSIUM 4.1 2023    POTASSIUM 4.0 2023    CHLORIDE 105 2023    CHLORIDE 103 2023    CO2 31 (H) 2023    CO2 30 (H) 2023    BUN 15.3 2023    BUN 13.7 2023    CR 0.99 2023    CR 0.86  "11/07/2023     (H) 11/08/2023     (H) 11/07/2023     COAGS:   Lab Results   Component Value Date    PTT 33 11/07/2023    INR 1.06 11/07/2023    FIBR 863 (H) 08/27/2023     POC: No results found for: \"BGM\", \"HCG\", \"HCGS\"  HEPATIC:   Lab Results   Component Value Date    ALBUMIN 3.4 (L) 11/07/2023    PROTTOTAL 7.9 11/07/2023    ALT 21 11/07/2023    AST 37 11/07/2023    ALKPHOS 133 (H) 11/07/2023    BILITOTAL 0.2 11/07/2023     OTHER:   Lab Results   Component Value Date    PH 7.23 (L) 08/28/2023    LACT 0.5 (L) 08/28/2023    A1C 5.5 09/06/2023    HILDA 8.4 (L) 11/08/2023    PHOS 3.4 09/14/2023    MAG 1.6 (L) 09/14/2023    SED 46 (H) 11/07/2023       Anesthesia Plan    ASA Status:  3    NPO Status:  NPO Appropriate    Anesthesia Type: General.     - Airway: ETT   Induction: Intravenous.   Maintenance: Balanced.        Consents    Anesthesia Plan(s) and associated risks, benefits, and realistic alternatives discussed. Questions answered and patient/representative(s) expressed understanding.     - Discussed:     - Discussed with:  Patient      - Extended Intubation/Ventilatory Support Discussed: No.      - Patient is DNR/DNI Status: No     Use of blood products discussed: No .     Postoperative Care    Pain management: IV analgesics, Oral pain medications.   PONV prophylaxis: Ondansetron (or other 5HT-3)     Comments:              Brian Roe MD  "

## 2023-11-08 NOTE — PROGRESS NOTES
[facility]    Medicine Progress Note - Hospitalist Service, GOLD TEAM 22    Date of Admission:  11/7/2023    Assessment & Plan   Saqib Bishop is a 42 year old male with a history of degenerative joint disease, GERD, and necrotizing fasciitis s/p L BKA admitted on 11/7/2023 with cellulitis of the L BKA stump. He requires hospitalization for IV antibiotics, pain control, and orthopedic surgery intervention. Ortho plans to take him to the OR within the next 24-48 hours.     # L BKA stump cellulitis  # Soft tissue abscess  # History of MRSA infection  # History of pseudomonas infection  Presented with about a week of increased erythema and purulent drainage from L BKA site. Seen at OSH ED ~ 4 days prior to presentation and initially started on bactrim and levofloxacin. His symptoms progressed, and he presented for further evaluation. Seen by Ortho in the ED, who recommended admission for antibiotics and surgical management.   - Ortho consulted, appreciate recs  - Plan for OR today vs tomorrow  - ID consult placed today  - Continue cefepime (11/7- )  - Continue vancomycin (11/7- ), pharmacy to dose   - Pain control   - Home oxycontin 20 mg q12h   - Scheduled tylenol 975 mg q8h   - Oxycodone 5-10 mg q4h prn    - Flexeril 10 mg TID prn   - Home lyrica 150 mg TID   - If severe breakthrough pain or NPO, please contact for consideration of IV dilaudid  - NPO  - Senna and miralax ordered while on opioids   - Trend CBC and CRP  - PT/OT consults  - WOC consult for sacral pressure wound    # HTN  - Holding home losartan 25 mg daily- hydrochlorothiazide 6.25 mg daily in anticipation of OR    # Mood disorder  - Continue home wellbutrin 450 mg daily, buspar 10 mg BID, duloxetine 60 mg BID    # GERD  - Continue home omeprazole 40 mg BID          Diet: NPO per Anesthesia Guidelines for Procedure/Surgery Except for: Meds    DVT Prophylaxis: Pneumatic Compression Devices; holding  pharmacologic anticoagulation in anticipation of procedure  Dyer Catheter: PRESENT, indication:    Lines: None     Cardiac Monitoring: None  Code Status: Full Code      Clinically Significant Risk Factors Present on Admission          # Hypocalcemia: Lowest Ca = 8.4 mg/dL in last 2 days, will monitor and replace as appropriate     # Hypoalbuminemia: Lowest albumin = 3.4 g/dL at 11/7/2023  1:26 PM, will monitor as appropriate     # Hypertension: Noted on problem list      # Obesity: Estimated body mass index is 31.19 kg/m  as calculated from the following:    Height as of this encounter: 1.829 m (6').    Weight as of this encounter: 104.3 kg (230 lb).              Disposition Plan     Expected Discharge Date: 11/09/2023                    PRESTON STERN MD  Hospitalist Service, [address]  Securely message with [email or web address] (more info)  Text page via [facility] Paging/Directory   See signed in provider for up to date coverage information  ______________________________________________________________________    Interval History   No acute events overnight. He did have back pain overnight and received IV dilaudid x1. He does endorse some ongoing pain and notes that the oxycodone ordered q6h prn overnight did not last the full 6 hours. He has no other concerns at this time.     Physical Exam   Vital Signs: Temp: 98  F (36.7  C) Temp src: Oral BP: 119/71 Pulse: 102   Resp: 16 SpO2: 99 % O2 Device: None (Room air)    Weight: 230 lbs 0 oz    Constitutional: awake, alert, cooperative, no apparent distress, and appears stated age  Eyes: extra-ocular muscles intact and sclera clear  ENT: normocepalic, without obvious abnormality, MMM, neck supple  Respiratory: No increased work of breathing, good air exchange, clear to auscultation bilaterally, no crackles or wheezing  Cardiovascular: regular rate and rhythm, normal S1 and S2, and no murmur noted  GI: normal bowel sounds,  soft, non-distended, and non-tender  Skin: L BKA stump wrapped  Neurologic: Awake, alert, oriented, moving all extremities, no focal deficits    Medical Decision Making             Data     I have personally reviewed the following data over the past 24 hrs:    3.6 (L)  \   12.1 (L)   / 225     140 105 15.3 /  102 (H)   4.1 31 (H) 0.99 \     ALT: 21 AST: 37 AP: 133 (H) TBILI: 0.2   ALB: 3.4 (L) TOT PROTEIN: 7.9 LIPASE: N/A     Procal: N/A CRP: 19.36 (H) Lactic Acid: N/A       INR:  1.06 PTT:  33   D-dimer:  N/A Fibrinogen:  N/A       Imaging results reviewed over the past 24 hrs:   No results found for this or any previous visit (from the past 24 hour(s)).

## 2023-11-08 NOTE — PROGRESS NOTES
Brief Medicine Progress Note  November 7, 2023     Received page about patient requesting his PTA OXYCONTIN 20mg BID. Reviewed pharmacy med rec and did not see this medication listed, though pharmacy did mention that pt was not very familiar w/ his medications.     Reviewed PDMP, which revealed that pt does regularly fill both oxycodone and oxyCONTIN (see below).        Appears pt PTA regimen is:  -- oxycodone 10mg Q6H   -- oxyCONTIN 20mg Q12H    Pain regimen ordered on admission:  - oxycodone 5mg Q4H for mild pain  - oxycodone 10mg Q4H for severe pain    Plan:  - Changed current oxy orders to be Q6H  - Added on oxycontin 20mg BID based on PDMP   :: Please confirm w/ patient and ensure this is appropriate for this current stay      Alonzo Ross PA-C  [facility] Hospitalist Service  Page via [employer] or [employer]

## 2023-11-08 NOTE — PLAN OF CARE
Goal Outcome Evaluation:           Overall Patient Progress: no change    Outcome Evaluation: pt AXO x4, Back pain and LLE managed with PRN meds and hot packs. plan for OR today.CHG Bath completed. NPO at bedside.      VS: /71 (BP Location: Right arm, Patient Position: Sitting, Cuff Size: Adult Regular)   Pulse 102   Temp 98  F (36.7  C) (Oral)   Resp 16   Ht 1.829 m (6')   Wt 104.3 kg (230 lb)   SpO2 99%   BMI 31.19 kg/m      O2: Stable on room air   Output: Pre-existing Dyer cath patent    Last BM: Colostomy    Activity: Wheelchair bound. Repositions independently in bed   Skin: L BKA   Right IT wound  Abrasion- right foot, shin   Pain: LLE and back pain   CMS: AXO x4. Baseline neuropathy to lower extremities   Dressing: L BKA stump dressing changed  IT wound changed  Wound consult pending   Diet: Reg diet  NPO    LDA: Right PIV-infusing LR at 125   Plan: Plan for OR today            0548-MD paged.PT in ortho 515  B.M. has chronic back pain. He reports 6/10 pain that is not manageable with current PRN meds. Please can he get  IV pain medication for breakthrough pain.    EMILY RN [id number]     Response-One time dose of IV dilaudid ordered.

## 2023-11-08 NOTE — PHARMACY-ADMISSION MEDICATION HISTORY
Pharmacist Admission Medication History    Admission medication history is complete. The information provided in this note is only as accurate as the sources available at the time of the update.    Medication reconciliation/reorder completed by provider prior to medication history? No    Information Source(s): Patient, Patient's pharmacy, and Hospital records via in-person    Pertinent Information: Medication history completed primarily based off of dispense history and discharge summary from 9/15/2023. Patient was not super familiar with medications and reported that a nurse sets up his medications for him each day but does not administer them to him. Patient believes to be taking hydroxychloroquine but was not sure of the dose.He reports taking his medications all at once in the morning. Patient was initiated on Wegovy this past Thursday 11/2/2023.     Changes made to PTA medication list:  Added: None  Deleted: Clindamycin, lactulose, benadryl cream, oxybutynin, sodium hypochlorite, tamsulosin   Changed: None      Allergies reviewed with patient and updates made in EHR: yes    Medication History Completed By: MARIJA HAQUE RPH 11/7/2023 9:20 PM    Prior to Admission medications    Medication Sig Last Dose Taking? Auth Provider Long Term End Date   acetaminophen (TYLENOL) 500 MG tablet Take 1,000 mg by mouth 3 times daily as needed for fever or mild pain Unknown Yes Reported, Patient     buPROPion (WELLBUTRIN XL) 150 MG 24 hr tablet Take 450 mg by mouth daily 11/7/2023 Yes Reported, Patient Yes    busPIRone (BUSPAR) 10 MG tablet Take 1 tablet by mouth 2 times daily 11/7/2023 Yes Reported, Patient     Cranberry 400 MG CAPS Take 400 mg by mouth daily 11/7/2023 Yes Reported, Patient     cyclobenzaprine (FLEXERIL) 10 MG tablet Take 10 mg by mouth 3 times daily as needed for muscle spasms Past Week Yes Reported, Patient     DULoxetine (CYMBALTA) 60 MG capsule Take 60 mg by mouth 2 times daily 11/7/2023 Yes Reported,  Patient     fluticasone (FLONASE) 50 MCG/ACT nasal spray Spray 1 spray into both nostrils 2 times daily as needed for rhinitis Past Week Yes Unknown, Entered By History     hydroxychloroquine (PLAQUENIL) 200 MG tablet Resumption timing per recs of ortho. 11/7/2023 Yes Gudelia Almonte PA     loratadine (CLARITIN) 10 MG tablet Take 1 tablet by mouth daily 11/7/2023 Yes Reported, Patient     losartan-hydrochlorothiazide (HYZAAR) 50-12.5 MG tablet Take 0.5 tablets by mouth daily 11/7/2023 Yes Reported, Patient Yes    melatonin 5 MG tablet Take 1 tablet (5 mg) by mouth nightly as needed for sleep Unknown Yes Anthony Rosenthal,      omeprazole (PRILOSEC) 40 MG DR capsule Take 40 mg by mouth 2 times daily 11/7/2023 Yes Reported, Patient     oxyCODONE (OXYCONTIN) 20 MG 12 hr tablet Take 1 tablet (20 mg) by mouth every 12 hours 11/7/2023 Yes Queenie Moreno PA-C No    oxyCODONE (ROXICODONE) 5 MG tablet Take 2 tablets (10 mg) by mouth every 4 hours as needed for moderate pain 11/7/2023 Yes Queenie Moreno PA-C No    polyethylene glycol (MIRALAX) 17 GM/Dose powder Take 17 g by mouth 2 times daily as needed for constipation Unknown Yes Gudelia Almonte PA     polyethylene glycol-propylene glycol (SYSTANE ULTRA) 0.4-0.3 % SOLN ophthalmic solution Apply 1 drop to eye 4 times daily as needed for dry eyes Unknown Yes Reported, Patient     prednisoLONE acetate (PRED FORTE) 1 % ophthalmic suspension Place 1 drop into both eyes 2 times daily Per taper 11/7/2023 Yes Reported, Patient     pregabalin (LYRICA) 150 MG capsule Take 1 capsule (150 mg) by mouth 3 times daily 11/7/2023 Yes Queenie Moreno PA-C Yes    semaglutide (OZEMPIC) 2 MG/3ML pen Inject 0.25mg subcutaneous every 7 days for 4 weeks, then increase to inject 0.5mg subcutaneous every 7 days. Past Week Yes Viv Traylor PA-C     senna-docusate (SENOKOT-S/PERICOLACE) 8.6-50 MG tablet Take 2 tablets by mouth 2 times daily 11/7/2023 Yes  Reported, Patient     Semaglutide-Weight Management (WEGOVY) 0.25 MG/0.5ML pen Inject 0.25 mg Subcutaneous once a week For 4 weeks   Viv Traylor PA-C     Semaglutide-Weight Management (WEGOVY) 0.5 MG/0.5ML pen Inject 0.5 mg Subcutaneous once a week After completing 4 weeks of 0.25mg dose   Viv Traylor PA-C

## 2023-11-08 NOTE — PROGRESS NOTES
pt in  ORTHO 515 M.B is requesting to have his Scheduled  OXYCONTIN 20mg re-ordered.    CELINA DIAZ [id number]

## 2023-11-08 NOTE — PLAN OF CARE
Goal Outcome Evaluation:      Plan of Care Reviewed With: patient    Overall Patient Progress: no changeOverall Patient Progress: no change    Outcome Evaluation: Pain 7/10 in his back, on scheduled oxycontin and prn oxycodone. Add on for surgery today for I/D left BKA abcess    VS: /86 (BP Location: Right arm)   Pulse 77   Temp 98  F (36.7  C) (Oral)   Resp 16   Ht 1.829 m (6')   Wt 104.3 kg (230 lb)   SpO2 96%   BMI 31.19 kg/m       O2: Stable on room air   Output: PTA Dyer cath patent    Last BM: Colostomy. He manages and emptied this shift.   Activity: Wheelchair bound. Repositions independently in bed   Skin: L BKA   IT wound  Abrasion- right foot, shin   Pain: LLE and back pain   CMS: AXO x4. Baseline neuropathy to lower extremities   Dressing: L BKA stump dressing changed  IT wound changed per POC daily  Wound consult completed   Diet: NPO    LDA: Right PIV-infusing LR at 100   Plan: Plan for OR today around 1600 to 1700

## 2023-11-08 NOTE — PROGRESS NOTES
Patient had [facility] in the past. They have declined to take him back on service due to non-compliance and missing 9 visits in the past month.     Teresita Kelly RN, BSN  Care Coordinator,  Ortho  Phone [phone]  Pager [phone]

## 2023-11-08 NOTE — PROGRESS NOTES
Orthopaedic Surgery Attending    I personally saw and examined this pleasant 42 year old patient preoperatively today in the preoperative area. Relevant portions of the chart, imaging, and labs were reviewed. The plan for debridement and irrigation of the left transtibial amputation wound with possible antibiotic bead placement and possible wound VAC placement, including the nature of the surgery, the risks, benefits, and alternatives, and postoperative plan, were all discussed in layman's terms. All questions were answered appropriately. The patient verbalized understanding of our discussion and agreement with the plan. I marked the surgical site with patient participation.

## 2023-11-08 NOTE — CONSULTS
[facility] ID SERVICE : NEW CONSULTATION  Patient:  Saqib Bishop, Date of birth 1980, Medical record number [id number]  Date of Admission: 11/7/2023  Date of Visit:  11/8/2023  Requesting Provider: Daphney Vasquez         Assessment and Recommendations:     ID Problem List:  Stump infection, involving left BKA  11/3 superficial wound cx: Pseudomonas aeruginosa; GS (GPC, GNB)  10/1 superficial wound cx: Enterobacter cloacae  Hx of necrotizing fascitis of LLE s/p guillotine 8/28/2023, s/p I&D of residual limb 8/30/2023, s/p BKA 9/7/2023  Paraplegia   S/p colostomy    Discussion:  43 yo M with left BKA stump infection, c/o erythema, discharge for almost 5-6 weeks. CT outside facility with collection around stump site with concern of abscess. Afebrile, hemodynamically stable. Has been on bactrim, levofloxacin since 11/3. Though previously completed a course of bactrim beginning October. Given the CT findings, and ongoing symptoms, agree with Orthopedic team to undergo surgery later today or tomorrow. Please send cultures and if any bone involvement then pathology as well. Continue empiric regimen in meantime. Also add po metronidazole for anaerobic coverage (Gram stain with GPC, GNB) on 11/3 wound cultures but only grew Pseudomonas aeruginosa but no Gram-positive growth (likely anaerobes).     Recommendations:  Continue empiric regimen  Continue iv vancomycin, pharmacy to dose and monitor  Continue iv cefepime 2g q8h  Start po metronidazole 500mg TID (ordered for you)  Awaiting surgery either later today or tomorrow  Please send intra-op cultures (aerobic, anaerobic, fungal, AFB)  If any bone involvement then also send for pathology  Further recommendations accordingly    Recommendations discussed with Orthopedic team.    Thank you for this consult. ID team will continue to follow this patient. Please reach out if any questions or concerns.     Total time spent during this encounter (chart  review, documentation, MDM, coordination of care): 85 minutes     Lisa Jade MD  Infectious Diseases Staff Physician  Pager: [phone]  [employer] [facility] jose c   Date: 11/8/2023       History of Present Illness:     Saqib Bishop is a 41 yo M with PMHx of T12 level spinal cord injury and paraplegia, colostomy, DJD, GERD, chronic sacral decubitus ulcer s/p flap and treatment for osteomyelitis Dec-2020, complicated by dehiscence, open wound and sacral decubitus ulceration Oct-2021, s/p an elective flap procedure in early 2023 which failed and resulted in a chronic sacral ulcer. In August presented with left foot necrotizing fascitis s/p guillotine 8/28/2023, s/p I&D of residual limb 8/30, then BKA 9/7/2023. Antibiotics were stopped post surgery per ID recommendations (sign off note 9/9). Per patient, since then never felt that stump healed completely. Noted distinct redness and then some discharge from the stump surgical incision on lateral aspect and seen in ER 10/1. Superficial wound cx then grew Enterobacter cloacae and received a course of bactrim without complete resolution. Evaluated by Orthopedic clinic (Dr. Maier) 10/6. Again evaluated in ER ([facility]) 11/3, wound cx grew Pseudomonas aeruginosa. Given bactrim, levofloxacin. Continues to have discharge, redness around stump incision site. Also noticing palpable swelling around stump since past one month or so. No fever, chills, or systemic complaints. Presented again at outside facility, CT was obtained which shows collection around stump concern for abscess. Transferred to [facility] for surgical intervention.          Review of Systems:     Complete 12 point review of systems negative except as noted in HPI.         Recent Antimicrobials::     Current:  Vancomycin, cefepime    Prior:  Bactrim, levofloxacin 11/3 - 11/7  Bactrim ~early October, 2023     Past Medical History:     Past Medical History:   Diagnosis Date    Brain injury with brief loss of consciousness (H)  2015    Candida esophagitis (H) 2022    Degenerative joint disease     Gastro-oesophageal reflux disease     Hypercalcemia 2022    Hypokalemia 2022    Hypomagnesemia 2022         Allergies:      Allergies   Allergen Reactions    Adhesive Tape Hives     From tape from surgery    Benzoin Hives and Rash    Droperidol Anaphylaxis    Prednisone      vomiting    Vitamin D (Calciferol) Rash     flairs up his sarcoidosis          Family History:   Reviewed and noncontributory.   No family history on file.       Social History:     Social History     Socioeconomic History    Marital status: Single     Spouse name: Not on file    Number of children: Not on file    Years of education: Not on file    Highest education level: Not on file   Occupational History    Not on file   Tobacco Use    Smoking status: Former     Types: Cigarettes     Quit date: 2011     Years since quittin.0    Smokeless tobacco: Not on file   Substance and Sexual Activity    Alcohol use: No    Drug use: No    Sexual activity: Not on file   Other Topics Concern    Not on file   Social History Narrative    Not on file     Social Determinants of Health     Financial Resource Strain: Not on file   Food Insecurity: Not on file   Transportation Needs: Not on file   Physical Activity: Not on file   Stress: Not on file   Social Connections: Not on file   Interpersonal Safety: Not on file   Housing Stability: Not on file            Physical Exam:     /86 (BP Location: Right arm)   Pulse 77   Temp 98  F (36.7  C) (Oral)   Resp 16   Ht 1.829 m (6')   Wt 104.3 kg (230 lb)   SpO2 96%   BMI 31.19 kg/m       Exam:  GENERAL:  sitting in bed in no acute distress.   Head: normocephalic, atraumatic.   EYES: PERRLA, EOMI, conjunctiva clear, anicteric sclerae.    ENT:  Oropharynx is moist without exudates or ulcers.  NECK:  Supple.  LUNGS:  Breathing comfortably, on room air, clear to auscultation bilaterally, no  w/r/r.  CARDIOVASCULAR:  Regular rate and rhythm, +S1S2 with no murmurs, gallops or rubs.  ABDOMEN:  Normal bowel sounds, soft, nontender. +colostomy bag  : no suprapubic or flank tendterness.   EXT: left BKA stump site with erythema, exfoliation/xerosis, oozing areas of drainage, non tender, palpable fluctuation  SKIN:  No acute rashes.    NEUROLOGIC:  Grossly nonfocal.     Lines and devices:   PIV is in place without any surrounding erythema.    Labs, Microbiology and Imaging studies are reviewed.          Laboratory Data:   Metabolic Studies       Recent Labs   Lab Test 11/08/23  0548 11/07/23  1326 09/14/23  0606 09/06/23  1159 09/06/23  0815 08/28/23  0414 08/28/23  0222 08/28/23  0144 08/27/23  2357 08/27/23  2128 08/27/23  2115    141 141   < >  --    < >  --    < > 130*  --  129*  128*   POTASSIUM 4.1 4.0 4.0   < >  --    < >  --    < > 3.4*  --  4.0  4.0   CHLORIDE 105 103 106   < >  --    < >  --    < >  --   --  95*  93*   CO2 31* 30* 25   < >  --    < >  --    < >  --   --  19*  18*   ANIONGAP 4* 8 10   < >  --    < >  --    < >  --   --  15  17*   BUN 15.3 13.7 16.4   < >  --    < >  --    < >  --   --  44.3*  44.1*   CR 0.99 0.86 0.80   < >  --    < >  --    < >  --   --  2.95*  2.89*   GFRESTIMATED >90 >90 >90   < >  --    < >  --    < >  --   --  26*  27*   * 116* 101*   < >  --    < >  --    < > 86   < > 79  76   A1C  --   --   --   --  5.5  --   --   --   --   --   --    HILDA 8.4* 8.9 9.1   < >  --    < >  --    < >  --   --  7.9*  7.6*   PHOS  --   --  3.4   < >  --    < >  --   --   --   --  4.3   MAG  --   --  1.6*   < >  --    < >  --   --   --   --  1.2*   LACT  --   --   --   --   --   --  0.5*  --  0.4   < >  --    CKT  --   --   --   --   --   --   --   --   --   --  1,339*    < > = values in this interval not displayed.       Hepatic Studies    Recent Labs   Lab Test 11/07/23  1326 09/11/23  0618 09/09/23  0454 08/31/23  0433 08/27/23  2115   BILITOTAL 0.2 0.2 0.2    < > 0.8  0.8   DBIL  --   --   --   --  0.61*   ALKPHOS 133* 139* 134*   < > 148*  164*   PROTTOTAL 7.9 7.7 7.7   < > 6.7  6.7   ALBUMIN 3.4* 3.2* 3.3*   < > 2.8*  2.7*   AST 37 35 22   < > 51*  52*   ALT 21 22 16   < > 29  28    < > = values in this interval not displayed.       Hematology Studies      Recent Labs   Lab Test 11/08/23  0548 11/07/23  1539 09/14/23  0606 09/11/23  0618 09/09/23  0454 09/08/23  0424   WBC 3.6* 4.1 4.1 4.7 9.1 11.1*   HGB 12.1* 13.0* 11.6* 11.3* 11.5* 11.3*   HCT 40.1 42.5 38.5* 37.4* 38.6* 37.2*    281 291 318 296 365       Inflammatory Markers    Recent Labs   Lab Test 11/07/23  1539 09/09/23  0454 09/08/23  0424 09/07/23  0423 09/06/23  0702 09/05/23  0029   SED 46* 55* 47* 1 46* 1

## 2023-11-09 ENCOUNTER — APPOINTMENT (OUTPATIENT)
Dept: PHYSICAL THERAPY | Facility: CLINIC | Age: 43
DRG: 492 | End: 2023-11-09
Attending: PEDIATRICS
Payer: MEDICARE

## 2023-11-09 LAB
ANION GAP SERPL CALCULATED.3IONS-SCNC: 5 MMOL/L (ref 7–15)
BUN SERPL-MCNC: 11.9 MG/DL (ref 6–20)
CALCIUM SERPL-MCNC: 8.5 MG/DL (ref 8.6–10)
CHLORIDE SERPL-SCNC: 104 MMOL/L (ref 98–107)
CREAT SERPL-MCNC: 0.79 MG/DL (ref 0.67–1.17)
CREAT SERPL-MCNC: 0.79 MG/DL (ref 0.67–1.17)
DEPRECATED HCO3 PLAS-SCNC: 31 MMOL/L (ref 22–29)
EGFRCR SERPLBLD CKD-EPI 2021: >90 ML/MIN/1.73M2
EGFRCR SERPLBLD CKD-EPI 2021: >90 ML/MIN/1.73M2
ERYTHROCYTE [DISTWIDTH] IN BLOOD BY AUTOMATED COUNT: 15.2 % (ref 10–15)
GLUCOSE SERPL-MCNC: 122 MG/DL (ref 70–99)
HCT VFR BLD AUTO: 41.1 % (ref 40–53)
HGB BLD-MCNC: 12.6 G/DL (ref 13.3–17.7)
MCH RBC QN AUTO: 23.7 PG (ref 26.5–33)
MCHC RBC AUTO-ENTMCNC: 30.7 G/DL (ref 31.5–36.5)
MCV RBC AUTO: 77 FL (ref 78–100)
MRSA DNA SPEC QL NAA+PROBE: NEGATIVE
PLATELET # BLD AUTO: 256 10E3/UL (ref 150–450)
POTASSIUM SERPL-SCNC: 4.5 MMOL/L (ref 3.4–5.3)
RBC # BLD AUTO: 5.31 10E6/UL (ref 4.4–5.9)
SA TARGET DNA: NEGATIVE
SODIUM SERPL-SCNC: 140 MMOL/L (ref 135–145)
VANCOMYCIN SERPL-MCNC: 28.6 UG/ML
WBC # BLD AUTO: 4.8 10E3/UL (ref 4–11)

## 2023-11-09 PROCEDURE — 250N000011 HC RX IP 250 OP 636: Performed by: STUDENT IN AN ORGANIZED HEALTH CARE EDUCATION/TRAINING PROGRAM

## 2023-11-09 PROCEDURE — 258N000003 HC RX IP 258 OP 636: Performed by: STUDENT IN AN ORGANIZED HEALTH CARE EDUCATION/TRAINING PROGRAM

## 2023-11-09 PROCEDURE — 250N000011 HC RX IP 250 OP 636: Performed by: PEDIATRICS

## 2023-11-09 PROCEDURE — 97161 PT EVAL LOW COMPLEX 20 MIN: CPT | Mod: GP

## 2023-11-09 PROCEDURE — 999N000040 HC STATISTIC CONSULT NO CHARGE VASC ACCESS

## 2023-11-09 PROCEDURE — 97110 THERAPEUTIC EXERCISES: CPT | Mod: GP

## 2023-11-09 PROCEDURE — 80048 BASIC METABOLIC PNL TOTAL CA: CPT | Performed by: STUDENT IN AN ORGANIZED HEALTH CARE EDUCATION/TRAINING PROGRAM

## 2023-11-09 PROCEDURE — 258N000003 HC RX IP 258 OP 636: Performed by: PEDIATRICS

## 2023-11-09 PROCEDURE — 999N000111 HC STATISTIC OT IP EVAL DEFER

## 2023-11-09 PROCEDURE — 36415 COLL VENOUS BLD VENIPUNCTURE: CPT | Performed by: STUDENT IN AN ORGANIZED HEALTH CARE EDUCATION/TRAINING PROGRAM

## 2023-11-09 PROCEDURE — 99232 SBSQ HOSP IP/OBS MODERATE 35: CPT | Performed by: STUDENT IN AN ORGANIZED HEALTH CARE EDUCATION/TRAINING PROGRAM

## 2023-11-09 PROCEDURE — 80202 ASSAY OF VANCOMYCIN: CPT | Performed by: STUDENT IN AN ORGANIZED HEALTH CARE EDUCATION/TRAINING PROGRAM

## 2023-11-09 PROCEDURE — 999N000127 HC STATISTIC PERIPHERAL IV START W US GUIDANCE

## 2023-11-09 PROCEDURE — 85027 COMPLETE CBC AUTOMATED: CPT | Performed by: STUDENT IN AN ORGANIZED HEALTH CARE EDUCATION/TRAINING PROGRAM

## 2023-11-09 PROCEDURE — 250N000013 HC RX MED GY IP 250 OP 250 PS 637: Performed by: STUDENT IN AN ORGANIZED HEALTH CARE EDUCATION/TRAINING PROGRAM

## 2023-11-09 PROCEDURE — 99233 SBSQ HOSP IP/OBS HIGH 50: CPT | Performed by: PEDIATRICS

## 2023-11-09 PROCEDURE — 120N000002 HC R&B MED SURG/OB UMMC

## 2023-11-09 PROCEDURE — 250N000013 HC RX MED GY IP 250 OP 250 PS 637: Performed by: PEDIATRICS

## 2023-11-09 PROCEDURE — 97530 THERAPEUTIC ACTIVITIES: CPT | Mod: GP

## 2023-11-09 PROCEDURE — 87641 MR-STAPH DNA AMP PROBE: CPT | Performed by: STUDENT IN AN ORGANIZED HEALTH CARE EDUCATION/TRAINING PROGRAM

## 2023-11-09 RX ORDER — LOSARTAN POTASSIUM 25 MG/1
25 TABLET ORAL DAILY
Status: DISCONTINUED | OUTPATIENT
Start: 2023-11-09 | End: 2023-11-18 | Stop reason: HOSPADM

## 2023-11-09 RX ORDER — HEPARIN SODIUM 5000 [USP'U]/.5ML
5000 INJECTION, SOLUTION INTRAVENOUS; SUBCUTANEOUS EVERY 12 HOURS
Status: DISCONTINUED | OUTPATIENT
Start: 2023-11-09 | End: 2023-11-16

## 2023-11-09 RX ORDER — OXYCODONE HYDROCHLORIDE 10 MG/1
10 TABLET ORAL EVERY 4 HOURS PRN
Status: DISCONTINUED | OUTPATIENT
Start: 2023-11-09 | End: 2023-11-16

## 2023-11-09 RX ORDER — SODIUM CHLORIDE 9 MG/ML
INJECTION, SOLUTION INTRAVENOUS CONTINUOUS
Status: DISCONTINUED | OUTPATIENT
Start: 2023-11-09 | End: 2023-11-18 | Stop reason: HOSPADM

## 2023-11-09 RX ADMIN — OXYCODONE HYDROCHLORIDE 20 MG: 20 TABLET, FILM COATED, EXTENDED RELEASE ORAL at 22:15

## 2023-11-09 RX ADMIN — PREGABALIN 150 MG: 75 CAPSULE ORAL at 08:24

## 2023-11-09 RX ADMIN — OXYCODONE HYDROCHLORIDE 10 MG: 10 TABLET ORAL at 06:03

## 2023-11-09 RX ADMIN — SODIUM CHLORIDE, POTASSIUM CHLORIDE, SODIUM LACTATE AND CALCIUM CHLORIDE: 600; 310; 30; 20 INJECTION, SOLUTION INTRAVENOUS at 01:32

## 2023-11-09 RX ADMIN — OXYCODONE HYDROCHLORIDE 20 MG: 20 TABLET, FILM COATED, EXTENDED RELEASE ORAL at 11:28

## 2023-11-09 RX ADMIN — ACETAMINOPHEN 975 MG: 325 TABLET, FILM COATED ORAL at 10:09

## 2023-11-09 RX ADMIN — BUSPIRONE HYDROCHLORIDE 10 MG: 10 TABLET ORAL at 08:24

## 2023-11-09 RX ADMIN — OXYCODONE HYDROCHLORIDE 15 MG: 10 TABLET ORAL at 14:09

## 2023-11-09 RX ADMIN — METRONIDAZOLE 500 MG: 500 TABLET ORAL at 08:24

## 2023-11-09 RX ADMIN — OMEPRAZOLE 40 MG: 20 CAPSULE, DELAYED RELEASE ORAL at 19:51

## 2023-11-09 RX ADMIN — SODIUM CHLORIDE: 9 INJECTION, SOLUTION INTRAVENOUS at 13:47

## 2023-11-09 RX ADMIN — ACETAMINOPHEN 975 MG: 325 TABLET, FILM COATED ORAL at 18:18

## 2023-11-09 RX ADMIN — PREGABALIN 150 MG: 75 CAPSULE ORAL at 19:51

## 2023-11-09 RX ADMIN — LORATADINE 10 MG: 10 TABLET ORAL at 08:24

## 2023-11-09 RX ADMIN — DULOXETINE HYDROCHLORIDE 120 MG: 60 CAPSULE, DELAYED RELEASE ORAL at 08:25

## 2023-11-09 RX ADMIN — BUSPIRONE HYDROCHLORIDE 10 MG: 10 TABLET ORAL at 19:51

## 2023-11-09 RX ADMIN — CEFEPIME 1 G: 1 INJECTION, POWDER, FOR SOLUTION INTRAMUSCULAR; INTRAVENOUS at 06:04

## 2023-11-09 RX ADMIN — METRONIDAZOLE 500 MG: 500 TABLET ORAL at 19:51

## 2023-11-09 RX ADMIN — LOSARTAN POTASSIUM 25 MG: 25 TABLET, FILM COATED ORAL at 12:31

## 2023-11-09 RX ADMIN — PREGABALIN 150 MG: 75 CAPSULE ORAL at 14:09

## 2023-11-09 RX ADMIN — METRONIDAZOLE 500 MG: 500 TABLET ORAL at 14:09

## 2023-11-09 RX ADMIN — Medication 1250 MG: at 13:47

## 2023-11-09 RX ADMIN — OMEPRAZOLE 40 MG: 20 CAPSULE, DELAYED RELEASE ORAL at 08:23

## 2023-11-09 RX ADMIN — CEFEPIME 1 G: 1 INJECTION, POWDER, FOR SOLUTION INTRAMUSCULAR; INTRAVENOUS at 15:54

## 2023-11-09 RX ADMIN — OXYCODONE HYDROCHLORIDE 10 MG: 10 TABLET ORAL at 01:30

## 2023-11-09 RX ADMIN — BUPROPION HYDROCHLORIDE 450 MG: 150 TABLET, FILM COATED, EXTENDED RELEASE ORAL at 08:23

## 2023-11-09 RX ADMIN — VANCOMYCIN HYDROCHLORIDE 1500 MG: 10 INJECTION, POWDER, LYOPHILIZED, FOR SOLUTION INTRAVENOUS at 01:32

## 2023-11-09 RX ADMIN — OXYCODONE HYDROCHLORIDE 10 MG: 10 TABLET ORAL at 10:09

## 2023-11-09 RX ADMIN — OXYCODONE HYDROCHLORIDE 15 MG: 10 TABLET ORAL at 18:19

## 2023-11-09 RX ADMIN — ACETAMINOPHEN 975 MG: 325 TABLET, FILM COATED ORAL at 01:29

## 2023-11-09 RX ADMIN — HEPARIN SODIUM 5000 UNITS: 5000 INJECTION, SOLUTION INTRAVENOUS; SUBCUTANEOUS at 12:32

## 2023-11-09 ASSESSMENT — ACTIVITIES OF DAILY LIVING (ADL)
ADLS_ACUITY_SCORE: 34

## 2023-11-09 NOTE — PLAN OF CARE
Goal Outcome Evaluation:      Plan of Care Reviewed With: patient    Overall Patient Progress: improvingOverall Patient Progress: improving    Outcome Evaluation: Left stump dressing C/D/I, elevated. Pt complained of pain q4  hrs pain med given. Pt appears sleepy says he gets tired day of surgely. on 3 liters nasal canular sats 95-97 %.  Right leg edema. bottom open wound.

## 2023-11-09 NOTE — PLAN OF CARE
Occupational Therapy: Orders received. Chart reviewed and discussed with care team.? Occupational Therapy not indicated as patient completes SB transfers at baseline and has assist from PCA for ADLs/IADLs.? Pt's needs can be met by single discipline, PT, to work on safety and IND with SBA transfer. Defer discharge recommendations to PT.? Will complete orders.

## 2023-11-09 NOTE — BRIEF OP NOTE
[facility]    Brief Operative Note    Pre-operative diagnosis: Infection of below knee amputation stump (H) [T87.40]  Post-operative diagnosis: Below knee amputation stump wound dehiscence. No gross evidence of infection.    Procedure: LEFT LOWER EXTREMITY IRRIGATION AND DEBRIDEMENT, VANCOMYCIN BEAD PLACEMENT, Left - Leg    Surgeon: Surgeon(s) and Role:     * Etienne Maier MD - Primary     * Etienne Argueta MD - Resident - Assisting  Anesthesia: General   Estimated Blood Loss: 10 cc    Drains: None  Specimens:   ID Type Source Tests Collected by Time Destination   A : Left Tibia #1 Bone Tissue Tibia, Left ANAEROBIC BACTERIAL CULTURE ROUTINE, FUNGAL OR YEAST CULTURE ROUTINE, AEROBIC BACTERIAL CULTURE ROUTINE Etienne Maier MD 11/8/2023  5:45 PM    B : Left Tibia #2 Deep Tissue Tibia, Left ANAEROBIC BACTERIAL CULTURE ROUTINE, FUNGAL OR YEAST CULTURE ROUTINE, AEROBIC BACTERIAL CULTURE ROUTINE Etienne Maier MD 11/8/2023  5:46 PM    C : Left Tibia #3 Superficial Tissue Tibia, Left ANAEROBIC BACTERIAL CULTURE ROUTINE, FUNGAL OR YEAST CULTURE ROUTINE, AEROBIC BACTERIAL CULTURE ROUTINE Etienne Maier MD 11/8/2023  5:47 PM      Findings:   Please see detailed operative report .  Complications: None.  Implants: * No implants in log *      Assessment and Plan: Saqib Bishop is a 42 year old male now s/p irrigation and debridement LLE w placement of antibiotic beads and primary wound closure on 11/8/2023 with Dr. Maier.     Medicine Primary  Activity: Up with assist  Weight bearing status: NWB LLE  Pain management:   Transition from IV to PO narcotics as tolerated.   Antibiotics: Continue vanc. Per medicine/ID  Diet: Begin with clear fluids and progress diet as tolerated.   DVT prophylaxis: Per medicine. Ok to restart dvt ppx as indicated on POD1  Labs: Trend ESR/CRP/CBC  Dressings: Keep dressing c/d/I. Change prn per nursing. Adaptic, 4x4, abd, webril, ace-wrap  Physical  Therapy/Occupational Therapy: Eval and treat  Cultures: Follow intra-op cultures  Follow-up: TBD  Disposition: Anticipate RTOR for repeat I&D w removal of abx beads and wound closure on 11/13/2023.    Etienne Argueta MD  Orthopaedic Surgery PGY-4      Addendum 11/08/2023:  I spoke with the patient's parents immediately postoperatively at [phone] . Findings and plan discussed, questions answered.  Etienne Maier MD

## 2023-11-09 NOTE — PROGRESS NOTES
11/09/23 1000   Appointment Info   Signing Clinician's Name / Credentials (PT) Juany Brown, PT, DPT   Rehab Comments (PT) NWB L LE, return to OR Monday 11/13   Living Environment   People in Home alone   Current Living Arrangements house   Home Accessibility no concerns;wheelchair accessible   Transportation Anticipated car, drives self   Living Environment Comments Pt lives at home alone, all living on main level, ramp to enter house.   Self-Care   Usual Activity Tolerance moderate   Current Activity Tolerance fair   Regular Exercise No   Equipment Currently Used at Home shower chair;colostomy/ostomy supplies;wheelchair, manual;transfer board   Activity/Exercise/Self-Care Comment Per pt he gets 5 hours/day help for PCA services for ADL's otherwise is IND with use of w/c and slide board for transfers.  Drives himself in adaptive car.   General Information   Onset of Illness/Injury or Date of Surgery 11/07/23   Patient/Family Therapy Goals Statement (PT) Pt wants to go home   Pertinent History of Current Problem (include personal factors and/or comorbidities that impact the POC) 42 year old male now s/p irrigation and debridement LLE w placement of antibiotic beads and primary wound closure on 11/8/2023 with Dr. Maier.   Existing Precautions/Restrictions fall   Weight-Bearing Status - LUE full weight-bearing   Weight-Bearing Status - RUE full weight-bearing   Weight-Bearing Status - LLE nonweight-bearing   Weight-Bearing Status - RLE full weight-bearing   General Observations Activity: up with assist   Cognition   Affect/Mental Status (Cognition) WFL   Orientation Status (Cognition) oriented x 4   Follows Commands (Cognition) WFL   Pain Assessment   Patient Currently in Pain Yes, see Vital Sign flowsheet  (At L stump 6/10)   Integumentary/Edema   Integumentary/Edema Comments L stump ace wrapped   Posture    Posture Protracted shoulders;Forward head position   Range of Motion (ROM)   ROM Comment Slight  tightness in L hamstring and hip, wanting to sit in knee flexion and hip ER   Strength (Manual Muscle Testing)   Strength Comments 2+/5 for L hip and knee, no movement in R LE below knee, R knee and hip 3/5 (unable to maintain knee extension with SLR)   Bed Mobility   Comment, (Bed Mobility) IND with use of bed rails, has a trapeze on his bed at home   Transfers   Comment, (Transfers) Unable to assess as pt wants to wait for his w/c, uses SB   Gait/Stairs (Locomotion)   Comment, (Gait/Stairs) Pt not ambulatory at baseline, IND with w/c propulsion, unbale to assess during eval   Balance   Balance Comments IND sitting balance   Sensory Examination   Sensory Perception Comments No sensation in B LE's distal to knees   Muscle Tone   Muscle Tone Comments Low tone in R LE due to dis-use   Clinical Impression   Criteria for Skilled Therapeutic Intervention Yes, treatment indicated   PT Diagnosis (PT) Impaired functional mobility   Influenced by the following impairments Decreased LE strength and activity tolerance   Functional limitations due to impairments Inability to complete functional transfers at baseline level of functioning   Clinical Presentation (PT Evaluation Complexity) stable   Clinical Presentation Rationale Clinical judgement   Clinical Decision Making (Complexity) low complexity   Planned Therapy Interventions (PT) bed mobility training;home exercise program;neuromuscular re-education;ROM (range of motion);strengthening;stretching;patient/family education;transfer training;wheelchair management/propulsion training   Risk & Benefits of therapy have been explained evaluation/treatment results reviewed;care plan/treatment goals reviewed;risks/benefits reviewed;current/potential barriers reviewed;participants voiced agreement with care plan;participants included;patient   Clinical Impression Comments Pt would benefit from IP PT to progress strength and IND with functional transfers to ensure safety with d/c  home.   PT Total Evaluation Time   PT Eval, Low Complexity Minutes (93622) 10   Physical Therapy Goals   PT Frequency 5x/week   PT Predicted Duration/Target Date for Goal Attainment 11/23/23   PT Goals Bed Mobility;Transfers;Wheelchair Mobility   PT: Bed Mobility Independent   PT: Transfers Modified independent  (With use of slide board)   PT: Wheelchair Mobility Greater than 500 feet;manual wheelchair   Interventions   Interventions Quick Adds Therapeutic Activity;Therapeutic Procedure   Therapeutic Procedure/Exercise   Ther. Procedure: strength, endurance, ROM, flexibillity Minutes (10429) 10   Treatment Detail/Skilled Intervention PT: Focus on B LE ROM and strength.  Supine completed B quad sets, glute sets, heel slides with min-modA (wnating to go into L limb ER), and B SAQ with Marti.  Pt educated on pillow distal to knee on L side to avoid loss of ROM.   Therapeutic Activity   Therapeutic Activities: dynamic activities to improve functional performance Minutes (42789) 10   Treatment Detail/Skilled Intervention PT: Focus on IND with mobility post evaluation.  Pt able to get fro supine<>sit [place] with use of bed rails.  Sitting EOB able to scoot laterally using UE's.  Pt wanting to wait for his w/c to get OOB, educated on having someone bring it in as soon as possible.  Left supine in bed all needs met.   PT Discharge Planning   PT Plan PT: B LE ex/ROM, trial SB to one of our w/cs even if not staying in it, w/c mobility as able, sitting up in his chair when its here   PT Discharge Recommendation (DC Rec) home with assist;home with outpatient physical therapy   PT Rationale for DC Rec Pt has good support at home from PCA and pending progress with slide board transfers shoul be able to d/c home.   PT Brief overview of current status SBA for mobility   Total Session Time   Timed Code Treatment Minutes 20   Total Session Time (sum of timed and untimed services) 30

## 2023-11-09 NOTE — PROGRESS NOTES
Orthopedic Surgery Progress Note: 11/09/2023    Subjective:   No acute events overnight. Pain well-controlled. Tolerating a diet. No new concerns or complaints. Denies SOB, chest Pain, fevers, chills.     Objective:   BP (!) 141/73 (BP Location: Left arm)   Pulse 72   Temp 97.6  F (36.4  C) (Oral)   Resp 17   Ht 1.829 m (6')   Wt 104.3 kg (230 lb)   SpO2 96%   BMI 31.19 kg/m    I/O this shift:  In: 240 [P.O.:240]  Out: 750 [Urine:750]  General: NAD. Resting comfortably in bed.  Respiratory: Nonlabored breathing  Musculoskeletal:  LLE:   ACE C/D/I  Limb warm and well perfused  Sensation intact ofver the limb     Laboratory Data:  Lab Results   Component Value Date    WBC 3.6 (L) 11/08/2023    HGB 12.1 (L) 11/08/2023     11/08/2023    INR 1.06 11/07/2023         Assessment & Plan:   Saqib Bishop is a 42 year old male now s/p irrigation and debridement LLE w placement of antibiotic beads and primary wound closure on 11/8/2023 with Dr. Maier.      Plan for Today:  - Pain control  - Continue vanc, additional per ID  - Follow cultures - NGTD  - OR planning for 11/13/23    Medicine Primary  Activity: Up with assist  Weight bearing status: NWB LLE  Pain management:   Transition from IV to PO narcotics as tolerated.   Antibiotics: Continue vanc. Per medicine/ID  Diet: Begin with clear fluids and progress diet as tolerated.   DVT prophylaxis: Per medicine. Ok to restart dvt ppx as indicated on POD1  Labs: Trend ESR/CRP/CBC  Dressings: Keep dressing c/d/I. Change prn per nursing. Adaptic, 4x4, abd, webril, ace-wrap  Physical Therapy/Occupational Therapy: Eval and treat  Cultures: Follow intra-op cultures  Follow-up: TBD  Disposition: Anticipate RTOR for repeat I&D w removal of abx beads and wound closure on 11/13/2023.    Orthopedic surgery staff for this patient is Dr. Maier.    ------------------------------------------------------------------------------------------    Respectfully,    José Manuel Gavin,  MD  Orthopedic Surgery PGY1  [phone]    Please page me directly with any questions/concerns during regular weekday hours before 5 pm. If there is no response, if it is a weekend, or if it is during evening hours then please page the orthopedic surgery resident on call.      FOLLOWUP:    Future Appointments   Date Time Provider Department Center   11/9/2023  9:30 AM Juany Brown, PT [facility] [place]   11/9/2023  7:00 PM Nory Burrows, OT [facility] [place]   11/10/2023  8:20 AM Etienne Maier MD [facility]   11/20/2023 12:30 PM Nii Mancilla MD [facility]   12/21/2023  1:15 PM Ne Zavala MD [facility]   1/4/2024  1:30 PM Bloch, Lauren Turner, [facility]   3/6/2024  1:00 PM Viv Traylor PA-C [facility]

## 2023-11-09 NOTE — PROGRESS NOTES
[facility]    Medicine Progress Note - Hospitalist Service, GOLD TEAM 22    Date of Admission:  11/7/2023    Assessment & Plan   Saqib Bishop is a 42 year old male with a history of degenerative joint disease, GERD, and necrotizing fasciitis s/p L BKA admitted on 11/7/2023 with cellulitis of the L BKA stump. He requires hospitalization for IV antibiotics, pain control, and orthopedic surgery intervention. He went to the OR on 11/8 for washout and vanc bead placement; likely return to OR on 11/13. He requires hospitalization for IV antibiotics and surgical management.     Changes Today:  - Continuing flagyl started by ID yesterday. Continuing vanc and cefepime.   - Increased PRN oxycodone dose, wean as tolerated.   - Restarted losartan today.  - Started DVT ppx with heparin today.     # L BKA stump cellulitis  # Soft tissue abscess  # History of MRSA infection  # History of pseudomonas infection  Presented with about a week of increased erythema and purulent drainage from L BKA site. Seen at OSH ED ~ 4 days prior to presentation and initially started on bactrim and levofloxacin. His symptoms progressed, and he presented for further evaluation. Seen by Ortho in the ED, who recommended admission for antibiotics and surgical management. Taken to the OR on 11/8 for washout and vanc bead placement.   - Ortho consulted, appreciate recs  - S/p OR on 11/8, plan for return on 11/13  - Okay to restart DVT ppx  - ID consulted, appreciate recs   - Check MRSA nares  - Continue cefepime 2g q8h (11/7- )  - Continue vancomycin (11/7- ), pharmacy to dose   - Continue PO flagyl 500 mg TID (11/8- )  - Follow intra-op cultures  - Pain control   - Home oxycontin 20 mg q12h   - Scheduled tylenol 975 mg q8h   - Oxycodone increased to 10-15 mg q4h prn    - Flexeril 10 mg TID prn   - Home lyrica 150 mg TID   - If severe breakthrough pain or NPO, please contact for consideration of IV  dilaudid  - ADAT to regular diet  - Senna and miralax ordered while on opioids   - Trend CBC and CRP  - PT/OT consults, appreciate recs  - WOC consult for sacral pressure wound, appreciate recs  - Heparin for DVT ppx due to return to OR on 11/13    # HTN  - Restarted home losartan 25 mg daily; hold on morning of 11/13 for OR  - Holding home hydrochlorothiazide 6.25 mg daily, will likely restart tomorrow     # Mood disorder  - Continue home wellbutrin 450 mg daily, buspar 10 mg BID, duloxetine 60 mg BID    # GERD  - Continue home omeprazole 40 mg BID          Diet: Regular Diet Adult    DVT Prophylaxis: Heparin subcutaneous   Dyer Catheter: PRESENT, indication:    Lines: None     Cardiac Monitoring: None  Code Status: Full Code      Clinically Significant Risk Factors          # Hypocalcemia: Lowest Ca = 8.4 mg/dL in last 2 days, will monitor and replace as appropriate     # Hypoalbuminemia: Lowest albumin = 3.4 g/dL at 11/7/2023  1:26 PM, will monitor as appropriate     # Hypertension: Noted on problem list        # Obesity: Estimated body mass index is 31.19 kg/m  as calculated from the following:    Height as of this encounter: 1.829 m (6').    Weight as of this encounter: 104.3 kg (230 lb)., PRESENT ON ADMISSION            Disposition Plan      Expected Discharge Date: 11/15/2023,  3:00 PM      Discharge Comments: OR on 11/13            PRESTON STERN MD  Hospitalist Service, GOLD TEAM 22  [facility]  Securely message with niiu (more info)  Text page via [facility] Paging/Directory   See signed in provider for up to date coverage information  ______________________________________________________________________    Interval History   No acute events overnight. He has increased pain after his procedure yesterday. He notes that he has not had much stool output from his ostomy today, but it is starting to  a bit. He has no other concerns at this time.      Physical Exam   Vital Signs: Temp: 98  F (36.7  C) Temp src: Oral BP: (!) 145/79 Pulse: 81   Resp: 16 SpO2: 97 % O2 Device: Nasal cannula Oxygen Delivery: 3 LPM  Weight: 230 lbs 0 oz    Constitutional: awake, alert, cooperative, no apparent distress, and appears stated age  Eyes: extra-ocular muscles intact and sclera clear  ENT: normocepalic, without obvious abnormality, MMM, neck supple  Respiratory: No increased work of breathing, good air exchange, clear to auscultation bilaterally, no crackles or wheezing  Cardiovascular: regular rate and rhythm, normal S1 and S2, and no murmurs/rubs/gallops noted  GI: normal bowel sounds, soft, non-distended, and non-tender, ostomy in place  Skin: L BKA stump wrapped  Neurologic: Awake, alert, oriented, moving all extremities, no focal deficits    Medical Decision Making             Data     I have personally reviewed the following data over the past 24 hrs:    4.8  \   12.6 (L)   / 256     140 104 11.9 /  122 (H)   4.5 31 (H) 0.79; 0.79 \       Imaging results reviewed over the past 24 hrs:   No results found for this or any previous visit (from the past 24 hour(s)).

## 2023-11-09 NOTE — ANESTHESIA POSTPROCEDURE EVALUATION
Patient: Saqib Bishop    Procedure: Procedure(s):  LEFT LOWER EXTREMITY IRRIGATION AND DEBRIDEMENT, VANCOMYCIN BEAD PLACEMENT       Anesthesia Type:  General    Note:  Disposition: Outpatient   Postop Pain Control: Uneventful            Sign Out: Well controlled pain   PONV: No   Neuro/Psych: Uneventful            Sign Out: Acceptable/Baseline neuro status   Airway/Respiratory: Uneventful            Sign Out: Acceptable/Baseline resp. status   CV/Hemodynamics: Uneventful            Sign Out: Acceptable CV status   Other NRE: NONE   DID A NON-ROUTINE EVENT OCCUR? No           Last vitals:  Vitals Value Taken Time   /74 11/08/23 1930   Temp 36  C (96.8  F) 11/08/23 1915   Pulse 88 11/08/23 1930   Resp 16 11/08/23 1930   SpO2 94 % 11/08/23 1931   Vitals shown include unfiled device data.    Electronically Signed By: Chintan Donovan MD  November 8, 2023  7:57 PM

## 2023-11-09 NOTE — PLAN OF CARE
Goal Outcome Evaluation:      Plan of Care Reviewed With: patient    Overall Patient Progress: no change    Blood pressure 139/76, pulse 79, temperature 97.8  F (36.6  C), temperature source Oral, resp. rate 16, height 1.829 m (6'), weight 104.3 kg (230 lb), SpO2 93%.     patient back from surgery. AXO X4. LLE ACE wrapped and elevated. Has chronic pain managed with heat pack and PRN pain meds. Denies N/V. vital stable, denies SOB. On 3 LPM While sleeping, on continues pulse oximetry. Right PIV infusing LR  at 100 between antibiotics.      Skin- L BKA. Right IT wound Dressing CDI. Scabs on the face. Abrasion to right foot.      Drains:Colostomy and bui cath in place. -patent

## 2023-11-09 NOTE — OR NURSING
PACU to Inpatient Nursing Handoff    Patient Saqib Bishop is a 42 year old male who speaks English.   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    Anesthesia General   Dermatome Level     Preop Meds Not applicable   Nerve block Not applicable   Intraop Meds dexamethasone (Decadron)  ondansetron (Zofran): last given at 1753   Local Meds No   Antibiotics Not applicable     Pain Patient Currently in Pain: no   PACU meds  Not applicable   PCA / epidural No   Capnography     Telemetry ECG Rhythm: Normal sinus rhythm   Inpatient Telemetry Monitor Ordered? No        Labs Glucose Lab Results   Component Value Date     11/08/2023     09/10/2023       Hgb Lab Results   Component Value Date    HGB 12.1 11/08/2023       INR Lab Results   Component Value Date    INR 1.06 11/07/2023      PACU Imaging Not applicable     Wound/Incision Wound Pressure injury community acquired (Active)   Number of days: 73       Wound Sacrum Pressure injury community acquired Stage 4 (Active)   Wound Bed Pale 11/07/23 2048   Vanessa-wound Assessment Warm;Erythema 11/08/23 1100   Drainage Amount None 11/08/23 1100   Wound Care/Cleansing Wound cleanser 11/07/23 2048   Dressing Moist gauze 11/08/23 1100   Amount of Packing Added 1 11/08/23 1100    Amount of Packing Removed 0 11/08/23 1100   Packing removed by Nurse 11/07/23 2048   Dressing Status Changed;New dressing 11/08/23 1100   Dressing Change Due 11/09/23 11/08/23 1100   Number of days: 1       Wound (used by OP [facility] only) 01/26/23 0905 Right sacral pressure injury (Active)   Thickness/Stage Stage 4 10/10/23 1437   Base gray;slough 10/10/23 1437   Periwound pink;intact;dry 10/10/23 1437   Periwound Temperature warm 10/10/23 1437   Periwound Skin Turgor firm 10/10/23 1437   Edges rolled/closed 10/10/23 1437   Length (cm) 1.7 10/10/23 1437   Width (cm) 0.6 10/10/23 1437   Depth (cm) 2.9 10/10/23 1437   Wound (cm^2) 1.02 cm^2 10/10/23 1437   Wound Volume (cm^3) 2.96 cm^3 10/10/23 1437   Wound healing % 50.96 10/10/23 1437   Tunneling [Depth (cm)/Location] 4 oclock/ 5.1 cm 10/10/23 1437   Undermining [Depth (cm)/Location] 5.8 cm/ 5-3 oclock 10/10/23 1437   Drainage Characteristics/Odor yellow;green 10/10/23 1437   Drainage Amount moderate 10/10/23 1437   Care, Wound non-select wound debridement performed 10/10/23 1437   Number of days: 286       Incision/Surgical Site 09/07/23 Left;Lower Leg (Active)   Incision Assessment WDL 11/08/23 1838   Closure Approximated;Sutures 11/08/23 1838   Incision Drainage Amount UTV 11/08/23 1838   Incision Care Normal saline 11/08/23 1838   Dressing Intervention Clean, dry, intact 11/08/23 1838   Number of days: 62      CMS        Equipment Not applicable   Other LDA       IV Access Peripheral IV 11/07/23 Right Antecubital fossa (Active)   Site Assessment [facility] 11/08/23 1829   Line Status Infusing 11/08/23 1829   Dressing Transparent 11/08/23 1829   Dressing Status clean;dry;intact 11/08/23 1829   Phlebitis Scale 0-->no symptoms 11/08/23 1829   Infiltration? no 11/08/23 1829   Number of days: 1      Blood Products Not applicable EBL 20 mL   Intake/Output Date 11/08/23 0700 - 11/09/23 0659   Shift 8155-6503 1868-0561 1356-6724 24 Hour Total   INTAKE   I.V.  1400  1400   Shift  Total(mL/kg)  1400(13.42)  1400(13.42)   OUTPUT   Urine 650 400  1050   Blood  20  20   Shift Total(mL/kg) 650(6.23) 420(4.03)  1070(10.26)   Weight (kg) 104.33 104.33 104.33 104.33      Drains / Bui Colostomy (Active)   Number of days: 73       Urethral Catheter (Active)   Catheter Care Done;Catheter wipes 11/08/23 1100   Urine Output 650 mL 11/08/23 1440   Number of days:       Time of void PreOp Time of Void Prior to Procedure: 1542 (bui in place) (11/08/23 1542)    PostOp      Diapered? No   Bladder Scan     PO    tolerating sips     Vitals    B/P: 117/81  T: 97  F (36.1  C)    Temp src: Axillary  P:  Pulse: 77 (11/08/23 1829)          R: 17  O2:  SpO2: 99 %    O2 Device: None (Room air) (11/08/23 1845)    Oxygen Delivery: 6 LPM (11/08/23 1829)         Family/support present none   Patient belongings     Patient transported on bed and air mat   DC meds/scripts (obs/outpt) Not applicable   Inpatient Pain Meds Released? Yes       Special needs/considerations None   Tasks needing completion None       Marlyn Lee RN  ASCOM [id number]

## 2023-11-09 NOTE — OP NOTE
DATE OF SURGERY:  11/08/2023.    PREOPERATIVE DIAGNOSIS:  Left transtibial amputation wound dehiscence with possible wound infection.    POSTOPERATIVE DIAGNOSIS:  Left transtibial amputation wound dehiscence with possible wound infection.    PROCEDURE:  Sharp excisional debridement and irrigation of left transtibial amputation wound and placement of antibiotic beads deep to the fascial layer adjacent to bone.    PRIMARY SURGEON:  Etienne Maier MD.    ASSISTANT SURGEON:  Etienne Argueta MD.    ANESTHESIA:  General endotracheal.    COMPLICATIONS:  None apparent intraoperatively or immediately postoperatively.    IMPLANTS:  One strand of ten tobramycin and vancomycin antibiotic beads.    SIGNIFICANT FINDINGS:  No grossly visible evidence for any purulence, abscess, or other infection.  Wound dehiscence / necrosis, full thickness through skin, in three separate focal areas in residual left lower extremity.  The necrosis appeared to be limited to skin and subcutaneous tissue only, with no tracking through the fascial layer.  Out of an abundance of precaution this was incised and the anterior tibial bone was explored with no visible evidence for infection.    Technique: Cutting, scrubbing.  Instrument used:  Scalpel, elevator.  Nature of tissue removed:  Necrotic skin and subcutaneous tissue.  Appearance of wound:  Down to healthy, bleeding tissue.  Surface area of debridement:  Three separate areas including 13 cm mediolateral x 2 cm superoinferior x 3 cm deep along anterior incision, 3 cm mediolateral x 1 cm superoinferior x 0.5 cm deep along lateral incision, and 1 cm mediolateral x 1 cm superoinferior x 0.5 cm deep in anterior tibia between the knee and the incision.  Depth of debridement: Wound was debrided down to and including bone (anterior tibia).    SPECIMENS:  .    DRAINS:  None.    ESTIMATED BLOOD LOSS:  Approximately 20 mL.      INDICATIONS:  Saqib Bishop is a 42 year old male patient with spinal cord  injury, paraplegia, and other medical conditions who developed left lower extremity necrotizing fasciitis and sepsis requiring an emergent guillotine amputation on 08/28/2023 by Dr. Barth, followed by I&Ds and eventual completion of the amputation to a formal transtibial amputation and closure by Dr. Gates on 09/07/2023.  He subsequently developed wound necrosis with no clear evidence for an abscess.  He then presented to another healthcare facility due to increased swelling, erythema, and pain, where the wound was swabbed revealing polymicrobial organisms including Pseudomonas on culture.  A CT scan was also performed apparently showing a fluid collection and possible abscess in the anterior distal residual limb near the incision, as well as bone irregularity in the anterior distal residual tibia possibly representing osteomyelitis.  The patient now presents to this facility for further evaluation and management.  The diagnosis and findings were discussed with the patient, and the recommendation was made for a debridement and irrigation with possible placement of antibiotic beads and possible application of a Vacuum Assisted Closure dressing.  The nature of the recommended procedure, the risks, benefits, and alternatives, the postoperative plan, and realistic expectations for outcome were all discussed with the patient in layman's terms.  No guarantees were expressed or implied as to outcome.  The possibility of multiple future procedures being performed was discussed including possible future revision amputation if the wound failed to eventually heal.  The patient had the opportunity to have all the questions he had at the time asked and answered appropriately, verbalized his understanding of this discussion, and verbalized his wish to proceed with the planned procedure.  Written informed consent was obtained.     DESCRIPTION OF PROCEDURE:             The patient, Saqib Bishop, was met in the preoperative  area where the correct surgical site was identified with patient participation and marked by the primary surgeon.  All questions were answered.  The patient was then brought to the operating room and was safely transferred to the operating table in the supine position with all bony prominences well padded and a safety strap across the waist.  The anesthesia team successfully induced general anesthesia and placed an endotracheal tube.  The patient was already on a preoperative ongoing regimen of antibiotic therapy and had just finished a vancomycin dose in the preoperative area.  Venous thromboembolic prophylaxis was performed with a sequential device on the nonoperative lower extremity.  A tourniquet was placed on the thigh of the operative lower extremity.  The operative lower extremity was then prepped and draped free in the usual sterile fashion.  Prior to the start of surgery, a multidisciplinary time out was performed per hospital protocol confirming among the other items, the correct patient identity, procedure, body part, and laterality.  All in the room verbalized agreement.     The left lower extremity residual limb was inspected and was notable for full thickness appearing skin necrosis / wounds in the anterior central area and a smaller lateral area along the transtibial amputation scar, as well as a smaller anterior area distal to the knee and proximal to the scar.  The remainder of the scar appeared to be well healed and dry.  There was scant serosanguinous drainage from the open areas without purulence or surrounding fluctuance.  The anterior area of skin necrosis was sharply ellipsed out with a knife, and the majority of the anterior incision was reincised along the previous scar to explore the wound thoroughly.  The lateral open wound along the scar, as well as the more proximal open wound, were likewise sharply debrided.  Necrotic skin and subcutaneous tissues were sharply debrided to healthy,  bleeding surrounding tissues. None of the three wounds appeared to track below the subcutaneous layer.  Given the preoperative imaging findings and the history of increased pain, erythema, and swelling, the intact fascial layer was reincised and the deep space deep to the fascial layer was explored to bone (anterior distal tibia).  No cavities or pockets were found, no abscess was found, and the bone appeared to be grossly healthy.  Cultures were taken from the distal anterior tibia as well as the soft tissues and these were sent to microbiology.  The wounds were thoroughly irrigated out with 3L sterile NS using cystoscopy tubing.  One strand of ten tobramycin and vancomycin antibiotic PMMA beads were placed into the anterior central wound, with the strand extending deep to the fascial layer and adjacent to bone (distal tibia).  Hemostasis was achieved.  All counts were reported as correct.  All three wounds were then closed with 2-0 nylon suture without undue wound tension.  There was no evidence for any vascular injury or other complications noted.  The incisions were carefully cleansed with sterile saline and dried.  Sterile dressings were then applied.  The patient was extubated and then taken to the recovery room in stable condition.  I was present in the room for setup and positioning, as well as presentand scrubbed in for the entire case.  The postoperative plan will include following culture results, ID consultation, and return to the OR for repeat debridement and removal versus exchange of the antibiotic beads and possible wound VAC placement versus primary closure.    Etienne Maier MD  Attending surgeon  Orthopaedic Surgery

## 2023-11-09 NOTE — ANESTHESIA CARE TRANSFER NOTE
Patient: Saqib Bishop    Procedure: Procedure(s):  LEFT LOWER EXTREMITY IRRIGATION AND DEBRIDEMENT, VANCOMYCIN BEAD PLACEMENT       Diagnosis: Infection of below knee amputation stump (H) [T87.40]  Diagnosis Additional Information: No value filed.    Anesthesia Type:   General     Note:    Oropharynx: oropharynx clear of all foreign objects and spontaneously breathing  Level of Consciousness: drowsy  Oxygen Supplementation: face mask  Level of Supplemental Oxygen (L/min / FiO2): 8  Independent Airway: airway patency satisfactory and stable  Dentition: dentition unchanged  Vital Signs Stable: post-procedure vital signs reviewed and stable  Report to RN Given: handoff report given  Patient transferred to: PACU    Handoff Report: Identifed the Patient, Identified the Reponsible Provider, Reviewed the pertinent medical history, Discussed the surgical course, Reviewed Intra-OP anesthesia mangement and issues during anesthesia, Set expectations for post-procedure period and Allowed opportunity for questions and acknowledgement of understanding      Vitals:  Vitals Value Taken Time   /81 11/08/23 1829   Temp 36.1C    Pulse 75 11/08/23 1832   Resp 12 11/08/23 1832   SpO2 98 % 11/08/23 1832   Vitals shown include unfiled device data.    Electronically Signed By: RL Edwards CRNA  November 8, 2023  6:33 PM

## 2023-11-09 NOTE — PROGRESS NOTES
[facility] GENERAL ID SERVICE : PROGRESS NOTE     Patient:  Saqib Bishop   YOB: 1980, MRN: [id number]  Date of Visit: 11/09/2023  Date of Admission: 11/7/2023  Consult Requester: Daphney Vasquez MD          Assessment and Recommendations:     ID Problem List:  Stump infection, involving left BKA s/p sharp excisional debridement, placement of antibiotic beads deep to the fascial layer adjacent to bone 11/8/2023  11/3 superficial wound cx: Pseudomonas aeruginosa; GS (GPC, GNB)  10/1 superficial wound cx: Enterobacter cloacae  Intra-op cultures 11/8/2023: pending  Hx of necrotizing fascitis of LLE s/p guillotine 8/28/2023, s/p I&D of residual limb 8/30/2023, s/p BKA 9/7/2023  Paraplegia   S/p colostomy  Chronic indwelling Dyer, last exchange just prior to admission    Discussion:  41 yo M with left BKA stump infection, c/o erythema, discharge for almost 5-6 weeks. CT outside facility with collection around stump site with concern of abscess. Afebrile, hemodynamically stable. Has been on bactrim, levofloxacin since 11/3. Though previously completed a course of bactrim beginning October. Added po metronidazole for anaerobic coverage (Gram stain with GPC, GNB) on 11/3 wound cultures but only grew Pseudomonas aeruginosa but no Gram-positive growth (likely anaerobes). Continue empiric iv vancomycin, cefepime. Underwent surgery 11/8, reviewed OP findings: Sharp excisional debridement and irrigation of left transtibial amputation wound and placement of antibiotic beads deep to the fascial layer adjacent to bone; no obvious purulence but wound dehiscence/necrosis involving full thickness through skin, in 3 separate focal areas in residual LLE. Intra-op cultures were obtained, remained negative so far.      Recommendations:  Check MRSA nares (ordered for you)  Continue current regimen  IV Vancomycin, pharmacy to dose and monitor, appreciate assistance  IV cefepime 2g q8h  PO Metronidazole 500mg  TID  Await intra-op cultures    Thank you for the consult. ID team will continue to follow. Please reach out if any questions or concerns.     Total time spent during this encounter (chart review, documentation, MDM, coordination of care): 40 minutes    Lisa Jade MD   Infectious Diseases Staff Physician  Pager: [phone]  Nortal ASera jose c   11/09/2023         Interval History and Events:     Seen and examined. No new complaints, feels soreness at surgery site. Otherwise doing well.          HPI as adopted from initial ID consult on 11/8/2023:     Saqib Bishop is a 43 yo M with PMHx of T12 level spinal cord injury and paraplegia, colostomy, DJD, GERD, chronic sacral decubitus ulcer s/p flap and treatment for osteomyelitis Dec-2020, complicated by dehiscence, open wound and sacral decubitus ulceration Oct-2021, s/p an elective flap procedure in early 2023 which failed and resulted in a chronic sacral ulcer. In August presented with left foot necrotizing fascitis s/p guillotine 8/28/2023, s/p I&D of residual limb 8/30, then BKA 9/7/2023. Antibiotics were stopped post surgery per ID recommendations (sign off note 9/9). Per patient, since then never felt that stump healed completely. Noted distinct redness and then some discharge from the stump surgical incision on lateral aspect and seen in ER 10/1. Superficial wound cx then grew Enterobacter cloacae and received a course of bactrim without complete resolution. Evaluated by Orthopedic clinic (Dr. Maier) 10/6. Again evaluated in ER ([facility]) 11/3, wound cx grew Pseudomonas aeruginosa. Given bactrim, levofloxacin. Continues to have discharge, redness around stump incision site. Also noticing palpable swelling around stump since past one month or so. No fever, chills, or systemic complaints. Presented again at outside facility, CT was obtained which shows collection around stump concern for abscess. Transferred to [facility] for surgical intervention.         Review of Systems:    Targeted 10 point ROS was completed with pertinent positives and negatives are detailed above.         Antimicrobial history:     Current:  Vancomycin, cefepime, metronidazole     Prior:  Bactrim, levofloxacin 11/3 - 11/7  Bactrim ~early October, 2023         Physical Examination:   Temp:  [96.8  F (36  C)-98  F (36.7  C)] 98  F (36.7  C)  Pulse:  [72-91] 81  Resp:  [12-21] 16  BP: (117-145)/(68-87) 145/79  SpO2:  [91 %-99 %] 97 %    I/O last 3 completed shifts:  In: 1940 [P.O.:540; I.V.:1400]  Out: 2820 [Urine:2800; Blood:20]    Vitals:    11/07/23 1231   Weight: 104.3 kg (230 lb)       Exam:  GENERAL:  sitting in bed in no acute distress.   Head: normocephalic, atraumatic.   EYES: PERRLA, EOMI, conjunctiva clear, anicteric sclerae.    ENT:  Oropharynx is moist without exudates or ulcers.  NECK:  Supple.  LUNGS:  Breathing comfortably, on room air, clear to auscultation bilaterally, no w/r/r.  CARDIOVASCULAR:  Regular rate and rhythm, +S1S2 with no murmurs, gallops or rubs.  ABDOMEN:  Normal bowel sounds, soft, nontender. +colostomy bag  : no suprapubic or flank tendterness.   EXT: left BKA stump site s/p surgery, covered in dressing, ace wrap  SKIN:  No acute rashes.    NEUROLOGIC:  Grossly nonfocal.      Lines and devices:   PIV is in place without any surrounding erythema.     Labs, Microbiology and Imaging studies are reviewed.          Laboratory Data:     Inflammatory Markers    Recent Labs   Lab Test 11/07/23  1539 09/09/23  0454 09/08/23  0424   SED 46* 55* 47*       Metabolic Studies     Recent Labs   Lab Test 11/09/23  0557 11/08/23  0548 11/07/23  1326 09/14/23  0606 09/06/23  1159 09/06/23  0815 08/28/23  0414 08/28/23  0222 08/27/23  2128 08/27/23  2115    140 141 141   < >  --    < >  --    < > 129*  128*   POTASSIUM 4.5 4.1 4.0 4.0   < >  --    < >  --    < > 4.0  4.0   CHLORIDE 104 105 103 106   < >  --    < >  --    < > 95*  93*   CO2 31* 31* 30* 25   < >  --    < >  --    < > 19*   18*   ANIONGAP 5* 4* 8 10   < >  --    < >  --    < > 15  17*   BUN 11.9 15.3 13.7 16.4   < >  --    < >  --    < > 44.3*  44.1*   CR 0.79  0.79 0.99 0.86 0.80   < >  --    < >  --    < > 2.95*  2.89*   GFRESTIMATED >90  >90 >90 >90 >90   < >  --    < >  --    < > 26*  27*   * 102* 116* 101*   < >  --    < >  --    < > 79  76   A1C  --   --   --   --   --  5.5  --   --   --   --    HILDA 8.5* 8.4* 8.9 9.1   < >  --    < >  --    < > 7.9*  7.6*   PHOS  --   --   --  3.4   < >  --    < >  --   --  4.3   MAG  --   --   --  1.6*   < >  --    < >  --   --  1.2*   LACT  --   --   --   --   --   --   --  0.5*   < >  --    CKT  --   --   --   --   --   --   --   --   --  1,339*    < > = values in this interval not displayed.       Hepatic Studies    Recent Labs   Lab Test 11/07/23  1326 09/11/23  0618 09/09/23  0454   BILITOTAL 0.2 0.2 0.2   ALKPHOS 133* 139* 134*   ALBUMIN 3.4* 3.2* 3.3*   AST 37 35 22   ALT 21 22 16       Hematology Studies      Recent Labs   Lab Test 11/09/23  0557 11/08/23  0548 11/07/23  1539   WBC 4.8 3.6* 4.1   HGB 12.6* 12.1* 13.0*   HCT 41.1 40.1 42.5    225 281        Vancomycin Levels     Recent Labs   Lab Test 11/09/23  0557 09/12/23  0638 09/09/23  0454   VANCOMYCIN 28.6* 8.9 11.4       Microbiology  Lab Results   Component Value Date    CULTURE No growth after 1 day 11/07/2023

## 2023-11-09 NOTE — PHARMACY-VANCOMYCIN DOSING SERVICE
Pharmacy Vancomycin Note  Date of Service 2023  Patient's  1980   42 year old, male    Indication: Abscess and Skin and Soft Tissue Infection  Day of Therapy: since   Current vancomycin regimen:  1500 mg IV q12h (14.4mg/kg/dose)  Current vancomycin monitoring method: AUC  Current vancomycin therapeutic monitoring goal: 400-600 mg*h/L    InsightRX Prediction of Current Vancomycin Regimen  Loading dose: N/A  Regimen: 1500 mg IV every 12 hours.  Exposure target: AUC24 (range)400-600 mg/L.hr   AUC24,ss: 610 mg/L.hr  Probability of AUC24 > 400: 98 %  Ctrough,ss: 18.7 mg/L  Probability of Ctrough,ss > 20: 42 %  Probability of nephrotoxicity (Lodise ANNE ): 15 %    Current estimated CrCl = Estimated Creatinine Clearance: 152.1 mL/min (based on SCr of 0.79 mg/dL).    Creatinine for last 3 days  2023:  1:26 PM Creatinine 0.86 mg/dL  2023:  5:48 AM Creatinine 0.99 mg/dL  2023:  5:57 AM Creatinine 0.79 mg/dL;  5:57 AM Creatinine 0.79 mg/dL    Recent Vancomycin Levels (past 3 days)  2023:  5:57 AM Vancomycin 28.6 ug/mL    Vancomycin IV Administrations (past 72 hours)                     vancomycin (VANCOCIN) 1,500 mg in 0.9% NaCl 250 mL intermittent infusion (mg) 1,500 mg New Bag 23 0132     1,500 mg New Bag 23 1425     1,500 mg New Bag  0328     1,500 mg New Bag 23 1542                    Nephrotoxins and other renal medications (From now, onward)      Start     Dose/Rate Route Frequency Ordered Stop    23 1300  vancomycin (VANCOCIN) 1,250 mg in 0.9% NaCl 250 mL intermittent infusion         1,250 mg  over 90 Minutes Intravenous EVERY 12 HOURS 23 0824                 Contrast Orders - past 72 hours (72h ago, onward)      None          Interpretation of levels and current regimen:  Vancomycin level is reflective of AUC greater than 600    Has serum creatinine changed greater than 50% in last 72 hours: No    Urine output:  good urine output    Renal  Function: Stable    InsightRX Prediction of Planned New Vancomycin Regimen  Loading dose: N/A  Regimen: 1250 mg IV every 12 hours.  Start time: 13:32 on 11/09/2023  Exposure target: AUC24 (range)400-600 mg/L.hr   AUC24,ss: 510 mg/L.hr  Probability of AUC24 > 400: 88 %  Ctrough,ss: 15.6 mg/L  Probability of Ctrough,ss > 20: 22 %  Probability of nephrotoxicity (Lodise ANNE 2009): 11 %    Plan:  Decrease Dose to 1250mg IV Q12H (12mg/kg/dose)  Vancomycin monitoring method: AUC  Vancomycin therapeutic monitoring goal: 400-600 mg*h/L  Pharmacy will check vancomycin levels as appropriate in 1-3 Days.  Serum creatinine levels will be ordered every 48 hours.    Nae Palencia, RicaD, BCPS
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
## 2023-11-10 ENCOUNTER — APPOINTMENT (OUTPATIENT)
Dept: PHYSICAL THERAPY | Facility: CLINIC | Age: 43
DRG: 492 | End: 2023-11-10
Payer: MEDICARE

## 2023-11-10 LAB
ANION GAP SERPL CALCULATED.3IONS-SCNC: 3 MMOL/L (ref 7–15)
BUN SERPL-MCNC: 18.2 MG/DL (ref 6–20)
CALCIUM SERPL-MCNC: 8.8 MG/DL (ref 8.6–10)
CHLORIDE SERPL-SCNC: 106 MMOL/L (ref 98–107)
CK SERPL-CCNC: 45 U/L (ref 39–308)
CREAT SERPL-MCNC: 0.96 MG/DL (ref 0.67–1.17)
CRP SERPL-MCNC: 7.38 MG/L
DEPRECATED HCO3 PLAS-SCNC: 31 MMOL/L (ref 22–29)
EGFRCR SERPLBLD CKD-EPI 2021: >90 ML/MIN/1.73M2
ERYTHROCYTE [DISTWIDTH] IN BLOOD BY AUTOMATED COUNT: 15.4 % (ref 10–15)
GLUCOSE SERPL-MCNC: 109 MG/DL (ref 70–99)
HCT VFR BLD AUTO: 38.4 % (ref 40–53)
HGB BLD-MCNC: 11.7 G/DL (ref 13.3–17.7)
MCH RBC QN AUTO: 23.8 PG (ref 26.5–33)
MCHC RBC AUTO-ENTMCNC: 30.5 G/DL (ref 31.5–36.5)
MCV RBC AUTO: 78 FL (ref 78–100)
PLATELET # BLD AUTO: 227 10E3/UL (ref 150–450)
POTASSIUM SERPL-SCNC: 4.4 MMOL/L (ref 3.4–5.3)
RBC # BLD AUTO: 4.91 10E6/UL (ref 4.4–5.9)
SODIUM SERPL-SCNC: 140 MMOL/L (ref 135–145)
WBC # BLD AUTO: 4.2 10E3/UL (ref 4–11)

## 2023-11-10 PROCEDURE — 36415 COLL VENOUS BLD VENIPUNCTURE: CPT | Performed by: STUDENT IN AN ORGANIZED HEALTH CARE EDUCATION/TRAINING PROGRAM

## 2023-11-10 PROCEDURE — 80048 BASIC METABOLIC PNL TOTAL CA: CPT | Performed by: STUDENT IN AN ORGANIZED HEALTH CARE EDUCATION/TRAINING PROGRAM

## 2023-11-10 PROCEDURE — G0463 HOSPITAL OUTPT CLINIC VISIT: HCPCS | Mod: 25

## 2023-11-10 PROCEDURE — 250N000013 HC RX MED GY IP 250 OP 250 PS 637: Performed by: STUDENT IN AN ORGANIZED HEALTH CARE EDUCATION/TRAINING PROGRAM

## 2023-11-10 PROCEDURE — 82550 ASSAY OF CK (CPK): CPT | Performed by: STUDENT IN AN ORGANIZED HEALTH CARE EDUCATION/TRAINING PROGRAM

## 2023-11-10 PROCEDURE — 97110 THERAPEUTIC EXERCISES: CPT | Mod: GP | Performed by: REHABILITATION PRACTITIONER

## 2023-11-10 PROCEDURE — 97602 WOUND(S) CARE NON-SELECTIVE: CPT

## 2023-11-10 PROCEDURE — 250N000009 HC RX 250: Performed by: PEDIATRICS

## 2023-11-10 PROCEDURE — 250N000013 HC RX MED GY IP 250 OP 250 PS 637: Performed by: PEDIATRICS

## 2023-11-10 PROCEDURE — 99232 SBSQ HOSP IP/OBS MODERATE 35: CPT | Performed by: PEDIATRICS

## 2023-11-10 PROCEDURE — 99232 SBSQ HOSP IP/OBS MODERATE 35: CPT | Performed by: STUDENT IN AN ORGANIZED HEALTH CARE EDUCATION/TRAINING PROGRAM

## 2023-11-10 PROCEDURE — 85027 COMPLETE CBC AUTOMATED: CPT | Performed by: STUDENT IN AN ORGANIZED HEALTH CARE EDUCATION/TRAINING PROGRAM

## 2023-11-10 PROCEDURE — 120N000002 HC R&B MED SURG/OB UMMC

## 2023-11-10 PROCEDURE — 86140 C-REACTIVE PROTEIN: CPT | Performed by: STUDENT IN AN ORGANIZED HEALTH CARE EDUCATION/TRAINING PROGRAM

## 2023-11-10 PROCEDURE — 258N000003 HC RX IP 258 OP 636: Performed by: PEDIATRICS

## 2023-11-10 PROCEDURE — 250N000011 HC RX IP 250 OP 636: Performed by: PEDIATRICS

## 2023-11-10 PROCEDURE — 250N000011 HC RX IP 250 OP 636: Mod: JZ | Performed by: STUDENT IN AN ORGANIZED HEALTH CARE EDUCATION/TRAINING PROGRAM

## 2023-11-10 RX ORDER — PREDNISOLONE ACETATE 10 MG/ML
1 SUSPENSION/ DROPS OPHTHALMIC 2 TIMES DAILY
Status: DISCONTINUED | OUTPATIENT
Start: 2023-11-10 | End: 2023-11-18 | Stop reason: HOSPADM

## 2023-11-10 RX ADMIN — Medication 6.25 MG: at 10:13

## 2023-11-10 RX ADMIN — CEFEPIME 1 G: 1 INJECTION, POWDER, FOR SOLUTION INTRAMUSCULAR; INTRAVENOUS at 08:29

## 2023-11-10 RX ADMIN — METRONIDAZOLE 500 MG: 500 TABLET ORAL at 13:06

## 2023-11-10 RX ADMIN — METRONIDAZOLE 500 MG: 500 TABLET ORAL at 19:29

## 2023-11-10 RX ADMIN — OXYCODONE HYDROCHLORIDE 15 MG: 10 TABLET ORAL at 00:49

## 2023-11-10 RX ADMIN — PREDNISOLONE ACETATE 1 DROP: 10 SUSPENSION/ DROPS OPHTHALMIC at 19:31

## 2023-11-10 RX ADMIN — OXYCODONE HYDROCHLORIDE 15 MG: 10 TABLET ORAL at 17:20

## 2023-11-10 RX ADMIN — PREGABALIN 150 MG: 75 CAPSULE ORAL at 08:32

## 2023-11-10 RX ADMIN — CEFEPIME 1 G: 1 INJECTION, POWDER, FOR SOLUTION INTRAMUSCULAR; INTRAVENOUS at 16:49

## 2023-11-10 RX ADMIN — CEFEPIME 1 G: 1 INJECTION, POWDER, FOR SOLUTION INTRAMUSCULAR; INTRAVENOUS at 00:41

## 2023-11-10 RX ADMIN — OXYCODONE HYDROCHLORIDE 20 MG: 20 TABLET, FILM COATED, EXTENDED RELEASE ORAL at 22:46

## 2023-11-10 RX ADMIN — OXYCODONE HYDROCHLORIDE 20 MG: 20 TABLET, FILM COATED, EXTENDED RELEASE ORAL at 10:13

## 2023-11-10 RX ADMIN — Medication 1250 MG: at 13:05

## 2023-11-10 RX ADMIN — ACETAMINOPHEN 975 MG: 325 TABLET, FILM COATED ORAL at 01:32

## 2023-11-10 RX ADMIN — LORATADINE 10 MG: 10 TABLET ORAL at 08:32

## 2023-11-10 RX ADMIN — DULOXETINE HYDROCHLORIDE 120 MG: 60 CAPSULE, DELAYED RELEASE ORAL at 08:31

## 2023-11-10 RX ADMIN — HEPARIN SODIUM 5000 UNITS: 5000 INJECTION, SOLUTION INTRAVENOUS; SUBCUTANEOUS at 22:46

## 2023-11-10 RX ADMIN — ACETAMINOPHEN 975 MG: 325 TABLET, FILM COATED ORAL at 17:20

## 2023-11-10 RX ADMIN — HEPARIN SODIUM 5000 UNITS: 5000 INJECTION, SOLUTION INTRAVENOUS; SUBCUTANEOUS at 00:41

## 2023-11-10 RX ADMIN — PREDNISOLONE ACETATE 1 DROP: 10 SUSPENSION/ DROPS OPHTHALMIC at 10:14

## 2023-11-10 RX ADMIN — METRONIDAZOLE 500 MG: 500 TABLET ORAL at 08:32

## 2023-11-10 RX ADMIN — OMEPRAZOLE 40 MG: 20 CAPSULE, DELAYED RELEASE ORAL at 08:31

## 2023-11-10 RX ADMIN — CEFEPIME 1 G: 1 INJECTION, POWDER, FOR SOLUTION INTRAMUSCULAR; INTRAVENOUS at 22:46

## 2023-11-10 RX ADMIN — BUSPIRONE HYDROCHLORIDE 10 MG: 10 TABLET ORAL at 08:32

## 2023-11-10 RX ADMIN — ACETAMINOPHEN 975 MG: 325 TABLET, FILM COATED ORAL at 10:13

## 2023-11-10 RX ADMIN — BUPROPION HYDROCHLORIDE 450 MG: 150 TABLET, FILM COATED, EXTENDED RELEASE ORAL at 08:31

## 2023-11-10 RX ADMIN — BUSPIRONE HYDROCHLORIDE 10 MG: 10 TABLET ORAL at 19:30

## 2023-11-10 RX ADMIN — HEPARIN SODIUM 5000 UNITS: 5000 INJECTION, SOLUTION INTRAVENOUS; SUBCUTANEOUS at 13:09

## 2023-11-10 RX ADMIN — OMEPRAZOLE 40 MG: 20 CAPSULE, DELAYED RELEASE ORAL at 19:30

## 2023-11-10 RX ADMIN — Medication 1250 MG: at 01:32

## 2023-11-10 RX ADMIN — OXYCODONE HYDROCHLORIDE 15 MG: 10 TABLET ORAL at 22:46

## 2023-11-10 RX ADMIN — LOSARTAN POTASSIUM 25 MG: 25 TABLET, FILM COATED ORAL at 08:32

## 2023-11-10 RX ADMIN — PREGABALIN 150 MG: 75 CAPSULE ORAL at 19:29

## 2023-11-10 RX ADMIN — OXYCODONE HYDROCHLORIDE 15 MG: 10 TABLET ORAL at 07:34

## 2023-11-10 RX ADMIN — PREGABALIN 150 MG: 75 CAPSULE ORAL at 13:06

## 2023-11-10 RX ADMIN — OXYCODONE HYDROCHLORIDE 15 MG: 10 TABLET ORAL at 13:05

## 2023-11-10 ASSESSMENT — ACTIVITIES OF DAILY LIVING (ADL)
ADLS_ACUITY_SCORE: 34
DEPENDENT_IADLS:: CLEANING;COOKING;LAUNDRY;SHOPPING;MEAL PREPARATION;MEDICATION MANAGEMENT
ADLS_ACUITY_SCORE: 34

## 2023-11-10 NOTE — CONSULTS
[facility]  WOC Nurse Inpatient Assessment     Consulted for: Right IT  NEW Consult 11/10/2023: Left posterior thigh    Summary: Wound location is sacrum, present on admit, pt has established ostomy  WOC will defer to ortho at this time for BKA care. Please consult WOC in the future if desired.     Patient History (according to provider note(s):      Saqib Bishop is a 42-year-old man with past medical history of left lower extremity necrotizing fasciitis status post left guillotine BKA with vascular surgery on 08/27, that required I&D on 08/30/2023 with Dr. Maier, who required subsequent revisions of the BKA on 09/02 and 09/07 with Dr. Rehman and Dr. Gates respectively, who has a soft tissue infection of the BKA site positive for Pseudomonas.  He had positive cultures drawn on 11/3.  CT shows soft tissue abscess, does not appear to show obvious osteomyelitis.  He is not not septic and otherwise feeling well.  I do think the degree of infection is concerning enough to at least require IV antibiotics at this time.  There is likely a possibility that he will need formal I&D in the OR with a possible wound revision.  We will discuss with staff the indication for doing this urgently versus initiating medical management and planning this for down the road.     Assessment:      Areas visualized during today's visit: Focused:    Pressure Injury Location: Sacrum    Last photo: 11/8/23  Wound type: Pressure Injury     Pressure Injury Stage: 4, present on admission   Wound history/plan of care: Present on admit. Seen on previous admit, last WOC assessment 9/12.  Wound base: unable to visualize  base     Palpation of the wound bed: normal      Drainage: moderate     Description of drainage: serosanguinous     Measurements (length x width x depth, in cm) 2.1  x 1.5  x  6.5 cm      Tunneling N/A     Undermining up to 5 cm from 4-9 o'clock  Periwound skin: Intact      Color:  normal and consistent with surrounding tissue      Temperature: normal   Odor: none  Pain: denies , none  Pain intervention prior to dressing change: slow and gentle cares   Treatment goal: Increase granulation  STATUS: initial assessment  Supplies ordered: discussed with RN and discussed with patient     Wound location: Left posterior thigh      Last photo: 11/10/2023  Wound due to: Unknown Etiology  Wound history/plan of care: Present on admission, patient does not know etiology  Wound base: 100 % slough     Palpation of the wound bed: normal      Drainage: scant     Description of drainage: cloudy     Measurements (length x width x depth, in cm): 1  x 1  x  0.2 cm      Tunneling: N/A     Undermining: N/A  Periwound skin: Intact      Color: pink      Temperature: normal   Odor: none  Pain: denies   Pain interventions prior to dressing change: slow and gentle cares   Treatment goal: Soften necrotic tissue  STATUS: initial assessment  Supplies ordered: ordered Medihoney    Colostomy: pt able to manage independently.     Treatment Plan:     Sacral wound(s): Daily and PRN   Pack wound with Vashe dampened kerlix to pack into wound bed.   Ensure to pack into undermined area, wound is bigger than what you see.   Cover with mepilex  Change dressing daily or PRN    Left posterior thigh wound: Every other day: Wash wound with Vashe and gauze.  Coat wound bed with Medihoney (orer #[id number]) and cover with Mepilex.     LUQ Colostomy  Encourage patient participation with ostomy care,  ~ Empty pouch when 1/3 to 1/2 full,   ~ Notify WOC for ongoing ostomy pouch leakage,  ~ Document stoma output volume, color, consistency EVERY shift  ~ Supplies used               ~ Pouch: Elena 70 FECAL ([id number])              ~ Flange/Barrier/Wafer: Elena 70mm FLAT ([id number])              ~ Accessories: Powder ([id number]) and No Sting ([id number])    Orders: Written    RECOMMEND PRIMARY TEAM ORDER: None, at this time  Education provided: importance  of repositioning, plan of care, and wound progress  Discussed plan of care with: Patient and Nurse  [facility] nurse follow-up plan: weekly  Notify WO if wound(s) deteriorate.  Nursing to notify the Provider(s) and re-consult the [facility] Nurse if new skin concern.    DATA:     Current support surface: Standard  Standard gel/foam mattress (IsoFlex, Atmos air, etc)  Containment of urine/stool: Colostomy pouch  BMI: Body mass index is 31.19 kg/m .   Active diet order: Orders Placed This Encounter      Regular Diet Adult      NPO per Anesthesia Guidelines for Procedure/Surgery Except for: Meds     Output: I/O last 3 completed shifts:  In: 3 [I.V.:3]  Out: 950 [Urine:950]     Labs:   Recent Labs   Lab 11/10/23  0521 11/08/23  0548 11/07/23  1539 11/07/23  1326   ALBUMIN  --   --   --  3.4*   HGB 11.7*   < > 13.0*  --    INR  --   --  1.06  --    WBC 4.2   < > 4.1  --     < > = values in this interval not displayed.     Pressure injury risk assessment:   Sensory Perception: 2-->very limited  Moisture: 3-->occasionally moist  Activity: 2-->chairfast  Mobility: 3-->slightly limited  Nutrition: 3-->adequate  Friction and Shear: 2-->potential problem  Long Score: 15    Pager no longer is use, please contact through [email or web address]   Agustin group: [facility] Nurse [facility]  Dept. Office Number: [phone]

## 2023-11-10 NOTE — PROGRESS NOTES
[facility]    Medicine Progress Note - Hospitalist Service, GOLD TEAM 22    Date of Admission:  11/7/2023    Assessment & Plan   Saqib Bishop is a 42 year old male with a history of degenerative joint disease, GERD, and necrotizing fasciitis s/p L BKA admitted on 11/7/2023 with cellulitis of the L BKA stump. He requires hospitalization for IV antibiotics, pain control, and orthopedic surgery intervention. He went to the OR on 11/8 for washout and vanc bead placement; likely return to OR on 11/13. He requires hospitalization for IV antibiotics and surgical management.     Changes Today:  - Continuing vanc/cefepime/flagyl.   - Restarted hydrochlorothiazide today.     # L BKA stump cellulitis  # Soft tissue abscess  # History of MRSA infection  # History of pseudomonas infection  Presented with about a week of increased erythema and purulent drainage from L BKA site. Seen at OSH ED ~ 4 days prior to presentation and initially started on bactrim and levofloxacin. His symptoms progressed, and he presented for further evaluation. Seen by Ortho in the ED, who recommended admission for antibiotics and surgical management. Taken to the OR on 11/8 for washout and vanc bead placement.   - Ortho consulted, appreciate recs  - S/p OR on 11/8, plan for return on 11/13  - Okay to restart DVT ppx  - ID consulted, appreciate recs   - MRSA nare negative   - Continue cefepime 2g q8h (11/7- )  - Continue vancomycin (11/7- ), pharmacy to dose   - Continue PO flagyl 500 mg TID (11/8- )  - Follow intra-op cultures, NGTD  - Pain control   - Home oxycontin 20 mg q12h   - Scheduled tylenol 975 mg q8h   - Oxycodone 10-15 mg q4h prn    - Flexeril 10 mg TID prn   - Home lyrica 150 mg TID   - If severe breakthrough pain or NPO, please contact for consideration of IV dilaudid  - Regular diet  - Senna and miralax ordered while on opioids   - Trend CBC and CRP  - PT/OT consults, appreciate recs  -  WOC consult for sacral pressure wound, appreciate recs  - Heparin for DVT ppx due to return to OR on 11/13    # HTN  - Continue home losartan 25 mg daily; hold on morning of 11/13 for OR  - Restarted home hydrochlorothiazide 6.25 mg daily; hold on morning of 11/13 for OR    # Mood disorder  - Continue home wellbutrin 450 mg daily, buspar 10 mg BID, duloxetine 60 mg BID    # GERD  - Continue home omeprazole 40 mg BID          Diet: Regular Diet Adult  NPO per Anesthesia Guidelines for Procedure/Surgery Except for: Meds    DVT Prophylaxis: Heparin subcutaneous   Dyer Catheter: PRESENT, indication: Neurogenic Bladder  Lines: None     Cardiac Monitoring: None  Code Status: Full Code      Clinically Significant Risk Factors              # Hypoalbuminemia: Lowest albumin = 3.4 g/dL at 11/7/2023  1:26 PM, will monitor as appropriate     # Hypertension: Noted on problem list        # Obesity: Estimated body mass index is 31.19 kg/m  as calculated from the following:    Height as of this encounter: 1.829 m (6').    Weight as of this encounter: 104.3 kg (230 lb)., PRESENT ON ADMISSION     # Financial/Environmental Concerns: none         Disposition Plan      Expected Discharge Date: 11/15/2023,  3:00 PM    Destination: home with help/services  Discharge Comments: OR on 11/13            PRESTON STERN MD  Hospitalist Service, GOLD TEAM 22  [facility]  Securely message with [employer] (more info)  Text page via [facility] Paging/Directory   See signed in provider for up to date coverage information  ______________________________________________________________________    Interval History   No acute events overnight. He has no concerns today. His pain is well-controlled. He has noticed increased stool output from his ostomy compared to post-op yesterday.     Physical Exam   Vital Signs: Temp: 98.2  F (36.8  C) Temp src: Oral BP: (!) 154/87 Pulse: 74   Resp: 15 SpO2: 94 % O2 Device: None  (Room air)    Weight: 230 lbs 0 oz    Constitutional: awake, alert, pleasant, cooperative, no apparent distress, and appears stated age  Eyes: extra-ocular muscles intact and sclera clear  ENT: normocepalic, without obvious abnormality, MMM, neck supple  Respiratory: No increased work of breathing, good air exchange, clear to auscultation bilaterally, no crackles or wheezing  Cardiovascular: regular rate and rhythm, normal S1 and S2, and no murmurs/rubs/gallops noted  GI: normal bowel sounds, soft, non-distended, and non-tender, ostomy in place  Skin: L BKA stump wrapped  Neurologic: Awake, alert, oriented, moving all extremities, no focal deficits    Medical Decision Making             Data     I have personally reviewed the following data over the past 24 hrs:    4.2  \   11.7 (L)   / 227     140 106 18.2 /  109 (H)   4.4 31 (H) 0.96 \     Procal: N/A CRP: 7.38 (H) Lactic Acid: N/A         Imaging results reviewed over the past 24 hrs:   No results found for this or any previous visit (from the past 24 hour(s)).

## 2023-11-10 NOTE — PROGRESS NOTES
Care Management Follow Up    Length of Stay (days): 3    Expected Discharge Date: 11/15/2023     Concerns to be Addressed: discharge planning     Patient plan of care discussed at interdisciplinary rounds: Yes    Anticipated Discharge Disposition:  (Home with assistance)     Anticipated Discharge Services:  (OP PT and continued PCA services)  Anticipated Discharge DME: None    Patient/family educated on Medicare website which has current facility and service quality ratings: no  Education Provided on the Discharge Plan: Yes  Patient/Family in Agreement with the Plan: yes    Referrals Placed by CM/SW:  ([facility] referrals may be needed.)  Private pay costs discussed: Not applicable    Additional Information:  Home care referral placed to [facility] for skilled nursing and OT. Patient has been declined from [facility] due to compliance issues. Surgery scheduled for 11/13/2023. [facility] will continue to follow.    Teresita Kelly RN, BSN  Care Coordinator, 5 Ortho  Phone [phone]  Pager [phone]

## 2023-11-10 NOTE — CONSULTS
Care Management Initial Consult    General Information  Assessment completed with: Patient,  (Saqib Bishop)  Type of CM/SW Visit: Initial Assessment    Primary Care Provider verified and updated as needed: Yes (Bernardo Robin MD    [facility]   [address] [place] [address]   PH: [phone])   **Face sheet is not updating with PCP information  Readmission within the last 30 days: no previous admission in last 30 days      Reason for Consult: discharge planning  Advance Care Planning: Advance Care Planning Reviewed: no concerns identified          Communication Assessment  Patient's communication style: spoken language (English or Bilingual)    Hearing Difficulty or Deaf: no   Wear Glasses or Blind: no    Cognitive  Cognitive/Neuro/Behavioral: WDL  Level of Consciousness: alert  Arousal Level: opens eyes spontaneously  Orientation: oriented x 4  Mood/Behavior: calm  Best Language: 0 - No aphasia  Speech: clear, logical    Living Environment:   People in home: child(danilo), dependent     Current living Arrangements: mobile home      Able to return to prior arrangements: yes       Family/Social Support:  Care provided by:  (PCA is pt's mother's S/O)  Provides care for: child(danilo)  Marital Status: Single  Sibling(s), Step parent(s), Parent(s)          Description of Support System: Supportive, Involved    Support Assessment: Adequate family and caregiver support, Adequate social supports    Current Resources:   Patient receiving home care services: Yes  Skilled Home Care Services: Skilled Nursing, Physical Therapy  Community Resources: [facility], [facility]  Equipment currently used at home: shower chair, wheelchair, manual, transfer board  Supplies currently used at home: Wound Care Supplies    Employment/Financial:  Employment Status: disabled        Financial Concerns: none   Referral to Financial Worker: No       Does the patient's insurance plan have a 3 day qualifying hospital stay  waiver?  No    Lifestyle & Psychosocial Needs:  Social Determinants of Health     Food Insecurity: Not on file   Depression: Not at risk (10/24/2023)    PHQ-2     PHQ-2 Score: 2   Housing Stability: Not on file   Tobacco Use: Medium Risk (11/10/2023)    Patient History     Smoking Tobacco Use: Former     Smokeless Tobacco Use: Unknown     Passive Exposure: Not on file   Financial Resource Strain: Not on file   Alcohol Use: Not on file   Transportation Needs: Not on file   Physical Activity: Not on file   Interpersonal Safety: Not on file   Stress: Not on file   Social Connections: Not on file       Functional Status:  Prior to admission patient needed assistance:   Dependent ADLs:: Bathing, Dressing, Wheelchair-independent  Dependent IADLs:: Cleaning, Cooking, Laundry, Shopping, Meal Preparation, Medication Management  Assesssment of Functional Status: At functional baseline    Mental Health Status:  Mental Health Status: No Current Concerns       Chemical Dependency Status:  Chemical Dependency Status: No Current Concerns             Values/Beliefs:  Spiritual, Cultural Beliefs, Advent Practices, Values that affect care: no               Additional Information:  Saqib Bishop is a 42 year old male with a history of degenerative joint disease, GERD, and necrotizing fasciitis s/p L BKA admitted on 11/7/2023 with cellulitis of the L BKA stump. He requires hospitalization for IV antibiotics and orthopedic surgery intervention.     SW met with pt at bedside for Initial CM Assessment d/t Elevated Risk Score. Pt confirmed that he is still receiving 5 hours of PCA, 5 days/week, with an additional 2 hours to help with shopping. Pt reported that his mother's s/o is his PCA (not his father, as he lives in [place]). Pt reported he is IND with transfers, -based mobility, finances, and driving. Pt confirmed his CADI CM is still [facility]. Pt stated he was unaware that [facility] will not take pt back on service d/t missing  "visits. Pt reported he had home RN/PT services through [facility]; RN did medication set-up. Current recs are for home with assist and OP PT. Pt reported he would be able to make it to OP PT appointments.    Upon chart review, prior [facility] agencies, [facility] and [facility], have both declined taking pt back on for services. Pt has daily wound care needs.    MIKE Garcia  PH: [phone]  [email or web address]    PCP Clinic  Bernardo Robin MD  [facility]  [address] [place] [address].  [place] [address]  PH: [phone]  Fax: [phone]    **Pt's PCP information will not upload onto face sheet. SW called pt's PCP Clinic for fax # for handoff (documented above).  stated to put \"ATTN: Dr. Bernardo Robin\", as fax machine is shared.          CRUZITO WhitesideW, LSW  5 Ortho & WB ED   PHONE: [phone]  Pager: [phone]   "

## 2023-11-10 NOTE — PLAN OF CARE
Goal Outcome Evaluation:      Plan of Care Reviewed With: patient    Overall Patient Progress: no changeOverall Patient Progress: no change    Outcome Evaluation: patient alert , oriented x4, and complaint with cares. dressing to left BKA CDI, dressing to sacrum chaged by writer, wound to left posterior thigh changed wby WOCN and dressing orders placed. patient manages colostomy independently, bui patent and draining adequate amount. pain managed with schedule Oxycotin, PRN oxycodone, Lyrica, and Tylenol. Plan for repeat I&D w removal of abx beads and wound closure on 11/13/2023.

## 2023-11-10 NOTE — PLAN OF CARE
Goal Outcome Evaluation:      Plan of Care Reviewed With: patient    Overall Patient Progress: no change    Pt denies chest pain and SOB. Pt c/o of back pain, managing with PRN oxycodone.   PIV infusing; dressing; CDI.   Dyer; patent and draining adequate amount.   Skin;x; integrity; sacrum; UTV; dressing; CDI, right posterior thigh; UTV; dressing; CDI. Scab and brusing in right anterior foot.     Pt passing time by watching tv. No acute changes during this shift, call light within reach, continue with plan of care.

## 2023-11-10 NOTE — PROGRESS NOTES
Orthopedic Surgery Progress Note: 11/10/2023    Subjective:   No acute events overnight. Pain well-controlled. No other concerns.     Objective:   BP (!) 149/89 (BP Location: Left arm)   Pulse 73   Temp 97.5  F (36.4  C) (Oral)   Resp 16   Ht 1.829 m (6')   Wt 104.3 kg (230 lb)   SpO2 94%   BMI 31.19 kg/m    No intake/output data recorded.  General: NAD. Resting comfortably in bed.  Respiratory: Nonlabored breathing  Musculoskeletal:  LLE:   ACE C/D/I  Limb warm and well perfused  Sensation intact over the limb     Laboratory Data:  Lab Results   Component Value Date    WBC 4.2 11/10/2023    HGB 11.7 (L) 11/10/2023     11/10/2023    INR 1.06 11/07/2023         Assessment & Plan:   Saqib Bishop is a 42 year old male now s/p irrigation and debridement LLE w placement of antibiotic beads and primary wound closure on 11/8/2023 with Dr. Maier.     Plan for Today:  - Continue Vanc/Cefepime/Metronidazole per ID recs  - PT/OT  - Pain control  - Follow cultures - NGTD  - OR planning for 11/13    Medicine Primary  Activity: Up with assist  Weight bearing status: NWB LLE  Pain management:   Transition from IV to PO narcotics as tolerated.   Antibiotics: Continue vanc/cefepime/metronidazole. Per medicine/ID  Diet: Begin with clear fluids and progress diet as tolerated.   DVT prophylaxis: Per medicine. Ok to restart dvt ppx as indicated on POD1  Labs: Trend ESR/CRP/CBC  Dressings: Keep dressing c/d/I. Change prn per nursing. Adaptic, 4x4, abd, webril, ace-wrap  Physical Therapy/Occupational Therapy: Eval and treat  Cultures: Follow intra-op cultures  Follow-up: TBD  Disposition: Anticipate RTOR for repeat I&D w removal of abx beads and wound closure on 11/13/2023.    Orthopedic surgery staff for this patient is Dr. Maier.    ------------------------------------------------------------------------------------------    Respectfully,    José Manuel Gavin MD  Orthopedic Surgery PGY1  [phone]    Please page me directly  with any questions/concerns during regular weekday hours before 5 pm. If there is no response, if it is a weekend, or if it is during evening hours then please page the orthopedic surgery resident on call.      FOLLOWUP:    Future Appointments   Date Time Provider Department Center   11/10/2023  8:20 AM Etienne Maier MD [facility]   11/10/2023  1:00 PM Cody Francisco PTA [facility]   11/20/2023 12:30 PM Nii Mancilla MD [facility]   12/21/2023  1:15 PM Ne Zavala MD [facility]   1/4/2024  1:30 PM Bloch, Lauren Turner, [facility]   3/6/2024  1:00 PM Viv Traylor PA-C [facility]

## 2023-11-10 NOTE — PLAN OF CARE
Pt A/O x4, RA, denies SOB & CP. Regular diet, thin liquids, meds whole. Dyer in place & colostomy, both reportedly managed by pt. Pt not OOB on shift. L BKA w/ baseline numbness. R PIV infusing TKO. Roxicodone last given at 0049. Contact precautions maintained. No significant changes on shift. Will continue POC.

## 2023-11-10 NOTE — PROGRESS NOTES
[facility] GENERAL ID SERVICE : PROGRESS NOTE     Patient:  Saqib Bishop   YOB: 1980, MRN: [id number]  Date of Visit: 11/10/2023  Date of Admission: 11/7/2023  Consult Requester: Daphney Vasquez MD          Assessment and Recommendations:     ID Problem List:  Stump infection, involving left BKA s/p sharp excisional debridement, placement of antibiotic beads deep to the fascial layer adjacent to bone 11/8/2023 11/8 Intra-op cultures: NGTD  11/3 superficial wound cx: Pseudomonas aeruginosa; GS (GPC, GNB)  10/1 superficial wound cx: Enterobacter cloacae    Hx of necrotizing fascitis of LLE s/p guillotine 8/28/2023, s/p I&D of residual limb 8/30/2023, s/p BKA 9/7/2023  Paraplegia   S/p colostomy  Chronic indwelling Dyer, last exchange just prior to admission    Discussion:  43 yo M with left BKA stump infection, c/o erythema, discharge for almost 5-6 weeks. CT outside facility with collection around stump site with concern of abscess. Afebrile, hemodynamically stable. Has been on bactrim, levofloxacin since 11/3. Also, previously completed a course of bactrim beginning October. Added po metronidazole for anaerobic coverage (Gram stain with GPC, GNB) on 11/3 wound cultures but only grew Pseudomonas aeruginosa but no Gram-positive growth (likely anaerobes). Continue empiric iv vancomycin, cefepime. Underwent surgery 11/8, reviewed OP findings: Sharp excisional debridement and irrigation of left transtibial amputation wound and placement of antibiotic beads deep to the fascial layer adjacent to bone; no obvious purulence but wound dehiscence/necrosis involving full thickness through skin, in 3 separate focal areas in residual LLE. Intra-op cultures were obtained, remained negative so far. Recommend to continue empiric regimen.     Recommendations:  Continue current regimen  IV Vancomycin, pharmacy to dose and monitor, appreciate assistance  IV cefepime 2g q8h  PO Metronidazole 500mg  TID  Await intra-op cultures, so far negative  Check CK level (Ordered for you)  Plan to RTOR on 11/13    Recommendations are discussed with Orthopedic resident.     Thank you for the consult. ID team will continue to follow. Please reach out if any questions or concerns.     For urgent questions over the weekend, please reach out to on-call ID provider per amcom. Dr. Darrell Ralph will assume [facility] ID service from Monday 11/13/2023 onwards.     Total time spent during this encounter (chart review, documentation, MDM, coordination of care): 40 minutes    Lisa Jade MD   Infectious Diseases Staff Physician  Pager: [phone]  "[employer]" jose c   11/10/2023         Interval History and Events:     Seen and examined. Resting. No new complaints today.          HPI as adopted from initial ID consult on 11/8/2023:     Saqib Bishop is a 41 yo M with PMHx of T12 level spinal cord injury and paraplegia, colostomy, DJD, GERD, chronic sacral decubitus ulcer s/p flap and treatment for osteomyelitis Dec-2020, complicated by dehiscence, open wound and sacral decubitus ulceration Oct-2021, s/p an elective flap procedure in early 2023 which failed and resulted in a chronic sacral ulcer. In August presented with left foot necrotizing fascitis s/p guillotine 8/28/2023, s/p I&D of residual limb 8/30, then BKA 9/7/2023. Antibiotics were stopped post surgery per ID recommendations (sign off note 9/9). Per patient, since then never felt that stump healed completely. Noted distinct redness and then some discharge from the stump surgical incision on lateral aspect and seen in ER 10/1. Superficial wound cx then grew Enterobacter cloacae and received a course of bactrim without complete resolution. Evaluated by Orthopedic clinic (Dr. Maier) 10/6. Again evaluated in ER ([facility]) 11/3, wound cx grew Pseudomonas aeruginosa. Given bactrim, levofloxacin. Continues to have discharge, redness around stump incision site. Also noticing  palpable swelling around stump since past one month or so. No fever, chills, or systemic complaints. Presented again at outside facility, CT was obtained which shows collection around stump concern for abscess. Transferred to [facility] for surgical intervention.         Review of Systems:   Targeted 10 point ROS was completed with pertinent positives and negatives are detailed above.         Antimicrobial history:     Current:  Vancomycin, cefepime, metronidazole     Prior:  Bactrim, levofloxacin 11/3 - 11/7  Bactrim ~early October, 2023         Physical Examination:   Temp:  [97.5  F (36.4  C)-98.7  F (37.1  C)] 97.5  F (36.4  C)  Pulse:  [73-77] 73  Resp:  [16-18] 16  BP: (133-149)/(82-89) 149/89  SpO2:  [94 %] 94 %    I/O last 3 completed shifts:  In: 3 [I.V.:3]  Out: 950 [Urine:950]    Vitals:    11/07/23 1231   Weight: 104.3 kg (230 lb)       Exam:  GENERAL:  sitting in bed in no acute distress.   Head: normocephalic, atraumatic.   EYES: PERRLA, EOMI, conjunctiva clear, anicteric sclerae.    ENT:  Oropharynx is moist without exudates or ulcers.  NECK:  Supple.  LUNGS:  Breathing comfortably, on room air, clear to auscultation bilaterally, no w/r/r.  CARDIOVASCULAR:  Regular rate and rhythm, +S1S2 with no murmurs, gallops or rubs.  ABDOMEN:  Normal bowel sounds, soft, nontender. +colostomy bag  : no suprapubic or flank tendterness.   EXT: left BKA stump site s/p surgery, covered in dressing, ace wrap   SKIN:  No acute rashes.    NEUROLOGIC:  Grossly nonfocal.      Lines and devices:   PIV is in place without any surrounding erythema.     Labs, Microbiology and Imaging studies are reviewed.          Laboratory Data:     Inflammatory Markers    Recent Labs   Lab Test 11/07/23  1539 09/09/23  0454 09/08/23  0424   SED 46* 55* 47*       Metabolic Studies     Recent Labs   Lab Test 11/10/23  0521 11/09/23  0557 11/08/23  0548 11/07/23  1326 09/14/23  0606 09/06/23  1159 09/06/23  0815 08/28/23  0414 08/28/23  0222  08/27/23 2128 08/27/23 2115    140 140   < > 141   < >  --    < >  --    < > 129*  128*   POTASSIUM 4.4 4.5 4.1   < > 4.0   < >  --    < >  --    < > 4.0  4.0   CHLORIDE 106 104 105   < > 106   < >  --    < >  --    < > 95*  93*   CO2 31* 31* 31*   < > 25   < >  --    < >  --    < > 19*  18*   ANIONGAP 3* 5* 4*   < > 10   < >  --    < >  --    < > 15  17*   BUN 18.2 11.9 15.3   < > 16.4   < >  --    < >  --    < > 44.3*  44.1*   CR 0.96 0.79  0.79 0.99   < > 0.80   < >  --    < >  --    < > 2.95*  2.89*   GFRESTIMATED >90 >90  >90 >90   < > >90   < >  --    < >  --    < > 26*  27*   * 122* 102*   < > 101*   < >  --    < >  --    < > 79  76   A1C  --   --   --   --   --   --  5.5  --   --   --   --    HILDA 8.8 8.5* 8.4*   < > 9.1   < >  --    < >  --    < > 7.9*  7.6*   PHOS  --   --   --   --  3.4   < >  --    < >  --   --  4.3   MAG  --   --   --   --  1.6*   < >  --    < >  --   --  1.2*   LACT  --   --   --   --   --   --   --   --  0.5*   < >  --    CKT  --   --   --   --   --   --   --   --   --   --  1,339*    < > = values in this interval not displayed.       Hepatic Studies    Recent Labs   Lab Test 11/07/23  1326 09/11/23  0618 09/09/23  0454   BILITOTAL 0.2 0.2 0.2   ALKPHOS 133* 139* 134*   ALBUMIN 3.4* 3.2* 3.3*   AST 37 35 22   ALT 21 22 16       Hematology Studies      Recent Labs   Lab Test 11/10/23  0521 11/09/23  0557 11/08/23  0548   WBC 4.2 4.8 3.6*   HGB 11.7* 12.6* 12.1*   HCT 38.4* 41.1 40.1    256 225        Vancomycin Levels     Recent Labs   Lab Test 11/09/23  0557 09/12/23  0638 09/09/23  0454   VANCOMYCIN 28.6* 8.9 11.4       Microbiology  Lab Results   Component Value Date    CULTURE No anaerobic organisms isolated after 1 day 11/08/2023    CULTURE No growth after 1 day 11/08/2023    CULTURE No growth, less than 1 day 11/08/2023

## 2023-11-10 NOTE — PLAN OF CARE
Goal Outcome Evaluation:  /82 (BP Location: Left arm, Patient Position: Supine, Cuff Size: Adult Large)   Pulse 77   Temp 98.7  F (37.1  C) (Oral)   Resp 18   Ht 1.829 m (6')   Wt 104.3 kg (230 lb)   SpO2 94%   BMI 31.19 kg/m    Alert and oriented x4, able to make needs known. Denies SOB, chest pain, headache, N/V. Chronic pain managed with scheduled and PRN pain meds   R IT dressing was changed-AM shift, L posterior thigh- dressing- CDI, L stump dressing CDI with ACE wraps in place. Dyer and colostomy bag in place-self manages.   Patient is Paraplegic. Raised no new concerns, continue with current POC.      Plan of Care Reviewed With: patient    Overall Patient Progress: improvingOverall Patient Progress: improving

## 2023-11-11 LAB
ANION GAP SERPL CALCULATED.3IONS-SCNC: 6 MMOL/L (ref 7–15)
BUN SERPL-MCNC: 17.3 MG/DL (ref 6–20)
CALCIUM SERPL-MCNC: 9 MG/DL (ref 8.6–10)
CHLORIDE SERPL-SCNC: 102 MMOL/L (ref 98–107)
CREAT SERPL-MCNC: 0.85 MG/DL (ref 0.67–1.17)
CREAT SERPL-MCNC: 0.85 MG/DL (ref 0.67–1.17)
DEPRECATED HCO3 PLAS-SCNC: 29 MMOL/L (ref 22–29)
EGFRCR SERPLBLD CKD-EPI 2021: >90 ML/MIN/1.73M2
EGFRCR SERPLBLD CKD-EPI 2021: >90 ML/MIN/1.73M2
ERYTHROCYTE [DISTWIDTH] IN BLOOD BY AUTOMATED COUNT: 15.6 % (ref 10–15)
GLUCOSE SERPL-MCNC: 103 MG/DL (ref 70–99)
HCT VFR BLD AUTO: 40.6 % (ref 40–53)
HGB BLD-MCNC: 12.5 G/DL (ref 13.3–17.7)
MCH RBC QN AUTO: 23.8 PG (ref 26.5–33)
MCHC RBC AUTO-ENTMCNC: 30.8 G/DL (ref 31.5–36.5)
MCV RBC AUTO: 77 FL (ref 78–100)
PLATELET # BLD AUTO: 248 10E3/UL (ref 150–450)
POTASSIUM SERPL-SCNC: 3.8 MMOL/L (ref 3.4–5.3)
RBC # BLD AUTO: 5.25 10E6/UL (ref 4.4–5.9)
SODIUM SERPL-SCNC: 137 MMOL/L (ref 135–145)
VANCOMYCIN SERPL-MCNC: 27.7 UG/ML
WBC # BLD AUTO: 4.9 10E3/UL (ref 4–11)

## 2023-11-11 PROCEDURE — 250N000013 HC RX MED GY IP 250 OP 250 PS 637: Performed by: STUDENT IN AN ORGANIZED HEALTH CARE EDUCATION/TRAINING PROGRAM

## 2023-11-11 PROCEDURE — 250N000011 HC RX IP 250 OP 636: Performed by: PEDIATRICS

## 2023-11-11 PROCEDURE — 82565 ASSAY OF CREATININE: CPT | Performed by: STUDENT IN AN ORGANIZED HEALTH CARE EDUCATION/TRAINING PROGRAM

## 2023-11-11 PROCEDURE — 120N000002 HC R&B MED SURG/OB UMMC

## 2023-11-11 PROCEDURE — 82310 ASSAY OF CALCIUM: CPT | Performed by: STUDENT IN AN ORGANIZED HEALTH CARE EDUCATION/TRAINING PROGRAM

## 2023-11-11 PROCEDURE — 36415 COLL VENOUS BLD VENIPUNCTURE: CPT | Performed by: STUDENT IN AN ORGANIZED HEALTH CARE EDUCATION/TRAINING PROGRAM

## 2023-11-11 PROCEDURE — 85027 COMPLETE CBC AUTOMATED: CPT | Performed by: STUDENT IN AN ORGANIZED HEALTH CARE EDUCATION/TRAINING PROGRAM

## 2023-11-11 PROCEDURE — 250N000013 HC RX MED GY IP 250 OP 250 PS 637: Performed by: PEDIATRICS

## 2023-11-11 PROCEDURE — 250N000011 HC RX IP 250 OP 636: Performed by: STUDENT IN AN ORGANIZED HEALTH CARE EDUCATION/TRAINING PROGRAM

## 2023-11-11 PROCEDURE — 99232 SBSQ HOSP IP/OBS MODERATE 35: CPT | Performed by: PEDIATRICS

## 2023-11-11 PROCEDURE — 80202 ASSAY OF VANCOMYCIN: CPT | Performed by: PEDIATRICS

## 2023-11-11 PROCEDURE — 258N000003 HC RX IP 258 OP 636: Performed by: PEDIATRICS

## 2023-11-11 RX ORDER — VANCOMYCIN HYDROCHLORIDE 1 G/200ML
1000 INJECTION, SOLUTION INTRAVENOUS EVERY 12 HOURS
Status: DISCONTINUED | OUTPATIENT
Start: 2023-11-11 | End: 2023-11-18 | Stop reason: HOSPADM

## 2023-11-11 RX ADMIN — OXYCODONE HYDROCHLORIDE 20 MG: 20 TABLET, FILM COATED, EXTENDED RELEASE ORAL at 22:53

## 2023-11-11 RX ADMIN — CEFEPIME 1 G: 1 INJECTION, POWDER, FOR SOLUTION INTRAMUSCULAR; INTRAVENOUS at 15:48

## 2023-11-11 RX ADMIN — ACETAMINOPHEN 975 MG: 325 TABLET, FILM COATED ORAL at 09:54

## 2023-11-11 RX ADMIN — OXYCODONE HYDROCHLORIDE 15 MG: 10 TABLET ORAL at 08:23

## 2023-11-11 RX ADMIN — OXYCODONE HYDROCHLORIDE 15 MG: 10 TABLET ORAL at 11:43

## 2023-11-11 RX ADMIN — LORATADINE 10 MG: 10 TABLET ORAL at 08:13

## 2023-11-11 RX ADMIN — Medication 6.25 MG: at 08:13

## 2023-11-11 RX ADMIN — METRONIDAZOLE 500 MG: 500 TABLET ORAL at 19:29

## 2023-11-11 RX ADMIN — METRONIDAZOLE 500 MG: 500 TABLET ORAL at 08:13

## 2023-11-11 RX ADMIN — BUSPIRONE HYDROCHLORIDE 10 MG: 10 TABLET ORAL at 08:13

## 2023-11-11 RX ADMIN — BUSPIRONE HYDROCHLORIDE 10 MG: 10 TABLET ORAL at 19:29

## 2023-11-11 RX ADMIN — OXYCODONE HYDROCHLORIDE 15 MG: 10 TABLET ORAL at 23:35

## 2023-11-11 RX ADMIN — METRONIDAZOLE 500 MG: 500 TABLET ORAL at 13:41

## 2023-11-11 RX ADMIN — BUPROPION HYDROCHLORIDE 450 MG: 150 TABLET, FILM COATED, EXTENDED RELEASE ORAL at 08:13

## 2023-11-11 RX ADMIN — LOSARTAN POTASSIUM 25 MG: 25 TABLET, FILM COATED ORAL at 08:13

## 2023-11-11 RX ADMIN — VANCOMYCIN HYDROCHLORIDE 1000 MG: 1 INJECTION, SOLUTION INTRAVENOUS at 12:37

## 2023-11-11 RX ADMIN — PREGABALIN 150 MG: 75 CAPSULE ORAL at 08:13

## 2023-11-11 RX ADMIN — PREDNISOLONE ACETATE 1 DROP: 10 SUSPENSION/ DROPS OPHTHALMIC at 19:29

## 2023-11-11 RX ADMIN — OXYCODONE HYDROCHLORIDE 15 MG: 10 TABLET ORAL at 03:47

## 2023-11-11 RX ADMIN — SODIUM CHLORIDE: 9 INJECTION, SOLUTION INTRAVENOUS at 08:06

## 2023-11-11 RX ADMIN — HEPARIN SODIUM 5000 UNITS: 5000 INJECTION, SOLUTION INTRAVENOUS; SUBCUTANEOUS at 11:31

## 2023-11-11 RX ADMIN — OXYCODONE HYDROCHLORIDE 15 MG: 10 TABLET ORAL at 15:46

## 2023-11-11 RX ADMIN — DULOXETINE HYDROCHLORIDE 120 MG: 60 CAPSULE, DELAYED RELEASE ORAL at 08:13

## 2023-11-11 RX ADMIN — PREGABALIN 150 MG: 75 CAPSULE ORAL at 13:41

## 2023-11-11 RX ADMIN — OXYCODONE HYDROCHLORIDE 20 MG: 20 TABLET, FILM COATED, EXTENDED RELEASE ORAL at 11:31

## 2023-11-11 RX ADMIN — PREDNISOLONE ACETATE 1 DROP: 10 SUSPENSION/ DROPS OPHTHALMIC at 10:05

## 2023-11-11 RX ADMIN — CYCLOBENZAPRINE HYDROCHLORIDE 10 MG: 5 TABLET, FILM COATED ORAL at 11:44

## 2023-11-11 RX ADMIN — PREGABALIN 150 MG: 75 CAPSULE ORAL at 19:29

## 2023-11-11 RX ADMIN — ACETAMINOPHEN 975 MG: 325 TABLET, FILM COATED ORAL at 01:09

## 2023-11-11 RX ADMIN — OXYCODONE HYDROCHLORIDE 15 MG: 10 TABLET ORAL at 19:29

## 2023-11-11 RX ADMIN — ACETAMINOPHEN 975 MG: 325 TABLET, FILM COATED ORAL at 17:38

## 2023-11-11 RX ADMIN — OMEPRAZOLE 40 MG: 20 CAPSULE, DELAYED RELEASE ORAL at 19:29

## 2023-11-11 RX ADMIN — VANCOMYCIN HYDROCHLORIDE 1000 MG: 1 INJECTION, SOLUTION INTRAVENOUS at 23:36

## 2023-11-11 RX ADMIN — HEPARIN SODIUM 5000 UNITS: 5000 INJECTION, SOLUTION INTRAVENOUS; SUBCUTANEOUS at 22:53

## 2023-11-11 RX ADMIN — CEFEPIME 1 G: 1 INJECTION, POWDER, FOR SOLUTION INTRAMUSCULAR; INTRAVENOUS at 22:53

## 2023-11-11 RX ADMIN — CEFEPIME 1 G: 1 INJECTION, POWDER, FOR SOLUTION INTRAMUSCULAR; INTRAVENOUS at 08:09

## 2023-11-11 RX ADMIN — CEFEPIME 1 G: 1 INJECTION, POWDER, FOR SOLUTION INTRAMUSCULAR; INTRAVENOUS at 01:10

## 2023-11-11 RX ADMIN — OMEPRAZOLE 40 MG: 20 CAPSULE, DELAYED RELEASE ORAL at 08:13

## 2023-11-11 RX ADMIN — Medication 1250 MG: at 01:09

## 2023-11-11 ASSESSMENT — ACTIVITIES OF DAILY LIVING (ADL)
ADLS_ACUITY_SCORE: 34

## 2023-11-11 NOTE — PHARMACY-VANCOMYCIN DOSING SERVICE
Pharmacy Vancomycin Note  Date of Service 2023  Patient's  1980   42 year old, male    Indication: Skin and Soft Tissue Infection  Day of Therapy: since  (day 5)  Current vancomycin regimen:  1250 mg IV q12h  Current vancomycin monitoring method: AUC  Current vancomycin therapeutic monitoring goal: 400-600 mg*h/L    Insight[employer] Prediction of Current Vancomycin Regimen  Regimen: 1250 mg IV every 12 hours.  Start time: 13:09 on 2023  Exposure target: AUC24 (range)400-600 mg/L.hr   AUC24,ss: 575 mg/L.hr  Probability of AUC24 > 400: 99 %  Ctrough,ss: 18.6 mg/L  Probability of Ctrough,ss > 20: 37 %  Probability of nephrotoxicity (Lodise ANNE ): 15 %      Current estimated CrCl = Estimated Creatinine Clearance: 141.4 mL/min (based on SCr of 0.85 mg/dL).    Creatinine for last 3 days  2023:  5:57 AM Creatinine 0.79 mg/dL;  5:57 AM Creatinine 0.79 mg/dL  11/10/2023:  5:21 AM Creatinine 0.96 mg/dL  2023:  7:10 AM Creatinine 0.85 mg/dL;  7:10 AM Creatinine 0.85 mg/dL    Recent Vancomycin Levels (past 3 days)  2023:  5:57 AM Vancomycin 28.6 ug/mL  2023:  7:10 AM Vancomycin 27.7 ug/mL    Vancomycin IV Administrations (past 72 hours)                     vancomycin (VANCOCIN) 1,250 mg in 0.9% NaCl 250 mL intermittent infusion (mg) 1,250 mg New Bag 23 0109     1,250 mg New Bag 11/10/23 1305     1,250 mg New Bag  0132     1,250 mg New Bag 23 1347    vancomycin (VANCOCIN) 1,500 mg in 0.9% NaCl 250 mL intermittent infusion (mg) 1,500 mg New Bag 23 0132     1,500 mg New Bag 23 1425                    Nephrotoxins and other renal medications (From now, onward)      Start     Dose/Rate Route Frequency Ordered Stop    23 1300  vancomycin (VANCOCIN) 1,000 mg in 200 mL dextrose intermittent infusion         1,000 mg  200 mL/hr over 1 Hours Intravenous EVERY 12 HOURS 23 0828              Interpretation of levels and current regimen:  Vancomycin level  is reflective of -600    Has serum creatinine changed greater than 50% in last 72 hours: No    Urine output:  unable to determine    Renal Function: Stable    Insight[employer] Prediction of Planned New Vancomycin Regimen  Regimen: 1000 mg IV every 12 hours.  Start time: 13:09 on 11/11/2023  Exposure target: AUC24 (range)400-600 mg/L.hr   AUC24,ss: 464 mg/L.hr  Probability of AUC24 > 400: 84 %  Ctrough,ss: 14.9 mg/L  Probability of Ctrough,ss > 20: 9 %  Probability of nephrotoxicity (Lodise ANNE 2009): 10 %      Plan:  Decrease Dose to vancomycin 1000 mg IV q12h   Vancomycin monitoring method: AUC  Vancomycin therapeutic monitoring goal: 400-600 mg*h/L  Pharmacy will check vancomycin levels as appropriate in 1-3 Days.  Serum creatinine levels will be ordered daily for the first week of therapy and at least twice weekly for subsequent weeks.    Tl Pizarro, [facility]

## 2023-11-11 NOTE — PLAN OF CARE
Pt is A&Ox4. VSS. LS clear, on RA. BS active, colostomy patent and draining well. Dyer patent and draining well. Pain managed with 15mg oxycodone. L BKA surgical dressing is c/d/I. Sacral wound and L thigh dressings changed this AM. Pt up with 2 assist with lift. R PIV is patent and infusing TKO. Continue to monitor.

## 2023-11-11 NOTE — PROGRESS NOTES
[facility]    Medicine Progress Note - Hospitalist Service, GOLD TEAM 22    Date of Admission:  11/7/2023    Assessment & Plan   Saqib Bishop is a 42 year old male with a history of degenerative joint disease, GERD, and necrotizing fasciitis s/p L BKA admitted on 11/7/2023 with cellulitis of the L BKA stump. He requires hospitalization for IV antibiotics, pain control, and orthopedic surgery intervention. He went to the OR on 11/8 for washout and vanc bead placement; likely return to OR on 11/13. He requires hospitalization for IV antibiotics and surgical management.     Changes Today:  - Continuing vanc/cefepime/flagyl   - added Ensure  - PT to bring a wheelchair so he's more mobile    # L BKA stump cellulitis  # Soft tissue abscess  # History of MRSA infection  # History of pseudomonas infection  # Sacral Pressure Ulcer (predates hospitalization)  Presented with about a week of increased erythema and purulent drainage from L BKA site. Seen at OSH ED ~ 4 days prior to presentation and initially started on bactrim and levofloxacin. His symptoms progressed, and he presented for further evaluation. Seen by Ortho in the ED, who recommended admission for antibiotics and surgical management. Taken to the OR on 11/8 for washout and vanc bead placement.   - Ortho consulted, appreciate recs  - S/p OR on 11/8, plan for return on 11/13  - Okay to restart DVT ppx  - ID consulted, appreciate recs   - MRSA nare negative   - Continue cefepime 2g q8h (11/7- )  - Continue vancomycin (11/7- ), pharmacy to dose   - Continue PO flagyl 500 mg TID (11/8- )  - Follow intra-op cultures, NGTD  - Pain control   - Home oxycontin 20 mg q12h   - Scheduled tylenol 975 mg q8h   - Oxycodone 10-15 mg q4h prn    - Flexeril 10 mg TID prn   - Home lyrica 150 mg TID   - If severe breakthrough pain or NPO, please contact for consideration of IV dilaudid  - Regular diet  - Senna and miralax ordered while  on opioids   - Trend CBC and CRP  - PT/OT consults, appreciate recs  - WOC consult for sacral pressure wound, appreciate recs  - Heparin for DVT ppx due to return to OR on 11/13    # HTN  - Continue home losartan / hydrochlorothiazide 25/6.25, hold on morning of 11/13 for OR    # Mood disorder  - Continue home wellbutrin 450 mg daily, buspar 10 mg BID, duloxetine 60 mg BID    # GERD  - Continue home omeprazole 40 mg BID          Diet: Regular Diet Adult  NPO per Anesthesia Guidelines for Procedure/Surgery Except for: Meds  Snacks/Supplements Adult: Ensure Enlive; Between Meals    DVT Prophylaxis: Heparin subcutaneous   Dyer Catheter: PRESENT, indication: Neurogenic Bladder  Lines: None     Cardiac Monitoring: None  Code Status: Full Code      Clinically Significant Risk Factors              # Hypoalbuminemia: Lowest albumin = 3.4 g/dL at 11/7/2023  1:26 PM, will monitor as appropriate     # Hypertension: Noted on problem list        # Obesity: Estimated body mass index is 31.19 kg/m  as calculated from the following:    Height as of this encounter: 1.829 m (6').    Weight as of this encounter: 104.3 kg (230 lb)., PRESENT ON ADMISSION     # Financial/Environmental Concerns: none         Disposition Plan     Expected Discharge Date: 11/15/2023,  3:00 PM    Destination: home with help/services  Discharge Comments: OR on 11/13            Anthony Jacinto DO  Hospitalist Service, GOLD TEAM 22  [facility]  Securely message with [employer] (more info)  Text page via [facility] Paging/Directory   See signed in provider for up to date coverage information  ______________________________________________________________________    Interval History   Feeling alright this morning, no fever/chills or acute complaints, appetite ok. Bored.    Physical Exam   Vital Signs: Temp: 97.7  F (36.5  C) Temp src: Oral BP: 129/68 Pulse: 86   Resp: 16 SpO2: 100 % O2 Device: None (Room air)    Weight:  230 lbs 0 oz    Sitting up in no distress  Awake, alert  RR and WOB are normal    Medical Decision Making             Data     I have personally reviewed the following data over the past 24 hrs:    4.9  \   12.5 (L)   / 248     N/A N/A N/A /  N/A   N/A N/A N/A \       Imaging results reviewed over the past 24 hrs:   No results found for this or any previous visit (from the past 24 hour(s)).

## 2023-11-11 NOTE — PLAN OF CARE
Goal Outcome Evaluation:                      VS: BP (!) 154/88 (BP Location: Left arm)   Pulse 75   Temp 97.4  F (36.3  C) (Oral)   Resp 16   Ht 1.829 m (6')   Wt 104.3 kg (230 lb)   SpO2 97%   BMI 31.19 kg/m      O2: >90% on RA. Denies chest pain and SOB. LS clear and equal bilaterally.    Output: Has Dyer   Last BM: Last BM on 11/10; has colostomy. Colostomy CDI. BS present in all x4 quadrants.   Activity: Ax2 with walker and gait belt. Needs lift.   Skin: Sacral wound, L posterior left thigh wound from I&D on 11/8. Scab on R foot.    Pain: Pain managed with PRN oxycodone and scheduled Oxycodone and scheduled Tylenol.    CMS: Has numbness in R foot.     Dressing: L posterior L thigh wound CDI; was changed yesterday; due to be changed tomorrow. Sacral wound changed today; due to be changed tomorrow.    Diet: Regular diet   LDA: R PIV infusing at 10 ml/hr TKO in between abx.    Equipment: IV pole. Personal belongings. Walker   Plan: Continue with plan   Additional Info:

## 2023-11-12 LAB
ANION GAP SERPL CALCULATED.3IONS-SCNC: 8 MMOL/L (ref 7–15)
BACTERIA BLD CULT: NO GROWTH
BUN SERPL-MCNC: 18.9 MG/DL (ref 6–20)
CALCIUM SERPL-MCNC: 9 MG/DL (ref 8.6–10)
CHLORIDE SERPL-SCNC: 101 MMOL/L (ref 98–107)
CREAT SERPL-MCNC: 0.83 MG/DL (ref 0.67–1.17)
CRP SERPL-MCNC: 5.6 MG/L
DEPRECATED HCO3 PLAS-SCNC: 26 MMOL/L (ref 22–29)
EGFRCR SERPLBLD CKD-EPI 2021: >90 ML/MIN/1.73M2
ERYTHROCYTE [DISTWIDTH] IN BLOOD BY AUTOMATED COUNT: 15.8 % (ref 10–15)
GLUCOSE SERPL-MCNC: 109 MG/DL (ref 70–99)
HCT VFR BLD AUTO: 42.3 % (ref 40–53)
HGB BLD-MCNC: 13.1 G/DL (ref 13.3–17.7)
MCH RBC QN AUTO: 24.1 PG (ref 26.5–33)
MCHC RBC AUTO-ENTMCNC: 31 G/DL (ref 31.5–36.5)
MCV RBC AUTO: 78 FL (ref 78–100)
PLATELET # BLD AUTO: 271 10E3/UL (ref 150–450)
POTASSIUM SERPL-SCNC: 3.9 MMOL/L (ref 3.4–5.3)
RBC # BLD AUTO: 5.43 10E6/UL (ref 4.4–5.9)
SODIUM SERPL-SCNC: 135 MMOL/L (ref 135–145)
WBC # BLD AUTO: 5.5 10E3/UL (ref 4–11)

## 2023-11-12 PROCEDURE — 85014 HEMATOCRIT: CPT | Performed by: STUDENT IN AN ORGANIZED HEALTH CARE EDUCATION/TRAINING PROGRAM

## 2023-11-12 PROCEDURE — 120N000002 HC R&B MED SURG/OB UMMC

## 2023-11-12 PROCEDURE — 36415 COLL VENOUS BLD VENIPUNCTURE: CPT | Performed by: STUDENT IN AN ORGANIZED HEALTH CARE EDUCATION/TRAINING PROGRAM

## 2023-11-12 PROCEDURE — 86140 C-REACTIVE PROTEIN: CPT | Performed by: STUDENT IN AN ORGANIZED HEALTH CARE EDUCATION/TRAINING PROGRAM

## 2023-11-12 PROCEDURE — 250N000013 HC RX MED GY IP 250 OP 250 PS 637: Performed by: STUDENT IN AN ORGANIZED HEALTH CARE EDUCATION/TRAINING PROGRAM

## 2023-11-12 PROCEDURE — 99233 SBSQ HOSP IP/OBS HIGH 50: CPT | Performed by: PEDIATRICS

## 2023-11-12 PROCEDURE — 82310 ASSAY OF CALCIUM: CPT | Performed by: STUDENT IN AN ORGANIZED HEALTH CARE EDUCATION/TRAINING PROGRAM

## 2023-11-12 PROCEDURE — 250N000013 HC RX MED GY IP 250 OP 250 PS 637: Performed by: PEDIATRICS

## 2023-11-12 PROCEDURE — 250N000011 HC RX IP 250 OP 636: Performed by: STUDENT IN AN ORGANIZED HEALTH CARE EDUCATION/TRAINING PROGRAM

## 2023-11-12 PROCEDURE — 250N000011 HC RX IP 250 OP 636: Mod: JZ | Performed by: PEDIATRICS

## 2023-11-12 RX ADMIN — PREDNISOLONE ACETATE 1 DROP: 10 SUSPENSION/ DROPS OPHTHALMIC at 08:56

## 2023-11-12 RX ADMIN — OXYCODONE HYDROCHLORIDE 15 MG: 10 TABLET ORAL at 12:58

## 2023-11-12 RX ADMIN — BUPROPION HYDROCHLORIDE 450 MG: 150 TABLET, FILM COATED, EXTENDED RELEASE ORAL at 08:53

## 2023-11-12 RX ADMIN — METRONIDAZOLE 500 MG: 500 TABLET ORAL at 08:54

## 2023-11-12 RX ADMIN — OXYCODONE HYDROCHLORIDE 15 MG: 10 TABLET ORAL at 17:16

## 2023-11-12 RX ADMIN — METRONIDAZOLE 500 MG: 500 TABLET ORAL at 13:00

## 2023-11-12 RX ADMIN — DULOXETINE HYDROCHLORIDE 120 MG: 60 CAPSULE, DELAYED RELEASE ORAL at 08:53

## 2023-11-12 RX ADMIN — OXYCODONE HYDROCHLORIDE 15 MG: 10 TABLET ORAL at 04:41

## 2023-11-12 RX ADMIN — ACETAMINOPHEN 975 MG: 325 TABLET, FILM COATED ORAL at 04:40

## 2023-11-12 RX ADMIN — BUSPIRONE HYDROCHLORIDE 10 MG: 10 TABLET ORAL at 08:54

## 2023-11-12 RX ADMIN — DOCUSATE SODIUM 50 MG AND SENNOSIDES 8.6 MG 2 TABLET: 8.6; 5 TABLET, FILM COATED ORAL at 08:53

## 2023-11-12 RX ADMIN — BUSPIRONE HYDROCHLORIDE 10 MG: 10 TABLET ORAL at 19:31

## 2023-11-12 RX ADMIN — LOSARTAN POTASSIUM 25 MG: 25 TABLET, FILM COATED ORAL at 08:54

## 2023-11-12 RX ADMIN — PREGABALIN 150 MG: 75 CAPSULE ORAL at 13:00

## 2023-11-12 RX ADMIN — PREGABALIN 150 MG: 75 CAPSULE ORAL at 08:54

## 2023-11-12 RX ADMIN — PREDNISOLONE ACETATE 1 DROP: 10 SUSPENSION/ DROPS OPHTHALMIC at 19:31

## 2023-11-12 RX ADMIN — CEFEPIME 1 G: 1 INJECTION, POWDER, FOR SOLUTION INTRAMUSCULAR; INTRAVENOUS at 08:51

## 2023-11-12 RX ADMIN — Medication 6.25 MG: at 08:53

## 2023-11-12 RX ADMIN — ACETAMINOPHEN 975 MG: 325 TABLET, FILM COATED ORAL at 08:53

## 2023-11-12 RX ADMIN — OMEPRAZOLE 40 MG: 20 CAPSULE, DELAYED RELEASE ORAL at 19:31

## 2023-11-12 RX ADMIN — OXYCODONE HYDROCHLORIDE 15 MG: 10 TABLET ORAL at 21:37

## 2023-11-12 RX ADMIN — CEFEPIME 1 G: 1 INJECTION, POWDER, FOR SOLUTION INTRAMUSCULAR; INTRAVENOUS at 17:17

## 2023-11-12 RX ADMIN — ACETAMINOPHEN 975 MG: 325 TABLET, FILM COATED ORAL at 17:16

## 2023-11-12 RX ADMIN — PREGABALIN 150 MG: 75 CAPSULE ORAL at 19:31

## 2023-11-12 RX ADMIN — OXYCODONE HYDROCHLORIDE 20 MG: 20 TABLET, FILM COATED, EXTENDED RELEASE ORAL at 23:10

## 2023-11-12 RX ADMIN — LORATADINE 10 MG: 10 TABLET ORAL at 08:54

## 2023-11-12 RX ADMIN — VANCOMYCIN HYDROCHLORIDE 1000 MG: 1 INJECTION, SOLUTION INTRAVENOUS at 12:57

## 2023-11-12 RX ADMIN — METRONIDAZOLE 500 MG: 500 TABLET ORAL at 19:31

## 2023-11-12 RX ADMIN — CEFEPIME 1 G: 1 INJECTION, POWDER, FOR SOLUTION INTRAMUSCULAR; INTRAVENOUS at 23:10

## 2023-11-12 RX ADMIN — OXYCODONE HYDROCHLORIDE 15 MG: 10 TABLET ORAL at 08:53

## 2023-11-12 RX ADMIN — OXYCODONE HYDROCHLORIDE 20 MG: 20 TABLET, FILM COATED, EXTENDED RELEASE ORAL at 11:05

## 2023-11-12 RX ADMIN — OMEPRAZOLE 40 MG: 20 CAPSULE, DELAYED RELEASE ORAL at 08:53

## 2023-11-12 ASSESSMENT — ACTIVITIES OF DAILY LIVING (ADL)
ADLS_ACUITY_SCORE: 34

## 2023-11-12 NOTE — PLAN OF CARE
Goal Outcome Evaluation:    ]Plan of Care Reviewed With: Patient    Overall Patient Progress: Improving    Outcome Evaluation: Pain managed with oxy. Denies sob or chest pain. Dressing CDI.

## 2023-11-12 NOTE — PROGRESS NOTES
[facility]    Medicine Progress Note - Hospitalist Service, GOLD TEAM 22    Date of Admission:  11/7/2023    Assessment & Plan   Saqib Bishop is a 42 year old male with a history of degenerative joint disease, GERD, and necrotizing fasciitis s/p L BKA admitted on 11/7/2023 with cellulitis of the L BKA stump. He requires hospitalization for IV antibiotics, pain control, and orthopedic surgery intervention. He went to the OR on 11/8 for washout and vanc bead placement; likely return to OR on 11/13. He requires hospitalization for IV antibiotics and surgical management. Ortho plans to take him back to the OR tomorrow.      Changes Today:  - Continuing vanc/cefepime/flagyl   - Plan for OR with Ortho tomorrow. Will hold lisinopril and hydrochlorothiazide tomorrow morning. NPO at midnight, IVF ordered to start at midnight. Holding heparin ppx.     # L BKA stump cellulitis  # Soft tissue abscess  # History of MRSA infection  # History of pseudomonas infection  # Sacral Pressure Ulcer (predates hospitalization)  Presented with about a week of increased erythema and purulent drainage from L BKA site. Seen at OSH ED ~ 4 days prior to presentation and initially started on bactrim and levofloxacin. His symptoms progressed, and he presented for further evaluation. Seen by Ortho in the ED, who recommended admission for antibiotics and surgical management. Taken to the OR on 11/8 for washout and vanc bead placement.   - Ortho consulted, appreciate recs  - S/p OR on 11/8, plan for return on 11/13  - Okay to restart DVT ppx; currently holding for procedure tomorrow   - ID consulted, appreciate recs   - MRSA nare negative   - Continue cefepime 2g q8h (11/7- )  - Continue vancomycin (11/7- ), pharmacy to dose   - Continue PO flagyl 500 mg TID (11/8- )  - Follow intra-op cultures, NGTD  - Pain control   - Home oxycontin 20 mg q12h   - Scheduled tylenol 975 mg q8h   - Oxycodone 10-15 mg  q4h prn    - Flexeril 10 mg TID prn   - Home lyrica 150 mg TID   - If severe breakthrough pain or NPO, please contact for consideration of IV dilaudid  - Regular diet; NPO at midnight for OR tomorrow; IVF written to start while NPO  - Senna and miralax ordered while on opioids   - Trend CBC and CRP  - PT/OT consults, appreciate recs  - WOC consult for pressure wounds, appreciate recs  - Heparin for DVT ppx due to return to OR on 11/13; currently held in anticipation of procedure    # HTN  - Continue home losartan / hydrochlorothiazide 25/6.25; hold orders placed for tomorrow AM    # Mood disorder  - Continue home wellbutrin 450 mg daily, buspar 10 mg BID, duloxetine 60 mg BID    # GERD  - Continue home omeprazole 40 mg BID          Diet: Regular Diet Adult  NPO per Anesthesia Guidelines for Procedure/Surgery Except for: Meds  Snacks/Supplements Adult: Ensure Enlive; Between Meals    DVT Prophylaxis: Heparin subcutaneous; currently held for OR tomorrow    Dyer Catheter: PRESENT, indication: Neurogenic Bladder  Lines: None     Cardiac Monitoring: None  Code Status: Full Code      Clinically Significant Risk Factors              # Hypoalbuminemia: Lowest albumin = 3.4 g/dL at 11/7/2023  1:26 PM, will monitor as appropriate     # Hypertension: Noted on problem list        # Obesity: Estimated body mass index is 31.19 kg/m  as calculated from the following:    Height as of this encounter: 1.829 m (6').    Weight as of this encounter: 104.3 kg (230 lb).      # Financial/Environmental Concerns: none         Disposition Plan     Expected Discharge Date: 11/15/2023,  3:00 PM    Destination: home with help/services  Discharge Comments: OR on 11/13            PRESTON STERN MD  Hospitalist Service, GOLD TEAM 22  [facility]  Securely message with [employer] (more info)  Text page via [employer] Paging/Directory   See signed in provider for up to date coverage  information  ______________________________________________________________________    Interval History   No acute events overnight. He has no concerns today. He notes that his ostomy output is starting to be closer to his baseline after being slow since his last OR procedure. He plans to watch the [employer] game today.     Physical Exam   Vital Signs: Temp: 97.9  F (36.6  C) Temp src: Oral BP: (!) 145/87 Pulse: 65   Resp: 16 SpO2: 97 % O2 Device: None (Room air)    Weight: 230 lbs 0 oz    General: Awake, alert, resting comfortably in bed in no acute distress.   HEENT: AT/NC. EOMI. MMM. Neck supple.   CV: Warm, well-perfused.   Pulm: Comfortable work of breathing.   Abd: Soft, nontender, nondistended. Ostomy in place.   Neuro: Awake, alert, oriented. Moving all extremities.     Medical Decision Making             Data     I have personally reviewed the following data over the past 24 hrs:    5.5  \   13.1 (L)   / 271     135 101 18.9 /  109 (H)   3.9 26 0.83 \     Procal: N/A CRP: 5.60 (H) Lactic Acid: N/A         Imaging results reviewed over the past 24 hrs:   No results found for this or any previous visit (from the past 24 hour(s)).

## 2023-11-12 NOTE — PLAN OF CARE
Pt is A&Ox4. VSS. LS clear, on RA. BS active, colostomy patent and draining well. Dyer patent and draining well. Pain managed with 15mg oxycodone. L BKA surgical dressing is c/d/I. Sacral wound and L thigh dressings changed this AM. Pt up with 2 assist with lift. R PIV is patent and infusing TKO. Continue to monitor

## 2023-11-13 LAB
ANION GAP SERPL CALCULATED.3IONS-SCNC: 6 MMOL/L (ref 7–15)
BACTERIA TISS BX CULT: NO GROWTH
BUN SERPL-MCNC: 21.6 MG/DL (ref 6–20)
CALCIUM SERPL-MCNC: 9 MG/DL (ref 8.6–10)
CHLORIDE SERPL-SCNC: 103 MMOL/L (ref 98–107)
CREAT SERPL-MCNC: 0.87 MG/DL (ref 0.67–1.17)
DEPRECATED HCO3 PLAS-SCNC: 28 MMOL/L (ref 22–29)
EGFRCR SERPLBLD CKD-EPI 2021: >90 ML/MIN/1.73M2
ERYTHROCYTE [DISTWIDTH] IN BLOOD BY AUTOMATED COUNT: 17 % (ref 10–15)
GLUCOSE SERPL-MCNC: 134 MG/DL (ref 70–99)
HCT VFR BLD AUTO: 46.7 % (ref 40–53)
HGB BLD-MCNC: 13.6 G/DL (ref 13.3–17.7)
MCH RBC QN AUTO: 24 PG (ref 26.5–33)
MCHC RBC AUTO-ENTMCNC: 29.1 G/DL (ref 31.5–36.5)
MCV RBC AUTO: 83 FL (ref 78–100)
PLATELET # BLD AUTO: 274 10E3/UL (ref 150–450)
POTASSIUM SERPL-SCNC: 4.3 MMOL/L (ref 3.4–5.3)
RBC # BLD AUTO: 5.66 10E6/UL (ref 4.4–5.9)
SODIUM SERPL-SCNC: 137 MMOL/L (ref 135–145)
VANCOMYCIN SERPL-MCNC: 19.9 UG/ML
WBC # BLD AUTO: 5.5 10E3/UL (ref 4–11)

## 2023-11-13 PROCEDURE — 99232 SBSQ HOSP IP/OBS MODERATE 35: CPT | Performed by: PEDIATRICS

## 2023-11-13 PROCEDURE — 250N000011 HC RX IP 250 OP 636: Mod: JZ | Performed by: PEDIATRICS

## 2023-11-13 PROCEDURE — 36415 COLL VENOUS BLD VENIPUNCTURE: CPT

## 2023-11-13 PROCEDURE — 250N000013 HC RX MED GY IP 250 OP 250 PS 637: Performed by: STUDENT IN AN ORGANIZED HEALTH CARE EDUCATION/TRAINING PROGRAM

## 2023-11-13 PROCEDURE — 99232 SBSQ HOSP IP/OBS MODERATE 35: CPT | Performed by: STUDENT IN AN ORGANIZED HEALTH CARE EDUCATION/TRAINING PROGRAM

## 2023-11-13 PROCEDURE — 250N000011 HC RX IP 250 OP 636

## 2023-11-13 PROCEDURE — 250N000011 HC RX IP 250 OP 636: Performed by: STUDENT IN AN ORGANIZED HEALTH CARE EDUCATION/TRAINING PROGRAM

## 2023-11-13 PROCEDURE — 80202 ASSAY OF VANCOMYCIN: CPT | Performed by: PEDIATRICS

## 2023-11-13 PROCEDURE — 85027 COMPLETE CBC AUTOMATED: CPT | Performed by: STUDENT IN AN ORGANIZED HEALTH CARE EDUCATION/TRAINING PROGRAM

## 2023-11-13 PROCEDURE — 250N000013 HC RX MED GY IP 250 OP 250 PS 637: Performed by: PEDIATRICS

## 2023-11-13 PROCEDURE — 120N000002 HC R&B MED SURG/OB UMMC

## 2023-11-13 PROCEDURE — 80048 BASIC METABOLIC PNL TOTAL CA: CPT

## 2023-11-13 RX ADMIN — METRONIDAZOLE 500 MG: 500 TABLET ORAL at 13:55

## 2023-11-13 RX ADMIN — HEPARIN SODIUM 5000 UNITS: 5000 INJECTION, SOLUTION INTRAVENOUS; SUBCUTANEOUS at 23:07

## 2023-11-13 RX ADMIN — OMEPRAZOLE 40 MG: 20 CAPSULE, DELAYED RELEASE ORAL at 09:25

## 2023-11-13 RX ADMIN — ACETAMINOPHEN 975 MG: 325 TABLET, FILM COATED ORAL at 09:24

## 2023-11-13 RX ADMIN — LORATADINE 10 MG: 10 TABLET ORAL at 09:25

## 2023-11-13 RX ADMIN — PREDNISOLONE ACETATE 1 DROP: 10 SUSPENSION/ DROPS OPHTHALMIC at 09:39

## 2023-11-13 RX ADMIN — OXYCODONE HYDROCHLORIDE 15 MG: 10 TABLET ORAL at 14:01

## 2023-11-13 RX ADMIN — OXYCODONE HYDROCHLORIDE 15 MG: 10 TABLET ORAL at 09:38

## 2023-11-13 RX ADMIN — VANCOMYCIN HYDROCHLORIDE 1000 MG: 1 INJECTION, SOLUTION INTRAVENOUS at 01:40

## 2023-11-13 RX ADMIN — BUSPIRONE HYDROCHLORIDE 10 MG: 10 TABLET ORAL at 09:25

## 2023-11-13 RX ADMIN — METRONIDAZOLE 500 MG: 500 TABLET ORAL at 09:25

## 2023-11-13 RX ADMIN — OMEPRAZOLE 40 MG: 20 CAPSULE, DELAYED RELEASE ORAL at 19:31

## 2023-11-13 RX ADMIN — OXYCODONE HYDROCHLORIDE 15 MG: 10 TABLET ORAL at 05:47

## 2023-11-13 RX ADMIN — PREDNISOLONE ACETATE 1 DROP: 10 SUSPENSION/ DROPS OPHTHALMIC at 19:32

## 2023-11-13 RX ADMIN — PREGABALIN 150 MG: 75 CAPSULE ORAL at 13:55

## 2023-11-13 RX ADMIN — PREGABALIN 150 MG: 75 CAPSULE ORAL at 09:25

## 2023-11-13 RX ADMIN — CEFEPIME 1 G: 1 INJECTION, POWDER, FOR SOLUTION INTRAMUSCULAR; INTRAVENOUS at 15:53

## 2023-11-13 RX ADMIN — OXYCODONE HYDROCHLORIDE 20 MG: 20 TABLET, FILM COATED, EXTENDED RELEASE ORAL at 22:17

## 2023-11-13 RX ADMIN — OXYCODONE HYDROCHLORIDE 15 MG: 10 TABLET ORAL at 01:40

## 2023-11-13 RX ADMIN — ACETAMINOPHEN 975 MG: 325 TABLET, FILM COATED ORAL at 01:40

## 2023-11-13 RX ADMIN — CEFEPIME 1 G: 1 INJECTION, POWDER, FOR SOLUTION INTRAMUSCULAR; INTRAVENOUS at 09:23

## 2023-11-13 RX ADMIN — ACETAMINOPHEN 975 MG: 325 TABLET, FILM COATED ORAL at 17:44

## 2023-11-13 RX ADMIN — OXYCODONE HYDROCHLORIDE 15 MG: 10 TABLET ORAL at 22:17

## 2023-11-13 RX ADMIN — DULOXETINE HYDROCHLORIDE 120 MG: 60 CAPSULE, DELAYED RELEASE ORAL at 09:25

## 2023-11-13 RX ADMIN — BUPROPION HYDROCHLORIDE 450 MG: 150 TABLET, FILM COATED, EXTENDED RELEASE ORAL at 09:25

## 2023-11-13 RX ADMIN — CEFEPIME 1 G: 1 INJECTION, POWDER, FOR SOLUTION INTRAMUSCULAR; INTRAVENOUS at 23:07

## 2023-11-13 RX ADMIN — VANCOMYCIN HYDROCHLORIDE 1000 MG: 1 INJECTION, SOLUTION INTRAVENOUS at 13:07

## 2023-11-13 RX ADMIN — BUSPIRONE HYDROCHLORIDE 10 MG: 10 TABLET ORAL at 19:31

## 2023-11-13 RX ADMIN — METRONIDAZOLE 500 MG: 500 TABLET ORAL at 19:31

## 2023-11-13 RX ADMIN — OXYCODONE HYDROCHLORIDE 15 MG: 10 TABLET ORAL at 17:47

## 2023-11-13 RX ADMIN — HEPARIN SODIUM 5000 UNITS: 5000 INJECTION, SOLUTION INTRAVENOUS; SUBCUTANEOUS at 11:15

## 2023-11-13 RX ADMIN — PREGABALIN 150 MG: 75 CAPSULE ORAL at 19:31

## 2023-11-13 RX ADMIN — OXYCODONE HYDROCHLORIDE 20 MG: 20 TABLET, FILM COATED, EXTENDED RELEASE ORAL at 11:15

## 2023-11-13 ASSESSMENT — ACTIVITIES OF DAILY LIVING (ADL)
ADLS_ACUITY_SCORE: 31
ADLS_ACUITY_SCORE: 34
ADLS_ACUITY_SCORE: 31
ADLS_ACUITY_SCORE: 34
ADLS_ACUITY_SCORE: 34
ADLS_ACUITY_SCORE: 31
ADLS_ACUITY_SCORE: 34
ADLS_ACUITY_SCORE: 31
ADLS_ACUITY_SCORE: 31

## 2023-11-13 NOTE — PHARMACY-VANCOMYCIN DOSING SERVICE
Pharmacy Vancomycin Note  Date of Service 2023  Patient's  1980   42 year old, male    Indication: Abscess and Skin and Soft Tissue Infection  Day of Therapy: since 23  Current vancomycin regimen:  1000 mg IV q12h (9.6mg/kg/dose)  Current vancomycin monitoring method: AUC  Current vancomycin therapeutic monitoring goal: 400-600 mg*h/L    [employer] Prediction of Current Vancomycin Regimen  Loading dose: N/A  Regimen: 1000 mg IV every 12 hours.  Start time: 01:07 on 2023  Exposure target: AUC24 (range)400-600 mg/L.hr   AUC24,ss: 460 mg/L.hr  Probability of AUC24 > 400: 89 %  Ctrough,ss: 14.8 mg/L  Probability of Ctrough,ss > 20: 3 %  Probability of nephrotoxicity (Lodise ANNE ): 10 %    Current estimated CrCl = Estimated Creatinine Clearance: 138.1 mL/min (based on SCr of 0.87 mg/dL).    Creatinine for last 3 days  2023:  7:10 AM Creatinine 0.85 mg/dL;  7:10 AM Creatinine 0.85 mg/dL  2023:  6:24 AM Creatinine 0.83 mg/dL  2023:  8:22 AM Creatinine 0.87 mg/dL    Recent Vancomycin Levels (past 3 days)  2023:  7:10 AM Vancomycin 27.7 ug/mL  2023:  8:22 AM Vancomycin 19.9 ug/mL    Vancomycin IV Administrations (past 72 hours)                     vancomycin (VANCOCIN) 1,000 mg in 200 mL dextrose intermittent infusion (mg) 1,000 mg New Bag 23 1307     1,000 mg New Bag  0140     1,000 mg New Bag 23 1257     1,000 mg New Bag 23 2336     1,000 mg New Bag  1237    vancomycin (VANCOCIN) 1,250 mg in 0.9% NaCl 250 mL intermittent infusion (mg) 1,250 mg New Bag 23 0109                    Nephrotoxins and other renal medications (From now, onward)      Start     Dose/Rate Route Frequency Ordered Stop    23 1300  vancomycin (VANCOCIN) 1,000 mg in 200 mL dextrose intermittent infusion         1,000 mg  200 mL/hr over 1 Hours Intravenous EVERY 12 HOURS 23 0837                 Contrast Orders - past 72 hours (72h ago, onward)      None             Interpretation of levels and current regimen:  Vancomycin level is reflective of -600    Has serum creatinine changed greater than 50% in last 72 hours: No    Urine output:  good urine output    Renal Function: Stable    Plan:  Continue Current Dose. Could change patient to 2000mg IV Q24H if he will be on this outpatient.  Vancomycin monitoring method: AUC  Vancomycin therapeutic monitoring goal: 400-600 mg*h/L  Pharmacy will check vancomycin levels as appropriate in 1-3 Days.  Serum creatinine levels will be ordered a minimum of twice weekly.    Nae Palencia, PharmD, BCPS

## 2023-11-13 NOTE — PLAN OF CARE
Goal Outcome Evaluation:      Plan of Care Reviewed With: patient    Overall Patient Progress: improvingOverall Patient Progress: improving    Outcome Evaluation: patient is alert and orientedx4. pain managed with schedule Tylenol, Lyrica, Oxycontin, and PRN Oxycodone. per ortho notes patient to return to OR tomorrow for repeat I&D. PIV to R forearm infusing TKO. patient to be NPO at midnight. call light within patient reach.

## 2023-11-13 NOTE — PROVIDER NOTIFICATION
[facility] has a NPO order that starts Wed 11/15 but I put in a reg order.  Can we DC this NPO order for today?  thanks  Teresita  [id number]

## 2023-11-13 NOTE — PROGRESS NOTES
[facility] GENERAL ID SERVICE : PROGRESS NOTE     Patient:  Saqib Bishop   YOB: 1980, MRN: [id number]  Date of Visit: 11/13/2023  Date of Admission: 11/7/2023  Consult Requester: Daphney Vasquez MD          Assessment and Recommendations:     ID Problem List:  1. Stump infection, involving left BKA s/p sharp excisional debridement, placement of antibiotic beads deep to the fascial layer adjacent to bone 11/8/2023  ? 11/8 Intra-op cultures: NGTD  ? 11/3 superficial wound cx: Pseudomonas aeruginosa; GS (GPC, GNB)  ? 10/1 superficial wound cx: Enterobacter cloacae    2. Hx of necrotizing fascitis of LLE s/p guillotine 8/28/2023, s/p I&D of residual limb 8/30/2023, s/p BKA 9/7/2023  3. Paraplegia   4. S/p colostomy  5. Chronic indwelling Dyer, last exchange just prior to admission    Discussion:  42/M with PMH of paraplegia, colostomy, necrotizing fasciitis w/p guillotine followed by left BKA (Aug-Sept, 2023), with left BKA stump infection. He had erythema, discharge for almost 5-6 weeks from stump incision. CT outside facility with collection around stump site c/f abscess. Been afebrile, hemodynamically stable. Superficial cultures showed  Enterobacter 10/1 - bactrim course completed beginning of Oct. Then 11/3 grew Pseudomonas, GPC on Gram stain but not in growth, suggesting anaerobes. Has been on bactrim, levofloxacin since 11/3. ID added po metronidazole for anaerobic coverage.     Underwent surgery 11/8, reviewed OP findings: Sharp excisional debridement and irrigation of left transtibial amputation wound and placement of antibiotic beads deep to the fascial layer adjacent to bone; no obvious purulence but wound dehiscence/necrosis involving full thickness through skin, in 3 separate focal areas in residual LLE. Intra-op cultures were obtained, remains negative so far.    Remains on empriic Vanc, cefepime, flagyl    Ortho plan to RTOR on 11/15    9/7 path with viable proximal margin  with mild chronic inflammation     Recommendations:  - Await RTOR, please get aerobic, anerobic cultures in OR  - Would presume and treat for osteo for minimum 4 weeks given proximity to bone (even if gross inspection did not reveal involvement, microscopic involvement still possible), previous above path results, long duration of symptoms - pt amenable  - Continue current regimen  o IV Vancomycin, pharmacy to dose and monitor, appreciate assistance  o IV cefepime 2g q8h  o PO Metronidazole 500mg TID      I have reviewed interval events, vital, labs, images, cultures and antibiotics    Total time on day of visit including chart review, visit, counseling, documentation and coordination of care: 40 minutes    Darrell Ralph  Staff Physician  Division of Infectious Diseases            Interval History and Events:     Seen and examined. Resting. No new complaints today.  RTOR planned 11/15. No fevers, chills, sweats. Tolerating antibiotics. Has done antibiotics via PICC at home prior         HPI as adopted from initial ID consult on 11/8/2023:     Saqib Bishop is a 41 yo M with PMHx of T12 level spinal cord injury and paraplegia, colostomy, DJD, GERD, chronic sacral decubitus ulcer s/p flap and treatment for osteomyelitis Dec-2020, complicated by dehiscence, open wound and sacral decubitus ulceration Oct-2021, s/p an elective flap procedure in early 2023 which failed and resulted in a chronic sacral ulcer. In August presented with left foot necrotizing fascitis s/p guillotine 8/28/2023, s/p I&D of residual limb 8/30, then BKA 9/7/2023. Antibiotics were stopped post surgery per ID recommendations (sign off note 9/9). Per patient, since then never felt that stump healed completely. Noted distinct redness and then some discharge from the stump surgical incision on lateral aspect and seen in ER 10/1. Superficial wound cx then grew Enterobacter cloacae and received a course of bactrim without complete resolution.  Evaluated by Orthopedic clinic (Dr. Maier) 10/6. Again evaluated in ER ([facility]) 11/3, wound cx grew Pseudomonas aeruginosa. Given bactrim, levofloxacin. Continues to have discharge, redness around stump incision site. Also noticing palpable swelling around stump since past one month or so. No fever, chills, or systemic complaints. Presented again at outside facility, CT was obtained which shows collection around stump concern for abscess. Transferred to [facility] for surgical intervention.           Antimicrobial history:     Current:  Vancomycin, cefepime, metronidazole     Prior:  Bactrim, levofloxacin 11/3 - 11/7  Bactrim ~early October, 2023         Physical Examination:   Temp:  [98  F (36.7  C)-98.2  F (36.8  C)] 98.2  F (36.8  C)  Pulse:  [] 77  Resp:  [16] 16  BP: (124-135)/(74-80) 135/74  SpO2:  [95 %-97 %] 95 %    I/O last 3 completed shifts:  In: -   Out: 2050 [Urine:2050]    Vitals:    11/07/23 1231   Weight: 104.3 kg (230 lb)       Exam:  GENERAL:  sitting in bed in no acute distress.   Head: normocephalic, atraumatic.   EYES: PERRLA, EOMI, conjunctiva clear, anicteric sclerae.    LUNGS:  Breathing comfortably, on room air  ABDOMEN:  . +colostomy bag  EXT: left BKA stump site s/p surgery, covered in dressing, ace wrap   SKIN:  No acute rashes.    NEUROLOGIC:  Grossly nonfocal.      Labs, Microbiology and Imaging studies are reviewed.          Laboratory Data:     Inflammatory Markers    Recent Labs   Lab Test 11/07/23  1539 09/09/23  0454 09/08/23  0424   SED 46* 55* 47*       Metabolic Studies     Recent Labs   Lab Test 11/13/23  0822 11/12/23  0624 11/11/23  0710 11/10/23  0521 11/07/23  1326 09/14/23  0606 09/06/23  1159 09/06/23  0815 08/28/23  0414 08/28/23  0222    135 137 140   < > 141   < >  --    < >  --    POTASSIUM 4.3 3.9 3.8 4.4   < > 4.0   < >  --    < >  --    CHLORIDE 103 101 102 106   < > 106   < >  --    < >  --    CO2 28 26 29 31*   < > 25   < >  --    < >  --    ANIONGAP 6* 8 6*  3*   < > 10   < >  --    < >  --    BUN 21.6* 18.9 17.3 18.2   < > 16.4   < >  --    < >  --    CR 0.87 0.83 0.85  0.85 0.96   < > 0.80   < >  --    < >  --    GFRESTIMATED >90 >90 >90  >90 >90   < > >90   < >  --    < >  --    * 109* 103* 109*   < > 101*   < >  --    < >  --    A1C  --   --   --   --   --   --   --  5.5  --   --    HILDA 9.0 9.0 9.0 8.8   < > 9.1   < >  --    < >  --    PHOS  --   --   --   --   --  3.4   < >  --    < >  --    MAG  --   --   --   --   --  1.6*   < >  --    < >  --    LACT  --   --   --   --   --   --   --   --   --  0.5*   CKT  --   --   --  45  --   --   --   --   --   --     < > = values in this interval not displayed.       Hepatic Studies    Recent Labs   Lab Test 11/07/23  1326 09/11/23  0618 09/09/23  0454   BILITOTAL 0.2 0.2 0.2   ALKPHOS 133* 139* 134*   ALBUMIN 3.4* 3.2* 3.3*   AST 37 35 22   ALT 21 22 16       Hematology Studies      Recent Labs   Lab Test 11/13/23  0822 11/12/23  0624 11/11/23  0710   WBC 5.5 5.5 4.9   HGB 13.6 13.1* 12.5*   HCT 46.7 42.3 40.6    271 248        Vancomycin Levels     Recent Labs   Lab Test 11/13/23  0822 11/11/23  0710 11/09/23  0557   VANCOMYCIN 19.9 27.7* 28.6*       Microbiology  Lab Results   Component Value Date    CULTURE No anaerobic organisms isolated after 4 days 11/08/2023    CULTURE No growth after 4 days 11/08/2023    CULTURE No Growth 11/08/2023

## 2023-11-13 NOTE — PROGRESS NOTES
VS: /74 (BP Location: Left arm)   Pulse 77   Temp 98.2  F (36.8  C) (Oral)   Resp 16   Ht 1.829 m (6')   Wt 104.3 kg (230 lb)   SpO2 95%   BMI 31.19 kg/m      O2: RA   Output: bui   Last BM: colostomy   Activity: Not OOB, WC at baseline   Skin: Sacral wound, has WOC orders, LBKA wrapped in ACE wrap,    Pain: Complains of constant pain    CMS: Intact    Dressing: LBKA ace wrap, CDI   Diet: reg   LDA: PIV in RFA   Equipment: WC   Plan: I&D on 11/15, NPO at midnight    Additional Info:         Sacrum wound was changed today.  Pt declined to have the wound on L thigh done.  Pt said it was healing and didn't need anything.

## 2023-11-13 NOTE — PROGRESS NOTES
Orthopedic Surgery Progress Note: 11/13/2023    Subjective:   No acute events over the weekend. Pain well-controlled. No concerns. Planning for OR 11/15.     Objective:   /80 (BP Location: Left arm)   Pulse 101   Temp 98  F (36.7  C) (Oral)   Resp 16   Ht 1.829 m (6')   Wt 104.3 kg (230 lb)   SpO2 97%   BMI 31.19 kg/m    I/O this shift:  In: -   Out: 900 [Urine:900]  General: NAD. Resting comfortably in bed.  Respiratory: Nonlabored breathing  Musculoskeletal:  LLE:   ACE C/D/I  Limb warm and well perfused  Sensation intact over the limb     Laboratory Data:  Lab Results   Component Value Date    WBC 5.5 11/12/2023    HGB 13.1 (L) 11/12/2023     11/12/2023    INR 1.06 11/07/2023         Assessment & Plan:   Saqib Bishop is a 42 year old male with PMH now s/p irrigation and debridement LLE w placement of antibiotic beads and primary wound closure on 11/8/2023 with Dr. Maier.      Plan for Today:  - Continue Vanc/Cefepime/Metronidazole  - OR planning for 11/15 due to OR and staff availability  - Pain control  - Follow cultures - NGTD    Medicine Primary  Activity: Up with assist  Weight bearing status: NWB LLE  Pain management:   Transition from IV to PO narcotics as tolerated.   Antibiotics: Continue vanc/cefepime/metronidazole. Per medicine/ID  Diet: Begin with clear fluids and progress diet as tolerated.   DVT prophylaxis: Per medicine. Ok to restart dvt ppx as indicated on POD1  Labs: Trend ESR/CRP/CBC  Dressings: Keep dressing c/d/I. Change prn per nursing. Adaptic, 4x4, abd, webril, ace-wrap  Physical Therapy/Occupational Therapy: Eval and treat  Cultures: Follow intra-op cultures - NGTD  Follow-up: TBD  Disposition: Anticipate RTOR for repeat I&D w removal of abx beads and wound closure on 11/15/2023.    Orthopedic surgery staff for this patient is Dr. Maier.    ------------------------------------------------------------------------------------------    Respectfully,    José Manuel Gavin,  MD  Orthopedic Surgery PGY1  [phone]    Please page me directly with any questions/concerns during regular weekday hours before 5 pm. If there is no response, if it is a weekend, or if it is during evening hours then please page the orthopedic surgery resident on call.      FOLLOWUP:    Future Appointments   Date Time Provider Department Center   11/13/2023 11:30 AM Vanesa Madden, PT [facility] [place]   11/20/2023 12:30 PM Nii Mancilla MD [facility]   12/21/2023  1:15 PM Ne Zavala MD [facility]   1/4/2024  1:30 PM Bloch, Lauren Turner, [facility]   3/6/2024  1:00 PM Viv Traylor PA-C [facility]

## 2023-11-13 NOTE — PROGRESS NOTES
[facility]    Medicine Progress Note - Hospitalist Service, GOLD TEAM 22    Date of Admission:  11/7/2023    Assessment & Plan   Saqib Bishop is a 42 year old male with a history of degenerative joint disease, GERD, and necrotizing fasciitis s/p L BKA admitted on 11/7/2023 with cellulitis of the L BKA stump. He requires hospitalization for IV antibiotics, pain control, and orthopedic surgery intervention. He went to the OR on 11/8 for washout and vanc bead placement; likely return to OR on 11/13. He requires hospitalization for IV antibiotics and surgical management. Ortho plans to take him back to the OR on 11/15.      Changes Today:  - Continuing vanc/cefepime/flagyl.   - Plan for OR with Ortho on 11/15, bumped from today.Resumed home lisinopril and hydrochlorothiazide, will need to hold morning of procedure. Heparin also resumed, will need to be held prior to procedure.     # L BKA stump cellulitis  # Soft tissue abscess  # History of MRSA infection  # History of pseudomonas infection  # Sacral Pressure Ulcer (predates hospitalization)  Presented with about a week of increased erythema and purulent drainage from L BKA site. Seen at OSH ED ~ 4 days prior to presentation and initially started on bactrim and levofloxacin. His symptoms progressed, and he presented for further evaluation. Seen by Ortho in the ED, who recommended admission for antibiotics and surgical management. Taken to the OR on 11/8 for washout and vanc bead placement.   - Ortho consulted, appreciate recs  - S/p OR on 11/8, plan for return on 11/15  - Okay to restart DVT ppx  - ID consulted, appreciate recs   - MRSA nare negative   - Continue cefepime 2g q8h (11/7- )  - Continue vancomycin (11/7- ), pharmacy to dose   - Continue PO flagyl 500 mg TID (11/8- )  - Follow intra-op cultures, NGTD  - Pain control   - Home oxycontin 20 mg q12h   - Scheduled tylenol 975 mg q8h   - Oxycodone 10-15 mg q4h prn     - Flexeril 10 mg TID prn   - Home lyrica 150 mg TID   - If severe breakthrough pain or NPO, please contact for consideration of IV dilaudid  - Regular diet  - Senna and miralax ordered while on opioids   - Trend CBC and CRP  - PT/OT consults, appreciate recs  - WOC consult for pressure wounds, appreciate recs  - Heparin for DVT ppx due to return to OR on 11/15    # HTN  - Continue home losartan / hydrochlorothiazide 25/6.25    # Mood disorder  - Continue home wellbutrin 450 mg daily, buspar 10 mg BID, duloxetine 60 mg BID    # GERD  - Continue home omeprazole 40 mg BID          Diet: Snacks/Supplements Adult: Ensure Enlive; Between Meals  NPO per Anesthesia Guidelines for Procedure/Surgery Except for: Meds  Regular Diet Adult    DVT Prophylaxis: Heparin subcutaneous  Dyer Catheter: PRESENT, indication: Neurogenic Bladder  Lines: None     Cardiac Monitoring: None  Code Status: Full Code      Clinically Significant Risk Factors              # Hypoalbuminemia: Lowest albumin = 3.4 g/dL at 11/7/2023  1:26 PM, will monitor as appropriate     # Hypertension: Noted on problem list        # Obesity: Estimated body mass index is 31.19 kg/m  as calculated from the following:    Height as of this encounter: 1.829 m (6').    Weight as of this encounter: 104.3 kg (230 lb).      # Financial/Environmental Concerns: none         Disposition Plan      Expected Discharge Date: 11/17/2023,  3:00 PM    Destination: home with help/services  Discharge Comments: OR on 11/15            PRESTON STERN MD  Hospitalist Service, GOLD TEAM 22  [facility]  Securely message with [employer] (more info)  Text page via [place] [facility] Paging/Directory   See signed in provider for up to date coverage information  ______________________________________________________________________    Interval History   No acute events overnight. He is a bit disappointed that his OR case has been bumped to 11/15. He has no  other concerns today. His ostomy has good output and is back to baseline.      Physical Exam   Vital Signs: Temp: 98.2  F (36.8  C) Temp src: Oral BP: 135/74 Pulse: 77   Resp: 16 SpO2: 95 % O2 Device: None (Room air)    Weight: 230 lbs 0 oz    General: Awake, alert, pleasant, resting comfortably in bed in no acute distress.   HEENT: AT/NC. EOMI. MMM. Neck supple.   CV: Warm, well-perfused.   Pulm: Comfortable work of breathing.   Abd: Soft, nontender, nondistended. Ostomy in place.   Neuro: Awake, alert, oriented. Moving all extremities.     Medical Decision Making             Data     I have personally reviewed the following data over the past 24 hrs:    5.5  \   13.6   / 274     137 103 21.6 (H) /  134 (H)   4.3 28 0.87 \       Imaging results reviewed over the past 24 hrs:   No results found for this or any previous visit (from the past 24 hour(s)).

## 2023-11-14 ENCOUNTER — APPOINTMENT (OUTPATIENT)
Dept: PHYSICAL THERAPY | Facility: CLINIC | Age: 43
DRG: 492 | End: 2023-11-14
Payer: MEDICARE

## 2023-11-14 LAB
ANION GAP SERPL CALCULATED.3IONS-SCNC: 7 MMOL/L (ref 7–15)
BUN SERPL-MCNC: 24.5 MG/DL (ref 6–20)
CALCIUM SERPL-MCNC: 9.4 MG/DL (ref 8.6–10)
CHLORIDE SERPL-SCNC: 107 MMOL/L (ref 98–107)
CREAT SERPL-MCNC: 0.88 MG/DL (ref 0.67–1.17)
CRP SERPL-MCNC: 11.23 MG/L
DEPRECATED HCO3 PLAS-SCNC: 28 MMOL/L (ref 22–29)
EGFRCR SERPLBLD CKD-EPI 2021: >90 ML/MIN/1.73M2
ERYTHROCYTE [DISTWIDTH] IN BLOOD BY AUTOMATED COUNT: 16.4 % (ref 10–15)
GLUCOSE SERPL-MCNC: 117 MG/DL (ref 70–99)
HCT VFR BLD AUTO: 42.8 % (ref 40–53)
HGB BLD-MCNC: 12.7 G/DL (ref 13.3–17.7)
MCH RBC QN AUTO: 23.6 PG (ref 26.5–33)
MCHC RBC AUTO-ENTMCNC: 29.7 G/DL (ref 31.5–36.5)
MCV RBC AUTO: 80 FL (ref 78–100)
PLATELET # BLD AUTO: 286 10E3/UL (ref 150–450)
POTASSIUM SERPL-SCNC: 4.4 MMOL/L (ref 3.4–5.3)
RBC # BLD AUTO: 5.37 10E6/UL (ref 4.4–5.9)
SODIUM SERPL-SCNC: 142 MMOL/L (ref 135–145)
WBC # BLD AUTO: 6.3 10E3/UL (ref 4–11)

## 2023-11-14 PROCEDURE — 250N000011 HC RX IP 250 OP 636: Performed by: STUDENT IN AN ORGANIZED HEALTH CARE EDUCATION/TRAINING PROGRAM

## 2023-11-14 PROCEDURE — 250N000013 HC RX MED GY IP 250 OP 250 PS 637: Performed by: STUDENT IN AN ORGANIZED HEALTH CARE EDUCATION/TRAINING PROGRAM

## 2023-11-14 PROCEDURE — 250N000013 HC RX MED GY IP 250 OP 250 PS 637: Performed by: PEDIATRICS

## 2023-11-14 PROCEDURE — 120N000002 HC R&B MED SURG/OB UMMC

## 2023-11-14 PROCEDURE — 99232 SBSQ HOSP IP/OBS MODERATE 35: CPT | Performed by: STUDENT IN AN ORGANIZED HEALTH CARE EDUCATION/TRAINING PROGRAM

## 2023-11-14 PROCEDURE — 85027 COMPLETE CBC AUTOMATED: CPT | Performed by: STUDENT IN AN ORGANIZED HEALTH CARE EDUCATION/TRAINING PROGRAM

## 2023-11-14 PROCEDURE — 36415 COLL VENOUS BLD VENIPUNCTURE: CPT | Performed by: STUDENT IN AN ORGANIZED HEALTH CARE EDUCATION/TRAINING PROGRAM

## 2023-11-14 PROCEDURE — 97530 THERAPEUTIC ACTIVITIES: CPT | Mod: GP

## 2023-11-14 PROCEDURE — 86140 C-REACTIVE PROTEIN: CPT | Performed by: STUDENT IN AN ORGANIZED HEALTH CARE EDUCATION/TRAINING PROGRAM

## 2023-11-14 PROCEDURE — 80048 BASIC METABOLIC PNL TOTAL CA: CPT

## 2023-11-14 PROCEDURE — 250N000011 HC RX IP 250 OP 636: Mod: JZ | Performed by: PEDIATRICS

## 2023-11-14 RX ADMIN — CEFEPIME 1 G: 1 INJECTION, POWDER, FOR SOLUTION INTRAMUSCULAR; INTRAVENOUS at 17:17

## 2023-11-14 RX ADMIN — OXYCODONE HYDROCHLORIDE 15 MG: 10 TABLET ORAL at 23:04

## 2023-11-14 RX ADMIN — OMEPRAZOLE 40 MG: 20 CAPSULE, DELAYED RELEASE ORAL at 09:05

## 2023-11-14 RX ADMIN — OMEPRAZOLE 40 MG: 20 CAPSULE, DELAYED RELEASE ORAL at 19:58

## 2023-11-14 RX ADMIN — METRONIDAZOLE 500 MG: 500 TABLET ORAL at 13:51

## 2023-11-14 RX ADMIN — Medication 6.25 MG: at 09:06

## 2023-11-14 RX ADMIN — METRONIDAZOLE 500 MG: 500 TABLET ORAL at 19:58

## 2023-11-14 RX ADMIN — BUSPIRONE HYDROCHLORIDE 10 MG: 10 TABLET ORAL at 19:58

## 2023-11-14 RX ADMIN — VANCOMYCIN HYDROCHLORIDE 1000 MG: 1 INJECTION, SOLUTION INTRAVENOUS at 02:28

## 2023-11-14 RX ADMIN — PREGABALIN 150 MG: 75 CAPSULE ORAL at 09:06

## 2023-11-14 RX ADMIN — VANCOMYCIN HYDROCHLORIDE 1000 MG: 1 INJECTION, SOLUTION INTRAVENOUS at 13:52

## 2023-11-14 RX ADMIN — LORATADINE 10 MG: 10 TABLET ORAL at 09:05

## 2023-11-14 RX ADMIN — PREDNISOLONE ACETATE 1 DROP: 10 SUSPENSION/ DROPS OPHTHALMIC at 09:06

## 2023-11-14 RX ADMIN — ACETAMINOPHEN 975 MG: 325 TABLET, FILM COATED ORAL at 17:17

## 2023-11-14 RX ADMIN — CEFEPIME 1 G: 1 INJECTION, POWDER, FOR SOLUTION INTRAMUSCULAR; INTRAVENOUS at 09:05

## 2023-11-14 RX ADMIN — OXYCODONE HYDROCHLORIDE 15 MG: 10 TABLET ORAL at 02:29

## 2023-11-14 RX ADMIN — PREGABALIN 150 MG: 75 CAPSULE ORAL at 19:58

## 2023-11-14 RX ADMIN — OXYCODONE HYDROCHLORIDE 20 MG: 20 TABLET, FILM COATED, EXTENDED RELEASE ORAL at 11:57

## 2023-11-14 RX ADMIN — OXYCODONE HYDROCHLORIDE 15 MG: 10 TABLET ORAL at 14:36

## 2023-11-14 RX ADMIN — OXYCODONE HYDROCHLORIDE 15 MG: 10 TABLET ORAL at 06:23

## 2023-11-14 RX ADMIN — LOSARTAN POTASSIUM 25 MG: 25 TABLET, FILM COATED ORAL at 09:06

## 2023-11-14 RX ADMIN — PREGABALIN 150 MG: 75 CAPSULE ORAL at 13:52

## 2023-11-14 RX ADMIN — ACETAMINOPHEN 975 MG: 325 TABLET, FILM COATED ORAL at 09:05

## 2023-11-14 RX ADMIN — BUSPIRONE HYDROCHLORIDE 10 MG: 10 TABLET ORAL at 09:06

## 2023-11-14 RX ADMIN — ACETAMINOPHEN 975 MG: 325 TABLET, FILM COATED ORAL at 02:29

## 2023-11-14 RX ADMIN — OXYCODONE HYDROCHLORIDE 15 MG: 10 TABLET ORAL at 10:26

## 2023-11-14 RX ADMIN — DULOXETINE HYDROCHLORIDE 120 MG: 60 CAPSULE, DELAYED RELEASE ORAL at 09:06

## 2023-11-14 RX ADMIN — OXYCODONE HYDROCHLORIDE 15 MG: 10 TABLET ORAL at 18:50

## 2023-11-14 RX ADMIN — BUPROPION HYDROCHLORIDE 450 MG: 150 TABLET, FILM COATED, EXTENDED RELEASE ORAL at 09:05

## 2023-11-14 RX ADMIN — METRONIDAZOLE 500 MG: 500 TABLET ORAL at 09:06

## 2023-11-14 RX ADMIN — PREDNISOLONE ACETATE 1 DROP: 10 SUSPENSION/ DROPS OPHTHALMIC at 23:05

## 2023-11-14 ASSESSMENT — ACTIVITIES OF DAILY LIVING (ADL)
ADLS_ACUITY_SCORE: 31

## 2023-11-14 NOTE — PLAN OF CARE
Goal Outcome Evaluation:      Plan of Care Reviewed With: patient    Overall Patient Progress: improving    Outcome Evaluation: Pt resting comfortably between cares. Awaiting I&D tomorrow.            VS:     Pt A/O X 4. Afebrile. VSS on RA. Lungs- clear bilaterally with both anterior and lateral. Denies nausea, shortness of breath, and chest pain.     Output:     Bowels active, LBM 11/14, colostomy device changed this shift. Dyer in place with adequate output.     Activity:     Pt not OOB this shift, A1-2 with cares with lift device.     Skin: Surgical incision to LLE, community acquired sacral wound, scattered scabs and abrasions to extremities.     Pain:     Has chronic pain in the back and LLE and given 15 mg oxycodone, scheduled tylenol and oxycontin and is tolerating well.      CMS:     CMS intact. Baseline neuropathy/.      Dressing:     Surgical incisional dressing is CDI, wound dressings to sacrum and L thigh changed this shift and CDI.     Diet:     Pt is on a regular diet and appetite was good this shift. NPO at midnight.       LDA:     PIV is patent in the R forearm and is SL.      Equipment:     IV pole, ELKE, PCD's, and wound care supplies in room.     Plan:     I&D tomorrow afternoon, IM, Ortho, and ID following. Pt is able to make needs known and the call light is within the pt's reach. Continue to monitor.       Additional Info:
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
## 2023-11-14 NOTE — PLAN OF CARE
Pt is A&Ox4. VSS. LS clear, on RA. BS active, colostomy patent and draining well. Dyer patent and draining well. Pain managed with 15mg oxycodone. L BKA surgical dressing is c/d/I. Sacral wound and changed this AM. Pt up with 2 assist with lift. R PIV is patent and infusing TKO. Surgery on 11/15. Continue to monitor

## 2023-11-14 NOTE — PLAN OF CARE
Physical Therapy Discharge Summary    Reason for therapy discharge:    Patient/family request discontinuation of services.  Pt refusing any OOB mobility without his personal wc. Family suppose to bring in many days in a row. Once they did bring it in, pt reports it is broken and they need to fix it. Per pt, this is a repeated issue the family has fixed before, has new wc being worked on ordering and has backup at home as well so not an equipment barrier to discharge.    Progress towards therapy goal(s). See goals on Care Plan in [facility] electronic health record for goal details.  Goals not met.  Barriers to achieving goals:   limited tolerance for therapy.    Therapy recommendation(s):    No further therapy is recommended.  Pt has repeated politely but adamantly refused to get OOB without his personal wc. He does have exercise program and theraband in room to prevent deconditioning. Pt was able to demo IND scooting for simulated-type slide board transfer he would need to do for home, which was safe and IND but did not perform real thing without his personal wc. Given pt's refusal to participate in OOB mobility for over a week, will discharge from PT at this time. Given mobility performance today low concern for mobility as discharge barrier, anticipate will be able to slide board and transfer just fine for discharge as long as does not have prolonged LOS where he would become deconditioned. If pt does have longer stay and he would be agreeable to participate, could consider re-consulting PT. Discussed all with pt who was in agreement with plan and has repeatedly declined any concern for transfer issues.

## 2023-11-15 ENCOUNTER — ANESTHESIA EVENT (OUTPATIENT)
Dept: SURGERY | Facility: CLINIC | Age: 43
DRG: 492 | End: 2023-11-15
Payer: MEDICARE

## 2023-11-15 ENCOUNTER — MEDICAL CORRESPONDENCE (OUTPATIENT)
Dept: ORTHOPEDICS | Facility: CLINIC | Age: 43
End: 2023-11-15
Payer: MEDICARE

## 2023-11-15 ENCOUNTER — ANESTHESIA (OUTPATIENT)
Dept: SURGERY | Facility: CLINIC | Age: 43
DRG: 492 | End: 2023-11-15
Payer: MEDICARE

## 2023-11-15 LAB
ABO/RH(D): NORMAL
ALBUMIN SERPL BCG-MCNC: 3.3 G/DL (ref 3.5–5.2)
ALP SERPL-CCNC: 115 U/L (ref 40–150)
ALT SERPL W P-5'-P-CCNC: 26 U/L (ref 0–70)
ANION GAP SERPL CALCULATED.3IONS-SCNC: 6 MMOL/L (ref 7–15)
ANTIBODY SCREEN: NEGATIVE
APTT PPP: 30 SECONDS (ref 22–38)
AST SERPL W P-5'-P-CCNC: 26 U/L (ref 0–45)
BACTERIA TISS BX CULT: NORMAL
BILIRUB SERPL-MCNC: <0.2 MG/DL
BUN SERPL-MCNC: 22.1 MG/DL (ref 6–20)
CALCIUM SERPL-MCNC: 9 MG/DL (ref 8.6–10)
CHLORIDE SERPL-SCNC: 105 MMOL/L (ref 98–107)
CREAT SERPL-MCNC: 0.82 MG/DL (ref 0.67–1.17)
DEPRECATED HCO3 PLAS-SCNC: 28 MMOL/L (ref 22–29)
EGFRCR SERPLBLD CKD-EPI 2021: >90 ML/MIN/1.73M2
ERYTHROCYTE [DISTWIDTH] IN BLOOD BY AUTOMATED COUNT: 16.5 % (ref 10–15)
GLUCOSE SERPL-MCNC: 126 MG/DL (ref 70–99)
HCT VFR BLD AUTO: 43.4 % (ref 40–53)
HGB BLD-MCNC: 13.1 G/DL (ref 13.3–17.7)
INR PPP: 1.06 (ref 0.85–1.15)
MCH RBC QN AUTO: 23.9 PG (ref 26.5–33)
MCHC RBC AUTO-ENTMCNC: 30.2 G/DL (ref 31.5–36.5)
MCV RBC AUTO: 79 FL (ref 78–100)
PLATELET # BLD AUTO: 285 10E3/UL (ref 150–450)
POTASSIUM SERPL-SCNC: 4.3 MMOL/L (ref 3.4–5.3)
PROT SERPL-MCNC: 7.1 G/DL (ref 6.4–8.3)
RBC # BLD AUTO: 5.47 10E6/UL (ref 4.4–5.9)
SODIUM SERPL-SCNC: 139 MMOL/L (ref 135–145)
SPECIMEN EXPIRATION DATE: NORMAL
WBC # BLD AUTO: 6.1 10E3/UL (ref 4–11)

## 2023-11-15 PROCEDURE — G0463 HOSPITAL OUTPT CLINIC VISIT: HCPCS

## 2023-11-15 PROCEDURE — 250N000013 HC RX MED GY IP 250 OP 250 PS 637: Performed by: ORTHOPAEDIC SURGERY

## 2023-11-15 PROCEDURE — 250N000011 HC RX IP 250 OP 636: Performed by: STUDENT IN AN ORGANIZED HEALTH CARE EDUCATION/TRAINING PROGRAM

## 2023-11-15 PROCEDURE — 20701 RMVL DEEP RX DELIVERY DEVICE: CPT | Performed by: ORTHOPAEDIC SURGERY

## 2023-11-15 PROCEDURE — 250N000011 HC RX IP 250 OP 636: Mod: JZ | Performed by: PEDIATRICS

## 2023-11-15 PROCEDURE — 0QBH0ZZ EXCISION OF LEFT TIBIA, OPEN APPROACH: ICD-10-PCS | Performed by: ORTHOPAEDIC SURGERY

## 2023-11-15 PROCEDURE — 250N000013 HC RX MED GY IP 250 OP 250 PS 637: Performed by: STUDENT IN AN ORGANIZED HEALTH CARE EDUCATION/TRAINING PROGRAM

## 2023-11-15 PROCEDURE — 86850 RBC ANTIBODY SCREEN: CPT

## 2023-11-15 PROCEDURE — 710N000010 HC RECOVERY PHASE 1, LEVEL 2, PER MIN: Performed by: ORTHOPAEDIC SURGERY

## 2023-11-15 PROCEDURE — 85610 PROTHROMBIN TIME: CPT

## 2023-11-15 PROCEDURE — 360N000075 HC SURGERY LEVEL 2, PER MIN: Performed by: ORTHOPAEDIC SURGERY

## 2023-11-15 PROCEDURE — 36415 COLL VENOUS BLD VENIPUNCTURE: CPT

## 2023-11-15 PROCEDURE — 250N000013 HC RX MED GY IP 250 OP 250 PS 637: Performed by: PEDIATRICS

## 2023-11-15 PROCEDURE — 250N000011 HC RX IP 250 OP 636: Performed by: NURSE ANESTHETIST, CERTIFIED REGISTERED

## 2023-11-15 PROCEDURE — 99232 SBSQ HOSP IP/OBS MODERATE 35: CPT | Performed by: STUDENT IN AN ORGANIZED HEALTH CARE EDUCATION/TRAINING PROGRAM

## 2023-11-15 PROCEDURE — 11044 DBRDMT BONE 1ST 20 SQ CM/<: CPT | Mod: 78 | Performed by: ORTHOPAEDIC SURGERY

## 2023-11-15 PROCEDURE — 272N000001 HC OR GENERAL SUPPLY STERILE: Performed by: ORTHOPAEDIC SURGERY

## 2023-11-15 PROCEDURE — 85730 THROMBOPLASTIN TIME PARTIAL: CPT

## 2023-11-15 PROCEDURE — 370N000017 HC ANESTHESIA TECHNICAL FEE, PER MIN: Performed by: ORTHOPAEDIC SURGERY

## 2023-11-15 PROCEDURE — 86901 BLOOD TYPING SEROLOGIC RH(D): CPT

## 2023-11-15 PROCEDURE — 120N000002 HC R&B MED SURG/OB UMMC

## 2023-11-15 PROCEDURE — 250N000011 HC RX IP 250 OP 636: Performed by: ANESTHESIOLOGY

## 2023-11-15 PROCEDURE — 258N000003 HC RX IP 258 OP 636: Performed by: PEDIATRICS

## 2023-11-15 PROCEDURE — 258N000001 HC RX 258: Performed by: ORTHOPAEDIC SURGERY

## 2023-11-15 PROCEDURE — 250N000025 HC SEVOFLURANE, PER MIN: Performed by: ORTHOPAEDIC SURGERY

## 2023-11-15 PROCEDURE — 80053 COMPREHEN METABOLIC PANEL: CPT | Performed by: STUDENT IN AN ORGANIZED HEALTH CARE EDUCATION/TRAINING PROGRAM

## 2023-11-15 PROCEDURE — 250N000009 HC RX 250: Performed by: NURSE ANESTHETIST, CERTIFIED REGISTERED

## 2023-11-15 PROCEDURE — 250N000011 HC RX IP 250 OP 636: Performed by: ORTHOPAEDIC SURGERY

## 2023-11-15 PROCEDURE — 250N000011 HC RX IP 250 OP 636

## 2023-11-15 PROCEDURE — 258N000003 HC RX IP 258 OP 636: Performed by: NURSE ANESTHETIST, CERTIFIED REGISTERED

## 2023-11-15 PROCEDURE — 999N000141 HC STATISTIC PRE-PROCEDURE NURSING ASSESSMENT: Performed by: ORTHOPAEDIC SURGERY

## 2023-11-15 PROCEDURE — 85027 COMPLETE CBC AUTOMATED: CPT | Performed by: STUDENT IN AN ORGANIZED HEALTH CARE EDUCATION/TRAINING PROGRAM

## 2023-11-15 RX ORDER — ONDANSETRON 4 MG/1
4 TABLET, ORALLY DISINTEGRATING ORAL EVERY 30 MIN PRN
Status: DISCONTINUED | OUTPATIENT
Start: 2023-11-15 | End: 2023-11-15 | Stop reason: HOSPADM

## 2023-11-15 RX ORDER — ONDANSETRON 2 MG/ML
INJECTION INTRAMUSCULAR; INTRAVENOUS PRN
Status: DISCONTINUED | OUTPATIENT
Start: 2023-11-15 | End: 2023-11-15

## 2023-11-15 RX ORDER — LIDOCAINE 40 MG/G
CREAM TOPICAL
Status: DISCONTINUED | OUTPATIENT
Start: 2023-11-15 | End: 2023-11-18 | Stop reason: HOSPADM

## 2023-11-15 RX ORDER — ONDANSETRON 2 MG/ML
4 INJECTION INTRAMUSCULAR; INTRAVENOUS EVERY 30 MIN PRN
Status: DISCONTINUED | OUTPATIENT
Start: 2023-11-15 | End: 2023-11-15 | Stop reason: HOSPADM

## 2023-11-15 RX ORDER — OXYCODONE HYDROCHLORIDE 5 MG/1
5 TABLET ORAL EVERY 4 HOURS PRN
Status: DISCONTINUED | OUTPATIENT
Start: 2023-11-15 | End: 2023-11-16

## 2023-11-15 RX ORDER — FENTANYL CITRATE 50 UG/ML
INJECTION, SOLUTION INTRAMUSCULAR; INTRAVENOUS PRN
Status: DISCONTINUED | OUTPATIENT
Start: 2023-11-15 | End: 2023-11-15

## 2023-11-15 RX ORDER — HYDROMORPHONE HYDROCHLORIDE 1 MG/ML
0.2 INJECTION, SOLUTION INTRAMUSCULAR; INTRAVENOUS; SUBCUTANEOUS EVERY 5 MIN PRN
Status: DISCONTINUED | OUTPATIENT
Start: 2023-11-15 | End: 2023-11-15 | Stop reason: HOSPADM

## 2023-11-15 RX ORDER — POLYETHYLENE GLYCOL 3350 17 G/17G
17 POWDER, FOR SOLUTION ORAL DAILY
Status: DISCONTINUED | OUTPATIENT
Start: 2023-11-16 | End: 2023-11-18 | Stop reason: HOSPADM

## 2023-11-15 RX ORDER — PROCHLORPERAZINE MALEATE 5 MG
10 TABLET ORAL EVERY 6 HOURS PRN
Status: DISCONTINUED | OUTPATIENT
Start: 2023-11-15 | End: 2023-11-18 | Stop reason: HOSPADM

## 2023-11-15 RX ORDER — SODIUM CHLORIDE 9 MG/ML
INJECTION, SOLUTION INTRAVENOUS CONTINUOUS PRN
Status: DISCONTINUED | OUTPATIENT
Start: 2023-11-15 | End: 2023-11-15

## 2023-11-15 RX ORDER — PROPOFOL 10 MG/ML
INJECTION, EMULSION INTRAVENOUS PRN
Status: DISCONTINUED | OUTPATIENT
Start: 2023-11-15 | End: 2023-11-15

## 2023-11-15 RX ORDER — OXYCODONE HYDROCHLORIDE 10 MG/1
10 TABLET ORAL EVERY 4 HOURS PRN
Status: DISCONTINUED | OUTPATIENT
Start: 2023-11-15 | End: 2023-11-16

## 2023-11-15 RX ORDER — SODIUM CHLORIDE, SODIUM LACTATE, POTASSIUM CHLORIDE, CALCIUM CHLORIDE 600; 310; 30; 20 MG/100ML; MG/100ML; MG/100ML; MG/100ML
INJECTION, SOLUTION INTRAVENOUS CONTINUOUS
Status: DISCONTINUED | OUTPATIENT
Start: 2023-11-15 | End: 2023-11-18 | Stop reason: HOSPADM

## 2023-11-15 RX ORDER — LIDOCAINE HYDROCHLORIDE 20 MG/ML
INJECTION, SOLUTION INFILTRATION; PERINEURAL PRN
Status: DISCONTINUED | OUTPATIENT
Start: 2023-11-15 | End: 2023-11-15

## 2023-11-15 RX ORDER — VANCOMYCIN HYDROCHLORIDE 1 G/20ML
INJECTION, POWDER, LYOPHILIZED, FOR SOLUTION INTRAVENOUS PRN
Status: DISCONTINUED | OUTPATIENT
Start: 2023-11-15 | End: 2023-11-15 | Stop reason: HOSPADM

## 2023-11-15 RX ORDER — METOPROLOL TARTRATE 1 MG/ML
1-2 INJECTION, SOLUTION INTRAVENOUS EVERY 5 MIN PRN
Status: DISCONTINUED | OUTPATIENT
Start: 2023-11-15 | End: 2023-11-15 | Stop reason: HOSPADM

## 2023-11-15 RX ORDER — ONDANSETRON 4 MG/1
4 TABLET, ORALLY DISINTEGRATING ORAL EVERY 6 HOURS PRN
Status: DISCONTINUED | OUTPATIENT
Start: 2023-11-15 | End: 2023-11-18 | Stop reason: HOSPADM

## 2023-11-15 RX ORDER — ONDANSETRON 2 MG/ML
4 INJECTION INTRAMUSCULAR; INTRAVENOUS EVERY 6 HOURS PRN
Status: DISCONTINUED | OUTPATIENT
Start: 2023-11-15 | End: 2023-11-18 | Stop reason: HOSPADM

## 2023-11-15 RX ORDER — HYDROMORPHONE HYDROCHLORIDE 1 MG/ML
0.4 INJECTION, SOLUTION INTRAMUSCULAR; INTRAVENOUS; SUBCUTANEOUS EVERY 5 MIN PRN
Status: DISCONTINUED | OUTPATIENT
Start: 2023-11-15 | End: 2023-11-15 | Stop reason: HOSPADM

## 2023-11-15 RX ORDER — CEFAZOLIN SODIUM/WATER 2 G/20 ML
2 SYRINGE (ML) INTRAVENOUS SEE ADMIN INSTRUCTIONS
Status: DISCONTINUED | OUTPATIENT
Start: 2023-11-15 | End: 2023-11-15 | Stop reason: HOSPADM

## 2023-11-15 RX ORDER — MORPHINE SULFATE 2 MG/ML
1 INJECTION, SOLUTION INTRAMUSCULAR; INTRAVENOUS
Status: DISCONTINUED | OUTPATIENT
Start: 2023-11-15 | End: 2023-11-18 | Stop reason: HOSPADM

## 2023-11-15 RX ORDER — FENTANYL CITRATE 50 UG/ML
50 INJECTION, SOLUTION INTRAMUSCULAR; INTRAVENOUS EVERY 5 MIN PRN
Status: DISCONTINUED | OUTPATIENT
Start: 2023-11-15 | End: 2023-11-15 | Stop reason: HOSPADM

## 2023-11-15 RX ORDER — SODIUM CHLORIDE, SODIUM LACTATE, POTASSIUM CHLORIDE, CALCIUM CHLORIDE 600; 310; 30; 20 MG/100ML; MG/100ML; MG/100ML; MG/100ML
INJECTION, SOLUTION INTRAVENOUS CONTINUOUS
Status: DISCONTINUED | OUTPATIENT
Start: 2023-11-15 | End: 2023-11-15 | Stop reason: HOSPADM

## 2023-11-15 RX ORDER — FENTANYL CITRATE 50 UG/ML
25 INJECTION, SOLUTION INTRAMUSCULAR; INTRAVENOUS EVERY 5 MIN PRN
Status: DISCONTINUED | OUTPATIENT
Start: 2023-11-15 | End: 2023-11-15 | Stop reason: HOSPADM

## 2023-11-15 RX ORDER — CEFAZOLIN SODIUM/WATER 2 G/20 ML
2 SYRINGE (ML) INTRAVENOUS
Status: COMPLETED | OUTPATIENT
Start: 2023-11-15 | End: 2023-11-15

## 2023-11-15 RX ORDER — AMOXICILLIN 250 MG
1 CAPSULE ORAL 2 TIMES DAILY
Status: DISCONTINUED | OUTPATIENT
Start: 2023-11-15 | End: 2023-11-18 | Stop reason: HOSPADM

## 2023-11-15 RX ORDER — MORPHINE SULFATE 2 MG/ML
2 INJECTION, SOLUTION INTRAMUSCULAR; INTRAVENOUS
Status: DISCONTINUED | OUTPATIENT
Start: 2023-11-15 | End: 2023-11-18 | Stop reason: HOSPADM

## 2023-11-15 RX ORDER — ACETAMINOPHEN 325 MG/1
650 TABLET ORAL EVERY 4 HOURS PRN
Status: DISCONTINUED | OUTPATIENT
Start: 2023-11-18 | End: 2023-11-18 | Stop reason: HOSPADM

## 2023-11-15 RX ORDER — HYDROXYZINE HYDROCHLORIDE 25 MG/1
25 TABLET, FILM COATED ORAL EVERY 6 HOURS PRN
Status: DISCONTINUED | OUTPATIENT
Start: 2023-11-15 | End: 2023-11-18 | Stop reason: HOSPADM

## 2023-11-15 RX ORDER — BISACODYL 10 MG
10 SUPPOSITORY, RECTAL RECTAL DAILY PRN
Status: DISCONTINUED | OUTPATIENT
Start: 2023-11-15 | End: 2023-11-18 | Stop reason: HOSPADM

## 2023-11-15 RX ORDER — ACETAMINOPHEN 325 MG/1
975 TABLET ORAL EVERY 8 HOURS
Qty: 27 TABLET | Refills: 0 | Status: DISCONTINUED | OUTPATIENT
Start: 2023-11-16 | End: 2023-11-18 | Stop reason: HOSPADM

## 2023-11-15 RX ORDER — HYDRALAZINE HYDROCHLORIDE 20 MG/ML
2.5-5 INJECTION INTRAMUSCULAR; INTRAVENOUS EVERY 10 MIN PRN
Status: DISCONTINUED | OUTPATIENT
Start: 2023-11-15 | End: 2023-11-15 | Stop reason: HOSPADM

## 2023-11-15 RX ADMIN — LIDOCAINE HYDROCHLORIDE 100 MG: 20 INJECTION, SOLUTION INFILTRATION; PERINEURAL at 20:00

## 2023-11-15 RX ADMIN — OXYCODONE HYDROCHLORIDE 15 MG: 10 TABLET ORAL at 10:39

## 2023-11-15 RX ADMIN — OXYCODONE HYDROCHLORIDE 20 MG: 20 TABLET, FILM COATED, EXTENDED RELEASE ORAL at 00:46

## 2023-11-15 RX ADMIN — CEFEPIME 1 G: 1 INJECTION, POWDER, FOR SOLUTION INTRAMUSCULAR; INTRAVENOUS at 08:26

## 2023-11-15 RX ADMIN — Medication 2 G: at 20:17

## 2023-11-15 RX ADMIN — PHENYLEPHRINE HYDROCHLORIDE 200 MCG: 10 INJECTION INTRAVENOUS at 20:18

## 2023-11-15 RX ADMIN — MIDAZOLAM 2 MG: 1 INJECTION INTRAMUSCULAR; INTRAVENOUS at 19:47

## 2023-11-15 RX ADMIN — METRONIDAZOLE 500 MG: 500 TABLET ORAL at 13:11

## 2023-11-15 RX ADMIN — OXYCODONE HYDROCHLORIDE 15 MG: 10 TABLET ORAL at 05:59

## 2023-11-15 RX ADMIN — SODIUM CHLORIDE: 9 INJECTION, SOLUTION INTRAVENOUS at 22:01

## 2023-11-15 RX ADMIN — SODIUM CHLORIDE: 9 INJECTION, SOLUTION INTRAVENOUS at 00:46

## 2023-11-15 RX ADMIN — PHENYLEPHRINE HYDROCHLORIDE 200 MCG: 10 INJECTION INTRAVENOUS at 20:27

## 2023-11-15 RX ADMIN — CEFEPIME 1 G: 1 INJECTION, POWDER, FOR SOLUTION INTRAMUSCULAR; INTRAVENOUS at 15:47

## 2023-11-15 RX ADMIN — PHENYLEPHRINE HYDROCHLORIDE 200 MCG: 10 INJECTION INTRAVENOUS at 20:08

## 2023-11-15 RX ADMIN — OMEPRAZOLE 40 MG: 20 CAPSULE, DELAYED RELEASE ORAL at 08:26

## 2023-11-15 RX ADMIN — HYDROMORPHONE HYDROCHLORIDE 0.2 MG: 1 INJECTION, SOLUTION INTRAMUSCULAR; INTRAVENOUS; SUBCUTANEOUS at 21:41

## 2023-11-15 RX ADMIN — DOCUSATE SODIUM 50 MG AND SENNOSIDES 8.6 MG 1 TABLET: 8.6; 5 TABLET, FILM COATED ORAL at 22:54

## 2023-11-15 RX ADMIN — OXYCODONE HYDROCHLORIDE 15 MG: 10 TABLET ORAL at 18:46

## 2023-11-15 RX ADMIN — FENTANYL CITRATE 50 MCG: 50 INJECTION INTRAMUSCULAR; INTRAVENOUS at 21:11

## 2023-11-15 RX ADMIN — VANCOMYCIN HYDROCHLORIDE 1000 MG: 1 INJECTION, SOLUTION INTRAVENOUS at 13:11

## 2023-11-15 RX ADMIN — PROPOFOL 200 MG: 10 INJECTION, EMULSION INTRAVENOUS at 20:00

## 2023-11-15 RX ADMIN — OXYCODONE HYDROCHLORIDE 20 MG: 20 TABLET, FILM COATED, EXTENDED RELEASE ORAL at 22:54

## 2023-11-15 RX ADMIN — PHENYLEPHRINE HYDROCHLORIDE 200 MCG: 10 INJECTION INTRAVENOUS at 20:38

## 2023-11-15 RX ADMIN — PHENYLEPHRINE HYDROCHLORIDE 200 MCG: 10 INJECTION INTRAVENOUS at 20:55

## 2023-11-15 RX ADMIN — DULOXETINE HYDROCHLORIDE 120 MG: 60 CAPSULE, DELAYED RELEASE ORAL at 08:26

## 2023-11-15 RX ADMIN — BUPROPION HYDROCHLORIDE 450 MG: 150 TABLET, FILM COATED, EXTENDED RELEASE ORAL at 08:26

## 2023-11-15 RX ADMIN — PHENYLEPHRINE HYDROCHLORIDE 200 MCG: 10 INJECTION INTRAVENOUS at 20:48

## 2023-11-15 RX ADMIN — PHENYLEPHRINE HYDROCHLORIDE 100 MCG: 10 INJECTION INTRAVENOUS at 20:07

## 2023-11-15 RX ADMIN — BUSPIRONE HYDROCHLORIDE 10 MG: 10 TABLET ORAL at 08:26

## 2023-11-15 RX ADMIN — ACETAMINOPHEN 975 MG: 325 TABLET, FILM COATED ORAL at 10:39

## 2023-11-15 RX ADMIN — PREGABALIN 150 MG: 75 CAPSULE ORAL at 13:11

## 2023-11-15 RX ADMIN — CEFEPIME 1 G: 1 INJECTION, POWDER, FOR SOLUTION INTRAMUSCULAR; INTRAVENOUS at 00:46

## 2023-11-15 RX ADMIN — OXYCODONE HYDROCHLORIDE 20 MG: 20 TABLET, FILM COATED, EXTENDED RELEASE ORAL at 10:39

## 2023-11-15 RX ADMIN — LORATADINE 10 MG: 10 TABLET ORAL at 08:26

## 2023-11-15 RX ADMIN — FENTANYL CITRATE 50 MCG: 50 INJECTION INTRAMUSCULAR; INTRAVENOUS at 20:00

## 2023-11-15 RX ADMIN — PREDNISOLONE ACETATE 1 DROP: 10 SUSPENSION/ DROPS OPHTHALMIC at 09:41

## 2023-11-15 RX ADMIN — ACETAMINOPHEN 975 MG: 325 TABLET, FILM COATED ORAL at 17:24

## 2023-11-15 RX ADMIN — PREGABALIN 150 MG: 75 CAPSULE ORAL at 08:26

## 2023-11-15 RX ADMIN — OXYCODONE HYDROCHLORIDE 15 MG: 10 TABLET ORAL at 14:30

## 2023-11-15 RX ADMIN — METRONIDAZOLE 500 MG: 500 TABLET ORAL at 08:26

## 2023-11-15 RX ADMIN — VANCOMYCIN HYDROCHLORIDE 1000 MG: 1 INJECTION, SOLUTION INTRAVENOUS at 02:01

## 2023-11-15 RX ADMIN — ONDANSETRON 4 MG: 2 INJECTION INTRAMUSCULAR; INTRAVENOUS at 20:49

## 2023-11-15 RX ADMIN — SODIUM CHLORIDE: 9 INJECTION, SOLUTION INTRAVENOUS at 19:47

## 2023-11-15 ASSESSMENT — ACTIVITIES OF DAILY LIVING (ADL)
ADLS_ACUITY_SCORE: 34
ADLS_ACUITY_SCORE: 31
ADLS_ACUITY_SCORE: 31
ADLS_ACUITY_SCORE: 34
ADLS_ACUITY_SCORE: 34
ADLS_ACUITY_SCORE: 31
ADLS_ACUITY_SCORE: 34

## 2023-11-15 NOTE — PLAN OF CARE
Pt is A&Ox4. VSS. LS clear, on RA. BS active, colostomy patent and draining well. NPO since midnight for surgery today. Dyer patent and draining well. Pain managed with 15mg oxycodone. L BKA surgical dressing is c/d/I. Sacral wound and changed this AM by [facility] nurse. Pt up with 2 assist with lift. R PIV is patent and infusing. Continue to monitor

## 2023-11-15 NOTE — PLAN OF CARE
VS: /68 (BP Location: Left arm)   Pulse 90   Temp 98.4  F (36.9  C) (Oral)   Resp 16   Ht 1.829 m (6')   Wt 104.3 kg (230 lb)   SpO2 98%   BMI 31.19 kg/m       O2: Stable at room air.    Output: Has patent bui in place with adequate output.    Last BM: 11/14/23.    Activity: Not OOB. Assist of 1-2 with cares.    Skin: LLE surgical incision.   Community acquired sacral wound. R leg scab. L thigh Skin tear.    Pain: Pain managed by PRN Oxycodone 15mg.   And scheduled Oxycontin and tylenol.     CMS: CMS intact. Has baseline neuropathy.    Dressing: LLE dressing, Sacral wound and L thigh skin tear dressing, CDI    Diet: NPO midnight.    LDA: PIV on on R forearm, infusing 100ml NS starting midnight.    Equipment: IV pole, PCD's, wound care supplies and personal belongings.    Plan: Plan for I&D 11/15/23.    Additional Info: CHG wipes done. Bed linen changed.
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
## 2023-11-15 NOTE — PROGRESS NOTES
"CLINICAL NUTRITION SERVICES - ASSESSMENT NOTE     Nutrition Prescription    RECOMMENDATIONS FOR MDs/PROVIDERS TO ORDER:  None at present.    Malnutrition Status:    Patient does not meet two of the established criteria necessary for diagnosing malnutrition    Recommendations already ordered by Registered Dietitian (RD):  Continue Ensure Enlive daily.    Future/Additional Recommendations:  Monitor labs, intakes, and weight trends.     REASON FOR ASSESSMENT  Saqib Bishop is a/an 42 year old male assessed by the dietitian for LOS and Nutrition Risk Monitoring    NUTRITION/MEDICAL HISTORY  Unable to visit with pt. Information obtained from chart review.    History of paraplegia, colostomy and necrotizing fasciitis s/p L BKA (Aug-Sept, 2023) admitted for stump infection s/p Sharp excisional debridement  and Abx beads placement. Currently on IV abx with ID following.  Ortho plans to take him back to the OR on 11/15.       CURRENT NUTRITION ORDERS  Diet: Regular  Snacks/supplements: Ensure Enlive    Intake/Tolerance: 100% of documented meals. Poorly documented    LABS   11/10/23 05:21 11/11/23 07:10 11/12/23 06:24 11/13/23 08:22 11/14/23 06:39 11/15/23 05:52   Sodium 140 137 135 137 142 139   Potassium 4.4 3.8 3.9 4.3 4.4 4.3   Urea Nitrogen 18.2 17.3 18.9 21.6 (H) 24.5 (H) 22.1 (H)   Creatinine 0.96 0.85  0.85 0.83 0.87 0.88 0.82   Alkaline Phosphatase      115   CRP Inflammation 7.38 (H)  5.60 (H)  11.23 (H)    Glucose 109 (H) 103 (H) 109 (H) 134 (H) 117 (H) 126 (H)     MEDICATIONS  Medications reviewed: cefepime, omeprazole, miralax, senna, hydrochlorothiazide, vancomycin, oxycodone PRN, LR gtt    ANTHROPOMETRICS  Height: 182.9 cm (6' 0\")  Most Recent Weight (11/7): 104.3 kg (230 lb)     IBW: 77.3 kg (adjusted 4.4% for BKA) - 135% IBW  BMI: 31.61 kg/m2 - Obesity Grade I BMI 30-34.9  Weight History: Updated wt requested 11/16, still pending. 24.4% wt loss since 9/2023, which is when pt got a BKA. Other wt hx is " variable, question accuracy of some wts. Difficult to assess wt loss.    Wt Readings from Last Encounters:   11/07/23 104.3 kg (230 lb)   10/24/23 113.4 kg (250 lb)   10/04/23 97.5 kg (215 lb)   09/09/23 125.9 kg (277 lb 9 oz)   09/04/23 138 kg (304 lb 3.8 oz)   08/04/23 124.7 kg (275 lb)     Dosing Weight: 84 kg - adjusted based on actual wt and IBW     ASSESSED NUTRITION NEEDS  Estimated Energy Needs: 2967-1012 kcals/day (25 - 30 kcals/kg)  Justification: Maintenance  Estimated Protein Needs: 101-126 grams protein/day (1.2 - 1.5 grams of pro/kg)  Justification: Increased needs  Estimated Fluid Needs: 1 ml/kcal or per provider pending fluid status    PHYSICAL FINDINGS  L BKA  Sacral wound  Colostomy    MALNUTRITION  % Intake: No decreased intake noted  % Weight Loss:  Difficult to assess with variable wt hx  Subcutaneous Fat Loss: Unable to assess  Muscle Loss: Unable to assess  Fluid Accumulation/Edema: Does not meet criteria  Malnutrition Diagnosis: Patient does not meet two of the established criteria necessary for diagnosing malnutrition    NUTRITION DIAGNOSIS  Predicted inadequate nutrient intake (kcal/protein) related to potential for appetite to decline with extended LOS.    INTERVENTIONS  Implementation  Nutrition Education: Will be provided if education needs arise   Medical food supplement therapy     Goals  Patient to consume % of nutritionally adequate meal trays TID, or the equivalent with supplements/snacks.     Monitoring/Evaluation  Progress toward goals will be monitored and evaluated per protocol.    Jhonny Ramos RD, FARIHA  RD pager: [phone]  WB Weekend/Holiday Pager: [phone]

## 2023-11-15 NOTE — PROGRESS NOTES
[facility]  WOC Nurse Inpatient Assessment     Consulted for: Right IT, left posterior thigh    Summary: Wound location is sacrum, present on admit, pt has established ostomy  WOC will defer to ortho at this time for BKA care. Please consult WOC in the future if desired.     Patient History (according to provider note(s):      Saqib Bishop is a 42-year-old man with past medical history of left lower extremity necrotizing fasciitis status post left guillotine BKA with vascular surgery on 08/27, that required I&D on 08/30/2023 with Dr. Maier, who required subsequent revisions of the BKA on 09/02 and 09/07 with Dr. Rehman and Dr. Gates respectively, who has a soft tissue infection of the BKA site positive for Pseudomonas.  He had positive cultures drawn on 11/3.  CT shows soft tissue abscess, does not appear to show obvious osteomyelitis.  He is not not septic and otherwise feeling well.  I do think the degree of infection is concerning enough to at least require IV antibiotics at this time.  There is likely a possibility that he will need formal I&D in the OR with a possible wound revision.  We will discuss with staff the indication for doing this urgently versus initiating medical management and planning this for down the road.     Assessment:      Areas visualized during today's visit: Focused:    Pressure Injury Location: Sacrum    Last photo: 11/8/23  Wound type: Pressure Injury     Pressure Injury Stage: 4, present on admission   Wound history/plan of care: Present on admit. Seen on previous admit, last WOC assessment 9/12.  Wound base: unable to visualize  base     Palpation of the wound bed: normal      Drainage: moderate     Description of drainage: serosanguinous     Measurements (length x width x depth, in cm) 2.1  x 1.5  x  6.5 cm      Tunneling N/A     Undermining up to 5 cm from 4-9 o'clock  Periwound skin: Intact      Color: normal and consistent with  surrounding tissue      Temperature: normal   Odor: none  Pain: denies , none  Pain intervention prior to dressing change: slow and gentle cares   Treatment goal: Increase granulation  STATUS: stable, unchanged  Supplies ordered: discussed with RN and discussed with patient     Wound location: Left posterior thigh      Last photo: 11/10/2023  Wound due to: Unknown Etiology  Wound history/plan of care: Present on admission, patient does not know etiology  Wound base: 100 % slough     Palpation of the wound bed: normal      Drainage: scant     Description of drainage: cloudy     Measurements (length x width x depth, in cm): 1  x 1  x  0.2 cm      Tunneling: N/A     Undermining: N/A  Periwound skin: Intact      Color: pink      Temperature: normal   Odor: none  Pain: denies   Pain interventions prior to dressing change: slow and gentle cares   Treatment goal: Soften necrotic tissue  STATUS: stable  Supplies ordered: ordered Medihoney    Colostomy: pt able to manage independently.     Treatment Plan:     Sacral wound(s): Daily and PRN   Pack wound with Vashe dampened kerlix to pack into wound bed.   Ensure to pack into undermined area, wound is bigger than what you see.   Cover with mepilex  Change dressing daily or PRN    Left posterior thigh wound: Every other day: Wash wound with Vashe and gauze.  Coat wound bed with Medihoney (orer #[id number]) and cover with Mepilex.     LUQ Colostomy  Encourage patient participation with ostomy care,  ~ Empty pouch when 1/3 to 1/2 full,   ~ Notify WOC for ongoing ostomy pouch leakage,  ~ Document stoma output volume, color, consistency EVERY shift  ~ Supplies used               ~ Pouch: Elena 70 FECAL ([id number])              ~ Flange/Barrier/Wafer: [place] 70mm FLAT ([id number])              ~ Accessories: Powder ([id number]) and No Sting ([id number])    Orders: Written    RECOMMEND PRIMARY TEAM ORDER: None, at this time  Education provided: importance of repositioning, plan of care, and  wound progress  Discussed plan of care with: Patient and Nurse  WO nurse follow-up plan: weekly  Notify WOC if wound(s) deteriorate.  Nursing to notify the Provider(s) and re-consult the WO Nurse if new skin concern.    DATA:     Current support surface: Standard  Standard gel/foam mattress (IsoFlex, Atmos air, etc)  Containment of urine/stool: Colostomy pouch  BMI: Body mass index is 31.19 kg/m .   Active diet order: Orders Placed This Encounter      NPO per Anesthesia Guidelines for Procedure/Surgery Except for: Meds     Output: I/O last 3 completed shifts:  In: -   Out: 2975 [Urine:2975]     Labs:   Recent Labs   Lab 11/15/23  0552   ALBUMIN 3.3*   HGB 13.1*   INR 1.06   WBC 6.1     Pressure injury risk assessment:   Sensory Perception: 3-->slightly limited  Moisture: 3-->occasionally moist  Activity: 2-->chairfast  Mobility: 3-->slightly limited  Nutrition: 3-->adequate  Friction and Shear: 2-->potential problem  Long Score: 16    Pager no longer is use, please contact through [employer] group: [facility] Nurse [facility]  Dept. Office Number: *[phone]

## 2023-11-15 NOTE — PROGRESS NOTES
Orthopedic Surgery Progress Note: 11/15/2023    Subjective:   No acute events overnight. Pain well-controlled. No concerns. Ready for OR today.     Objective:   /68 (BP Location: Left arm)   Pulse 90   Temp 98.4  F (36.9  C) (Oral)   Resp 16   Ht 1.829 m (6')   Wt 104.3 kg (230 lb)   SpO2 98%   BMI 31.19 kg/m    No intake/output data recorded.  General: NAD. Resting comfortably in bed.  Respiratory: Nonlabored breathing  Musculoskeletal:  LLE:   ACE C/D/I  Limb warm and well perfused  Sensation intact over the limb     Laboratory Data:  Lab Results   Component Value Date    WBC 6.3 11/14/2023    HGB 12.7 (L) 11/14/2023     11/14/2023    INR 1.06 11/07/2023         Assessment & Plan:   Saqib Bishop is a 42 year old male with PMH now s/p irrigation and debridement LLE w placement of antibiotic beads and primary wound closure on 11/8/2023 with Dr. Maier.   Plan for OR 11/15/23 for LLE I&D, antibiotic bead removal.     Plan for Today:  - Likely OR today for I&D, antibiotic bead removal  - NPO until OR decision  - Pain control  - Continue Vanc/Cefepime/Flagyl  - Follow cultures - NGTD    Medicine Primary  Activity: Up with assist  Weight bearing status: NWB LLE  Pain management:   Transition from IV to PO narcotics as tolerated.   Antibiotics: Continue vanc/cefepime/metronidazole. Per medicine/ID  Diet: Begin with clear fluids and progress diet as tolerated.   DVT prophylaxis: Per medicine. Ok to restart dvt ppx as indicated on POD1  Labs: Trend ESR/CRP/CBC  Dressings: Keep dressing c/d/I. Change prn per nursing. Adaptic, 4x4, abd, webril, ace-wrap  Physical Therapy/Occupational Therapy: Eval and treat  Cultures: Follow intra-op cultures - NGTD  Follow-up: TBD  Disposition: Anticipate RTOR for repeat I&D w removal of abx beads and wound closure on 11/15/2023.     Orthopedic surgery staff for this patient is   Livier.    ------------------------------------------------------------------------------------------    Respectfully,    José Manuel Gavin MD  Orthopedic Surgery PGY1  [phone]    Please page me directly with any questions/concerns during regular weekday hours before 5 pm. If there is no response, if it is a weekend, or if it is during evening hours then please page the orthopedic surgery resident on call.      FOLLOWUP:    Future Appointments   Date Time Provider Department Center   11/16/2023  1:30 PM UR PT WAITLIST [facility]   11/20/2023 12:30 PM Nii Mancilla MD [facility]   12/21/2023  1:15 PM Ne Zavala MD [facility]   1/4/2024  1:30 PM Bloch, Lauren Turner, [facility]   3/6/2024  1:00 PM Viv Traylor PA-C [facility]

## 2023-11-15 NOTE — PROGRESS NOTES
[facility] GENERAL ID SERVICE : PROGRESS NOTE     Patient:  Saqib Bishop   YOB: 1980, MRN: [id number]  Date of Visit: 11/15/2023  Date of Admission: 11/7/2023          Assessment and Recommendations:     ID Problem List:  Stump infection, involving left BKA s/p sharp excisional debridement, placement of antibiotic beads deep to the fascial layer adjacent to bone 11/8/2023 11/8 Intra-op cultures: NGTD  11/3 superficial wound cx: Pseudomonas aeruginosa; GS (GPC, GNB)  10/1 superficial wound cx: Enterobacter cloacae    Hx of necrotizing fascitis of LLE s/p guillotine 8/28/2023, s/p I&D of residual limb 8/30/2023, s/p BKA 9/7/2023  Paraplegia   S/p colostomy  Chronic indwelling Dyer, last exchange just prior to admission    Discussion:  42/M with PMH of paraplegia, colostomy, necrotizing fasciitis w/p guillotine followed by left BKA (Aug-Sept, 2023), with left BKA stump infection. He had erythema, discharge for almost 5-6 weeks from stump incision. CT outside facility with collection around stump site c/f abscess. Been afebrile, hemodynamically stable. Superficial cultures showed  Enterobacter 10/1 - bactrim course completed beginning of Oct. Then 11/3 grew Pseudomonas, GPC on Gram stain but not in growth, suggesting anaerobes. Had been on bactrim, levofloxacin since 11/3.     Underwent surgery 11/8, reviewed OP findings: Sharp excisional debridement and irrigation of left transtibial amputation wound and placement of antibiotic beads deep to the fascial layer adjacent to bone; no obvious purulence but wound dehiscence/necrosis involving full thickness through skin, in 3 separate focal areas in residual LLE. Intra-op cultures were obtained, remains negative so far.    Since 11/7 been on empiric Vanc, cefepime, and 11/8 - flagyl    Awaiting RTOR today     9/7 path with viable proximal margin with mild chronic inflammation     Recommendations:  - Await RTOR, please get aerobic, anerobic  cultures in OR  - Would presume and treat for osteo for minimum 4 weeks given proximity to bone (even if gross inspection did not reveal involvement, microscopic involvement still possible), previous above path results, long duration of symptoms - pt amenable  - Continue current regimen  IV Vancomycin, pharmacy to dose and monitor, appreciate assistance  IV cefepime 2g q8h  PO Metronidazole 500mg TID      I have reviewed interval events, vital, labs, images, cultures and antibiotics    Total time on day of visit including chart review, visit, counseling, documentation and coordination of care: 25 minutes    Darrell Ralph  Staff Physician  Division of Infectious Diseases            Interval History and Events:     Seen and examined. Resting. No new complaints today.  Awaiting OR today.. No fevers, chills, sweats. Tolerating antibiotics.          HPI as adopted from initial ID consult on 11/8/2023:     Saqib Bishop is a 41 yo M with PMHx of T12 level spinal cord injury and paraplegia, colostomy, DJD, GERD, chronic sacral decubitus ulcer s/p flap and treatment for osteomyelitis Dec-2020, complicated by dehiscence, open wound and sacral decubitus ulceration Oct-2021, s/p an elective flap procedure in early 2023 which failed and resulted in a chronic sacral ulcer. In August presented with left foot necrotizing fascitis s/p guillotine 8/28/2023, s/p I&D of residual limb 8/30, then BKA 9/7/2023. Antibiotics were stopped post surgery per ID recommendations (sign off note 9/9). Per patient, since then never felt that stump healed completely. Noted distinct redness and then some discharge from the stump surgical incision on lateral aspect and seen in ER 10/1. Superficial wound cx then grew Enterobacter cloacae and received a course of bactrim without complete resolution. Evaluated by Orthopedic clinic (Dr. Maier) 10/6. Again evaluated in ER ([facility]) 11/3, wound cx grew Pseudomonas aeruginosa. Given bactrim,  levofloxacin. Continues to have discharge, redness around stump incision site. Also noticing palpable swelling around stump since past one month or so. No fever, chills, or systemic complaints. Presented again at outside facility, CT was obtained which shows collection around stump concern for abscess. Transferred to [facility] for surgical intervention.           Antimicrobial history:     Current:  Vancomycin, cefepime, metronidazole     Prior:  Bactrim, levofloxacin 11/3 - 11/7  Bactrim ~early October, 2023         Physical Examination:   Temp:  [98.2  F (36.8  C)-98.4  F (36.9  C)] 98.2  F (36.8  C)  Pulse:  [79-90] 79  Resp:  [16] 16  BP: (130-133)/(68-79) 133/79  SpO2:  [98 %] 98 %    I/O last 3 completed shifts:  In: -   Out: 2975 [Urine:2975]    Vitals:    11/07/23 1231   Weight: 104.3 kg (230 lb)       Exam:  GENERAL:  sitting in bed in no acute distress.   Head: normocephalic, atraumatic.   EYES: PERRLA, EOMI, conjunctiva clear, anicteric sclerae.    LUNGS:  Breathing comfortably, on room air  ABDOMEN:  . +colostomy bag  EXT: left BKA stump site s/p surgery, covered in dressing, ace wrap   SKIN:  No acute rashes.    NEUROLOGIC:  Grossly nonfocal.      Labs, Microbiology and Imaging studies are reviewed.          Laboratory Data:     Inflammatory Markers    Recent Labs   Lab Test 11/07/23  1539 09/09/23  0454 09/08/23  0424   SED 46* 55* 47*       Metabolic Studies     Recent Labs   Lab Test 11/15/23  0552 11/14/23  0639 11/13/23  0822 11/11/23  0710 11/10/23  0521 11/07/23  1326 09/14/23  0606 09/06/23  1159 09/06/23  0815 08/28/23  0414 08/28/23  0222    142 137   < > 140   < > 141   < >  --    < >  --    POTASSIUM 4.3 4.4 4.3   < > 4.4   < > 4.0   < >  --    < >  --    CHLORIDE 105 107 103   < > 106   < > 106   < >  --    < >  --    CO2 28 28 28   < > 31*   < > 25   < >  --    < >  --    ANIONGAP 6* 7 6*   < > 3*   < > 10   < >  --    < >  --    BUN 22.1* 24.5* 21.6*   < > 18.2   < > 16.4   < >  --    <  >  --    CR 0.82 0.88 0.87   < > 0.96   < > 0.80   < >  --    < >  --    GFRESTIMATED >90 >90 >90   < > >90   < > >90   < >  --    < >  --    * 117* 134*   < > 109*   < > 101*   < >  --    < >  --    A1C  --   --   --   --   --   --   --   --  5.5  --   --    HILDA 9.0 9.4 9.0   < > 8.8   < > 9.1   < >  --    < >  --    PHOS  --   --   --   --   --   --  3.4   < >  --    < >  --    MAG  --   --   --   --   --   --  1.6*   < >  --    < >  --    LACT  --   --   --   --   --   --   --   --   --   --  0.5*   CKT  --   --   --   --  45  --   --   --   --   --   --     < > = values in this interval not displayed.       Hepatic Studies    Recent Labs   Lab Test 11/15/23  0552 11/07/23  1326 09/11/23  0618   BILITOTAL <0.2 0.2 0.2   ALKPHOS 115 133* 139*   ALBUMIN 3.3* 3.4* 3.2*   AST 26 37 35   ALT 26 21 22       Hematology Studies      Recent Labs   Lab Test 11/15/23  0552 11/14/23  0639 11/13/23  0822   WBC 6.1 6.3 5.5   HGB 13.1* 12.7* 13.6   HCT 43.4 42.8 46.7    286 274        Vancomycin Levels     Recent Labs   Lab Test 11/13/23  0822 11/11/23  0710 11/09/23  0557   VANCOMYCIN 19.9 27.7* 28.6*       Microbiology  Lab Results   Component Value Date    CULTURE No anaerobic organisms isolated 11/08/2023    CULTURE No growth after 6 days 11/08/2023    CULTURE No Growth 11/08/2023

## 2023-11-16 LAB
ALBUMIN SERPL BCG-MCNC: 3.4 G/DL (ref 3.5–5.2)
ALP SERPL-CCNC: 122 U/L (ref 40–150)
ALT SERPL W P-5'-P-CCNC: 25 U/L (ref 0–70)
ANION GAP SERPL CALCULATED.3IONS-SCNC: 9 MMOL/L (ref 7–15)
AST SERPL W P-5'-P-CCNC: 31 U/L (ref 0–45)
BILIRUB SERPL-MCNC: <0.2 MG/DL
BUN SERPL-MCNC: 18.9 MG/DL (ref 6–20)
CALCIUM SERPL-MCNC: 8.7 MG/DL (ref 8.6–10)
CHLORIDE SERPL-SCNC: 104 MMOL/L (ref 98–107)
CREAT SERPL-MCNC: 0.77 MG/DL (ref 0.67–1.17)
CRP SERPL-MCNC: 10.72 MG/L
DEPRECATED HCO3 PLAS-SCNC: 24 MMOL/L (ref 22–29)
EGFRCR SERPLBLD CKD-EPI 2021: >90 ML/MIN/1.73M2
ERYTHROCYTE [DISTWIDTH] IN BLOOD BY AUTOMATED COUNT: 16.3 % (ref 10–15)
GLUCOSE SERPL-MCNC: 117 MG/DL (ref 70–99)
HCT VFR BLD AUTO: 44.2 % (ref 40–53)
HGB BLD-MCNC: 13.1 G/DL (ref 13.3–17.7)
MCH RBC QN AUTO: 24.1 PG (ref 26.5–33)
MCHC RBC AUTO-ENTMCNC: 29.6 G/DL (ref 31.5–36.5)
MCV RBC AUTO: 81 FL (ref 78–100)
PLATELET # BLD AUTO: 265 10E3/UL (ref 150–450)
POTASSIUM SERPL-SCNC: 4.1 MMOL/L (ref 3.4–5.3)
PROT SERPL-MCNC: 7.5 G/DL (ref 6.4–8.3)
RBC # BLD AUTO: 5.43 10E6/UL (ref 4.4–5.9)
SODIUM SERPL-SCNC: 137 MMOL/L (ref 135–145)
VANCOMYCIN SERPL-MCNC: 21.7 UG/ML
WBC # BLD AUTO: 6.7 10E3/UL (ref 4–11)

## 2023-11-16 PROCEDURE — 250N000011 HC RX IP 250 OP 636: Performed by: STUDENT IN AN ORGANIZED HEALTH CARE EDUCATION/TRAINING PROGRAM

## 2023-11-16 PROCEDURE — 250N000011 HC RX IP 250 OP 636: Performed by: ORTHOPAEDIC SURGERY

## 2023-11-16 PROCEDURE — 99233 SBSQ HOSP IP/OBS HIGH 50: CPT | Performed by: STUDENT IN AN ORGANIZED HEALTH CARE EDUCATION/TRAINING PROGRAM

## 2023-11-16 PROCEDURE — 80202 ASSAY OF VANCOMYCIN: CPT | Performed by: ORTHOPAEDIC SURGERY

## 2023-11-16 PROCEDURE — 250N000013 HC RX MED GY IP 250 OP 250 PS 637: Performed by: ORTHOPAEDIC SURGERY

## 2023-11-16 PROCEDURE — 85014 HEMATOCRIT: CPT | Performed by: ORTHOPAEDIC SURGERY

## 2023-11-16 PROCEDURE — 36415 COLL VENOUS BLD VENIPUNCTURE: CPT | Performed by: ORTHOPAEDIC SURGERY

## 2023-11-16 PROCEDURE — 250N000011 HC RX IP 250 OP 636: Mod: JZ | Performed by: ORTHOPAEDIC SURGERY

## 2023-11-16 PROCEDURE — 86140 C-REACTIVE PROTEIN: CPT | Performed by: ORTHOPAEDIC SURGERY

## 2023-11-16 PROCEDURE — 80053 COMPREHEN METABOLIC PANEL: CPT | Performed by: ORTHOPAEDIC SURGERY

## 2023-11-16 PROCEDURE — 120N000002 HC R&B MED SURG/OB UMMC

## 2023-11-16 PROCEDURE — 99232 SBSQ HOSP IP/OBS MODERATE 35: CPT | Performed by: STUDENT IN AN ORGANIZED HEALTH CARE EDUCATION/TRAINING PROGRAM

## 2023-11-16 RX ORDER — METRONIDAZOLE 500 MG/1
500 TABLET ORAL 3 TIMES DAILY
DISCHARGE
Start: 2023-11-17 | End: 2023-11-17

## 2023-11-16 RX ORDER — OXYCODONE HYDROCHLORIDE 10 MG/1
10 TABLET ORAL EVERY 4 HOURS PRN
Status: DISCONTINUED | OUTPATIENT
Start: 2023-11-16 | End: 2023-11-17

## 2023-11-16 RX ORDER — ACETAMINOPHEN 325 MG/1
650 TABLET ORAL EVERY 4 HOURS PRN
DISCHARGE
Start: 2023-11-18

## 2023-11-16 RX ORDER — HEPARIN SODIUM 5000 [USP'U]/.5ML
5000 INJECTION, SOLUTION INTRAVENOUS; SUBCUTANEOUS EVERY 12 HOURS
Status: DISCONTINUED | OUTPATIENT
Start: 2023-11-16 | End: 2023-11-18 | Stop reason: HOSPADM

## 2023-11-16 RX ORDER — BUPROPION HYDROCHLORIDE 450 MG/1
450 TABLET, FILM COATED, EXTENDED RELEASE ORAL DAILY
DISCHARGE
Start: 2023-11-17

## 2023-11-16 RX ORDER — VANCOMYCIN HYDROCHLORIDE 1 G/200ML
1000 INJECTION, SOLUTION INTRAVENOUS EVERY 12 HOURS
Status: ACTIVE | DISCHARGE
Start: 2023-11-17 | End: 2023-11-17

## 2023-11-16 RX ORDER — HYDROXYZINE HYDROCHLORIDE 25 MG/1
25 TABLET, FILM COATED ORAL EVERY 6 HOURS PRN
DISCHARGE
Start: 2023-11-16

## 2023-11-16 RX ORDER — NALOXONE HYDROCHLORIDE 0.4 MG/ML
0.2 INJECTION, SOLUTION INTRAMUSCULAR; INTRAVENOUS; SUBCUTANEOUS
Status: ON HOLD | DISCHARGE
Start: 2023-11-16 | End: 2024-06-03

## 2023-11-16 RX ORDER — CEFEPIME HYDROCHLORIDE 1 G/1
1 INJECTION, POWDER, FOR SOLUTION INTRAMUSCULAR; INTRAVENOUS EVERY 8 HOURS
Status: ACTIVE | DISCHARGE
Start: 2023-11-17 | End: 2023-11-17

## 2023-11-16 RX ADMIN — LORATADINE 10 MG: 10 TABLET ORAL at 07:59

## 2023-11-16 RX ADMIN — DOCUSATE SODIUM 50 MG AND SENNOSIDES 8.6 MG 1 TABLET: 8.6; 5 TABLET, FILM COATED ORAL at 20:14

## 2023-11-16 RX ADMIN — PREDNISOLONE ACETATE 1 DROP: 10 SUSPENSION/ DROPS OPHTHALMIC at 20:15

## 2023-11-16 RX ADMIN — OXYCODONE HYDROCHLORIDE 20 MG: 20 TABLET, FILM COATED, EXTENDED RELEASE ORAL at 10:22

## 2023-11-16 RX ADMIN — PREGABALIN 150 MG: 75 CAPSULE ORAL at 20:14

## 2023-11-16 RX ADMIN — CEFEPIME 1 G: 1 INJECTION, POWDER, FOR SOLUTION INTRAMUSCULAR; INTRAVENOUS at 15:48

## 2023-11-16 RX ADMIN — OXYCODONE HYDROCHLORIDE 20 MG: 20 TABLET, FILM COATED, EXTENDED RELEASE ORAL at 23:15

## 2023-11-16 RX ADMIN — VANCOMYCIN HYDROCHLORIDE 1000 MG: 1 INJECTION, SOLUTION INTRAVENOUS at 01:45

## 2023-11-16 RX ADMIN — VANCOMYCIN HYDROCHLORIDE 1000 MG: 1 INJECTION, SOLUTION INTRAVENOUS at 13:33

## 2023-11-16 RX ADMIN — CEFEPIME 1 G: 1 INJECTION, POWDER, FOR SOLUTION INTRAMUSCULAR; INTRAVENOUS at 23:15

## 2023-11-16 RX ADMIN — ACETAMINOPHEN 975 MG: 325 TABLET, FILM COATED ORAL at 17:28

## 2023-11-16 RX ADMIN — PREDNISOLONE ACETATE 1 DROP: 10 SUSPENSION/ DROPS OPHTHALMIC at 08:09

## 2023-11-16 RX ADMIN — OXYCODONE HYDROCHLORIDE 15 MG: 10 TABLET ORAL at 08:09

## 2023-11-16 RX ADMIN — OMEPRAZOLE 40 MG: 20 CAPSULE, DELAYED RELEASE ORAL at 07:58

## 2023-11-16 RX ADMIN — ACETAMINOPHEN 975 MG: 325 TABLET, FILM COATED ORAL at 01:13

## 2023-11-16 RX ADMIN — METRONIDAZOLE 500 MG: 500 TABLET ORAL at 20:14

## 2023-11-16 RX ADMIN — METRONIDAZOLE 500 MG: 500 TABLET ORAL at 13:33

## 2023-11-16 RX ADMIN — BUPROPION HYDROCHLORIDE 450 MG: 150 TABLET, FILM COATED, EXTENDED RELEASE ORAL at 07:58

## 2023-11-16 RX ADMIN — HEPARIN SODIUM 5000 UNITS: 5000 INJECTION, SOLUTION INTRAVENOUS; SUBCUTANEOUS at 23:15

## 2023-11-16 RX ADMIN — OXYCODONE HYDROCHLORIDE 15 MG: 10 TABLET ORAL at 20:14

## 2023-11-16 RX ADMIN — OXYCODONE HYDROCHLORIDE 15 MG: 10 TABLET ORAL at 15:56

## 2023-11-16 RX ADMIN — ACETAMINOPHEN 975 MG: 325 TABLET, FILM COATED ORAL at 07:58

## 2023-11-16 RX ADMIN — OXYCODONE HYDROCHLORIDE 15 MG: 10 TABLET ORAL at 11:59

## 2023-11-16 RX ADMIN — BUSPIRONE HYDROCHLORIDE 10 MG: 10 TABLET ORAL at 07:57

## 2023-11-16 RX ADMIN — OMEPRAZOLE 40 MG: 20 CAPSULE, DELAYED RELEASE ORAL at 20:14

## 2023-11-16 RX ADMIN — DULOXETINE HYDROCHLORIDE 120 MG: 60 CAPSULE, DELAYED RELEASE ORAL at 07:59

## 2023-11-16 RX ADMIN — PREGABALIN 150 MG: 75 CAPSULE ORAL at 13:33

## 2023-11-16 RX ADMIN — HEPARIN SODIUM 5000 UNITS: 5000 INJECTION, SOLUTION INTRAVENOUS; SUBCUTANEOUS at 12:00

## 2023-11-16 RX ADMIN — CEFEPIME 1 G: 1 INJECTION, POWDER, FOR SOLUTION INTRAMUSCULAR; INTRAVENOUS at 07:59

## 2023-11-16 RX ADMIN — PREGABALIN 150 MG: 75 CAPSULE ORAL at 07:58

## 2023-11-16 RX ADMIN — METRONIDAZOLE 500 MG: 500 TABLET ORAL at 07:57

## 2023-11-16 RX ADMIN — CEFEPIME 1 G: 1 INJECTION, POWDER, FOR SOLUTION INTRAMUSCULAR; INTRAVENOUS at 01:13

## 2023-11-16 RX ADMIN — BUSPIRONE HYDROCHLORIDE 10 MG: 10 TABLET ORAL at 20:14

## 2023-11-16 RX ADMIN — OXYCODONE HYDROCHLORIDE 15 MG: 10 TABLET ORAL at 03:55

## 2023-11-16 ASSESSMENT — ACTIVITIES OF DAILY LIVING (ADL)
ADLS_ACUITY_SCORE: 34
ADLS_ACUITY_SCORE: 33
ADLS_ACUITY_SCORE: 34
ADLS_ACUITY_SCORE: 33
ADLS_ACUITY_SCORE: 34
ADLS_ACUITY_SCORE: 36
ADLS_ACUITY_SCORE: 33
ADLS_ACUITY_SCORE: 36
ADLS_ACUITY_SCORE: 34
ADLS_ACUITY_SCORE: 33
ADLS_ACUITY_SCORE: 33
ADLS_ACUITY_SCORE: 34
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
## 2023-11-16 NOTE — PROGRESS NOTES
Orthopedic Surgery Progress Note: 11/16/2023    Subjective:   No acute events overnight. Pain well-controlled. Tolerating a diet. No new concerns or complaints. +Flatus. Denies SOB, chest Pain, fevers, chills.     Objective:   /52 (BP Location: Left arm)   Pulse 77   Temp 97.8  F (36.6  C) (Oral)   Resp 12   Ht 1.829 m (6')   Wt 104.3 kg (230 lb)   SpO2 97%   BMI 31.19 kg/m    I/O this shift:  In: -   Out: 650 [Urine:650]  General: NAD. Resting comfortably in bed.  Respiratory: Nonlabored breathing  Musculoskeletal:  LLE:   ACE C/D/I  Limb warm and well perfused  Sensation intact over the limb     Laboratory Data:  Lab Results   Component Value Date    WBC 6.1 11/15/2023    HGB 13.1 (L) 11/15/2023     11/15/2023    INR 1.06 11/15/2023         Assessment & Plan:   Saqib Bishop is a 42 year old male with PMH now s/p irrigation and debridement LLE w placement of antibiotic beads and primary wound closure on 11/8/2023 and repeat I&D LLE with antibiotic bead removal on 11/15/23 with Dr. Maier. Progressing appropriately postoperatively.     Plan for Today:  - Continue antibiotics, need finalized antibiotic plan per ID  - Pain control  - PT/OT  - Disposition planning    Medicine Primary  Activity: Up as tolerated  Weight bearing: WBAT LLE  DVT ppx: Per primary  Antibiotics: Vanc, Cefepime, Metronidazole per ID  Dressings: Keep C/D/I x 7 days  Pain: Per primary, recommend multimodal  PT/OT: Advance as tolerated  Diet: Ok for regular diet   Follow-up: Within 2 weeks with Ortho DIANE for wound check, 6 weeks with Dr. Maier  Disposition: Ok for discharge from orthopedic perspective pending finalized antibiotic plan, PT/OT clearance, and medical stability postop.     Orthopedic surgery staff for this patient is Dr. Maier.    ------------------------------------------------------------------------------------------    Respectfully,    José Manuel Gavin MD  Orthopedic Surgery PGY1  [phone]    Please page me  directly with any questions/concerns during regular weekday hours before 5 pm. If there is no response, if it is a weekend, or if it is during evening hours then please page the orthopedic surgery resident on call.      FOLLOWUP:    Future Appointments   Date Time Provider Department Center   11/20/2023 12:30 PM Nii Mancilla MD [facility]   12/21/2023  1:15 PM Ne Zavala MD [facility]   1/4/2024  1:30 PM Bloch, Lauren Turner, [facility]   3/6/2024  1:00 PM Viv Traylor PA-C [facility]

## 2023-11-16 NOTE — ANESTHESIA PREPROCEDURE EVALUATION
Anesthesia Pre-Procedure Evaluation    Patient: Saqib Bishop   MRN: [id number] : 1980        Procedure : Procedure(s):  IRRIGATION AND DEBRIDEMENT, LOWER EXTREMITY LEFT LEG          Past Medical History:   Diagnosis Date    Brain injury with brief loss of consciousness (H) 2015    Candida esophagitis (H) 2022    Degenerative joint disease     Gastro-oesophageal reflux disease     Hypercalcemia 2022    Hypokalemia 2022    Hypomagnesemia 2022      Past Surgical History:   Procedure Laterality Date    AMPUTATE LEG BELOW KNEE Left 2023    Procedure: Left below knee Guillotine Amputation;  Surgeon: Sesar Barth MD;  Location: [facility] OR    AMPUTATE LEG BELOW KNEE Left 2023    Procedure: Irrigation and Debridement leg below knee, wound vac application, Left;  Surgeon: Juan Rehman MD;  Location: [facility] OR    AMPUTATE LEG BELOW KNEE Left 2023    Procedure: Below Left Knee Amputation;  Surgeon: Semaj Gates MD;  Location: [facility] OR    BACK SURGERY      lumbar fusion    EXPLORE SPINE, REMOVE HARDWARE, COMBINED  2012    Procedure:COMBINED EXPLORE SPINE, REMOVE HARDWARE; EXPLORE SPINE, REMOVE HARDWARE L4-S1; Surgeon:FAM GONZALEZ; Location:[facility] OR    IRRIGATION AND DEBRIDEMENT FOOT, COMBINED Left 2023    Procedure: LEFT BELOW KNEE AMPUTATION STUMP IRRIGATION AND DEBRIDEMENT, VANCOMYCIN BEAD PLACEMENT;  Surgeon: Etienne Maier MD;  Location: [facility] OR    IRRIGATION AND DEBRIDEMENT LOWER EXTREMITY, COMBINED Left 2023    Procedure: Irrigation and debridement lower extremity, combined and wound vac exchange;  Surgeon: Etienne Maier MD;  Location: [facility] OR    IRRIGATION AND DEBRIDEMENT LOWER EXTREMITY, COMBINED Left 2023    Procedure: IRRIGATION AND DEBRIDEMENT, LEFT LOWER EXTREMITY WITH WOUND VAC Removal;  Surgeon: Semaj Gates MD;  Location: [facility] OR    ORTHOPEDIC SURGERY      right foot surgery      Allergies   Allergen  "Reactions    Adhesive Tape Hives     From tape from surgery    Benzoin Hives and Rash    Droperidol Anaphylaxis    Prednisone      vomiting    Vitamin D (Calciferol) Rash     flairs up his sarcoidosis      Social History     Tobacco Use    Smoking status: Former     Types: Cigarettes     Quit date: 2011     Years since quittin.0    Smokeless tobacco: Not on file   Substance Use Topics    Alcohol use: No      Wt Readings from Last 1 Encounters:   23 104.3 kg (230 lb)        Anesthesia Evaluation   Pt has had prior anesthetic.     History of anesthetic complications       ROS/MED HX  ENT/Pulmonary:       Neurologic:     (+)                     Spinal cord injury,           Cardiovascular:     (+)  hypertension- -   -  - -                                      METS/Exercise Tolerance:     Hematologic:       Musculoskeletal:       GI/Hepatic:     (+) GERD,                   Renal/Genitourinary:       Endo:     (+)               Obesity,       Psychiatric/Substance Use:     (+) psychiatric history 1 and depression       Infectious Disease:       Malignancy:       Other:               OUTSIDE LABS:  CBC:   Lab Results   Component Value Date    WBC 6.1 11/15/2023    WBC 6.3 2023    HGB 13.1 (L) 11/15/2023    HGB 12.7 (L) 2023    HCT 43.4 11/15/2023    HCT 42.8 2023     11/15/2023     2023     BMP:   Lab Results   Component Value Date     11/15/2023     2023    POTASSIUM 4.3 11/15/2023    POTASSIUM 4.4 2023    CHLORIDE 105 11/15/2023    CHLORIDE 107 2023    CO2 28 11/15/2023    CO2 28 2023    BUN 22.1 (H) 11/15/2023    BUN 24.5 (H) 2023    CR 0.82 11/15/2023    CR 0.88 2023     (H) 11/15/2023     (H) 2023     COAGS:   Lab Results   Component Value Date    PTT 30 11/15/2023    INR 1.06 11/15/2023    FIBR 863 (H) 2023     POC: No results found for: \"BGM\", \"HCG\", \"HCGS\"  HEPATIC:   Lab Results "   Component Value Date    ALBUMIN 3.3 (L) 11/15/2023    PROTTOTAL 7.1 11/15/2023    ALT 26 11/15/2023    AST 26 11/15/2023    ALKPHOS 115 11/15/2023    BILITOTAL <0.2 11/15/2023     OTHER:   Lab Results   Component Value Date    PH 7.23 (L) 08/28/2023    LACT 0.5 (L) 08/28/2023    A1C 5.5 09/06/2023    HILDA 9.0 11/15/2023    PHOS 3.4 09/14/2023    MAG 1.6 (L) 09/14/2023    SED 46 (H) 11/07/2023       Anesthesia Plan    ASA Status:  3    NPO Status:  NPO Appropriate    Anesthesia Type: General.     - Airway: LMA   Induction: Intravenous.   Maintenance: Balanced.        Consents    Anesthesia Plan(s) and associated risks, benefits, and realistic alternatives discussed. Questions answered and patient/representative(s) expressed understanding.     - Discussed:     - Discussed with:  Patient      - Extended Intubation/Ventilatory Support Discussed: No.      - Patient is DNR/DNI Status: No     Use of blood products discussed: No .     Postoperative Care    Pain management: IV analgesics, Oral pain medications.   PONV prophylaxis: Ondansetron (or other 5HT-3)     Comments:                Atul Bolden MD

## 2023-11-16 NOTE — ANESTHESIA CARE TRANSFER NOTE
Patient: Saqib Bishop    Procedure: Procedure(s):  IRRIGATION AND DEBRIDEMENT, LOWER EXTREMITY LEFT LEG, ANTIBIOTIC BEAD REMOVAL POSSIBLE PRIMARY CLOSURE       Diagnosis: Infection of amputation stump of left lower extremity (H) [T87.44]  Diagnosis Additional Information: No value filed.    Anesthesia Type:   General     Note:    Oropharynx: oropharynx clear of all foreign objects and spontaneously breathing  Level of Consciousness: awake  Oxygen Supplementation: face mask  Level of Supplemental Oxygen (L/min / FiO2): 7  Independent Airway: airway patency satisfactory and stable  Dentition: dentition unchanged  Vital Signs Stable: post-procedure vital signs reviewed and stable  Report to RN Given: handoff report given  Patient transferred to: PACU    Handoff Report: Identifed the Patient, Identified the Reponsible Provider, Reviewed the pertinent medical history, Discussed the surgical course, Reviewed Intra-OP anesthesia mangement and issues during anesthesia, Set expectations for post-procedure period and Allowed opportunity for questions and acknowledgement of understanding      Vitals:  Vitals Value Taken Time   /65 11/15/23 2115   Temp 36.6    Pulse 79 11/15/23 2126   Resp 12 11/15/23 2126   SpO2 96 % 11/15/23 2124   Vitals shown include unfiled device data.    Electronically Signed By: RL Barragan CRNA  November 15, 2023  9:27 PM

## 2023-11-16 NOTE — PLAN OF CARE
Goal Outcome Evaluation: Ongoing       Plan of Care Reviewed With: patient    Overall Patient Progress: improvingOverall Patient Progress: improving  VS:  /75   Pulse 79  Temp 97.7  F (36.5  C) (Oral)   Resp 16  Ht 1.829 m (6')   Wt 104.3 kg (230 lb)   SpO2 97%   BMI 31.19 kg/m      O2:  >95% on room air.   Output:  Dyer-baseline.    Last BM:  Colostomy   Activity: Declined to get out of bed today. Recliner is outside his room if he changes his mind.    Skin:  L BKA;   Sacral wound -dressing changed.   Left posterior thigh- dressing changed.    RLE scab open to air.   States his bed is uncomfortable and is hitting an area around his sacrum and it is bothering him. Pulsate mattress ordered.    Pain:  Managed w/ PRN oxy   CMS:  A&O X4; paraplegic   Dressing:  CDI   Diet:  Reg w/ thin; denied N/V   LDA:  R PIV SL   Equipment:  IV pole, capno, & personal belongings   Plan:  Continue POC   Additional Info:

## 2023-11-16 NOTE — PLAN OF CARE
Pt. Is A & O X 4. Overall Pt. Progress: Improving. Pt. Verbalized pain. Pain managed with 15  mg of Oxycodone. Denies SOB or acute distress. VSS and recorded. Pt slept most of the night without distress. Rt. PIV running @ 100 ml/ hr. Provider states its ok to discontinue  Capno Dyer and colostomy emptied and recorded. Contact precaution for MRSA maintained throughout shift. Pt os compliant with treatment regime. Pt. Assessed and able to use the call button effectively, call button placed within reach. Will continue with POC

## 2023-11-16 NOTE — PHARMACY-VANCOMYCIN DOSING SERVICE
Pharmacy Vancomycin Note  Date of Service 2023  Patient's  1980   42 year old, male    Indication: Abscess and Skin and Soft Tissue Infection  Day of Therapy: since 23  Current vancomycin regimen:  1000 mg IV q12h (9.6mg/kg/dose)  Current vancomycin monitoring method: AUC  Current vancomycin therapeutic monitoring goal: 400-600 mg*h/L    [employer]ight[employer] Prediction of Current Vancomycin Regimen  Loading dose: N/A  Regimen: 1000 mg IV every 12 hours.  Exposure target: AUC24 (range)400-600 mg/L.hr   AUC24,ss: 464 mg/L.hr  Probability of AUC24 > 400: 91 %  Ctrough,ss: 15.2 mg/L  Probability of Ctrough,ss > 20: 3 %  Probability of nephrotoxicity (Lodise ANNE ): 10 %    Current estimated CrCl = Estimated Creatinine Clearance: 146.6 mL/min (based on SCr of 0.82 mg/dL).    Creatinine for last 3 days  2023:  6:39 AM Creatinine 0.88 mg/dL  11/15/2023:  5:52 AM Creatinine 0.82 mg/dL  2023: 07:49AM creatinine 0.77mg/dL    Recent Vancomycin Levels (past 3 days)  2023:  7:49 AM Vancomycin 21.7 ug/mL    Vancomycin IV Administrations (past 72 hours)                     vancomycin (VANCOCIN) 1,000 mg in 200 mL dextrose intermittent infusion (mg) 1,000 mg New Bag 23 0145     1,000 mg New Bag 11/15/23 1311     1,000 mg New Bag  0201     1,000 mg New Bag 23 1352     1,000 mg New Bag  0228     1,000 mg New Bag 23 1307                    Nephrotoxins and other renal medications (From now, onward)      Start     Dose/Rate Route Frequency Ordered Stop    23 1300  vancomycin (VANCOCIN) 1,000 mg in 200 mL dextrose intermittent infusion         1,000 mg  200 mL/hr over 1 Hours Intravenous EVERY 12 HOURS 23 0837                 Contrast Orders - past 72 hours (72h ago, onward)      None            Interpretation of levels and current regimen:  Vancomycin level is reflective of -600    Has serum creatinine changed greater than 50% in last 72 hours: No    Urine  output:  good urine output    Renal Function: Stable    Plan:  Continue Current Dose  Vancomycin monitoring method: AUC  Vancomycin therapeutic monitoring goal: 400-600 mg*h/L  Pharmacy will check vancomycin levels as appropriate in 3-5 Days.  Serum creatinine levels will be ordered a minimum of twice weekly.    Nae Palencia, RicaD, BCPS

## 2023-11-16 NOTE — ANESTHESIA PROCEDURE NOTES
Airway       Patient location during procedure: OR  Staff -        CRNA: Poonam Piña APRN CRNA       Performed By: CRNA  Consent for Airway        Urgency: elective  Indications and Patient Condition       Indications for airway management: ivette-procedural       Induction type:intravenous       Mask difficulty assessment: 0 - not attempted    Final Airway Details       Final airway type: supraglottic airway    Supraglottic Airway Details        Type: LMA       Brand: I-Gel       LMA size: 5    Post intubation assessment        Placement verified by: capnometry, equal breath sounds and chest rise        Number of attempts at approach: 1       Number of other approaches attempted: 0       Secured with: tape       Ease of procedure: easy       Dentition: Intact and Unchanged

## 2023-11-16 NOTE — PROGRESS NOTES
Writer gave report to pre-op RN and pt was transported down for his procedure @ 1902 accompanied by transport staff.

## 2023-11-16 NOTE — OP NOTE
DATE OF SURGERY:  11/15/2023.     PREOPERATIVE DIAGNOSIS:  Left transtibial amputation wound dehiscence with possible wound infection.     POSTOPERATIVE DIAGNOSIS:  Left transtibial amputation wound dehiscence with possible wound infection.     PROCEDURE:  Sharp excisional debridement and irrigation of left transtibial amputation wound.     PRIMARY SURGEON:  Etienne Maier MD.     ANESTHESIA:  General laryngeal mask airway.     COMPLICATIONS:  None apparent intraoperatively or immediately postoperatively.     IMPLANTS: None.      SPECIMENS NOT SENT:  One strand of previously implanted ten tobramycin and vancomycin antibiotic beads.    SPECIMENS SENT: None.     SIGNIFICANT FINDINGS: The main incision along the medial two thirds of the previous left transtibial amputation incision line appeared to be well approximated with full apposition and healthy margins without skin necrosis.  Previous small open wound distal to the knee and proximal to the amputation wound was now dry and covered with eschar without exudates or signs for infection.  Previous small open wound at the lateral end of the amputation incision was fully approximated and dry without exudates, skin necrosis, or signs for infection.  As previously, and the main incision, no visible evidence for any purulence or fluid collection.  The wound was thoroughly explored down to bone and reclosed primarily without significant skin tension.     Technique:  Cutting, scrubbing.  Instrument used:  Metzenbaum scissors, curette.  Nature of tissue removed:  Small amounts of loose/friable subcutaneous tissue and muscle fascia.  Appearance of wound:  Down to healthy, bleeding tissue.  Surface area of debridement:  5 cm mediolateral x 2 cm superoinferior x 3 cm deep along anterior incision.  Depth of debridement:  Wound was debrided down to and including bone (anterior tibia).    DRAINS:  None.     ESTIMATED BLOOD LOSS:  Approximately 5 mL.        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  He developed a postoperative wound dehiscence and a CT scan which appeared to show a fluid collection.  He underwent a recent I&D and placement of vancomycin and tobramycin antibiotic beads 11/8/2023.  Findings at that surgery did not show any visible purulence or abscess or other fluid collection..  Cultures from that surgery have remained negative to date.  He now returns to the OR for planned repeat debridement and irrigation of his left extremity residual limb.  The nature of the recommended procedure, the risks, benefits, and alternatives, the postoperative plan, and realistic expectations for outcome were all discussed with the patient in layman's terms.  No guarantees were expressed or implied as to outcome.  The patient had the opportunity to have all the questions he had at the time asked and answered appropriately, verbalized his understanding of this discussion, and verbalized his wish to proceed with the planned procedure.  Written informed consent was obtained.     DESCRIPTION OF PROCEDURE:             The patient, Saqib Bishop, was met in the preoperative area where the correct surgical site was identified with patient participation and marked by the primary surgeon.  All questions were answered.  The patient was then brought to the operating room and was safely transferred to the operating table in the supine position with all bony prominences well padded and a safety strap across the waist.  The anesthesia team successfully induced general anesthesia and placed a laryngeal mask airway.  Ancef was administered intravenously.  Venous thromboembolic prophylaxis was performed with a sequential device on the nonoperative lower extremity.  A tourniquet was placed on the thigh of the operative lower extremity.  The operative lower extremity was then  prepped and draped free, using a leg kim, covered by a sterile cassette cover, to elevate the left lower extremity to facilitate the surgery.  Prior to the start of surgery, a multidisciplinary time out was performed per hospital protocol confirming among the other items, the correct patient identity, procedure, body part, and laterality.  All in the room verbalized agreement.     The left lower extremity residual limb was inspected.  The 2 recently opened incisions, the longer one along the medial and anterior amputation incision line, and the shorter ones along the lateral amputation incision line, or both fully approximated, dry, without skin necrosis or drainage visible.  The small open wound on the anterior left leg distal to the knee and proximal to the amputation incision line was covered by a dry eschar without overt signs for infection.  The main anterior incision line was reopened by removing some of the sutures and was thoroughly explored.  There was no evidence for any purulence or fluid collection.  The previously implanted strand of 10 antibiotic beads was removed and passed off the operative field.  Small amounts of loose, friable, subcutaneous tissue and muscle fascia were sharply debrided with Metzenbaum scissors.  The underlying muscle appeared to be healthy and viable.  The deep space deep to the fascial layer was again explored to bone (anterior distal tibia).  No fluid collection was found, and the bone appeared to be healthy.  The entire anterior cavity the wound was thoroughly curetted out with a curette.  The wound was thoroughly irrigated out with 3L sterile NS using cystoscopy tubing.  Meticulous hemostasis was achieved.  All counts were reported as correct prior to wound closure.  The incision was fully approximated with 2-0 nylon suture without any significant wound tension.  There was no evidence for any vascular injury or other complications noted during this case or immediately  postoperatively.  The incision was carefully cleansed with sterile saline and dried.  Sterile dressings were applied to both the main incision line as well as the small eschar on the anterior left leg distal to the knee and proximal to the amputation incision line.  An Ace wrap were applied.  The patient was taken to the recovery room in stable condition.  I was present in the room for setup and positioning, as well as present and scrubbed in for the entire case from start to finish.      POSTOPERATIVE PLAN:     Admit:  [facility], medicine primary with Orthopaedic and infectious disease consultations.  Diet:  Clear liquids and advance as tolerated.  Antibiotics:  Continue current antibiotic regimen as directed by Infectious Disease.  Follow previous cultures.  Pain management:  Multimodal. Wean from IV to oral medications.  Weightbearing status:  Strict nonweightbearing on the left lower extremity residual limb.  Use appropriate assistive gait devices and assistance (nursing/PT) for ambulation.  Activity:  May be up for ADLs, PT, diagnostic tests, etc., but encourage elevation of the left lower extremity residual limb.  Float right heel off of the bed.  Labs:  Follow culture results. Trend acute phase reactants.  Other labs per primary team.  Xrays:  None at this time.  Immobilization:  None at this time.  Dressings: Keep left lower extremity residual limb dressings clean, dry, and intact.  DVT prophylaxis:  Deferred to primary team.  Okay and recommended from an orthopedic standpoint for both mechanical and pharmacologic DVT prophylaxis.  Disposition:  Pending at this time.  The patient is not planned for repeat debridements at this time, depending on clinical signs for ongoing/recurrent infection.  Follow up: Follow-up in Ortho clinic for wound check in 2 weeks.      Etienne Maier MD  Attending surgeon  Orthopaedic Surgery      Addendum:  Beads were removed from a subfascial location, adjacent to bone.  Etienne Maier  MD  Attending surgeon  Orthopaedic Surgery  Date of Addendum: 11/29/23

## 2023-11-16 NOTE — PLAN OF CARE
Goal Outcome Evaluation:      Plan of Care Reviewed With: patient    Overall Patient Progress: no change    Outcome Evaluation: C/o back pain managed with PRN oxycodone. Wound care completed during day shift; dressing CDI. Specialty mattress ordered and delivered. Pt did not want specialty mattress after learning it can be loud. Currently on pulsate mattress. Able to makes needs known and uses call light appropriately.

## 2023-11-16 NOTE — ANESTHESIA POSTPROCEDURE EVALUATION
Patient: Saqib Bishop    Procedure: Procedure(s):  IRRIGATION AND DEBRIDEMENT, LOWER EXTREMITY LEFT LEG, ANTIBIOTIC BEAD REMOVAL POSSIBLE PRIMARY CLOSURE       Anesthesia Type:  General    Note:  Disposition: Outpatient   Postop Pain Control: Uneventful            Sign Out: Well controlled pain   PONV: No   Neuro/Psych: Uneventful            Sign Out: Acceptable/Baseline neuro status   Airway/Respiratory: Uneventful            Sign Out: Acceptable/Baseline resp. status   CV/Hemodynamics: Uneventful            Sign Out: Acceptable CV status; No obvious hypovolemia; No obvious fluid overload   Other NRE:    DID A NON-ROUTINE EVENT OCCUR? No           Last vitals:  Vitals Value Taken Time   /63 11/15/23 2200   Temp 36.6  C (97.9  F) 11/15/23 2111   Pulse 66 11/15/23 2207   Resp 11 11/15/23 2208   SpO2 97 % 11/15/23 2207   Vitals shown include unfiled device data.    Electronically Signed By: Atul Bolden MD  November 15, 2023  10:09 PM

## 2023-11-16 NOTE — PLAN OF CARE
Pt was sent down for his procedure @ 1902 and got back to unit @ approximately 1802    Goal Outcome Evaluation:         Overall Patient Progress: improving       VS:  /63   Pulse 63   Temp 97.7  F (36.5  C) (Oral)   Resp 14   Ht 1.829 m (6')   Wt 104.3 kg (230 lb)   SpO2 97%   BMI 31.19 kg/m      O2:  >95% on 2L; capno on per post-op protocol   Output:  [place]   Last BM:  Colostomy   Activity:  NOOB this shift   Skin:  L BKA;   Sacral wound;   RLE scab   Pain:  Managed w/ PRN oxy   CMS:  A&O X4; paraplegic   Dressing:  CDI   Diet:  Reg w/ thin; denied N/V   LDA:  R PIV infusing NS @ 100mL/hr   Equipment:  IV pole, capno, & personal belongings   Plan:  Continue POC   Additional Info:

## 2023-11-16 NOTE — PROGRESS NOTES
[facility] ID SERVICE : PROGRESS NOTE     Patient:  Saqib Bishop   YOB: 1980, MRN: [id number]  Date of Visit: 11/16/2023  Date of Admission: 11/7/2023          Assessment and Recommendations:     ID Problem List:  Stump infection, involving left BKA s/p sharp excisional debridement, placement of antibiotic beads deep to the fascial layer adjacent to bone 11/8/2023 11/8 Intra-op cultures: NGTD  11/3 superficial wound cx: Pseudomonas aeruginosa; GS (GPC, GNB)  10/1 superficial wound cx: Enterobacter cloacae    Hx of necrotizing fascitis of LLE s/p guillotine 8/28/2023, s/p I&D of residual limb 8/30/2023, s/p BKA 9/7/2023  Paraplegia   S/p colostomy  Chronic indwelling Dyer, last exchange just prior to admission    Discussion:  Saqib Bishop is a 43 yo M with PMHx of T12 level spinal cord injury and paraplegia, colostomy, DJD, GERD, chronic sacral decubitus ulcer s/p flap and treatment for osteomyelitis Dec-2020, complicated by dehiscence, open wound and sacral decubitus ulceration Oct-2021, s/p an elective flap procedure in early 2023 which failed and resulted in a chronic sacral ulcer. In August presented with left foot necrotizing fascitis s/p guillotine 8/28/2023, s/p I&D of residual limb 8/30, then BKA 9/7/2023. Antibiotics were stopped post surgery per ID recommendations (sign off note 9/9). It never healed completely per pt with continued redness and discharge,. Wads seen in ER 10/1. Superficial wound cx grew Enterobacter cloacae and received a course of bactrim without complete resolution(see S below under micro, Cefepime CARMEN </= 2). Evaluated by Ortho in clinic (Dr. Maier) 10/6. Again evaluated in ER (Aquiles) 11/3, wound cx grew Pseudomonas aeruginosa. Was given bactrim, levofloxacin but on review this strain appears to have been  R to Cipro and Levo(see S below under micro). Continued to have discharge, redness, and a new palpable swelling (x 1 m PRA) around stump  "incision site. Presented again at outside facility, CT was obtained which showed collection around stump concern for abscess. Transferred to [facility] for surgical intervention.    Underwent surgery 11/8, reviewed OP findings: Sharp excisional debridement and irrigation of left transtibial amputation wound and placement of antibiotic beads deep to the fascial layer adjacent to bone; no obvious purulence but wound dehiscence/necrosis involving full thickness through skin, in 3 separate focal areas in residual LLE. Intra-op cultures were obtained, remains negative but was on at least some coverage of antibiotics prior as above    Since 11/7 been on empiric Vanc, cefepime, and 11/8 - flagyl was added by ID    9/7 path with viable proximal margin with mild chronic inflammation     Now s/p 11/15 repeat I&D and antibiotic bead removal  Op findings: \"No visible purulence. All prior 10 antibiotic beads removed.. Wound was able to be re-closed primarily without any significant tension. Prior small lateral wound remained closed and dry. Prior small open wound just distal to knee had been dressed open and was now dry and covered by an eschar .\"    Looks like ortho ok for discharge    Assessment:  Would presume and treat for osteo for minimum 4 weeks given proximity to bone (even if gross inspection did not reveal involvement, microscopic involvement still possible), previous above path results, long duration of symptoms - pt amenable. I dd consider orals but given above resistance and uncertain pathogen, would prefer to be aggressive.     Recommendations:  - Continue current regimen  IV Vancomycin, pharmacy to dose and monitor, appreciate assistance  IV cefepime 2g q8h, continuous infusion ok for OPAT  PO Metronidazole 500mg TID  - Anticipate minimum 4 weeks, final TBD at ID clinic follow up  - OPAT and follow up recs below  - Inpatient ID will sign off at this time, please call if questions      I have reviewed interval events, " "vital, labs, images, cultures and antibiotics    Total time on day of visit including chart review, visit, counseling, documentation and coordination of care: 65 minutes    Darrell Ralph  Staff Physician  Division of Infectious Diseases      Primary team:  Place order panel  OPAT Pharmacy ([facility]) Review and ID Care Transition   Contact ID teams about any missed ID clinic appointments and/or ongoing therapy needs.    Prolonged Parenteral/Oral Antibiotic Recommendations and ID Follow up  This template provides final ID recommendations as of this date.     Infectious Diseases Indication: osteomyelitis    Antibiotic Information  Name of Antibiotic Dose of Antibiotic1 Anticipated duration Effective start date2 End date   Vanc IV Pharmacy to dose 4 weeks minimum, final TBD 11/15/23 Minimum 12/13/23, final TBD   Cefepime 2g IV Q8H 4 weeks minimum, final TBD 11/15/23 Minimum 12/13/23, final TBD   Metronidazole 500 mg PO Q8H 4 weeks minimum, final TBD 11/15/23 Minimum 12/13/23, final TBD   1.Dose of antibiotic will need to be renally adjusted if creatinine clearance changes  2.Effective start date is the date of adequate therapy with appropriate spectrum    Method of antibiotic delivery:PICC line.Is the line being used for another indication besides antimicrobials? No At the end of therapy should the line used for antimicrobials be removed or de-accessed? No. Selecting \"yes\" will function as written order to remove PICC line or de-access the indwelling line at the end of therapy.    Weekly labs required: Creatinine, AST/ALT, CBC with diff, and CRP. Dr. Ralph will follow labs at discharge until ID follow up. Fax labs to ID clinic.    Are there pending microbial tests: No     Imaging for ID follow up: ID Imaging: No.     If additional appointments are needed later in the antibiotic course specify the provider Ramo, timing of visit before this specific date 12/13/23 , and the type of visit Virtual Video visit.     ID " provider route note: OPAT RN Care Coordinator [facility] ID Clinic Information:  Phone: [phone]  Fax: [phone] (Attention Ernestina Olvera)       I have reviewed interval events, vital, labs, images, cultures and antibiotics    Total time on day of visit including chart review, visit, counseling, documentation and coordination of care: 35 minutes    Darrell Ralph  Staff Physician  Division of Infectious Diseases            Interval History and Events:     No new complaints today.  S/p OR yesterday. Eager to go home.          HPI as adopted from initial ID consult on 11/8/2023:     Saqib Bishop is a 43 yo M with PMHx of T12 level spinal cord injury and paraplegia, colostomy, DJD, GERD, chronic sacral decubitus ulcer s/p flap and treatment for osteomyelitis Dec-2020, complicated by dehiscence, open wound and sacral decubitus ulceration Oct-2021, s/p an elective flap procedure in early 2023 which failed and resulted in a chronic sacral ulcer. In August presented with left foot necrotizing fascitis s/p guillotine 8/28/2023, s/p I&D of residual limb 8/30, then BKA 9/7/2023. Antibiotics were stopped post surgery per ID recommendations (sign off note 9/9). Per patient, since then never felt that stump healed completely. Noted distinct redness and then some discharge from the stump surgical incision on lateral aspect and seen in ER 10/1. Superficial wound cx then grew Enterobacter cloacae and received a course of bactrim without complete resolution. Evaluated by Orthopedic clinic (Dr. Maier) 10/6. Again evaluated in ER ([facility]) 11/3, wound cx grew Pseudomonas aeruginosa. Given bactrim, levofloxacin. Continues to have discharge, redness around stump incision site. Also noticing palpable swelling around stump since past one month or so. No fever, chills, or systemic complaints. Presented again at outside facility, CT was obtained which shows collection around stump  concern for abscess. Transferred to [facility] for surgical intervention.           Antimicrobial history:     Current:  Vancomycin, cefepime, metronidazole     Prior:  Bactrim, levofloxacin 11/3 - 11/7  Bactrim ~early October, 2023    BNORMAL) Wound culture (10/01/2023 10:06 PM CDT)  Lab Results - (ABNORMAL) Wound culture (10/01/2023 10:06 PM CDT)  Component Value Ref Range Test Method Analysis Time Performed At Pathologist Signature   Wound Culture Enterobacter cloacae (A)     10/04/2023 7:45 AM CDT [facility]     Comment:      This is an edited result. Previous organism was Gram Negative Rods on 10/3/2023 at 10:00 AM CDT.  Gram Negative Rods has been updated to reportable.     Lab Results - (ABNORMAL) Wound culture (10/01/2023 10:06 PM CDT)  Specimen (Source) Anatomical Location / Laterality Collection Method / Volume Collection Time Received Time   Swab Specimen from wound / Unknown Non-blood Collection / Unknown 10/01/2023 10:06 PM CDT 10/01/2023 10:32 PM CDT     Lab Results - (ABNORMAL) Wound culture (10/01/2023 10:06 PM CDT)  Narrative         Lab Results - (ABNORMAL) Wound culture (10/01/2023 10:06 PM CDT)  Organism Antibiotic Method Susceptibility   Enterobacter cloacae Amoxicillin/Clavulanic Acid   >16/8: Resistant    Enterobacter cloacae Ampicillin   16: Predicted resistant interpretation    Enterobacter cloacae Ampicillin/Sulbactam   <=8/4: Inducible Beta-lactamase    Enterobacter cloacae Cefazolin   >16: Resistant    Enterobacter cloacae Cefepime   <=2: Susceptible    Enterobacter cloacae Ceftazidime   4: Inducible Beta-lactamase    Enterobacter cloacae Ceftriaxone   <=1: Inducible Beta-lactamase    Enterobacter cloacae Ciprofloxacin   <=0.25: Susceptible    Enterobacter cloacae Ertapenem   <=0.5: Susceptible    Enterobacter cloacae Gentamycin   <=4: Susceptible    Enterobacter cloacae Levofloxacin   <=0.5: Susceptible    Enterobacter cloacae Meropenem   <=1: Susceptible    Enterobacter cloacae  Trimethoprim + Sulfamethoxazole   <=2/38: Susceptible      Lab Results - (ABNORMAL) Wound culture (10/01/2023 10:06 PM CDT)  Authorizing Provider Result Type         (ABNORMAL) Wound culture (11/03/2023 5:06 PM CDT)  Only the most recent of 3 results within the time period is included.     Results - (ABNORMAL) Wound culture (11/03/2023 5:06 PM CDT)  Component Value Ref Range Test Method Analysis Time Performed At Pathologist Signature   Wound Culture Pseudomonas aeruginosa (A)     11/05/2023 11:30 AM [facility]     Comment:      The organism value for this result has been updated. These results have been appended to the previously preliminary verified report.  <null> has been updated to reportable.   Gram Stain Result Negative WBC per low power field (A)     11/05/2023 11:30 AM [facility]     Gram Stain Result 1+ Gram Positive Cocci (A)     11/05/2023 11:30 AM [facility]     Gram Stain Result 1+ Gram Negative Bacilli (A)     11/05/2023 11:30 AM [facility]       Results - (ABNORMAL) Wound culture (11/03/2023 5:06 PM CDT)  Specimen (Source) Anatomical Location / Laterality Collection Method / Volume Collection Time Received Time   Swab Specimen from wound / Unknown Non-blood Collection / Unknown 11/03/2023 5:06 PM CDT 11/03/2023 5:12 PM CDT     Results - (ABNORMAL) Wound culture (11/03/2023 5:06 PM CDT)  [facility] - 11/05/2023 11:30 AM CST   ID and Sensitivities to follow        Results - (ABNORMAL) Wound culture (11/03/2023 5:06 PM CDT)  Organism Antibiotic Method Susceptibility   Pseudomonas aeruginosa Cefepime   4: Susceptible    Pseudomonas aeruginosa Ceftazidime   4: Inducible Beta-lactamase    Pseudomonas aeruginosa Ciprofloxacin   >2: Resistant    Pseudomonas aeruginosa Gentamycin   <=4: Susceptible    Pseudomonas aeruginosa Levofloxacin   >4: Resistant    Pseudomonas aeruginosa Meropenem   2: Susceptible    Pseudomonas aeruginosa Piperacillin +  Tazobactam   Resistant                 Physical Examination:   Temp:  [97.7  F (36.5  C)-97.9  F (36.6  C)] 97.7  F (36.5  C)  Pulse:  [63-80] 79  Resp:  [11-17] 16  BP: (112-132)/(52-83) 131/75  SpO2:  [89 %-100 %] 95 %    I/O last 3 completed shifts:  In: 253.7 [I.V.:253.7]  Out: 1850 [Urine:1850]    Vitals:    11/07/23 1231   Weight: 104.3 kg (230 lb)       Exam:  GENERAL:  sitting in bed in no acute distress.   Head: normocephalic, atraumatic.   EYES: PERRLA, EOMI, conjunctiva clear, anicteric sclerae.    LUNGS:  Breathing comfortably, on room air  ABDOMEN:  . +colostomy bag  EXT: left BKA stump site s/p surgery, covered in dressing, ace wrap   SKIN:  No acute rashes.    NEUROLOGIC:  Grossly nonfocal.      Labs, Microbiology and Imaging studies are reviewed.          Laboratory Data:     Inflammatory Markers    Recent Labs   Lab Test 11/07/23  1539 09/09/23  0454 09/08/23  0424   SED 46* 55* 47*       Metabolic Studies     Recent Labs   Lab Test 11/16/23  0749 11/15/23  0552 11/14/23  0639 11/11/23  0710 11/10/23  0521 11/07/23  1326 09/14/23  0606 09/06/23  1159 09/06/23  0815 08/28/23  0414 08/28/23  0222    139 142   < > 140   < > 141   < >  --    < >  --    POTASSIUM 4.1 4.3 4.4   < > 4.4   < > 4.0   < >  --    < >  --    CHLORIDE 104 105 107   < > 106   < > 106   < >  --    < >  --    CO2 24 28 28   < > 31*   < > 25   < >  --    < >  --    ANIONGAP 9 6* 7   < > 3*   < > 10   < >  --    < >  --    BUN 18.9 22.1* 24.5*   < > 18.2   < > 16.4   < >  --    < >  --    CR 0.77 0.82 0.88   < > 0.96   < > 0.80   < >  --    < >  --    GFRESTIMATED >90 >90 >90   < > >90   < > >90   < >  --    < >  --    * 126* 117*   < > 109*   < > 101*   < >  --    < >  --    A1C  --   --   --   --   --   --   --   --  5.5  --   --    HILDA 8.7 9.0 9.4   < > 8.8   < > 9.1   < >  --    < >  --    PHOS  --   --   --   --   --   --  3.4   < >  --    < >  --    MAG  --   --   --   --   --   --  1.6*   < >  --    < >  --     LACT  --   --   --   --   --   --   --   --   --   --  0.5*   CKT  --   --   --   --  45  --   --   --   --   --   --     < > = values in this interval not displayed.       Hepatic Studies    Recent Labs   Lab Test 11/16/23  0749 11/15/23  0552 11/07/23  1326   BILITOTAL <0.2 <0.2 0.2   ALKPHOS 122 115 133*   ALBUMIN 3.4* 3.3* 3.4*   AST 31 26 37   ALT 25 26 21       Hematology Studies      Recent Labs   Lab Test 11/16/23  0749 11/15/23  0552 11/14/23  0639   WBC 6.7 6.1 6.3   HGB 13.1* 13.1* 12.7*   HCT 44.2 43.4 42.8    285 286        Vancomycin Levels     Recent Labs   Lab Test 11/16/23  0749 11/13/23  0822 11/11/23  0710   VANCOMYCIN 21.7 19.9 27.7*       Microbiology  Lab Results   Component Value Date    CULTURE No anaerobic organisms isolated 11/08/2023    CULTURE No growth after 7 days 11/08/2023    CULTURE No Growth 11/08/2023

## 2023-11-16 NOTE — PROGRESS NOTES
Orthopaedic surgery preoperative note    I saw this pleasant 48-year-old patient today preoperatively tonight in the preoperative area.  The patient's diagnosis, findings, treatment plan, and the risks, benefits, and alternatives to surgery were all discussed in layman's terms.  The correct surgical site was marked with patient verbal confirmation.  The possibility of a VAC sponge dressing with subsequent VAC changes for wou nd closure, which might be done at bedside, should the wound not be able to be closed safely primarily at the time of surgery, was also included as part of our discussion.  The patient verbalized understanding of our discussion and agreement with the plan.  All questions were answered.

## 2023-11-17 LAB
ALBUMIN SERPL BCG-MCNC: 3.3 G/DL (ref 3.5–5.2)
ALP SERPL-CCNC: 119 U/L (ref 40–150)
ALT SERPL W P-5'-P-CCNC: 19 U/L (ref 0–70)
ANION GAP SERPL CALCULATED.3IONS-SCNC: 5 MMOL/L (ref 7–15)
AST SERPL W P-5'-P-CCNC: 23 U/L (ref 0–45)
BILIRUB SERPL-MCNC: 0.2 MG/DL
BUN SERPL-MCNC: 21 MG/DL (ref 6–20)
CALCIUM SERPL-MCNC: 9.1 MG/DL (ref 8.6–10)
CHLORIDE SERPL-SCNC: 101 MMOL/L (ref 98–107)
CREAT SERPL-MCNC: 0.85 MG/DL (ref 0.67–1.17)
DEPRECATED HCO3 PLAS-SCNC: 28 MMOL/L (ref 22–29)
EGFRCR SERPLBLD CKD-EPI 2021: >90 ML/MIN/1.73M2
ERYTHROCYTE [DISTWIDTH] IN BLOOD BY AUTOMATED COUNT: 16.3 % (ref 10–15)
GLUCOSE SERPL-MCNC: 117 MG/DL (ref 70–99)
HCT VFR BLD AUTO: 42.8 % (ref 40–53)
HGB BLD-MCNC: 13 G/DL (ref 13.3–17.7)
MCH RBC QN AUTO: 24.1 PG (ref 26.5–33)
MCHC RBC AUTO-ENTMCNC: 30.4 G/DL (ref 31.5–36.5)
MCV RBC AUTO: 79 FL (ref 78–100)
PLATELET # BLD AUTO: 282 10E3/UL (ref 150–450)
POTASSIUM SERPL-SCNC: 4.3 MMOL/L (ref 3.4–5.3)
PROT SERPL-MCNC: 7.5 G/DL (ref 6.4–8.3)
RBC # BLD AUTO: 5.39 10E6/UL (ref 4.4–5.9)
SODIUM SERPL-SCNC: 134 MMOL/L (ref 135–145)
WBC # BLD AUTO: 5.7 10E3/UL (ref 4–11)

## 2023-11-17 PROCEDURE — 272N000454 HC KIT, 3FR SV SINGLE LUMEN POWERPICC

## 2023-11-17 PROCEDURE — 36569 INSJ PICC 5 YR+ W/O IMAGING: CPT

## 2023-11-17 PROCEDURE — 250N000009 HC RX 250: Performed by: STUDENT IN AN ORGANIZED HEALTH CARE EDUCATION/TRAINING PROGRAM

## 2023-11-17 PROCEDURE — 272N000276 HC DEVICE 3FR SECURACATH

## 2023-11-17 PROCEDURE — 999N000007 HC SITE CHECK

## 2023-11-17 PROCEDURE — 250N000013 HC RX MED GY IP 250 OP 250 PS 637: Performed by: STUDENT IN AN ORGANIZED HEALTH CARE EDUCATION/TRAINING PROGRAM

## 2023-11-17 PROCEDURE — 250N000013 HC RX MED GY IP 250 OP 250 PS 637: Performed by: ORTHOPAEDIC SURGERY

## 2023-11-17 PROCEDURE — 80053 COMPREHEN METABOLIC PANEL: CPT | Performed by: ORTHOPAEDIC SURGERY

## 2023-11-17 PROCEDURE — 99232 SBSQ HOSP IP/OBS MODERATE 35: CPT | Performed by: STUDENT IN AN ORGANIZED HEALTH CARE EDUCATION/TRAINING PROGRAM

## 2023-11-17 PROCEDURE — 250N000011 HC RX IP 250 OP 636: Performed by: STUDENT IN AN ORGANIZED HEALTH CARE EDUCATION/TRAINING PROGRAM

## 2023-11-17 PROCEDURE — 250N000011 HC RX IP 250 OP 636: Mod: JZ | Performed by: ORTHOPAEDIC SURGERY

## 2023-11-17 PROCEDURE — 85027 COMPLETE CBC AUTOMATED: CPT | Performed by: ORTHOPAEDIC SURGERY

## 2023-11-17 PROCEDURE — 36415 COLL VENOUS BLD VENIPUNCTURE: CPT | Performed by: ORTHOPAEDIC SURGERY

## 2023-11-17 PROCEDURE — 999N000040 HC STATISTIC CONSULT NO CHARGE VASC ACCESS

## 2023-11-17 PROCEDURE — 120N000002 HC R&B MED SURG/OB UMMC

## 2023-11-17 RX ORDER — VANCOMYCIN HYDROCHLORIDE 1 G/200ML
1000 INJECTION, SOLUTION INTRAVENOUS EVERY 12 HOURS
Status: ON HOLD
Start: 2023-11-17 | End: 2024-06-03

## 2023-11-17 RX ORDER — CEFEPIME HYDROCHLORIDE 2 G/1
2 INJECTION, POWDER, FOR SOLUTION INTRAVENOUS EVERY 8 HOURS
Status: ON HOLD
Start: 2023-11-18 | End: 2024-06-03

## 2023-11-17 RX ORDER — HEPARIN SODIUM,PORCINE 10 UNIT/ML
5-20 VIAL (ML) INTRAVENOUS
Status: DISCONTINUED | OUTPATIENT
Start: 2023-11-17 | End: 2023-11-18 | Stop reason: HOSPADM

## 2023-11-17 RX ORDER — CEFEPIME HYDROCHLORIDE 2 G/1
2 INJECTION, POWDER, FOR SOLUTION INTRAVENOUS EVERY 8 HOURS
Status: DISCONTINUED | OUTPATIENT
Start: 2023-11-17 | End: 2023-11-18 | Stop reason: HOSPADM

## 2023-11-17 RX ORDER — CEFEPIME HYDROCHLORIDE 2 G/1
2 INJECTION, POWDER, FOR SOLUTION INTRAVENOUS EVERY 8 HOURS
DISCHARGE
Start: 2023-11-18 | End: 2023-11-17

## 2023-11-17 RX ORDER — METRONIDAZOLE 500 MG/1
500 TABLET ORAL 3 TIMES DAILY
Start: 2023-11-17 | End: 2023-12-20

## 2023-11-17 RX ORDER — LIDOCAINE 40 MG/G
CREAM TOPICAL
Status: DISCONTINUED | OUTPATIENT
Start: 2023-11-17 | End: 2023-11-18 | Stop reason: HOSPADM

## 2023-11-17 RX ORDER — HEPARIN SODIUM,PORCINE 10 UNIT/ML
5-20 VIAL (ML) INTRAVENOUS EVERY 24 HOURS
Status: DISCONTINUED | OUTPATIENT
Start: 2023-11-17 | End: 2023-11-18 | Stop reason: HOSPADM

## 2023-11-17 RX ADMIN — LIDOCAINE HYDROCHLORIDE ANHYDROUS 3.5 ML: 10 INJECTION, SOLUTION INFILTRATION at 14:51

## 2023-11-17 RX ADMIN — OXYCODONE HYDROCHLORIDE 15 MG: 10 TABLET ORAL at 23:06

## 2023-11-17 RX ADMIN — METRONIDAZOLE 500 MG: 500 TABLET ORAL at 19:51

## 2023-11-17 RX ADMIN — OMEPRAZOLE 40 MG: 20 CAPSULE, DELAYED RELEASE ORAL at 19:51

## 2023-11-17 RX ADMIN — METRONIDAZOLE 500 MG: 500 TABLET ORAL at 08:48

## 2023-11-17 RX ADMIN — OXYCODONE HYDROCHLORIDE 20 MG: 20 TABLET, FILM COATED, EXTENDED RELEASE ORAL at 22:36

## 2023-11-17 RX ADMIN — PREDNISOLONE ACETATE 1 DROP: 10 SUSPENSION/ DROPS OPHTHALMIC at 20:07

## 2023-11-17 RX ADMIN — METRONIDAZOLE 500 MG: 500 TABLET ORAL at 14:11

## 2023-11-17 RX ADMIN — OXYCODONE HYDROCHLORIDE 20 MG: 20 TABLET, FILM COATED, EXTENDED RELEASE ORAL at 11:03

## 2023-11-17 RX ADMIN — PREGABALIN 150 MG: 75 CAPSULE ORAL at 08:48

## 2023-11-17 RX ADMIN — HEPARIN, PORCINE (PF) 10 UNIT/ML INTRAVENOUS SYRINGE 5 ML: at 17:42

## 2023-11-17 RX ADMIN — VANCOMYCIN HYDROCHLORIDE 1000 MG: 1 INJECTION, SOLUTION INTRAVENOUS at 14:13

## 2023-11-17 RX ADMIN — DULOXETINE HYDROCHLORIDE 120 MG: 60 CAPSULE, DELAYED RELEASE ORAL at 08:49

## 2023-11-17 RX ADMIN — OXYCODONE HYDROCHLORIDE 15 MG: 10 TABLET ORAL at 18:43

## 2023-11-17 RX ADMIN — CEFEPIME HYDROCHLORIDE 2 G: 2 INJECTION, POWDER, FOR SOLUTION INTRAVENOUS at 08:57

## 2023-11-17 RX ADMIN — ACETAMINOPHEN 975 MG: 325 TABLET, FILM COATED ORAL at 00:54

## 2023-11-17 RX ADMIN — OXYCODONE HYDROCHLORIDE 10 MG: 10 TABLET ORAL at 05:55

## 2023-11-17 RX ADMIN — BUSPIRONE HYDROCHLORIDE 10 MG: 10 TABLET ORAL at 19:51

## 2023-11-17 RX ADMIN — OXYCODONE HYDROCHLORIDE 10 MG: 10 TABLET ORAL at 09:51

## 2023-11-17 RX ADMIN — BUSPIRONE HYDROCHLORIDE 10 MG: 10 TABLET ORAL at 08:49

## 2023-11-17 RX ADMIN — LOSARTAN POTASSIUM 25 MG: 25 TABLET, FILM COATED ORAL at 08:47

## 2023-11-17 RX ADMIN — HEPARIN SODIUM 5000 UNITS: 5000 INJECTION, SOLUTION INTRAVENOUS; SUBCUTANEOUS at 11:03

## 2023-11-17 RX ADMIN — PREGABALIN 150 MG: 75 CAPSULE ORAL at 19:51

## 2023-11-17 RX ADMIN — OXYCODONE HYDROCHLORIDE 10 MG: 10 TABLET ORAL at 00:54

## 2023-11-17 RX ADMIN — Medication 6.25 MG: at 08:48

## 2023-11-17 RX ADMIN — OMEPRAZOLE 40 MG: 20 CAPSULE, DELAYED RELEASE ORAL at 08:48

## 2023-11-17 RX ADMIN — VANCOMYCIN HYDROCHLORIDE 1000 MG: 1 INJECTION, SOLUTION INTRAVENOUS at 00:13

## 2023-11-17 RX ADMIN — BUPROPION HYDROCHLORIDE 450 MG: 150 TABLET, FILM COATED, EXTENDED RELEASE ORAL at 08:48

## 2023-11-17 RX ADMIN — ACETAMINOPHEN 975 MG: 325 TABLET, FILM COATED ORAL at 08:47

## 2023-11-17 RX ADMIN — LORATADINE 10 MG: 10 TABLET ORAL at 08:48

## 2023-11-17 RX ADMIN — OXYCODONE HYDROCHLORIDE 15 MG: 10 TABLET ORAL at 14:12

## 2023-11-17 RX ADMIN — ACETAMINOPHEN 975 MG: 325 TABLET, FILM COATED ORAL at 17:42

## 2023-11-17 RX ADMIN — CEFEPIME HYDROCHLORIDE 2 G: 2 INJECTION, POWDER, FOR SOLUTION INTRAVENOUS at 17:42

## 2023-11-17 RX ADMIN — PREGABALIN 150 MG: 75 CAPSULE ORAL at 14:12

## 2023-11-17 RX ADMIN — PREDNISOLONE ACETATE 1 DROP: 10 SUSPENSION/ DROPS OPHTHALMIC at 08:57

## 2023-11-17 ASSESSMENT — ACTIVITIES OF DAILY LIVING (ADL)
ADLS_ACUITY_SCORE: 33

## 2023-11-17 NOTE — PROGRESS NOTES
Home Infusion Benefit Check    Patient Name /Room #: Saqib Bishop  : 1980  MRN:[id number]  Referral Source Name & Phone Number: Teresita Kelly, [phone]  Anticipated D/C date:-  Anticipated Therapy Type (IV abx and/or feeds): IV Vancomycin and IV Cefepime.  PT, OT, SPEECH, WOUND CARE, or any other therapies anticipated at discharge:   Current Home Care Agency: No accepting home care agency.   Provider that will be following/signing orders post discharge: Bernardo Robin MD [facility] [address] [place] [address] PH: [phone]       Teresita Kelly RN, BSN  Care Coordinator, [address] [place] [address]  Phone [phone]  Pager [phone]

## 2023-11-17 NOTE — PLAN OF CARE
Pt is A&Ox4. VSS. LS clear, on RA. BS active, colostomy patent and draining well. NPO since midnight for surgery today. Dyer patent and draining well. Pain managed with 15mg oxycodone. L BKA surgical dressing is c/d/I. Sacral wound and changed this AM. Pt up with 2 assist with lift. R PIV is patent and infusing. Continue to monitor

## 2023-11-17 NOTE — CONSULTS
3 Fr SL non valved PICC placed in LUE.  PICC tip at the CA junction per 3cg technology and ready for immediate use.

## 2023-11-17 NOTE — PLAN OF CARE
Goal Outcome Evaluation:      Plan of Care Reviewed With: patient    Overall Patient Progress: improving    Outcome Evaluation: Pain better controlled on orals. PICC placed today and is patent, blood return noted. Wound cares done and dressing changed this shift to pre-existing pressure injury. Pt calm and cooperative. Pt remains on IV abx. Plan to discharge to home tomorrow with home care/IV abx.

## 2023-11-17 NOTE — PROGRESS NOTES
Care Management Follow Up    Length of Stay (days): 10    Expected Discharge Date: 11/18/2023     Concerns to be Addressed: Discharge planning     Patient plan of care discussed at interdisciplinary rounds: Yes    Anticipated Discharge Disposition: Transitional Care     Anticipated Discharge Services: None  Anticipated Discharge DME: None    Patient/family educated on Medicare website which has current facility and service quality ratings: Yes  Education Provided on the Discharge Plan: Yes  Patient/Family in Agreement with the Plan: Unknown    Referrals Placed by CM/SW: Not applicable  Private pay costs discussed: Not applicable    Additional Information:  Social work stopped by patients room to discuss discharge plans. Explained to patient that all home care agencies in his area have declined and that he will need to go to TCU to continue his 6 weeks of IV ABX. Patient asked if his sister could learn how to do the IV ABX and this SW was not sure. Discussed with WINIFRED Lo who stated that would not be an option as this would need to be overseen by a home care agencies and we are not able to secure one for patient. Went back to patients room and explained this. Patient was understanding but stated he would be calling his  and some home care agencies to see if he could figure something out. SW provided patient with a Medicare Care Compare list for the [place], [place] area. Informed patient that my coworker SURYA Salazar would be coming by this afternoon to discuss TCU preferences. Patient was okay with this. Care management will continue to follow.     JARAD Camargo, LGSW  5 Med Surg and 10 ICU   [facility]  Phone: [phone]  Pager: [phone]

## 2023-11-17 NOTE — PLAN OF CARE
BP (!) 149/71   Pulse 66   Temp 98.6  F (37  C)   Resp 16   Ht 1.829 m (6')   Wt 104.3 kg (230 lb)   SpO2 95%   BMI 31.19 kg/m      Denies SOB, CP, new N/T. Paraplegic baseline. L BKA washout dressing CDI. R IT dressing CDI to be changed later today per order. L posterior thigh dressing. Declines help repositioning, pt able to repo independently. Reg diet, tolerates. PICC placed this shift for abx. SW following for plan to discharge with IV abx. Continue POC.

## 2023-11-17 NOTE — PROCEDURES
[facility]    Single Lumen PICC Placement    Date/Time: 11/17/2023 2:27 PM    Performed by: Alisha Greenberg RN  Authorized by: Gregg Tomas MD  Indications: vascular access      UNIVERSAL PROTOCOL   Site Marked: Yes  Prior Images Obtained and Reviewed:  Yes  Required items: Required blood products, implants, devices and special equipment available    Patient identity confirmed:  Verbally with patient, arm band and hospital-assigned identification number  NA - No sedation, light sedation, or local anesthesia  Confirmation Checklist:  Patient's identity using two indicators, relevant allergies, procedure was appropriate and matched the consent or emergent situation and correct equipment/implants were available  Time out: Immediately prior to the procedure a time out was called    Universal Protocol: the Joint Commission Universal Protocol was followed    Preparation: Patient was prepped and draped in usual sterile fashion    ESBL (mL):  5     ANESTHESIA    Anesthesia:  Local infiltration  Local Anesthetic:  Lidocaine 1% without epinephrine  Anesthetic Total (mL):  3.5      SEDATION    Patient Sedated: No        Preparation: skin prepped with ChloraPrep  Skin prep agent: skin prep agent completely dried prior to procedure  Sterile barriers: maximum sterile barriers were used: cap, mask, sterile gown, sterile gloves, and large sterile sheet  Hand hygiene: hand hygiene performed prior to central venous catheter insertion  Type of line used: PICC  Catheter type: single lumen  Lumen type: non-valved and power PICC  Catheter size: 3 Fr  Brand: Manzama  Lot number: [id number]  Placement method: venipuncture, MST, ultrasound and tip navigation system  Number of attempts: 3 (3 attempts at access in LUE)  Difficulty threading catheter: no  Successful placement: yes  Orientation: left  Catheter to Vein (%): 20  Location: basilic vein  Tip Location: SVC/RA Junction  Arm circumference:  adults 10 cm (4)  Extremity circumference: 36  Visible catheter length: 3  Total catheter length: 55  Dressing and securement: alcohol impregnated caps, blood cleaned with CHG, blood removed, chlorhexidine disc applied, dressing applied, gloves changed prior to final dressing, line secured, line sutured, occlusive dressing applied, securement device, site cleansed, sterile dressing applied, subcutaneous anchor securement system and transparent securement dressing  Post procedure assessment: blood return through all ports, free fluid flow and placement verified by 3CG technology  PROCEDURE   Patient Tolerance:  Patient tolerated the procedure well with no immediate complicationsDescribe Procedure: 3 Fr SL non valved PICC placed in LUE.  PICC tip at the CA junction per 3cg technology and ready for immediate use.  Disposal: sharps and needle count correct at the end of procedure, needles and guidewire disposed in sharps container

## 2023-11-17 NOTE — PROGRESS NOTES
Care Management Follow Up    Length of Stay (days): 10    Expected Discharge Date: 11/18/2023     Concerns to be Addressed: discharge planning     Patient plan of care discussed at interdisciplinary rounds: Yes    Anticipated Discharge Disposition: Home with [facility] supplying IV antibiotics. Weekly PCP visits for PICC line and lab draws.  [facility]      Anticipated Discharge Services: None  Anticipated Discharge DME: None    Patient/family educated on Medicare website which has current facility and service quality ratings: yes  Education Provided on the Discharge Plan: Yes  Patient/Family in Agreement with the Plan: yes    Referrals Placed by CM/SW:  ([facility] referrals may be needed.) [facility]  Private pay costs discussed: Not applicable    Additional Information:  Met with patient at bedside to discuss discharge options. Benefit check completed with [facility]. Patient should have 100% coverage through his Medicaid plan but may have a $3.80 monthly copay. Patient is able to discharge to home with [facility] supplying IV antibiotics.  IV antibiotics will be delivered to patient room prior to discharge on 11/18.     Patient has agreed to go into his PCP clinic weekly for PICC line cares and lab draws. Spoke with CELINA Fernandez at Dr. Robin's office. They have agreed to do weekly PICC line cares and lab draws. Orders to be faxed to Dr. Robin's office at [facility] prior to discharge. Phone [phone], fax [phone].    [facility], Wound Clinic will see patient for dressing changes on his surgical LE wound. Wound care orders to be faxed prior to discharge. Fax: [phone], phone: [phone].    This writer spoke with patient's CADI , William, from [facility]. Phone [phone]. The hospital has been unable to secure home care for the patient. William recommended trying [facility]. Phone [phone]. Tried to call [facility], they are currently closed for the weekend. Patient's PCP  should be able to follow up with [facility] to see if they will accept for skilled nursing for wound care.     Patient is agreeable to the above plan. He is excited to get home this weekend and see his son. Dr. Black will need to put in orders for wound care, PCP, PICC cares and weekly lab draws. Patient has no further questions at this time. RNCC available as needed.    Teresita Kelly RN, BSN  Care Coordinator, 5 Ortho  Phone [phone]  Pager [phone]
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
## 2023-11-17 NOTE — PROGRESS NOTES
Pt seen bedside at [facility] hosp room M515 and reports that he has no questions regarding prosthetics. I see he just had antibiotic beads removed from his residual limb and has stitches. The patient was more alert but still not having any questions regarding prosthetics. FU PRN. Anant Fiore CPO, LPO.
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
## 2023-11-18 VITALS
RESPIRATION RATE: 16 BRPM | BODY MASS INDEX: 31.15 KG/M2 | OXYGEN SATURATION: 98 % | HEIGHT: 72 IN | SYSTOLIC BLOOD PRESSURE: 156 MMHG | TEMPERATURE: 98.2 F | WEIGHT: 230 LBS | HEART RATE: 78 BPM | DIASTOLIC BLOOD PRESSURE: 85 MMHG

## 2023-11-18 LAB
ALBUMIN SERPL BCG-MCNC: 3.6 G/DL (ref 3.5–5.2)
ALP SERPL-CCNC: 122 U/L (ref 40–150)
ALT SERPL W P-5'-P-CCNC: 18 U/L (ref 0–70)
ANION GAP SERPL CALCULATED.3IONS-SCNC: 9 MMOL/L (ref 7–15)
AST SERPL W P-5'-P-CCNC: 18 U/L (ref 0–45)
BILIRUB SERPL-MCNC: 0.2 MG/DL
BUN SERPL-MCNC: 18.4 MG/DL (ref 6–20)
CALCIUM SERPL-MCNC: 9.7 MG/DL (ref 8.6–10)
CHLORIDE SERPL-SCNC: 100 MMOL/L (ref 98–107)
CREAT SERPL-MCNC: 0.72 MG/DL (ref 0.67–1.17)
CRP SERPL-MCNC: 10.85 MG/L
DEPRECATED HCO3 PLAS-SCNC: 28 MMOL/L (ref 22–29)
EGFRCR SERPLBLD CKD-EPI 2021: >90 ML/MIN/1.73M2
ERYTHROCYTE [DISTWIDTH] IN BLOOD BY AUTOMATED COUNT: 16.9 % (ref 10–15)
GLUCOSE SERPL-MCNC: 129 MG/DL (ref 70–99)
HCT VFR BLD AUTO: 45.7 % (ref 40–53)
HGB BLD-MCNC: 13.9 G/DL (ref 13.3–17.7)
MCH RBC QN AUTO: 23.3 PG (ref 26.5–33)
MCHC RBC AUTO-ENTMCNC: 30.4 G/DL (ref 31.5–36.5)
MCV RBC AUTO: 77 FL (ref 78–100)
PLATELET # BLD AUTO: 337 10E3/UL (ref 150–450)
POTASSIUM SERPL-SCNC: 4 MMOL/L (ref 3.4–5.3)
PROT SERPL-MCNC: 8.2 G/DL (ref 6.4–8.3)
RBC # BLD AUTO: 5.97 10E6/UL (ref 4.4–5.9)
SODIUM SERPL-SCNC: 137 MMOL/L (ref 135–145)
WBC # BLD AUTO: 6 10E3/UL (ref 4–11)

## 2023-11-18 PROCEDURE — 250N000011 HC RX IP 250 OP 636: Performed by: STUDENT IN AN ORGANIZED HEALTH CARE EDUCATION/TRAINING PROGRAM

## 2023-11-18 PROCEDURE — 250N000013 HC RX MED GY IP 250 OP 250 PS 637: Performed by: ORTHOPAEDIC SURGERY

## 2023-11-18 PROCEDURE — 99239 HOSP IP/OBS DSCHRG MGMT >30: CPT | Performed by: STUDENT IN AN ORGANIZED HEALTH CARE EDUCATION/TRAINING PROGRAM

## 2023-11-18 PROCEDURE — 80053 COMPREHEN METABOLIC PANEL: CPT | Performed by: ORTHOPAEDIC SURGERY

## 2023-11-18 PROCEDURE — 36415 COLL VENOUS BLD VENIPUNCTURE: CPT | Performed by: ORTHOPAEDIC SURGERY

## 2023-11-18 PROCEDURE — 86140 C-REACTIVE PROTEIN: CPT | Performed by: STUDENT IN AN ORGANIZED HEALTH CARE EDUCATION/TRAINING PROGRAM

## 2023-11-18 PROCEDURE — 85027 COMPLETE CBC AUTOMATED: CPT | Performed by: ORTHOPAEDIC SURGERY

## 2023-11-18 PROCEDURE — 250N000013 HC RX MED GY IP 250 OP 250 PS 637: Performed by: STUDENT IN AN ORGANIZED HEALTH CARE EDUCATION/TRAINING PROGRAM

## 2023-11-18 PROCEDURE — 250N000011 HC RX IP 250 OP 636: Mod: JZ | Performed by: ORTHOPAEDIC SURGERY

## 2023-11-18 RX ORDER — OXYCODONE HYDROCHLORIDE 5 MG/1
10 TABLET ORAL EVERY 4 HOURS PRN
Qty: 24 TABLET | Refills: 0 | Status: ON HOLD | OUTPATIENT
Start: 2023-11-18 | End: 2024-06-03

## 2023-11-18 RX ADMIN — VANCOMYCIN HYDROCHLORIDE 1000 MG: 1 INJECTION, SOLUTION INTRAVENOUS at 01:36

## 2023-11-18 RX ADMIN — VANCOMYCIN HYDROCHLORIDE 1000 MG: 1 INJECTION, SOLUTION INTRAVENOUS at 13:18

## 2023-11-18 RX ADMIN — HEPARIN SODIUM 5000 UNITS: 5000 INJECTION, SOLUTION INTRAVENOUS; SUBCUTANEOUS at 00:06

## 2023-11-18 RX ADMIN — OXYCODONE HYDROCHLORIDE 15 MG: 10 TABLET ORAL at 14:02

## 2023-11-18 RX ADMIN — OXYCODONE HYDROCHLORIDE 15 MG: 10 TABLET ORAL at 04:55

## 2023-11-18 RX ADMIN — LOSARTAN POTASSIUM 25 MG: 25 TABLET, FILM COATED ORAL at 08:14

## 2023-11-18 RX ADMIN — HYDROXYZINE HYDROCHLORIDE 25 MG: 25 TABLET, FILM COATED ORAL at 08:13

## 2023-11-18 RX ADMIN — DULOXETINE HYDROCHLORIDE 120 MG: 60 CAPSULE, DELAYED RELEASE ORAL at 08:13

## 2023-11-18 RX ADMIN — HEPARIN SODIUM 5000 UNITS: 5000 INJECTION, SOLUTION INTRAVENOUS; SUBCUTANEOUS at 13:02

## 2023-11-18 RX ADMIN — PREDNISOLONE ACETATE 1 DROP: 10 SUSPENSION/ DROPS OPHTHALMIC at 08:22

## 2023-11-18 RX ADMIN — HEPARIN, PORCINE (PF) 10 UNIT/ML INTRAVENOUS SYRINGE 5 ML: at 15:50

## 2023-11-18 RX ADMIN — METRONIDAZOLE 500 MG: 500 TABLET ORAL at 14:02

## 2023-11-18 RX ADMIN — BUSPIRONE HYDROCHLORIDE 10 MG: 10 TABLET ORAL at 08:14

## 2023-11-18 RX ADMIN — PREGABALIN 150 MG: 75 CAPSULE ORAL at 14:02

## 2023-11-18 RX ADMIN — CEFEPIME HYDROCHLORIDE 2 G: 2 INJECTION, POWDER, FOR SOLUTION INTRAVENOUS at 08:56

## 2023-11-18 RX ADMIN — BUPROPION HYDROCHLORIDE 450 MG: 150 TABLET, FILM COATED, EXTENDED RELEASE ORAL at 08:14

## 2023-11-18 RX ADMIN — OXYCODONE HYDROCHLORIDE 15 MG: 10 TABLET ORAL at 08:57

## 2023-11-18 RX ADMIN — LORATADINE 10 MG: 10 TABLET ORAL at 08:14

## 2023-11-18 RX ADMIN — PREGABALIN 150 MG: 75 CAPSULE ORAL at 08:12

## 2023-11-18 RX ADMIN — CEFEPIME HYDROCHLORIDE 2 G: 2 INJECTION, POWDER, FOR SOLUTION INTRAVENOUS at 00:48

## 2023-11-18 RX ADMIN — Medication 6.25 MG: at 08:14

## 2023-11-18 RX ADMIN — OXYCODONE HYDROCHLORIDE 20 MG: 20 TABLET, FILM COATED, EXTENDED RELEASE ORAL at 10:59

## 2023-11-18 RX ADMIN — ACETAMINOPHEN 650 MG: 325 TABLET, FILM COATED ORAL at 08:13

## 2023-11-18 RX ADMIN — ACETAMINOPHEN 975 MG: 325 TABLET, FILM COATED ORAL at 01:37

## 2023-11-18 RX ADMIN — METRONIDAZOLE 500 MG: 500 TABLET ORAL at 08:14

## 2023-11-18 RX ADMIN — OMEPRAZOLE 40 MG: 20 CAPSULE, DELAYED RELEASE ORAL at 08:14

## 2023-11-18 ASSESSMENT — ACTIVITIES OF DAILY LIVING (ADL)
ADLS_ACUITY_SCORE: 33
ADLS_ACUITY_SCORE: 34
ADLS_ACUITY_SCORE: 33
ADLS_ACUITY_SCORE: 34
ADLS_ACUITY_SCORE: 33
ADLS_ACUITY_SCORE: 33
ADLS_ACUITY_SCORE: 34
ADLS_ACUITY_SCORE: 33
ADLS_ACUITY_SCORE: 33
ADLS_ACUITY_SCORE: 34

## 2023-11-18 NOTE — PROGRESS NOTES
Pt. discharged at 1620 via wheelchair to home. Pt. was accompanied by mom and dad, and left with personal belongings. Prior to discharge, PIV was removed. Pt. received complete discharge paperwork and home PICC care instructions and supplies, and medications as filled by discharge pharmacy. Pt. was given times of last dose for all discharge medications in writing on discharge medication sheets.  Discharge teaching included PICC line management, medication, pain management, activity restrictions, dressing changes, and signs and symptoms of infection. Pt. to follow up with primary care provider. Pt. had no further questions at the time of discharge and no unmet needs were identified.

## 2023-11-18 NOTE — PLAN OF CARE
Goal Outcome Evaluation:                 Outcome Evaluation: Planning on discharge to home today pending [facility] verification    BP (!) 156/85 (BP Location: Right arm)   Pulse 78   Temp 98.2  F (36.8  C) (Oral)   Resp 16   Ht 1.829 m (6')   Wt 104.3 kg (230 lb)   SpO2 98%   BMI 31.19 kg/m        Denies SOB, CP, new N/T. Paraplegic baseline. L BKA washout dressing CDI under ACE. R IT dressing and L posterior thigh dressings will be changed today prior to discharge. Declines help repositioning, pt able to repo independently. Reg diet, tolerates. PICC LUE patent. SW following for plan to discharge with IV abx with [facility]. Continue POC.        Went over AVS with pt and provided with filled script from pharmacy. Waiting for [facility] to arrive to complete PICC education. Home infusion supplies arrived and delivered to pt room. Family at bedside to assist with discharge.

## 2023-11-18 NOTE — PHARMACY
[facility], [facility]  Parenteral ANtibiotic Review at Departure from Acute Care Collaborative Note     Patient: Saqib Bishop  MRN: [id number]  Allergies: Adhesive tape, Benzoin, Droperidol, Prednisone, and Vitamin d (calciferol)    Current Location:  ORTHO  OPAT to be provided by: [facility]       Line Type: PICC    Diagnosis/Indications: L BKA stump cellulitis c/b soft tissue abscess & osteomyelitis  Organism(s): Empiric coverage, 11/8 L tibia tissue cultures with no growth to date  MRDO? No  Pending Cultures/Microbiological Tests: yes 11/8 L tibia tissue cultures    Inpatient ID involved in developing OPAT plan: Yes - discharge OPAT plan has no changes from ID provider, Dr. Darrell Ralph, OPAT plan charted on 11/16/2023    Outpatient ID Follow-up: ID OPAT Clinic Referral Placed ([facility] Ph: [phone] and Fax: [phone]) - appointment not scheduled, but needed  Designated Provider: Dr. Darrell Ralph    Antimicrobial Regimen / Route Anticipated  Duration Start Date Stop /  Reassess Date   Cefepime 2 g every 8 hours/IV At least 4 weeks 11/15/2023 Tentative 12/13/2023, definitive duration to be determined by ID provider   Vancomycin IV 1000 mg every 12 hours/IV At least 4 weeks 11/15/2023 Tentative 12/13/2023, definitive duration to be determined by ID provider   Metronidazole 500 mg every 8 hours/PO At least 4 weeks 11/15/2023 Tentative 12/13/2023, definitive duration to be determined by ID provider     Laboratory Tests and Monitoring Frequency: CBC with Diff, CRP, SCr, ALT, AST Once Weekly    Therapeutic Drug Monitoring: Vancomycin   - Drug: Vancomycin   - Goal(s): -600; if not able to perform AUC-guided monitoring when outpatient, recommend trough goal of 15-20 mg/L to ensure adequate bone penetration (correlating troughs with AUC montioring have been ~15 mg/L)   - Level Type & Frequency: Please obtain serum vancomycin level  (ideally trough) at least once weekly; may increase vancomycin level monitoring frequency (e.g., twice weekly) with regimen changes, labile renal function, and/or addition of nephrotoxic medications   - Level(s) last checked date: 11/16/2023    Imaging/Miscellaneous Monitoring: None    ID Pharmacist Interventions: None                          Queenie Smalls, PharmD, BCIDP  Pager: [phone]

## 2023-11-18 NOTE — PROGRESS NOTES
RNCC paged gold 22 to sign orders for [facility].  Lauren Holloway, RN, BSN, PHN  Weekend/Holiday RNCC Pager [phone]    Addendum: Orders signed.RNCC contacted [facility], they will process the orders and deliver the supplies to the pt's room around 230-300 p.m. today. A [facility] nurse will come after delivery to teach the pt how to administer.   Pt will be going to his PCP office for weekly labs and dressing changes, Orders to be faxed to Dr. Robin's office at [facility], fax [phone].   Lauren Holloway RN, BSN, PHN  Weekend/Holiday RNCC Pager [phone]    [facility] contacted me stating that they are concerned about this pt's discharge plan, as he does not have home care  nursing. The nurse at [facility] will speak to the charge nurse and contact me back.    UPDATE: [facility] admin nurse contacted me back, we discussed the plan in place for the pt, which is a safe and coordinated plan. [facility] will plan to teach and deliver around 3 pm in the pt's hospital room.   Lauren Holloway, RN, BSN, PHN

## 2023-11-18 NOTE — PLAN OF CARE
Goal Outcome Evaluation:  Temp: 98  F (36.7  C) Temp src: Oral BP: 131/73 Pulse: 99   Resp: 16 SpO2: 95 % O2 Device: None (Room air)         Patient is alert and oriented/4, able to make needs known, PIV to right forearm SL, PICC to left upper arm, hep locked. Not OOB this shift, paraplegic, Dyer catheter,colostomy. Patient scheduled to discharge 11/18 with IV abx. Continue with POC

## 2023-11-20 ENCOUNTER — TELEPHONE (OUTPATIENT)
Dept: INFECTIOUS DISEASES | Facility: CLINIC | Age: 43
End: 2023-11-20

## 2023-11-20 NOTE — TELEPHONE ENCOUNTER
EP called 11/20 to let pt know about 12/13 follow up with Dr. Ralph.     ----- Message from Ernestina Olvera RN sent at 11/20/2023  2:00 PM CST -----  Regarding: appt  Please call Pt to notify of appt with ID

## 2023-11-20 NOTE — PROGRESS NOTES
Ortho staff  Spoke with patient by phone at [phone] today to check in after his surgery last Wednesday. Pain seems controllable. On Lyrica. Offered additional topical analgesics such as lidocaine patches and/or cream, politely declined. On antibiotics. Discussed tissue culture results still negative from 11/08/2023. Various reasons discussed including possible suppression of growth from antibiotics, or no infection in amputation wound present. Findings at surgery 11/8 and 11/15 did not show signs for an abscess or osteomyelitis. Discussed f/up scheduled for 12/11. Signs and symptoms that should warrant sooner attention discussed. Verbalized understanding. All questions this very pleasant 41yo patient at at this time appeared to have been answered.

## 2023-11-21 ENCOUNTER — TELEPHONE (OUTPATIENT)
Dept: ORTHOPEDICS | Facility: CLINIC | Age: 43
End: 2023-11-21
Payer: MEDICARE

## 2023-12-05 ENCOUNTER — TELEPHONE (OUTPATIENT)
Dept: ORTHOPEDICS | Facility: CLINIC | Age: 43
End: 2023-12-05
Payer: MEDICARE

## 2023-12-05 DIAGNOSIS — M72.6 NECROTIZING FASCIITIS OF LOWER LEG (H): Primary | ICD-10-CM

## 2023-12-05 DIAGNOSIS — Z89.512 S/P BELOW KNEE AMPUTATION, LEFT (H): ICD-10-CM

## 2023-12-05 NOTE — TELEPHONE ENCOUNTER
!! Order Request, PICC Line Dressing Change     Dayron from [facility] called.     Pt is in the area and needs a dressing change of his PICC line. An order is being requested by [facility], to change pt's PICC line dressing.    If approved, please FAX the order to:    FAX: [phone]    This is HIGH PRIORITY, due to timing of TODAY. Thank you.    Please CALL DAYRON with any questions. Thank you.    Dayron  PHONE  [phone]

## 2023-12-06 LAB
BACTERIA TISS BX CULT: NO GROWTH

## 2023-12-06 NOTE — TELEPHONE ENCOUNTER
Spoke with Kenia at [facility], she was not sure if Pt was going to be needing weekly dressing changes or just this one time.   FAX: [phone]      LM for Pt to discuss.   Pt called back. The original orders were sent to a local facility, but they cannot do dressing changes and only labs.   Pt was told he would need labs 2x a week so wanted something close to home.   Pt's Vanco has been stable and would only need 2x a week lab monitoring if his vanco has to be adjusted. [facility] is managing the dosing and lab monitoring.     Pt dosing his daily Vanco 10am/10pm. He could have dressing change and labs done at [facility] weekly and if additional labs are needed we can send to      Dr. Robin's office at [facility] prior to discharge. Phone [phone], fax [phone].       Sent order by fax to [facility]. Requested dressing change today, labs next week since they were done on 12/5. Has follow up appt on 12/13 to determine Plan.

## 2023-12-07 ENCOUNTER — MEDICAL CORRESPONDENCE (OUTPATIENT)
Dept: HEALTH INFORMATION MANAGEMENT | Facility: CLINIC | Age: 43
End: 2023-12-07
Payer: MEDICARE

## 2023-12-07 ENCOUNTER — DOCUMENTATION ONLY (OUTPATIENT)
Dept: ORTHOPEDICS | Facility: CLINIC | Age: 43
End: 2023-12-07
Payer: MEDICARE

## 2023-12-07 NOTE — PROGRESS NOTES
I talked with Saqib on the Phone and he reports that he still has his stitches in and that I should call back next week for a better picture and update for his Amputation and or a Prosthesis. Anant Fiore , LPO.

## 2023-12-08 ENCOUNTER — MEDICAL CORRESPONDENCE (OUTPATIENT)
Dept: HEALTH INFORMATION MANAGEMENT | Facility: CLINIC | Age: 43
End: 2023-12-08
Payer: MEDICARE

## 2023-12-11 ENCOUNTER — OFFICE VISIT (OUTPATIENT)
Dept: ORTHOPEDICS | Facility: CLINIC | Age: 43
End: 2023-12-11
Payer: MEDICARE

## 2023-12-11 DIAGNOSIS — Z89.512 S/P BELOW KNEE AMPUTATION, LEFT (H): ICD-10-CM

## 2023-12-11 DIAGNOSIS — M72.6 NECROTIZING FASCIITIS OF LOWER LEG (H): Primary | ICD-10-CM

## 2023-12-11 PROCEDURE — 99024 POSTOP FOLLOW-UP VISIT: CPT | Performed by: ORTHOPAEDIC SURGERY

## 2023-12-11 NOTE — LETTER
12/11/2023         RE: Saqib Bishop  [address] [place] [address]        Dear Colleague,    Thank you for referring your patient, Saqib Bishop, to the [facility] [place]. Please see a copy of my visit note below.    Orthopaedic Surgery Clinic Note    Chief Complaint:   Follow-up left transtibial amputation wound.    Dates of Surgery:   08/28/2023, L transtibial guillotine amputation, Dr. Barth, for necrotizing fasciitis and sepsis.  08/30/2023, I&D, VAC change, Dr. Maier, Parvimonas micra left leg tissue 08/30/2023, anaerobic GPB broth only left leg tissue 08/30/2023.  09/02/2023, I&D, VAC change, Dr. Rehman.  09/07/2023, I&D, revision L transtibial amputation, Dr. Gates.  11/08/2023, I&D, Dr. Maier.  11/15/2023, I&D, Dr. Maier.    I saw Saqib today in follow-up for his left transtibial amputation.  He denies fevers, chills, chest pain, or shortness of breath.  He has been nonweightbearing on his left lower extremity residual limb, and he reports he has been in touch with this prosthetists.    Examination shows the patient to be a pleasant, cooperative, awake, and alert adult sitting upright in a manual wheelchair.  He is accompanied by both parents for his entire encounter.  He is nonseptic appearing from a general clinical standpoint.  Breathing pattern is regular and nonlabored on room air.  The left lower extremity residual limb surgical incision does appear to be fully approximated and healed at this time.  There is some are still some open areas from the skin suture sites which were removed earlier in this encounter.  There is no palpable fluctuance or induration around the incision.  There is no palpable tenderness around the incision.  There is no expressible drainage.  The previous scab on the anterior left leg distal to the knee and proximal to the incision is still present without drainage or signs for infection.  The wound was dressed again sterilely  today.    Impression: 43-year-old patient with spinal cord injury and left lower extremity transtibial amputation secondary to necrotizing soft tissue infection, now with healed surgical incision from BKA.    Plan: He may restart the stump shrinking process and prosthetic fitting once his suture holes from the recently removed sutures close.  Dry sterile gauze dressing change daily until then.  Follow-up in 4 weeks.  Signs and symptoms watch out for this approximator medical attention were discussed.  All questions were answered.      Etienne Maier MD

## 2023-12-11 NOTE — PROGRESS NOTES
Orthopaedic Surgery Clinic Note    Chief Complaint:   Follow-up left transtibial amputation wound.    Dates of Surgery:   08/28/2023, L transtibial guillotine amputation, Dr. Barth, for necrotizing fasciitis and sepsis.  08/30/2023, I&D, VAC change, Dr. Maier, Parvimonas micra left leg tissue 08/30/2023, anaerobic GPB broth only left leg tissue 08/30/2023.  09/02/2023, I&D, VAC change, Dr. Rehman.  09/07/2023, I&D, revision L transtibial amputation, Dr. Gates.  11/08/2023, I&D, Dr. Maier.  11/15/2023, I&D, Dr. Maier.    I saw Saqib today in follow-up for his left transtibial amputation.  He denies fevers, chills, chest pain, or shortness of breath.  He has been nonweightbearing on his left lower extremity residual limb, and he reports he has been in touch with this prosthetists.    Examination shows the patient to be a pleasant, cooperative, awake, and alert adult sitting upright in a manual wheelchair.  He is accompanied by both parents for his entire encounter.  He is nonseptic appearing from a general clinical standpoint.  Breathing pattern is regular and nonlabored on room air.  The left lower extremity residual limb surgical incision does appear to be fully approximated and healed at this time.  There is some are still some open areas from the skin suture sites which were removed earlier in this encounter.  There is no palpable fluctuance or induration around the incision.  There is no palpable tenderness around the incision.  There is no expressible drainage.  The previous scab on the anterior left leg distal to the knee and proximal to the incision is still present without drainage or signs for infection.  The wound was dressed again sterilely today.    Impression: 43-year-old patient with spinal cord injury and left lower extremity transtibial amputation secondary to necrotizing soft tissue infection, now with healed surgical incision from BKA.    Plan: He may restart the stump shrinking process and  prosthetic fitting once his suture holes from the recently removed sutures close.  Dry sterile gauze dressing change daily until then.  Follow-up in 4 weeks.  Signs and symptoms watch out for this approximator medical attention were discussed.  All questions were answered.

## 2023-12-12 ENCOUNTER — DOCUMENTATION ONLY (OUTPATIENT)
Dept: ORTHOPEDICS | Facility: CLINIC | Age: 43
End: 2023-12-12
Payer: MEDICARE

## 2023-12-12 NOTE — PROGRESS NOTES
Received Completed forms Yes   Faxed Forms Faxed To: [employer]  Fax Number: [phone]   Sent to HIM (Date) 12/12/23

## 2023-12-14 ENCOUNTER — VIRTUAL VISIT (OUTPATIENT)
Dept: SURGERY | Facility: CLINIC | Age: 43
End: 2023-12-14
Payer: MEDICARE

## 2023-12-14 VITALS — BODY MASS INDEX: 31.19 KG/M2 | HEIGHT: 72 IN

## 2023-12-14 DIAGNOSIS — K43.5 PARASTOMAL HERNIA WITHOUT OBSTRUCTION OR GANGRENE: Primary | ICD-10-CM

## 2023-12-14 PROCEDURE — 99441 PR PHYSICIAN TELEPHONE EVALUATION 5-10 MIN: CPT | Mod: 95 | Performed by: SURGERY

## 2023-12-14 NOTE — LETTER
"12/14/2023       RE: Saqib Bishop  [address]  [facility] [address]       Dear Colleague,    Thank you for referring your patient, Saqib Bishop, to the [facility] GENERAL SURGERY [facility]IN[facility] [place] at [facility]. Please see a copy of my visit note below.    Saqib is a 43 year old who is being evaluated via a billable telephone visit.      What phone number would you like to be contacted at? [phone]  How would you like to obtain your AVS? MyChart    Distant Location (provider location):  On-site      Phone call duration: 10 minutes    The patient is a 43-year-old man who is well known to the general surgery service. The patient has a symptomatic peristomal hernia. The visit was originally scheduled as a \"video visit.\" However, this was not possible, and a \"telephone visit\" of 10 minutes duration served to review the patient's history, interview the patient, and determine the necessary follow-up.    The patient describes increasing difficulty with the peristomal hernia due to the increased size. The patient has recently undergone a revision of his lower extremity amputation site. The patient also is being evaluated by the plastic surgery service for an open wound that is related to his wheelchair. The patient will be evaluated by Dr. Genevieve Zavala this month, and a potential surgical coverage procedure is planned.    PAST MEDICAL HISTORY:  Past Medical History:   Diagnosis Date    Brain injury with brief loss of consciousness (H) 12/14/2015    Candida esophagitis (H) 08/03/2022    Degenerative joint disease     Gastro-oesophageal reflux disease     Hypercalcemia 08/03/2022    Hypokalemia 08/03/2022    Hypomagnesemia 08/03/2022        PAST SURGICAL HISTORY:  Past Surgical History:   Procedure Laterality Date    AMPUTATE LEG BELOW KNEE Left 8/27/2023    Procedure: Left below knee Guillotine Amputation;  Surgeon: Sesar Barth MD;  Location: [facility]" OR    AMPUTATE LEG BELOW KNEE Left 9/2/2023    Procedure: Irrigation and Debridement leg below knee, wound vac application, Left;  Surgeon: Juan Rehman MD;  [facility]: [facility] OR    AMPUTATE LEG BELOW KNEE Left 9/7/2023    Procedure: Below Left Knee Amputation;  Surgeon: Semaj Gates MD;  [facility] OR    BACK SURGERY      lumbar fusion    EXPLORE SPINE, REMOVE HARDWARE, COMBINED  1/13/2012    Procedure:COMBINED EXPLORE SPINE, REMOVE HARDWARE; EXPLORE SPINE, REMOVE HARDWARE L4-S1; Surgeon:FAM GONZALEZ; Location:[facility] OR    IRRIGATION AND DEBRIDEMENT FOOT, COMBINED Left 11/8/2023    Procedure: LEFT BELOW KNEE AMPUTATION STUMP IRRIGATION AND DEBRIDEMENT, VANCOMYCIN BEAD PLACEMENT;  Surgeon: Etienne Maier MD;  [facility]    IRRIGATION AND DEBRIDEMENT LOWER EXTREMITY, COMBINED Left 8/30/2023    Procedure: Irrigation and debridement lower extremity, combined and wound vac exchange;  Surgeon: Etienne Maier MD;  Location: [facility] OR    IRRIGATION AND DEBRIDEMENT LOWER EXTREMITY, COMBINED Left 9/7/2023    Procedure: IRRIGATION AND DEBRIDEMENT, LEFT LOWER EXTREMITY WITH WOUND VAC Removal;  Surgeon: Semaj Gates MD;  [facility]    IRRIGATION AND DEBRIDEMENT LOWER EXTREMITY, COMBINED Left 11/15/2023    Procedure: IRRIGATION AND DEBRIDEMENT, LOWER EXTREMITY LEFT LEG, ANTIBIOTIC BEAD REMOVAL AND PRIMARY CLOSURE;  Surgeon: Etienne Maier MD;  [facility] OR    ORTHOPEDIC SURGERY      right foot surgery        MEDICATIONS:  Current Outpatient Medications   Medication    acetaminophen (TYLENOL) 325 MG tablet    buPROPion 450 MG TB24    busPIRone (BUSPAR) 10 MG tablet    ceFEPIme (MAXIPIME) 2 g vial    Cranberry 400 MG CAPS    cyclobenzaprine (FLEXERIL) 10 MG tablet    DULoxetine (CYMBALTA) 60 MG capsule    fluticasone (FLONASE) 50 MCG/ACT nasal spray    hydrOXYzine (ATARAX) 25 MG tablet    loratadine (CLARITIN) 10 MG tablet    losartan-hydrochlorothiazide (HYZAAR) 50-12.5 MG tablet     melatonin 5 MG tablet    metroNIDAZOLE (FLAGYL) 500 MG tablet    naloxone (NARCAN) 0.4 MG/ML injection    omeprazole (PRILOSEC) 40 MG DR capsule    oxyCODONE (OXYCONTIN) 20 MG 12 hr tablet    oxyCODONE (ROXICODONE) 5 MG tablet    polyethylene glycol (MIRALAX) 17 GM/Dose powder    polyethylene glycol-propylene glycol (SYSTANE ULTRA) 0.4-0.3 % SOLN ophthalmic solution    prednisoLONE acetate (PRED FORTE) 1 % ophthalmic suspension    pregabalin (LYRICA) 150 MG capsule    semaglutide (OZEMPIC) 2 MG/3ML pen    Semaglutide-Weight Management (WEGOVY) 0.25 MG/0.5ML pen    Semaglutide-Weight Management (WEGOVY) 0.5 MG/0.5ML pen    senna-docusate (SENOKOT-S/PERICOLACE) 8.6-50 MG tablet    vancomycin (VANCOCIN) 1000 mg in dextrose 5% 200 mL PREMIX     No current facility-administered medications for this visit.        ALLERGIES:  Allergies   Allergen Reactions    Adhesive Tape Hives     From tape from surgery    Benzoin Hives and Rash    Droperidol Anaphylaxis    Prednisone      vomiting    Vitamin D (Calciferol) Rash     flairs up his sarcoidosis        SOCIAL HISTORY:  Social History     Socioeconomic History    Marital status: Single   Tobacco Use    Smoking status: Former     Types: Cigarettes     Quit date: 2011     Years since quittin.1   Substance and Sexual Activity    Alcohol use: No    Drug use: No       FAMILY HISTORY:  No family history on file.     Status: Ht 1.829 m (6')   BMI 31.19 kg/m      A/P: The patient is currently undergoing other surgical care and follow-up. Surgical coverage of his open wound would be optimal prior to any surgery for his peristomal hernia. Recognition of obstructive symptoms or strangulation related to the peristomal hernia will be necessary, and the patient is knowledgeable about incarceration or stand strangulation. The patient will call return should he develop symptoms of incarceration obstruction of the peristomal hernia. The patient is scheduled to be reevaluated by the  General Surgery service following plastic surgery coverage of his open wound.         Again, thank you for allowing me to participate in the care of your patient.      Sincerely,    Nii Mancilla MD

## 2023-12-14 NOTE — PATIENT INSTRUCTIONS
You met with Dr. Nii Mancilla.      Today's visit instructions:    Return to the Surgery Clinic to see Dr. Mancilla in the Spring of 2024. A [employer] message was sent to you with several dates that he is in clinic.  Please let our team know what works best for you.     If you have questions please contact Yamilet RN or Ann RN during regular clinic hours, Monday through Friday 7:30 AM - 4:00 PM, or you can contact us via [employer] at anytime.       If you have urgent needs after-hours, weekends, or holidays please call the hospital at [phone] and ask to speak with our on-call General Surgery Team.    Appointment [phone]  Nurse Advice (Yamilet or Ann): [phone]   Surgery Scheduler (Zahida): [phone]  Fax: [phone]

## 2023-12-14 NOTE — PROGRESS NOTES
"Saqib is a 43 year old who is being evaluated via a billable telephone visit.      What phone number would you like to be contacted at? [phone]  How would you like to obtain your AVS? Leyda    Distant Location (provider location):  On-site      Phone call duration: 10 minutes    The patient is a 43-year-old man who is well known to the general surgery service. The patient has a symptomatic peristomal hernia. The visit was originally scheduled as a \"video visit.\" However, this was not possible, and a \"telephone visit\" of 10 minutes duration served to review the patient's history, interview the patient, and determine the necessary follow-up.    The patient describes increasing difficulty with the peristomal hernia due to the increased size. The patient has recently undergone a revision of his lower extremity amputation site. The patient also is being evaluated by the plastic surgery service for an open wound that is related to his wheelchair. The patient will be evaluated by Dr. Genevieve Zavala this month, and a potential surgical coverage procedure is planned.    PAST MEDICAL HISTORY:  Past Medical History:   Diagnosis Date    Brain injury with brief loss of consciousness (H) 12/14/2015    Candida esophagitis (H) 08/03/2022    Degenerative joint disease     Gastro-oesophageal reflux disease     Hypercalcemia 08/03/2022    Hypokalemia 08/03/2022    Hypomagnesemia 08/03/2022        PAST SURGICAL HISTORY:  Past Surgical History:   Procedure Laterality Date    AMPUTATE LEG BELOW KNEE Left 8/27/2023    Procedure: Left below knee Guillotine Amputation;  Surgeon: Sesar Barth MD;  [facility]    AMPUTATE LEG BELOW KNEE Left 9/2/2023    Procedure: Irrigation and Debridement leg below knee, wound vac application, Left;  Surgeon: Juan Rehman MD;  [facility]    AMPUTATE LEG BELOW KNEE Left 9/7/2023    Procedure: Below Left Knee Amputation;  Surgeon: Semaj Gates MD;  [facility] OR "    BACK SURGERY      lumbar fusion    EXPLORE SPINE, REMOVE HARDWARE, COMBINED  1/13/2012    Procedure:COMBINED EXPLORE SPINE, REMOVE HARDWARE; EXPLORE SPINE, REMOVE HARDWARE L4-S1; Surgeon:FAM GONZALEZ; Location:[facility] OR    IRRIGATION AND DEBRIDEMENT FOOT, COMBINED Left 11/8/2023    Procedure: LEFT BELOW KNEE AMPUTATION STUMP IRRIGATION AND DEBRIDEMENT, VANCOMYCIN BEAD PLACEMENT;  Surgeon: Etienne Maier MD;  [facility]    IRRIGATION AND DEBRIDEMENT LOWER EXTREMITY, COMBINED Left 8/30/2023    Procedure: Irrigation and debridement lower extremity, combined and wound vac exchange;  Surgeon: Etienne Maier MD;  [facility]    IRRIGATION AND DEBRIDEMENT LOWER EXTREMITY, COMBINED Left 9/7/2023    Procedure: IRRIGATION AND DEBRIDEMENT, LEFT LOWER EXTREMITY WITH WOUND VAC Removal;  Surgeon: Semaj Gates MD;  [facility]    IRRIGATION AND DEBRIDEMENT LOWER EXTREMITY, COMBINED Left 11/15/2023    Procedure: IRRIGATION AND DEBRIDEMENT, LOWER EXTREMITY LEFT LEG, ANTIBIOTIC BEAD REMOVAL AND PRIMARY CLOSURE;  Surgeon: Etienne Maier MD;  [facility] OR    ORTHOPEDIC SURGERY      right foot surgery        MEDICATIONS:  Current Outpatient Medications   Medication    acetaminophen (TYLENOL) 325 MG tablet    buPROPion 450 MG TB24    busPIRone (BUSPAR) 10 MG tablet    ceFEPIme (MAXIPIME) 2 g vial    Cranberry 400 MG CAPS    cyclobenzaprine (FLEXERIL) 10 MG tablet    DULoxetine (CYMBALTA) 60 MG capsule    fluticasone (FLONASE) 50 MCG/ACT nasal spray    hydrOXYzine (ATARAX) 25 MG tablet    loratadine (CLARITIN) 10 MG tablet    losartan-hydrochlorothiazide (HYZAAR) 50-12.5 MG tablet    melatonin 5 MG tablet    metroNIDAZOLE (FLAGYL) 500 MG tablet    naloxone (NARCAN) 0.4 MG/ML injection    omeprazole (PRILOSEC) 40 MG DR capsule    oxyCODONE (OXYCONTIN) 20 MG 12 hr tablet    oxyCODONE (ROXICODONE) 5 MG tablet    polyethylene glycol (MIRALAX) 17 GM/Dose powder    polyethylene glycol-propylene glycol  (SYSTANE ULTRA) 0.4-0.3 % SOLN ophthalmic solution    prednisoLONE acetate (PRED FORTE) 1 % ophthalmic suspension    pregabalin (LYRICA) 150 MG capsule    semaglutide (OZEMPIC) 2 MG/3ML pen    Semaglutide-Weight Management (WEGOVY) 0.25 MG/0.5ML pen    Semaglutide-Weight Management (WEGOVY) 0.5 MG/0.5ML pen    senna-docusate (SENOKOT-S/PERICOLACE) 8.6-50 MG tablet    vancomycin (VANCOCIN) 1000 mg in dextrose 5% 200 mL PREMIX     No current facility-administered medications for this visit.        ALLERGIES:  Allergies   Allergen Reactions    Adhesive Tape Hives     From tape from surgery    Benzoin Hives and Rash    Droperidol Anaphylaxis    Prednisone      vomiting    Vitamin D (Calciferol) Rash     flairs up his sarcoidosis        SOCIAL HISTORY:  Social History     Socioeconomic History    Marital status: Single   Tobacco Use    Smoking status: Former     Types: Cigarettes     Quit date: 2011     Years since quittin.1   Substance and Sexual Activity    Alcohol use: No    Drug use: No       FAMILY HISTORY:  No family history on file.     Status: Ht 1.829 m (6')   BMI 31.19 kg/m      A/P: The patient is currently undergoing other surgical care and follow-up. Surgical coverage of his open wound would be optimal prior to any surgery for his peristomal hernia. Recognition of obstructive symptoms or strangulation related to the peristomal hernia will be necessary, and the patient is knowledgeable about incarceration or stand strangulation. The patient will call return should he develop symptoms of incarceration obstruction of the peristomal hernia. The patient is scheduled to be reevaluated by the General Surgery service following plastic surgery coverage of his open wound.

## 2023-12-15 ENCOUNTER — DOCUMENTATION ONLY (OUTPATIENT)
Dept: ORTHOPEDICS | Facility: CLINIC | Age: 43
End: 2023-12-15
Payer: MEDICARE

## 2023-12-15 ENCOUNTER — TELEPHONE (OUTPATIENT)
Dept: INFECTIOUS DISEASES | Facility: CLINIC | Age: 43
End: 2023-12-15
Payer: MEDICARE

## 2023-12-15 DIAGNOSIS — M86.9 OSTEOMYELITIS (H): Primary | ICD-10-CM

## 2023-12-15 DIAGNOSIS — T87.44: ICD-10-CM

## 2023-12-15 NOTE — TELEPHONE ENCOUNTER
Dr. Ralph,     [facility] wanted you to be aware that this Pt has not had labs done since 12/5 and is on Vanco. [facility] is not able to properly dose Vanco without labs.     Pt also missed appt on 12/13.     [facility] pharmacist talked to pt 12/13. Pt stated he was not having any side effects and that he would go to lab 12/14 which he did not.   [facility] tried to call Pt today, but did not answer his phone, and  is full.     Spoke with nurse at [facility] to determine if Pt has been attending appts.   dressing done 7th, no showed appt on 12/14 for dressing change and labs. Already has scheduled appts for 21st and 28th.   Attempted to reach Pt to request he go to the the infusion center to get dressing changed and labs done, but went straight to  and VM was full.   Infusion center worked him in for Monday 12/18 at 9:15am.     Writer originally sent orders to [facility] for 1x a week dressing change and labs. [facility] had discharged Pt with orders to another facility close to his home, but cannot do PICC line dressing changes.

## 2023-12-15 NOTE — PROGRESS NOTES
I called the BT # and got no answer, I could not leave a message with my direct dial # to call me back to follow up since the VM had not been set up or was full. Anant Fiore CPO, LPO.

## 2023-12-19 ENCOUNTER — PATIENT OUTREACH (OUTPATIENT)
Dept: SURGERY | Facility: CLINIC | Age: 43
End: 2023-12-19
Payer: MEDICARE

## 2023-12-19 NOTE — LETTER
12/19/2023       RE: Saqib NEGRETE Dario  [address] [facility]  [place] [address]       Good morning,     Dr. Mancilla would like to see you in the clinic in the Spring.  He has morning clinics scheduled on the following dates:     3/29/24- Morning  4/8/24- Morning  4/10/24- Morning     Please let me know if any of these dates will work for you.  You can call [phone] to schedule an in-person clinic visit.     Yamilet Estrella RN, BSN, CNOR, RNFA  [facility] General Surgery

## 2023-12-20 ENCOUNTER — TELEPHONE (OUTPATIENT)
Dept: WOUND CARE | Facility: CLINIC | Age: 43
End: 2023-12-20
Payer: MEDICARE

## 2023-12-20 RX ORDER — DOXYCYCLINE 100 MG/1
100 CAPSULE ORAL 2 TIMES DAILY
Qty: 60 CAPSULE | Refills: 0 | Status: ON HOLD | OUTPATIENT
Start: 2023-12-20 | End: 2024-06-03

## 2023-12-20 RX ORDER — METRONIDAZOLE 500 MG/1
500 TABLET ORAL 3 TIMES DAILY
Qty: 90 TABLET | Refills: 0 | Status: ON HOLD | OUTPATIENT
Start: 2023-12-20 | End: 2024-06-03

## 2023-12-20 NOTE — TELEPHONE ENCOUNTER
Was able to reach Pt today.   He was having difficulty getting to the lab for dressing changes and labs. This is required for pharmacy to monitor and dose vanco. He will be going to get labs and PICC dressing change tomorrow.   Pt has NOT been taking Metronidazole since discharge. Sent with his doxy today.     Sent Rx of Doxycycline per Dr. Ralph:   Let's stop the Vanc, switch to Oral Doxycycline 100 mg PO BID until he sees Stefanie. Keep the Cefepime (no oral options for his pseudomonas unfortunately), and PO flagyl.     [facility] notified of the plan to change Vanco to oral med.     Signed Prescriptions:                        Disp   Refills    doxycycline hyclate (VIBRAMYCIN) 100 MG ca*60 cap*0        Sig: Take 1 capsule (100 mg) by mouth 2 times daily  Authorizing Provider: KYLE RALPH  Ordering User: ALYSSA ROBLES    metroNIDAZOLE (FLAGYL) 500 MG tablet       90 tab*0        Sig: Take 1 tablet (500 mg) by mouth 3 times daily  Authorizing Provider: KYLE RALPH  Ordering User: ALYSSA ROBLES    Sent request to see if the in person on 12/27 can be converted to virtual to determine EOT plan.     Appt   Future Appointments   Date Time Provider Department Center   12/21/2023  1:15 PM Ne Zavala MD [facility]   12/27/2023  1:00 PM Sanjuanita Martin MD [facility]   1/4/2024  1:30 PM Bloch, Lauren Turner, [facility]   1/19/2024  3:00 PM Etienne Maier MD [facility]   3/6/2024  1:00 PM Viv Traylor PA-C [facility]

## 2023-12-20 NOTE — TELEPHONE ENCOUNTER
[facility]    Who is the name of the provider?:  Sally    What is the location you see this provider at/preferred location?: [place]  Person calling / Facility: N[facility]  Phone number:  [phone]   Nurse call back needed:  YES     Reason for call:  Appointment tomorrow, 12/21/23.  Patient is not feeling up to an in person appointment.  Requesting to cancel or change to virtual/phone.  Please call her to confirm the change, parents bring him to appointment.  Also, he needs wound supplies, totally out.      Pharmacy location:  NA  Outside Imaging: NA   Can we leave a detailed message on this number?  YES

## 2023-12-21 ENCOUNTER — HOSPITAL ENCOUNTER (OUTPATIENT)
Dept: WOUND CARE | Facility: CLINIC | Age: 43
Discharge: HOME | End: 2023-12-21
Attending: SURGERY
Payer: MEDICARE

## 2023-12-21 ENCOUNTER — TELEPHONE (OUTPATIENT)
Dept: INFECTIOUS DISEASES | Facility: CLINIC | Age: 43
End: 2023-12-21
Payer: MEDICARE

## 2023-12-21 DIAGNOSIS — L89.324 DECUBITUS ULCER OF LEFT ISCHIAL AREA, STAGE IV (H): ICD-10-CM

## 2023-12-21 DIAGNOSIS — L89.154 DECUBITUS ULCER OF COCCYGEAL REGION, STAGE 4 (H): ICD-10-CM

## 2023-12-21 DIAGNOSIS — L89.154 PRESSURE INJURY OF SACRAL REGION, STAGE 4 (H): Primary | ICD-10-CM

## 2023-12-21 NOTE — TELEPHONE ENCOUNTER
Ok to change in person to virtual, notified pt.   He was not able to get labs/line dressing change done today, but will try to go in tomorrow.        Future Appointments   Date Time Provider Department Center   12/27/2023  1:00 PM Sanjuanita Martin MD [facility]   1/4/2024  1:30 PM Bloch, Lauren Turner, [facility]   1/19/2024  3:00 PM Etienne Maier MD [facility]   3/6/2024  1:00 PM Viv Traylor PA-C [facility]

## 2023-12-21 NOTE — DISCHARGE INSTRUCTIONS
"Saqib Reynosousimary      1980    [facility] Fax [phone]     To do list for FLAP surgery:  1. Done: Group 2 Mattress: [facility] P: [phone] Fax: [phone]    2. Waiting for new wheel chair and cushion from [facility]  3. DONE MRI  4. Continue diet high in protein is important for wound healing: protein shakes or bars, Rahul  5. NO TOBACCO  6. Colostomy in place  7. Indwelling Catheter  8. H&P with Primary Care MD 30 days prior to surgery    Wound Dressing Change: Right Sacral/Gluteal wound  -Cleanse with mild unscented soap (such as Cetaphil, Cerave or Dove) and water  -Apply small amount of vinegar soak  (recipe below) on gauze, lay into wound bed, let sit for 5-10 minutes, remove gauze   -Use a Qtip to pack tunnel and wound defect with about 1/4th of 4\" AMD roll gauze, use one piece and leave tail out of wound for retrieval  -Cover with 1/2 of 5x9\" ABD pad and  2\" Medipore tape  Change 2 times a day and as needed for soilage     How to prepare the vinegar solution:  1. Mix 2 tablespoons of white household vinegar with 2 cups of water  2. Store in the refrigerator and use for up to 5 days      Repositioning:  Bed: Reposition MINIMALLY every 1-2 hours in bed to relieve pressure and promote perfusion to tissue. Avoid pressure to area.  Chair: When up to the chair, do not sit for longer than one hour total before returning to bed for at least 60 minutes to relieve pressure and promote perfusion to the tissue. Completely recline/tilt for 15 minutes each hour. Sit on a chair cushion when up to the chair.  Group 2 Mattress: [facility] done     Patient will need Group 2, low air loss mattress due to large, stage 4 ulceration on the patient's pelvis that has failed to improve on a normal mattress despite regular wound cares and repositioning. A group 1 mattress will not be adequate to offload these severe ulcerations. Patient has impaired sensation and is unable to reposition " independently.     Jayashree Zavala M.D. December 21, 2023    Call us at [phone] if you have any questions about your wounds, have redness or swelling around your wound, have a fever of 101 degrees Fahrenheit or greater or if you have any other problems or concerns. We answer the phone Monday through Friday 8 am to 4 pm, please leave a message as we check the voicemail frequently throughout the day.     If you had a positive experience please indicate that on your patient satisfaction survey form that [facility] will be sending you.  It was a pleasure meeting with you today.  Thank you for allowing me and my team the privilege of caring for you today.  YOU are the reason we are here, and I truly hope we provided you with the excellent service you deserve.  Please let us know if there is anything else we can do for you so that we can be sure you are leaving completely satisfied with your care experience.      If you have any billing related questions please call the [facility] Business office at [phone]. The clinic staff does not handle billing related matters.  If you are scheduled to have a follow up appointment, you will receive a reminder call the day before your visit. On the appointment day please arrive 15 minutes prior to your appointment time. If you are unable to keep that appointment, please call the clinic to cancel or reschedule. If you are more than 10 minutes late or greater for your scheduled appointment time, the clinic policy is that you may be asked to reschedule.

## 2023-12-21 NOTE — PROGRESS NOTES
Patient called for a telephone visit. Certified Wound Care Nurse time spent evaluating patient record, completed a full evaluation and documented wound(s) & ivette-wound skin; provided recommendation based on treatment plan. Reviewed discharge instructions, patient education, and discussed plan of care with appropriate medical team staff members and patient and/or family members.   Patient Active Problem List   Diagnosis    Removal of pin, plate, kristi, or screw    Open wound of lower leg, left, initial encounter    Pressure ulcer of sacral region, unspecified stage    Necrotizing fasciitis of lower leg (H)    S/P below knee amputation, left (H)    Adjustment disorder with mixed anxiety and depressed mood    Allergic rhinitis, unspecified    Anxiety    Band-shaped keratopathy    Burst fracture of thoracic vertebra (H)    Cataract    Chorioretinal scar    Chronic, continuous use of opioids    Colostomy present (H)    Decubitus ulcer of coccygeal region, stage 4 (H)    Decubitus ulcer of left ischial area, stage IV (H)    Pressure ulcer of coccygeal region, stage III (H)    Degeneration of intervertebral disc of lumbar region    Diplopia    Disorder of macula of retina    Compression fracture of vertebra (H)    Fracture of cervical vertebra (H)    Gastro-esophageal reflux disease without esophagitis    Posttraumatic stress disorder    History of DVT (deep vein thrombosis)    Hypertension    Insomnia    Iris adhesion, posterior    Mild episode of recurrent major depressive disorder (H24)    Neurogenic bladder    Narrowing of intervertebral disc space    Muscle weakness (generalized)    Neurogenic pain    Neuropathic pain of both legs    Numbness of hand    Open wound of buttock, right, subsequent encounter    Class 1 obesity with serious comorbidity and body mass index (BMI) of 32.0 to 32.9 in adult, unspecified obesity type    Osteopenia    Chronic pain disorder    Panuveitis    Paraplegia, unspecified (H)    Restless  legs syndrome    Sacral osteomyelitis (H)    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    Sarcoidosis    Secondary hypocortisolism (H24)    Sinus tachycardia    Skin ulcer (H)    Vertebral subluxation complex    Vitamin D deficiency    Wound dehiscence, surgical, initial encounter    Cutaneous abscess, unspecified    Hernia, ventral    T12 spinal cord injury (H)    Parastomal hernia without obstruction or gangrene    Infection of amputation stump of left lower extremity (H)     Past Medical History:   Diagnosis Date    Brain injury with brief loss of consciousness (H) 12/14/2015    Candida esophagitis (H) 08/03/2022    Degenerative joint disease     Gastro-oesophageal reflux disease     Hypercalcemia 08/03/2022    Hypokalemia 08/03/2022    Hypomagnesemia 08/03/2022     Labs:   Recent Labs   Lab Test 11/18/23  0805 11/16/23  0749 11/15/23  0552 09/07/23  0423 09/06/23  0815   ALBUMIN 3.6   < > 3.3*   < >  --    HGB 13.9   < > 13.1*   < >  --    INR  --   --  1.06   < >  --    WBC 6.0   < > 6.1   < >  --    A1C  --   --   --   --  5.5    < > = values in this interval not displayed.     Nutrition requirements were discussed with patient today.  Vitals:  There were no vitals taken for this visit.  Wound:   Wound Pressure injury community acquired (Active)       Wound Sacrum Pressure injury community acquired Stage 4 (Active)       Wound Thigh Skin tear;Other (comment) (Active)       Wound (used by OP [facility] only) 01/26/23 0905 Right sacral pressure injury (Active)   Thickness/Stage Stage 4 12/21/23 1400   Base granulating;slough 12/21/23 1400   Periwound intact 12/21/23 1400   Periwound Temperature warm 12/21/23 1400   Periwound Skin Turgor soft 12/21/23 1400   Edges open 12/21/23 1400   Length (cm) 2 12/21/23 1400   Width (cm) 2 12/21/23 1400   Depth (cm) 3 12/21/23 1400   Wound (cm^2) 4 cm^2 12/21/23 1400   Wound Volume (cm^3) 12 cm^3 12/21/23 1400   Wound healing % -92.31 12/21/23 1400   Tunneling [Depth (cm)/Location] 4  "o'/ 7cm 12/21/23 1400   Undermining [Depth (cm)/Location] 5.8 cm/ 5-3 oclock 10/10/23 1437   Drainage Characteristics/Odor serous 12/21/23 1400   Drainage Amount copious;large 12/21/23 1400   Care, Wound non-select wound debridement performed. 12/21/23 1400       Incision/Surgical Site 09/07/23 Left;Lower Leg (Active)       Incision/Surgical Site 11/15/23 Left;Lower Leg (Active)    Photo: in media section    Further instructions from your care team         Saqib Bishop      1980  [facility] Phone: [phone]   Fax: [phone]    To do list for FLAP surgery:  1. Done: Group 2 Mattress: [facility] P: [phone] Fax: [phone]    2. Waiting for new wheel chair and cushion from [facility]  3. DONE MRI  4. Continue diet high in protein is important for wound healing: protein shakes or bars, Rahul  5. NO TOBACCO  6. Colostomy in place  7. Indwelling Catheter  8. H&P with Primary Care MD 30 days prior to surgery    Wound Dressing Change: Right Sacral/Gluteal wound  -Cleanse with mild unscented soap (such as Cetaphil, Cerave or Dove) and water  -Apply small amount of vinegar soak  (recipe below) on gauze, lay into wound bed, let sit for 5-10 minutes, remove gauze   -Use a Qtip to pack tunnel and wound defect with about 1/4th of 4\" AMD roll gauze, use one piece and leave tail out of wound for retrieval  -Cover with 1/2 of 5x9\" ABD pad and  2\" Medipore tape  Change 2 times a day and as needed for soilage     How to prepare the vinegar solution:  1. Mix 2 tablespoons of white household vinegar with 2 cups of water  2. Store in the refrigerator and use for up to 5 days      Repositioning:  Bed: Reposition MINIMALLY every 1-2 hours in bed to relieve pressure and promote perfusion to tissue. Avoid pressure to area.  Chair: When up to the chair, do not sit for longer than one hour total before returning to bed for at least 60 minutes to relieve pressure and promote perfusion to the " tissue. Completely recline/tilt for 15 minutes each hour. Sit on a chair cushion when up to the chair.  Group 2 Mattress: [facility] done     Patient will need Group 2, low air loss mattress due to large, stage 4 ulceration on the patient's pelvis that has failed to improve on a normal mattress despite regular wound cares and repositioning. A group 1 mattress will not be adequate to offload these severe ulcerations. Patient has impaired sensation and is unable to reposition independently.     Jayashree Zavala M.D. December 21, 2023

## 2023-12-22 ENCOUNTER — DOCUMENTATION ONLY (OUTPATIENT)
Dept: ORTHOPEDICS | Facility: CLINIC | Age: 43
End: 2023-12-22
Payer: MEDICARE

## 2023-12-26 ENCOUNTER — TELEPHONE (OUTPATIENT)
Dept: INFECTIOUS DISEASES | Facility: CLINIC | Age: 43
End: 2023-12-26
Payer: MEDICARE

## 2023-12-26 NOTE — TELEPHONE ENCOUNTER
[facility] inquiring about Pt's labs and dressing changes. Last noted dressing change was 12/7 (see telephone notes from 12/15)   Changed Vanco to PO since Pt was not able to get labs routinely done to monitor vanco therapy.     Spoke with Pt today and he was going in at 2:30 today for labs/CLC.   [facility] notified.

## 2023-12-27 ENCOUNTER — VIRTUAL VISIT (OUTPATIENT)
Dept: INFECTIOUS DISEASES | Facility: CLINIC | Age: 43
End: 2023-12-27
Attending: STUDENT IN AN ORGANIZED HEALTH CARE EDUCATION/TRAINING PROGRAM
Payer: MEDICARE

## 2023-12-27 ENCOUNTER — TELEPHONE (OUTPATIENT)
Dept: ORTHOPEDICS | Facility: CLINIC | Age: 43
End: 2023-12-27
Payer: MEDICARE

## 2023-12-27 VITALS — WEIGHT: 245 LBS | BODY MASS INDEX: 32.47 KG/M2 | HEIGHT: 73 IN

## 2023-12-27 DIAGNOSIS — T87.44 INFECTION OF LEFT BELOW KNEE AMPUTATION (H): Primary | ICD-10-CM

## 2023-12-27 DIAGNOSIS — A49.8 PSEUDOMONAS INFECTION: ICD-10-CM

## 2023-12-27 DIAGNOSIS — Z79.2 RECEIVING INTRAVENOUS ANTIBIOTIC TREATMENT AS OUTPATIENT: ICD-10-CM

## 2023-12-27 DIAGNOSIS — G82.20 PARAPLEGIA (H): ICD-10-CM

## 2023-12-27 PROCEDURE — 99214 OFFICE O/P EST MOD 30 MIN: CPT | Mod: GC | Performed by: STUDENT IN AN ORGANIZED HEALTH CARE EDUCATION/TRAINING PROGRAM

## 2023-12-27 ASSESSMENT — PAIN SCALES - GENERAL: PAINLEVEL: SEVERE PAIN (6)

## 2023-12-27 NOTE — NURSING NOTE
Is the patient currently in the state of [place]? YES    Visit mode:VIDEO    If the visit is dropped, the patient can be reconnected by: VIDEO VISIT: Text to cell phone:   No relevant phone numbers on file.       Will anyone else be joining the visit? NO  (If patient encounters technical issues they should call [phone]    How would you like to obtain your AVS? MyChart    Are changes needed to the allergy or medication list? Pt stated no changes to allergies and Pt stated no med changes    Reason for visit: No chief complaint on file.    Key CARTAGENA

## 2023-12-27 NOTE — Clinical Note
12/27/2023       RE: Saqib Bishop  [address] [place] [address]     Dear Colleague,    Thank you for referring your patient, Saqib Bishop, to the [facility] INFECTIOUS DISEASE CLINIC [place] [facility]. Please see a copy of my visit note below.     [facility]    Division of Infectious Diseases and International Medicine    CLINICAL INFECTIOUS DISEASE OUTPATIENT FOLLOW UP NOTE     Patient:  Saqib Bishop, Date of birth 1980, Medical record number [id number]  Referred by: No ref. provider found   Reason for Visit: Follow up          Assessment and Plan   Stump infection, involving left BKA s/p sharp excisional debridement, placement of antibiotic beads deep to the fascial layer adjacent to bone 11/8/2023 11/8 Intra-op cultures: NGTD  11/3 superficial wound cx: Pseudomonas aeruginosa; GS (GPC, GNB)  10/1 superficial wound cx: Enterobacter cloacae     Hx of necrotizing fascitis of LLE s/p guillotine 8/28/2023, s/p I&D of residual limb 8/30/2023, s/p BKA 9/7/2023  Paraplegia   S/p colostomy  Chronic indwelling Dyer, last exchange just prior to admission    Had been on Vancomycin and Cefepime since 11/15. Never started the Metronidazole. On 11/15 was switched from Vancomycin to Doxycycline which he has been taking. Has essentially been on empiric therapy for ~6 weeks with good tolerance of the medications and reported near-complete healing of the LLE wound without signs of ongoing infection. As antibiotic course is adequate for deep fascial infx / osteomyelitis I think that we should stop the antibiotics at this time and monitor clinically with ongoing wound cares. Does not need dedicated ID follow up but if any e/o erythema, pain, drainage, etc after stopping the antibiotics I would want to know.     PLAN:   - Stop antibiotics   - OK to remove PICC line   - Continue with dedicated wound cares, inform ID  "provider if any e/o developing erythema, pain or drainage after cessation of antibiotics     RTC: PRN       History of Present Illness:     Saqib Bishop is a 43 year old y.o with a PMH of T12 SCI c/b paraplegia, colostomy, chronic sacral decubitus ulcer s/p flap and tx for OM 12/2020 c/b dehiscence 10/2021 s/p flap 2023 which failed and resulted in chronic ulcer.      Per prior ID notes,  \"In August presented with left foot necrotizing fascitis s/p guillotine 8/28/2023, s/p I&D of residual limb 8/30, then BKA 9/7/2023. Antibiotics were stopped post surgery per ID recommendations (sign off note 9/9). It never healed completely per pt with continued redness and discharge,. Was seen in ER 10/1. Superficial wound cx grew Enterobacter cloacae and received a course of bactrim without complete resolution (see S below under micro, Cefepime CARMEN </= 2). Evaluated by Ortho in clinic (Dr. Maier) 10/6. Again evaluated in ER ([facility]) 11/3, wound cx grew Pseudomonas aeruginosa. Was given bactrim, levofloxacin but on review this strain appears to have been  R to Cipro and Levo (see S below under micro). Continued to have discharge, redness, and a new palpable swelling (x 1 m PRA) around stump incision site. Presented again at outside facility, CT was obtained which showed collection around stump concern for abscess. Transferred to [facility] for surgical intervention.     Underwent surgery 11/8, reviewed OP findings: Sharp excisional debridement and irrigation of left transtibial amputation wound and placement of antibiotic beads deep to the fascial layer adjacent to bone; no obvious purulence but wound dehiscence/necrosis involving full thickness through skin, in 3 separate focal areas in residual LLE. Intra-op cultures were obtained, remains negative but was on at least some coverage of antibiotics prior as above     Since 11/7 been on empiric Vanc, cefepime, and 11/8 - flagyl was added by ID     9/7 path with viable proximal margin " "with mild chronic inflammation      Now s/p 11/15 repeat I&D and antibiotic bead removal  Op findings: \"No visible purulence. All prior 10 antibiotic beads removed.. Wound was able to be re-closed primarily without any significant tension. Prior small lateral wound remained closed and dry. Prior small open wound just distal to knee had been dressed open and was now dry and covered by an eschar .\"    It was recommended that he complete 4 weeks of Vancomycin, Cefepime and Metronidazole given proximity to bone (11/15 - 12/13). He presents today for follow up.        Key Prior Lab/Imaging and other data   11/8/23 Surgical cultures LLE   No growth     11/3/23 LLE wound cx   Wound Culture Pseudomonas aeruginosa Abnormal    Susceptibility    Organism Antibiotic Susceptibility   Pseudomonas aeruginosa Cefepime 4: Susceptible   Pseudomonas aeruginosa Ceftazidime 4: Inducible Beta-lactamase   Pseudomonas aeruginosa Ciprofloxacin >2: Resistant   Pseudomonas aeruginosa Gentamycin <=4: Susceptible   Pseudomonas aeruginosa Levofloxacin >4: Resistant   Pseudomonas aeruginosa Meropenem 2: Susceptible   Pseudomonas aeruginosa Piperacillin + Tazobactam Resistant     10/1/23 LLE wound cx   Wound Culture Enterobacter cloacae Abnormal    Susceptibility    Organism Antibiotic Susceptibility   Enterobacter cloacae Amoxicillin/Clavulanic Acid >16/8: Resistant   Enterobacter cloacae Ampicillin 16: Predicted resistant interpretation   Enterobacter cloacae Ampicillin/Sulbactam <=8/4: Inducible Beta-lactamase   Enterobacter cloacae Cefazolin >16: Resistant   Enterobacter cloacae Cefepime <=2: Susceptible   Enterobacter cloacae Ceftazidime 4: Inducible Beta-lactamase   Enterobacter cloacae Ceftriaxone <=1: Inducible Beta-lactamase   Enterobacter cloacae Ciprofloxacin <=0.25: Susceptible   Enterobacter cloacae Ertapenem <=0.5: Susceptible   Enterobacter cloacae Gentamycin <=4: Susceptible   Enterobacter cloacae Levofloxacin <=0.5: Susceptible "   Enterobacter cloacae Meropenem <=1: Susceptible   Enterobacter cloacae Trimethoprim + Sulfamethoxazole <=2/38: Susceptible     8/30/23 Tissue cx LLE   Culture 1+ Parvimonas micra Abnormal    Susceptibility testing requested by Dr. Page pager [phone].    Susceptibilities not routinely done, refer to antibiogram to view typical susceptibility profiles        Susceptibility     Parvimonas micra     CARMEN     Amoxicillin/Clavulanate 0.064 ug/mL Susceptible     Cefotaxime 0.125 ug/mL Susceptible     Clindamycin 0.38 ug/mL Susceptible     metronidazole 0.032 ug/mL Susceptible     Penicillin 0.016 ug/mL Susceptible                            Review of Systems:     The following systems were reviewed with the patient as they pertain to the case and are negative unless noted here or above in the HPI. The patient was  able to participate in the following review of systems    REVIEW OF SYSTEMS:   10 point ROS is conducted and is otherwise negative unless noted in the HPI        Other Medical History:     Attempt was made to collect past, family and social history during this encounter,  this information was reviewed with the patient and updated    Allergies Adhesive tape, Benzoin, Droperidol, Prednisone, and Vitamin d (calciferol)    Past Medical History  Past Medical History:   Diagnosis Date    Brain injury with brief loss of consciousness (H) 12/14/2015    Candida esophagitis (H) 08/03/2022    Degenerative joint disease     Gastro-oesophageal reflux disease     Hypercalcemia 08/03/2022    Hypokalemia 08/03/2022    Hypomagnesemia 08/03/2022    PastSurgical History   has a past surgical history that includes back surgery; orthopedic surgery; Explore spine, remove hardware, combined (1/13/2012); Amputate leg below knee (Left, 8/27/2023); Irrigation and debridement lower extremity, combined (Left, 8/30/2023); Amputate leg below knee (Left, 9/2/2023); Irrigation and debridement lower extremity, combined (Left, 9/7/2023);  Amputate leg below knee (Left, 9/7/2023); Irrigation and debridement foot, combined (Left, 11/8/2023); and Irrigation and debridement lower extremity, combined (Left, 11/15/2023).   Family History  No family history on file. Social History  He reports that he quit smoking about 12 years ago. His smoking use included cigarettes. He does not have any smokeless tobacco history on file. He reports that he does not drink alcohol and does not use drugs.   Notable Exposures Listed below if pertinent    Vaccination History:  Immunization History   Administered Date(s) Administered    COVID-19 MONOVALENT 12+ (Pfizer) 05/07/2021, 05/28/2021    Mantoux Tuberculin Skin Test 09/11/2002             Physical Exam:     VITAL SIGNS:  There were no vitals taken for this visit.  Video visit     GENERAL APPEARANCE: Not in acute distress    PHYSICAL EXAM:   Eyes:     No ptosis, no discharge, no scleral icterus  Respiratory:     Inspection: Not in respiratory distress, Chest expansion symmetrical  Skin:     Dry and intact  Neurologic:     Higher Mental Function: Conversant, AOx4   Facial asymmetry grossly absent  Psychiatric:     Appropriate        Signature:     Sanjuanita Martin MD   Infectious Disease Fellow    Case discussed with the supervising attending ID physician, Dr Whipple     This dictation was prepared in part using Dragon recognition software.  As a result errors may occur. When identified these transcription errors have been corrected.  While every attempt is made to correct errors during dictation, errors may still exist       Virtual Visit Details    Type of service:  Video Visit   Video Start Time:  1:05pm  Video End Time: 1:25pm    Originating Location (pt. Location): Home  {PROVIDER LOCATION On-site should be selected for visits conducted from your clinic location or adjoining [facility] hospital, academic office, or other nearby [facility] building. Off-site should be selected for all other provider locations, including  home:[id number]}  Distant Location (provider location):  On-site  Platform used for Video Visit: Ivana    Unable to edit existing provider note, new note created        Again, thank you for allowing me to participate in the care of your patient.      Sincerely,    Sanjuanita Martin MD

## 2023-12-27 NOTE — PROGRESS NOTES
[facility]    Division of Infectious Diseases and International Medicine    CLINICAL INFECTIOUS DISEASE OUTPATIENT FOLLOW UP NOTE     Patient:  Saqib Bishop, Date of birth 1980, Medical record number [id number]  Referred by: No ref. provider found   Reason for Visit: Follow up          Assessment and Plan   Stump infection, involving left BKA s/p sharp excisional debridement, placement of antibiotic beads deep to the fascial layer adjacent to bone 11/8/2023 11/8 Intra-op cultures: NGTD  11/3 superficial wound cx: Pseudomonas aeruginosa; GS (GPC, GNB)  10/1 superficial wound cx: Enterobacter cloacae     Hx of necrotizing fascitis of LLE s/p guillotine 8/28/2023, s/p I&D of residual limb 8/30/2023, s/p BKA 9/7/2023  Paraplegia   S/p colostomy  Chronic indwelling Dyer, last exchange just prior to admission    Had been on Vancomycin and Cefepime since 11/15. Never started the Metronidazole. On 11/15 was switched from Vancomycin to Doxycycline which he has been taking. Has essentially been on empiric therapy for ~6 weeks with good tolerance of the medications and reported near-complete healing of the LLE wound without signs of ongoing infection. As antibiotic course is adequate for deep fascial infx / osteomyelitis I think that we should stop the antibiotics at this time and monitor clinically with ongoing wound cares. Does not need dedicated ID follow up but if any e/o erythema, pain, drainage, etc after stopping the antibiotics I would want to know.     PLAN:   - Stop antibiotics   - OK to remove PICC line   - Continue with dedicated wound cares, inform ID provider if any e/o developing erythema, pain or drainage after cessation of antibiotics     RTC: PRN       History of Present Illness:     Saqib Bishop is a 43 year old y.o with a PMH of T12 SCI c/b paraplegia, colostomy, chronic sacral decubitus ulcer s/p flap and tx for OM 12/2020 c/b dehiscence 10/2021 s/p flap 2023  "which failed and resulted in chronic ulcer.      Per prior ID notes,  \"In August presented with left foot necrotizing fascitis s/p guillotine 8/28/2023, s/p I&D of residual limb 8/30, then BKA 9/7/2023. Antibiotics were stopped post surgery per ID recommendations (sign off note 9/9). It never healed completely per pt with continued redness and discharge,. Was seen in ER 10/1. Superficial wound cx grew Enterobacter cloacae and received a course of bactrim without complete resolution (see S below under micro, Cefepime CARMEN </= 2). Evaluated by Ortho in clinic (Dr. Maier) 10/6. Again evaluated in ER ([facility]) 11/3, wound cx grew Pseudomonas aeruginosa. Was given bactrim, levofloxacin but on review this strain appears to have been  R to Cipro and Levo (see S below under micro). Continued to have discharge, redness, and a new palpable swelling (x 1 m PRA) around stump incision site. Presented again at outside facility, CT was obtained which showed collection around stump concern for abscess. Transferred to [facility] for surgical intervention.     Underwent surgery 11/8, reviewed OP findings: Sharp excisional debridement and irrigation of left transtibial amputation wound and placement of antibiotic beads deep to the fascial layer adjacent to bone; no obvious purulence but wound dehiscence/necrosis involving full thickness through skin, in 3 separate focal areas in residual LLE. Intra-op cultures were obtained, remains negative but was on at least some coverage of antibiotics prior as above     Since 11/7 been on empiric Vanc, cefepime, and 11/8 - flagyl was added by ID     9/7 path with viable proximal margin with mild chronic inflammation      Now s/p 11/15 repeat I&D and antibiotic bead removal  Op findings: \"No visible purulence. All prior 10 antibiotic beads removed.. Wound was able to be re-closed primarily without any significant tension. Prior small lateral wound remained closed and dry. Prior small open wound just distal " "to knee had been dressed open and was now dry and covered by an eschar .\"    It was recommended that he complete 4 weeks of Vancomycin, Cefepime and Metronidazole given proximity to bone (11/15 - 12/13). He presents today for follow up.        Key Prior Lab/Imaging and other data   11/8/23 Surgical cultures LLE   No growth     11/3/23 LLE wound cx   Wound Culture Pseudomonas aeruginosa Abnormal    Susceptibility    Organism Antibiotic Susceptibility   Pseudomonas aeruginosa Cefepime 4: Susceptible   Pseudomonas aeruginosa Ceftazidime 4: Inducible Beta-lactamase   Pseudomonas aeruginosa Ciprofloxacin >2: Resistant   Pseudomonas aeruginosa Gentamycin <=4: Susceptible   Pseudomonas aeruginosa Levofloxacin >4: Resistant   Pseudomonas aeruginosa Meropenem 2: Susceptible   Pseudomonas aeruginosa Piperacillin + Tazobactam Resistant     10/1/23 LLE wound cx   Wound Culture Enterobacter cloacae Abnormal    Susceptibility    Organism Antibiotic Susceptibility   Enterobacter cloacae Amoxicillin/Clavulanic Acid >16/8: Resistant   Enterobacter cloacae Ampicillin 16: Predicted resistant interpretation   Enterobacter cloacae Ampicillin/Sulbactam <=8/4: Inducible Beta-lactamase   Enterobacter cloacae Cefazolin >16: Resistant   Enterobacter cloacae Cefepime <=2: Susceptible   Enterobacter cloacae Ceftazidime 4: Inducible Beta-lactamase   Enterobacter cloacae Ceftriaxone <=1: Inducible Beta-lactamase   Enterobacter cloacae Ciprofloxacin <=0.25: Susceptible   Enterobacter cloacae Ertapenem <=0.5: Susceptible   Enterobacter cloacae Gentamycin <=4: Susceptible   Enterobacter cloacae Levofloxacin <=0.5: Susceptible   Enterobacter cloacae Meropenem <=1: Susceptible   Enterobacter cloacae Trimethoprim + Sulfamethoxazole <=2/38: Susceptible     8/30/23 Tissue cx LLE   Culture 1+ Parvimonas micra Abnormal    Susceptibility testing requested by Dr. Page pager [phone].    Susceptibilities not routinely done, refer to antibiogram to view " typical susceptibility profiles        Susceptibility     Parvimonas micra     CARMEN     Amoxicillin/Clavulanate 0.064 ug/mL Susceptible     Cefotaxime 0.125 ug/mL Susceptible     Clindamycin 0.38 ug/mL Susceptible     metronidazole 0.032 ug/mL Susceptible     Penicillin 0.016 ug/mL Susceptible                            Review of Systems:     The following systems were reviewed with the patient as they pertain to the case and are negative unless noted here or above in the HPI. The patient was  able to participate in the following review of systems    REVIEW OF SYSTEMS:   10 point ROS is conducted and is otherwise negative unless noted in the HPI        Other Medical History:     Attempt was made to collect past, family and social history during this encounter,  this information was reviewed with the patient and updated    Allergies Adhesive tape, Benzoin, Droperidol, Prednisone, and Vitamin d (calciferol)    Past Medical History  Past Medical History:   Diagnosis Date    Brain injury with brief loss of consciousness (H) 12/14/2015    Candida esophagitis (H) 08/03/2022    Degenerative joint disease     Gastro-oesophageal reflux disease     Hypercalcemia 08/03/2022    Hypokalemia 08/03/2022    Hypomagnesemia 08/03/2022    PastSurgical History   has a past surgical history that includes back surgery; orthopedic surgery; Explore spine, remove hardware, combined (1/13/2012); Amputate leg below knee (Left, 8/27/2023); Irrigation and debridement lower extremity, combined (Left, 8/30/2023); Amputate leg below knee (Left, 9/2/2023); Irrigation and debridement lower extremity, combined (Left, 9/7/2023); Amputate leg below knee (Left, 9/7/2023); Irrigation and debridement foot, combined (Left, 11/8/2023); and Irrigation and debridement lower extremity, combined (Left, 11/15/2023).   Family History  No family history on file. Social History  He reports that he quit smoking about 12 years ago. His smoking use included cigarettes.  He does not have any smokeless tobacco history on file. He reports that he does not drink alcohol and does not use drugs.   Notable Exposures Listed below if pertinent    Vaccination History:  Immunization History   Administered Date(s) Administered    COVID-19 MONOVALENT 12+ (Pfizer) 05/07/2021, 05/28/2021    Mantoux Tuberculin Skin Test 09/11/2002             Physical Exam:     VITAL SIGNS:  There were no vitals taken for this visit.  Video visit     GENERAL APPEARANCE: Not in acute distress    PHYSICAL EXAM:   Eyes:     No ptosis, no discharge, no scleral icterus  Respiratory:     Inspection: Not in respiratory distress, Chest expansion symmetrical  Skin:     Dry and intact  Neurologic:     Higher Mental Function: Conversant, AOx4   Facial asymmetry grossly absent  Psychiatric:     Appropriate        Signature:     Sanjuanita Martin MD   Infectious Disease Fellow    Case discussed with the supervising attending ID physician, Dr Whipple     This dictation was prepared in part using Dragon recognition software.  As a result errors may occur. When identified these transcription errors have been corrected.  While every attempt is made to correct errors during dictation, errors may still exist

## 2023-12-27 NOTE — TELEPHONE ENCOUNTER
[facility] Call Center    Phone Message    May a detailed message be left on voicemail: yes     Reason for Call: Standard written Order is Needed. Payton stated Form was sent Weeks Ago. Please call for Details.     Action Taken: Message routed to:  [facility] ([facility]): get    Travel Screening: Not Applicable

## 2023-12-27 NOTE — PROGRESS NOTES
Virtual Visit Details    Type of service:  Video Visit   Video Start Time:  1:05pm  Video End Time: 1:25pm    Originating Location (pt. Location): Home    Distant Location (provider location):  On-site  Platform used for Video Visit: Ivana    Unable to edit existing provider note, new note created

## 2023-12-29 NOTE — PROGRESS NOTES
Infectious Disease Clinic Staff Note: Mr. Bishop had a Video Virtual Visit today and I participated in and discussed the case with Dr. Martin, ID Fellow -- I agree with her interval history, assessment and plan in this ID Virtual Progress note. This note reflects my observations and opinions and the plan outlined fully reflects my approach. I have reviewed the available history, radiology, laboratory results, and reports with the Fellow.

## 2024-01-02 ENCOUNTER — DOCUMENTATION ONLY (OUTPATIENT)
Dept: ORTHOPEDICS | Facility: CLINIC | Age: 44
End: 2024-01-02
Payer: MEDICARE

## 2024-01-02 NOTE — PROGRESS NOTES
Received Completed forms Yes   Faxed Forms Faxed To: [employer]  Fax Number: [phone]   Sent to HIM (Date) 1/2/24

## 2024-01-04 ENCOUNTER — TELEPHONE (OUTPATIENT)
Dept: ENDOCRINOLOGY | Facility: CLINIC | Age: 44
End: 2024-01-04
Payer: MEDICARE

## 2024-01-04 ENCOUNTER — VIRTUAL VISIT (OUTPATIENT)
Dept: CARDIOLOGY | Facility: CLINIC | Age: 44
End: 2024-01-04
Payer: MEDICAID

## 2024-01-04 VITALS — BODY MASS INDEX: 33.13 KG/M2 | HEIGHT: 73 IN | WEIGHT: 250 LBS

## 2024-01-04 DIAGNOSIS — E66.811 CLASS 1 OBESITY WITH SERIOUS COMORBIDITY AND BODY MASS INDEX (BMI) OF 32.0 TO 32.9 IN ADULT, UNSPECIFIED OBESITY TYPE: Primary | ICD-10-CM

## 2024-01-04 ASSESSMENT — PAIN SCALES - GENERAL: PAINLEVEL: SEVERE PAIN (6)

## 2024-01-04 NOTE — TELEPHONE ENCOUNTER
MTM referral placed due to [facility] practice shift. CPA with Viv Traylor PA-C.     Lauren Bloch, PharmD, BCACP   Medication Therapy Management Pharmacist   [facility] [place]
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
## 2024-01-04 NOTE — PATIENT INSTRUCTIONS
"Recommendations from today's MTM visit:                                                       Patient to continue Ozempic at 0.5 mg weekly for now. Can re-evaluate dose in 6 weeks, pharmacist will call patient at that time to discuss.     Follow-up: Return in about 6 weeks (around 2/15/2024).    It was great speaking with you today.  I value your experience and would be very thankful for your time in providing feedback in our clinic survey. In the next few days, you may receive an email or text message from [email or web address] with a link to a survey related to your  clinical pharmacist.\"     To schedule another MTM appointment, please call the clinic directly or you may call the MTM scheduling line at [phone] or toll-free at [phone].     My Clinical Pharmacist's contact information:                                                      Please feel free to contact me with any questions or concerns you have.      Lauren Bloch, PharmD, BCACP   Medication Therapy Management Pharmacist   [facility] [place]       "

## 2024-01-04 NOTE — Clinical Note
JUMA declined comprehensive review of medications today but did do ozempic check in. Doesn't feel it is doing anything at this dose. We discussed at follow up could consider dose increase but has only been on 0.5 mg dose for 2 weeks so to take another 6 weeks on it then can re-eval.

## 2024-01-04 NOTE — NURSING NOTE
Is the patient currently in the state of [place]? YES    Visit mode:TELEPHONE    If the visit is dropped, the patient can be reconnected by: TELEPHONE VISIT: Phone number:   [phone]      Will anyone else be joining the visit? NO  (If patient encounters technical issues they should call [phone]    How would you like to obtain your AVS? MyChart    Are changes needed to the allergy or medication list? No    Reason for visit: Consult    Shazia CARTAGENA
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
## 2024-01-18 ENCOUNTER — HOSPITAL ENCOUNTER (OUTPATIENT)
Dept: WOUND CARE | Facility: CLINIC | Age: 44
Discharge: HOME | End: 2024-01-18
Attending: SURGERY
Payer: MEDICARE

## 2024-01-18 DIAGNOSIS — S81.802A OPEN WOUND OF LOWER LEG, LEFT, INITIAL ENCOUNTER: Primary | ICD-10-CM

## 2024-01-18 DIAGNOSIS — L89.154 PRESSURE INJURY OF SACRAL REGION, STAGE 4 (H): ICD-10-CM

## 2024-01-18 PROCEDURE — 99213 OFFICE O/P EST LOW 20 MIN: CPT | Mod: 95 | Performed by: SURGERY
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
## 2024-01-18 NOTE — PROGRESS NOTES
Patient Active Problem List   Diagnosis    Removal of pin, plate, kristi, or screw    Open wound of lower leg, left, initial encounter    Pressure ulcer of sacral region, unspecified stage    Necrotizing fasciitis of lower leg (H)    S/P below knee amputation, left (H)    Adjustment disorder with mixed anxiety and depressed mood    Allergic rhinitis, unspecified    Anxiety    Band-shaped keratopathy    Burst fracture of thoracic vertebra (H)    Cataract    Chorioretinal scar    Chronic, continuous use of opioids    Colostomy present (H)    Decubitus ulcer of coccygeal region, stage 4 (H)    Decubitus ulcer of left ischial area, stage IV (H)    Pressure ulcer of coccygeal region, stage III (H)    Degeneration of intervertebral disc of lumbar region    Diplopia    Disorder of macula of retina    Compression fracture of vertebra (H)    Fracture of cervical vertebra (H)    Gastro-esophageal reflux disease without esophagitis    Posttraumatic stress disorder    History of DVT (deep vein thrombosis)    Hypertension    Insomnia    Iris adhesion, posterior    Mild episode of recurrent major depressive disorder (H24)    Neurogenic bladder    Narrowing of intervertebral disc space    Muscle weakness (generalized)    Neurogenic pain    Neuropathic pain of both legs    Numbness of hand    Open wound of buttock, right, subsequent encounter    Class 1 obesity with serious comorbidity and body mass index (BMI) of 32.0 to 32.9 in adult, unspecified obesity type    Osteopenia    Chronic pain disorder    Panuveitis    Paraplegia, unspecified (H)    Restless legs syndrome    Sacral osteomyelitis (H)    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    Sarcoidosis    Secondary hypocortisolism (H24)    Sinus tachycardia    Skin ulcer (H)    Vertebral subluxation complex    Vitamin D deficiency    Wound dehiscence, surgical, initial encounter    Cutaneous abscess, unspecified    Hernia, ventral    T12 spinal cord injury (H)    Parastomal hernia  without obstruction or gangrene    Infection of amputation stump of left lower extremity (H)     Past Medical History:   Diagnosis Date    Brain injury with brief loss of consciousness (H) 12/14/2015    Candida esophagitis (H) 08/03/2022    Degenerative joint disease     Gastro-oesophageal reflux disease     Hypercalcemia 08/03/2022    Hypokalemia 08/03/2022    Hypomagnesemia 08/03/2022     Labs:   Recent Labs   Lab Test 11/18/23  0805 11/16/23  0749 11/15/23  0552 09/07/23  0423 09/06/23  0815   ALBUMIN 3.6   < > 3.3*   < >  --    HGB 13.9   < > 13.1*   < >  --    INR  --   --  1.06   < >  --    WBC 6.0   < > 6.1   < >  --    A1C  --   --   --   --  5.5    < > = values in this interval not displayed.     Nutrition requirements were discussed with patient today.  Vitals:  There were no vitals taken for this visit.  Patient called for a telephone visit. Certified Wound Care Nurse time spent evaluating patient record, completed a full evaluation and documented wound(s) & ivette-wound skin; provided recommendation based on treatment plan. Reviewed discharge instructions, patient education, and discussed plan of care with appropriate medical team staff members and patient and/or family members.     Wound:   Wound Pressure injury community acquired (Active)       Wound Sacrum Pressure injury community acquired Stage 4 (Active)       Wound Thigh Skin tear;Other (comment) (Active)       Wound (used by OP [facility] only) 01/26/23 0905 Right sacral pressure injury (Active)   Thickness/Stage Stage 4 12/21/23 1400   Base granulating;slough 12/21/23 1400   Periwound intact 12/21/23 1400   Periwound Temperature warm 12/21/23 1400   Periwound Skin Turgor soft 12/21/23 1400   Edges open 12/21/23 1400   Length (cm) 2 12/21/23 1400   Width (cm) 2 12/21/23 1400   Depth (cm) 3 12/21/23 1400   Wound (cm^2) 4 cm^2 12/21/23 1400   Wound Volume (cm^3) 12 cm^3 12/21/23 1400   Wound healing % -92.31 12/21/23 1400   Tunneling [Depth (cm)/Location] 4  "o'/ 7cm 12/21/23 1400   Undermining [Depth (cm)/Location] 5.8 cm/ 5-3 oclock 10/10/23 1437   Drainage Characteristics/Odor serous 12/21/23 1400   Drainage Amount copious;large 12/21/23 1400   Care, Wound non-select wound debridement performed. 12/21/23 1400       Incision/Surgical Site 09/07/23 Left;Lower Leg (Active)       Incision/Surgical Site 11/15/23 Left;Lower Leg (Active)    Photo: see media tab for pictures      Further instructions from your care team         Saqib Bishop      1980  DME order sent to   [facility] Phone: [phone] Fax [phone]      To do list for FLAP surgery:  1. Done: Group 2 Mattress: [employer] [facility] P: [phone] Fax: [phone]    2. Still pending: new wheel chair and High Profile ROHO cushion from [facility]  3. Done: MRI  4. Continue diet high in protein is important for wound healing: protein shakes or bars, Rahul  5. Done: No TOBACCO products at all  6. Done: Colostomy   7. Indwelling Catheter  8. H&P with Primary Care MD 30 days prior to surgery     Wound Dressing Change: Right Sacral/Gluteal wound  -Cleanse with mild unscented soap (such as Cetaphil, Cerave or Dove) and water  -Apply small amount of vinegar soak  (recipe below) on gauze, lay into wound bed, let sit for 5-10 minutes, remove gauze   -Fluff and tuck 1/4 piece of 4\" AMD roll gauze, into tunnel and fill wound defect.   Leave a tail outside of wound for retrieval  -Cover with 1/2 of 5x9\" ABD pad and  2\" Medipore tape  Change 2 times a day and as needed for soilage     How to prepare the vinegar solution:  1. Mix 2 tablespoons of white household vinegar with 2 cups of water  2. Store in the refrigerator and use for up to 5 days      Repositioning:  Bed: Reposition MINIMALLY every 1-2 hours in bed to relieve pressure and promote perfusion to tissue. Avoid pressure to area.  Chair: When up to the chair, do not sit for longer than one hour total before returning to bed for at least 60 " minutes to relieve pressure and promote perfusion to the tissue. Completely recline/tilt for 15 minutes each hour. Sit on a chair cushion when up to the chair.  Done: Group 2 Mattress: [facility]   Patient will need Group 2, low air loss mattress due to large, stage 4 ulceration on the patient's pelvis that has failed to improve on a normal mattress despite regular wound cares and repositioning. A group 1 mattress will not be adequate to offload these severe ulcerations. Patient has impaired sensation and is unable to reposition independently.      Main Provider: Jayashree Zavala M.D. January 18, 2024

## 2024-01-18 NOTE — DISCHARGE INSTRUCTIONS
"Saqib Bishop      1980  DME order sent to   [facility] Phone: [phone] Fax [phone]      To do list for FLAP surgery:  1. Done: Group 2 Mattress: [facility] P: [phone] Fax: [phone]    2. Still pending: new wheel chair and High Profile ROHO cushion from [facility]  3. Done: MRI  4. Continue diet high in protein is important for wound healing: protein shakes or bars, Rahul  5. Done: No TOBACCO products at all  6. Done: Colostomy   7. Indwelling Catheter  8. H&P with Primary Care MD 30 days prior to surgery     Wound Dressing Change: Right Sacral/Gluteal wound  -Cleanse with mild unscented soap (such as Cetaphil, Cerave or Dove) and water  -Apply small amount of vinegar soak  (recipe below) on gauze, lay into wound bed, let sit for 5-10 minutes, remove gauze   -Fluff and tuck 1/4 piece of 4\" AMD roll gauze, into tunnel and fill wound defect.   Leave a tail outside of wound for retrieval  -Cover with 1/2 of 5x9\" ABD pad and  2\" Medipore tape  Change 2 times a day and as needed for soilage     How to prepare the vinegar solution:  1. Mix 2 tablespoons of white household vinegar with 2 cups of water  2. Store in the refrigerator and use for up to 5 days      Repositioning:  Bed: Reposition MINIMALLY every 1-2 hours in bed to relieve pressure and promote perfusion to tissue. Avoid pressure to area.  Chair: When up to the chair, do not sit for longer than one hour total before returning to bed for at least 60 minutes to relieve pressure and promote perfusion to the tissue. Completely recline/tilt for 15 minutes each hour. Sit on a chair cushion when up to the chair.  Done: Group 2 Mattress: [facility]   Patient will need Group 2, low air loss mattress due to large, stage 4 ulceration on the patient's pelvis that has failed to improve on a normal mattress despite regular wound cares and repositioning. A group 1 mattress will not be adequate to offload these severe " ulcerations. Patient has impaired sensation and is unable to reposition independently.      Main Provider: Jayashree Zavala M.D. January 18, 2024    Call us at [phone] if you have any questions about your wounds, have redness or swelling around your wound, have a fever of 101 degrees Fahrenheit or greater or if you have any other problems or concerns. We answer the phone Monday through Friday 8 am to 4 pm, please leave a message as we check the voicemail frequently throughout the day.     If you had a positive experience please indicate that on your patient satisfaction survey form that [facility] will be sending you.  It was a pleasure meeting with you today.  Thank you for allowing me and my team the privilege of caring for you today.  YOU are the reason we are here, and I truly hope we provided you with the excellent service you deserve.  Please let us know if there is anything else we can do for you so that we can be sure you are leaving completely satisfied with your care experience.      If you have any billing related questions please call the [facility] Business office at [phone]. The clinic staff does not handle billing related matters.  If you are scheduled to have a follow up appointment, you will receive a reminder call the day before your visit. On the appointment day please arrive 15 minutes prior to your appointment time. If you are unable to keep that appointment, please call the clinic to cancel or reschedule. If you are more than 10 minutes late or greater for your scheduled appointment time, the clinic policy is that you may be asked to reschedule.

## 2024-01-19 ENCOUNTER — OFFICE VISIT (OUTPATIENT)
Dept: ORTHOPEDICS | Facility: CLINIC | Age: 44
End: 2024-01-19
Payer: MEDICARE

## 2024-01-19 DIAGNOSIS — Z89.512 S/P BELOW KNEE AMPUTATION, LEFT (H): Primary | ICD-10-CM

## 2024-01-19 PROCEDURE — 99213 OFFICE O/P EST LOW 20 MIN: CPT | Performed by: ORTHOPAEDIC SURGERY

## 2024-01-19 NOTE — NURSING NOTE
Reason For Visit:   Chief Complaint   Patient presents with    RECHECK     Patient returns for 4 week recheck left leg BKA.  Patient had sutures removed last visit and he states it's not fully closed up yet, but it's making improvements.       There were no vitals taken for this visit.    Pain Assessment  Patient Currently in Pain: No    Bill Smith, ATC

## 2024-01-19 NOTE — LETTER
1/19/2024         RE: Saqib Bishop  [id number] [place] [address]        Dear Colleague,    Thank you for referring your patient, Saqib Bishop, to the [facility] [place]. Please see a copy of my visit note below.    Orthopaedic Surgery Clinic Note - Follow-Up Appointment    Chief Complaint:   Follow-up left transtibial amputation wound.    Dates of Surgery:   08/28/2023, L transtibial guillotine amputation, Dr. Barth, for necrotizing fasciitis and sepsis.  08/30/2023, I&D, VAC change, Dr. Maier, Parvimonas micra left leg tissue 08/30/2023, anaerobic GPB broth only left leg tissue 08/30/2023.  09/02/2023, I&D, VAC change, Dr. Rehman.  09/07/2023, I&D, revision L transtibial amputation, Dr. Gates.  11/08/2023, I&D, Dr. Maier.  11/15/2023, I&D, Dr. Maier.    I saw Saqib today in follow-up, a very pleasant 33-year-old gentleman with a history of spinal cord injury and a more recent left transtibial amputation, here for wound check.  He is accompanied by his parents and reports that the residual limb is feeling fine.  He had sutures removed at his last appointment about four weeks ago, and reports that although it is not fully closed yet, it is making significant improvements.  He denies any pain at all at this time.  He denies fevers or chills.    Examination today shows the patient to be a pleasant, cooperative, awake, and alert adult sitting upright in manual wheelchair in no acute distress.  He is nonseptic appearing.  Breathing pattern is regular and nonlabored on room air.  His left extremity  sock is removed, and the dressings are changed.  The skin on the left knee and residual leg are all intact except for a small approximately 1 x 1 cm region on the medial aspect of the amputation incision line, and a dry and benign appearing scab distal to the tibial tubercle without signs for infection.  Otherwise the skin throughout the residual left leg appears  intact.  The base of the small open area does have about two thirds covered with granulation tissue.  There is some fibrinous exudate which after sterilely preparing the area with Betadine, it is debrided.  There is no undermining.  Although the wound is full-thickness through skin, it does not appear to go through the fascial layer at all.  There is no undermining under the wound margins noted after carefully and gently probing it circumferentially.  There is no expressible purulence.  There is no active drainage.  There is no surrounding erythema.  The remainder of the incision is fully healed and dry.  Left knee range of motion is from about 30 degrees to 110 degrees of active flexion.    Impression:  33-year-old gentleman with spinal cord injury and left transtibial amputation, with improving wound, and residual open portion of his incision along the medial aspect.    Plan:  Findings and plan were discussed with Saqib and his parents.  Continue dressing changes consisting of dry sterile gauze to left medial BKA incision line daily plus as needed.  New supplies were provided today.  Follow-up in 3 weeks for a wound check.  All questions were answered.        Etienne Maier MD

## 2024-01-19 NOTE — PROGRESS NOTES
Orthopaedic Surgery Clinic Note - Follow-Up Appointment    Chief Complaint:   Follow-up left transtibial amputation wound.    Dates of Surgery:   08/28/2023, L transtibial guillotine amputation, Dr. Barth, for necrotizing fasciitis and sepsis.  08/30/2023, I&D, VAC change, Dr. Maier, Parvimonas micra left leg tissue 08/30/2023, anaerobic GPB broth only left leg tissue 08/30/2023.  09/02/2023, I&D, VAC change, Dr. Rehman.  09/07/2023, I&D, revision L transtibial amputation, Dr. Gates.  11/08/2023, I&D, Dr. Maier.  11/15/2023, I&D, Dr. Maier.    I saw Saqib today in follow-up, a very pleasant 33-year-old gentleman with a history of spinal cord injury and a more recent left transtibial amputation, here for wound check.  He is accompanied by his parents and reports that the residual limb is feeling fine.  He had sutures removed at his last appointment about four weeks ago, and reports that although it is not fully closed yet, it is making significant improvements.  He denies any pain at all at this time.  He denies fevers or chills.    Examination today shows the patient to be a pleasant, cooperative, awake, and alert adult sitting upright in manual wheelchair in no acute distress.  He is nonseptic appearing.  Breathing pattern is regular and nonlabored on room air.  His left extremity  sock is removed, and the dressings are changed.  The skin on the left knee and residual leg are all intact except for a small approximately 1 x 1 cm region on the medial aspect of the amputation incision line, and a dry and benign appearing scab distal to the tibial tubercle without signs for infection.  Otherwise the skin throughout the residual left leg appears intact.  The base of the small open area does have about two thirds covered with granulation tissue.  There is some fibrinous exudate which after sterilely preparing the area with Betadine, it is debrided.  There is no undermining.  Although the wound is  full-thickness through skin, it does not appear to go through the fascial layer at all.  There is no undermining under the wound margins noted after carefully and gently probing it circumferentially.  There is no expressible purulence.  There is no active drainage.  There is no surrounding erythema.  The remainder of the incision is fully healed and dry.  Left knee range of motion is from about 30 degrees to 110 degrees of active flexion.    Impression:  33-year-old gentleman with spinal cord injury and left transtibial amputation, with improving wound, and residual open portion of his incision along the medial aspect.    Plan:  Findings and plan were discussed with Saqib and his parents.  Continue dressing changes consisting of dry sterile gauze to left medial BKA incision line daily plus as needed.  New supplies were provided today.  Follow-up in 3 weeks for a wound check.  All questions were answered.

## 2024-02-22 ENCOUNTER — HOSPITAL ENCOUNTER (OUTPATIENT)
Dept: WOUND CARE | Facility: CLINIC | Age: 44
Discharge: HOME | End: 2024-02-22
Attending: SURGERY | Admitting: SURGERY
Payer: MEDICARE

## 2024-02-22 VITALS — HEART RATE: 123 BPM | TEMPERATURE: 97 F | DIASTOLIC BLOOD PRESSURE: 65 MMHG | SYSTOLIC BLOOD PRESSURE: 110 MMHG

## 2024-02-22 DIAGNOSIS — S81.802A OPEN WOUND OF LOWER LEG, LEFT, INITIAL ENCOUNTER: Primary | ICD-10-CM

## 2024-02-22 DIAGNOSIS — L89.222 PRESSURE INJURY OF LEFT THIGH, STAGE 2 (H): ICD-10-CM

## 2024-02-22 DIAGNOSIS — L89.154 PRESSURE INJURY OF SACRAL REGION, STAGE 4 (H): ICD-10-CM

## 2024-02-22 PROCEDURE — 99213 OFFICE O/P EST LOW 20 MIN: CPT | Performed by: SURGERY

## 2024-02-22 PROCEDURE — 97602 WOUND(S) CARE NON-SELECTIVE: CPT
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
## 2024-02-22 NOTE — PROGRESS NOTES
Patient Active Problem List   Diagnosis    Removal of pin, plate, kristi, or screw    Open wound of lower leg, left, initial encounter    Pressure ulcer of sacral region, unspecified stage    Necrotizing fasciitis of lower leg (H)    S/P below knee amputation, left (H)    Adjustment disorder with mixed anxiety and depressed mood    Allergic rhinitis, unspecified    Anxiety    Band-shaped keratopathy    Burst fracture of thoracic vertebra (H)    Cataract    Chorioretinal scar    Chronic, continuous use of opioids    Colostomy present (H)    Decubitus ulcer of coccygeal region, stage 4 (H)    Decubitus ulcer of left ischial area, stage IV (H)    Pressure ulcer of coccygeal region, stage III (H)    Degeneration of intervertebral disc of lumbar region    Diplopia    Disorder of macula of retina    Compression fracture of vertebra (H)    Fracture of cervical vertebra (H)    Gastro-esophageal reflux disease without esophagitis    Posttraumatic stress disorder    History of DVT (deep vein thrombosis)    Hypertension    Insomnia    Iris adhesion, posterior    Mild episode of recurrent major depressive disorder (H24)    Neurogenic bladder    Narrowing of intervertebral disc space    Muscle weakness (generalized)    Neurogenic pain    Neuropathic pain of both legs    Numbness of hand    Open wound of buttock, right, subsequent encounter    Class 1 obesity with serious comorbidity and body mass index (BMI) of 32.0 to 32.9 in adult, unspecified obesity type    Osteopenia    Chronic pain disorder    Panuveitis    Paraplegia, unspecified (H)    Restless legs syndrome    Sacral osteomyelitis (H)    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    Sarcoidosis    Secondary hypocortisolism (H24)    Sinus tachycardia    Skin ulcer (H)    Vertebral subluxation complex    Vitamin D deficiency    Wound dehiscence, surgical, initial encounter    Cutaneous abscess, unspecified    Hernia, ventral    T12 spinal cord injury (H)    Parastomal hernia  without obstruction or gangrene    Infection of amputation stump of left lower extremity (H)     Past Medical History:   Diagnosis Date    Brain injury with brief loss of consciousness (H) 12/14/2015    Candida esophagitis (H) 08/03/2022    Degenerative joint disease     Gastro-oesophageal reflux disease     Hypercalcemia 08/03/2022    Hypokalemia 08/03/2022    Hypomagnesemia 08/03/2022     Labs:   Recent Labs   Lab Test 11/18/23  0805 11/16/23  0749 11/15/23  0552 09/07/23  0423 09/06/23  0815   ALBUMIN 3.6   < > 3.3*   < >  --    HGB 13.9   < > 13.1*   < >  --    INR  --   --  1.06   < >  --    WBC 6.0   < > 6.1   < >  --    A1C  --   --   --   --  5.5    < > = values in this interval not displayed.     Nutrition requirements were discussed with patient today.  Vitals:  /65 (BP Location: Right arm, Patient Position: Semi-Anguiano's)   Pulse (!) 123   Temp 97  F (36.1  C) (Temporal)   Wound:   Left medial stump full thickness surgical wound: 2 x 2 x 0.3cm Granulation/ slough, small - moderate  serous drainage; Vashe non select debridement; Vanessa wound excoriated.   Dressing recommend to use: Vashe, Medihoney- prefer Alginate 1/6 piece of 2x2 over wound; cover with absorbant Cutimed Sorbian with border and change Daily.    Wound Pressure injury community acquired (Active)       Wound Sacrum Pressure injury community acquired Stage 4 (Active)       Wound Thigh Skin tear;Other (comment) (Active)       Wound (used by OP [facility]I only) 01/26/23 0905 Right sacral pressure injury (Active)   Thickness/Stage Stage 4 02/22/24 1359   Base granulating;slough 02/22/24 1359   Periwound intact 02/22/24 1359   Periwound Temperature warm 02/22/24 1359   Periwound Skin Turgor soft 02/22/24 1359   Edges open 02/22/24 1359   Length (cm) 0.6 02/22/24 1359   Width (cm) 2 02/22/24 1359   Depth (cm) 5 02/22/24 1359   Wound (cm^2) 1.2 cm^2 02/22/24 1359   Wound Volume (cm^3) 6 cm^3 02/22/24 1359   Wound healing % 42.31 02/22/24 6879    Tunneling [Depth (cm)/Location] 4 o'clock @ 3.5cm & 9 o'clock 8.5cm 02/22/24 1359   Undermining [Depth (cm)/Location] 5.8 cm/ 5-3 oclock 10/10/23 1437   Drainage Characteristics/Odor serous 02/22/24 1359   Drainage Amount copious;large 02/22/24 1359   Care, Wound non-select wound debridement performed. 02/22/24 1359       Wound (used by OP I only) 02/22/24 1421 Left posterior;upper thigh pressure injury (Active)   Thickness/Stage Stage 2 02/22/24 1359   Base granulating 02/22/24 1359   Periwound intact 02/22/24 1359   Periwound Temperature warm 02/22/24 1359   Periwound Skin Turgor soft 02/22/24 1359   Edges open 02/22/24 1359   Length (cm) 2 02/22/24 1359   Width (cm) 2 02/22/24 1359   Depth (cm) 0.1 02/22/24 1359   Wound (cm^2) 4 cm^2 02/22/24 1359   Wound Volume (cm^3) 0.4 cm^3 02/22/24 1359   Drainage Characteristics/Odor serous 02/22/24 1359   Drainage Amount moderate 02/22/24 1359   Care, Wound non-select wound debridement performed. 02/22/24 1359       Incision/Surgical Site 09/07/23 Left;Lower Leg (Active)       Incision/Surgical Site 11/15/23 Left;Lower Leg (Active)    Photo:       Further instructions from your care team         Saqib Bishop      1980  DME order sent to [facility] Phone: [phone] Fax [phone]  Dressing changes outside of clinic are being performed by Family    To do list for FLAP surgery:  1. Done: Group 2 Mattress: [facility] P: [phone] Fax: [phone]  2. Pending: new wheel chair and High Profile ROHO cushion from [facility]- as soon as you get your new wheel chair then call [facility] [phone] to let Dr Zavala know it has arrived. Then surgery can be scheduled.   3. Done: MRI  4. Continue diet high in protein is important for wound healing: protein shakes or bars, Rahul  5. Done: No TOBACCO products at all  6. Done: Colostomy  7. Indwelling Catheter  8. H&P with Primary Care MD 30 days prior to surgery    Recommend to use Medihoney alginate  "1/6th piece of 2x2 and 4x4 Cutimed  Silicone foam dressing with border Stump wound- please discuss with Surgeon at next visit to order for supplies.     Wound Dressing Change: Left Posterior Thigh  Cleanse wound with wound spray or gentle soap, pat dry  Cover with 4x4 Mepilex Silicone foam with border   Change 3 times a week    Wound Dressing Change: Right Sacrum   -Cleanse with mild unscented soap (such as Cetaphil, Cerave or Dove) and water  -Apply small amount of Vashe/ vinegar soak on gauze, to wound bed, for 5-10 minutes, remove   -Fluff and tuck 1/2 piece of 4\" AMD roll gauze, into tunnel/ undermine pocket to fill wound defect.  Leave a tail outside of wound to retrieve  -Cover with 1/2 of 5x9\" ABD pad and 2\" Medipore tape  Change 2 times a day and as needed for soilage    How to prepare the vinegar solution:  1. Mix 2 tablespoons of white household vinegar with 2 cups of water  2. Store in the refrigerator and use for up to 5 days    Repositioning:  Bed: Reposition MINIMALLY every 1-2 hours in bed to relieve pressure and promote perfusion to tissue. Avoid pressure to area.  Chair: When up to the chair, do not sit for longer than one hour total before returning to bed for at least 60 minutes to relieve pressure and promote perfusion to the tissue. Completely recline/tilt for 15 minutes each hour. Sit on a chair cushion when up to the chair.  Done: Group 2 Mattress: [facility]: Patient will need Group 2, low air loss mattress due to large, stage 4 ulceration on  the patient's pelvis that has failed to improve on a normal mattress despite regular wound cares and repositioning. A group 1 mattress will not be adequate to offload these severe ulcerations. Patient has impaired sensation and is unable to reposition independently.     Main Provider: Jayashree Zavala M.D. February 22, 2024    "

## 2024-02-22 NOTE — DISCHARGE INSTRUCTIONS
"Saqib NEGRETE Angelesmateuszmary      1980  DME order sent to [facility] Phone: [phone] Fax [phone]  Dressing changes outside of clinic are being performed by Family    To do list for FLAP surgery:  1. Done: Group 2 Mattress: [facility] P: [phone] Fax: [phone]  2. Pending: new wheel chair and High Profile ROHO cushion from [facility]- as soon as you get your new wheel chair then call [facility] [phone] to let Dr Zavala know it has arrived. Then surgery can be scheduled.   3. Done: MRI  4. Continue diet high in protein is important for wound healing: protein shakes or bars, Rahul  5. Done: No TOBACCO products at all  6. Done: Colostomy  7. Indwelling Catheter  8. H&P with Primary Care MD 30 days prior to surgery    Recommend to use Medihoney alginate and Silicone foam dressing to Stump wound- please discuss with Surgeon to order for supplies.     Wound Dressing Change: Left Posterior Thigh  Cleanse wound with wound spray or gentle soap, pat dry  Cover with 4x4 Mepilex Silicone foam with border   Change 3 times a week    Wound Dressing Change: Right Sacrum   -Cleanse with mild unscented soap (such as Cetaphil, Cerave or Dove) and water  -Apply small amount of Vashe/ vinegar soak on gauze, to wound bed, for 5-10 minutes, remove   -Fluff and tuck 1/2 piece of 4\" AMD roll gauze, into tunnel/ undermine pocket to fill wound defect.  Leave a tail outside of wound to retrieve  -Cover with 1/2 of 5x9\" ABD pad and 2\" Medipore tape  Change 2 times a day and as needed for soilage    How to prepare the vinegar solution:  1. Mix 2 tablespoons of white household vinegar with 2 cups of water  2. Store in the refrigerator and use for up to 5 days    Repositioning:  Bed: Reposition MINIMALLY every 1-2 hours in bed to relieve pressure and promote perfusion to tissue. Avoid pressure to area.  Chair: When up to the chair, do not sit for longer than one hour total before returning to bed for at least 60 minutes " to relieve pressure and promote perfusion to the tissue. Completely recline/tilt for 15 minutes each hour. Sit on a chair cushion when up to the chair.  Done: Group 2 Mattress: [facility]: Patient will need Group 2, low air loss mattress due to large, stage 4 ulceration on  the patient's pelvis that has failed to improve on a normal mattress despite regular wound cares and repositioning. A group 1 mattress will not be adequate to offload these severe ulcerations. Patient has impaired sensation and is unable to reposition independently.     Main Provider: Jayashree Zavala M.D. February 22, 2024    Call us at [phone] if you have any questions about your wounds, have redness or swelling around your wound, have a fever of 101 degrees Fahrenheit or greater or if you have any other problems or concerns. We answer the phone Monday through Friday 8 am to 4 pm, please leave a message as we check the voicemail frequently throughout the day.     If you had a positive experience please indicate that on your patient satisfaction survey form that [facility] will be sending you.  It was a pleasure meeting with you today.  Thank you for allowing me and my team the privilege of caring for you today.  YOU are the reason we are here, and I truly hope we provided you with the excellent service you deserve.  Please let us know if there is anything else we can do for you so that we can be sure you are leaving completely satisfied with your care experience.      If you have any billing related questions please call the [facility] Business office at [phone]. The clinic staff does not handle billing related matters.  If you are scheduled to have a follow up appointment, you will receive a reminder call the day before your visit. On the appointment day please arrive 15 minutes prior to your appointment time. If you are unable to keep that appointment, please call the clinic to cancel or reschedule. If you are more  than 10 minutes late or greater for your scheduled appointment time, the clinic policy is that you may be asked to reschedule.

## 2024-02-23 ENCOUNTER — OFFICE VISIT (OUTPATIENT)
Dept: ORTHOPEDICS | Facility: CLINIC | Age: 44
End: 2024-02-23
Payer: MEDICARE

## 2024-02-23 DIAGNOSIS — Z89.512 S/P BELOW KNEE AMPUTATION, LEFT (H): Primary | ICD-10-CM

## 2024-02-23 PROCEDURE — 99212 OFFICE O/P EST SF 10 MIN: CPT | Performed by: ORTHOPAEDIC SURGERY

## 2024-02-23 NOTE — PROGRESS NOTES
Orthopaedic Surgery Clinic Follow-up Appointment    08/28/2023, L transtibial guillotine amputation, Dr. Barth, for necrotizing fasciitis and sepsis.  08/30/2023, I&D, VAC change, Dr. Maier.  Cultures: Parvimonas micra left leg tissue, anaerobic GPB broth only left leg tissue.  09/02/2023, I&D, VAC change, Dr. Rehman.  09/07/2023, I&D, revision L VALENTÍN, Dr. Gates.  11/08/2023, I&D, Dr. Maier.  11/15/2023, I&D, Dr. Maier.    Very pleasant 44yo male, returns as scheduled for wound check, accompanied by both parents  Denies F/C/malaise  Pain 4/10    Awake, alert, pleasant, cooperative, sitting upright in wheelchair in NAD  Here with both parents  L LE residual limb with incision completely well healed and dry along its entire length except for one approx 1.5 x 1 cm area along medial aspect with FT skin defect, fibrinous base, sterilely probed without tracking or sinuses or undermining. Nontender. Surrounding skin relatively fibrosed and immobile. No fluctuance. Prior eschar anterior tibia well healed, dry.    Impression  44yo patient with SCI and L BKA for necrotizing soft tissue infection, with healing BKA wound but residual open area without overt signs for infection    Plan  Medihoney  Cont daily sterile dry dressing changes  Follow-up 3 weeks for wound check  Signs and symptoms that should prompt immediate medical attention were discussed.

## 2024-02-23 NOTE — NURSING NOTE
Reason For Visit:   Chief Complaint   Patient presents with    RECHECK     Patient returns for 4 week recheck of left BKA.  Most recent procedure was 11/15/23.         There were no vitals taken for this visit.    Pain Assessment  Patient Currently in Pain: Yes  0-10 Pain Scale: 4  Primary Pain Location: Leg (left)    Bill Smith ATC

## 2024-02-23 NOTE — LETTER
2/23/2024         RE: Saqib Bishop  [address] [place] [address]        Dear Colleague,    Thank you for referring your patient, Saqib Bishop, to the [facility] [place]. Please see a copy of my visit note below.    Orthopaedic Surgery Clinic Follow-up Appointment    08/28/2023, L transtibial guillotine amputation, Dr. Barth, for necrotizing fasciitis and sepsis.  08/30/2023, I&D, VAC change, Dr. Maier.  Cultures: Parvimonas micra left leg tissue, anaerobic GPB broth only left leg tissue.  09/02/2023, I&D, VAC change, Dr. Rehman.  09/07/2023, I&D, revision L BKA, Dr. Gates.  11/08/2023, I&D, Dr. Maier.  11/15/2023, I&D, Dr. Maier.    Very pleasant 42yo male, returns as scheduled for wound check, accompanied by both parents  Denies F/C/malaise  Pain 4/10    Awake, alert, pleasant, cooperative, sitting upright in wheelchair in NAD  Here with both parents  L LE residual limb with incision completely well healed and dry along its entire length except for one approx 1.5 x 1 cm area along medial aspect with FT skin defect, fibrinous base, sterilely probed without tracking or sinuses or undermining. Nontender. Surrounding skin relatively fibrosed and immobile. No fluctuance. Prior eschar anterior tibia well healed, dry.    Impression  42yo patient with SCI and L BKA for necrotizing soft tissue infection, with healing BKA wound but residual open area without overt signs for infection    Plan  Medihoney  Cont daily sterile dry dressing changes  Follow-up 3 weeks for wound check  Signs and symptoms that should prompt immediate medical attention were discussed.          Etienne Maier MD

## 2024-02-26 ENCOUNTER — TELEPHONE (OUTPATIENT)
Dept: WOUND CARE | Facility: CLINIC | Age: 44
End: 2024-02-26
Payer: MEDICARE

## 2024-02-26 NOTE — TELEPHONE ENCOUNTER
Patient called, was told to call back when he found out his wheelchair would arrive so surgery can be scheduled.  It will be delivered on 3.18.

## 2024-02-27 NOTE — TELEPHONE ENCOUNTER
Voicemail left with patient informing him that the wheelchair delivery date was put in his notes and shared with Dr. Zavala's nurse. The nurse states surgery timing will be discussed at the 3/21 visit.

## 2024-03-03 ENCOUNTER — HEALTH MAINTENANCE LETTER (OUTPATIENT)
Age: 44
End: 2024-03-03

## 2024-03-06 ENCOUNTER — VIRTUAL VISIT (OUTPATIENT)
Dept: ENDOCRINOLOGY | Facility: CLINIC | Age: 44
End: 2024-03-06
Payer: MEDICARE

## 2024-03-06 ENCOUNTER — TELEPHONE (OUTPATIENT)
Dept: SURGERY | Facility: CLINIC | Age: 44
End: 2024-03-06

## 2024-03-06 VITALS — BODY MASS INDEX: 33.13 KG/M2 | HEIGHT: 73 IN | WEIGHT: 250 LBS

## 2024-03-06 DIAGNOSIS — E66.811 CLASS 1 OBESITY WITH SERIOUS COMORBIDITY AND BODY MASS INDEX (BMI) OF 32.0 TO 32.9 IN ADULT, UNSPECIFIED OBESITY TYPE: Primary | ICD-10-CM

## 2024-03-06 PROCEDURE — 99214 OFFICE O/P EST MOD 30 MIN: CPT | Mod: 95

## 2024-03-06 ASSESSMENT — PAIN SCALES - GENERAL: PAINLEVEL: MODERATE PAIN (4)

## 2024-03-06 NOTE — PATIENT INSTRUCTIONS
"Greg Cerrato,  Thank you for allowing us the privilege of caring for you. We hope we provided you with the excellent service you deserve.   Please let us know if there is anything else we can do for you so that we can be sure you are completely satisfied with your care experience.    To ensure the quality of our services you may be receiving a patient satisfaction survey from an independent patient satisfaction monitoring company.    The greatest compliment you can give is a \"Likely to Recommend\"    Your visit was with Viv Traylor PA-C today.    Instructions per today's visit:     Continue Ozempic 0.25mg once weekly for 3 weeks, then increase to 0.5mg once weekly. Can discuss dose increasing once has an updated weight   Lauren Bloch in 6 weeks  Iliana in 3 months     ___________________________________________________________________________  Important contact and scheduling information:  Please call our contact center at [phone] to schedule your next appointments.  For any nursing questions or concerns call Marie Blakely LPN at [phone] or Brissa Meraz RN at [phone]  Please call during clinic hours Monday through Friday 8:00a - 4:00p if you have questions or you can contact us via [employer] at anytime and we will reply during clinic hours.    Lab results will be communicated through My Chart or letter (if My Chart not used). Please call the clinic if you have not received communication after 1 week or if you have any questions.?  Clinic Fax: [phone]  __________________________________________________________________________    If labs were ordered today:    Please make an appointment to have them drawn at your convenience.     To schedule the Lab Appointment using MyChart:  Select \"Schedule an Appointment\"  Select \"Lab Only\"  For \"A couple of questions\", select \"Other\"  For \"Which locations work for you?, select the location and set up the appointment    To schedule by phone call [phone] " to schedule a lab only appointment at any [facility] lab.  ___________________________________________________________________________  Work with A Health !  Virtual Sessions are Available through [facility] Weight Management Clinics    To learn more, call to schedule a free, Health  Q&A appointment: [phone]     What is Health Coaching?  Do you know what you are supposed to do, but you just aren't doing it?  Then, HEALTH COACHING may help you!   Get unstuck and move forward with the support of a professionally trained NBC-HWC (National Board-Certified Health and ) who uses evidence-based approaches to help you move forward with healthy lifestyle changes in the areas of weight loss, stress management and overall well-being.    Health Coaches help you identify goals that will work best for you. Health Coaches provide support and encouragement with overcoming barriers and help you to find inspiration and motivation to lead a healthy lifestyle.    Option one:  Health Coaching 3-Pack; Three, 30-minute Health Coaching Visits, for $99  Visits are done virtually (phone or video)  This is a self pay service; we do not accept insurance for ernesto coaching.    Option two:   The 24 week Plan; 11 Health Coaching Visits, and a 7 months subscription to [employer]-- on-demand fitness, nutrition and mindfulness classes, for $499 (employee discounts may be available). Participants will also meet regularly with a weight management Medical Provider and a Registered/Licensed Dietician.  This is a self-pay service; we do not accept insurance for health coaching.    To Schedule a free Health  Q&A appointment to learn more,  call [phone].  ____________________________________________________________________  [facility]  Healthy Lifestyle Group    Healthy Lifestyle Group  This is a 60 minute virtual coaching group for those who want to lead a  "healthier lifestyle. Come together to set goals and overcome barriers in a supportive group environment. We will address the four pillars of health--nutrition, exercise, sleep and emotional well-being.  This group is highly recommended for those who are participating in the 24 week Healthy Lifestyle Plan and our Health Coaching sessions.    WHEN: This group meets the first Friday of the month, 12:30 PM - 1:30 PM online, via a zoom meeting.      FACILITATOR: Led by National Board Certified Health and , Shaniqua Chavez [facility].    TO REGISTER: Please call the Call Center at [phone] to register. You will get an appointment to attend in [facility]. Fifteen minutes prior to the meeting, complete the e-check in and you will get the link to join the meeting.  There is no charge to attend this group and space is limited.      2023 and 2024 Meeting Topics and Dates:    November 3: Introduction to Mindfulness (Learn simple and effective mindfulness practices and how it can benefit you)    December 8: Let's Talk (guided discussion on our wins and challenges)    January 5: New Years Vision: Manifest your Best 2024! (Guided imagery,  journaling and discussion)    February 2: Let's Talk    March 1: 10 Percent Happier by Shankar Montgomery (Book Bites; a guided discussion on the nuggets of wisdom from favorite wellness books; no need to read the book but highly encouraged)    April 5: Let's Talk    May 3: \"Essentialism; The Disciplined Pursuit of Less by Adam Saba (book bites discussion)    June 7: Let's Talk    July 5: NO MEETING, off for the 4th of July Holiday    August 2: The Blue Zones, Secrets for Living a Longer Life by Shankar Gilbert (book bites discussion)      If you would like bariatric surgery specific support group info please let your care team know.         Thank you,   [facility] Comprehensive Weight Management Team                          "
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
## 2024-03-06 NOTE — TELEPHONE ENCOUNTER
[facility] Call Center    Phone Message    May a detailed message be left on voicemail: yes     Reason for Call: Other: Patient called to discuss surgery with Dr. Nii Mancilla.  Please have Dr. Mancilla follow up with patient.     Action Taken: Message routed to:  [facility] ([facility]    Travel Screening: Not Applicable

## 2024-03-06 NOTE — NURSING NOTE
Is the patient currently in the state of [place]? YES    Visit mode:VIDEO    If the visit is dropped, the patient can be reconnected by: VIDEO VISIT: Text to cell phone:   No relevant phone numbers on file.       Will anyone else be joining the visit? NO  (If patient encounters technical issues they should call [phone]    How would you like to obtain your AVS? MyChart    Are changes needed to the allergy or medication list? Pt stated no changes to allergies and Pt stated no med changes    Reason for visit: Follow Up    Liseth CARTAGENA

## 2024-03-06 NOTE — TELEPHONE ENCOUNTER
Returned call to patient. Informed him that Dr. Mancilla is out on medical leave until May 2024. Scheduled video visit with Dr. Mancilla on 5/31 to discuss plan moving forward. Patient will need to have healed wounds prior to undergoing surgery.
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
## 2024-03-11 ENCOUNTER — TELEPHONE (OUTPATIENT)
Dept: ENDOCRINOLOGY | Facility: CLINIC | Age: 44
End: 2024-03-11
Payer: MEDICARE

## 2024-03-11 NOTE — TELEPHONE ENCOUNTER
Left Voicemail (1st Attempt) and Sent Mychart (1st Attempt) for the patient to call back and schedule the following:    Appointment type: HOWIE HOUSER  Provider: Viv Traylor PA-C   Return date: 3 mos ( 06/06/24 )   Specialty phone number: [phone]  Additional appointment(s) needed: yes   Additonal Notes: Lauren Bloch, RPH, RET [place], 6 week follow-up

## 2024-03-21 ENCOUNTER — HOSPITAL ENCOUNTER (OUTPATIENT)
Dept: WOUND CARE | Facility: CLINIC | Age: 44
Discharge: HOME | End: 2024-03-21
Attending: SURGERY | Admitting: SURGERY
Payer: MEDICARE

## 2024-03-21 VITALS — HEART RATE: 108 BPM | SYSTOLIC BLOOD PRESSURE: 102 MMHG | TEMPERATURE: 97 F | DIASTOLIC BLOOD PRESSURE: 65 MMHG

## 2024-03-21 DIAGNOSIS — L89.154 PRESSURE INJURY OF SACRAL REGION, STAGE 4 (H): Primary | ICD-10-CM

## 2024-03-21 DIAGNOSIS — L89.154 DECUBITUS ULCER OF COCCYGEAL REGION, STAGE 4 (H): ICD-10-CM

## 2024-03-21 DIAGNOSIS — S81.802A OPEN WOUND OF LOWER LEG, LEFT, INITIAL ENCOUNTER: ICD-10-CM

## 2024-03-21 PROCEDURE — 99213 OFFICE O/P EST LOW 20 MIN: CPT | Performed by: SURGERY

## 2024-03-21 PROCEDURE — 97602 WOUND(S) CARE NON-SELECTIVE: CPT
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
## 2024-03-21 NOTE — DISCHARGE INSTRUCTIONS
"Saqib Bishop   1980    DME order sent to [facility] Phone: [phone] Fax [phone]    Dressing changes outside of clinic are being performed by Family.     Still a couple months out for surgery per Dr Zavala's schedule    To do list for FLAP surgery: 03/21/24  1. Done: Group 2 Mattress: [facility] P: [phone] Fax: [phone]  2. Pending: new wheel chair and High Profile ROHO cushion from [facility] WITH PRESSURE MAPPING- as soon as you get your new wheel chair then call [facility] [phone] to let Dr Zavala know it has arrived, so surgery can be scheduled. PENDING, PARTS, MAPPING for seat, back.  3. Done: MRI  4. Continue diet high in protein is important for wound healing: protein shakes or bars, Rahul  5. Done: No TOBACCO products at all  6. Done: Colostomy  7. Indwelling Catheter  8. H&P with Primary Care MD 30 days prior to surgery YOU TO COMPLETE ONCE YOU SET YOUR SURGERY DATE  9. Recovery at home post-flap     3/21/24 Recommend to use Medihoney alginate and Silicone foam dressing to Stump wound- please continue to discuss with Surgeon to order for supplies.      Wound Dressing Change: Right Sacrum   -Cleanse with mild unscented soap (such as Cetaphil, Cerave or Dove) and water  -Apply small amount of Vashe OR vinegar soak on gauze, to wound bed, for 5-10 minutes, remove then:   -Fluff and gently tuck 1/3RD roll of 4\" AMD roll gauze, include tunnel/ undermining pocket to fill wound defect. Leave a tail outside of wound to retrieve  -Cover with 1/2 of 5x9\" ABD pad and 2\" Medipore tape  Change 2 times a day and as needed for soilage     How to prepare the vinegar solution: if VASHE not available  1. Mix 2 tablespoons of white household vinegar with 2 cups of water  2. Store in the refrigerator and use for up to 5 days     Repositioning:  Bed: Reposition MINIMALLY every 1-2 hours in bed to relieve pressure and promote perfusion to tissue. Avoid pressure to area.  Chair: When up " to the chair, do not sit for longer than one hour total before returning to bed for at least 60 minutes to relieve pressure and promote perfusion to the tissue. Completely recline/tilt for 15 minutes each hour. Sit on a chair cushion when up to the chair.  Done: Group 2 Mattress: [facility]: Patient will need Group 2, low air loss mattress due to large, stage 4 ulceration on  the patient's pelvis that has failed to improve on a normal mattress despite regular wound cares and repositioning. A group 1 mattress will not be adequate to offload these severe ulcerations. Patient has impaired sensation and is unable to reposition independently.     Main Provider: Jayashree Zavala M.D. March 21, 2024    Call us at [phone] if you have any questions about your wounds, have redness or swelling around your wound, have a fever of 101 degrees Fahrenheit or greater or if you have any other problems or concerns. We answer the phone Monday through Friday 8 am to 4 pm, please leave a message as we check the voicemail frequently throughout the day.     If you had a positive experience please indicate that on your patient satisfaction survey form that [facility] will be sending you.  It was a pleasure meeting with you today.  Thank you for allowing me and my team the privilege of caring for you today.  YOU are the reason we are here, and I truly hope we provided you with the excellent service you deserve.  Please let us know if there is anything else we can do for you so that we can be sure you are leaving completely satisfied with your care experience.      If you have any billing related questions please call the [facility] Business office at [phone]. The clinic staff does not handle billing related matters.  If you are scheduled to have a follow up appointment, you will receive a reminder call the day before your visit. On the appointment day please arrive 15 minutes prior to your appointment time. If you  are unable to keep that appointment, please call the clinic to cancel or reschedule. If you are more than 10 minutes late or greater for your scheduled appointment time, the clinic policy is that you may be asked to reschedule.\

## 2024-03-21 NOTE — PROGRESS NOTES
Patient Active Problem List   Diagnosis    Removal of pin, plate, kristi, or screw    Open wound of lower leg, left, initial encounter    Pressure ulcer of sacral region, unspecified stage    Necrotizing fasciitis of lower leg (H)    S/P below knee amputation, left (H)    Adjustment disorder with mixed anxiety and depressed mood    Allergic rhinitis, unspecified    Anxiety    Band-shaped keratopathy    Burst fracture of thoracic vertebra (H)    Cataract    Chorioretinal scar    Chronic, continuous use of opioids    Colostomy present (H)    Decubitus ulcer of coccygeal region, stage 4 (H)    Decubitus ulcer of left ischial area, stage IV (H)    Pressure ulcer of coccygeal region, stage III (H)    Degeneration of intervertebral disc of lumbar region    Diplopia    Disorder of macula of retina    Compression fracture of vertebra (H)    Fracture of cervical vertebra (H)    Gastro-esophageal reflux disease without esophagitis    Posttraumatic stress disorder    History of DVT (deep vein thrombosis)    Hypertension    Insomnia    Iris adhesion, posterior    Mild episode of recurrent major depressive disorder (H24)    Neurogenic bladder    Narrowing of intervertebral disc space    Muscle weakness (generalized)    Neurogenic pain    Neuropathic pain of both legs    Numbness of hand    Open wound of buttock, right, subsequent encounter    Class 1 obesity with serious comorbidity and body mass index (BMI) of 32.0 to 32.9 in adult, unspecified obesity type    Osteopenia    Chronic pain disorder    Panuveitis    Paraplegia, unspecified (H)    Restless legs syndrome    Sacral osteomyelitis (H)    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    Sarcoidosis    Secondary hypocortisolism (H24)    Sinus tachycardia    Skin ulcer (H)    Vertebral subluxation complex    Vitamin D deficiency    Wound dehiscence, surgical, initial encounter    Cutaneous abscess, unspecified    Hernia, ventral    T12 spinal cord injury (H)    Parastomal hernia  without obstruction or gangrene    Infection of amputation stump of left lower extremity (H)     Past Medical History:   Diagnosis Date    Brain injury with brief loss of consciousness (H) 12/14/2015    Candida esophagitis (H) 08/03/2022    Degenerative joint disease     Gastro-oesophageal reflux disease     Hypercalcemia 08/03/2022    Hypokalemia 08/03/2022    Hypomagnesemia 08/03/2022     Labs:   Recent Labs   Lab Test 11/18/23  0805 11/16/23  0749 11/15/23  0552 09/07/23  0423 09/06/23  0815   ALBUMIN 3.6   < > 3.3*   < >  --    HGB 13.9   < > 13.1*   < >  --    INR  --   --  1.06   < >  --    WBC 6.0   < > 6.1   < >  --    A1C  --   --   --   --  5.5    < > = values in this interval not displayed.     Nutrition requirements were discussed with patient today.  Vitals:  /65 (Patient Position: Chair, Cuff Size: Adult Regular)   Pulse 108   Temp 97  F (36.1  C) (Temporal)   Wound:   Wound Pressure injury community acquired (Active)       Wound Sacrum Pressure injury community acquired Stage 4 (Active)       Wound Thigh Skin tear;Other (comment) (Active)       Wound (used by OP [facility] only) 01/26/23 0905 Right sacral pressure injury (Active)   Thickness/Stage Stage 4 03/21/24 1244   Base granulating;slough 03/21/24 1244   Periwound intact 03/21/24 1244   Periwound Temperature warm 03/21/24 1244   Periwound Skin Turgor soft 03/21/24 1244   Edges open 03/21/24 1244   Length (cm) 0.6 03/21/24 1244   Width (cm) 1.8 03/21/24 1244   Depth (cm) 4.5 03/21/24 1244   Wound (cm^2) 1.08 cm^2 03/21/24 1244   Wound Volume (cm^3) 4.86 cm^3 03/21/24 1244   Wound healing % 48.08 03/21/24 1244   Tunneling [Depth (cm)/Location] 4 oclock/ 6.9 cm 03/21/24 1244   Undermining [Depth (cm)/Location] 5.8 cm/ 5-3 oclock 10/10/23 1437   Drainage Characteristics/Odor serous 03/21/24 1244   Drainage Amount copious;large 03/21/24 1244   Care, Wound non-select wound debridement performed. 03/21/24 1244       Wound (used by OP [facility] only)  02/22/24 1421 Left posterior;upper thigh pressure injury (Active)   Thickness/Stage Stage 2 03/21/24 1244   Base closed/resurfaced 03/21/24 1244   Periwound intact 03/21/24 1244   Periwound Temperature warm 03/21/24 1244   Periwound Skin Turgor soft 03/21/24 1244   Edges open 03/21/24 1244   Length (cm) 0 03/21/24 1244   Width (cm) 0 03/21/24 1244   Depth (cm) 0 03/21/24 1244   Wound (cm^2) 0 cm^2 03/21/24 1244   Wound Volume (cm^3) 0 cm^3 03/21/24 1244   Wound healing % 100 03/21/24 1244   Drainage Characteristics/Odor serous 02/22/24 1359   Drainage Amount none 03/21/24 1244   Care, Wound non-select wound debridement performed. 02/22/24 1359       Incision/Surgical Site 09/07/23 Left;Lower Leg (Active)       Incision/Surgical Site 11/15/23 Left;Lower Leg (Active)      Photo:       Further instructions from your care team         Saqib Bishop   1980    DME order sent to [facility] Phone: [phone] Fax [phone]    Dressing changes outside of clinic are being performed by Family.     Still a couple months out for surgery per Dr Zavala's schedule    To do list for FLAP surgery: 03/21/24  1. Done: Group 2 Mattress: [facility] P: [phone] Fax: [phone]  2. Pending: new wheel chair and High Profile ROHO cushion from [facility] WITH PRESSURE MAPPING- as soon as you get your new wheel chair then call [facility] [phone] to let Dr Zavala know it has arrived, so surgery can be scheduled. PENDING, PARTS, MAPPING for seat, back.  3. Done: MRI  4. Continue diet high in protein is important for wound healing: protein shakes or bars, Rahul  5. Done: No TOBACCO products at all  6. Done: Colostomy  7. Indwelling Catheter  8. H&P with Primary Care MD 30 days prior to surgery YOU TO COMPLETE ONCE YOU SET YOUR SURGERY DATE  9. Recovery at home post-flap     3/21/24 Recommend to use Medihoney alginate and Silicone foam dressing to Stump wound- please continue to discuss with Surgeon to order for  "supplies.      Wound Dressing Change: Right Sacrum   -Cleanse with mild unscented soap (such as Cetaphil, Cerave or Dove) and water  -Apply small amount of Vashe OR vinegar soak on gauze, to wound bed, for 5-10 minutes, remove then:   -Fluff and gently tuck 1/3RD roll of 4\" AMD roll gauze, include tunnel/ undermining pocket to fill wound defect. Leave a tail outside of wound to retrieve  -Cover with 1/2 of 5x9\" ABD pad and 2\" Medipore tape  Change 2 times a day and as needed for soilage     How to prepare the vinegar solution: if VASHE not available  1. Mix 2 tablespoons of white household vinegar with 2 cups of water  2. Store in the refrigerator and use for up to 5 days     Repositioning:  Bed: Reposition MINIMALLY every 1-2 hours in bed to relieve pressure and promote perfusion to tissue. Avoid pressure to area.  Chair: When up to the chair, do not sit for longer than one hour total before returning to bed for at least 60 minutes to relieve pressure and promote perfusion to the tissue. Completely recline/tilt for 15 minutes each hour. Sit on a chair cushion when up to the chair.  Done: Group 2 Mattress: [facility]: Patient will need Group 2, low air loss mattress due to large, stage 4 ulceration on  the patient's pelvis that has failed to improve on a normal mattress despite regular wound cares and repositioning. A group 1 mattress will not be adequate to offload these severe ulcerations. Patient has impaired sensation and is unable to reposition independently.     Main Provider: Jayashree Zavala M.D. March 21, 2024                "

## 2024-03-26 ENCOUNTER — TELEPHONE (OUTPATIENT)
Dept: ORTHOPEDICS | Facility: CLINIC | Age: 44
End: 2024-03-26
Payer: MEDICARE

## 2024-03-26 NOTE — TELEPHONE ENCOUNTER
[facility] Call Center    Phone Message    May a detailed message be left on voicemail: yes     Reason for Call: Other: Patient's mother is wondering if patient come in any time sooner than current appointment (05/10). Prefer afternoon appointments. Declined 04/22.     Action Taken: Message routed to:  [facility] ([facility] Orthopedics    Travel Screening: Not Applicable

## 2024-03-26 NOTE — TELEPHONE ENCOUNTER
"ATC called patient's mother back regarding the upcoming appointment.  We did book them for 4/22/24 at 1:10pm and ATC advised they send in MyChart pictures of the stump/incision so that we can review the suspicious \"spot\" they are concerned about.  ATC advised that once reviewed, if it seems like we should see it right away, we'll make arrangements to do so.  Patient's mother expressed full understanding of the plan.    Bill Smith MS, ATC, LAT, [facility]  Dr. Semaj Maier    "

## 2024-04-03 ENCOUNTER — DOCUMENTATION ONLY (OUTPATIENT)
Dept: INFECTIOUS DISEASES | Facility: CLINIC | Age: 44
End: 2024-04-03
Payer: MEDICARE

## 2024-04-03 ENCOUNTER — TELEPHONE (OUTPATIENT)
Dept: CARDIOLOGY | Facility: CLINIC | Age: 44
End: 2024-04-03
Payer: MEDICARE

## 2024-04-03 NOTE — TELEPHONE ENCOUNTER
Left Voicemail (1st Attempt) and Sent Mychart (1st Attempt) for the patient to call back and schedule the following:    Appointment type: RET-MTM  Provider: Lauren Bloch  Return date: around 4/17/24  Specialty phone number: [phone]  Additional appointment(s) needed: n/a  Additonal Notes: n/a

## 2024-04-15 DIAGNOSIS — E66.811 CLASS 1 OBESITY WITH SERIOUS COMORBIDITY AND BODY MASS INDEX (BMI) OF 32.0 TO 32.9 IN ADULT, UNSPECIFIED OBESITY TYPE: ICD-10-CM

## 2024-04-15 NOTE — TELEPHONE ENCOUNTER
Medication Question or Refill    Contacts         Type Contact Phone/Fax    04/15/2024 09:26 AM CDT Phone (Incoming) Saqib Bishop (Self) [phone] (H)            What medication are you calling about (include dose and sig)?:     semaglutide (OZEMPIC) 2 MG/3ML pen     Preferred Pharmacy:    [facility] [address] [place] [address] [address]   [address] [facility] [address]  Phone: [phone] Fax: [phone]      Controlled Substance Agreement on file:   CSA -- Patient Level:    CSA: None found at the patient level.       Who prescribed the medication?: Hjelm    Do you need a refill? Yes    When did you use the medication last? Last Friday, out    Patient offered an appointment? Yes: , next avail    Do you have any questions or concerns?  Yes:   Please send [facility] when complete      Could we send this information to you in Vopium or would you prefer to receive a phone call?:   Patient would like to be contacted via Vopium

## 2024-04-15 NOTE — TELEPHONE ENCOUNTER
semaglutide (OZEMPIC) 2 MG/3ML pen    Disp-3 mL, R-2,   Start: 10/24/2023     3/6/2024  [facility]    Viv Traylor PA-C  Surgery    Nv: 10/23/24    Creatinine   Date Value Ref Range Status   11/18/2023 0.72 0.67 - 1.17 mg/dL Final     Routed because: still at .5 dose or higher dose?? Labs 11/18/23. At 6mth now. Rf? Request per pt call.

## 2024-04-22 ENCOUNTER — OFFICE VISIT (OUTPATIENT)
Dept: ORTHOPEDICS | Facility: CLINIC | Age: 44
End: 2024-04-22
Payer: MEDICARE

## 2024-04-22 DIAGNOSIS — Z89.512 S/P BELOW KNEE AMPUTATION, LEFT (H): Primary | ICD-10-CM

## 2024-04-22 PROCEDURE — 99213 OFFICE O/P EST LOW 20 MIN: CPT | Performed by: ORTHOPAEDIC SURGERY

## 2024-04-22 NOTE — LETTER
4/22/2024         RE: Saqib Bishop  [address] [place] [address]        Dear Colleague,    Thank you for referring your patient, Saqib Bishop, to the [facility] [place]. Please see a copy of my visit note below.    Orthopaedic Surgery Clinic Follow-up Appointment    08/28/2023, L transtibial guillotine amputation, Dr. Barth, for necrotizing fasciitis and sepsis.  08/30/2023, I&D, VAC change, Dr. Maier.  Cultures: Parvimonas micra left leg tissue, anaerobic GPB broth only left leg tissue.  09/02/2023, I&D, VAC change, Dr. Rehman.  09/07/2023, I&D, revision L BKA, Dr. Gates.  11/08/2023, I&D, Dr. Maier.  11/15/2023, I&D, Dr. Maier.    Very pleasant 44yo male patient, well-known to me, returns as scheduled for wound check of his left BKA incision, accompanied by his father for the entire encounter.  He reports that the open wound on the medial side of the incision line is unchanged since our last appointment.  He rates his current pain as about a 5 out of 10 in severity.  He denies fevers or chills.  He reports ongoing serous drainage that appears to be unchanged in both character and amount.  He has been doing daily dry sterile gauze dressing changes.  He has baseline nonambulatory status secondary to prior spinal cord injury.  He reports using a standing frame prior to development of his left lower extremity necrotizing infection and subsequent surgeries, and is wondering if he can resume using it.    Exam:  Awake, alert, pleasant, cooperative adult, sitting upright in wheelchair in NAD.  Here with father present for entire encounter.  L LE residual limb with BKA incision line remains well healed and dry along its entire length except for one approximately 2.5 cm x 2 cm open wound along medial aspect with what appears to be a defect full-thickness through skin, but not extending past the subcutaneous layer.  The wound base appears to be completely covered by  granulation tissue with tiny amounts of fibrinous exudate.  The area was sterilely prepped and then carefully probed revealing no tracking, undermining, or other suspicious findings.  The small amount of fibrinous exudate was debrided.  There is no surrounding tenderness or fluctuance.  No other open wounds on the residual left lower extremity.  Left knee able to be passively extended to neutral.  Exhibits the ability to do a slight active straight leg raise though not fully.    Impression:  Very pleasant 42yo patient with SCI and L BKA for necrotizing soft tissue infection, with healing BKA wound but residual open area without overt signs for infection.    Plan  Findings/treatment plan discussed with patient and father.  Continue daily sterile dry dressing changes.  Discussed the options for continued conservative care, versus surgical intervention, which can include application of a wound VAC, and/or application of a split thickness skin graft.  Saqib verbalized that he is amenable to considering this for the future, but that he wants to continue conservative care for now, and that he might consider doing it after our next appointment.  Follow-up as scheduled in about 3 weeks for another wound check.  Signs and symptoms that should prompt sooner medical attention were discussed.  All questions this very pleasant gentleman and his father had at this time were answered.      Etienne Maier MD

## 2024-04-22 NOTE — PROGRESS NOTES
Orthopaedic Surgery Clinic Follow-up Appointment    08/28/2023, L transtibial guillotine amputation, Dr. Barth, for necrotizing fasciitis and sepsis.  08/30/2023, I&D, VAC change, Dr. Maier.  Cultures: Parvimonas micra left leg tissue, anaerobic GPB broth only left leg tissue.  09/02/2023, I&D, VAC change, Dr. Rehman.  09/07/2023, I&D, revision L BKA, Dr. Gates.  11/08/2023, I&D, Dr. Maier.  11/15/2023, I&D, Dr. Maier.    Very pleasant 42yo male patient, well-known to me, returns as scheduled for wound check of his left BKA incision, accompanied by his father for the entire encounter.  He reports that the open wound on the medial side of the incision line is unchanged since our last appointment.  He rates his current pain as about a 5 out of 10 in severity.  He denies fevers or chills.  He reports ongoing serous drainage that appears to be unchanged in both character and amount.  He has been doing daily dry sterile gauze dressing changes.  He has baseline nonambulatory status secondary to prior spinal cord injury.  He reports using a standing frame prior to development of his left lower extremity necrotizing infection and subsequent surgeries, and is wondering if he can resume using it.    Exam:  Awake, alert, pleasant, cooperative adult, sitting upright in wheelchair in NAD.  Here with father present for entire encounter.  L LE residual limb with BKA incision line remains well healed and dry along its entire length except for one approximately 2.5 cm x 2 cm open wound along medial aspect with what appears to be a defect full-thickness through skin, but not extending past the subcutaneous layer.  The wound base appears to be completely covered by granulation tissue with tiny amounts of fibrinous exudate.  The area was sterilely prepped and then carefully probed revealing no tracking, undermining, or other suspicious findings.  The small amount of fibrinous exudate was debrided.  There is no surrounding  tenderness or fluctuance.  No other open wounds on the residual left lower extremity.  Left knee able to be passively extended to neutral.  Exhibits the ability to do a slight active straight leg raise though not fully.    Impression:  Very pleasant 44yo patient with SCI and L BKA for necrotizing soft tissue infection, with healing BKA wound but residual open area without overt signs for infection.    Plan  Findings/treatment plan discussed with patient and father.  Continue daily sterile dry dressing changes.  Discussed the options for continued conservative care, versus surgical intervention, which can include application of a wound VAC, and/or application of a split thickness skin graft.  Saqib verbalized that he is amenable to considering this for the future, but that he wants to continue conservative care for now, and that he might consider doing it after our next appointment.  Follow-up as scheduled in about 3 weeks for another wound check.  Signs and symptoms that should prompt sooner medical attention were discussed.  All questions this very pleasant gentleman and his father had at this time were answered.

## 2024-04-22 NOTE — NURSING NOTE
Reason For Visit:   Chief Complaint   Patient presents with    RECHECK     Patient returns for recheck left BKA wound.  Last seen on 2/23/24.  Patient reports no changes to left leg since our last visit.  He endorses the same wound hole in the same spot with no improvement.       There were no vitals taken for this visit.    Pain Assessment  Patient Currently in Pain: Yes  0-10 Pain Scale: 5  Primary Pain Location: Leg (left)    Bill Smith ATC

## 2024-05-10 ENCOUNTER — OFFICE VISIT (OUTPATIENT)
Dept: ORTHOPEDICS | Facility: CLINIC | Age: 44
End: 2024-05-10
Payer: MEDICARE

## 2024-05-10 DIAGNOSIS — Z89.512 S/P BELOW KNEE AMPUTATION, LEFT (H): Primary | ICD-10-CM

## 2024-05-10 PROCEDURE — 99213 OFFICE O/P EST LOW 20 MIN: CPT | Performed by: ORTHOPAEDIC SURGERY

## 2024-05-10 NOTE — PROGRESS NOTES
Orthopaedic Surgery Clinic Follow-up Appointment    08/28/2023, L transtibial guillotine amputation, Dr. Barth, for necrotizing fasciitis and sepsis.  08/30/2023, I&D, VAC change, Dr. Maier.  09/02/2023, I&D, VAC change, Dr. Rehman.  09/07/2023, I&D, revision L BKA, Dr. Gates.  11/08/2023, I&D, Dr. Maier.  11/15/2023, I&D, Dr. Maier.  Cultures:  Parvimonas micra left leg tissue 08/30/2023, anaerobic GPB broth only left leg tissue 08/30/2023.    History:  I saw this very pleasant 42-year-old gentleman follows up today for his left BKA wound.  He denies fevers, and he is doing regular dressing changes on his wound.  He is accompanied here today by his father, and they report that the wound is improving in appearance and looking the best it has looked.  He rates his pain as about 5 out of 10 in severity.    Exam:  Examination shows patient to be nonseptic appearing from a general clinical standpoint.  He is an adult male sitting upright in a regular wheelchair with a left leg rest elevated, in no acute distress.  This is a new wheelchair he has gotten that holds his left leg up better.  Breathing pattern is regular and nonlabored on room air.  The open wound appears to be improving significantly.  As before base appears to be covered by granulation tissue with tiny amount of fibrinous exudate.  The wound is sterilely prepped and again probed showing no tracking, undermining, or penetration past the subcutaneous layer.  Again, the small amount of fibrinous exudate was debrided.  There is no surrounding erythema, tenderness, or fluctuance.  The remainder of the BKA incision line is well-healed and dry.    Impression:  Very pleasant 43-year-old gentleman with a healing left transtibial amputation wound.  It does appear to be improving with conservative care.    Plan:  Continue sterile dry gauze dressing changes daily.  Continue nonweightbearing left lower extremity residual limb.  We did discuss the potential  treatments should conservative care fail to fully heal the wound, including debridement and application of a wound VAC, versus skin grafting.  Follow-up in 4 weeks.  Signs and symptoms to watch out for that should prompt sooner medical attention were discussed.  All questions were answered.

## 2024-05-10 NOTE — NURSING NOTE
Reason For Visit:   Chief Complaint   Patient presents with    RECHECK     Patient returns for 3w recheck of left BKA stump.  He was last seen on 4/22/24.  Patient states things are feeling and looking better today.       There were no vitals taken for this visit.    Pain Assessment  Patient Currently in Pain: Yes  0-10 Pain Scale: 5  Primary Pain Location: Leg (left)    Bill Smith, ATC

## 2024-05-10 NOTE — LETTER
5/10/2024         RE: Saqib Bishop  [address] [place] [address]        Dear Colleague,    Thank you for referring your patient, Saqib Bishop, to the [facility] [place]. Please see a copy of my visit note below.    Orthopaedic Surgery Clinic Follow-up Appointment    08/28/2023, L transtibial guillotine amputation, Dr. Barth, for necrotizing fasciitis and sepsis.  08/30/2023, I&D, VAC change, Dr. Maier.  09/02/2023, I&D, VAC change, Dr. Rehman.  09/07/2023, I&D, revision L BKA, Dr. Gates.  11/08/2023, I&D, Dr. Maier.  11/15/2023, I&D, Dr. Maier.  Cultures:  Parvimonas micra left leg tissue 08/30/2023, anaerobic GPB broth only left leg tissue 08/30/2023.    History:  I saw this very pleasant 42-year-old gentleman follows up today for his left BKA wound.  He denies fevers, and he is doing regular dressing changes on his wound.  He is accompanied here today by his father, and they report that the wound is improving in appearance and looking the best it has looked.  He rates his pain as about 5 out of 10 in severity.    Exam:  Examination shows patient to be nonseptic appearing from a general clinical standpoint.  He is an adult male sitting upright in a regular wheelchair with a left leg rest elevated, in no acute distress.  This is a new wheelchair he has gotten that holds his left leg up better.  Breathing pattern is regular and nonlabored on room air.  The open wound appears to be improving significantly.  As before base appears to be covered by granulation tissue with tiny amount of fibrinous exudate.  The wound is sterilely prepped and again probed showing no tracking, undermining, or penetration past the subcutaneous layer.  Again, the small amount of fibrinous exudate was debrided.  There is no surrounding erythema, tenderness, or fluctuance.  The remainder of the BKA incision line is well-healed and dry.    Impression:  Very pleasant 43-year-old gentleman with a  healing left transtibial amputation wound.  It does appear to be improving with conservative care.    Plan:  Continue sterile dry gauze dressing changes daily.  Continue nonweightbearing left lower extremity residual limb.  We did discuss the potential treatments should conservative care fail to fully heal the wound, including debridement and application of a wound VAC, versus skin grafting.  Follow-up in 4 weeks.  Signs and symptoms to watch out for that should prompt sooner medical attention were discussed.  All questions were answered.    Sincerely,        Etienne Maier MD

## 2024-05-16 ENCOUNTER — HOSPITAL ENCOUNTER (OUTPATIENT)
Dept: WOUND CARE | Facility: CLINIC | Age: 44
Discharge: HOME | End: 2024-05-16
Attending: SURGERY | Admitting: SURGERY
Payer: MEDICARE

## 2024-05-16 VITALS — SYSTOLIC BLOOD PRESSURE: 156 MMHG | TEMPERATURE: 96.9 F | DIASTOLIC BLOOD PRESSURE: 111 MMHG | HEART RATE: 116 BPM

## 2024-05-16 DIAGNOSIS — L89.154 PRESSURE INJURY OF SACRAL REGION, STAGE 4 (H): ICD-10-CM

## 2024-05-16 DIAGNOSIS — S81.802A OPEN WOUND OF LOWER LEG, LEFT, INITIAL ENCOUNTER: Primary | ICD-10-CM

## 2024-05-16 PROCEDURE — 99213 OFFICE O/P EST LOW 20 MIN: CPT | Performed by: SURGERY

## 2024-05-16 PROCEDURE — 97602 WOUND(S) CARE NON-SELECTIVE: CPT

## 2024-05-16 NOTE — DISCHARGE INSTRUCTIONS
"05/16/2024   Saqib Angelesmateuszmary   1980    A DME order was not completed because the patient declined the need for supplies  Dressing changes outside of clinic are being performed by [facility]     Dr. Zavala is going to try to get you in for your flap surgery in the next few weeks hopefully. You will get a call for scheduling.  To do list for FLAP surgery: 05/16/24  1. Done: Group 2 Mattress: [facility] P: [phone] Fax: [phone]  2. Done: new wheel chair and High Profile ROHO cushion from [facility] WITH PRESSURE MAPPING3. Done: MRI  4. Continue diet high in protein is important for wound healing: protein shakes or bars, Rahul  5. Done: No TOBACCO products at all  6. Done: Colostomy  7. Indwelling Catheter  8. Done on 5-15-24: H&P with Primary Care MD 30 days prior to surgery   9. Recovery at home post-flap    Wound Dressing Change: Right Sacrum   -Cleanse with mild unscented soap (such as Cetaphil, Cerave or Dove) and water  -Apply small amount of Vashe OR vinegar soak on gauze, to wound bed, for 5-10 minutes, remove then:   -Fluff and gently tuck 1/3RD roll of 4\" AMD roll gauze, include tunnel/ undermining pocket to fill wound defect. Leave a tail outside of wound to retrieve  -Cover with 1/2 of 5x9\" ABD pad and 2\" Medipore tape  Change 2 times a day and as needed for soilage     How to prepare the vinegar solution: if VASHE not available  1. Mix 2 tablespoons of white household vinegar with 2 cups of water  2. Store in the refrigerator and use for up to 5 days     Repositioning:  Bed: Reposition MINIMALLY every 1-2 hours in bed to relieve pressure and promote perfusion to tissue. Avoid pressure to area.  Chair: When up to the chair, do not sit for longer than one hour total before returning to bed for at least 60 minutes to relieve pressure and promote perfusion to the tissue. Completely recline/tilt for 15 minutes each hour. Sit on a chair cushion when up to the chair.  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 A group 1 mattress will not be adequate to offload these severe ulcerations. Patient has impaired sensation and is unable to reposition independently.    Main Provider: Jayashree Zavala M.D. May 16, 2024    Call us at [phone] if you have any questions about your wounds, if you have redness or swelling around your wound, have a fever of 101 degrees Fahrenheit or greater or if you have any other problems or concerns. We answer the phone Monday through Friday 8 am to 4 pm, please leave a message as we check the voicemail frequently throughout the day. If you have a concern over the weekend, please leave a message and we will return your call Monday. If the need is urgent, go to the ER or urgent care.    If you had a positive experience please indicate that on your patient satisfaction survey form that [facility] will be sending you.    It was a pleasure meeting with you today.  Thank you for allowing me and my team the privilege of caring for you today.  YOU are the reason we are here, and I truly hope we provided you with the excellent service you deserve.  Please let us know if there is anything else we can do for you so that we can be sure you are leaving completely satisfied with your care experience.      If you have any billing related questions please call the [facility] Business office at [phone]. The clinic staff does not handle billing related matters.    If you are scheduled to have a follow up appointment, you will receive a reminder call the day before your visit. On the appointment day please arrive 15 minutes prior to your appointment time. If you are unable to keep that appointment, please call the clinic to cancel or reschedule. If you are more than 10  minutes late or greater for your scheduled appointment time, the clinic policy is that you may be asked to reschedule.
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
## 2024-05-16 NOTE — PROGRESS NOTES
Patient Active Problem List   Diagnosis    Removal of pin, plate, kristi, or screw    Open wound of lower leg, left, initial encounter    Pressure ulcer of sacral region, unspecified stage    Necrotizing fasciitis of lower leg (H)    S/P below knee amputation, left (H)    Adjustment disorder with mixed anxiety and depressed mood    Allergic rhinitis, unspecified    Anxiety    Band-shaped keratopathy    Burst fracture of thoracic vertebra (H)    Cataract    Chorioretinal scar    Chronic, continuous use of opioids    Colostomy present (H)    Decubitus ulcer of coccygeal region, stage 4 (H)    Decubitus ulcer of left ischial area, stage IV (H)    Pressure ulcer of coccygeal region, stage III (H)    Degeneration of intervertebral disc of lumbar region    Diplopia    Disorder of macula of retina    Compression fracture of vertebra (H)    Fracture of cervical vertebra (H)    Gastro-esophageal reflux disease without esophagitis    Posttraumatic stress disorder    History of DVT (deep vein thrombosis)    Hypertension    Insomnia    Iris adhesion, posterior    Mild episode of recurrent major depressive disorder (H24)    Neurogenic bladder    Narrowing of intervertebral disc space    Muscle weakness (generalized)    Neurogenic pain    Neuropathic pain of both legs    Numbness of hand    Open wound of buttock, right, subsequent encounter    Class 1 obesity with serious comorbidity and body mass index (BMI) of 32.0 to 32.9 in adult, unspecified obesity type    Osteopenia    Chronic pain disorder    Panuveitis    Paraplegia, unspecified (H)    Restless legs syndrome    Sacral osteomyelitis (H)    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    Sarcoidosis    Secondary hypocortisolism (H24)    Sinus tachycardia    Skin ulcer (H)    Vertebral subluxation complex    Vitamin D deficiency    Wound dehiscence, surgical, initial encounter    Cutaneous abscess, unspecified    Hernia, ventral    T12 spinal cord injury (H)    Parastomal hernia  without obstruction or gangrene    Infection of amputation stump of left lower extremity (H)     Past Medical History:   Diagnosis Date    Brain injury with brief loss of consciousness (H) 12/14/2015    Candida esophagitis (H) 08/03/2022    Degenerative joint disease     Gastro-oesophageal reflux disease     Hypercalcemia 08/03/2022    Hypokalemia 08/03/2022    Hypomagnesemia 08/03/2022     Labs:   Recent Labs   Lab Test 11/18/23  0805 11/16/23  0749 11/15/23  0552 09/07/23  0423 09/06/23  0815   ALBUMIN 3.6   < > 3.3*   < >  --    HGB 13.9   < > 13.1*   < >  --    INR  --   --  1.06   < >  --    WBC 6.0   < > 6.1   < >  --    A1C  --   --   --   --  5.5    < > = values in this interval not displayed.     Nutrition requirements were discussed with patient today.  Vitals:  BP (!) 156/111 (BP Location: Left arm, Patient Position: Sitting, Cuff Size: Adult Regular)   Pulse 116   Temp 96.9  F (36.1  C) (Temporal)   Wound:   Wound (used by OP [facility] only) 01/26/23 0905 Right sacral pressure injury (Active)   Thickness/Stage Stage 4 05/16/24 1226   Base granulating;slough 05/16/24 1226   Periwound intact 05/16/24 1226   Periwound Temperature warm 05/16/24 1226   Periwound Skin Turgor soft 05/16/24 1226   Edges open 05/16/24 1226   Length (cm) 0.8 05/16/24 1226   Width (cm) 1.7 05/16/24 1226   Depth (cm) 5.8 05/16/24 1226   Wound (cm^2) 1.36 cm^2 05/16/24 1226   Wound Volume (cm^3) 7.89 cm^3 05/16/24 1226   Wound healing % 34.62 05/16/24 1226   Tunneling [Depth (cm)/Location] 9 o'clock / 6.0cm 05/16/24 1226   Undermining [Depth (cm)/Location] 5.8 cm/ 5-3 oclock 10/10/23 1437   Drainage Characteristics/Odor serous 05/16/24 1226   Drainage Amount copious;large 05/16/24 1226   Care, Wound non-select wound debridement performed. 05/16/24 1226      Photo:       Further instructions from your care team         05/16/2024   Saqib Bishop   1980    A DME order was not completed because the patient declined the need for  "supplies  Dressing changes outside of clinic are being performed by Family     Dr. Zavala is going to try to get you in for your flap surgery in the next few weeks hopefully. You will get a call for scheduling.  To do list for FLAP surgery: 05/16/24  1. Done: Group 2 Mattress: [facility] P: [phone] Fax: [phone]  2. Done: new wheel chair and High Profile ROHO cushion from [facility] WITH PRESSURE MAPPING3. Done: MRI  4. Continue diet high in protein is important for wound healing: protein shakes or bars, Rahul  5. Done: No TOBACCO products at all  6. Done: Colostomy  7. Indwelling Catheter  8. Done on 5-15-24: H&P with Primary Care MD 30 days prior to surgery   9. Recovery at home post-flap    Wound Dressing Change: Right Sacrum   -Cleanse with mild unscented soap (such as Cetaphil, Cerave or Dove) and water  -Apply small amount of Vashe OR vinegar soak on gauze, to wound bed, for 5-10 minutes, remove then:   -Fluff and gently tuck 1/3RD roll of 4\" AMD roll gauze, include tunnel/ undermining pocket to fill wound defect. Leave a tail outside of wound to retrieve  -Cover with 1/2 of 5x9\" ABD pad and 2\" Medipore tape  Change 2 times a day and as needed for soilage     How to prepare the vinegar solution: if VASHE not available  1. Mix 2 tablespoons of white household vinegar with 2 cups of water  2. Store in the refrigerator and use for up to 5 days     Repositioning:  Bed: Reposition MINIMALLY every 1-2 hours in bed to relieve pressure and promote perfusion to tissue. Avoid pressure to area.  Chair: When up to the chair, do not sit for longer than one hour total before returning to bed for at least 60 minutes to relieve pressure and promote perfusion to the tissue. Completely recline/tilt for 15 minutes each hour. Sit on a chair cushion when up to the chair.  Done: Group 2 Mattress: [facility]: Patient will need Group 2, low air loss mattress due to large, stage 4 ulceration on  the " patient's pelvis that has failed to improve on a normal mattress despite regular wound cares and repositioning. A group 1 mattress will not be adequate to offload these severe ulcerations. Patient has impaired sensation and is unable to reposition independently.    Main Provider: Jayashree Zavala M.D. May 16, 2024

## 2024-05-23 ENCOUNTER — TELEPHONE (OUTPATIENT)
Dept: WOUND CARE | Facility: CLINIC | Age: 44
End: 2024-05-23
Payer: MEDICARE

## 2024-05-23 ENCOUNTER — PRE VISIT (OUTPATIENT)
Dept: SURGERY | Facility: CLINIC | Age: 44
End: 2024-05-23
Payer: MEDICARE

## 2024-05-23 DIAGNOSIS — L98.429 STAGE 4 SKIN ULCER OF SACRAL REGION (H): Primary | ICD-10-CM

## 2024-05-23 DIAGNOSIS — M86.9 OSTEOMYELITIS, UNSPECIFIED SITE, UNSPECIFIED TYPE (H): ICD-10-CM

## 2024-05-23 DIAGNOSIS — G82.20 PARAPLEGIA (H): ICD-10-CM

## 2024-05-23 NOTE — TELEPHONE ENCOUNTER
Patient Telephone Reminder Call    Date of call:  05/23/24  Phone numbers:  Home number on file [phone] (home)    Reached patient/confirmed appointment:  Yes  Appointment with:   Dr. Nii Mancilla  Reason for visit:  Parastomal hernia  Remind patient that this visit is a consultation only, NO procedure:  Yes  Can this visit be changed to a video visit:  No

## 2024-05-23 NOTE — TELEPHONE ENCOUNTER
Discussed with Dr. Zavala who states the surgery might be within the next month but no date has been determined. Returned call to patient and informed him of this. No further questions or concerns.

## 2024-05-24 ENCOUNTER — TELEPHONE (OUTPATIENT)
Dept: PLASTIC SURGERY | Facility: CLINIC | Age: 44
End: 2024-05-24

## 2024-05-24 ENCOUNTER — VIRTUAL VISIT (OUTPATIENT)
Dept: SURGERY | Facility: CLINIC | Age: 44
End: 2024-05-24
Payer: MEDICARE

## 2024-05-24 DIAGNOSIS — K43.5 PARASTOMAL HERNIA WITHOUT OBSTRUCTION OR GANGRENE: Primary | ICD-10-CM

## 2024-05-24 PROCEDURE — 99213 OFFICE O/P EST LOW 20 MIN: CPT | Mod: 95 | Performed by: SURGERY

## 2024-05-24 ASSESSMENT — PAIN SCALES - GENERAL: PAINLEVEL: MODERATE PAIN (5)

## 2024-05-24 NOTE — TELEPHONE ENCOUNTER
RN Care Coordinator: Viv Ashu    Surgery is scheduled with Dr. Zavala  Date: 6/3/2024   Location: [facility]    H&P to be completed by Primary Care team - patient instructed to schedule with PCP. Per patient, this has already been scheduled with his Primary Care Clinic.     Post-op visit(s):     To be scheduled by [facility]      Patient will receive a phone call from surgery center pre-operative nurses 3-5 days prior to surgery with arrival time and NPO instructions.     Spoke with patient, confirmed all scheduled information.       Patient questions/concerns: N/A       Surgery packet to be sent via [employer]    __    Zahida Hernandez on 5/24/2024 at 12:12 PM  P: [phone]

## 2024-05-24 NOTE — PATIENT INSTRUCTIONS
You met with Dr. Nii Mancilla.      Today's visit instructions:    Dr. Mancilla would like to arrange a video visit with you on 2024, at 9:30 AM.    A referral was placed for you to meet with Ana to be fit for an ostomy hernia belt. She will call you directly.     Continue with good nutrition to heal your wound.    Continue with your weight loss journey. Your goal weight is 230 pounds.     If you have questions please contact Yamilet RN or Ann RN during regular clinic hours, Monday through Friday 7:30 AM - 4:00 PM, or you can contact us via [employer] at anytime.       If you have urgent needs after-hours, weekends, or holidays please call the hospital at [phone] and ask to speak with our on-call General Surgery Team.    Appointment [phone]  Nurse Advice (Yamilet or Ann): [phone]   Surgery Scheduler (Zahida): [phone]  Fax: [phone]

## 2024-05-24 NOTE — PROGRESS NOTES
"Saqib is a 43 year old who is being evaluated via a billable video visit.    How would you like to obtain your AVS? MyChart  If the video visit is dropped, the invitation should be resent by: Text to cell phone: [phone]  Will anyone else be joining your video visit? No      Video-Visit Details    Type of service:  Video Visit   Video start Time:  0953  Video end time: 1010  Originating Location (pt. Location): Home    Distant Location (provider location):  On-site  Platform used for Video Visit: Ivana  Signed Electronically by: Nii Mancilla MD    General Surgery Virtual Visit:    The patient is a 43-year-old man who is well known to the general surgery service. The patient has a symptomatic parastomal hernia. The visit was a \"video visit.\"  The patient currently has an open wound and is scheduled for a plastic surgery flap coverage surgery with Dr. Genevieve Zavala in June 2024.  The patient will be reassessed after the surgery has been completed for the open wound.      PAST MEDICAL HISTORY:  Past Medical History:   Diagnosis Date    Brain injury with brief loss of consciousness (H) 12/14/2015    Candida esophagitis (H) 08/03/2022    Degenerative joint disease     Gastro-oesophageal reflux disease     Hypercalcemia 08/03/2022    Hypokalemia 08/03/2022    Hypomagnesemia 08/03/2022     PAST SURGICAL HISTORY:  Past Surgical History:   Procedure Laterality Date    AMPUTATE LEG BELOW KNEE Left 8/27/2023    Procedure: Left below knee Guillotine Amputation;  Surgeon: Sesar Barth MD;  [facility]    AMPUTATE LEG BELOW KNEE Left 9/2/2023    Procedure: Irrigation and Debridement leg below knee, wound vac application, Left;  Surgeon: Juan Rehman MD;  [facility]    AMPUTATE LEG BELOW KNEE Left 9/7/2023    Procedure: Below Left Knee Amputation;  Surgeon: Semaj Gates MD;  [facility]    BACK SURGERY      lumbar fusion    EXPLORE SPINE, REMOVE HARDWARE, COMBINED  1/13/2012 "    Procedure:COMBINED EXPLORE SPINE, REMOVE HARDWARE; EXPLORE SPINE, REMOVE HARDWARE L4-S1; Surgeon:FAM GONZALEZ; Location:[facility] OR    IR PICC PLACEMENT > 5 YRS OF AGE  10/10/2017    IR PICC PLACEMENT > 5 YRS OF AGE  4/9/2018    IRRIGATION AND DEBRIDEMENT FOOT, COMBINED Left 11/8/2023    Procedure: LEFT BELOW KNEE AMPUTATION STUMP IRRIGATION AND DEBRIDEMENT, VANCOMYCIN BEAD PLACEMENT;  Surgeon: Etienne Maier MD;  [facility]    IRRIGATION AND DEBRIDEMENT LOWER EXTREMITY, COMBINED Left 8/30/2023    Procedure: Irrigation and debridement lower extremity, combined and wound vac exchange;  Surgeon: Etienne Maier MD;  [facility]    IRRIGATION AND DEBRIDEMENT LOWER EXTREMITY, COMBINED Left 9/7/2023    Procedure: IRRIGATION AND DEBRIDEMENT, LEFT LOWER EXTREMITY WITH WOUND VAC Removal;  Surgeon: Semaj Gates MD;  [facility]    IRRIGATION AND DEBRIDEMENT LOWER EXTREMITY, COMBINED Left 11/15/2023    Procedure: IRRIGATION AND DEBRIDEMENT, LOWER EXTREMITY LEFT LEG, ANTIBIOTIC BEAD REMOVAL AND PRIMARY CLOSURE;  Surgeon: Etienne Maier MD;  [facility]    ORTHOPEDIC SURGERY      right foot surgery     MEDICATIONS:  Current Outpatient Medications   Medication Sig Dispense Refill    acetaminophen (TYLENOL) 325 MG tablet Take 2 tablets (650 mg) by mouth every 4 hours as needed for other (For optimal non-opioid multimodal pain management to improve pain control.)      buPROPion 450 MG TB24 Take 450 mg by mouth daily      busPIRone (BUSPAR) 10 MG tablet Take 1 tablet by mouth 2 times daily      ceFEPIme (MAXIPIME) 2 g vial Inject 2 g into the vein every 8 hours      Cranberry 400 MG CAPS Take 400 mg by mouth daily      cyclobenzaprine (FLEXERIL) 10 MG tablet Take 10 mg by mouth 3 times daily as needed for muscle spasms      doxycycline hyclate (VIBRAMYCIN) 100 MG capsule Take 1 capsule (100 mg) by mouth 2 times daily 60 capsule 0    DULoxetine (CYMBALTA) 60 MG capsule Take 60 mg by mouth 2 times  daily      fluticasone (FLONASE) 50 MCG/ACT nasal spray Spray 1 spray into both nostrils 2 times daily as needed for rhinitis      hydrOXYzine (ATARAX) 25 MG tablet Take 1 tablet (25 mg) by mouth every 6 hours as needed for other (adjuvant pain)      loratadine (CLARITIN) 10 MG tablet Take 1 tablet by mouth daily      losartan-hydrochlorothiazide (HYZAAR) 50-12.5 MG tablet Take 0.5 tablets by mouth daily      melatonin 5 MG tablet Take 1 tablet (5 mg) by mouth nightly as needed for sleep      metroNIDAZOLE (FLAGYL) 500 MG tablet Take 1 tablet (500 mg) by mouth 3 times daily 90 tablet 0    naloxone (NARCAN) 0.4 MG/ML injection Inject 0.5 mLs (0.2 mg) into the vein once as needed for opioid reversal      omeprazole (PRILOSEC) 40 MG DR capsule Take 40 mg by mouth 2 times daily      oxyCODONE (OXYCONTIN) 20 MG 12 hr tablet Take 1 tablet (20 mg) by mouth every 12 hours 20 tablet 0    oxyCODONE (ROXICODONE) 5 MG tablet Take 2 tablets (10 mg) by mouth every 4 hours as needed for moderate pain 24 tablet 0    polyethylene glycol (MIRALAX) 17 GM/Dose powder Take 17 g by mouth 2 times daily as needed for constipation 510 g     polyethylene glycol-propylene glycol (SYSTANE ULTRA) 0.4-0.3 % SOLN ophthalmic solution Apply 1 drop to eye 4 times daily as needed for dry eyes      prednisoLONE acetate (PRED FORTE) 1 % ophthalmic suspension Place 1 drop into both eyes 2 times daily Per taper      pregabalin (LYRICA) 150 MG capsule Take 1 capsule (150 mg) by mouth 3 times daily 30 capsule 0    semaglutide (OZEMPIC) 2 MG/3ML pen Inject 0.25mg subcutaneous every 7 days for 4 weeks, then increase to inject 0.5mg subcutaneous every 7 days. 3 mL 2    Semaglutide-Weight Management (WEGOVY) 0.25 MG/0.5ML pen Inject 0.25 mg Subcutaneous once a week For 4 weeks 2 mL 0    Semaglutide-Weight Management (WEGOVY) 0.5 MG/0.5ML pen Inject 0.5 mg Subcutaneous once a week After completing 4 weeks of 0.25mg dose 2 mL 2    senna-docusate  (SENOKOT-S/PERICOLACE) 8.6-50 MG tablet Take 2 tablets by mouth 2 times daily      vancomycin (VANCOCIN) 1000 mg in dextrose 5% 200 mL PREMIX Inject 1,000 mg into the vein every 12 hours       No current facility-administered medications for this visit.     ALLERGIES:  Allergies   Allergen Reactions    Adhesive Tape Hives     From tape from surgery    Benzoin Hives and Rash    Droperidol Anaphylaxis    Prednisone      vomiting    Vitamin D (Calciferol) Rash     flairs up his sarcoidosis     SOCIAL HISTORY:  Social History     Socioeconomic History    Marital status: Single     Spouse name: None    Number of children: None    Years of education: None    Highest education level: None   Tobacco Use    Smoking status: Former     Current packs/day: 0.00     Types: Cigarettes     Quit date: 2011     Years since quittin.5   Substance and Sexual Activity    Alcohol use: No    Drug use: No     Social Determinants of Health     Financial Resource Strain: Not on File (2023)    Received from [facility]     Financial Resource Strain     Financial Resource Strain: 0   Food Insecurity: Not on File (2023)    Received from [facility]     Food Insecurity     Food: 0   Transportation Needs: Not on File (2023)    Received from [facility]     Transportation Needs     Transportation: 0   Physical Activity: Not on File (2023)    Received from [facility]     Physical Activity     Physical Activity: 0   Recent Concern: Physical Activity - Inactive (2023)    [facility]    Exercise Vital Sign     Days of Exercise per Week: 0 days     Minutes of Exercise per Session: 0 min   Stress: Not on File (2023)    Received from [facility]     Stress     Stress: 0   Social Connections: Not on File (2023)    Received from [facility]     Social Connections     Social Connections and Isolation: 0   Housing Stability: Not on File  (2023)    Received from [facility]     Housin     FAMILY HISTORY:  No family history on file.    A/P: The patient currently has an open wound and is scheduled for a plastic surgery flap coverage surgery with Dr. Genevieve Zavala in 2024.  The patient will be reassessed after the surgery has been completed for the open wound.   the patient is knowledgeable about incarceration or stand strangulation. The patient will call return should he develop symptoms of incarceration obstruction of the parastomal hernia. The patient is scheduled to be reevaluated by the General Surgery service following plastic surgery coverage of his open wound.    Nii Mancilla MD, PhD

## 2024-05-24 NOTE — LETTER
"5/24/2024       RE: Saqib Bishop  [address]  [facility] [address]     Dear Colleague,    Thank you for referring your patient, Saqib Bishop, to the [facility] [place] [facility]. Please see a copy of my visit note below.    Saqib is a 43 year old who is being evaluated via a billable video visit.    How would you like to obtain your AVS? MyChart  If the video visit is dropped, the invitation should be resent by: Text to cell phone: [phone]  Will anyone else be joining your video visit? No        General Surgery Virtual Visit:    The patient is a 43-year-old man who is well known to the general surgery service. The patient has a symptomatic parastomal hernia. The visit was a \"video visit.\"  The patient currently has an open wound and is scheduled for a plastic surgery flap coverage surgery with Dr. Genevieve Zavala in June 2024.  The patient will be reassessed after the surgery has been completed for the open wound.      PAST MEDICAL HISTORY:  Past Medical History:   Diagnosis Date    Brain injury with brief loss of consciousness (H) 12/14/2015    Candida esophagitis (H) 08/03/2022    Degenerative joint disease     Gastro-oesophageal reflux disease     Hypercalcemia 08/03/2022    Hypokalemia 08/03/2022    Hypomagnesemia 08/03/2022     PAST SURGICAL HISTORY:  Past Surgical History:   Procedure Laterality Date    AMPUTATE LEG BELOW KNEE Left 8/27/2023    Procedure: Left below knee Guillotine Amputation;  Surgeon: Sesar Barth MD;  [facility]    AMPUTATE LEG BELOW KNEE Left 9/2/2023    Procedure: Irrigation and Debridement leg below knee, wound vac application, Left;  Surgeon: Juan Rehman MD;  [facility]    AMPUTATE LEG BELOW KNEE Left 9/7/2023    Procedure: Below Left Knee Amputation;  Surgeon: Semaj Gates MD;  [facility]    BACK SURGERY      lumbar fusion    EXPLORE SPINE, " REMOVE HARDWARE, COMBINED  1/13/2012    Procedure:COMBINED EXPLORE SPINE, REMOVE HARDWARE; EXPLORE SPINE, REMOVE HARDWARE L4-S1; Surgeon:FAM GONZALEZ; Location:[facility]    IR PICC PLACEMENT > 5 YRS OF AGE  10/10/2017    IR PICC PLACEMENT > 5 YRS OF AGE  4/9/2018    IRRIGATION AND DEBRIDEMENT FOOT, COMBINED Left 11/8/2023    Procedure: LEFT BELOW KNEE AMPUTATION STUMP IRRIGATION AND DEBRIDEMENT, VANCOMYCIN BEAD PLACEMENT;  Surgeon: Etienne Maier MD;  [facility]    IRRIGATION AND DEBRIDEMENT LOWER EXTREMITY, COMBINED Left 8/30/2023    Procedure: Irrigation and debridement lower extremity, combined and wound vac exchange;  Surgeon: Etienne Maier MD;  [facility]    IRRIGATION AND DEBRIDEMENT LOWER EXTREMITY, COMBINED Left 9/7/2023    Procedure: IRRIGATION AND DEBRIDEMENT, LEFT LOWER EXTREMITY WITH WOUND VAC Removal;  Surgeon: Semaj Gates MD;  [facility]    IRRIGATION AND DEBRIDEMENT LOWER EXTREMITY, COMBINED Left 11/15/2023    Procedure: IRRIGATION AND DEBRIDEMENT, LOWER EXTREMITY LEFT LEG, ANTIBIOTIC BEAD REMOVAL AND PRIMARY CLOSURE;  Surgeon: Etienne Maier MD;  [facility]    ORTHOPEDIC SURGERY      right foot surgery     MEDICATIONS:  Current Outpatient Medications   Medication Sig Dispense Refill    acetaminophen (TYLENOL) 325 MG tablet Take 2 tablets (650 mg) by mouth every 4 hours as needed for other (For optimal non-opioid multimodal pain management to improve pain control.)      buPROPion 450 MG TB24 Take 450 mg by mouth daily      busPIRone (BUSPAR) 10 MG tablet Take 1 tablet by mouth 2 times daily      ceFEPIme (MAXIPIME) 2 g vial Inject 2 g into the vein every 8 hours      Cranberry 400 MG CAPS Take 400 mg by mouth daily      cyclobenzaprine (FLEXERIL) 10 MG tablet Take 10 mg by mouth 3 times daily as needed for muscle spasms      doxycycline hyclate (VIBRAMYCIN) 100 MG capsule Take 1 capsule (100 mg) by mouth 2 times daily 60 capsule 0    DULoxetine (CYMBALTA) 60 MG  capsule Take 60 mg by mouth 2 times daily      fluticasone (FLONASE) 50 MCG/ACT nasal spray Spray 1 spray into both nostrils 2 times daily as needed for rhinitis      hydrOXYzine (ATARAX) 25 MG tablet Take 1 tablet (25 mg) by mouth every 6 hours as needed for other (adjuvant pain)      loratadine (CLARITIN) 10 MG tablet Take 1 tablet by mouth daily      losartan-hydrochlorothiazide (HYZAAR) 50-12.5 MG tablet Take 0.5 tablets by mouth daily      melatonin 5 MG tablet Take 1 tablet (5 mg) by mouth nightly as needed for sleep      metroNIDAZOLE (FLAGYL) 500 MG tablet Take 1 tablet (500 mg) by mouth 3 times daily 90 tablet 0    naloxone (NARCAN) 0.4 MG/ML injection Inject 0.5 mLs (0.2 mg) into the vein once as needed for opioid reversal      omeprazole (PRILOSEC) 40 MG DR capsule Take 40 mg by mouth 2 times daily      oxyCODONE (OXYCONTIN) 20 MG 12 hr tablet Take 1 tablet (20 mg) by mouth every 12 hours 20 tablet 0    oxyCODONE (ROXICODONE) 5 MG tablet Take 2 tablets (10 mg) by mouth every 4 hours as needed for moderate pain 24 tablet 0    polyethylene glycol (MIRALAX) 17 GM/Dose powder Take 17 g by mouth 2 times daily as needed for constipation 510 g     polyethylene glycol-propylene glycol (SYSTANE ULTRA) 0.4-0.3 % SOLN ophthalmic solution Apply 1 drop to eye 4 times daily as needed for dry eyes      prednisoLONE acetate (PRED FORTE) 1 % ophthalmic suspension Place 1 drop into both eyes 2 times daily Per taper      pregabalin (LYRICA) 150 MG capsule Take 1 capsule (150 mg) by mouth 3 times daily 30 capsule 0    semaglutide (OZEMPIC) 2 MG/3ML pen Inject 0.25mg subcutaneous every 7 days for 4 weeks, then increase to inject 0.5mg subcutaneous every 7 days. 3 mL 2    Semaglutide-Weight Management (WEGOVY) 0.25 MG/0.5ML pen Inject 0.25 mg Subcutaneous once a week For 4 weeks 2 mL 0    Semaglutide-Weight Management (WEGOVY) 0.5 MG/0.5ML pen Inject 0.5 mg Subcutaneous once a week After completing 4 weeks of 0.25mg dose 2 mL  2    senna-docusate (SENOKOT-S/PERICOLACE) 8.6-50 MG tablet Take 2 tablets by mouth 2 times daily      vancomycin (VANCOCIN) 1000 mg in dextrose 5% 200 mL PREMIX Inject 1,000 mg into the vein every 12 hours       No current facility-administered medications for this visit.     ALLERGIES:  Allergies   Allergen Reactions    Adhesive Tape Hives     From tape from surgery    Benzoin Hives and Rash    Droperidol Anaphylaxis    Prednisone      vomiting    Vitamin D (Calciferol) Rash     flairs up his sarcoidosis     SOCIAL HISTORY:  Social History     Socioeconomic History    Marital status: Single     Spouse name: None    Number of children: None    Years of education: None    Highest education level: None   Tobacco Use    Smoking status: Former     Current packs/day: 0.00     Types: Cigarettes     Quit date: 2011     Years since quittin.5   Substance and Sexual Activity    Alcohol use: No    Drug use: No     Social Determinants of Health     Financial Resource Strain: Not on File (2023)    Received from [facility]     Financial Resource Strain     Financial Resource Strain: 0   Food Insecurity: Not on File (2023)    Received from [facility]     Food Insecurity     Food: 0   Transportation Needs: Not on File (2023)    Received from [facility]     Transportation Needs     Transportation: 0   Physical Activity: Not on File (2023)    Received from [facility]     Physical Activity     Physical Activity: 0   Recent Concern: Physical Activity - Inactive (2023)    [facility]    Exercise Vital Sign     Days of Exercise per Week: 0 days     Minutes of Exercise per Session: 0 min   Stress: Not on File (2023)    Received from [facility]     Stress     Stress: 0   Social Connections: Not on File (2023)    Received from [facility]     Social Connections     Social Connections and Isolation: 0   Housing  Stability: Not on File (2023)    Received from [facility]  [employer]     [facility] Stability     Housin     FAMILY HISTORY:  No family history on file.    A/P: The patient currently has an open wound and is scheduled for a plastic surgery flap coverage surgery with Dr. Genevieve Zavala in 2024.  The patient will be reassessed after the surgery has been completed for the open wound.   the patient is knowledgeable about incarceration or stand strangulation. The patient will call return should he develop symptoms of incarceration obstruction of the parastomal hernia. The patient is scheduled to be reevaluated by the General Surgery service following plastic surgery coverage of his open wound.        Again, thank you for allowing me to participate in the care of your patient.      Sincerely,    Nii Mancilla MD

## 2024-05-29 ENCOUNTER — TELEPHONE (OUTPATIENT)
Dept: WOUND CARE | Facility: CLINIC | Age: 44
End: 2024-05-29
Payer: MEDICARE

## 2024-05-29 NOTE — TELEPHONE ENCOUNTER
Patient confirmed scheduled appointment:  Date: 6/17/24  Time: 7:30 am  Visit type: P New Ostomy Nurse  Provider: Breann Way  Location: [facility]  Testing/imaging: n/a  Additional notes: referred by Dr. Mancilla to [facility] for ostomy hernia belt

## 2024-06-02 ENCOUNTER — ANESTHESIA EVENT (OUTPATIENT)
Dept: SURGERY | Facility: CLINIC | Age: 44
DRG: 580 | End: 2024-06-02
Payer: MEDICARE

## 2024-06-02 RX ORDER — ACETAMINOPHEN 325 MG/1
975 TABLET ORAL ONCE
Status: CANCELLED | OUTPATIENT
Start: 2024-06-02 | End: 2024-06-02

## 2024-06-03 ENCOUNTER — ANESTHESIA (OUTPATIENT)
Dept: SURGERY | Facility: CLINIC | Age: 44
DRG: 580 | End: 2024-06-03
Payer: MEDICARE

## 2024-06-03 ENCOUNTER — HOSPITAL ENCOUNTER (INPATIENT)
Facility: CLINIC | Age: 44
LOS: 2 days | Discharge: HOME | DRG: 580 | End: 2024-06-05
Attending: SURGERY | Admitting: SURGERY
Payer: MEDICARE

## 2024-06-03 DIAGNOSIS — M72.6 NECROTIZING FASCIITIS OF LOWER LEG (H): ICD-10-CM

## 2024-06-03 DIAGNOSIS — Z89.512 S/P BKA (BELOW KNEE AMPUTATION), LEFT (H): ICD-10-CM

## 2024-06-03 PROBLEM — S31.000A SACRAL WOUND: Status: ACTIVE | Noted: 2024-06-03

## 2024-06-03 LAB
CREAT SERPL-MCNC: 0.99 MG/DL (ref 0.67–1.17)
EGFRCR SERPLBLD CKD-EPI 2021: >90 ML/MIN/1.73M2
GLUCOSE BLDC GLUCOMTR-MCNC: 103 MG/DL (ref 70–99)
HOLD SPECIMEN: NORMAL

## 2024-06-03 PROCEDURE — 250N000011 HC RX IP 250 OP 636: Performed by: STUDENT IN AN ORGANIZED HEALTH CARE EDUCATION/TRAINING PROGRAM

## 2024-06-03 PROCEDURE — 15734 MUSCLE-SKIN GRAFT TRUNK: CPT | Performed by: SURGERY

## 2024-06-03 PROCEDURE — G0463 HOSPITAL OUTPT CLINIC VISIT: HCPCS | Mod: 25

## 2024-06-03 PROCEDURE — 360N000075 HC SURGERY LEVEL 2, PER MIN: Performed by: SURGERY

## 2024-06-03 PROCEDURE — 258N000003 HC RX IP 258 OP 636: Performed by: NURSE ANESTHETIST, CERTIFIED REGISTERED

## 2024-06-03 PROCEDURE — 272N000001 HC OR GENERAL SUPPLY STERILE: Performed by: SURGERY

## 2024-06-03 PROCEDURE — 250N000011 HC RX IP 250 OP 636: Performed by: NURSE ANESTHETIST, CERTIFIED REGISTERED

## 2024-06-03 PROCEDURE — 99222 1ST HOSP IP/OBS MODERATE 55: CPT | Mod: AI | Performed by: NURSE PRACTITIONER

## 2024-06-03 PROCEDURE — 36415 COLL VENOUS BLD VENIPUNCTURE: CPT | Performed by: PHYSICIAN ASSISTANT

## 2024-06-03 PROCEDURE — 0BH17EZ INSERTION OF ENDOTRACHEAL AIRWAY INTO TRACHEA, VIA NATURAL OR ARTIFICIAL OPENING: ICD-10-PCS | Performed by: NURSE ANESTHETIST, CERTIFIED REGISTERED

## 2024-06-03 PROCEDURE — 0KBP0ZZ EXCISION OF LEFT HIP MUSCLE, OPEN APPROACH: ICD-10-PCS | Performed by: SURGERY

## 2024-06-03 PROCEDURE — 250N000009 HC RX 250: Performed by: PHYSICIAN ASSISTANT

## 2024-06-03 PROCEDURE — 250N000025 HC SEVOFLURANE, PER MIN: Performed by: SURGERY

## 2024-06-03 PROCEDURE — 5A19054 RESPIRATORY VENTILATION, SINGLE, NONMECHANICAL: ICD-10-PCS | Performed by: NURSE ANESTHETIST, CERTIFIED REGISTERED

## 2024-06-03 PROCEDURE — 15936 EXC SAC PR ULC PREP MUS FLAP: CPT | Performed by: SURGERY

## 2024-06-03 PROCEDURE — 370N000017 HC ANESTHESIA TECHNICAL FEE, PER MIN: Performed by: SURGERY

## 2024-06-03 PROCEDURE — 999N000141 HC STATISTIC PRE-PROCEDURE NURSING ASSESSMENT: Performed by: SURGERY

## 2024-06-03 PROCEDURE — 250N000013 HC RX MED GY IP 250 OP 250 PS 637: Performed by: STUDENT IN AN ORGANIZED HEALTH CARE EDUCATION/TRAINING PROGRAM

## 2024-06-03 PROCEDURE — 120N000002 HC R&B MED SURG/OB UMMC

## 2024-06-03 PROCEDURE — 97602 WOUND(S) CARE NON-SELECTIVE: CPT

## 2024-06-03 PROCEDURE — 250N000011 HC RX IP 250 OP 636: Performed by: SURGERY

## 2024-06-03 PROCEDURE — 250N000009 HC RX 250: Performed by: NURSE ANESTHETIST, CERTIFIED REGISTERED

## 2024-06-03 PROCEDURE — 82565 ASSAY OF CREATININE: CPT | Performed by: PHYSICIAN ASSISTANT

## 2024-06-03 PROCEDURE — 258N000003 HC RX IP 258 OP 636: Performed by: PHYSICIAN ASSISTANT

## 2024-06-03 PROCEDURE — 15936 EXC SAC PR ULC PREP MUS FLAP: CPT | Performed by: STUDENT IN AN ORGANIZED HEALTH CARE EDUCATION/TRAINING PROGRAM

## 2024-06-03 PROCEDURE — 250N000013 HC RX MED GY IP 250 OP 250 PS 637: Performed by: PHYSICIAN ASSISTANT

## 2024-06-03 PROCEDURE — 0KXP0Z2 TRANSFER LEFT HIP MUSCLE WITH SKIN AND SUBCUTANEOUS TISSUE, OPEN APPROACH: ICD-10-PCS | Performed by: SURGERY

## 2024-06-03 PROCEDURE — 710N000010 HC RECOVERY PHASE 1, LEVEL 2, PER MIN: Performed by: SURGERY

## 2024-06-03 PROCEDURE — 15936 EXC SAC PR ULC PREP MUS FLAP: CPT | Performed by: NURSE ANESTHETIST, CERTIFIED REGISTERED

## 2024-06-03 RX ORDER — OXYCODONE HYDROCHLORIDE 5 MG/1
5-10 TABLET ORAL EVERY 4 HOURS PRN
Status: DISCONTINUED | OUTPATIENT
Start: 2024-06-03 | End: 2024-06-05 | Stop reason: HOSPADM

## 2024-06-03 RX ORDER — ENOXAPARIN SODIUM 100 MG/ML
40 INJECTION SUBCUTANEOUS EVERY 24 HOURS
Status: DISCONTINUED | OUTPATIENT
Start: 2024-06-04 | End: 2024-06-05 | Stop reason: HOSPADM

## 2024-06-03 RX ORDER — PREGABALIN 75 MG/1
150 CAPSULE ORAL 3 TIMES DAILY
Status: DISCONTINUED | OUTPATIENT
Start: 2024-06-03 | End: 2024-06-05 | Stop reason: HOSPADM

## 2024-06-03 RX ORDER — NALOXONE HYDROCHLORIDE 0.4 MG/ML
0.4 INJECTION, SOLUTION INTRAMUSCULAR; INTRAVENOUS; SUBCUTANEOUS
Status: DISCONTINUED | OUTPATIENT
Start: 2024-06-03 | End: 2024-06-05 | Stop reason: HOSPADM

## 2024-06-03 RX ORDER — FENTANYL CITRATE 50 UG/ML
INJECTION, SOLUTION INTRAMUSCULAR; INTRAVENOUS PRN
Status: DISCONTINUED | OUTPATIENT
Start: 2024-06-03 | End: 2024-06-03

## 2024-06-03 RX ORDER — FENTANYL CITRATE 50 UG/ML
25 INJECTION, SOLUTION INTRAMUSCULAR; INTRAVENOUS EVERY 5 MIN PRN
Status: DISCONTINUED | OUTPATIENT
Start: 2024-06-03 | End: 2024-06-03 | Stop reason: HOSPADM

## 2024-06-03 RX ORDER — EPHEDRINE SULFATE 50 MG/ML
INJECTION, SOLUTION INTRAMUSCULAR; INTRAVENOUS; SUBCUTANEOUS PRN
Status: DISCONTINUED | OUTPATIENT
Start: 2024-06-03 | End: 2024-06-03

## 2024-06-03 RX ORDER — SODIUM CHLORIDE, SODIUM LACTATE, POTASSIUM CHLORIDE, CALCIUM CHLORIDE 600; 310; 30; 20 MG/100ML; MG/100ML; MG/100ML; MG/100ML
INJECTION, SOLUTION INTRAVENOUS CONTINUOUS PRN
Status: DISCONTINUED | OUTPATIENT
Start: 2024-06-03 | End: 2024-06-03

## 2024-06-03 RX ORDER — LIDOCAINE HYDROCHLORIDE 20 MG/ML
INJECTION, SOLUTION INFILTRATION; PERINEURAL PRN
Status: DISCONTINUED | OUTPATIENT
Start: 2024-06-03 | End: 2024-06-03

## 2024-06-03 RX ORDER — DULOXETIN HYDROCHLORIDE 60 MG/1
60 CAPSULE, DELAYED RELEASE ORAL 2 TIMES DAILY
Status: DISCONTINUED | OUTPATIENT
Start: 2024-06-03 | End: 2024-06-05 | Stop reason: HOSPADM

## 2024-06-03 RX ORDER — METRONIDAZOLE 500 MG/1
500 TABLET ORAL 3 TIMES DAILY
Status: DISCONTINUED | OUTPATIENT
Start: 2024-06-03 | End: 2024-06-03

## 2024-06-03 RX ORDER — ONDANSETRON 4 MG/1
4 TABLET, ORALLY DISINTEGRATING ORAL EVERY 30 MIN PRN
Status: DISCONTINUED | OUTPATIENT
Start: 2024-06-03 | End: 2024-06-03 | Stop reason: HOSPADM

## 2024-06-03 RX ORDER — HYDROXYCHLOROQUINE SULFATE 200 MG/1
400 TABLET, FILM COATED ORAL DAILY
COMMUNITY

## 2024-06-03 RX ORDER — CEFAZOLIN SODIUM 2 G/100ML
2 INJECTION, SOLUTION INTRAVENOUS EVERY 8 HOURS
Status: COMPLETED | OUTPATIENT
Start: 2024-06-03 | End: 2024-06-04

## 2024-06-03 RX ORDER — CEFAZOLIN SODIUM/WATER 2 G/20 ML
2 SYRINGE (ML) INTRAVENOUS SEE ADMIN INSTRUCTIONS
Status: DISCONTINUED | OUTPATIENT
Start: 2024-06-03 | End: 2024-06-03 | Stop reason: HOSPADM

## 2024-06-03 RX ORDER — PROPOFOL 10 MG/ML
INJECTION, EMULSION INTRAVENOUS PRN
Status: DISCONTINUED | OUTPATIENT
Start: 2024-06-03 | End: 2024-06-03

## 2024-06-03 RX ORDER — ACETAMINOPHEN 325 MG/1
975 TABLET ORAL ONCE
Status: COMPLETED | OUTPATIENT
Start: 2024-06-03 | End: 2024-06-03

## 2024-06-03 RX ORDER — NALOXONE HYDROCHLORIDE 0.4 MG/ML
0.1 INJECTION, SOLUTION INTRAMUSCULAR; INTRAVENOUS; SUBCUTANEOUS
Status: DISCONTINUED | OUTPATIENT
Start: 2024-06-03 | End: 2024-06-03 | Stop reason: HOSPADM

## 2024-06-03 RX ORDER — HYDROMORPHONE HYDROCHLORIDE 1 MG/ML
0.4 INJECTION, SOLUTION INTRAMUSCULAR; INTRAVENOUS; SUBCUTANEOUS
Status: DISCONTINUED | OUTPATIENT
Start: 2024-06-03 | End: 2024-06-05 | Stop reason: HOSPADM

## 2024-06-03 RX ORDER — NALOXONE HYDROCHLORIDE 0.4 MG/ML
0.2 INJECTION, SOLUTION INTRAMUSCULAR; INTRAVENOUS; SUBCUTANEOUS
Status: DISCONTINUED | OUTPATIENT
Start: 2024-06-03 | End: 2024-06-05 | Stop reason: HOSPADM

## 2024-06-03 RX ORDER — ONDANSETRON 2 MG/ML
4 INJECTION INTRAMUSCULAR; INTRAVENOUS EVERY 6 HOURS PRN
Status: DISCONTINUED | OUTPATIENT
Start: 2024-06-03 | End: 2024-06-05 | Stop reason: HOSPADM

## 2024-06-03 RX ORDER — OXYCODONE HYDROCHLORIDE 10 MG/1
10 TABLET ORAL EVERY 6 HOURS PRN
Status: ON HOLD | COMMUNITY
End: 2024-06-05

## 2024-06-03 RX ORDER — GLYCOPYRROLATE 0.2 MG/ML
INJECTION, SOLUTION INTRAMUSCULAR; INTRAVENOUS PRN
Status: DISCONTINUED | OUTPATIENT
Start: 2024-06-03 | End: 2024-06-03

## 2024-06-03 RX ORDER — POLYETHYLENE GLYCOL 3350 17 G/17G
17 POWDER, FOR SOLUTION ORAL DAILY
Status: DISCONTINUED | OUTPATIENT
Start: 2024-06-04 | End: 2024-06-05 | Stop reason: HOSPADM

## 2024-06-03 RX ORDER — CYCLOBENZAPRINE HCL 10 MG
10 TABLET ORAL 3 TIMES DAILY PRN
Status: DISCONTINUED | OUTPATIENT
Start: 2024-06-03 | End: 2024-06-05 | Stop reason: HOSPADM

## 2024-06-03 RX ORDER — DOXYCYCLINE 100 MG/1
100 CAPSULE ORAL 2 TIMES DAILY
Status: DISCONTINUED | OUTPATIENT
Start: 2024-06-03 | End: 2024-06-03

## 2024-06-03 RX ORDER — AMOXICILLIN 250 MG
1 CAPSULE ORAL 2 TIMES DAILY
Status: DISCONTINUED | OUTPATIENT
Start: 2024-06-03 | End: 2024-06-05 | Stop reason: HOSPADM

## 2024-06-03 RX ORDER — ACETAMINOPHEN 325 MG/1
650 TABLET ORAL EVERY 4 HOURS PRN
Status: DISCONTINUED | OUTPATIENT
Start: 2024-06-06 | End: 2024-06-05 | Stop reason: HOSPADM

## 2024-06-03 RX ORDER — ONDANSETRON 4 MG/1
4 TABLET, ORALLY DISINTEGRATING ORAL EVERY 6 HOURS PRN
Status: DISCONTINUED | OUTPATIENT
Start: 2024-06-03 | End: 2024-06-05 | Stop reason: HOSPADM

## 2024-06-03 RX ORDER — LIDOCAINE 40 MG/G
CREAM TOPICAL
Status: DISCONTINUED | OUTPATIENT
Start: 2024-06-03 | End: 2024-06-05 | Stop reason: HOSPADM

## 2024-06-03 RX ORDER — HYDROMORPHONE HYDROCHLORIDE 1 MG/ML
0.4 INJECTION, SOLUTION INTRAMUSCULAR; INTRAVENOUS; SUBCUTANEOUS EVERY 5 MIN PRN
Status: DISCONTINUED | OUTPATIENT
Start: 2024-06-03 | End: 2024-06-03 | Stop reason: HOSPADM

## 2024-06-03 RX ORDER — VASOPRESSIN IN 0.9 % NACL 2 UNIT/2ML
SYRINGE (ML) INTRAVENOUS PRN
Status: DISCONTINUED | OUTPATIENT
Start: 2024-06-03 | End: 2024-06-03

## 2024-06-03 RX ORDER — ACETAMINOPHEN 325 MG/1
975 TABLET ORAL EVERY 8 HOURS
Qty: 27 TABLET | Refills: 0 | Status: DISCONTINUED | OUTPATIENT
Start: 2024-06-03 | End: 2024-06-05 | Stop reason: HOSPADM

## 2024-06-03 RX ORDER — FENTANYL CITRATE 50 UG/ML
50 INJECTION, SOLUTION INTRAMUSCULAR; INTRAVENOUS EVERY 5 MIN PRN
Status: DISCONTINUED | OUTPATIENT
Start: 2024-06-03 | End: 2024-06-03 | Stop reason: HOSPADM

## 2024-06-03 RX ORDER — HYDROMORPHONE HYDROCHLORIDE 1 MG/ML
0.2 INJECTION, SOLUTION INTRAMUSCULAR; INTRAVENOUS; SUBCUTANEOUS
Status: DISCONTINUED | OUTPATIENT
Start: 2024-06-03 | End: 2024-06-05 | Stop reason: HOSPADM

## 2024-06-03 RX ORDER — BUSPIRONE HYDROCHLORIDE 10 MG/1
10 TABLET ORAL 2 TIMES DAILY
Status: DISCONTINUED | OUTPATIENT
Start: 2024-06-03 | End: 2024-06-05 | Stop reason: HOSPADM

## 2024-06-03 RX ORDER — CEFAZOLIN SODIUM/WATER 2 G/20 ML
2 SYRINGE (ML) INTRAVENOUS
Status: DISCONTINUED | OUTPATIENT
Start: 2024-06-03 | End: 2024-06-03 | Stop reason: HOSPADM

## 2024-06-03 RX ORDER — OXYCODONE HCL 10 MG/1
20 TABLET, FILM COATED, EXTENDED RELEASE ORAL EVERY 12 HOURS
Status: DISCONTINUED | OUTPATIENT
Start: 2024-06-03 | End: 2024-06-05 | Stop reason: HOSPADM

## 2024-06-03 RX ORDER — SODIUM CHLORIDE, SODIUM LACTATE, POTASSIUM CHLORIDE, CALCIUM CHLORIDE 600; 310; 30; 20 MG/100ML; MG/100ML; MG/100ML; MG/100ML
INJECTION, SOLUTION INTRAVENOUS CONTINUOUS
Status: DISCONTINUED | OUTPATIENT
Start: 2024-06-03 | End: 2024-06-05 | Stop reason: HOSPADM

## 2024-06-03 RX ORDER — BISACODYL 10 MG
10 SUPPOSITORY, RECTAL RECTAL DAILY PRN
Status: DISCONTINUED | OUTPATIENT
Start: 2024-06-06 | End: 2024-06-05 | Stop reason: HOSPADM

## 2024-06-03 RX ORDER — HYDROMORPHONE HYDROCHLORIDE 1 MG/ML
0.2 INJECTION, SOLUTION INTRAMUSCULAR; INTRAVENOUS; SUBCUTANEOUS EVERY 5 MIN PRN
Status: DISCONTINUED | OUTPATIENT
Start: 2024-06-03 | End: 2024-06-03 | Stop reason: HOSPADM

## 2024-06-03 RX ORDER — DEXAMETHASONE SODIUM PHOSPHATE 4 MG/ML
INJECTION, SOLUTION INTRA-ARTICULAR; INTRALESIONAL; INTRAMUSCULAR; INTRAVENOUS; SOFT TISSUE PRN
Status: DISCONTINUED | OUTPATIENT
Start: 2024-06-03 | End: 2024-06-03

## 2024-06-03 RX ORDER — PREDNISOLONE ACETATE 10 MG/ML
1 SUSPENSION/ DROPS OPHTHALMIC 2 TIMES DAILY
Status: DISCONTINUED | OUTPATIENT
Start: 2024-06-03 | End: 2024-06-05 | Stop reason: HOSPADM

## 2024-06-03 RX ORDER — LORATADINE 10 MG/1
10 TABLET ORAL DAILY
Status: DISCONTINUED | OUTPATIENT
Start: 2024-06-04 | End: 2024-06-05 | Stop reason: HOSPADM

## 2024-06-03 RX ORDER — CLINDAMYCIN PHOSPHATE 11.9 MG/ML
SOLUTION TOPICAL DAILY PRN
COMMUNITY

## 2024-06-03 RX ORDER — OXYCODONE HCL 10 MG/1
20 TABLET, FILM COATED, EXTENDED RELEASE ORAL EVERY 12 HOURS
Status: COMPLETED | OUTPATIENT
Start: 2024-06-03 | End: 2024-06-03

## 2024-06-03 RX ORDER — PROCHLORPERAZINE MALEATE 5 MG
10 TABLET ORAL EVERY 6 HOURS PRN
Status: DISCONTINUED | OUTPATIENT
Start: 2024-06-03 | End: 2024-06-05 | Stop reason: HOSPADM

## 2024-06-03 RX ORDER — ONDANSETRON 2 MG/ML
4 INJECTION INTRAMUSCULAR; INTRAVENOUS EVERY 30 MIN PRN
Status: DISCONTINUED | OUTPATIENT
Start: 2024-06-03 | End: 2024-06-03 | Stop reason: HOSPADM

## 2024-06-03 RX ORDER — SODIUM CHLORIDE, SODIUM LACTATE, POTASSIUM CHLORIDE, CALCIUM CHLORIDE 600; 310; 30; 20 MG/100ML; MG/100ML; MG/100ML; MG/100ML
INJECTION, SOLUTION INTRAVENOUS CONTINUOUS
Status: DISCONTINUED | OUTPATIENT
Start: 2024-06-03 | End: 2024-06-03 | Stop reason: HOSPADM

## 2024-06-03 RX ORDER — HYDROXYZINE HYDROCHLORIDE 25 MG/1
25 TABLET, FILM COATED ORAL EVERY 6 HOURS PRN
Status: DISCONTINUED | OUTPATIENT
Start: 2024-06-03 | End: 2024-06-05 | Stop reason: HOSPADM

## 2024-06-03 RX ADMIN — LIDOCAINE HYDROCHLORIDE 100 MG: 20 INJECTION, SOLUTION INFILTRATION; PERINEURAL at 07:36

## 2024-06-03 RX ADMIN — PREGABALIN 150 MG: 75 CAPSULE ORAL at 19:49

## 2024-06-03 RX ADMIN — Medication 100 MG: at 07:40

## 2024-06-03 RX ADMIN — PREDNISOLONE ACETATE 1 DROP: 10 SUSPENSION/ DROPS OPHTHALMIC at 22:33

## 2024-06-03 RX ADMIN — PHENYLEPHRINE HYDROCHLORIDE 100 MCG: 10 INJECTION INTRAVENOUS at 08:23

## 2024-06-03 RX ADMIN — Medication 0.5 UNITS: at 08:33

## 2024-06-03 RX ADMIN — PHENYLEPHRINE HYDROCHLORIDE 100 MCG: 10 INJECTION INTRAVENOUS at 08:00

## 2024-06-03 RX ADMIN — EPHEDRINE SULFATE 5 MG: 5 INJECTION INTRAVENOUS at 08:05

## 2024-06-03 RX ADMIN — CYCLOBENZAPRINE 10 MG: 10 TABLET, FILM COATED ORAL at 22:33

## 2024-06-03 RX ADMIN — DOCUSATE SODIUM AND SENNOSIDES 1 TABLET: 8.6; 5 TABLET, FILM COATED ORAL at 19:49

## 2024-06-03 RX ADMIN — OXYCODONE HYDROCHLORIDE 10 MG: 5 TABLET ORAL at 12:53

## 2024-06-03 RX ADMIN — OXYCODONE HYDROCHLORIDE 10 MG: 5 TABLET ORAL at 23:58

## 2024-06-03 RX ADMIN — GLYCOPYRROLATE 0.2 MG: 0.2 INJECTION, SOLUTION INTRAMUSCULAR; INTRAVENOUS at 08:09

## 2024-06-03 RX ADMIN — DULOXETINE HYDROCHLORIDE 60 MG: 60 CAPSULE, DELAYED RELEASE ORAL at 19:49

## 2024-06-03 RX ADMIN — PHENYLEPHRINE HYDROCHLORIDE 100 MCG: 10 INJECTION INTRAVENOUS at 08:35

## 2024-06-03 RX ADMIN — ACETAMINOPHEN 975 MG: 325 TABLET, FILM COATED ORAL at 06:37

## 2024-06-03 RX ADMIN — PHENYLEPHRINE HYDROCHLORIDE 100 MCG: 10 INJECTION INTRAVENOUS at 08:43

## 2024-06-03 RX ADMIN — Medication 3 G: at 07:46

## 2024-06-03 RX ADMIN — PHENYLEPHRINE HYDROCHLORIDE 0.5 MCG/KG/MIN: 10 INJECTION INTRAVENOUS at 08:24

## 2024-06-03 RX ADMIN — PHENYLEPHRINE HYDROCHLORIDE 100 MCG: 10 INJECTION INTRAVENOUS at 07:50

## 2024-06-03 RX ADMIN — PREGABALIN 150 MG: 75 CAPSULE ORAL at 17:28

## 2024-06-03 RX ADMIN — PREDNISOLONE ACETATE 1 DROP: 10 SUSPENSION/ DROPS OPHTHALMIC at 15:33

## 2024-06-03 RX ADMIN — EPHEDRINE SULFATE 5 MG: 5 INJECTION INTRAVENOUS at 07:57

## 2024-06-03 RX ADMIN — DOCUSATE SODIUM AND SENNOSIDES 1 TABLET: 8.6; 5 TABLET, FILM COATED ORAL at 15:33

## 2024-06-03 RX ADMIN — PHENYLEPHRINE HYDROCHLORIDE 100 MCG: 10 INJECTION INTRAVENOUS at 08:28

## 2024-06-03 RX ADMIN — PHENYLEPHRINE HYDROCHLORIDE 100 MCG: 10 INJECTION INTRAVENOUS at 08:05

## 2024-06-03 RX ADMIN — PHENYLEPHRINE HYDROCHLORIDE 100 MCG: 10 INJECTION INTRAVENOUS at 08:58

## 2024-06-03 RX ADMIN — Medication 0.5 UNITS: at 08:37

## 2024-06-03 RX ADMIN — EPHEDRINE SULFATE 5 MG: 5 INJECTION INTRAVENOUS at 07:47

## 2024-06-03 RX ADMIN — SODIUM CHLORIDE, POTASSIUM CHLORIDE, SODIUM LACTATE AND CALCIUM CHLORIDE: 600; 310; 30; 20 INJECTION, SOLUTION INTRAVENOUS at 08:33

## 2024-06-03 RX ADMIN — CEFAZOLIN SODIUM 2 G: 2 INJECTION, SOLUTION INTRAVENOUS at 15:33

## 2024-06-03 RX ADMIN — SODIUM CHLORIDE, POTASSIUM CHLORIDE, SODIUM LACTATE AND CALCIUM CHLORIDE: 600; 310; 30; 20 INJECTION, SOLUTION INTRAVENOUS at 12:41

## 2024-06-03 RX ADMIN — EPHEDRINE SULFATE 5 MG: 5 INJECTION INTRAVENOUS at 08:20

## 2024-06-03 RX ADMIN — PHENYLEPHRINE HYDROCHLORIDE 100 MCG: 10 INJECTION INTRAVENOUS at 07:47

## 2024-06-03 RX ADMIN — EPHEDRINE SULFATE 5 MG: 5 INJECTION INTRAVENOUS at 07:55

## 2024-06-03 RX ADMIN — SUGAMMADEX 100 MG: 100 INJECTION, SOLUTION INTRAVENOUS at 10:15

## 2024-06-03 RX ADMIN — OXYCODONE HYDROCHLORIDE 20 MG: 10 TABLET, FILM COATED, EXTENDED RELEASE ORAL at 19:49

## 2024-06-03 RX ADMIN — OXYCODONE HYDROCHLORIDE 10 MG: 5 TABLET ORAL at 17:39

## 2024-06-03 RX ADMIN — PHENYLEPHRINE HYDROCHLORIDE 100 MCG: 10 INJECTION INTRAVENOUS at 09:04

## 2024-06-03 RX ADMIN — ACETAMINOPHEN 975 MG: 325 TABLET, FILM COATED ORAL at 15:33

## 2024-06-03 RX ADMIN — SODIUM CHLORIDE, POTASSIUM CHLORIDE, SODIUM LACTATE AND CALCIUM CHLORIDE: 600; 310; 30; 20 INJECTION, SOLUTION INTRAVENOUS at 07:30

## 2024-06-03 RX ADMIN — PHENYLEPHRINE HYDROCHLORIDE 100 MCG: 10 INJECTION INTRAVENOUS at 08:50

## 2024-06-03 RX ADMIN — DEXAMETHASONE SODIUM PHOSPHATE 4 MG: 4 INJECTION, SOLUTION INTRA-ARTICULAR; INTRALESIONAL; INTRAMUSCULAR; INTRAVENOUS; SOFT TISSUE at 08:09

## 2024-06-03 RX ADMIN — CEFAZOLIN SODIUM 2 G: 2 INJECTION, SOLUTION INTRAVENOUS at 23:58

## 2024-06-03 RX ADMIN — PHENYLEPHRINE HYDROCHLORIDE 100 MCG: 10 INJECTION INTRAVENOUS at 08:13

## 2024-06-03 RX ADMIN — PHENYLEPHRINE HYDROCHLORIDE 100 MCG: 10 INJECTION INTRAVENOUS at 08:09

## 2024-06-03 RX ADMIN — OXYCODONE HYDROCHLORIDE 20 MG: 10 TABLET, FILM COATED, EXTENDED RELEASE ORAL at 07:15

## 2024-06-03 RX ADMIN — Medication 0.5 UNITS: at 09:02

## 2024-06-03 RX ADMIN — ONDANSETRON 4 MG: 2 INJECTION INTRAMUSCULAR; INTRAVENOUS at 10:00

## 2024-06-03 RX ADMIN — BUSPIRONE HYDROCHLORIDE 10 MG: 10 TABLET ORAL at 19:49

## 2024-06-03 RX ADMIN — OMEPRAZOLE 40 MG: 20 CAPSULE, DELAYED RELEASE ORAL at 19:49

## 2024-06-03 RX ADMIN — PHENYLEPHRINE HYDROCHLORIDE 100 MCG: 10 INJECTION INTRAVENOUS at 08:18

## 2024-06-03 RX ADMIN — PHENYLEPHRINE HYDROCHLORIDE 100 MCG: 10 INJECTION INTRAVENOUS at 07:54

## 2024-06-03 RX ADMIN — FENTANYL CITRATE 100 MCG: 50 INJECTION INTRAMUSCULAR; INTRAVENOUS at 07:36

## 2024-06-03 RX ADMIN — ACETAMINOPHEN 975 MG: 325 TABLET, FILM COATED ORAL at 22:33

## 2024-06-03 RX ADMIN — PROPOFOL 200 MG: 10 INJECTION, EMULSION INTRAVENOUS at 07:40

## 2024-06-03 ASSESSMENT — ACTIVITIES OF DAILY LIVING (ADL)
ADLS_ACUITY_SCORE: 30
DIFFICULTY_EATING/SWALLOWING: NO
ADLS_ACUITY_SCORE: 28
ADLS_ACUITY_SCORE: 32
DEPENDENT_IADLS:: CLEANING;COOKING;SHOPPING;MEAL PREPARATION;MEDICATION MANAGEMENT
CHANGE_IN_FUNCTIONAL_STATUS_SINCE_ONSET_OF_CURRENT_ILLNESS/INJURY: NO
ADLS_ACUITY_SCORE: 34
ADLS_ACUITY_SCORE: 30
ADLS_ACUITY_SCORE: 32
ADLS_ACUITY_SCORE: 26
ADLS_ACUITY_SCORE: 30
HEARING_DIFFICULTY_OR_DEAF: NO
ADLS_ACUITY_SCORE: 32
WALKING_OR_CLIMBING_STAIRS_DIFFICULTY: NO
CONCENTRATING,_REMEMBERING_OR_MAKING_DECISIONS_DIFFICULTY: NO
ADLS_ACUITY_SCORE: 32
DOING_ERRANDS_INDEPENDENTLY_DIFFICULTY: NO
ADLS_ACUITY_SCORE: 32
FALL_HISTORY_WITHIN_LAST_SIX_MONTHS: NO
DIFFICULTY_COMMUNICATING: NO
TOILETING_ISSUES: NO
WEAR_GLASSES_OR_BLIND: NO
ADLS_ACUITY_SCORE: 34
ADLS_ACUITY_SCORE: 30
DRESSING/BATHING_DIFFICULTY: NO
ADLS_ACUITY_SCORE: 30
ADLS_ACUITY_SCORE: 32
ADLS_ACUITY_SCORE: 34
ADLS_ACUITY_SCORE: 30
ADLS_ACUITY_SCORE: 32

## 2024-06-03 ASSESSMENT — ENCOUNTER SYMPTOMS: SEIZURES: 0
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
## 2024-06-03 NOTE — ANESTHESIA PREPROCEDURE EVALUATION
Anesthesia Pre-Procedure Evaluation    Patient: Saqib Bishop   MRN: [id number] : 1980        Procedure : Procedure(s):  IRRIGATION AND DEBRIDEMENT, PRESSURE ULCER, WITH FLAP CLOSURE - sacral decubitus           Past Medical History:   Diagnosis Date    Brain injury with brief loss of consciousness (H) 2015    Candida esophagitis (H) 2022    Degenerative joint disease     Gastro-oesophageal reflux disease     Hypercalcemia 2022    Hypokalemia 2022    Hypomagnesemia 2022      Past Surgical History:   Procedure Laterality Date    AMPUTATE LEG BELOW KNEE Left 2023    Procedure: Left below knee Guillotine Amputation;  Surgeon: Sesar Barth MD;  [facility]    AMPUTATE LEG BELOW KNEE Left 2023    Procedure: Irrigation and Debridement leg below knee, wound vac application, Left;  Surgeon: Juan Rehman MD;  [facility]    AMPUTATE LEG BELOW KNEE Left 2023    Procedure: Below Left Knee Amputation;  Surgeon: Semaj Gates MD;  [facility]    BACK SURGERY      lumbar fusion    EXPLORE SPINE, REMOVE HARDWARE, COMBINED  2012    Procedure:COMBINED EXPLORE SPINE, REMOVE HARDWARE; EXPLORE SPINE, REMOVE HARDWARE L4-S1; Surgeon:FAM GONZALEZ; Location:[facility] OR    IR PICC PLACEMENT > 5 YRS OF AGE  10/10/2017    IR PICC PLACEMENT > 5 YRS OF AGE  2018    IRRIGATION AND DEBRIDEMENT FOOT, COMBINED Left 2023    Procedure: LEFT BELOW KNEE AMPUTATION STUMP IRRIGATION AND DEBRIDEMENT, VANCOMYCIN BEAD PLACEMENT;  Surgeon: Etienne Maier MD;  [facility]    IRRIGATION AND DEBRIDEMENT LOWER EXTREMITY, COMBINED Left 2023    Procedure: Irrigation and debridement lower extremity, combined and wound vac exchange;  Surgeon: Etienne Maier MD;  [facility]    IRRIGATION AND DEBRIDEMENT LOWER EXTREMITY, COMBINED Left 2023    Procedure: IRRIGATION AND DEBRIDEMENT, LEFT LOWER EXTREMITY WITH WOUND VAC Removal;  Surgeon:  Semaj Gates MD;  [facility]    IRRIGATION AND DEBRIDEMENT LOWER EXTREMITY, COMBINED Left 11/15/2023    Procedure: IRRIGATION AND DEBRIDEMENT, LOWER EXTREMITY LEFT LEG, ANTIBIOTIC BEAD REMOVAL AND PRIMARY CLOSURE;  Surgeon: Etienne Maier MD;  [facility]    ORTHOPEDIC SURGERY      right foot surgery      Allergies   Allergen Reactions    Adhesive Tape Hives     From tape from surgery    Benzoin Hives and Rash    Droperidol Anaphylaxis    Vitamin D (Calciferol) Rash     flairs up his sarcoidosis      Social History     Tobacco Use    Smoking status: Former     Current packs/day: 0.00     Types: Cigarettes     Quit date: 2011     Years since quittin.5    Smokeless tobacco: Not on file   Substance Use Topics    Alcohol use: No      Wt Readings from Last 1 Encounters:   24 113.4 kg (250 lb)        Anesthesia Evaluation   Pt has had prior anesthetic. Type: General.    No history of anesthetic complications       ROS/MED HX  ENT/Pulmonary:     (+)     IAN risk factors,  hypertension,                              (-) asthma and recent URI   Neurologic:     (+)                     Spinal cord injury,  level of injury: T12, without autonomic hyperflexia symptoms,     (-) no seizures and no CVA   Cardiovascular:     (+)  hypertension- -   -  - -                                 Previous cardiac testing   Echo: Date: 2023 Results:  Interpretation Summary  Left ventricular size, wall motion and function are normal. The ejection  fraction is 60-65%.  Right ventricular function, chamber size, wall motion, and thickness are  normal.  IVC diameter >2.1 cm collapsing <50% with sniff suggests a high RA pressure  estimated at 15 mmHg or greater.  No pericardial effusion is present.     There is no prior study for direct comparison.    Stress Test:  Date: Results:    ECG Reviewed:  Date: 2023 Results:  Sinus tachycardia   Otherwise normal ECG   When compared with ECG of 24-MAY-2013  "17:39,   Non-specific change in ST segment in Inferior leads   ST no longer elevated in Anterior leads   Confirmed by Kilo Landrum ([id number]) on 8/29/2023 10:06:42 AM     Cath:  Date: Results:      METS/Exercise Tolerance: 1 - Eating, dressing    Hematologic:     (+) History of blood clots,               Musculoskeletal:       GI/Hepatic:     (+) GERD, Asymptomatic on medication,                  Renal/Genitourinary:       Endo:     (+)               Obesity,       Psychiatric/Substance Use:     (+) psychiatric history depression       Infectious Disease:     (+)   MRSA,         Malignancy:       Other:            Physical Exam    Airway        Mallampati: IV   TM distance: > 3 FB   Neck ROM: full   Mouth opening: > 3 cm    Respiratory Devices and Support         Dental       (+) Modest Abnormalities - crowns, retainers, 1 or 2 missing teeth    B=Bridge, C=Chipped, L=Loose, M=Missing    Cardiovascular   cardiovascular exam normal       Rhythm and rate: regular and normal     Pulmonary   pulmonary exam normal        breath sounds clear to auscultation           OUTSIDE LABS:  CBC:   Lab Results   Component Value Date    WBC 6.0 11/18/2023    WBC 5.7 11/17/2023    HGB 13.9 11/18/2023    HGB 13.0 (L) 11/17/2023    HCT 45.7 11/18/2023    HCT 42.8 11/17/2023     11/18/2023     11/17/2023     BMP:   Lab Results   Component Value Date     11/18/2023     (L) 11/17/2023    POTASSIUM 4.0 11/18/2023    POTASSIUM 4.3 11/17/2023    CHLORIDE 100 11/18/2023    CHLORIDE 101 11/17/2023    CO2 28 11/18/2023    CO2 28 11/17/2023    BUN 18.4 11/18/2023    BUN 21.0 (H) 11/17/2023    CR 0.72 11/18/2023    CR 0.85 11/17/2023     (H) 11/18/2023     (H) 11/17/2023     COAGS:   Lab Results   Component Value Date    PTT 30 11/15/2023    INR 1.06 11/15/2023    FIBR 863 (H) 08/27/2023     POC: No results found for: \"BGM\", \"HCG\", \"HCGS\"  HEPATIC:   Lab Results   Component Value Date    ALBUMIN 3.6 " 11/18/2023    PROTTOTAL 8.2 11/18/2023    ALT 18 11/18/2023    AST 18 11/18/2023    ALKPHOS 122 11/18/2023    BILITOTAL 0.2 11/18/2023     OTHER:   Lab Results   Component Value Date    PH 7.23 (L) 08/28/2023    LACT 0.5 (L) 08/28/2023    A1C 5.5 09/06/2023    HILDA 9.7 11/18/2023    PHOS 3.4 09/14/2023    MAG 1.6 (L) 09/14/2023    SED 46 (H) 11/07/2023       Anesthesia Plan    ASA Status:  3    NPO Status:  NPO Appropriate    Anesthesia Type: General.     - Airway: ETT   Induction: Intravenous, Propofol.   Maintenance: Balanced.   Techniques and Equipment:     - Lines/Monitors: BIS     Consents    Anesthesia Plan(s) and associated risks, benefits, and realistic alternatives discussed. Questions answered and patient/representative(s) expressed understanding.     - Discussed: Risks, Benefits and Alternatives for BOTH SEDATION and the PROCEDURE were discussed     - Discussed with:  Patient      - Extended Intubation/Ventilatory Support Discussed: No.      - Patient is DNR/DNI Status: No     Use of blood products discussed: No .     Postoperative Care    Pain management: IV analgesics, Oral pain medications.   PONV prophylaxis: Ondansetron (or other 5HT-3), Dexamethasone or Solumedrol     Comments:    Other Comments: 42 yo male PMHx T12 spinal cord injury, GERD, HTN, obesity (semaglutide was never started per patient report, citing insurance issues) presenting for I&D of sacral decub ulcer. Plan GETA with standard ASA monitors + BIS.             Ninfa Ulloa MD

## 2024-06-03 NOTE — PLAN OF CARE
Goal Outcome Evaluation:      Plan of Care Reviewed With: patient          Outcome Evaluation: DC to home when medically ready.
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
## 2024-06-03 NOTE — ADDENDUM NOTE
Addendum  created 06/03/24 1119 by Vanesa Almodovar APRN CRNA    Flowsheet accepted, Intraprocedure Meds edited

## 2024-06-03 NOTE — PHARMACY-ADMISSION MEDICATION HISTORY
Pharmacist Admission Medication History    Admission medication history is complete. The information provided in this note is only as accurate as the sources available at the time of the update.    Information Source(s): Patient and CareEverywhere/SureScripts via phone    Pertinent Information:   Patient not currently taking Wegovy/ozempic as he has not been able to fill them to start it yet.     Changes made to PTA medication list:  Added: hydroxychloroquine, clindamycin solution  Deleted: vancomycin, doxycycline, cefepime, Flagyl  Changed: senokot-s, lyrica, omeprazole, flonase,       Medication History Completed By: Pauly Lagunas [facility] 6/3/2024 2:19 PM    PTA Med List   Medication Sig Last Dose    acetaminophen (TYLENOL) 325 MG tablet Take 2 tablets (650 mg) by mouth every 4 hours as needed for other (For optimal non-opioid multimodal pain management to improve pain control.) 6/2/2024 at 1800    buPROPion 450 MG TB24 Take 450 mg by mouth daily 6/2/2024 at 0800    busPIRone (BUSPAR) 10 MG tablet Take 1 tablet by mouth 2 times daily 6/2/2024 at 0800    clindamycin (CLEOCIN T) 1 % external solution Apply topically daily as needed (to face and scalp for acne and folliculutis) Past Week    Cranberry 400 MG CAPS Take 400 mg by mouth daily 6/2/2024 at 0800    cyclobenzaprine (FLEXERIL) 10 MG tablet Take 10 mg by mouth 3 times daily as needed for muscle spasms Past Week    DULoxetine (CYMBALTA) 60 MG capsule Take 60 mg by mouth 2 times daily 6/2/2024 at 0800    fluticasone (FLONASE) 50 MCG/ACT nasal spray Spray 1 spray into both nostrils daily Unknown    hydroxychloroquine (PLAQUENIL) 200 MG tablet Take 400 mg by mouth daily Past Week    hydrOXYzine (ATARAX) 25 MG tablet Take 1 tablet (25 mg) by mouth every 6 hours as needed for other (adjuvant pain) 6/2/2024 at 0800    loratadine (CLARITIN) 10 MG tablet Take 1 tablet by mouth daily 6/2/2024 at 0800    losartan-hydrochlorothiazide (HYZAAR) 50-12.5 MG tablet Take 0.5  tablets by mouth daily (One-half tablet) 6/2/2024 at 0800    melatonin 5 MG tablet Take 1 tablet (5 mg) by mouth nightly as needed for sleep Unknown    omeprazole (PRILOSEC) 40 MG DR capsule Take 80 mg by mouth daily before breakfast 6/3/2024 at 0330    oxyCODONE (OXYCONTIN) 20 MG 12 hr tablet Take 1 tablet (20 mg) by mouth every 12 hours 6/2/2024 at 2000    oxyCODONE IR (ROXICODONE) 10 MG tablet Take 10 mg by mouth every 6 hours as needed for severe pain Past Week    polyethylene glycol (MIRALAX) 17 GM/Dose powder Take 17 g by mouth 2 times daily as needed for constipation Past Week    polyethylene glycol-propylene glycol (SYSTANE ULTRA) 0.4-0.3 % SOLN ophthalmic solution Apply 1 drop to eye 4 times daily as needed for dry eyes 6/2/2024 at 2100    prednisoLONE acetate (PRED FORTE) 1 % ophthalmic suspension Place 1 drop into both eyes 2 times daily Per taper 6/2/2024 at 2100    pregabalin (LYRICA) 150 MG capsule Take 1 capsule (150 mg) by mouth 3 times daily (Patient taking differently: Take 300 mg by mouth 2 times daily) 6/2/2024 at 0800    senna-docusate (SENOKOT-S/PERICOLACE) 8.6-50 MG tablet Take 1 tablet by mouth daily 6/2/2024 at 0800

## 2024-06-03 NOTE — ANESTHESIA POSTPROCEDURE EVALUATION
Patient: Saqib Bishop    Procedure: Procedure(s):  IRRIGATION AND DEBRIDEMENT, PRESSURE ULCER, WITH FLAP CLOSURE - sacral decubitus       Anesthesia Type:  General    Note:  Disposition: Inpatient   Postop Pain Control: Uneventful            Sign Out: Well controlled pain   PONV: No   Neuro/Psych: Uneventful            Sign Out: Acceptable/Baseline neuro status   Airway/Respiratory: Uneventful            Sign Out: O2 supplementation               Oxygen: Nasal Cannula   CV/Hemodynamics: Uneventful            Sign Out: Acceptable CV status; No obvious hypovolemia; No obvious fluid overload   Other NRE: NONE   DID A NON-ROUTINE EVENT OCCUR? No           Last vitals:  Vitals Value Taken Time   /69 06/03/24 1100   Temp 36.2  C (97.2  F) 06/03/24 1029   Pulse 97 06/03/24 1108   Resp 13 06/03/24 1108   SpO2 92 % 06/03/24 1109   Vitals shown include unfiled device data.    Electronically Signed By: Ninfa Ulloa MD  Ana 3, 2024  11:09 AM

## 2024-06-03 NOTE — CONSULTS
"[facility] Nurse Inpatient Assessment     Consulted for: Left stump wound, left popliteal fossa    Summary: Wounds present on admission.    Patient History (according to provider note(s):      Per Dr Nii Mancilla: The patient is a 43-year-old man who is well known to the general surgery service. The patient has a symptomatic parastomal hernia. The visit was a \"video visit.\"  The patient currently has an open wound and is scheduled for a plastic surgery flap coverage surgery with Dr. Genevieve Zavala in June 2024.  The patient will be reassessed after the surgery has been completed for the open wound.       Assessment:      Areas visualized during today's visit: Left lower leg    Wound location: Left medial stump    6/3 Left medial stump    Last photo: 6/2/2024  Wound due to: Healing surgical incision  Wound history/plan of care: Follows with Dr Maier with Orthopedics s/p amputation  Wound base: 100 % superficial scab     Palpation of the wound bed: normal      Drainage: none     Description of drainage: none     Measurements (length x width x depth, in cm): 2.8  x 2.5  x  0 cm      Tunneling: N/A     Undermining: N/A  Periwound skin: Intact      Color: pink      Temperature: normal   Odor: none  Pain: denies   Pain interventions prior to dressing change: slow and gentle cares   Treatment goal: Heal   STATUS: initial assessment  Supplies ordered: gathered    Wound location: Left popliteal fossa    6/3 posterior knee    Last photo: 6/3/2024  Wound due to: Moisture Associated Skin Damage (MASD)  Wound history/plan of care: Follows with Dr Maier with Orthopedics s/p amputation  Wound base: 100 % dermis, dry     Palpation of the wound bed: normal      Drainage: none     Description of drainage: none     Measurements (length x width x depth, in cm): 2.5  x 0.5  x  0.1 cm      Tunneling: N/A     Undermining: N/A  Periwound skin: Intact      Color: pink      Temperature: normal " "  Odor: none  Pain: denies   Pain interventions prior to dressing change: slow and gentle cares   Treatment goal: Heal   STATUS: initial assessment  Supplies ordered: gathered    Treatment Plan:     Left lower leg wound(s):  Every other day: Remove dressing and wash wound with MicroKlenz and gauze. Coat stump wound with Medihoney (order #[id number]) and cover with Mepilex. Wound behind knee can be left open to air. See Plastic Surgery notes for surgical wound instructions.      Orders: Written    RECOMMEND PRIMARY TEAM ORDER: None, at this time  Education provided: plan of care  Discussed plan of care with: Patient and Nurse  [facility] nurse follow-up plan: weekly  Notify WO if wound(s) deteriorate.  Nursing to notify the Provider(s) and re-consult the [facility] Nurse if new skin concern.    DATA:     Current support surface: Standard  Low air loss (ELKE pump, Isolibrium, Pulsate)  Containment of urine/stool: Colostomy pouch  BMI: Body mass index is 37.33 kg/m .   Active diet order: Orders Placed This Encounter      Advance Diet as Tolerated: Regular Diet Adult     Output: No intake/output data recorded.     Labs: No lab results found in last 7 days.    Invalid input(s): \"GLUCOMBO\"  Pressure injury risk assessment:                          Kristine Dacosta RN CWOCN  Pager no longer is use, please contact through [employer] group: [facility]  Dept. Office Number: *[phone]    "

## 2024-06-03 NOTE — PLAN OF CARE
ADMISSION to [facility] [place]:    Saqib Bishop was admitted from PACU for I and D for sacral pressure ulcer.  2 RN skin assessment: completed by Moraima MENDOSA RN and Mayra Raman RN.   Result of skin assessment and interventions/actions: wounds on L medial stump and behind left knee; scabs on lips, R shin and R anterior foot/No adjustment needed, [facility] nurse is planning to visit patient to see the wound.   Height, weight: completed? No  Patient belongings & admission documents: see Flowsheets, completed? Yes  MDRO education added to care plan: Yes    End of shift Summary: See flowsheet for VS and detail assessments.     Changes this Shift:     Neuro/CMS: A&Ox4. CMS intact. Calm and pleasant. Able to make needs known. Denies dizziness, headaches, N/V and N/T.      Pulmonary: On RA when awake but on 4LPM of O2 when falling asleep. Denies cough, chest pain and SOB. Pt refused to be on CAPNO.      Output: Dyer in place that is patent and draining clear, yellow urine. Colostomy in place that is intact.      Activity: Paraplegic     Skin: Surgical incision on sacral area. Wounds on L medial stump and behind left knee, scabs on lips, R shin and R anterior foot.      Pain: Rates pain 5/10; managed with PRN PO oxycodone and scheduled tylenol.      Dressings/Drains: DEANDRE drain intact and patent. Adaptic and ABD dressing on surgical incision C/D/I. L medial stump wound care done this shift; dressing change due on 6/5.     IV: R and L PIV SL and patent with ABX in between.      Plan: Continue with POC.

## 2024-06-03 NOTE — ANESTHESIA PROCEDURE NOTES
Airway       Patient location during procedure: OR       Procedure Start/Stop Times: 6/3/2024 7:42 AM  Staff -        CRNA: Vanesa Almodovar APRN CRNA       Performed By: CRNAIndications and Patient Condition       Indications for airway management: ivette-procedural       Induction type:intravenous       Mask difficulty assessment: 1 - vent by mask    Final Airway Details       Final airway type: endotracheal airway       Successful airway: ETT - single  Endotracheal Airway Details        ETT size (mm): 7.5       Cuffed: yes       Successful intubation technique: video laryngoscopy       VL Blade Size: MAC 4       Grade View of Cords: 1       Adjucts: stylet       Position: Right       Measured from: lips       Secured at (cm): 24       Bite block used: Soft    Post intubation assessment        Placement verified by: capnometry, equal breath sounds and chest rise        Number of attempts at approach: 1       Secured with: tape       Ease of procedure: easy       Dentition: Intact    Medication(s) Administered   Medication Administration Time: 6/3/2024 7:42 AM

## 2024-06-03 NOTE — ANESTHESIA CARE TRANSFER NOTE
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    Anesthesia Type:   General     Note:    Oropharynx: oropharynx clear of all foreign objects  Level of Consciousness: awake  Oxygen Supplementation: face mask  Level of Supplemental Oxygen (L/min / FiO2): 6  Independent Airway: airway patency satisfactory and stable  Dentition: dentition unchanged    Report to RN Given: handoff report given  Patient transferred to: PACU    Handoff Report: Identifed the Patient, Identified the Reponsible Provider, Reviewed the pertinent medical history, Discussed the surgical course, Reviewed Intra-OP anesthesia mangement and issues during anesthesia, Set expectations for post-procedure period and Allowed opportunity for questions and acknowledgement of understanding      Vitals:  Vitals Value Taken Time   /77 06/03/24 1029   Temp 36.2    Pulse 116 06/03/24 1034   Resp 10 06/03/24 1034   SpO2 97 % 06/03/24 1034   Vitals shown include unfiled device data.    Electronically Signed By: RL Robert CRNA  Ana 3, 2024  10:35 AM

## 2024-06-03 NOTE — BRIEF OP NOTE
[facility] Brief Operative Note    Pre-operative diagnosis: Stage 4 skin ulcer of sacral region (H) [L98.429]  Osteomyelitis, unspecified site, unspecified type (H) [M86.9]  Paraplegia (H) [G82.20]   Post-operative diagnosis * No post-op diagnosis entered *     Procedure: Procedure(s):  IRRIGATION AND DEBRIDEMENT, PRESSURE ULCER, WITH FLAP CLOSURE - sacral decubitus   Surgeon(s): Surgeons and Role:     * Ne Zavala MD - Primary     * Zakiya Miller PA-C - Fellow - Assisting   Estimated blood loss: 50 mL   400UP  1800IVF   Specimens: * No specimens in log *   Findings: No exposed bone, no cultures sent. L gluteal fasciocutaneous rotational flap.     Plan:  -Admit to PRS, medicine comanagement  -WAVE mattress, no sitting, HOB <30, ok to lay supine and lateral, limit L lateral due to flap side, q2hr turns while awake  -DEANDRE drain care  -chronic bui  -social work for dispo planning will need stretcher ride. Likely recovering at home
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
## 2024-06-04 LAB
ANION GAP SERPL CALCULATED.3IONS-SCNC: 9 MMOL/L (ref 7–15)
BASOPHILS # BLD AUTO: 0 10E3/UL (ref 0–0.2)
BASOPHILS NFR BLD AUTO: 0 %
BUN SERPL-MCNC: 24.1 MG/DL (ref 6–20)
CALCIUM SERPL-MCNC: 8.3 MG/DL (ref 8.6–10)
CHLORIDE SERPL-SCNC: 101 MMOL/L (ref 98–107)
CREAT SERPL-MCNC: 0.81 MG/DL (ref 0.67–1.17)
DEPRECATED HCO3 PLAS-SCNC: 27 MMOL/L (ref 22–29)
EGFRCR SERPLBLD CKD-EPI 2021: >90 ML/MIN/1.73M2
EOSINOPHIL # BLD AUTO: 0.2 10E3/UL (ref 0–0.7)
EOSINOPHIL NFR BLD AUTO: 2 %
ERYTHROCYTE [DISTWIDTH] IN BLOOD BY AUTOMATED COUNT: 17 % (ref 10–15)
GLUCOSE SERPL-MCNC: 118 MG/DL (ref 70–99)
HCT VFR BLD AUTO: 41.6 % (ref 40–53)
HGB BLD-MCNC: 12.7 G/DL (ref 13.3–17.7)
IMM GRANULOCYTES # BLD: 0 10E3/UL
IMM GRANULOCYTES NFR BLD: 0 %
LYMPHOCYTES # BLD AUTO: 1.2 10E3/UL (ref 0.8–5.3)
LYMPHOCYTES NFR BLD AUTO: 12 %
MCH RBC QN AUTO: 22.7 PG (ref 26.5–33)
MCHC RBC AUTO-ENTMCNC: 30.5 G/DL (ref 31.5–36.5)
MCV RBC AUTO: 74 FL (ref 78–100)
MONOCYTES # BLD AUTO: 0.9 10E3/UL (ref 0–1.3)
MONOCYTES NFR BLD AUTO: 9 %
NEUTROPHILS # BLD AUTO: 7.7 10E3/UL (ref 1.6–8.3)
NEUTROPHILS NFR BLD AUTO: 77 %
NRBC # BLD AUTO: 0 10E3/UL
NRBC BLD AUTO-RTO: 0 /100
PLATELET # BLD AUTO: 236 10E3/UL (ref 150–450)
POTASSIUM SERPL-SCNC: 4 MMOL/L (ref 3.4–5.3)
RBC # BLD AUTO: 5.59 10E6/UL (ref 4.4–5.9)
SODIUM SERPL-SCNC: 137 MMOL/L (ref 135–145)
WBC # BLD AUTO: 10.1 10E3/UL (ref 4–11)

## 2024-06-04 PROCEDURE — 250N000013 HC RX MED GY IP 250 OP 250 PS 637: Performed by: PHYSICIAN ASSISTANT

## 2024-06-04 PROCEDURE — 120N000002 HC R&B MED SURG/OB UMMC

## 2024-06-04 PROCEDURE — 250N000013 HC RX MED GY IP 250 OP 250 PS 637: Performed by: INTERNAL MEDICINE

## 2024-06-04 PROCEDURE — 36415 COLL VENOUS BLD VENIPUNCTURE: CPT | Performed by: PHYSICIAN ASSISTANT

## 2024-06-04 PROCEDURE — 99232 SBSQ HOSP IP/OBS MODERATE 35: CPT | Performed by: INTERNAL MEDICINE

## 2024-06-04 PROCEDURE — 250N000011 HC RX IP 250 OP 636: Performed by: PHYSICIAN ASSISTANT

## 2024-06-04 PROCEDURE — 85025 COMPLETE CBC W/AUTO DIFF WBC: CPT | Performed by: PHYSICIAN ASSISTANT

## 2024-06-04 PROCEDURE — 80048 BASIC METABOLIC PNL TOTAL CA: CPT | Performed by: PHYSICIAN ASSISTANT

## 2024-06-04 RX ORDER — HYDROCHLOROTHIAZIDE 12.5 MG/1
6.25 TABLET ORAL DAILY
Status: DISCONTINUED | OUTPATIENT
Start: 2024-06-04 | End: 2024-06-05 | Stop reason: HOSPADM

## 2024-06-04 RX ADMIN — BUPROPION HYDROCHLORIDE 450 MG: 300 TABLET, EXTENDED RELEASE ORAL at 08:41

## 2024-06-04 RX ADMIN — OMEPRAZOLE 40 MG: 20 CAPSULE, DELAYED RELEASE ORAL at 08:42

## 2024-06-04 RX ADMIN — PREGABALIN 150 MG: 75 CAPSULE ORAL at 20:03

## 2024-06-04 RX ADMIN — OXYCODONE HYDROCHLORIDE 20 MG: 10 TABLET, FILM COATED, EXTENDED RELEASE ORAL at 08:41

## 2024-06-04 RX ADMIN — PREGABALIN 150 MG: 75 CAPSULE ORAL at 14:40

## 2024-06-04 RX ADMIN — BUSPIRONE HYDROCHLORIDE 10 MG: 10 TABLET ORAL at 08:42

## 2024-06-04 RX ADMIN — PREDNISOLONE ACETATE 1 DROP: 10 SUSPENSION/ DROPS OPHTHALMIC at 08:41

## 2024-06-04 RX ADMIN — BUSPIRONE HYDROCHLORIDE 10 MG: 10 TABLET ORAL at 20:03

## 2024-06-04 RX ADMIN — OXYCODONE HYDROCHLORIDE 10 MG: 5 TABLET ORAL at 16:17

## 2024-06-04 RX ADMIN — ACETAMINOPHEN 975 MG: 325 TABLET, FILM COATED ORAL at 05:45

## 2024-06-04 RX ADMIN — LORATADINE 10 MG: 10 TABLET ORAL at 08:42

## 2024-06-04 RX ADMIN — PREGABALIN 150 MG: 75 CAPSULE ORAL at 08:42

## 2024-06-04 RX ADMIN — DOCUSATE SODIUM AND SENNOSIDES 1 TABLET: 8.6; 5 TABLET, FILM COATED ORAL at 20:03

## 2024-06-04 RX ADMIN — POLYETHYLENE GLYCOL 3350 17 G: 17 POWDER, FOR SOLUTION ORAL at 08:41

## 2024-06-04 RX ADMIN — Medication 6.25 MG: at 12:00

## 2024-06-04 RX ADMIN — OMEPRAZOLE 40 MG: 20 CAPSULE, DELAYED RELEASE ORAL at 20:03

## 2024-06-04 RX ADMIN — DULOXETINE HYDROCHLORIDE 60 MG: 60 CAPSULE, DELAYED RELEASE ORAL at 20:03

## 2024-06-04 RX ADMIN — OXYCODONE HYDROCHLORIDE 20 MG: 10 TABLET, FILM COATED, EXTENDED RELEASE ORAL at 20:03

## 2024-06-04 RX ADMIN — ENOXAPARIN SODIUM 40 MG: 40 INJECTION SUBCUTANEOUS at 08:41

## 2024-06-04 RX ADMIN — OXYCODONE HYDROCHLORIDE 10 MG: 5 TABLET ORAL at 21:07

## 2024-06-04 RX ADMIN — OXYCODONE HYDROCHLORIDE 10 MG: 5 TABLET ORAL at 12:08

## 2024-06-04 RX ADMIN — ACETAMINOPHEN 975 MG: 325 TABLET, FILM COATED ORAL at 21:07

## 2024-06-04 RX ADMIN — DULOXETINE HYDROCHLORIDE 60 MG: 60 CAPSULE, DELAYED RELEASE ORAL at 08:42

## 2024-06-04 RX ADMIN — PREDNISOLONE ACETATE 1 DROP: 10 SUSPENSION/ DROPS OPHTHALMIC at 21:07

## 2024-06-04 RX ADMIN — DOCUSATE SODIUM AND SENNOSIDES 1 TABLET: 8.6; 5 TABLET, FILM COATED ORAL at 08:42

## 2024-06-04 RX ADMIN — Medication 25 MG: at 12:00

## 2024-06-04 RX ADMIN — ACETAMINOPHEN 975 MG: 325 TABLET, FILM COATED ORAL at 14:41

## 2024-06-04 RX ADMIN — OXYCODONE HYDROCHLORIDE 10 MG: 5 TABLET ORAL at 03:57

## 2024-06-04 ASSESSMENT — ACTIVITIES OF DAILY LIVING (ADL)
ADLS_ACUITY_SCORE: 32
ADLS_ACUITY_SCORE: 34
ADLS_ACUITY_SCORE: 32
ADLS_ACUITY_SCORE: 34
ADLS_ACUITY_SCORE: 32
ADLS_ACUITY_SCORE: 34
ADLS_ACUITY_SCORE: 32

## 2024-06-04 NOTE — PROGRESS NOTES
Plastic Surgery Progress Note  Attending: Dr. Zavala    Doing ok pain manageable with PO meds. No issues overnight.     Temp:  [97.2  F (36.2  C)-98.8  F (37.1  C)] 98.8  F (37.1  C)  Pulse:  [] 76  Resp:  [8-18] 16  BP: (114-153)/(67-85) 126/80  SpO2:  [92 %-98 %] 95 %  I/O last 3 completed shifts:  In: 2008 [P.O.:150; I.V.:1858]  Out: 4025 [Urine:3800; Drains:175; Blood:50]  General: NAD  Resp: NLB on RA  Left gluteal flap warm, soft, viable. Incisions c/d/I. No significant swelling. No erythema. DEANDRE with sanguinous output.     DEANDRE 145cc yesterday, 30 last shift    CBC  Recent Labs   Lab 06/04/24  0536   WBC 10.1   RBC 5.59   HGB 12.7*   HCT 41.6   MCV 74*   MCH 22.7*   MCHC 30.5*   RDW 17.0*        BMP  Recent Labs   Lab 06/04/24  0536 06/03/24  1216 06/03/24  0624     --   --    POTASSIUM 4.0  --   --    CHLORIDE 101  --   --    HILDA 8.3*  --   --    CO2 27  --   --    BUN 24.1*  --   --    CR 0.81 0.99  --    *  --  103*       ASSESSMENT: 43 year old male POD #1 sacral wound debridement, L gluteal fasciocutaneous rotational advancement flap closure with Dr. Zavala    PLAN:   -no sitting, HOB <30, WAVE mattress, q2 hr turns while awake, ok for supine and lateral positions, limit L lateral due to flap being on that side  -DEANDRE drain care, strip, empty and record output every 4-8 hours  -if dressing soiled or falls off, ok to change (ABD pads/tape)Otherwise will change tomorrow.  -keep bui in place  -pain regimen- PO oxycodone home regimen, tylenol, flexeril, IV dilaudid last resort  -appreciate WOC care recommendations for other wounds  -appreciate medicine comanagement    -dispo- needs stretcher transport, likely ok for discharge tomorrow 6/5    -discussed with Dr. Sally Miller PA-C  Plastic and Reconstructive Surgery  [phone]
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
## 2024-06-04 NOTE — PLAN OF CARE
End of shift Summary: See flowsheet for VS and detail assessments.     Changes this Shift:     Neuro/CMS: A&Ox4. CMS intact. Calm and pleasant. Able to make needs known. Denies dizziness, headaches, N/V and N/T.      Pulmonary: On RA. Denies cough, chest pain and SOB.      Output: Dyer in place with adequate output. Colostomy intact; no output this shift.      Activity: Paraplegic, L below the knee amputee, On bedrest with HOB <30 degrees     Skin: Surgical incision on sacral area. Wounds on L medial stump and behind left knee.      Pain: Rates pain 4-7/10; managed with PRN PO Oxycodone and scheduled meds.      Dressings/Drains: Dressing to sacrum and LLE C/D/I. Dressing change on sacrum and L medial stump due tomorrow.      IV: L PIV SL and patent    Plan: Discharge home tomorrow. Stretcher ride set up [facility] tomorrow [facility] 13[facility]53[facility]14[facility]
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
## 2024-06-04 NOTE — PLAN OF CARE
[id number]    Goal Outcome Evaluation:      Plan of Care Reviewed With: patient    Overall Patient Progress: no changeOverall Patient Progress: no change    Outcome Evaluation: Pt admitted to floor after I/D with flap    Pt is Aox4. On bedrest. VSS on RA. Dressings to sacrum and LLE CDI. Colostomy intact. Dyer in place with adequate output. Denies n/v, n/t, sob. Pain managed with prn oxy, flexeril and scheduled meds. DEANDRE drain in place. L PIV SL. Continue POC.

## 2024-06-04 NOTE — PROGRESS NOTES
Care Management Follow Up    Length of Stay (days): 1    Expected Discharge Date: 06/06/2024     Concerns to be Addressed: all concerns addressed in this encounter     Patient plan of care discussed at interdisciplinary rounds: Yes    Anticipated Discharge Disposition:       Anticipated Discharge Services:    Anticipated Discharge DME:  (patient has all the DME he needs at home)    Patient/family educated on Medicare website which has current facility and service quality ratings:    Education Provided on the Discharge Plan:    Patient/Family in Agreement with the Plan: yes    Referrals Placed by CM/SW:    Private pay costs discussed: Not applicable    Additional Information:  RNCC met with patient to confirm address and be sure patient had someone to help at home with his wound care.  Patient states his sister will be there to help him. RNCC set stretcher ride up tomorrow per provider request.  Ride set up for tomorrow between 13:53-14:38 with [facility].      RNCC will continue to assist with any discharge needs.    [facility]  [phone]    Trish Yanez RN    Nurse Coordinator     Covering for: Arlin Vernon 5 MS    Phone: *[phone]    Social Work and Care Management Department    SEARCHABLE in [facility] - search CARE COORDINATOR    [place] & [place] ([phone]) Saturday & Sunday; ([phone]) [facility] Recognized Holidays    Units: 5A, 5B & 5C  Pager: [phone]    Units: 6B, 6C & 6D    Pager: [phone]    Units: 7A, 7B, 7C & 7D    Pager: [phone]    Units: 6A & ICU   Pager: [phone]    Units: 5 Ortho, 5MS & WB ED Pager: [phone]    Units: 6MS, 8A & 10 ICU  Pager [phone]

## 2024-06-05 ENCOUNTER — TELEPHONE (OUTPATIENT)
Dept: ORTHOPEDICS | Facility: CLINIC | Age: 44
End: 2024-06-05
Payer: MEDICARE

## 2024-06-05 VITALS
DIASTOLIC BLOOD PRESSURE: 82 MMHG | RESPIRATION RATE: 16 BRPM | TEMPERATURE: 98.3 F | HEART RATE: 85 BPM | OXYGEN SATURATION: 94 % | BODY MASS INDEX: 37.33 KG/M2 | SYSTOLIC BLOOD PRESSURE: 147 MMHG | WEIGHT: 282.85 LBS

## 2024-06-05 PROCEDURE — 250N000013 HC RX MED GY IP 250 OP 250 PS 637: Performed by: INTERNAL MEDICINE

## 2024-06-05 PROCEDURE — 250N000013 HC RX MED GY IP 250 OP 250 PS 637: Performed by: PHYSICIAN ASSISTANT

## 2024-06-05 PROCEDURE — 99232 SBSQ HOSP IP/OBS MODERATE 35: CPT | Performed by: INTERNAL MEDICINE

## 2024-06-05 PROCEDURE — 250N000011 HC RX IP 250 OP 636: Performed by: PHYSICIAN ASSISTANT

## 2024-06-05 RX ORDER — PREGABALIN 150 MG/1
300 CAPSULE ORAL 2 TIMES DAILY
Status: SHIPPED
Start: 2024-06-05

## 2024-06-05 RX ADMIN — BUSPIRONE HYDROCHLORIDE 10 MG: 10 TABLET ORAL at 09:04

## 2024-06-05 RX ADMIN — PREGABALIN 150 MG: 75 CAPSULE ORAL at 14:00

## 2024-06-05 RX ADMIN — OMEPRAZOLE 40 MG: 20 CAPSULE, DELAYED RELEASE ORAL at 09:04

## 2024-06-05 RX ADMIN — OXYCODONE HYDROCHLORIDE 10 MG: 5 TABLET ORAL at 01:05

## 2024-06-05 RX ADMIN — Medication 6.25 MG: at 09:05

## 2024-06-05 RX ADMIN — PREGABALIN 150 MG: 75 CAPSULE ORAL at 09:04

## 2024-06-05 RX ADMIN — OXYCODONE HYDROCHLORIDE 20 MG: 10 TABLET, FILM COATED, EXTENDED RELEASE ORAL at 09:04

## 2024-06-05 RX ADMIN — PREDNISOLONE ACETATE 1 DROP: 10 SUSPENSION/ DROPS OPHTHALMIC at 09:05

## 2024-06-05 RX ADMIN — OXYCODONE HYDROCHLORIDE 10 MG: 5 TABLET ORAL at 09:04

## 2024-06-05 RX ADMIN — DOCUSATE SODIUM AND SENNOSIDES 1 TABLET: 8.6; 5 TABLET, FILM COATED ORAL at 09:03

## 2024-06-05 RX ADMIN — ACETAMINOPHEN 975 MG: 325 TABLET, FILM COATED ORAL at 14:01

## 2024-06-05 RX ADMIN — OXYCODONE HYDROCHLORIDE 10 MG: 5 TABLET ORAL at 05:02

## 2024-06-05 RX ADMIN — ACETAMINOPHEN 975 MG: 325 TABLET, FILM COATED ORAL at 05:02

## 2024-06-05 RX ADMIN — OXYCODONE HYDROCHLORIDE 10 MG: 5 TABLET ORAL at 14:00

## 2024-06-05 RX ADMIN — Medication 25 MG: at 09:03

## 2024-06-05 RX ADMIN — BUPROPION HYDROCHLORIDE 450 MG: 300 TABLET, EXTENDED RELEASE ORAL at 09:03

## 2024-06-05 RX ADMIN — LORATADINE 10 MG: 10 TABLET ORAL at 09:04

## 2024-06-05 RX ADMIN — DULOXETINE HYDROCHLORIDE 60 MG: 60 CAPSULE, DELAYED RELEASE ORAL at 09:04

## 2024-06-05 RX ADMIN — ENOXAPARIN SODIUM 40 MG: 40 INJECTION SUBCUTANEOUS at 09:03

## 2024-06-05 ASSESSMENT — ACTIVITIES OF DAILY LIVING (ADL)
ADLS_ACUITY_SCORE: 34

## 2024-06-05 NOTE — PROGRESS NOTES
Care Management Discharge Note    Discharge Date: 06/05/2024       Discharge Disposition:      Discharge Services:      Discharge DME:  (patient has all the DME he needs at home)    Discharge Transportation: other (see comments), health plan transportation, agency    Private pay costs discussed: Not applicable    Does the patient's insurance plan have a 3 day qualifying hospital stay waiver?  No    PAS Confirmation Code:    Patient/family educated on Medicare website which has current facility and service quality ratings:      Education Provided on the Discharge Plan:    Persons Notified of Discharge Plans: bedside nurse, [employer] transport, patient  Patient/Family in Agreement with the Plan: yes    Handoff Referral Completed: Yes IHO    Additional Information:  RNCC met with patient to provide him with transportation information.  RNCC confirmed that patient had help from his sister at home for his wound cares and didn't need any home care.  RNCC confirmed patients address and set up transport with [employer] stretcher ride today between 13:53 and 14:38.  RNCC informed patient and bedside nurse of this information.  RNCC provided medicare form to patient for signature and faxed back to EMS billing.      Trish Yanez, RN    Nurse Coordinator     Covering for: Arlin Vernon 5 MS    Phone: *[phone]    Social Work and Care Management Department    [facility]ABLE in [facility] - search CARE COORDINATOR    [place] & [place] ([phone]) Saturday & Sunday; ([phone]) [facility] Recognized Holidays    Units: 5A, 5B & 5C  Pager: [phone]    Units: 6B, 6C & 6D    Pager: [phone]    Units: 7A, 7B, 7C & 7D    Pager: [phone]    Units: 6A & ICU   Pager: [phone]    Units: 5 Ortho, 5MS & WB ED Pager: [phone]    Units: 6MS, 8A & 10 ICU  Pager [phone]

## 2024-06-05 NOTE — TELEPHONE ENCOUNTER
Other: Saqib called he is scheduled to see Dr. Maier on 6/10/24 for a 3 week follow up but he is in the hospital and has had his surgery and will be on bed rest for 6 weeks. He is wondering if his appointment on the 10th can be virtual? Please call to discuss     Could we send this information to you in [employer] or would you prefer to receive a phone call?:   Patient would prefer a phone call   Okay to leave a detailed message?: Yes at Cell number on file:    Telephone Information:   Mobile [phone]

## 2024-06-05 NOTE — TELEPHONE ENCOUNTER
RN changed patient's visit to virtual after the discretion of Dr. Maier reviewing patient's stump photos.     Morena Walter RN
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
## 2024-06-05 NOTE — PLAN OF CARE
Problem: Adult Inpatient Plan of Care  Goal: Absence of Hospital-Acquired Illness or Injury  Intervention: Prevent Skin Injury  Recent Flowsheet Documentation  Taken 6/5/2024 0000 by Jud Jenkins RN  Body Position:   turned   left  Skin Protection:   adhesive use limited   skin to skin areas padded  Device Skin Pressure Protection:   tubing/devices free from skin contact   skin-to-skin areas padded   pressure points protected   absorbent pad utilized/changed   adhesive use limited  Taken 6/4/2024 2000 by Jud Jenkins RN  Body Position:   turned   left  Skin Protection:   adhesive use limited   skin to skin areas padded  Device Skin Pressure Protection:   tubing/devices free from skin contact   skin-to-skin areas padded   pressure points protected   absorbent pad utilized/changed   adhesive use limited     Problem: Pain Acute  Goal: Optimal Pain Control and Function  Intervention: Prevent or Manage Pain  Recent Flowsheet Documentation  Taken 6/5/2024 0000 by Jud Jenkins RN  Medication Review/Management: medications reviewed  Taken 6/4/2024 2000 by Jud Jenkins RN  Medication Review/Management: medications reviewed   Goal Outcome Evaluation:    No acute changes this shift. A&Ox4. Left BKA. Dressing CDI. DEANDRE 80 ml this shift. Can self reposition this shift. Strong upper body strength. Dyer patent. Colostomy patent. No BM this shift. Bed not working around 0400. Switch patient to regular bed. PRN oxycodone given per MAR for pain. Offered PRN Flexeril and atarax for pain and to relax muscle. Patient declined.     Continue to monitor POC.

## 2024-06-05 NOTE — DISCHARGE SUMMARY
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 Zavala         Non-operative procedures:   None performed          Consultations:   INTERNAL MEDICINE ADULT IP CONSULT FOR [facility]  WOUND OSTOMY CONTINENCE NURSE  IP CONSULT  CARE MANAGEMENT / SOCIAL WORK IP CONSULT  CARE MANAGEMENT / SOCIAL WORK IP CONSULT          Brief History of Illness:   43 year old male PMH paraplegia, L BKA, parastomal hernia, HTN, depression, anxiety, GERD, cutaneous sarcoidosis who had a chronic sacral wound.            Hospital Course:   The patient underwent the above operation on 6/3, which he tolerated well without complications. He was transferred to the floor for routine postoperative care and the remainder of his hospitalization was unremarkable. No bone cultures taken.      At the time of discharge, he was tolerating a regular diet, on bed rest, voiding spontaneously via bui, and pain was controlled with oral pain medications. The patient was discharged home in stable and improved condition. He will follow up in clinic in 3-4 weeks with a virtual visit.         Final Pathology Result:   No pathology submitted         Medications Prior to Admission:     Medications Prior to Admission   Medication Sig Dispense Refill Last Dose    acetaminophen (TYLENOL) 325 MG tablet Take 2 tablets (650 mg) by mouth every 4 hours as needed for other (For optimal non-opioid  multimodal pain management to improve pain control.)   6/2/2024 at 1800    buPROPion 450 MG TB24 Take 450 mg by mouth daily   6/2/2024 at 0800    busPIRone (BUSPAR) 10 MG tablet Take 1 tablet by mouth 2 times daily   6/2/2024 at 0800    clindamycin (CLEOCIN T) 1 % external solution Apply topically daily as needed (to face and scalp for acne and folliculutis)   Past Week    Cranberry 400 MG CAPS Take 400 mg by mouth daily   6/2/2024 at 0800    cyclobenzaprine (FLEXERIL) 10 MG tablet Take 10 mg by mouth 3 times daily as needed for muscle spasms   Past Week    DULoxetine (CYMBALTA) 60 MG capsule Take 60 mg by mouth 2 times daily   6/2/2024 at 0800    fluticasone (FLONASE) 50 MCG/ACT nasal spray Spray 1 spray into both nostrils daily   Unknown    hydroxychloroquine (PLAQUENIL) 200 MG tablet Take 400 mg by mouth daily   Past Week    hydrOXYzine (ATARAX) 25 MG tablet Take 1 tablet (25 mg) by mouth every 6 hours as needed for other (adjuvant pain)   6/2/2024 at 0800    loratadine (CLARITIN) 10 MG tablet Take 1 tablet by mouth daily   6/2/2024 at 0800    losartan-hydrochlorothiazide (HYZAAR) 50-12.5 MG tablet Take 0.5 tablets by mouth daily (One-half tablet)   6/2/2024 at 0800    melatonin 5 MG tablet Take 1 tablet (5 mg) by mouth nightly as needed for sleep   Unknown    omeprazole (PRILOSEC) 40 MG DR capsule Take 80 mg by mouth daily before breakfast   6/3/2024 at 0330    oxyCODONE (OXYCONTIN) 20 MG 12 hr tablet Take 1 tablet (20 mg) by mouth every 12 hours 20 tablet 0 6/2/2024 at 2000    polyethylene glycol (MIRALAX) 17 GM/Dose powder Take 17 g by mouth 2 times daily as needed for constipation 510 g  Past Week    polyethylene glycol-propylene glycol (SYSTANE ULTRA) 0.4-0.3 % SOLN ophthalmic solution Apply 1 drop to eye 4 times daily as needed for dry eyes   6/2/2024 at 2100    prednisoLONE acetate (PRED FORTE) 1 % ophthalmic suspension Place 1 drop into both eyes 2 times daily Per taper   6/2/2024 at 2100     senna-docusate (SENOKOT-S/PERICOLACE) 8.6-50 MG tablet Take 1 tablet by mouth daily   6/2/2024 at 0800    [DISCONTINUED] oxyCODONE IR (ROXICODONE) 10 MG tablet Take 10 mg by mouth every 6 hours as needed for severe pain   Past Week    [DISCONTINUED] pregabalin (LYRICA) 150 MG capsule Take 1 capsule (150 mg) by mouth 3 times daily (Patient taking differently: Take 300 mg by mouth 2 times daily) 30 capsule 0 6/2/2024 at 0800    [DISCONTINUED] semaglutide (OZEMPIC) 2 MG/3ML pen Inject 0.25mg subcutaneous every 7 days for 4 weeks, then increase to inject 0.5mg subcutaneous every 7 days. 3 mL 2     [DISCONTINUED] Semaglutide-Weight Management (WEGOVY) 0.25 MG/0.5ML pen Inject 0.25 mg Subcutaneous once a week For 4 weeks 2 mL 0     [DISCONTINUED] Semaglutide-Weight Management (WEGOVY) 0.5 MG/0.5ML pen Inject 0.5 mg Subcutaneous once a week After completing 4 weeks of 0.25mg dose 2 mL 2             Discharge Medications:     Current Discharge Medication List        CONTINUE these medications which have CHANGED    Details   pregabalin (LYRICA) 150 MG capsule Take 2 capsules (300 mg) by mouth 2 times daily    Associated Diagnoses: Necrotizing fasciitis of lower leg (H); S/P BKA (below knee amputation), left (H)           CONTINUE these medications which have NOT CHANGED    Details   acetaminophen (TYLENOL) 325 MG tablet Take 2 tablets (650 mg) by mouth every 4 hours as needed for other (For optimal non-opioid multimodal pain management to improve pain control.)    Associated Diagnoses: Infection of amputation stump of left lower extremity (H)      buPROPion 450 MG TB24 Take 450 mg by mouth daily    Associated Diagnoses: Infection of amputation stump of left lower extremity (H)      busPIRone (BUSPAR) 10 MG tablet Take 1 tablet by mouth 2 times daily      clindamycin (CLEOCIN T) 1 % external solution Apply topically daily as needed (to face and scalp for acne and folliculutis)      Cranberry 400 MG CAPS Take 400 mg by mouth  daily      cyclobenzaprine (FLEXERIL) 10 MG tablet Take 10 mg by mouth 3 times daily as needed for muscle spasms      DULoxetine (CYMBALTA) 60 MG capsule Take 60 mg by mouth 2 times daily      fluticasone (FLONASE) 50 MCG/ACT nasal spray Spray 1 spray into both nostrils daily      hydroxychloroquine (PLAQUENIL) 200 MG tablet Take 400 mg by mouth daily      hydrOXYzine (ATARAX) 25 MG tablet Take 1 tablet (25 mg) by mouth every 6 hours as needed for other (adjuvant pain)    Associated Diagnoses: Infection of amputation stump of left lower extremity (H)      loratadine (CLARITIN) 10 MG tablet Take 1 tablet by mouth daily      losartan-hydrochlorothiazide (HYZAAR) 50-12.5 MG tablet Take 0.5 tablets by mouth daily (One-half tablet)      melatonin 5 MG tablet Take 1 tablet (5 mg) by mouth nightly as needed for sleep    Associated Diagnoses: Insomnia, unspecified type      omeprazole (PRILOSEC) 40 MG DR capsule Take 80 mg by mouth daily before breakfast      oxyCODONE (OXYCONTIN) 20 MG 12 hr tablet Take 1 tablet (20 mg) by mouth every 12 hours  Qty: 20 tablet, Refills: 0    Associated Diagnoses: S/P BKA (below knee amputation), left (H)      polyethylene glycol (MIRALAX) 17 GM/Dose powder Take 17 g by mouth 2 times daily as needed for constipation  Qty: 510 g    Associated Diagnoses: Colostomy care (H)      polyethylene glycol-propylene glycol (SYSTANE ULTRA) 0.4-0.3 % SOLN ophthalmic solution Apply 1 drop to eye 4 times daily as needed for dry eyes      prednisoLONE acetate (PRED FORTE) 1 % ophthalmic suspension Place 1 drop into both eyes 2 times daily Per taper      senna-docusate (SENOKOT-S/PERICOLACE) 8.6-50 MG tablet Take 1 tablet by mouth daily           STOP taking these medications       oxyCODONE (ROXICODONE) 5 MG tablet Comments:   Reason for Stopping:         oxyCODONE IR (ROXICODONE) 10 MG tablet Comments:   Reason for Stopping:         semaglutide (OZEMPIC) 2 MG/3ML pen Comments:   Reason for Stopping:          Semaglutide-Weight Management (WEGOVY) 0.25 MG/0.5ML pen Comments:   Reason for Stopping:         Semaglutide-Weight Management (WEGOVY) 0.5 MG/0.5ML pen Comments:   Reason for Stopping:                    Day of Discharge Physical Exam:   Temp:  [97.8  F (36.6  C)-98.3  F (36.8  C)] 98.3  F (36.8  C)  Pulse:  [84-92] 85  Resp:  [16-18] 16  BP: (124-149)/(74-92) 147/82  SpO2:  [93 %-97 %] 94 %  General: NAD  Resp: NLB on RA  Left gluteal flap warm, soft, viable. Incisions c/d/I. No significant swelling. No erythema. DEANDRE with sanguinous output.         Discharge Instructions and Follow-Up:        Reason for your hospital stay    Sacral wound debridement with flap closure     Activity    Your activity upon discharge: bedrest  HOB <30 degrees. No sitting. No wheelchair until cleared by Dr. Zavala.  Change positions Q2 hours while awake, Q4 hours during PM.   Alternate from supine to lateral Minimize time on flap side (Left side).   Keep heels off mattress.     Tubes and drains    You are going home with the following tubes or drains: DEANDRE.  Strip, empty and record output every 8 hours     Wound care and dressings    Instructions to care for your wound at home: ok to keep flap covered with abd pads if draining. If all draining stops ok to keep open to air.     When to contact your care team    Call or message your doctor if you have any of the following: increased drainage, increased swelling, increased pain, or temp >100.4F. [facility] [phone]     Follow Up and recommended labs and tests    Follow up with Dr. Zavala 6/27/2024 10:15 AM  [facility]. This will be a virtual visit. We are ok with your primary care doctor removing sutures, staples and drains after being cleared by Dr. Zavala     Brief Discharge Instructions    Please continue your home pain control regimen as prior to surgery     Diet    Follow this diet upon discharge: Regular           Home Health Care:     Arranged                Discharge Disposition:     Discharged to home      Condition at discharge: Stable      Patient discussed with staff on day of discharge.    Zakiya Miller PA-C  Plastic and Reconstructive Surgery

## 2024-06-05 NOTE — PROGRESS NOTES
BP (!) 147/82 (BP Location: Left arm)   Pulse 85   Temp 98.3  F (36.8  C) (Oral)   Resp 16   Wt 128.3 kg (282 lb 13.6 oz)   SpO2 94%   BMI 37.33 kg/m      VSS on RA. Denies CP or SOB  Sacrum DRSG CDI, LLE wound- cleansed and new drsg today 6/5/24.   Colostomy bag changed prior to Discharge, Leg bag emptied & DEANDRE emptied prior to discharge. Supplies sent home with patient.   Pain management- PRN Oxycodone  Scheduled Tylenol, lyrica  Tolerating regular diet  Paraplegic- Able to turn self/reposition. Not to exceed HOB 30 degrees     Pt. discharged at 1435 to HOME, was accompanied by Transport, and left with personal belongings. Pt. received complete discharge paperwork. Pt. was given times of last dose for all discharge medications in writing on discharge medication sheets. Discharge teaching included ALL medication, pain management, activity restrictions, dressing changes, and signs and symptoms of infection. Dressing supplies sent home. Pt. to follow up with Plastics on Jun 10th. Pt. had no further questions at the time of discharge and no unmet needs were identified.    Pt informed to reach out to Dr. Zavala with updates on Wound Healing and drain output. RN highliughted and instructed pt of this information on his Discharge paperwork.

## 2024-06-05 NOTE — OP NOTE
OPERATIVE NOTE    DATE OF PROCEDURE: 6/3/24  ATTENDING SURGEON: Jayashree Zavala MD  RESIDENT SURGEON: none  ASSISTANT(S):Zakiya Miller PA-C (no resident available, PA did 50% of case)  PREOP DIAGNOSIS:stage 4 recurrent sacral decubitus  POSTOP DIAGNOSIS:same  PROCEDURE:sharp excisional debridement sacral decubitus (including skin, fat, muscle - NO bone). Rotation-readvancement left gluteal fasciocutaneous flap  ANESTHESIA:GETA  EBL:50  IVFS:1800  UO:400  COUNTS:correct  COMPLICATIONS:none  DRAINS:DEANDRE x 1, #19 channel  SPECIMENS:none    INDICATIONS:  This is a 43 year old paraplegic male with a diagnosis of recurrent sacral decubtus s/p previous left gluteal flap at outside institution. Patient failed conservative wound cares and had a diverting colostomy in preparation for re-do flap. Family doing wound cares. Patient has appropriate equipment at home.     PROCEDURE:  The patient was seen in the preop waiting area. The operative site was marked either on the patient or on the anatomical diagram. Informed consent was obtained after reviewing the possible risks and complications, including but not limited to the following: infection, bleeding, hematoma/seroma formation,  poor healing, dehiscence, spitting sutures, hypertrophic or keloid scars, injury to surrounding musculoskeletal and neurovascular structures, including possible colorectal and urologic structures ,  residual deformities or asymmetries, need for further surgery, altered sensation of the surgical area, anesthetic risks such as DVT, PE, cardiopulmonary arrest. He understands that if we encounter exposed bone, we will send biopsies and he may require 6-8 weeks of antibiotics for possible osteomyelitis. He also understands that there is no guarantee that this new flap will not have additional problems with healing.     The patient was then brought to the operating room. Anesthesia was administered with placement of ETT. The patient was on the table  in a  prone  position, with chest pillows and hip roll with foam face cushion. Knees and feet were padded and great care was taken to protect ostomy. Dyer was already in place. The surgical site was prepped and draped in the usual sterile fashion using Betadine. Preop photos were  taken. A time out was taken and the proper patient and procedure were identified.    We used methylene blue to stain the inner surface of the wound pocket. The pocket was outlined with a marker. The electric cautery was used to incise the skin around the wound perimeter, then removing the inked inner wound rind through the subcutaneous and muscle layers to the base of the wound. There was a tremendous amount of fibrotic scar from previous healing that was also excised with the bovie to get down to more normal bleeding tissues. At no time was any bone exposed. We irrigated the wound pocket with pulsavac and VASHE, and no cultures were sent. Hemostasis was achieved with cautery.     We then identified the subfascial plane in order to mobilize the old flap enough to advance into the wound defect. Several strong gluteal perforators were identified with doppler and incorporated into flap. The upper lateral scar was extended to allow for more rotation and advancement. Of note, this patient had a very thick fat layer, but it appeared to be perfused.     Once the flap was fully mobilized, a final irrigation with VASHE and Prontosan was applied and hemostasis was assured. The flap was then advanced and several cms of skin were de-epithelialized sharply with a scalpel to allow for burying the most forward corner into the depths of the defect. The flap was secured with several layers of 2-0 vicryl sutures, starting with the  deepest fascial plane. Just prior to closure, a #19 channel DEANDRE was inserted through a separate incision laterally and secured with a 3-0 nylon. This was placed into the midline defect. The dermis was approximated with 2-0 and  3-0 vicryl buried sutures and the skin was closed with 3-0 prolene vertical mattress sutures centrally and skin staples laterally. DEANDRE was trimmed and put to bulb suction. The incisions were dressed with adaptic and ABD pads secured with Medipore tape.     The patient was returned to a supine position on a WAVE mattress. He was extubated in the OR and taken to PACU in stable condition, having tolerated the procedure without complication.

## 2024-06-10 ENCOUNTER — VIRTUAL VISIT (OUTPATIENT)
Dept: ORTHOPEDICS | Facility: CLINIC | Age: 44
End: 2024-06-10
Payer: MEDICARE

## 2024-06-10 ENCOUNTER — TELEPHONE (OUTPATIENT)
Dept: ORTHOPEDICS | Facility: CLINIC | Age: 44
End: 2024-06-10
Payer: MEDICARE

## 2024-06-10 DIAGNOSIS — Z89.512 S/P BELOW KNEE AMPUTATION, LEFT (H): Primary | ICD-10-CM

## 2024-06-10 PROCEDURE — 99441 PR PHYSICIAN TELEPHONE EVALUATION 5-10 MIN: CPT | Mod: 93 | Performed by: ORTHOPAEDIC SURGERY

## 2024-06-10 NOTE — LETTER
6/10/2024      Saqib Bishop  [address] [place] [address]      Dear Colleague,    Thank you for referring your patient, Saqib Bishop, to the [facility] [place]. Please see a copy of my visit note below.    Orthopaedic Surgery Clinic Follow-up Appointment    08/28/2023, L transtibial guillotine amputation, Dr. Barth, for necrotizing fasciitis and sepsis.  08/30/2023, I&D, VAC change, Dr. Maier.  Parvimonas micra from left leg tissue culture, anaerobic GPB in broth only from left leg tissue culture.  09/02/2023, I&D, VAC change, Dr. Rehman.  09/07/2023, I&D, revision L BKA, Dr. Gates.  11/08/2023, I&D, Dr. Maier.  11/15/2023, I&D, Dr. Maier.    I had a telephone appointment today by phone at [phone], with this very pleasant 44yo male patient who has had ongoing wound healing problems after a L BKA.  He now reports that the last residual open area left to heal seems to finally be turning a corner, appearing to be healing well, and is now just a dry scab without any drainage.   He endorses that his left leg is looking really good at this time.  He has not had to put any dressings on it.  He denies fevers or chills.  He rates his pain as 5/10 in severity.  He denies any new problems or concerns at this time.  I told him that it appears we can now begin prosthetic fitting.  He reports he no longer as the referral to the prosthetist and needs a new referral.  A new referral is placed.  He may continue to keep the dry scab open to air.  Follow-up in 6 weeks for clinical exam.  Signs and symptoms that should prompt immediate medical attention were discussed.  All questions this very pleasant gentleman had at this time were were answered.    Total time of call: 6 minutes.      Etienne Maier MD

## 2024-06-10 NOTE — NURSING NOTE
Reason For Visit:   Chief Complaint   Patient presents with    RECHECK     Patient is doing a 3 week recheck, via telephone visit.  Patient denies any new problems.  He states his leg is looking really good right now.       There were no vitals taken for this visit.    Pain Assessment  Patient Currently in Pain: Yes  0-10 Pain Scale: 5  Primary Pain Location: Leg    Bill Smith, ATC

## 2024-06-10 NOTE — PROGRESS NOTES
Orthopaedic Surgery Clinic Follow-up Appointment    08/28/2023, L transtibial guillotine amputation, Dr. Barth, for necrotizing fasciitis and sepsis.  08/30/2023, I&D, VAC change, Dr. Maier.  Parvimonas micra from left leg tissue culture, anaerobic GPB in broth only from left leg tissue culture.  09/02/2023, I&D, VAC change, Dr. Rehman.  09/07/2023, I&D, revision L BKA, Dr. Gates.  11/08/2023, I&D, Dr. Maier.  11/15/2023, I&D, Dr. Maier.    I had a telephone appointment today by phone at [phone], with this very pleasant 42yo male patient who has had ongoing wound healing problems after a L BKA.  He now reports that the last residual open area left to heal seems to finally be turning a corner, appearing to be healing well, and is now just a dry scab without any drainage.   He endorses that his left leg is looking really good at this time.  He has not had to put any dressings on it.  He denies fevers or chills.  He rates his pain as 5/10 in severity.  He denies any new problems or concerns at this time.  I told him that it appears we can now begin prosthetic fitting.  He reports he no longer as the referral to the prosthetist and needs a new referral.  A new referral is placed.  He may continue to keep the dry scab open to air.  Follow-up in 6 weeks for clinical exam.  Signs and symptoms that should prompt immediate medical attention were discussed.  All questions this very pleasant gentleman had at this time were were answered.    Total time of call: 6 minutes.

## 2024-06-10 NOTE — TELEPHONE ENCOUNTER
ATC called patient to check in for telephone visit, left VM requesting they call back to main clinic.  ATC apologized for clinic being a bit behind schedule.  If patient returns call to main clinic, please call ortho back line so that they can connect to clinic staff phone for appointment check in or to speak directly with us.      Bill Smith MS, ATC, LAT, [facility]  Dr. Semaj Maier

## 2024-06-13 ENCOUNTER — TELEPHONE (OUTPATIENT)
Dept: ENDOCRINOLOGY | Facility: CLINIC | Age: 44
End: 2024-06-13
Payer: MEDICARE

## 2024-06-13 NOTE — TELEPHONE ENCOUNTER
Patient confirmed scheduled appointment:  Date: 10/25/24  Time: 2:30 pm  Visit type: HOWIE BORREGO  Provider: Viv Traylor  Location: virtual  Testing/imaging: n/a  Additional notes: Pt confirmed will be in [place] for appt

## 2024-06-26 ENCOUNTER — TELEPHONE (OUTPATIENT)
Dept: WOUND CARE | Facility: CLINIC | Age: 44
End: 2024-06-26
Payer: MEDICARE

## 2024-06-26 NOTE — TELEPHONE ENCOUNTER
Returned call to mother;  She was advised to apply antifungal cream to skin surrounding the DEANDRE drain per Dr Zavala as there has been some leakage;  Discussed options to obtain moisture barrier cream containing miconazole;  She is comfortable with options and will pursue;  No further inquiries.

## 2024-06-27 ENCOUNTER — HOSPITAL ENCOUNTER (OUTPATIENT)
Dept: WOUND CARE | Facility: CLINIC | Age: 44
Discharge: HOME | End: 2024-06-27
Attending: SURGERY
Payer: MEDICARE

## 2024-06-27 ENCOUNTER — TELEPHONE (OUTPATIENT)
Dept: WOUND CARE | Facility: CLINIC | Age: 44
End: 2024-06-27

## 2024-06-27 DIAGNOSIS — S81.802A OPEN WOUND OF LOWER LEG, LEFT, INITIAL ENCOUNTER: Primary | ICD-10-CM

## 2024-06-27 DIAGNOSIS — L89.154 PRESSURE INJURY OF SACRAL REGION, STAGE 4 (H): ICD-10-CM

## 2024-06-27 PROCEDURE — 99024 POSTOP FOLLOW-UP VISIT: CPT | Mod: 95 | Performed by: SURGERY
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
## 2024-06-27 NOTE — TELEPHONE ENCOUNTER
left message, questions on patient's homebound status and getting him home care.  [place] [phone]

## 2024-06-27 NOTE — PROGRESS NOTES
Patient called for a telephone visit. Certified Wound Care Nurse time spent evaluating patient record, completed a full evaluation and documented wound(s) & ivette-wound skin; provided recommendation based on treatment plan. Reviewed discharge instructions, patient education, and discussed plan of care with appropriate medical team staff members and patient and/or family members.   Patient Active Problem List   Diagnosis    Removal of pin, plate, kristi, or screw    Open wound of lower leg, left, initial encounter    Pressure ulcer of sacral region, unspecified stage    Necrotizing fasciitis of lower leg (H)    S/P below knee amputation, left (H)    Adjustment disorder with mixed anxiety and depressed mood    Allergic rhinitis, unspecified    Anxiety    Band-shaped keratopathy    Burst fracture of thoracic vertebra (H)    Cataract    Chorioretinal scar    Chronic, continuous use of opioids    Colostomy present (H)    Decubitus ulcer of coccygeal region, stage 4 (H)    Decubitus ulcer of left ischial area, stage IV (H)    Pressure ulcer of coccygeal region, stage III (H)    Degeneration of intervertebral disc of lumbar region    Diplopia    Disorder of macula of retina    Compression fracture of vertebra (H)    Fracture of cervical vertebra (H)    Gastro-esophageal reflux disease without esophagitis    Posttraumatic stress disorder    History of DVT (deep vein thrombosis)    Hypertension    Insomnia    Iris adhesion, posterior    Mild episode of recurrent major depressive disorder (H24)    Neurogenic bladder    Narrowing of intervertebral disc space    Muscle weakness (generalized)    Neurogenic pain    Neuropathic pain of both legs    Numbness of hand    Open wound of buttock, right, subsequent encounter    Class 1 obesity with serious comorbidity and body mass index (BMI) of 32.0 to 32.9 in adult, unspecified obesity type    Osteopenia    Chronic pain disorder    Panuveitis    Paraplegia, unspecified (H)    Restless  legs syndrome    Sacral osteomyelitis (H)    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    Sarcoidosis    Secondary hypocortisolism (H24)    Sinus tachycardia    Skin ulcer (H)    Vertebral subluxation complex    Vitamin D deficiency    Wound dehiscence, surgical, initial encounter    Cutaneous abscess, unspecified    Hernia, ventral    T12 spinal cord injury (H)    Parastomal hernia without obstruction or gangrene    Infection of amputation stump of left lower extremity (H)    Sacral wound     Past Medical History:   Diagnosis Date    Brain injury with brief loss of consciousness (H) 12/14/2015    Candida esophagitis (H) 08/03/2022    Degenerative joint disease     Gastro-oesophageal reflux disease     Hypercalcemia 08/03/2022    Hypokalemia 08/03/2022    Hypomagnesemia 08/03/2022     Labs:   Recent Labs   Lab Test 06/04/24  0536 11/18/23  0805 11/16/23  0749 11/15/23  0552 09/07/23  0423 09/06/23  0815   ALBUMIN  --  3.6   < > 3.3*   < >  --    HGB 12.7* 13.9   < > 13.1*   < >  --    INR  --   --   --  1.06   < >  --    WBC 10.1 6.0   < > 6.1   < >  --    A1C  --   --   --   --   --  5.5    < > = values in this interval not displayed.     Nutrition requirements were discussed with patient today.  Vitals:  There were no vitals taken for this visit.  Wound:   Wound Leg Amputation;Surgical (Active)   Wound Care/Cleansing Wound cleanser 06/05/24 1100   Dressing Foam 06/05/24 1100   Dressing Status Clean, dry, intact 06/04/24 2000   Dressing Change Due 06/07/24 06/05/24 1100       Wound Knee Moisture damage (Active)   Dressing Open to air 06/05/24 1100       Incision/Surgical Site 06/03/24 Upper;Bilateral Buttocks (Active)   Incision Assessment UTV 06/05/24 1100   Dressing Dry gauze 06/03/24 1050   Closure SHELLY;Sutures;Staples 06/03/24 1255   Incision Drainage Amount None 06/05/24 0000   Dressing Intervention Clean, dry, intact 06/05/24 1100      Photo: No images are attached to the encounter.      Further instructions  from your care team         06/27/2024   Saqib Bishop   1980  A DME order was not completed because supplies were not needed    Plan: 6/27/24  Leave all sutures and staples in place  Wound care provided by  Family  Please call to set up Ambulance needed for next clinic visit:   [facility] [phone]  Justice Spicer: [phone]    Surgical incision care: Daily  - Prepare clean surface and wash your hands with soap and water   -Cleanse with mild unscented soap and water    Pat entire incision line and surrounding skin with Betadine and fan dry  Apply Antifungal cream to rash areas  Cover areas as needed to absorb drainage    Please monitor incision line every day and contact Dr Zavala with weekly pictures     Main Provider: Jayashree Zavala M.D. June 27, 2024

## 2024-06-27 NOTE — DISCHARGE INSTRUCTIONS
06/27/2024   Saqib Angelesmateuszmary   1980  A DME order was not completed because supplies were not needed    Plan: 6/27/24  Leave all sutures and staples in place  Wound care provided by  Family  Please call to set up Ambulance needed for next clinic visit:   [facility] [phone]  Justice Spicer: [phone]    Surgical incision care: Daily  - Prepare clean surface and wash your hands with soap and water   -Cleanse with mild unscented soap and water    Pat entire incision line and surrounding skin with Betadine and fan dry  Apply Antifungal cream to rash areas  Cover areas as needed to absorb drainage    Please monitor incision line every day and contact Dr Zavala with weekly pictures     Main Provider: Jayashree Zavala M.D. June 27, 2024    Call us at [phone] if you have any questions about your wounds, if you have redness or swelling around your wound, have a fever of 101 degrees Fahrenheit or greater or if you have any other problems or concerns. We answer the phone Monday through Friday 8 am to 4 pm, please leave a message as we check the voicemail frequently throughout the day. If you have a concern over the weekend, please leave a message and we will return your call Monday. If the need is urgent, go to the ER or urgent care.    If you had a positive experience please indicate that on your patient satisfaction survey form that [facility] will be sending you.  It was a pleasure meeting with you today.  Thank you for allowing me and my team the privilege of caring for you today.  YOU are the reason we are here, and I truly hope we provided you with the excellent service you deserve.  Please let us know if there is anything else we can do for you so that we can be sure you are leaving completely satisfied with your care experience.      If you have any billing related questions please call the [facility] Business office at [phone]. The clinic staff does not handle billing related  matters.  If you are scheduled to have a follow up appointment, you will receive a reminder call the day before your visit. On the appointment day please arrive 15 minutes prior to your appointment time. If you are unable to keep that appointment, please call the clinic to cancel or reschedule. If you are more than 10 minutes late or greater for your scheduled appointment time, the clinic policy is that you may be asked to reschedule.

## 2024-06-28 NOTE — TELEPHONE ENCOUNTER
Discussion with  regarding need for Home Care nursing care at this time. Dr Zavala would prefer Patient to be monitored closely by another nurse to ensure the integrity of the FLAP incision. William will work to find an agency that would fit with the Patient and his insurance. He will call [facility] back if he can find an agency that accepts the Patient and insurance.   No further questions or concerns.

## 2024-07-18 ENCOUNTER — HOSPITAL ENCOUNTER (OUTPATIENT)
Dept: WOUND CARE | Facility: CLINIC | Age: 44
Discharge: HOME | End: 2024-07-18
Attending: SURGERY | Admitting: SURGERY
Payer: MEDICARE

## 2024-07-18 VITALS — DIASTOLIC BLOOD PRESSURE: 73 MMHG | TEMPERATURE: 97.7 F | SYSTOLIC BLOOD PRESSURE: 111 MMHG | HEART RATE: 101 BPM

## 2024-07-18 DIAGNOSIS — S81.802A OPEN WOUND OF LOWER LEG, LEFT, INITIAL ENCOUNTER: Primary | ICD-10-CM

## 2024-07-18 DIAGNOSIS — L89.154 PRESSURE INJURY OF SACRAL REGION, STAGE 4 (H): ICD-10-CM

## 2024-07-18 PROCEDURE — 99024 POSTOP FOLLOW-UP VISIT: CPT | Performed by: SURGERY

## 2024-07-18 PROCEDURE — 11042 DBRDMT SUBQ TIS 1ST 20SQCM/<: CPT | Performed by: SURGERY

## 2024-07-18 NOTE — PROGRESS NOTES
Patient Active Problem List   Diagnosis    Removal of pin, plate, kristi, or screw    Open wound of lower leg, left, initial encounter    Pressure ulcer of sacral region, unspecified stage    Necrotizing fasciitis of lower leg (H)    S/P below knee amputation, left (H)    Adjustment disorder with mixed anxiety and depressed mood    Allergic rhinitis, unspecified    Anxiety    Band-shaped keratopathy    Burst fracture of thoracic vertebra (H)    Cataract    Chorioretinal scar    Chronic, continuous use of opioids    Colostomy present (H)    Decubitus ulcer of coccygeal region, stage 4 (H)    Decubitus ulcer of left ischial area, stage IV (H)    Pressure ulcer of coccygeal region, stage III (H)    Degeneration of intervertebral disc of lumbar region    Diplopia    Disorder of macula of retina    Compression fracture of vertebra (H)    Fracture of cervical vertebra (H)    Gastro-esophageal reflux disease without esophagitis    Posttraumatic stress disorder    History of DVT (deep vein thrombosis)    Hypertension    Insomnia    Iris adhesion, posterior    Mild episode of recurrent major depressive disorder (H24)    Neurogenic bladder    Narrowing of intervertebral disc space    Muscle weakness (generalized)    Neurogenic pain    Neuropathic pain of both legs    Numbness of hand    Open wound of buttock, right, subsequent encounter    Class 1 obesity with serious comorbidity and body mass index (BMI) of 32.0 to 32.9 in adult, unspecified obesity type    Osteopenia    Chronic pain disorder    Panuveitis    Paraplegia, unspecified (H)    Restless legs syndrome    Sacral osteomyelitis (H)    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    Sarcoidosis    Secondary hypocortisolism (H24)    Sinus tachycardia    Skin ulcer (H)    Vertebral subluxation complex    Vitamin D deficiency    Wound dehiscence, surgical, initial encounter    Cutaneous abscess, unspecified    Hernia, ventral    T12 spinal cord injury (H)    Parastomal hernia  without obstruction or gangrene    Infection of amputation stump of left lower extremity (H)    Sacral wound     Past Medical History:   Diagnosis Date    Brain injury with brief loss of consciousness (H) 12/14/2015    Candida esophagitis (H) 08/03/2022    Degenerative joint disease     Gastro-oesophageal reflux disease     Hypercalcemia 08/03/2022    Hypokalemia 08/03/2022    Hypomagnesemia 08/03/2022     Labs:   Recent Labs   Lab Test 06/04/24  0536 11/18/23  0805 11/16/23  0749 11/15/23  0552 09/07/23  0423 09/06/23  0815   ALBUMIN  --  3.6   < > 3.3*   < >  --    HGB 12.7* 13.9   < > 13.1*   < >  --    INR  --   --   --  1.06   < >  --    WBC 10.1 6.0   < > 6.1   < >  --    A1C  --   --   --   --   --  5.5    < > = values in this interval not displayed.     Nutrition requirements were discussed with patient today.  Vitals:  /73 (BP Location: Left arm, Patient Position: Sitting, Cuff Size: Adult Regular)   Pulse 101   Temp 97.7  F (36.5  C) (Temporal)   Wound:   Wound (used by OP [facility] only) 07/18/24 1505 sacral surgical (Active)   Thickness/Stage full thickness 07/18/24 1505   Base granulating 07/18/24 1505   Periwound intact 07/18/24 1505   Periwound Temperature warm 07/18/24 1505   Periwound Skin Turgor soft 07/18/24 1505   Edges open 07/18/24 1505   Length (cm) 3 07/18/24 1505   Width (cm) 5 07/18/24 1505   Depth (cm) 9 07/18/24 1505   Wound (cm^2) 15 cm^2 07/18/24 1505   Wound Volume (cm^3) 135 cm^3 07/18/24 1505   Tunneling [Depth (cm)/Location] 3o'/ 7cm, 6o'/ 6cm, 9o'/ 15cm 07/18/24 1505   Undermining [Depth (cm)/Location] 6-9o'/ 15cm 07/18/24 1505   Drainage Characteristics/Odor serous 07/18/24 1505   Drainage Amount large 07/18/24 1505   Care, Wound debrided 07/18/24 1505      Photo:         Further instructions from your care team         07/18/2024   Saqib Bishop   1980  DME order sent to [facility] Phone: [phone] Fax [phone]   [facility]: Ph:  [phone]  Fax:  "[phone]    Plan 07/18/2024   Wound care provided by  Family and home care  VAC will be started when approved by home care agency  Bedrest and DO NOT start sitting until next visit 8/1/24     Wound care Sacrum / Surgical Wound   -Prepare clean surface and wash your hands with soap and water   -Cleanse with mild unscented soap and water  -Apply small amount of VASHE on gauze, to wound bed, for 10 minutes, remove gauze (do not rinse)  -Cut and Fluff 1  4\" dry AMD roll gauze into pockets at 3,6, 9 o'clock. May need to use straight scissor to tuck the gauze into the wound pockets, use one singular piece  -Cover with 1 5x9 ABD pad   Secure with 2 \" Medipore tape  Change twice a day and as needed for copious drainage    VAC order placed 7/18/24; please start VAC when approved  Wound care Sacrum: 3 times a week  Remove old dressing and ensure all black foam is removed  Cleanse wound with Vashe moist gauze, leave in place for 10 minutes; remove   Cleanse periwound skin with wound spray, pat dry and apply No Sting Barrier film.    Cut Black Foam like a cinnamon roll to fill pockets at 3, 6 and 9 o'clock and fill wound base  Bridge out to hip and alternate if possible hip to hip  Cover wound with VAC dressing tape to seal the foam, cut appropriate size opening for the TRAC pad.  Connect TRAC pad and connect to pump; NPWT -150 mmHg Continuous, ensure no leaks  Change canister when alarms full or weekly  Change dressing three times a week    Document on the outside of the dressing the number of sponges used and the date of the dressing  If dressing is compromised for greater than 2 hours then please remove entire dressing and change or tuck moist Vashe gauze into wound until new dressing can be applied. Use ostomy paste for divots and crevices as needed to maintain seal.     Main Provider: Jayashree Zavala M.D. July 18, 2024  "
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
## 2024-07-18 NOTE — DISCHARGE INSTRUCTIONS
"07/18/2024   Saqib Angelesmanuel   1980  DME order sent to [facility] Phone: [phone] Fax [phone]   [facility]: Ph:  [phone]  Fax: [phone]    Plan 07/18/2024   Wound care provided by  Family and home care  VAC will be started when approved by home care agency  Bedrest and DO NOT start sitting until next visit 8/1/24     Wound care Sacrum / Surgical Wound   -Prepare clean surface and wash your hands with soap and water   -Cleanse with mild unscented soap and water  -Apply small amount of VASHE on gauze, to wound bed, for 10 minutes, remove gauze (do not rinse)  -Cut and Fluff 1  4\" dry AMD roll gauze into pockets at 3,6, 9 o'clock. May need to use straight scissor to tuck the gauze into the wound pockets, use one singular piece  -Cover with 1 5x9 ABD pad   Secure with 2 \" Medipore tape  Change twice a day and as needed for copious drainage    VAC order placed 7/18/24; please start VAC when approved  Wound care Sacrum: 3 times a week  Remove old dressing and ensure all black foam is removed  Cleanse wound with Vashe moist gauze, leave in place for 10 minutes; remove   Cleanse periwound skin with wound spray, pat dry and apply No Sting Barrier film.    Cut Black Foam like a cinnamon roll to fill pockets at 3, 6 and 9 o'clock and fill wound base  Bridge out to hip and alternate if possible hip to hip  Cover wound with VAC dressing tape to seal the foam, cut appropriate size opening for the TRAC pad.  Connect TRAC pad and connect to pump; NPWT -150 mmHg Continuous, ensure no leaks  Change canister when alarms full or weekly  Change dressing three times a week    Document on the outside of the dressing the number of sponges used and the date of the dressing  If dressing is compromised for greater than 2 hours then please remove entire dressing and change or tuck moist Vashe gauze into wound until new dressing can be applied. Use ostomy paste for divots and crevices as needed to maintain " seal.     Main Provider: Jayashree Zavala M.D. July 18, 2024    Call us at [phone] if you have any questions about your wounds, if you have redness or swelling around your wound, have a fever of 101 degrees Fahrenheit or greater or if you have any other problems or concerns. We answer the phone Monday through Friday 8 am to 4 pm, please leave a message as we check the voicemail frequently throughout the day. If you have a concern over the weekend, please leave a message and we will return your call Monday. If the need is urgent, go to the ER or urgent care.    If you had a positive experience please indicate that on your patient satisfaction survey form that [facility] will be sending you.  It was a pleasure meeting with you today.  Thank you for allowing me and my team the privilege of caring for you today.  YOU are the reason we are here, and I truly hope we provided you with the excellent service you deserve.  Please let us know if there is anything else we can do for you so that we can be sure you are leaving completely satisfied with your care experience.      If you have any billing related questions please call the [facility] Business office at [phone]. The clinic staff does not handle billing related matters.  If you are scheduled to have a follow up appointment, you will receive a reminder call the day before your visit. On the appointment day please arrive 15 minutes prior to your appointment time. If you are unable to keep that appointment, please call the clinic to cancel or reschedule. If you are more than 10 minutes late or greater for your scheduled appointment time, the clinic policy is that you may be asked to reschedule.

## 2024-07-24 ENCOUNTER — TELEPHONE (OUTPATIENT)
Dept: WOUND CARE | Facility: CLINIC | Age: 44
End: 2024-07-24
Payer: MEDICARE

## 2024-07-24 NOTE — TELEPHONE ENCOUNTER
[facility] VASCULAR [facility] CENTER    Who is the name of the provider?:  AVANI    What is the location you see this provider at/preferred location?: Wound Healing [facility]  Person calling / Facility: [facility]  Phone number:  [phone]   Nurse call back needed:  YES   Can we leave a detailed message on this number?  NA     Reason for call:  Need delay of care for wound vac to Friday, 7/ 26/24   Patient was offered appointment Thursday, 7/25/24 but patient has appointment so unavailable Thursday.    Pharmacy location:  NA

## 2024-07-24 NOTE — TELEPHONE ENCOUNTER
Returned call to [facility] and gave the ok for delay of care to Friday 7/26/24. No further questions or concerns.

## 2024-07-26 ENCOUNTER — TELEPHONE (OUTPATIENT)
Dept: WOUND CARE | Facility: CLINIC | Age: 44
End: 2024-07-26
Payer: MEDICARE

## 2024-07-26 NOTE — TELEPHONE ENCOUNTER
Annie from [facility] called, patient is supposed to have a wound vac M, W, and F but they don't have any orders for it.  Looking up to see if it was ordered by [facility].  Please fax updated orders to .

## 2024-08-01 ENCOUNTER — HOSPITAL ENCOUNTER (OUTPATIENT)
Dept: WOUND CARE | Facility: CLINIC | Age: 44
Discharge: HOME | End: 2024-08-01
Attending: SURGERY | Admitting: SURGERY
Payer: MEDICARE

## 2024-08-01 VITALS — HEART RATE: 108 BPM | SYSTOLIC BLOOD PRESSURE: 128 MMHG | TEMPERATURE: 96.4 F | DIASTOLIC BLOOD PRESSURE: 85 MMHG

## 2024-08-01 DIAGNOSIS — S81.802A OPEN WOUND OF LOWER LEG, LEFT, INITIAL ENCOUNTER: Primary | ICD-10-CM

## 2024-08-01 PROCEDURE — 99024 POSTOP FOLLOW-UP VISIT: CPT | Performed by: SURGERY

## 2024-08-01 PROCEDURE — 97602 WOUND(S) CARE NON-SELECTIVE: CPT

## 2024-08-01 NOTE — PROGRESS NOTES
Patient Active Problem List   Diagnosis    Removal of pin, plate, kristi, or screw    Open wound of lower leg, left, initial encounter    Pressure ulcer of sacral region, unspecified stage    Necrotizing fasciitis of lower leg (H)    S/P below knee amputation, left (H)    Adjustment disorder with mixed anxiety and depressed mood    Allergic rhinitis, unspecified    Anxiety    Band-shaped keratopathy    Burst fracture of thoracic vertebra (H)    Cataract    Chorioretinal scar    Chronic, continuous use of opioids    Colostomy present (H)    Decubitus ulcer of coccygeal region, stage 4 (H)    Decubitus ulcer of left ischial area, stage IV (H)    Pressure ulcer of coccygeal region, stage III (H)    Degeneration of intervertebral disc of lumbar region    Diplopia    Disorder of macula of retina    Compression fracture of vertebra (H)    Fracture of cervical vertebra (H)    Gastro-esophageal reflux disease without esophagitis    Posttraumatic stress disorder    History of DVT (deep vein thrombosis)    Hypertension    Insomnia    Iris adhesion, posterior    Mild episode of recurrent major depressive disorder (H24)    Neurogenic bladder    Narrowing of intervertebral disc space    Muscle weakness (generalized)    Neurogenic pain    Neuropathic pain of both legs    Numbness of hand    Open wound of buttock, right, subsequent encounter    Class 1 obesity with serious comorbidity and body mass index (BMI) of 32.0 to 32.9 in adult, unspecified obesity type    Osteopenia    Chronic pain disorder    Panuveitis    Paraplegia, unspecified (H)    Restless legs syndrome    Sacral osteomyelitis (H)    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    Sarcoidosis    Secondary hypocortisolism (H24)    Sinus tachycardia    Skin ulcer (H)    Vertebral subluxation complex    Vitamin D deficiency    Wound dehiscence, surgical, initial encounter    Cutaneous abscess, unspecified    Hernia, ventral    T12 spinal cord injury (H)    Parastomal hernia  without obstruction or gangrene    Infection of amputation stump of left lower extremity (H)    Sacral wound     Past Medical History:   Diagnosis Date    Brain injury with brief loss of consciousness (H) 12/14/2015    Candida esophagitis (H) 08/03/2022    Degenerative joint disease     Gastro-oesophageal reflux disease     Hypercalcemia 08/03/2022    Hypokalemia 08/03/2022    Hypomagnesemia 08/03/2022     Labs:   Recent Labs   Lab Test 06/04/24  0536 11/18/23  0805 11/16/23  0749 11/15/23  0552 09/07/23  0423 09/06/23  0815   ALBUMIN  --  3.6   < > 3.3*   < >  --    HGB 12.7* 13.9   < > 13.1*   < >  --    INR  --   --   --  1.06   < >  --    WBC 10.1 6.0   < > 6.1   < >  --    A1C  --   --   --   --   --  5.5    < > = values in this interval not displayed.     Nutrition requirements were discussed with patient today.  Vitals:  /85   Pulse 108   Temp (!) 96.4  F (35.8  C) (Temporal)   Wound:   Wound (used by OP [facility] only) 07/18/24 1505 sacral surgical (Active)   Thickness/Stage full thickness 08/01/24 1429   Base granulating 08/01/24 1429   Periwound intact 08/01/24 1429   Periwound Temperature warm 08/01/24 1429   Periwound Skin Turgor soft 08/01/24 1429   Edges open 08/01/24 1429   Length (cm) 3 08/01/24 1429   Width (cm) 4.4 08/01/24 1429   Depth (cm) 7.8 08/01/24 1429   Wound (cm^2) 13.2 cm^2 08/01/24 1429   Wound Volume (cm^3) 102.96 cm^3 08/01/24 1429   Wound healing % 12 08/01/24 1429   Tunneling [Depth (cm)/Location] 3o'/ 7cm, 6o'/ 6cm, 9o'/ 15cm 07/18/24 1505   Undermining [Depth (cm)/Location] 3-10 o'clock / 11.8cm 08/01/24 1429   Drainage Characteristics/Odor serous 08/01/24 1429   Drainage Amount large 08/01/24 1429   Care, Wound non-select wound debridement performed. 08/01/24 1429      Photo:         Further instructions from your care team         08/01/2024   Saqib Bishop   1980      Dressing changes outside of clinic are being performed by Home Care    Plan 08/01/2024   [facility]: : [phone] Fax: [phone]    Plan 08/01/2024  Can start sitting protocol IN BED ONLY. Once you get to an hour in the protocol have your wheelchair pressure mapped! Sitting protocol is listed below.     Important!!!:  After each sitting period/session, the wound or suture site must be checked by the nurse.  Any sign of pressure or damage (e.g. dehiscence of suture line, area of new drainage, poor capillary refill of flap) pushes the protocol back to point of no sign of damage.    All sitting protocols start with the patient IN BED flat on buttocks.    Sitting Protocol    1.  Begin sitting up in bed at 90 degrees (or as close to as possible) for 15 minutes, 3-4 times per day.  2.  At 1-2 days move to 30 minutes of sitting in bed, 3-4 times per day.  If OK wound check, then advance sitting period by 15 minutes the next day or two.   a)   To 45 minutes sitting in bed  b)   To 60 minutes sitting in bed  3.  When patient can tolerate sitting in bed for sessions of 60 minutes, get wheelchair cushion mapped (in both the seating position and also in the tilted position if your chair tilts)  Move the patient to the chair to sit, starting with sitting for 60 minutes in the chair, 3 times per day, checking the flap and incisions after each sitting session.  If OK wound check, then advance sitting period in the chair by 15 minutes the next day or two.  To 1 hour, 15 minutes sitting in the chair  To 1 hour, 30 minutes sitting in the chair  To 1 hour, 45 minutes sitting in the chair  To 2 hours sitting in the chair  5.   When tolerating 2 hour sitting sessions, proceed with usual regimen, in wheelchair, with appropriate off-loading techniques.            Remember: If at any point during the sitting protocol, either in bed or in chair, the wound changes for the worse, return to the previous time period that was tolerated.    Wound care Sacrum/surgical: 3 times a week (patient has video of how vac foam should  lay)  Remove old dressing and ensure all black foam is removed  Cleanse wound with Vashe moist gauze, leave in place for 10 minutes; remove  Cleanse periwound skin with wound spray, pat dry and apply No Sting Barrier film.  Cut Black Foam to fill pockets/ undermining from 3-10 o'clock. Utilize 2 pieces to fill the wound. Use one larger piece cut into a long strip and fan/place it like an accordion from 7-10 o'clock position and leave a tail at the opening. Then place a second smaller strip and layer that starting at 7/6 o'clock and working towards 3 o'clock then leave a tail at the opening. You should see two tails at the opening of the wound. Then utilize your third piece to make a bridge to the hip so you are not lying on the connector.   Bridge out to hip and alternate if possible hip to hip  Cover wound with VAC dressing tape to seal the foam, cut appropriate size opening  for the TRAC pad.  Connect TRAC pad and connect to pump; NPWT -150 mmHg Continuous, ensure no leaks  Change canister when alarms full or weekly  Change dressing three times a week  Document on the outside of the dressing the number of sponges used and the date  of the dressing  If dressing is compromised for greater than 2 hours then please remove entire  dressing and change or tuck moist Vashe gauze into wound until new dressing can be  applied. Use ostomy paste for divots and crevices as needed to maintain seal.       Main Provider: Jayashree Zavala M.D. August 1, 2024    Call us at [phone] if you have any questions about your wounds, if you have redness or swelling around your wound, have a fever of 101 degrees Fahrenheit or greater or if you have any other problems or concerns. We answer the phone Monday through Friday 8 am to 4 pm, please leave a message as we check the voicemail frequently throughout the day. If you have a concern over the weekend, please leave a message and we will return your call Monday. If the need is  urgent, go to the ER or urgent care.    If you had a positive experience please indicate that on your patient satisfaction survey form that [facility] will be sending you.    It was a pleasure meeting with you today.  Thank you for allowing me and my team the privilege of caring for you today.  YOU are the reason we are here, and I truly hope we provided you with the excellent service you deserve.  Please let us know if there is anything else we can do for you so that we can be sure you are leaving completely satisfied with your care experience.      If you have any billing related questions please call the [facility] Business office at [phone]. The clinic staff does not handle billing related matters.    If you are scheduled to have a follow up appointment, you will receive a reminder call the day before your visit. On the appointment day please arrive 15 minutes prior to your appointment time. If you are unable to keep that appointment, please call the clinic to cancel or reschedule. If you are more than 10 minutes late or greater for your scheduled appointment time, the clinic policy is that you may be asked to reschedule.         Main Provider: Jayashree Zavala M.D. August 1, 2024    Call us at [phone] if you have any questions about your wounds, if you have redness or swelling around your wound, have a fever of 101 degrees Fahrenheit or greater or if you have any other problems or concerns. We answer the phone Monday through Friday 8 am to 4 pm, please leave a message as we check the voicemail frequently throughout the day. If you have a concern over the weekend, please leave a message and we will return your call Monday. If the need is urgent, go to the ER or urgent care.    If you had a positive experience please indicate that on your patient satisfaction survey form that [facility] will be sending you.    It was a pleasure meeting with you today.  Thank you for allowing me and my  team the privilege of caring for you today.  YOU are the reason we are here, and I truly hope we provided you with the excellent service you deserve.  Please let us know if there is anything else we can do for you so that we can be sure you are leaving completely satisfied with your care experience.      If you have any billing related questions please call the [facility] Business office at [phone]. The clinic staff does not handle billing related matters.    If you are scheduled to have a follow up appointment, you will receive a reminder call the day before your visit. On the appointment day please arrive 15 minutes prior to your appointment time. If you are unable to keep that appointment, please call the clinic to cancel or reschedule. If you are more than 10 minutes late or greater for your scheduled appointment time, the clinic policy is that you may be asked to reschedule.

## 2024-08-01 NOTE — DISCHARGE INSTRUCTIONS
08/01/2024   Saqib Dario   1980      Dressing changes outside of clinic are being performed by Home Care    Plan 08/01/2024   [facility]: : [phone] Fax: [phone]    Plan 08/01/2024  Can start sitting protocol IN BED ONLY. Once you get to an hour in the protocol have your wheelchair pressure mapped! Sitting protocol is listed below.     Important!!!:  After each sitting period/session, the wound or suture site must be checked by the nurse.  Any sign of pressure or damage (e.g. dehiscence of suture line, area of new drainage, poor capillary refill of flap) pushes the protocol back to point of no sign of damage.    All sitting protocols start with the patient IN BED flat on buttocks.    Sitting Protocol    1.  Begin sitting up in bed at 90 degrees (or as close to as possible) for 15 minutes, 3-4 times per day.  2.  At 1-2 days move to 30 minutes of sitting in bed, 3-4 times per day.  If OK wound check, then advance sitting period by 15 minutes the next day or two.   a)   To 45 minutes sitting in bed  b)   To 60 minutes sitting in bed  3.  When patient can tolerate sitting in bed for sessions of 60 minutes, get wheelchair cushion mapped (in both the seating position and also in the tilted position if your chair tilts)  Move the patient to the chair to sit, starting with sitting for 60 minutes in the chair, 3 times per day, checking the flap and incisions after each sitting session.  If OK wound check, then advance sitting period in the chair by 15 minutes the next day or two.  To 1 hour, 15 minutes sitting in the chair  To 1 hour, 30 minutes sitting in the chair  To 1 hour, 45 minutes sitting in the chair  To 2 hours sitting in the chair  5.   When tolerating 2 hour sitting sessions, proceed with usual regimen, in wheelchair, with appropriate off-loading techniques.            Remember: If at any point during the sitting protocol, either in bed or in chair, the wound changes for the worse,  return to the previous time period that was tolerated.    Wound care Sacrum/surgical: 3 times a week (patient has video of how vac foam should lay)  Remove old dressing and ensure all black foam is removed  Cleanse wound with Vashe moist gauze, leave in place for 10 minutes; remove  Cleanse periwound skin with wound spray, pat dry and apply No Sting Barrier film.  Cut Black Foam to fill pockets/ undermining from 3-10 o'clock. Utilize 2 pieces to fill the wound. Use one larger piece cut into a long strip and fan/place it like an accordion from 7-10 o'clock position and leave a tail at the opening. Then place a second smaller strip and layer that starting at 7/6 o'clock and working towards 3 o'clock then leave a tail at the opening. You should see two tails at the opening of the wound. Then utilize your third piece to make a bridge to the hip so you are not lying on the connector.   Bridge out to hip and alternate if possible hip to hip  Cover wound with VAC dressing tape to seal the foam, cut appropriate size opening  for the TRAC pad.  Connect TRAC pad and connect to pump; NPWT -150 mmHg Continuous, ensure no leaks  Change canister when alarms full or weekly  Change dressing three times a week  Document on the outside of the dressing the number of sponges used and the date  of the dressing  If dressing is compromised for greater than 2 hours then please remove entire  dressing and change or tuck moist Vashe gauze into wound until new dressing can be  applied. Use ostomy paste for divots and crevices as needed to maintain seal.       Main Provider: Jayashree Zavala M.D. August 1, 2024    Call us at [phone] if you have any questions about your wounds, if you have redness or swelling around your wound, have a fever of 101 degrees Fahrenheit or greater or if you have any other problems or concerns. We answer the phone Monday through Friday 8 am to 4 pm, please leave a message as we check the voicemail  frequently throughout the day. If you have a concern over the weekend, please leave a message and we will return your call Monday. If the need is urgent, go to the ER or urgent care.    If you had a positive experience please indicate that on your patient satisfaction survey form that [facility] will be sending you.    It was a pleasure meeting with you today.  Thank you for allowing me and my team the privilege of caring for you today.  YOU are the reason we are here, and I truly hope we provided you with the excellent service you deserve.  Please let us know if there is anything else we can do for you so that we can be sure you are leaving completely satisfied with your care experience.      If you have any billing related questions please call the [facility] Business office at [phone]. The clinic staff does not handle billing related matters.    If you are scheduled to have a follow up appointment, you will receive a reminder call the day before your visit. On the appointment day please arrive 15 minutes prior to your appointment time. If you are unable to keep that appointment, please call the clinic to cancel or reschedule. If you are more than 10 minutes late or greater for your scheduled appointment time, the clinic policy is that you may be asked to reschedule.         Main Provider: Jayashree Zavala M.D. August 1, 2024    Call us at [phone] if you have any questions about your wounds, if you have redness or swelling around your wound, have a fever of 101 degrees Fahrenheit or greater or if you have any other problems or concerns. We answer the phone Monday through Friday 8 am to 4 pm, please leave a message as we check the voicemail frequently throughout the day. If you have a concern over the weekend, please leave a message and we will return your call Monday. If the need is urgent, go to the ER or urgent care.    If you had a positive experience please indicate that on your patient  satisfaction survey form that [facility] will be sending you.    It was a pleasure meeting with you today.  Thank you for allowing me and my team the privilege of caring for you today.  YOU are the reason we are here, and I truly hope we provided you with the excellent service you deserve.  Please let us know if there is anything else we can do for you so that we can be sure you are leaving completely satisfied with your care experience.      If you have any billing related questions please call the [facility] Business office at [phone]. The clinic staff does not handle billing related matters.    If you are scheduled to have a follow up appointment, you will receive a reminder call the day before your visit. On the appointment day please arrive 15 minutes prior to your appointment time. If you are unable to keep that appointment, please call the clinic to cancel or reschedule. If you are more than 10 minutes late or greater for your scheduled appointment time, the clinic policy is that you may be asked to reschedule.
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
## 2024-08-02 ENCOUNTER — TELEPHONE (OUTPATIENT)
Dept: WOUND CARE | Facility: CLINIC | Age: 44
End: 2024-08-02
Payer: MEDICARE

## 2024-08-02 NOTE — TELEPHONE ENCOUNTER
Return call to Patient and discussed wound condition. Patient was instructed by the Home care nurse to go to the ED.  Patient states that he is having severe pain especially when he turns side to side.    Instructed Patient to go to the ED. He will go to [place], [place].   Instructed to keep his Home care RN and [facility] informed of status.

## 2024-08-05 ENCOUNTER — TELEPHONE (OUTPATIENT)
Dept: WOUND CARE | Facility: CLINIC | Age: 44
End: 2024-08-05
Payer: MEDICARE

## 2024-08-05 NOTE — TELEPHONE ENCOUNTER
Discussed with patient who stated that the drainage was brown in the canister and looked like oatmeal. Stated that the canister also had an odor to it. Patient stated he does still have some pain but not as excruciating as the other day. Denies any fevers or chills. Discussed that the pain is concerning and that he should go in to the ED to be evaluated. Patient stated he will let his parents know and they will drive him to the ED.   
Patient called the clinic at the urging of his home care nurse, Nelly, due to brown drainage associated with the patient's wound. She is concerned and wondering if the patient should be seen sooner. She did report that patient's pain level is more manageable today (see T/E from 8/2/24)    Nelly prefers the patient to be contacted regarding this concern, but if needed she can be reached at [phone]  
n/a

## 2024-08-22 ENCOUNTER — OFFICE VISIT (OUTPATIENT)
Dept: WOUND CARE | Facility: CLINIC | Age: 44
End: 2024-08-22
Attending: SURGERY
Payer: MEDICARE

## 2024-08-22 DIAGNOSIS — Z93.3 COLOSTOMY PRESENT (H): Primary | ICD-10-CM

## 2024-08-22 DIAGNOSIS — K43.5 PARASTOMAL HERNIA WITHOUT OBSTRUCTION OR GANGRENE: ICD-10-CM

## 2024-08-22 PROCEDURE — 99024 POSTOP FOLLOW-UP VISIT: CPT

## 2024-08-22 NOTE — PROGRESS NOTES
"Pt arrive to clinic with a Colostomy for hernia belt measurement per dr Elisabet Mancilla    Subjective: Pt was diagnosed with a parastomal hernia and/or stoma prolapse    Objective: Girth sitting position:  58  \" ( wheelchair bound no other measurements taken )  Type of pouch: 70mm Elena  Ring measurement: C \"  Width of belt:  6 \"  Belt number: [id number]  pt didn't want the Beige since it was back ordered and would take longer to arrive. He gets medical supplies at [place] so he will bring the rx there to get it filled    Assessment and Plan: Recommend wearing the belt daily specifically when lifting or exercising. Place belt beind you when laying won and fasten over the hernia and pouch in reclined position    Monika Horton NP  was available for supervision of care if needed or if questions should arise and regarding plan of care.  Breann Way RN, CWON     "

## 2024-09-05 ENCOUNTER — TELEPHONE (OUTPATIENT)
Dept: WOUND CARE | Facility: CLINIC | Age: 44
End: 2024-09-05
Payer: MEDICARE

## 2024-09-05 NOTE — TELEPHONE ENCOUNTER
LVM with [facility] RN Nelly to call back regarding paperwork received from  for Excessive Supply Prescription. Need to know if they need extra Canisters or Dressings for the Patient on a monthly basis. Dr Zavala has signed the paperwork, please complete and fax back if needed.    Admitted

## 2024-10-25 ENCOUNTER — VIRTUAL VISIT (OUTPATIENT)
Dept: ENDOCRINOLOGY | Facility: CLINIC | Age: 44
End: 2024-10-25
Payer: MEDICARE

## 2024-10-25 VITALS — HEIGHT: 73 IN | BODY MASS INDEX: 37.37 KG/M2 | WEIGHT: 282 LBS

## 2024-10-25 DIAGNOSIS — E66.811 CLASS 1 OBESITY WITH SERIOUS COMORBIDITY AND BODY MASS INDEX (BMI) OF 32.0 TO 32.9 IN ADULT, UNSPECIFIED OBESITY TYPE: Primary | ICD-10-CM

## 2024-10-25 PROCEDURE — 99213 OFFICE O/P EST LOW 20 MIN: CPT | Mod: 95

## 2024-10-25 PROCEDURE — G2211 COMPLEX E/M VISIT ADD ON: HCPCS | Mod: 95

## 2024-10-25 ASSESSMENT — PATIENT HEALTH QUESTIONNAIRE - PHQ9: SUM OF ALL RESPONSES TO PHQ QUESTIONS 1-9: 17

## 2024-10-25 NOTE — PROGRESS NOTES
"Virtual Visit Details    Type of service:  Video Visit   Video Start Time: {video visit start/end time for provider to select:979764}  Video End Time:{video visit start/end time for provider to select:644749}    Originating Location (pt. Location): {video visit patient location:204395::\"Home\"}  {PROVIDER LOCATION On-site should be selected for visits conducted from your clinic location or adjoining [facility] hospital, academic office, or other nearby [facility] building. Off-site should be selected for all other provider locations, including home:662035}  Distant Location (provider location):  {virtual location provider:333728}  Platform used for Video Visit: {Virtual Visit Platforms:622246::\"[facility]\"}        "
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
## 2024-10-25 NOTE — NURSING NOTE
Current patient location: [address] [place] [id number]    Is the patient currently in the state of [place]? YES    Visit mode:VIDEO    If the visit is dropped, the patient can be reconnected by: VIDEO VISIT: Text to cell phone:   Telephone Information:   Mobile [phone]       Will anyone else be joining the visit? NO  (If patient encounters technical issues they should call [phone])    Are changes needed to the allergy or medication list? Pt stated no changes to allergies and Pt stated no med changes    Are refills needed on medications prescribed by this physician? YES    Reason for visit: RECHECK    Liseth Dupree VVF    High phq2&9, decline triage nurse, pt state they are seeing someone for depression already

## 2024-10-25 NOTE — PATIENT INSTRUCTIONS
"Greg Cerrato,  Thank you for allowing us the privilege of caring for you. We hope we provided you with the excellent service you deserve.   Please let us know if there is anything else we can do for you so that we can be sure you are completely satisfied with your care experience.    To ensure the quality of our services you may be receiving a patient satisfaction survey from an independent patient satisfaction monitoring company.    The greatest compliment you can give is a \"Likely to Recommend\"    Your visit was with Viv Traylor PA-C today.    Instructions per today's visit:     Start Ozempic 0.25mg once weekly for 4 weeks, then increase to 0.5mg once weekly for 4 weeks, then increase to 1.0mg once weekly   MTM pharmacist in 2 months  Viv Watts in 4 months     ___________________________________________________________________________  Important contact and scheduling information:  Please call our contact center at [phone] to schedule your next appointments.  For any nursing questions or concerns call Marie Blakely LPN at [phone] or Brissa Meraz RN at [phone]  Please call during clinic hours Monday through Friday 8:00a - 4:00p if you have questions or you can contact us via [employer] at anytime and we will reply during clinic hours.    Lab results will be communicated through My Chart or letter (if My Chart not used). Please call the clinic if you have not received communication after 1 week or if you have any questions.?  Clinic Fax: [phone]  __________________________________________________________________________    If labs were ordered today:    Please make an appointment to have them drawn at your convenience.     To schedule the Lab Appointment using [employer]:  Select \"Schedule an Appointment\"  Select \"Lab Only\"  For \"A couple of questions\", select \"Other\"  For \"Which locations work for you?, select the location and set up the appointment    To schedule by phone call [phone] to " schedule a lab only appointment at any [facility] lab.  ___________________________________________________________________________  Work with A Health !  Virtual Sessions are Available through [facility] Weight Management Clinics    To learn more, call to schedule a free, Health  Q&A appointment: [phone]     What is Health Coaching?  Do you know what you are supposed to do, but you just aren't doing it?  Then, HEALTH COACHING may help you!   Get unstuck and move forward with the support of a professionally trained NBC-HWC (National Board-Certified Health and ) who uses evidence-based approaches to help you move forward with healthy lifestyle changes in the areas of weight loss, stress management and overall well-being.    Health Coaches help you identify goals that will work best for you. Health Coaches provide support and encouragement with overcoming barriers and help you to find inspiration and motivation to lead a healthy lifestyle.    Option one:  Health Coaching 3-Pack; Three, 30-minute Health Coaching Visits, for $99  Visits are done virtually (phone or video)  This is a self pay service; we do not accept insurance for ernesto coaching.    Option two:   The 24 week Plan; 11 Health Coaching Visits, and a 7 months subscription to [employer]-- on-demand fitness, nutrition and mindfulness classes, for $499 (employee discounts may be available). Participants will also meet regularly with a weight management Medical Provider and a Registered/Licensed Dietician.  This is a self-pay service; we do not accept insurance for health coaching.    To Schedule a free Health  Q&A appointment to learn more,  call [phone].  ____________________________________________________________________  [facility]  Healthy Lifestyle Group    Healthy Lifestyle Group  This is a 60 minute virtual coaching group for those who want to lead a  "healthier lifestyle. Come together to set goals and overcome barriers in a supportive group environment. We will address the four pillars of health--nutrition, exercise, sleep and emotional well-being.  This group is highly recommended for those who are participating in the 24 week Healthy Lifestyle Plan and our Health Coaching sessions.    WHEN: This group meets the first Friday of the month, 12:30 PM - 1:30 PM online, via a zoom meeting.      FACILITATOR: Led by National Board Certified Health and , Shaniqua Chavez [facility].    TO REGISTER: Please call the Call Center at [phone] to register. You will get an appointment to attend in [facility]. Fifteen minutes prior to the meeting, complete the e-check in and you will get the link to join the meeting.  There is no charge to attend this group and space is limited.      2023 and 2024 Meeting Topics and Dates:    November 3: Introduction to Mindfulness (Learn simple and effective mindfulness practices and how it can benefit you)    December 8: Let's Talk (guided discussion on our wins and challenges)    January 5: New Years Vision: Manifest your Best 2024! (Guided imagery,  journaling and discussion)    February 2: Let's Talk    March 1: 10 Percent Happier by Shankar Montgomery (Book Bites; a guided discussion on the nuggets of wisdom from favorite wellness books; no need to read the book but highly encouraged)    April 5: Let's Talk    May 3: \"Essentialism; The Disciplined Pursuit of Less by Adam Saba (book bites discussion)    June 7: Let's Talk    July 5: NO MEETING, off for the 4th of July Holiday    August 2: The Blue Zones, Secrets for Living a Longer Life by Shankar Gilbert (book bites discussion)      If you would like bariatric surgery specific support group info please let your care team know.         Thank you,   [facility] Comprehensive Weight Management Team                          "
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
## 2024-10-28 ENCOUNTER — TELEPHONE (OUTPATIENT)
Dept: ENDOCRINOLOGY | Facility: CLINIC | Age: 44
End: 2024-10-28
Payer: MEDICARE

## 2024-10-28 NOTE — TELEPHONE ENCOUNTER
PA Initiation    Medication: OZEMPIC (0.25 OR 0.5 MG/DOSE) 2 [facility]  Insurance Company: [employer] - Phone [phone] Fax [phone]  Pharmacy Filling the Rx: [id number] - [place], [place] - [address]  Filling Pharmacy Phone: [phone]  Filling Pharmacy Fax: [phone]  Start Date: 10/28/2024

## 2024-10-28 NOTE — TELEPHONE ENCOUNTER
PRIOR AUTHORIZATION DENIED    Medication: OZEMPIC (0.25 OR 0.5 MG/DOSE) 2 MG/3ML SC [facility]  Insurance Company: [employer] - Phone [phone] Fax [phone]  Denial Date: 10/28/2024  Denial Reason(s):   Appeal Information:   Patient Notified: clinic to discuss with pt what they would like to do

## 2024-10-31 ENCOUNTER — HOSPITAL ENCOUNTER (OUTPATIENT)
Dept: WOUND CARE | Facility: CLINIC | Age: 44
Discharge: HOME | End: 2024-10-31
Attending: SURGERY | Admitting: SURGERY
Payer: MEDICARE

## 2024-10-31 ENCOUNTER — MEDICAL CORRESPONDENCE (OUTPATIENT)
Dept: HEALTH INFORMATION MANAGEMENT | Facility: CLINIC | Age: 44
End: 2024-10-31

## 2024-10-31 DIAGNOSIS — S81.802A OPEN WOUND OF LOWER LEG, LEFT, INITIAL ENCOUNTER: Primary | ICD-10-CM

## 2024-10-31 PROCEDURE — 11042 DBRDMT SUBQ TIS 1ST 20SQCM/<: CPT | Performed by: SURGERY

## 2024-10-31 NOTE — DISCHARGE INSTRUCTIONS
10/31/2024   Saqib ReyRosibelmary   1980    A DME order was not completed because the supplies are ordered by home care or at a care facility    Plan 10/31/2024     Dressing changes outside of clinic are being performed by Home Care     [facility]: : [phone] Fax: [phone]     Plan 10/31/2024  -Opened up the entrance your wound today using the BOVI.   -Home Care---make sure you are bridging out to the hip. If patient sits on the trac pad it can cause pressure and make the wound worse.     Wound care Sacrum/surgical: 3 times a week  Remove old dressing and ensure all black foam is removed  Cleanse wound with Vashe moist gauze, leave in place for 10 minutes; remove  Cleanse periwound skin with wound spray, pat dry and apply No Sting Barrier film.  Cut Black Foam to fill pockets/ tunnelin o'clock @8.5cm, 10'o'clock @ 6.5cm, and 4 o'clock @ 5cm. You may have to Utilize 2 pieces to fill the wound. Use one larger piece cut into a long strip and fan/place it like an accordion and leave a tail at the opening. You should see two tails at the opening of the wound if you utilize 2 piece to fill the tunnels. Then utilize your third piece to make a bridge to the hip so you are not lying on the connector.   Bridge out to hip and alternate if possible hip to hip  Cover wound with VAC dressing tape to seal the foam, cut appropriate size opening  for the TRAC pad.  Connect TRAC pad and connect to pump; NPWT -150 mmHg Continuous, ensure no leaks  Change canister when alarms full or weekly  Change dressing three times a week  Document on the outside of the dressing the number of sponges used and the date  of the dressing  If dressing is compromised for greater than 2 hours then please remove entire  dressing and change or tuck moist Vashe gauze into wound until new dressing can be  applied. Use ostomy paste for divots and crevices as needed to maintain seal.    Repositioning:  Bed: Reposition MINIMALLY every 1-2  hours in bed to relieve pressure and promote perfusion to tissue.  Chair: When up to the chair, sit on a chair cushion when up to the chair. Do not sit for longer than one hour total before returning to bed for at least 60 minutes to relieve pressure and promote perfusion to the tissue.  Completely recline/tilt for 15 minutes each hour.    A diet high in protein is important for wound healing, we recommend getting 90 grams of protein per day. Taking protein shakes or bars are a good way to get extra protein in your diet.     Good sources of protein:  Pork 26g per 3 oz  Whey protein powder - 24g per scoop (on average)  Greek yogurt - 23g per 8oz   Chicken or Turkey - 23g per 3oz  Fish - 20-25g per 3oz  Beef - 18-23g per 3oz  Tofu - 10g per 1/2 cup  Navy beans - 20g per cup  Cottage cheese - 14g per 1/2 cup   Lentils - 13g per 1/4 cup  Beef jerky 13g per 1oz  2% milk - 8g per cup  Peanut butter - 8g per 2 tablespoons  Eggs - 6g per egg  Mixed nuts - 6g per 2oz          Main Provider: Jayashree Zavala M.D. October 31, 2024    Call us at [phone] if you have any questions about your wounds, if you have redness or swelling around your wound, have a fever of 101 degrees Fahrenheit or greater or if you have any other problems or concerns. We answer the phone Monday through Friday 8 am to 4 pm, please leave a message as we check the voicemail frequently throughout the day. If you have a concern over the weekend, please leave a message and we will return your call Monday. If the need is urgent, go to the ER or urgent care.    If you had a positive experience please indicate that on your patient satisfaction survey form that [facility] will be sending you.    It was a pleasure meeting with you today.  Thank you for allowing me and my team the privilege of caring for you today.  YOU are the reason we are here, and I truly hope we provided you with the excellent service you deserve.  Please let us know if there  is anything else we can do for you so that we can be sure you are leaving completely satisfied with your care experience.      If you have any billing related questions please call the [facility] Business office at [phone]. The clinic staff does not handle billing related matters.    If you are scheduled to have a follow up appointment, you will receive a reminder call the day before your visit. On the appointment day please arrive 15 minutes prior to your appointment time. If you are unable to keep that appointment, please call the clinic to cancel or reschedule. If you are more than 10 minutes late or greater for your scheduled appointment time, the clinic policy is that you may be asked to reschedule.
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
## 2024-11-01 DIAGNOSIS — E66.811 CLASS 1 OBESITY WITH SERIOUS COMORBIDITY AND BODY MASS INDEX (BMI) OF 32.0 TO 32.9 IN ADULT, UNSPECIFIED OBESITY TYPE: Primary | ICD-10-CM

## 2024-11-01 RX ORDER — SEMAGLUTIDE 1 MG/.5ML
1 INJECTION, SOLUTION SUBCUTANEOUS WEEKLY
Qty: 2 ML | Refills: 1 | Status: SHIPPED | OUTPATIENT
Start: 2024-11-01

## 2024-11-01 RX ORDER — SEMAGLUTIDE 0.25 MG/.5ML
0.25 INJECTION, SOLUTION SUBCUTANEOUS WEEKLY
Qty: 2 ML | Refills: 0 | Status: SHIPPED | OUTPATIENT
Start: 2024-11-01

## 2024-11-01 RX ORDER — SEMAGLUTIDE 0.5 MG/.5ML
0.5 INJECTION, SOLUTION SUBCUTANEOUS WEEKLY
Qty: 2 ML | Refills: 0 | Status: SHIPPED | OUTPATIENT
Start: 2024-11-01

## 2024-11-01 NOTE — PROGRESS NOTES
Patient Active Problem List   Diagnosis    Removal of pin, plate, kristi, or screw    Open wound of lower leg, left, initial encounter    Pressure ulcer of sacral region, unspecified stage    Necrotizing fasciitis of lower leg (H)    S/P below knee amputation, left (H)    Adjustment disorder with mixed anxiety and depressed mood    Allergic rhinitis, unspecified    Anxiety    Band-shaped keratopathy    Burst fracture of thoracic vertebra (H)    Cataract    Chorioretinal scar    Chronic, continuous use of opioids    Colostomy present (H)    Decubitus ulcer of coccygeal region, stage 4 (H)    Decubitus ulcer of left ischial area, stage IV (H)    Pressure ulcer of coccygeal region, stage III (H)    Degeneration of intervertebral disc of lumbar region    Diplopia    Disorder of macula of retina    Compression fracture of vertebra (H)    Fracture of cervical vertebra (H)    Gastro-esophageal reflux disease without esophagitis    Posttraumatic stress disorder    History of DVT (deep vein thrombosis)    Hypertension    Insomnia    Iris adhesion, posterior    Mild episode of recurrent major depressive disorder (H)    Neurogenic bladder    Narrowing of intervertebral disc space    Muscle weakness (generalized)    Neurogenic pain    Neuropathic pain of both legs    Numbness of hand    Open wound of buttock, right, subsequent encounter    Class 1 obesity with serious comorbidity and body mass index (BMI) of 32.0 to 32.9 in adult, unspecified obesity type    Osteopenia    Chronic pain disorder    Panuveitis    Paraplegia, unspecified (H)    Restless legs syndrome    Sacral osteomyelitis (H)    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    Sarcoidosis    Secondary hypocortisolism (H)    Sinus tachycardia    Skin ulcer (H)    Vertebral subluxation complex    Vitamin D deficiency    Wound dehiscence, surgical, initial encounter    Cutaneous abscess, unspecified    Hernia, ventral    T12 spinal cord injury (H)    Parastomal hernia  without obstruction or gangrene    Infection of amputation stump of left lower extremity (H)    Sacral wound     Past Medical History:   Diagnosis Date    Brain injury with brief loss of consciousness (H) 12/14/2015    Candida esophagitis (H) 08/03/2022    Degenerative joint disease     Gastro-oesophageal reflux disease     Hypercalcemia 08/03/2022    Hypokalemia 08/03/2022    Hypomagnesemia 08/03/2022     Labs:   Recent Labs   Lab Test 06/04/24  0536 11/18/23  0805 11/16/23  0749 11/15/23  0552 09/07/23  0423 09/06/23  0815   ALBUMIN  --  3.6   < > 3.3*   < >  --    HGB 12.7* 13.9   < > 13.1*   < >  --    INR  --   --   --  1.06   < >  --    WBC 10.1 6.0   < > 6.1   < >  --    A1C  --   --   --   --   --  5.5    < > = values in this interval not displayed.     Nutrition requirements were discussed with patient today.  Vitals:  There were no vitals taken for this visit.  Wound:   Wound (used by OP [facility] only) 07/18/24 1505 sacral surgical (Active)   Thickness/Stage full thickness 10/31/24 1341   Base granulating 10/31/24 1341   Periwound intact 10/31/24 1341   Periwound Temperature warm 10/31/24 1341   Periwound Skin Turgor soft 10/31/24 1341   Edges open 10/31/24 1341   Length (cm) 2.5 10/31/24 1341   Width (cm) 3.5 10/31/24 1341   Depth (cm) 7 10/31/24 1341   Wound (cm^2) 8.75 cm^2 10/31/24 1341   Wound Volume (cm^3) 61.25 cm^3 10/31/24 1341   Wound healing % 41.67 10/31/24 1341   Tunneling [Depth (cm)/Location] 10o' / 6.5cm, 4o' / 5cm, 7o' / 8.5cm 10/31/24 1341   Undermining [Depth (cm)/Location] 3-10 o'clock / 11.8cm 08/01/24 1429   Drainage Characteristics/Odor serous 10/31/24 1341   Drainage Amount large 10/31/24 1341   Care, Wound debrided;other (see comments) 10/31/24 1341      Photo:         Further instructions from your care team         10/31/2024   Saqib Bishop   1980    A DME order was not completed because the supplies are ordered by home care or at a care facility    Plan 10/31/2024      Dressing changes outside of clinic are being performed by Home Care     [facility]: Ph: [phone] Fax: [phone]     Plan 10/31/2024  -Opened up the entrance your wound today using the BOVI.   -Home Care---make sure you are bridging out to the hip. If patient sits on the trac pad it can cause pressure and make the wound worse.     Wound care Sacrum/surgical: 3 times a week  Remove old dressing and ensure all black foam is removed  Cleanse wound with Vashe moist gauze, leave in place for 10 minutes; remove  Cleanse periwound skin with wound spray, pat dry and apply No Sting Barrier film.  Cut Black Foam to fill pockets/ tunnelin o'clock @8.5cm, 10'o'clock @ 6.5cm, and 4 o'clock @ 5cm. You may have to Utilize 2 pieces to fill the wound. Use one larger piece cut into a long strip and fan/place it like an accordion and leave a tail at the opening. You should see two tails at the opening of the wound if you utilize 2 piece to fill the tunnels. Then utilize your third piece to make a bridge to the hip so you are not lying on the connector.   Bridge out to hip and alternate if possible hip to hip  Cover wound with VAC dressing tape to seal the foam, cut appropriate size opening  for the TRAC pad.  Connect TRAC pad and connect to pump; NPWT -150 mmHg Continuous, ensure no leaks  Change canister when alarms full or weekly  Change dressing three times a week  Document on the outside of the dressing the number of sponges used and the date  of the dressing  If dressing is compromised for greater than 2 hours then please remove entire  dressing and change or tuck moist Vashe gauze into wound until new dressing can be  applied. Use ostomy paste for divots and crevices as needed to maintain seal.    Repositioning:  Bed: Reposition MINIMALLY every 1-2 hours in bed to relieve pressure and promote perfusion to tissue.  Chair: When up to the chair, sit on a chair cushion when up to the chair. Do not sit for longer  than one hour total before returning to bed for at least 60 minutes to relieve pressure and promote perfusion to the tissue.  Completely recline/tilt for 15 minutes each hour.    A diet high in protein is important for wound healing, we recommend getting 90 grams of protein per day. Taking protein shakes or bars are a good way to get extra protein in your diet.     Good sources of protein:  Pork 26g per 3 oz  Whey protein powder - 24g per scoop (on average)  Greek yogurt - 23g per 8oz   Chicken or Turkey - 23g per 3oz  Fish - 20-25g per 3oz  Beef - 18-23g per 3oz  Tofu - 10g per 1/2 cup  Navy beans - 20g per cup  Cottage cheese - 14g per 1/2 cup   Lentils - 13g per 1/4 cup  Beef jerky 13g per 1oz  2% milk - 8g per cup  Peanut butter - 8g per 2 tablespoons  Eggs - 6g per egg  Mixed nuts - 6g per 2oz          Main Provider: Jayashree Zavala M.D. October 31, 2024    Call us at [phone] if you have any questions about your wounds, if you have redness or swelling around your wound, have a fever of 101 degrees Fahrenheit or greater or if you have any other problems or concerns. We answer the phone Monday through Friday 8 am to 4 pm, please leave a message as we check the voicemail frequently throughout the day. If you have a concern over the weekend, please leave a message and we will return your call Monday. If the need is urgent, go to the ER or urgent care.    If you had a positive experience please indicate that on your patient satisfaction survey form that [facility] will be sending you.    It was a pleasure meeting with you today.  Thank you for allowing me and my team the privilege of caring for you today.  YOU are the reason we are here, and I truly hope we provided you with the excellent service you deserve.  Please let us know if there is anything else we can do for you so that we can be sure you are leaving completely satisfied with your care experience.      If you have any billing related  questions please call the [facility] office at [phone]. The clinic staff does not handle billing related matters.    If you are scheduled to have a follow up appointment, you will receive a reminder call the day before your visit. On the appointment day please arrive 15 minutes prior to your appointment time. If you are unable to keep that appointment, please call the clinic to cancel or reschedule. If you are more than 10 minutes late or greater for your scheduled appointment time, the clinic policy is that you may be asked to reschedule.

## 2024-11-04 ENCOUNTER — TELEPHONE (OUTPATIENT)
Dept: ENDOCRINOLOGY | Facility: CLINIC | Age: 44
End: 2024-11-04
Payer: MEDICARE

## 2024-11-04 NOTE — TELEPHONE ENCOUNTER
PRIOR AUTHORIZATION DENIED    Medication: WEGOVY 0.25 MG/0.5ML SC SOAJ  Insurance Company: [employer] - Phone [phone] Fax [phone]  Denial Date: 11/4/2024  Denial Reason(s):   Appeal Information:   Patient Notified: clinic to discuss with pt what they would like to do

## 2024-11-04 NOTE — TELEPHONE ENCOUNTER
PA Initiation    Medication: WEGOVY 0.25 MG/0.5ML SC SOAJ  Insurance Company: [employer] - Phone [phone] Fax [phone]  Pharmacy Filling the Rx: [id number] - [place], [place] - [address]   Filling Pharmacy Phone: [phone]  Filling Pharmacy Fax: [phone]  Start Date: 11/4/2024

## 2024-11-07 ENCOUNTER — PRE VISIT (OUTPATIENT)
Dept: SURGERY | Facility: CLINIC | Age: 44
End: 2024-11-07
Payer: MEDICARE

## 2024-11-07 NOTE — TELEPHONE ENCOUNTER
Patient Telephone Reminder Call    Date of call:  11/07/24  Phone numbers:  Cell number on file:    Telephone Information:   Mobile [phone]       Reached patient/confirmed appointment:  Yes  Appointment with:   Dr. Nii Mancilla  Reason for visit:  Parastomal hernia  Remind patient that this visit is a consultation only, NO procedure:  Yes

## 2024-11-08 ENCOUNTER — TELEPHONE (OUTPATIENT)
Dept: ENDOCRINOLOGY | Facility: CLINIC | Age: 44
End: 2024-11-08

## 2024-11-08 ENCOUNTER — VIRTUAL VISIT (OUTPATIENT)
Dept: SURGERY | Facility: CLINIC | Age: 44
End: 2024-11-08
Payer: MEDICARE

## 2024-11-08 VITALS — BODY MASS INDEX: 34.31 KG/M2 | WEIGHT: 260 LBS

## 2024-11-08 DIAGNOSIS — K43.5 PARASTOMAL HERNIA WITHOUT OBSTRUCTION OR GANGRENE: Primary | ICD-10-CM

## 2024-11-08 PROCEDURE — 99213 OFFICE O/P EST LOW 20 MIN: CPT | Mod: 95 | Performed by: SURGERY

## 2024-11-08 NOTE — NURSING NOTE
Chief Complaint   Patient presents with    Follow Up       Vitals:    11/08/24 0941   Weight: 260 lb       Body mass index is 34.31 kg/m .    Chepe HUGHES-P

## 2024-11-08 NOTE — LETTER
2024    Saqib Bishop   1980        To Whom it May Concern;    Please excuse Saqib Bishop from work/school for a healthcare visit on 2024.    Sincerely,        Nii Mancilla MD

## 2024-11-08 NOTE — TELEPHONE ENCOUNTER
PA Initiation    Medication: WEGOVY 0.25 MG/0.5ML SC SOAJ  Insurance Company: [employer] - Phone [phone] Fax [phone]  Pharmacy Filling the Rx: [id number] - [place], [place] - [address]   Filling Pharmacy Phone: [phone]  Filling Pharmacy Fax: [phone]  Start Date: 11/8/2024

## 2024-11-08 NOTE — PROGRESS NOTES
"Saqib is a 43 year old who is being evaluated via a billable video visit.    How would you like to obtain your AVS? MyChart  If the video visit is dropped, the invitation should be resent by: Text to cell phone: [phone]  Will anyone else be joining your video visit? No      Video-Visit Details    Type of service:  Video Visit   Video Start Time:10:09 AM  Video end time:  10:30 AM   Originating Location (pt. Location): Home    Distant Location (provider location):  On-site  Platform used for Video Visit: Ivana  Signed Electronically by: Nii Mancilla MD       >>>>>>>>>>>>>>>>>>>>>>>>>>>>>>>>>>>>>>>>>>>>>>    General Surgery Clinic Staff:    The patient is a 43 year old man who is being evaluated via video virtual visits.  The patient is a 43-year-old man who is well known to the general surgery service. The patient has a symptomatic peristomal hernia. The visit is scheduled as a \"video visit.\" The patient understood the limits of video visits. The patient describes  continued difficulty with the peristomal hernia due to the increased size. The patient uses an orthopedic bed to lift himself from the bed with a triangle bar. The patient continues to care for an open decubitus wound less than 3 cm in greatest dimension.      ROS: negative ten point ROS    PAST MEDICAL HISTORY:  Past Medical History:   Diagnosis Date    Brain injury with brief loss of consciousness (H) 12/14/2015    Candida esophagitis (H) 08/03/2022    Degenerative joint disease     Gastro-oesophageal reflux disease     Hypercalcemia 08/03/2022    Hypokalemia 08/03/2022    Hypomagnesemia 08/03/2022     PAST SURGICAL HISTORY:  Past Surgical History:   Procedure Laterality Date    AMPUTATE LEG BELOW KNEE Left 8/27/2023    Procedure: Left below knee Guillotine Amputation;  Surgeon: Sesar Barth MD;  Location: [facility]    AMPUTATE LEG BELOW KNEE Left 9/2/2023    Procedure: Irrigation and Debridement leg below knee, wound vac application, " Left;  Surgeon: Juan Rehman MD;  Location: [facility]    AMPUTATE LEG BELOW KNEE Left 9/7/2023    Procedure: Below Left Knee Amputation;  Surgeon: Semaj Gates MD;  Location: [facility]    BACK SURGERY      lumbar fusion    EXPLORE SPINE, REMOVE HARDWARE, COMBINED  1/13/2012    Procedure:COMBINED EXPLORE SPINE, REMOVE HARDWARE; EXPLORE SPINE, REMOVE HARDWARE L4-S1; Surgeon:FAM GONZALEZ; Location:[facility] OR    IR PICC PLACEMENT > 5 YRS OF AGE  10/10/2017    IR PICC PLACEMENT > 5 YRS OF AGE  4/9/2018    IRRIGATION AND DEBRIDEMENT DECUBITUS WITH FLAP CLOSURE, COMBINED Left 6/3/2024    Procedure: LEFT IRRIGATION AND DEBRIDEMENT, GLUTEAL FASCIOCUTANEOUS ROTATIONAL FLAP CLOSURE;  Surgeon: Ne Zavala MD;  Location: [facility]    IRRIGATION AND DEBRIDEMENT FOOT, COMBINED Left 11/8/2023    Procedure: LEFT BELOW KNEE AMPUTATION STUMP IRRIGATION AND DEBRIDEMENT, VANCOMYCIN BEAD PLACEMENT;  Surgeon: Etienne Maier MD;  [facility]    IRRIGATION AND DEBRIDEMENT LOWER EXTREMITY, COMBINED Left 8/30/2023    Procedure: Irrigation and debridement lower extremity, combined and wound vac exchange;  Surgeon: Etienne Maier MD;  Location: [facility] OR    IRRIGATION AND DEBRIDEMENT LOWER EXTREMITY, COMBINED Left 9/7/2023    Procedure: IRRIGATION AND DEBRIDEMENT, LEFT LOWER EXTREMITY WITH WOUND VAC Removal;  Surgeon: Semaj Gates MD;  [facility]    IRRIGATION AND DEBRIDEMENT LOWER EXTREMITY, COMBINED Left 11/15/2023    Procedure: IRRIGATION AND DEBRIDEMENT, LOWER EXTREMITY LEFT LEG, ANTIBIOTIC BEAD REMOVAL AND PRIMARY CLOSURE;  Surgeon: Etienne Maier MD;  Location: [facility]    ORTHOPEDIC SURGERY      right foot surgery     MEDICATIONS:  Current Outpatient Medications   Medication Sig Dispense Refill    acetaminophen (TYLENOL) 325 MG tablet Take 2 tablets (650 mg) by mouth every 4 hours as needed for other (For optimal non-opioid multimodal pain management to improve pain control.)      buPROPion 450 MG  TB24 Take 450 mg by mouth daily      busPIRone (BUSPAR) 10 MG tablet Take 1 tablet by mouth 2 times daily      clindamycin (CLEOCIN T) 1 % external solution Apply topically daily as needed (to face and scalp for acne and folliculutis)      Cranberry 400 MG CAPS Take 400 mg by mouth daily      cyclobenzaprine (FLEXERIL) 10 MG tablet Take 10 mg by mouth 3 times daily as needed for muscle spasms      DULoxetine (CYMBALTA) 60 MG capsule Take 60 mg by mouth 2 times daily      fluticasone (FLONASE) 50 MCG/ACT nasal spray Spray 1 spray into both nostrils daily      hydroxychloroquine (PLAQUENIL) 200 MG tablet Take 400 mg by mouth daily      hydrOXYzine (ATARAX) 25 MG tablet Take 1 tablet (25 mg) by mouth every 6 hours as needed for other (adjuvant pain)      loratadine (CLARITIN) 10 MG tablet Take 1 tablet by mouth daily      losartan-hydrochlorothiazide (HYZAAR) 50-12.5 MG tablet Take 0.5 tablets by mouth daily (One-half tablet)      melatonin 5 MG tablet Take 1 tablet (5 mg) by mouth nightly as needed for sleep      omeprazole (PRILOSEC) 40 MG DR capsule Take 80 mg by mouth daily before breakfast      Ostomy Supplies MISC 1 each daily. 1 each 11    Ostomy Supplies MISC 1 each daily. 20 each 11    oxyCODONE (OXYCONTIN) 20 MG 12 hr tablet Take 1 tablet (20 mg) by mouth every 12 hours 20 tablet 0    polyethylene glycol (MIRALAX) 17 GM/Dose powder Take 17 g by mouth 2 times daily as needed for constipation 510 g     polyethylene glycol-propylene glycol (SYSTANE ULTRA) 0.4-0.3 % SOLN ophthalmic solution Apply 1 drop to eye 4 times daily as needed for dry eyes      prednisoLONE acetate (PRED FORTE) 1 % ophthalmic suspension Place 1 drop into both eyes 2 times daily Per taper      pregabalin (LYRICA) 150 MG capsule Take 2 capsules (300 mg) by mouth 2 times daily      Semaglutide-Weight Management (WEGOVY) 0.25 MG/0.5ML pen Inject 0.25 mg subcutaneously once a week. For 4 weeks 2 mL 0    Semaglutide-Weight Management (WEGOVY)  0.5 MG/0.5ML pen Inject 0.5 mg subcutaneously once a week. For 4 weeks, after completing 4 weeks of 0.25mg dose 2 mL 0    Semaglutide-Weight Management (WEGOVY) 1 MG/0.5ML pen Inject 1 mg subcutaneously once a week. After completing 4 weeks of 0.5mg 2 mL 1    senna-docusate (SENOKOT-S/PERICOLACE) 8.6-50 MG tablet Take 1 tablet by mouth daily       No current facility-administered medications for this visit.     ALLERGIES:  Allergies   Allergen Reactions    Adhesive Tape Hives     From tape from surgery    Benzoin Hives and Rash    Droperidol Anaphylaxis    Vitamin D (Calciferol) Rash     flairs up his sarcoidosis     SOCIAL HISTORY:  Social History     Socioeconomic History    Marital status: Single   Tobacco Use    Smoking status: Former     Current packs/day: 0.00     Types: Cigarettes     Quit date: 2011     Years since quittin.0   Substance and Sexual Activity    Alcohol use: No    Drug use: No     Social Drivers of Health     Financial Resource Strain: Not on File (2023)    Received from [facility]    Financial Resource Strain     Financial Resource Strain: 0   Food Insecurity: Not on File (2024)    Received from [employer]    Food Insecurity     Food: 0   Transportation Needs: Not on File (2023)    Received from [facility]    Transportation Needs     Transportation: 0   Physical Activity: Not on File (2023)    Received from [facility]    Physical Activity     Physical Activity: 0   Recent Concern: Physical Activity - Inactive (2023)    [facility]    Exercise Vital Sign     Days of Exercise per Week: 0 days     Minutes of Exercise per Session: 0 min   Stress: Not on File (2023)    Received from [facility]    Stress     Stress: 0   Social Connections: Not on File (2024)    Received from [employer]    Social Connections     Connectedness: 0   Interpersonal Safety: Low Risk  (10/31/2024)    Interpersonal  Safety     Do you feel physically and emotionally safe where you currently live?: Yes     Within the past 12 months, have you been hit, slapped, kicked or otherwise physically hurt by someone?: No     Within the past 12 months, have you been humiliated or emotionally abused in other ways by your partner or ex-partner?: No   Housing Stability: Not on File (2023)    Received from [facility]    Housing Stability     Housin      Wt 117.9 kg (260 lb)   BMI 34.31 kg/m      A/P: The patient was reminded about possible obstructive symptoms or strangulation related to the peristomal hernia and the patient is knowledgeable about incarceration or stand strangulation. The patient will call return should he develop symptoms of incarceration obstruction of the peristomal hernia. The patient is to be reevaluated by the General Surgery service in the next 6 months.

## 2024-11-08 NOTE — PATIENT INSTRUCTIONS
You met with Dr. Nii Mancilla.      Today's visit instructions:    Return to the Surgery Clinic to see Dr. Mancilla on 2025 at 12 PM.    A CT scan has been arranged for you on 2025, at 11:20 AM.  Please arrive at the Imaging Center at 10:50 AM (main floor of the clinic building).     If you have questions please contact Yamilet RN or Ann RN during regular clinic hours, Monday through Friday 7:30 AM - 4:00 PM, or you can contact us via [facility] at anytime.       If you have urgent needs after-hours, weekends, or holidays please call the hospital at [phone] and ask to speak with our on-call General Surgery Team.    Appointment [phone]  Nurse Advice (Yamilet or Ann): [phone]   Surgery Scheduler (Zahida): [phone]  Fax: [phone]

## 2024-11-08 NOTE — LETTER
"11/8/2024       RE: Saqib Bishop  [id number]  [facility] [address]     Dear Colleague,    Thank you for referring your patient, Saqib Bishop, to the [facility] GENERAL SURGERY CLINIC [place] at [facility]. Please see a copy of my visit note below.    Saqib is a 43 year old who is being evaluated via a billable video visit.    How would you like to obtain your AVS? MyChart  If the video visit is dropped, the invitation should be resent by: Text to cell phone: [phone]  Will anyone else be joining your video visit? No  {If patient encounters technical issues they should call [phone] :[id number]}    Video-Visit Details    Type of service:  Video Visit   Video Start Time:10:09 AM  Video end time:  10:30 AM   Originating Location (pt. Location): Home  {PROVIDER LOCATION On-site should be selected for visits conducted from your clinic location or adjoining [facility] hospital, academic office, or other nearby [facility] building. Off-site should be selected for all other provider locations, including home:[id number]}  Distant Location (provider location):  On-site  Platform used for Video Visit: Ivana  Signed Electronically by: Nii Mancilla MD  {Email feedback regarding this note to [email or web address]   :[id number]}     >>>>>>>>>>>>>>>>>>>>>>>>>>>>>>>>>>>>>>>>>>>>>>    General Surgery Clinic Staff:    The patient is a 43 year old man who is being evaluated via video virtual visits.  The patient is a 43-year-old man who is well known to the general surgery service. The patient has a symptomatic peristomal hernia. The visit is scheduled as a \"video visit.\" The patient understood the limits of video visits. The patient describes  continued difficulty with the peristomal hernia due to the increased size. The patient uses an orthopedic bed to lift himself from the bed with a triangle bar. The patient continues to care for an open decubitus " wound less than 3 cm in greatest dimension.      ROS: negative ten point ROS    PAST MEDICAL HISTORY:  Past Medical History:   Diagnosis Date     Brain injury with brief loss of consciousness (H) 12/14/2015     Candida esophagitis (H) 08/03/2022     Degenerative joint disease      Gastro-oesophageal reflux disease      Hypercalcemia 08/03/2022     Hypokalemia 08/03/2022     Hypomagnesemia 08/03/2022     PAST SURGICAL HISTORY:  Past Surgical History:   Procedure Laterality Date     AMPUTATE LEG BELOW KNEE Left 8/27/2023    Procedure: Left below knee Guillotine Amputation;  Surgeon: Sesar Barth MD;  [facility]     AMPUTATE LEG BELOW KNEE Left 9/2/2023    Procedure: Irrigation and Debridement leg below knee, wound vac application, Left;  Surgeon: Juan Rehman MD;  [facility]     AMPUTATE LEG BELOW KNEE Left 9/7/2023    Procedure: Below Left Knee Amputation;  Surgeon: Semaj Gates MD;  [facility]     BACK SURGERY      lumbar fusion     EXPLORE SPINE, REMOVE HARDWARE, COMBINED  1/13/2012    Procedure:COMBINED EXPLORE SPINE, REMOVE HARDWARE; EXPLORE SPINE, REMOVE HARDWARE L4-S1; Surgeon:FAM GONZALEZ; Location:[facility] OR     IR PICC PLACEMENT > 5 YRS OF AGE  10/10/2017     IR PICC PLACEMENT > 5 YRS OF AGE  4/9/2018     IRRIGATION AND DEBRIDEMENT DECUBITUS WITH FLAP CLOSURE, COMBINED Left 6/3/2024    Procedure: LEFT IRRIGATION AND DEBRIDEMENT, GLUTEAL FASCIOCUTANEOUS ROTATIONAL FLAP CLOSURE;  Surgeon: Ne Zavala MD;  [facility]     IRRIGATION AND DEBRIDEMENT FOOT, COMBINED Left 11/8/2023    Procedure: LEFT BELOW KNEE AMPUTATION STUMP IRRIGATION AND DEBRIDEMENT, VANCOMYCIN BEAD PLACEMENT;  Surgeon: Etienne Maier MD;  [facility]     IRRIGATION AND DEBRIDEMENT LOWER EXTREMITY, COMBINED Left 8/30/2023    Procedure: Irrigation and debridement lower extremity, combined and wound vac exchange;  Surgeon: Etienne Maier MD;  Location: [facility]     IRRIGATION AND  DEBRIDEMENT LOWER EXTREMITY, COMBINED Left 9/7/2023    Procedure: IRRIGATION AND DEBRIDEMENT, LEFT LOWER EXTREMITY WITH WOUND VAC Removal;  Surgeon: Semaj Gates MD;  [facility]     IRRIGATION AND DEBRIDEMENT LOWER EXTREMITY, COMBINED Left 11/15/2023    Procedure: IRRIGATION AND DEBRIDEMENT, LOWER EXTREMITY LEFT LEG, ANTIBIOTIC BEAD REMOVAL AND PRIMARY CLOSURE;  Surgeon: Etienne Maier MD;  [facility]     ORTHOPEDIC SURGERY      right foot surgery     MEDICATIONS:  Current Outpatient Medications   Medication Sig Dispense Refill     acetaminophen (TYLENOL) 325 MG tablet Take 2 tablets (650 mg) by mouth every 4 hours as needed for other (For optimal non-opioid multimodal pain management to improve pain control.)       buPROPion 450 MG TB24 Take 450 mg by mouth daily       busPIRone (BUSPAR) 10 MG tablet Take 1 tablet by mouth 2 times daily       clindamycin (CLEOCIN T) 1 % external solution Apply topically daily as needed (to face and scalp for acne and folliculutis)       Cranberry 400 MG CAPS Take 400 mg by mouth daily       cyclobenzaprine (FLEXERIL) 10 MG tablet Take 10 mg by mouth 3 times daily as needed for muscle spasms       DULoxetine (CYMBALTA) 60 MG capsule Take 60 mg by mouth 2 times daily       fluticasone (FLONASE) 50 MCG/ACT nasal spray Spray 1 spray into both nostrils daily       hydroxychloroquine (PLAQUENIL) 200 MG tablet Take 400 mg by mouth daily       hydrOXYzine (ATARAX) 25 MG tablet Take 1 tablet (25 mg) by mouth every 6 hours as needed for other (adjuvant pain)       loratadine (CLARITIN) 10 MG tablet Take 1 tablet by mouth daily       losartan-hydrochlorothiazide (HYZAAR) 50-12.5 MG tablet Take 0.5 tablets by mouth daily (One-half tablet)       melatonin 5 MG tablet Take 1 tablet (5 mg) by mouth nightly as needed for sleep       omeprazole (PRILOSEC) 40 MG DR capsule Take 80 mg by mouth daily before breakfast       Ostomy Supplies MISC 1 each daily. 1 each 11      Ostomy Supplies MISC 1 each daily. 20 each 11     oxyCODONE (OXYCONTIN) 20 MG 12 hr tablet Take 1 tablet (20 mg) by mouth every 12 hours 20 tablet 0     polyethylene glycol (MIRALAX) 17 GM/Dose powder Take 17 g by mouth 2 times daily as needed for constipation 510 g      polyethylene glycol-propylene glycol (SYSTANE ULTRA) 0.4-0.3 % SOLN ophthalmic solution Apply 1 drop to eye 4 times daily as needed for dry eyes       prednisoLONE acetate (PRED FORTE) 1 % ophthalmic suspension Place 1 drop into both eyes 2 times daily Per taper       pregabalin (LYRICA) 150 MG capsule Take 2 capsules (300 mg) by mouth 2 times daily       Semaglutide-Weight Management (WEGOVY) 0.25 MG/0.5ML pen Inject 0.25 mg subcutaneously once a week. For 4 weeks 2 mL 0     Semaglutide-Weight Management (WEGOVY) 0.5 MG/0.5ML pen Inject 0.5 mg subcutaneously once a week. For 4 weeks, after completing 4 weeks of 0.25mg dose 2 mL 0     Semaglutide-Weight Management (WEGOVY) 1 MG/0.5ML pen Inject 1 mg subcutaneously once a week. After completing 4 weeks of 0.5mg 2 mL 1     senna-docusate (SENOKOT-S/PERICOLACE) 8.6-50 MG tablet Take 1 tablet by mouth daily       No current facility-administered medications for this visit.     ALLERGIES:  Allergies   Allergen Reactions     Adhesive Tape Hives     From tape from surgery     Benzoin Hives and Rash     Droperidol Anaphylaxis     Vitamin D (Calciferol) Rash     flairs up his sarcoidosis     SOCIAL HISTORY:  Social History     Socioeconomic History     Marital status: Single   Tobacco Use     Smoking status: Former     Current packs/day: 0.00     Types: Cigarettes     Quit date: 2011     Years since quittin.0   Substance and Sexual Activity     Alcohol use: No     Drug use: No     Social Drivers of Health     Financial Resource Strain: Not on File (2023)    Received from [facility]    Financial Resource Strain      Financial Resource Strain: 0   Food Insecurity: Not on File (2024)     Received from [facility]    Food Insecurity      Food: 0   Transportation Needs: Not on File (2023)    Received from [facility]    Transportation Needs      Transportation: 0   Physical Activity: Not on File (2023)    Received from [facility]    Physical Activity      Physical Activity: 0   Recent Concern: Physical Activity - Inactive (2023)    [facility]    Exercise Vital Sign      Days of Exercise per Week: 0 days      Minutes of Exercise per Session: 0 min   Stress: Not on File (2023)    Received from [facility]    Stress      Stress: 0   Social Connections: Not on File (2024)    Received from [facility]    Social Connections      Connectedness: 0   Interpersonal Safety: Low Risk  (10/31/2024)    Interpersonal Safety      Do you feel physically and emotionally safe where you currently live?: Yes      Within the past 12 months, have you been hit, slapped, kicked or otherwise physically hurt by someone?: No      Within the past 12 months, have you been humiliated or emotionally abused in other ways by your partner or ex-partner?: No   Housing Stability: Not on File (2023)    Received from [facility]    Housing Stability      Housin      Wt 117.9 kg (260 lb)   BMI 34.31 kg/m      A/P: The patient was reminded about possible obstructive symptoms or strangulation related to the peristomal hernia and the patient is knowledgeable about incarceration or stand strangulation. The patient will call return should he develop symptoms of incarceration obstruction of the peristomal hernia. The patient is to be reevaluated by the General Surgery service in the next 6 months.        Again, thank you for allowing me to participate in the care of your patient.      Sincerely,    Nii Mancilla MD

## 2024-11-08 NOTE — TELEPHONE ENCOUNTER
Prior Authorization Approval    Medication: WEGOVY 0.25 MG/0.5ML SC SOAJ  Authorization Effective Date: 11/8/2024  Authorization Expiration Date: 5/7/2025  Approved Dose/Quantity: 1 month  Reference #: [id number]   Insurance Company: [employer] - Phone [phone] Fax [phone]  Expected CoPay: $    CoPay Card Available:      Financial Assistance Needed:    Which Pharmacy is filling the prescription: [facility] [address] [place] [address], [place] - [facility] [address]   Pharmacy Notified: order sent to pharmacy  Patient Notified: kuhsal message sent to patient

## 2024-11-08 NOTE — TELEPHONE ENCOUNTER
PA Approved for Wegovy. Order sent to pharmacy and [employer] message sent to patient. Closing encounter

## 2024-11-12 ENCOUNTER — TELEPHONE (OUTPATIENT)
Dept: ENDOCRINOLOGY | Facility: CLINIC | Age: 44
End: 2024-11-12
Payer: MEDICARE

## 2024-11-12 NOTE — TELEPHONE ENCOUNTER
Patient confirmed scheduled appointment:     Date: 4/15/25  Time: 2 PM  Visit type: Return Weight Management  Visit mode: Virtual Visit  Provider:  Viv Traylor PA-C  Location: [facility]    Additional Notes: Pt confirmed will be in [place] for appt

## 2024-11-19 ENCOUNTER — VIRTUAL VISIT (OUTPATIENT)
Dept: PHARMACY | Facility: CLINIC | Age: 44
End: 2024-11-19
Payer: MEDICARE

## 2024-11-19 VITALS — HEIGHT: 72 IN | BODY MASS INDEX: 35.21 KG/M2 | WEIGHT: 260 LBS

## 2024-11-19 DIAGNOSIS — E66.813 CLASS 3 SEVERE OBESITY DUE TO EXCESS CALORIES IN ADULT, UNSPECIFIED BMI, UNSPECIFIED WHETHER SERIOUS COMORBIDITY PRESENT (H): Primary | ICD-10-CM

## 2024-11-19 DIAGNOSIS — E66.01 CLASS 3 SEVERE OBESITY DUE TO EXCESS CALORIES IN ADULT, UNSPECIFIED BMI, UNSPECIFIED WHETHER SERIOUS COMORBIDITY PRESENT (H): Primary | ICD-10-CM

## 2024-11-19 ASSESSMENT — PAIN SCALES - GENERAL: PAINLEVEL_OUTOF10: SEVERE PAIN (6)

## 2024-11-19 NOTE — NURSING NOTE
Current patient location: [address] [place], [place] [address] [address]    Is the patient currently in the state of [place]? YES    Visit mode:TELEPHONE    If the visit is dropped, the patient can be reconnected by:TELEPHONE VISIT: Phone number:   Telephone Information:   Mobile [phone]       Will anyone else be joining the visit? NO  (If patient encounters technical issues they should call [phone])    Are changes needed to the allergy or medication list? Yes Pt states Lyrica is 4x daily.    Are refills needed on medications prescribed by this physician? Discuss with provider    Rooming Documentation:  Questionnaire(s) not pre-assigned    Reason for visit: Medication Therapy Management    Pt states 6/10 low/mid back pain and back of leg pain chronic today.    Anthony LIMAF

## 2024-11-19 NOTE — PATIENT INSTRUCTIONS
"Recommendations from today's consult with [place] pharmacist                                                         Plan:  1. Wegovy was approved. Tell [facility] to rerun RX as it is now approved. Start Wegovy 0.25 mg weekly 4 week then if tolerating increase to 0.5 mg weekly thereafter   2. Follow up with Lauren Bloch [facility] pharmacist 1/17/2025 to do comprehensive review of medications and check in to see how things are going on the Wegovy.   3. Reach out via INX[employer] in the mean time if any issues with the Wegovy.     Lauren Bloch, PharmD, [facility]   Medication Therapy Management Pharmacist   [facility]    It was great speaking with you today.  I value your experience and would be very thankful for your time in providing feedback in our clinic survey. In the next few days, you may receive an email or text message from [employer] with a link to a survey related to your  clinical pharmacist.\"     To schedule another [facility] appointment, please call the clinic directly or you may call the MTM scheduling line at [phone] or toll-free at [phone].     My Clinical Pharmacist's contact information:                                                      Please feel free to contact me with any questions or concerns you have.      Lauren Bloch, PharmD  Medication Therapy Management Pharmacist   [facility]      Wegovy Dosing:   Start Wegovy: It is a subcutaneous injection that you inject once weekly and titrate the dose slowly over time. Make sure inject the same time each day of the week, for example Monday evenings.  Each pen is single use.  In each prescription, you will get 4 pens in each box.  You will need a new prescription for each strength of the medication.  Week 1-4: Inject 0.25 mg once weekly  Week 5-8: If tolerating, increase to 0.5 mg once weekly  Week 9-12: If tolerating, increase to 1 mg once weekly  Week 13-15: If " tolerating, increase to 1.7 mg once weekly  Week 16 & on: If tolerating, increase to 2.4 mg once weekly  *If you are having some nausea or other side effects to where you are hesitant to move up to the next dose, stay at the same dose you are on for an additional week to see if side effect(s) improves/resolves. Make sure to take this time to hydrate and ensure you are drinking at least 64 oz water per day.  If you are wanting to stay at the same dose and do not have additional refills on that prescription please reach out to the clinic.    The goal is to get to a dose that is well tolerated and effective for you. You do not have to go up on the dose each month or get to maximum dose if getting an effective response with minimal side effects.     Wegovy Administration Video: Video that was created by the .  https://www.Blurb.com/watch?v=[email or web address]    Wegovy Storage and Stability:   Make sure that when you get the prescription that you store the prescription in the refrigerator until it is time to use the Wegovy pen.  Once it is time to use the Wegovy pen, you can keep the pen at room temperature and it is good for up to 28 days at room temperature.     Wegovy Common Side Effects:   Nausea, diarrhea, constipation, headache, tiredness (fatigue), dizziness, stomach upset/pain. Less commonly, Wegovy can cause low blood sugar (symptoms: shaky, dizzy, sweaty, agitation). Please reach out to the care team should you feel like this is occurring. It is important to ensure that you are eating consistent meals and not skipping meals. Ensure you are getting at least 64 oz water daily.

## 2024-11-19 NOTE — PROGRESS NOTES
Clinical Pharmacy Consult:                                                    Saqib Bishop is a 43 year old male called for a clinical pharmacist consult.  He was referred to me from Viv Traylor PA-C.     Reason for Consult: Wegovy pen teaching     Discussion: Wegovy approved recently. Patient reports when he talked to [facility] pharmacy today they were saying still not approved. Told patient of Prior Authorization approval data en Prior Authorization #. Offered to call [facility] pharmacy to assist, patient declined as he has to call today again anyhow. Completed teaching of administration of Wegovy. Discussed mechanism of action, side effects, warnings, and efficacy. Discussed appropriate storage and stability. Answered patient questions. Discussed better then to follow up after he had started Wegovy, so scheduled comprehensive review of medications for Jan to check in.     Plan:  1. Wegovy was approved. Tell [facility] to rerun RX as it is now approved. Start Wegovy 0.25 mg weekly 4 week then if tolerating increase to 0.5 mg weekly thereafter   2. Follow up with Lauren Bloch [facility] pharmacist 1/17/2025 to do comprehensive review of medications and check in to see how things are going on the Wegovy.   3. Reach out via [employer] in the mean time if any issues with the Wegovy.     Lauren Bloch, PharmD, BCACP   Medication Therapy Management Pharmacist   [facility]

## 2024-12-09 ENCOUNTER — TELEPHONE (OUTPATIENT)
Dept: PHARMACY | Facility: CLINIC | Age: 44
End: 2024-12-09
Payer: MEDICARE

## 2024-12-09 DIAGNOSIS — E66.811 CLASS 1 OBESITY WITH SERIOUS COMORBIDITY AND BODY MASS INDEX (BMI) OF 32.0 TO 32.9 IN ADULT, UNSPECIFIED OBESITY TYPE: ICD-10-CM

## 2024-12-09 NOTE — TELEPHONE ENCOUNTER
Greg Cruz,    This patient was informed the pharmacy will not fill his Wegovy even though it was approved. He'd like a call back as soon as possible to discuss this. His call back number is [phone].     Thank you,  EARL Graham

## 2024-12-10 RX ORDER — SEMAGLUTIDE 0.5 MG/.5ML
0.5 INJECTION, SOLUTION SUBCUTANEOUS WEEKLY
Qty: 2 ML | Refills: 0 | Status: SHIPPED | OUTPATIENT
Start: 2024-12-10

## 2024-12-10 RX ORDER — SEMAGLUTIDE 0.25 MG/.5ML
0.25 INJECTION, SOLUTION SUBCUTANEOUS WEEKLY
Qty: 2 ML | Refills: 0 | Status: SHIPPED | OUTPATIENT
Start: 2024-12-10

## 2024-12-10 RX ORDER — SEMAGLUTIDE 1 MG/.5ML
1 INJECTION, SOLUTION SUBCUTANEOUS WEEKLY
Qty: 2 ML | Refills: 1 | Status: SHIPPED | OUTPATIENT
Start: 2024-12-10

## 2024-12-10 NOTE — TELEPHONE ENCOUNTER
Sending RX over to [facility] per patient request to fill wegovy at 0.25 mg and put other doses on hold. He is aware of current shortage of 0.5 mg and 1 mg doses.     Lauren Bloch, PharmD, BCACP   Medication Therapy Management Pharmacist   [facility]

## 2024-12-19 ENCOUNTER — MEDICAL CORRESPONDENCE (OUTPATIENT)
Dept: HEALTH INFORMATION MANAGEMENT | Facility: CLINIC | Age: 44
End: 2024-12-19
Payer: MEDICARE

## 2025-01-02 ENCOUNTER — HOSPITAL ENCOUNTER (OUTPATIENT)
Dept: WOUND CARE | Facility: CLINIC | Age: 45
End: 2025-01-02
Attending: SURGERY
Payer: MEDICARE

## 2025-01-02 VITALS — SYSTOLIC BLOOD PRESSURE: 99 MMHG | DIASTOLIC BLOOD PRESSURE: 65 MMHG | TEMPERATURE: 98.6 F | HEART RATE: 96 BPM

## 2025-01-02 DIAGNOSIS — S81.802A OPEN WOUND OF LOWER LEG, LEFT, INITIAL ENCOUNTER: Primary | ICD-10-CM

## 2025-01-02 DIAGNOSIS — L89.154 PRESSURE INJURY OF SACRAL REGION, STAGE 4 (H): ICD-10-CM

## 2025-01-02 PROCEDURE — 11042 DBRDMT SUBQ TIS 1ST 20SQCM/<: CPT | Performed by: SURGERY

## 2025-01-02 NOTE — PROGRESS NOTES
Patient Active Problem List   Diagnosis    Removal of pin, plate, kristi, or screw    Open wound of lower leg, left, initial encounter    Pressure ulcer of sacral region, unspecified stage    Necrotizing fasciitis of lower leg (H)    S/P below knee amputation, left (H)    Adjustment disorder with mixed anxiety and depressed mood    Allergic rhinitis, unspecified    Anxiety    Band-shaped keratopathy    Burst fracture of thoracic vertebra (H)    Cataract    Chorioretinal scar    Chronic, continuous use of opioids    Colostomy present (H)    Decubitus ulcer of coccygeal region, stage 4 (H)    Decubitus ulcer of left ischial area, stage IV (H)    Pressure ulcer of coccygeal region, stage III (H)    Degeneration of intervertebral disc of lumbar region    Diplopia    Disorder of macula of retina    Compression fracture of vertebra (H)    Fracture of cervical vertebra (H)    Gastro-esophageal reflux disease without esophagitis    Posttraumatic stress disorder    History of DVT (deep vein thrombosis)    Hypertension    Insomnia    Iris adhesion, posterior    Mild episode of recurrent major depressive disorder (H)    Neurogenic bladder    Narrowing of intervertebral disc space    Muscle weakness (generalized)    Neurogenic pain    Neuropathic pain of both legs    Numbness of hand    Open wound of buttock, right, subsequent encounter    Class 1 obesity with serious comorbidity and body mass index (BMI) of 32.0 to 32.9 in adult, unspecified obesity type    Osteopenia    Chronic pain disorder    Panuveitis    Paraplegia, unspecified (H)    Restless legs syndrome    Sacral osteomyelitis (H)    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    Sarcoidosis    Secondary hypocortisolism (H)    Sinus tachycardia    Skin ulcer (H)    Vertebral subluxation complex    Vitamin D deficiency    Wound dehiscence, surgical, initial encounter    Cutaneous abscess, unspecified    Hernia, ventral    T12 spinal cord injury (H)    Parastomal hernia  without obstruction or gangrene    Infection of amputation stump of left lower extremity (H)    Sacral wound     Past Medical History:   Diagnosis Date    Brain injury with brief loss of consciousness (H) 12/14/2015    Candida esophagitis (H) 08/03/2022    Degenerative joint disease     Gastro-oesophageal reflux disease     Hypercalcemia 08/03/2022    Hypokalemia 08/03/2022    Hypomagnesemia 08/03/2022     Labs:   Recent Labs   Lab Test 06/04/24  0536 11/18/23  0805 11/16/23  0749 11/15/23  0552 09/07/23  0423 09/06/23  0815   ALBUMIN  --  3.6   < > 3.3*   < >  --    HGB 12.7* 13.9   < > 13.1*   < >  --    INR  --   --   --  1.06   < >  --    WBC 10.1 6.0   < > 6.1   < >  --    A1C  --   --   --   --   --  5.5    < > = values in this interval not displayed.     Nutrition requirements were discussed with patient today.  Vitals:  BP 99/65 (BP Location: Right arm, Patient Position: Left side, Cuff Size: Adult Regular)   Pulse 96   Temp 98.6  F (37  C) (Temporal)   Wound:   Wound (used by OP [facility] only) 07/18/24 1505 sacral surgical (Active)   Thickness/Stage full thickness 01/02/25 1345   Base granulating 01/02/25 1345   Periwound intact 01/02/25 1345   Periwound Temperature warm 01/02/25 1345   Periwound Skin Turgor soft 01/02/25 1345   Edges open 01/02/25 1345   Length (cm) 2.5 01/02/25 1345   Width (cm) 3.4 01/02/25 1345   Depth (cm) 7.2 01/02/25 1345   Wound (cm^2) 8.5 cm^2 01/02/25 1345   Wound Volume (cm^3) 61.2 cm^3 01/02/25 1345   Wound healing % 43.33 01/02/25 1345   Tunneling [Depth (cm)/Location] 6.6cm/4oclock 01/02/25 1345   Undermining [Depth (cm)/Location] 7cm/7-9oclock 01/02/25 1345   Drainage Characteristics/Odor serosanguineous 01/02/25 1345   Drainage Amount large 01/02/25 1345   Care, Wound debrided 01/02/25 1345      Photo:         Further instructions from your care team         01/02/2025   Saqib Bishop   1980    A DME order was not completed because the supplies are ordered by home care  or at a care facility    Dressing changes outside of clinic are being performed by Home Care  [facility]: Ph: [phone] Fax: [phone]    Plan 01/02/2025   -Home Care: make sure you are bridging out to the hip. If patient sits on the trac pad it can cause pressure and make the wound worse.   - Today we used the Bovy to widen the opening of your wound     Wound care Sacrum/surgical: 3 times a week  Remove old dressing and ensure all black foam is removed  Cleanse wound with Vashe moist gauze, leave in place for 10 minutes; remove  Cleanse periwound skin with wound spray, pat dry and apply No Sting Barrier film.  Cut Black Foam to fill pockets/ tunneling: undermining from 7-9 o'clock 7cm, and 4 o'clock 6.6cm. You may have to Utilize 2 pieces to fill the wound. Use one larger piece cut into a long strip and fan/place it like an accordion and leave a tail at the opening. You should see two tails at the opening of the wound if you utilize 2 piece to fill the tunnels. Then utilize your third piece to make a bridge to the hip so you are not lying on the connector.   Bridge out to hip and alternate if possible hip to hip  Cover wound with VAC dressing tape to seal the foam, cut appropriate size opening  for the TRAC pad.  Connect TRAC pad and connect to pump; NPWT -150 mmHg Continuous, ensure no leaks  Change canister when alarms full or weekly  Change dressing three times a week    Document on the outside of the dressing the number of sponges used and the date  of the dressing  If dressing is compromised for greater than 2 hours then please remove entire  dressing and change or tuck moist Vashe gauze into wound until new dressing can be  applied. Use ostomy paste for divots and crevices as needed to maintain seal.     Repositioning:  Bed: Reposition MINIMALLY every 1-2 hours in bed to relieve pressure and promote perfusion to tissue.  Chair: When up to the chair, sit on a chair cushion when up to the chair. Do  not sit for longer than one hour total before returning to bed for at least 60 minutes to relieve pressure and promote perfusion to the tissue.  Completely recline/tilt for 15 minutes each hour.     A diet high in protein is important for wound healing, we recommend getting 90 grams of protein per day. Taking protein shakes or bars are a good way to get extra protein in your diet.      Good sources of protein:  Pork 26g per 3 oz  Whey protein powder - 24g per scoop (on average)  Greek yogurt - 23g per 8oz   Chicken or Turkey - 23g per 3oz  Fish - 20-25g per 3oz  Beef - 18-23g per 3oz  Tofu - 10g per 1/2 cup  Navy beans - 20g per cup  Cottage cheese - 14g per 1/2 cup   Lentils - 13g per 1/4 cup  Beef jerky 13g per 1oz  2% milk - 8g per cup  Peanut butter - 8g per 2 tablespoons  Eggs - 6g per egg  Mixed nuts - 6g per 2oz    You do not need to change the dressing on the days you are being seen at the wound clinic     Main Provider: Jayashree Zavala M.D. January 2, 2025    Call us at [phone] if you have any questions about your wounds, if you have redness or swelling around your wound, have a fever of 101 degrees Fahrenheit or greater or if you have any other problems or concerns. We answer the phone Monday through Friday 8 am to 4 pm, please leave a message as we check the voicemail frequently throughout the day. If you have a concern over the weekend, please leave a message and we will return your call Monday. If the need is urgent, go to the ER or urgent care.    If you had a positive experience please indicate that on your patient satisfaction survey form that [facility] will be sending you.    It was a pleasure meeting with you today.  Thank you for allowing me and my team the privilege of caring for you today.  YOU are the reason we are here, and I truly hope we provided you with the excellent service you deserve.  Please let us know if there is anything else we can do for you so that we can be  sure you are leaving completely satisfied with your care experience.      If you have any billing related questions please call the [facility] Business office at [phone]. The clinic staff does not handle billing related matters.    If you are scheduled to have a follow up appointment, you will receive a reminder call the day before your visit. On the appointment day please arrive 15 minutes prior to your appointment time. If you are unable to keep that appointment, please call the clinic to cancel or reschedule. If you are more than 10 minutes late or greater for your scheduled appointment time, the clinic policy is that you may be asked to reschedule.

## 2025-01-02 NOTE — DISCHARGE INSTRUCTIONS
01/02/2025   Saqib Danielsmary   1980    A DME order was not completed because the supplies are ordered by home care or at a care facility    Dressing changes outside of clinic are being performed by Home Care  [facility]: : [phone] Fax: [phone]    Plan 01/02/2025   -Home Care: make sure you are bridging out to the hip. If patient sits on the trac pad it can cause pressure and make the wound worse.   - Today we used the Bovy to widen the opening of your wound     Wound care Sacrum/surgical: 3 times a week  Remove old dressing and ensure all black foam is removed  Cleanse wound with Vashe moist gauze, leave in place for 10 minutes; remove  Cleanse periwound skin with wound spray, pat dry and apply No Sting Barrier film.  Cut Black Foam to fill pockets/ tunneling: undermining from 7-9 o'clock 7cm, and 4 o'clock 6.6cm. You may have to Utilize 2 pieces to fill the wound. Use one larger piece cut into a long strip and fan/place it like an accordion and leave a tail at the opening. You should see two tails at the opening of the wound if you utilize 2 piece to fill the tunnels. Then utilize your third piece to make a bridge to the hip so you are not lying on the connector.   Bridge out to hip and alternate if possible hip to hip  Cover wound with VAC dressing tape to seal the foam, cut appropriate size opening  for the TRAC pad.  Connect TRAC pad and connect to pump; NPWT -150 mmHg Continuous, ensure no leaks  Change canister when alarms full or weekly  Change dressing three times a week    Document on the outside of the dressing the number of sponges used and the date  of the dressing  If dressing is compromised for greater than 2 hours then please remove entire  dressing and change or tuck moist Vashe gauze into wound until new dressing can be  applied. Use ostomy paste for divots and crevices as needed to maintain seal.     Repositioning:  Bed: Reposition MINIMALLY every 1-2 hours in bed to relieve  pressure and promote perfusion to tissue.  Chair: When up to the chair, sit on a chair cushion when up to the chair. Do not sit for longer than one hour total before returning to bed for at least 60 minutes to relieve pressure and promote perfusion to the tissue.  Completely recline/tilt for 15 minutes each hour.     A diet high in protein is important for wound healing, we recommend getting 90 grams of protein per day. Taking protein shakes or bars are a good way to get extra protein in your diet.      Good sources of protein:  Pork 26g per 3 oz  Whey protein powder - 24g per scoop (on average)  Greek yogurt - 23g per 8oz   Chicken or Turkey - 23g per 3oz  Fish - 20-25g per 3oz  Beef - 18-23g per 3oz  Tofu - 10g per 1/2 cup  Navy beans - 20g per cup  Cottage cheese - 14g per 1/2 cup   Lentils - 13g per 1/4 cup  Beef jerky 13g per 1oz  2% milk - 8g per cup  Peanut butter - 8g per 2 tablespoons  Eggs - 6g per egg  Mixed nuts - 6g per 2oz    You do not need to change the dressing on the days you are being seen at the wound clinic     Main Provider: Jayashree Zavala M.D. January 2, 2025    Call us at [phone] if you have any questions about your wounds, if you have redness or swelling around your wound, have a fever of 101 degrees Fahrenheit or greater or if you have any other problems or concerns. We answer the phone Monday through Friday 8 am to 4 pm, please leave a message as we check the voicemail frequently throughout the day. If you have a concern over the weekend, please leave a message and we will return your call Monday. If the need is urgent, go to the ER or urgent care.    If you had a positive experience please indicate that on your patient satisfaction survey form that [facility] will be sending you.    It was a pleasure meeting with you today.  Thank you for allowing me and my team the privilege of caring for you today.  YOU are the reason we are here, and I truly hope we provided you with  the excellent service you deserve.  Please let us know if there is anything else we can do for you so that we can be sure you are leaving completely satisfied with your care experience.      If you have any billing related questions please call the [facility] Business office at [phone]. The clinic staff does not handle billing related matters.    If you are scheduled to have a follow up appointment, you will receive a reminder call the day before your visit. On the appointment day please arrive 15 minutes prior to your appointment time. If you are unable to keep that appointment, please call the clinic to cancel or reschedule. If you are more than 10 minutes late or greater for your scheduled appointment time, the clinic policy is that you may be asked to reschedule.
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
## 2025-01-09 ENCOUNTER — MEDICAL CORRESPONDENCE (OUTPATIENT)
Dept: HEALTH INFORMATION MANAGEMENT | Facility: CLINIC | Age: 45
End: 2025-01-09
Payer: MEDICARE

## 2025-02-13 ENCOUNTER — MEDICAL CORRESPONDENCE (OUTPATIENT)
Dept: HEALTH INFORMATION MANAGEMENT | Facility: CLINIC | Age: 45
End: 2025-02-13
Payer: MEDICARE

## 2025-02-27 ENCOUNTER — HOSPITAL ENCOUNTER (OUTPATIENT)
Dept: WOUND CARE | Facility: CLINIC | Age: 45
End: 2025-02-27
Attending: SURGERY
Payer: MEDICARE

## 2025-02-27 VITALS — TEMPERATURE: 97.7 F

## 2025-02-27 DIAGNOSIS — S81.802A OPEN WOUND OF LOWER LEG, LEFT, INITIAL ENCOUNTER: Primary | ICD-10-CM

## 2025-02-27 DIAGNOSIS — L89.154 PRESSURE INJURY OF SACRAL REGION, STAGE 4 (H): ICD-10-CM

## 2025-02-27 PROCEDURE — 11042 DBRDMT SUBQ TIS 1ST 20SQCM/<: CPT | Performed by: SURGERY
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
## 2025-02-27 NOTE — PROGRESS NOTES
Patient Active Problem List   Diagnosis    Removal of pin, plate, kristi, or screw    Open wound of lower leg, left, initial encounter    Pressure ulcer of sacral region, unspecified stage    Necrotizing fasciitis of lower leg (H)    S/P below knee amputation, left (H)    Adjustment disorder with mixed anxiety and depressed mood    Allergic rhinitis, unspecified    Anxiety    Band-shaped keratopathy    Burst fracture of thoracic vertebra (H)    Cataract    Chorioretinal scar    Chronic, continuous use of opioids    Colostomy present (H)    Decubitus ulcer of coccygeal region, stage 4 (H)    Decubitus ulcer of left ischial area, stage IV (H)    Pressure ulcer of coccygeal region, stage III (H)    Degeneration of intervertebral disc of lumbar region    Diplopia    Disorder of macula of retina    Compression fracture of vertebra (H)    Fracture of cervical vertebra (H)    Gastro-esophageal reflux disease without esophagitis    Posttraumatic stress disorder    History of DVT (deep vein thrombosis)    Hypertension    Insomnia    Iris adhesion, posterior    Mild episode of recurrent major depressive disorder    Neurogenic bladder    Narrowing of intervertebral disc space    Muscle weakness (generalized)    Neurogenic pain    Neuropathic pain of both legs    Numbness of hand    Open wound of buttock, right, subsequent encounter    Class 1 obesity with serious comorbidity and body mass index (BMI) of 32.0 to 32.9 in adult, unspecified obesity type    Osteopenia    Chronic pain disorder    Panuveitis    Paraplegia, unspecified (H)    Restless legs syndrome    Sacral osteomyelitis (H)    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    Sarcoidosis    Secondary hypocortisolism    Sinus tachycardia    Skin ulcer (H)    Vertebral subluxation complex    Vitamin D deficiency    Wound dehiscence, surgical, initial encounter    Cutaneous abscess, unspecified    Hernia, ventral    T12 spinal cord injury (H)    Parastomal hernia without  obstruction or gangrene    Infection of amputation stump of left lower extremity (H)    Sacral wound     Past Medical History:   Diagnosis Date    Brain injury with brief loss of consciousness (H) 12/14/2015    Candida esophagitis (H) 08/03/2022    Degenerative joint disease     Gastro-oesophageal reflux disease     Hypercalcemia 08/03/2022    Hypokalemia 08/03/2022    Hypomagnesemia 08/03/2022     Labs:   Recent Labs   Lab Test 06/04/24  0536 11/18/23  0805 11/16/23  0749 11/15/23  0552 09/07/23  0423 09/06/23  0815   ALBUMIN  --  3.6   < > 3.3*   < >  --    HGB 12.7* 13.9   < > 13.1*   < >  --    INR  --   --   --  1.06   < >  --    WBC 10.1 6.0   < > 6.1   < >  --    A1C  --   --   --   --   --  5.5    < > = values in this interval not displayed.     Nutrition requirements were discussed with patient today.  Vitals:  Temp 97.7  F (36.5  C) (Temporal)   Wound:   Wound (used by OP [facility] only) 07/18/24 1505 sacral surgical (Active)   Thickness/Stage full thickness 02/27/25 1408   Base granulating 02/27/25 1408   Periwound intact 02/27/25 1408   Periwound Temperature warm 02/27/25 1408   Periwound Skin Turgor soft 02/27/25 1408   Edges open 02/27/25 1408   Length (cm) 0.9 02/27/25 1408   Width (cm) 1.7 02/27/25 1408   Depth (cm) 6.6 02/27/25 1408   Wound (cm^2) 1.53 cm^2 02/27/25 1408   Wound Volume (cm^3) 10.1 cm^3 02/27/25 1408   Wound healing % 89.8 02/27/25 1408   Tunneling [Depth (cm)/Location] 6.6cm/4oclock 01/02/25 1345   Undermining [Depth (cm)/Location] 4 o'clock/2.5cm. 8o'clock/2.5cm 02/27/25 1408   Drainage Characteristics/Odor serosanguineous;creamy;yellow 02/27/25 1408   Drainage Amount large 02/27/25 1408   Care, Wound debrided;other (see comments) 02/27/25 1408      Photo:         Further instructions from your care team         02/27/2025   Saqib Bishop   1980    Plan 02/27/2025     A DME order was not completed because the supplies are ordered by home care or at a care facility     Dressing  changes outside of clinic are being performed by Home Care  [facility]: Ph: [phone] Fax: [phone]     Plan 2025   -Home Care: make sure you are bridging out to the hip!!! If patient sits on the trac pad it can cause pressure and make the wound worse.   -Today we used the Bovy to widen the opening of your wound     Wound care Sacrum/surgical: 3 times a week  Remove old dressing and ensure all black foam is removed  Cleanse wound with Vashe moist gauze, leave in place for 10 minutes; and stick your FINGER in the wound and WIPE the wound base with the gauze to clean it out well. Then remove the gauze.   Cleanse periwound skin with wound spray, pat dry and apply No Sting Barrier film.  Cut Black Foam to fill pockets/ tunnelin o'clock and 4 o'clock AT 2.5cm each. You may have to Utilize 2 pieces to fill the wound. Use one larger piece cut into a long strip and fan/place it like an accordion and leave a tail at the opening. OR use 2 pieces 1 piece down to fill the 4 o'clock pocket and 1 piece to fill the 8 o'clock pocket. You should see two tails at the opening of the wound if you utilize 2 piece to fill the tunnels. Then utilize your third piece to make a bridge to the hip so you are not lying on the connector. USE YOUR FINGER TO FEEL IF THE FOAM IS TOUCHING THE POCKETS MAKE YOUR FINGER LIKE A FISH HOOK TO ACCESS THE POCKETS.   Bridge out to hip and alternate if possible hip to hip  Cover wound with VAC dressing tape to seal the foam, cut appropriate size opening  for the TRAC pad.  Connect TRAC pad and connect to pump; NPWT -150 mmHg Continuous, ensure no leaks  Change canister when alarms full or weekly  Change dressing three times a week     Document on the outside of the dressing the number of sponges used and the date  of the dressing  If dressing is compromised for greater than 2 hours then please remove entire  dressing and change or tuck moist Vashe gauze into wound until new dressing  can be  applied. Use ostomy paste for divots and crevices as needed to maintain seal.     Repositioning:  Bed: Reposition MINIMALLY every 1-2 hours in bed to relieve pressure and promote perfusion to tissue.  Chair: When up to the chair, sit on a chair cushion when up to the chair. Do not sit for longer than one hour total before returning to bed for at least 60 minutes to relieve pressure and promote perfusion to the tissue.  Completely recline/tilt for 15 minutes each hour.     A diet high in protein is important for wound healing, we recommend getting 90 grams of protein per day. Taking protein shakes or bars are a good way to get extra protein in your diet.      Good sources of protein:  Pork 26g per 3 oz  Whey protein powder - 24g per scoop (on average)  Greek yogurt - 23g per 8oz   Chicken or Turkey - 23g per 3oz  Fish - 20-25g per 3oz  Beef - 18-23g per 3oz  Tofu - 10g per 1/2 cup  Navy beans - 20g per cup  Cottage cheese - 14g per 1/2 cup   Lentils - 13g per 1/4 cup  Beef jerky 13g per 1oz  2% milk - 8g per cup  Peanut butter - 8g per 2 tablespoons  Eggs - 6g per egg  Mixed nuts - 6g per 2oz     You do not need to change the dressing on the days you are being seen at the wound clinic     Main Provider: Jayashree Zavala M.D. February 27, 2025    Call us at [phone] if you have any questions about your wounds, if you have redness or swelling around your wound, have a fever of 101 degrees Fahrenheit or greater or if you have any other problems or concerns. We answer the phone Monday through Friday 8 am to 4 pm, please leave a message as we check the voicemail frequently throughout the day. If you have a concern over the weekend, please leave a message and we will return your call Monday. If the need is urgent, go to the ER or urgent care.    If you had a positive experience please indicate that on your patient satisfaction survey form that [facility] will be sending you.    It was a pleasure  meeting with you today.  Thank you for allowing me and my team the privilege of caring for you today.  YOU are the reason we are here, and I truly hope we provided you with the excellent service you deserve.  Please let us know if there is anything else we can do for you so that we can be sure you are leaving completely satisfied with your care experience.      If you have any billing related questions please call the [facility] Business office at [phone]. The clinic staff does not handle billing related matters.    If you are scheduled to have a follow up appointment, you will receive a reminder call the day before your visit. On the appointment day please arrive 15 minutes prior to your appointment time. If you are unable to keep that appointment, please call the clinic to cancel or reschedule. If you are more than 10 minutes late or greater for your scheduled appointment time, the clinic policy is that you may be asked to reschedule.

## 2025-02-27 NOTE — DISCHARGE INSTRUCTIONS
2025   Saqib Danielsmary   1980    Plan 2025     A DME order was not completed because the supplies are ordered by home care or at a care facility     Dressing changes outside of clinic are being performed by Home Care  [facility]: : [phone] Fax: [phone]     Plan 2025   -Home Care: make sure you are bridging out to the hip!!! If patient sits on the trac pad it can cause pressure and make the wound worse.   -Today we used the Bovy to widen the opening of your wound     Wound care Sacrum/surgical: 3 times a week  Remove old dressing and ensure all black foam is removed  Cleanse wound with Vashe moist gauze, leave in place for 10 minutes; and stick your FINGER in the wound and WIPE the wound base with the gauze to clean it out well. Then remove the gauze.   Cleanse periwound skin with wound spray, pat dry and apply No Sting Barrier film.  Cut Black Foam to fill pockets/ tunnelin o'clock and 4 o'clock AT 2.5cm each. You may have to Utilize 2 pieces to fill the wound. Use one larger piece cut into a long strip and fan/place it like an accordion and leave a tail at the opening. OR use 2 pieces 1 piece down to fill the 4 o'clock pocket and 1 piece to fill the 8 o'clock pocket. You should see two tails at the opening of the wound if you utilize 2 piece to fill the tunnels. Then utilize your third piece to make a bridge to the hip so you are not lying on the connector. USE YOUR FINGER TO FEEL IF THE FOAM IS TOUCHING THE POCKETS MAKE YOUR FINGER LIKE A FISH HOOK TO ACCESS THE POCKETS.   Bridge out to hip and alternate if possible hip to hip  Cover wound with VAC dressing tape to seal the foam, cut appropriate size opening  for the TRAC pad.  Connect TRAC pad and connect to pump; NPWT -150 mmHg Continuous, ensure no leaks  Change canister when alarms full or weekly  Change dressing three times a week     Document on the outside of the dressing the number of sponges used and the  date  of the dressing  If dressing is compromised for greater than 2 hours then please remove entire  dressing and change or tuck moist Vashe gauze into wound until new dressing can be  applied. Use ostomy paste for divots and crevices as needed to maintain seal.     Repositioning:  Bed: Reposition MINIMALLY every 1-2 hours in bed to relieve pressure and promote perfusion to tissue.  Chair: When up to the chair, sit on a chair cushion when up to the chair. Do not sit for longer than one hour total before returning to bed for at least 60 minutes to relieve pressure and promote perfusion to the tissue.  Completely recline/tilt for 15 minutes each hour.     A diet high in protein is important for wound healing, we recommend getting 90 grams of protein per day. Taking protein shakes or bars are a good way to get extra protein in your diet.      Good sources of protein:  Pork 26g per 3 oz  Whey protein powder - 24g per scoop (on average)  Greek yogurt - 23g per 8oz   Chicken or Turkey - 23g per 3oz  Fish - 20-25g per 3oz  Beef - 18-23g per 3oz  Tofu - 10g per 1/2 cup  Navy beans - 20g per cup  Cottage cheese - 14g per 1/2 cup   Lentils - 13g per 1/4 cup  Beef jerky 13g per 1oz  2% milk - 8g per cup  Peanut butter - 8g per 2 tablespoons  Eggs - 6g per egg  Mixed nuts - 6g per 2oz     You do not need to change the dressing on the days you are being seen at the wound clinic     Main Provider: Jayashree Zavala M.D. February 27, 2025    Call us at [phone] if you have any questions about your wounds, if you have redness or swelling around your wound, have a fever of 101 degrees Fahrenheit or greater or if you have any other problems or concerns. We answer the phone Monday through Friday 8 am to 4 pm, please leave a message as we check the voicemail frequently throughout the day. If you have a concern over the weekend, please leave a message and we will return your call Monday. If the need is urgent, go to the ER or  urgent care.    If you had a positive experience please indicate that on your patient satisfaction survey form that [facility] will be sending you.    It was a pleasure meeting with you today.  Thank you for allowing me and my team the privilege of caring for you today.  YOU are the reason we are here, and I truly hope we provided you with the excellent service you deserve.  Please let us know if there is anything else we can do for you so that we can be sure you are leaving completely satisfied with your care experience.      If you have any billing related questions please call the [facility] Business office at [phone]. The clinic staff does not handle billing related matters.    If you are scheduled to have a follow up appointment, you will receive a reminder call the day before your visit. On the appointment day please arrive 15 minutes prior to your appointment time. If you are unable to keep that appointment, please call the clinic to cancel or reschedule. If you are more than 10 minutes late or greater for your scheduled appointment time, the clinic policy is that you may be asked to reschedule.     done

## 2025-03-04 ENCOUNTER — VIRTUAL VISIT (OUTPATIENT)
Dept: PHARMACY | Facility: CLINIC | Age: 45
End: 2025-03-04
Payer: MEDICARE

## 2025-03-04 DIAGNOSIS — E61.1 IRON DEFICIENCY: ICD-10-CM

## 2025-03-04 DIAGNOSIS — J30.9 ALLERGIC RHINITIS, UNSPECIFIED SEASONALITY, UNSPECIFIED TRIGGER: ICD-10-CM

## 2025-03-04 DIAGNOSIS — I10 HYPERTENSION, UNSPECIFIED TYPE: ICD-10-CM

## 2025-03-04 DIAGNOSIS — Z78.9 TAKES DIETARY SUPPLEMENTS: ICD-10-CM

## 2025-03-04 DIAGNOSIS — E66.811 CLASS 1 OBESITY WITH SERIOUS COMORBIDITY AND BODY MASS INDEX (BMI) OF 32.0 TO 32.9 IN ADULT, UNSPECIFIED OBESITY TYPE: Primary | ICD-10-CM

## 2025-03-04 DIAGNOSIS — F41.9 ANXIETY: ICD-10-CM

## 2025-03-04 DIAGNOSIS — F33.0 MILD EPISODE OF RECURRENT MAJOR DEPRESSIVE DISORDER: ICD-10-CM

## 2025-03-04 DIAGNOSIS — K59.00 CONSTIPATION, UNSPECIFIED CONSTIPATION TYPE: ICD-10-CM

## 2025-03-04 DIAGNOSIS — G89.4 CHRONIC PAIN DISORDER: ICD-10-CM

## 2025-03-04 DIAGNOSIS — D86.9 SARCOIDOSIS: ICD-10-CM

## 2025-03-04 DIAGNOSIS — K21.9 GASTRO-ESOPHAGEAL REFLUX DISEASE WITHOUT ESOPHAGITIS: ICD-10-CM

## 2025-03-04 DIAGNOSIS — H20.9 IRITIS: ICD-10-CM

## 2025-03-04 RX ORDER — FERROUS GLUCONATE 324(38)MG
324 TABLET ORAL
COMMUNITY
Start: 2025-02-24 | End: 2025-05-25

## 2025-03-04 RX ORDER — OXYCODONE HYDROCHLORIDE 10 MG/1
10 TABLET ORAL EVERY 6 HOURS PRN
COMMUNITY
Start: 2025-02-27

## 2025-03-04 ASSESSMENT — PAIN SCALES - GENERAL: PAINLEVEL_OUTOF10: NO PAIN (0)

## 2025-03-04 NOTE — Clinical Note
Has started on Wegovy and just got up to 0.5 mg dose. Goingto message him in a couple weeks to check in on how tolerating to see at stay on same dose or increase.

## 2025-03-04 NOTE — PATIENT INSTRUCTIONS
"Recommendations from today's MTM visit:                                                       Pharmacist to reach out to patient in 2-3 weeks to check in on status on the Wegovy 0.5 mg weekly dose and next steps   Can consider increase Wegovy to 1 mg at that time.     Follow-up: Return in about 3 months (around 6/4/2025) for Medication Therapy Management Pharmacist Visit.    It was great speaking with you today.  I value your experience and would be very thankful for your time in providing feedback in our clinic survey. In the next few days, you may receive an email or text message from [employer] with a link to a survey related to your  clinical pharmacist.\"     To schedule another MTM appointment, please call the clinic directly or you may call the MTM scheduling line at [phone] or toll-free at [phone].     My Clinical Pharmacist's contact information:                                                      Please feel free to contact me with any questions or concerns you have.      Lauren Bloch, PharmD  Medication Therapy Management Pharmacist   [facility] [place]             "
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
## 2025-03-04 NOTE — NURSING NOTE
Current patient location: [address] [place] [id number]    Is the patient currently in the state of [place]? YES    Visit mode: TELEPHONE    If the visit is dropped, the patient can be reconnected by:TELEPHONE VISIT: Phone number:   Telephone Information:   Mobile [phone]       Will anyone else be joining the visit? NO  (If patient encounters technical issues they should call [phone]    Are changes needed to the allergy or medication list? No    Are refills needed on medications prescribed by this physician? NO    Rooming Documentation:  Questionnaire(s) not pre-assigned    Reason for visit: Medication Therapy Management    Shazia CARTAGENA

## 2025-03-12 ENCOUNTER — MEDICAL CORRESPONDENCE (OUTPATIENT)
Dept: HEALTH INFORMATION MANAGEMENT | Facility: CLINIC | Age: 45
End: 2025-03-12
Payer: MEDICARE

## 2025-03-15 ENCOUNTER — HEALTH MAINTENANCE LETTER (OUTPATIENT)
Age: 45
End: 2025-03-15

## 2025-03-20 ENCOUNTER — MEDICAL CORRESPONDENCE (OUTPATIENT)
Dept: HEALTH INFORMATION MANAGEMENT | Facility: CLINIC | Age: 45
End: 2025-03-20
Payer: MEDICARE

## 2025-03-27 ENCOUNTER — TELEPHONE (OUTPATIENT)
Dept: WOUND CARE | Facility: CLINIC | Age: 45
End: 2025-03-27
Payer: MEDICARE

## 2025-03-27 NOTE — TELEPHONE ENCOUNTER
CT scan is now viewable in PACS. Writer will notify Dr. Zavala to review. Writer updated patient and patient requested a response back either through phone or [employer]. Scheduled a sooner follow up on 4/10 and continues to be on the cancellation list for a sooner opening.

## 2025-03-27 NOTE — TELEPHONE ENCOUNTER
"Spoke with patient who stated his home care nurse sent him to the ED yesterday due to warm wound, foul odor, and \"gooey\" drainage as well as feeling fatigued. Now on 2 antibiotics at home. CT was done during his visit and was instructed to call our clinic to have a follow up. Per the CT impression there was no abscess or osteomyelitis noted. Patient stated that the ED provider had mentioned there being a pocket. Writer called Radiology department to have CT scan pushed to [facility] system so Dr. Zavala can review if necessary. Will call patient back with an update once CT images have been received.   "

## 2025-04-10 ENCOUNTER — HOSPITAL ENCOUNTER (OUTPATIENT)
Dept: WOUND CARE | Facility: CLINIC | Age: 45
Discharge: HOME | End: 2025-04-10
Attending: SURGERY
Payer: MEDICARE

## 2025-04-10 VITALS — DIASTOLIC BLOOD PRESSURE: 98 MMHG | TEMPERATURE: 98.6 F | SYSTOLIC BLOOD PRESSURE: 151 MMHG

## 2025-04-10 DIAGNOSIS — S81.802A OPEN WOUND OF LOWER LEG, LEFT, INITIAL ENCOUNTER: Primary | ICD-10-CM

## 2025-04-10 DIAGNOSIS — L89.154 PRESSURE INJURY OF SACRAL REGION, STAGE 4 (H): ICD-10-CM

## 2025-04-10 PROCEDURE — 11043 DBRDMT MUSC&/FSCA 1ST 20/<: CPT | Performed by: SURGERY

## 2025-04-10 NOTE — DISCHARGE INSTRUCTIONS
04/10/2025   Saqib Danielsmary   1980    A DME order was not completed because the supplies are ordered by home care or at a care facility    Dressing changes outside of clinic are being performed by Home Care  Simone Eligio [facility]: : [phone] Fax: [phone]    Plan 04/10/2025   -Home Care: make sure you are bridging out to the hip!!! If patient sits on the trac pad it can cause pressure and make the wound worse.  -Please submit your pictures and measurements from last month to [facility] for insurance to continue covering your wound vac  -Today we used the Bovy to widen the opening of your wound     Wound care Sacrum/surgical: 3 times a week  Remove old dressing and ensure all black foam is removed  Cleanse wound with Vashe moist gauze, leave in place for 10 minutes; and stick your FINGER in the wound and WIPE the wound base with the gauze to clean it out well. Then remove the gauze.   Cleanse periwound skin with wound spray, pat dry and apply No Sting Barrier film.  Cut WHITE FOAM to fill pockets: 7-10 o'clock and 5-6 o'clock. You may have to Utilize 2 pieces to fill the wound. Use one larger piece cut into a long strip and fan/place it like an accordion and leave a tail at the opening. OR use 2 pieces 1 piece down to fill the 5-6 o'clock pocket and 1 piece to fill the 7-10 o'clock pocket. You should see two tails at the opening of the wound if you utilize 2 piece to fill the tunnels. Then utilize BLACK FOAM to make a bridge to the hip so you are not lying on the connector. USE YOUR FINGER TO FEEL IF THE FOAM IS TOUCHING THE POCKETS MAKE YOUR FINGER LIKE A FISH HOOK TO ACCESS THE POCKETS.   Bridge out to hip and alternate if possible hip to hip  Cover wound with VAC dressing tape to seal the foam, cut appropriate size opening  for the TRAC pad.  Connect TRAC pad and connect to pump; NPWT -175 mmHg Continuous, ensure no leaks  Change canister when alarms full or weekly  Change dressing three times a week      Document on the outside of the dressing the number of sponges used and the date  of the dressing  If dressing is compromised for greater than 2 hours then please remove entire  dressing and change or tuck moist Vashe gauze into wound until new dressing can be  applied. Use ostomy paste for divots and crevices as needed to maintain seal.     Repositioning:  Bed: Reposition MINIMALLY every 1-2 hours in bed to relieve pressure and promote perfusion to tissue.  Chair: When up to the chair, sit on a chair cushion when up to the chair. Do not sit for longer than one hour total before returning to bed for at least 60 minutes to relieve pressure and promote perfusion to the tissue.  Completely recline/tilt for 15 minutes each hour.     A diet high in protein is important for wound healing, we recommend getting 90 grams of protein per day. Taking protein shakes or bars are a good way to get extra protein in your diet.      Good sources of protein:  Pork 26g per 3 oz  Whey protein powder - 24g per scoop (on average)  Greek yogurt - 23g per 8oz   Chicken or Turkey - 23g per 3oz  Fish - 20-25g per 3oz  Beef - 18-23g per 3oz  Tofu - 10g per 1/2 cup  Navy beans - 20g per cup  Cottage cheese - 14g per 1/2 cup   Lentils - 13g per 1/4 cup  Beef jerky 13g per 1oz  2% milk - 8g per cup  Peanut butter - 8g per 2 tablespoons  Eggs - 6g per egg  Mixed nuts - 6g per 2oz    You do not need to change the dressing on the days you are being seen at the wound clinic     Main Provider: Jayashree Zavala M.D. April 10, 2025    Call us at [phone] if you have any questions about your wounds, if you have redness or swelling around your wound, have a fever of 101 degrees Fahrenheit or greater or if you have any other problems or concerns. We answer the phone Monday through Friday 8 am to 4 pm, please leave a message as we check the voicemail frequently throughout the day. If you have a concern over the weekend, please leave a message and  we will return your call Monday. If the need is urgent, go to the ER or urgent care.    If you had a positive experience please indicate that on your patient satisfaction survey form that [facility] will be sending you.    It was a pleasure meeting with you today.  Thank you for allowing me and my team the privilege of caring for you today.  YOU are the reason we are here, and I truly hope we provided you with the excellent service you deserve.  Please let us know if there is anything else we can do for you so that we can be sure you are leaving completely satisfied with your care experience.      If you have any billing related questions please call the [facility] Business office at [phone]. The clinic staff does not handle billing related matters.    If you are scheduled to have a follow up appointment, you will receive a reminder call the day before your visit. On the appointment day please arrive 15 minutes prior to your appointment time. If you are unable to keep that appointment, please call the clinic to cancel or reschedule. If you are more than 10 minutes late or greater for your scheduled appointment time, the clinic policy is that you may be asked to reschedule.
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
## 2025-04-14 DIAGNOSIS — E66.811 CLASS 1 OBESITY WITH SERIOUS COMORBIDITY AND BODY MASS INDEX (BMI) OF 32.0 TO 32.9 IN ADULT, UNSPECIFIED OBESITY TYPE: ICD-10-CM

## 2025-04-14 NOTE — TELEPHONE ENCOUNTER
Patient called to request refills of his wegovy. He states that he is unable to get in with his doctor before his next refills are due.    Augusta Quinones  [facility]

## 2025-04-15 ENCOUNTER — VIRTUAL VISIT (OUTPATIENT)
Dept: ENDOCRINOLOGY | Facility: CLINIC | Age: 45
End: 2025-04-15
Payer: MEDICARE

## 2025-04-15 VITALS — BODY MASS INDEX: 34.97 KG/M2 | HEIGHT: 73 IN

## 2025-04-15 DIAGNOSIS — E66.811 CLASS 1 OBESITY WITH SERIOUS COMORBIDITY AND BODY MASS INDEX (BMI) OF 32.0 TO 32.9 IN ADULT, UNSPECIFIED OBESITY TYPE: ICD-10-CM

## 2025-04-15 PROCEDURE — 1125F AMNT PAIN NOTED PAIN PRSNT: CPT | Mod: 95

## 2025-04-15 PROCEDURE — 98004 SYNCH AUDIO-VIDEO EST SF 10: CPT

## 2025-04-15 RX ORDER — SEMAGLUTIDE 1.7 MG/.75ML
1.7 INJECTION, SOLUTION SUBCUTANEOUS WEEKLY
Qty: 3 ML | Refills: 3 | Status: SHIPPED | OUTPATIENT
Start: 2025-04-15

## 2025-04-15 ASSESSMENT — PAIN SCALES - GENERAL: PAINLEVEL_OUTOF10: SEVERE PAIN (7)
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
## 2025-04-15 NOTE — PATIENT INSTRUCTIONS
Visit Plan:     Increase Wegovy 1.7mg once weekly.   Lauren Bloch, RPH in 2 months   Viv Watts PA-C in 4 months

## 2025-04-15 NOTE — NURSING NOTE
Current patient location: [address] [place] [address]    Is the patient currently in the state of [place]? YES    Visit mode: VIDEO    If the visit is dropped, the patient can be reconnected by:VIDEO VISIT: Text to cell phone:   Telephone Information:   Mobile [phone]    and VIDEO VISIT: Send to e-mail at: [email or web address]    Will anyone else be joining the visit? NO  (If patient encounters technical issues they should call [phone])    Are changes needed to the allergy or medication list? No    Are refills needed on medications prescribed by this physician? YES--looks like order for wegovy refill is already pended    Rooming Documentation:  Questionnaire(s) completed    Reason for visit: RECHECK    Shazia CARTAGENA
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
## 2025-04-16 RX ORDER — SEMAGLUTIDE 0.5 MG/.5ML
0.5 INJECTION, SOLUTION SUBCUTANEOUS WEEKLY
Qty: 2 ML | Refills: 0 | OUTPATIENT
Start: 2025-04-16

## 2025-04-23 ENCOUNTER — TELEPHONE (OUTPATIENT)
Dept: WOUND CARE | Facility: CLINIC | Age: 45
End: 2025-04-23
Payer: MEDICARE

## 2025-04-28 ENCOUNTER — TELEPHONE (OUTPATIENT)
Dept: WOUND CARE | Facility: CLINIC | Age: 45
End: 2025-04-28
Payer: MEDICARE

## 2025-04-28 ENCOUNTER — TELEPHONE (OUTPATIENT)
Dept: ENDOCRINOLOGY | Facility: CLINIC | Age: 45
End: 2025-04-28
Payer: MEDICARE

## 2025-04-28 DIAGNOSIS — L89.154 PRESSURE INJURY OF SACRAL REGION, STAGE 4 (H): Primary | ICD-10-CM

## 2025-04-28 NOTE — TELEPHONE ENCOUNTER
Patient confirmed scheduled appointment:     Date: 6/23/25  Time: 1PM  Visit type: return MTM  Visit mode: Virtual Visit  Provider:  Lauren Bloch, RPH  Location: [facility]    Additional Notes:         Date: 9/11/25  Time: 2PM  Visit type: Return Weight Management  Visit mode: Virtual Visit  Provider:  Viv Traylor PA-C  Location: [facility]    Additional Notes:

## 2025-04-28 NOTE — TELEPHONE ENCOUNTER
Call received from the home care nurse looking to have a referral sent to [facility] for further assistance with the wound. She thinks the patient would benefit from more monitoring and the patient is not able to come to [facility] more often due to distance. Dr. Zavala contacted and ok given to send the referral to [facility] wound care. Referral faxed to [phone]. Returned call to home care nurse to inform her the referral was faxed at [phone].

## 2025-05-08 ENCOUNTER — HOSPITAL ENCOUNTER (OUTPATIENT)
Dept: WOUND CARE | Facility: CLINIC | Age: 45
Discharge: HOME | End: 2025-05-08
Attending: SURGERY
Payer: MEDICARE

## 2025-05-08 ENCOUNTER — MEDICAL CORRESPONDENCE (OUTPATIENT)
Dept: HEALTH INFORMATION MANAGEMENT | Facility: CLINIC | Age: 45
End: 2025-05-08

## 2025-05-08 ENCOUNTER — TELEPHONE (OUTPATIENT)
Dept: ENDOCRINOLOGY | Facility: CLINIC | Age: 45
End: 2025-05-08

## 2025-05-08 VITALS
SYSTOLIC BLOOD PRESSURE: 140 MMHG | DIASTOLIC BLOOD PRESSURE: 76 MMHG | BODY MASS INDEX: 37.93 KG/M2 | HEART RATE: 122 BPM | RESPIRATION RATE: 17 BRPM | TEMPERATURE: 98.7 F | WEIGHT: 287.4 LBS

## 2025-05-08 VITALS — HEIGHT: 73 IN | BODY MASS INDEX: 38.09 KG/M2 | WEIGHT: 287.4 LBS

## 2025-05-08 DIAGNOSIS — L89.154 PRESSURE INJURY OF SACRAL REGION, STAGE 4 (H): ICD-10-CM

## 2025-05-08 DIAGNOSIS — S81.802A OPEN WOUND OF LOWER LEG, LEFT, INITIAL ENCOUNTER: Primary | ICD-10-CM

## 2025-05-08 PROBLEM — L89.324 DECUBITUS ULCER OF LEFT ISCHIAL AREA, STAGE IV (H): Status: RESOLVED | Noted: 2019-07-18 | Resolved: 2025-05-08

## 2025-05-08 PROBLEM — T87.44: Status: RESOLVED | Noted: 2023-11-07 | Resolved: 2025-05-08

## 2025-05-08 PROBLEM — M72.6: Status: RESOLVED | Noted: 2023-08-27 | Resolved: 2025-05-08

## 2025-05-08 PROCEDURE — 97602 WOUND(S) CARE NON-SELECTIVE: CPT

## 2025-05-08 NOTE — TELEPHONE ENCOUNTER
PA Initiation    Medication: WEGOVY 1.7 MG/0.75ML SC SOAJ  Insurance Company: [employer] - Phone [phone] Fax [phone]  Pharmacy Filling the Rx: [facility] - [place], [place] - [facility] [address]  Filling Pharmacy Phone: [phone]  Filling Pharmacy Fax: [phone]  Start Date: 5/8/2025
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
## 2025-05-08 NOTE — PROGRESS NOTES
Patient Active Problem List   Diagnosis    Removal of pin, plate, kristi, or screw    Pressure ulcer of sacral region, unspecified stage    S/P below knee amputation, left (H)    Adjustment disorder with mixed anxiety and depressed mood    Allergic rhinitis, unspecified    Anxiety    Band-shaped keratopathy    Burst fracture of thoracic vertebra (H)    Cataract    Chorioretinal scar    Chronic, continuous use of opioids    Colostomy present (H)    Decubitus ulcer of coccygeal region, stage 4 (H)    Degeneration of intervertebral disc of lumbar region    Diplopia    Disorder of macula of retina    Compression fracture of vertebra (H)    Fracture of cervical vertebra (H)    Gastro-esophageal reflux disease without esophagitis    Posttraumatic stress disorder    History of DVT (deep vein thrombosis)    Hypertension    Insomnia    Iris adhesion, posterior    Mild episode of recurrent major depressive disorder    Neurogenic bladder    Narrowing of intervertebral disc space    Muscle weakness (generalized)    Neurogenic pain    Neuropathic pain of both legs    Numbness of hand    Class 1 obesity with serious comorbidity and body mass index (BMI) of 32.0 to 32.9 in adult, unspecified obesity type    Osteopenia    Chronic pain disorder    Panuveitis    Paraplegia, unspecified (H)    Restless legs syndrome    Sacral osteomyelitis (H)    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    Sarcoidosis    Secondary hypocortisolism    Sinus tachycardia    Vertebral subluxation complex    Vitamin D deficiency    Wound dehiscence, surgical, initial encounter    Cutaneous abscess, unspecified    Hernia, ventral    T12 spinal cord injury (H)    Parastomal hernia without obstruction or gangrene     Past Medical History:   Diagnosis Date    Brain injury with brief loss of consciousness (H) 12/14/2015    Candida esophagitis (H) 08/03/2022    Decubitus ulcer of left ischial area, stage IV (H) 07/18/2019    Degenerative joint disease      Gastro-oesophageal reflux disease     Hypercalcemia 08/03/2022    Hypokalemia 08/03/2022    Hypomagnesemia 08/03/2022    Infection of amputation stump of left lower extremity (H) 11/07/2023    Necrotizing fasciitis of lower leg (H) 08/27/2023     Labs:   Recent Labs   Lab Test 06/04/24  0536 11/18/23  0805 11/16/23  0749 11/15/23  0552 09/07/23  0423 09/06/23  0815   ALBUMIN  --  3.6   < > 3.3*   < >  --    HGB 12.7* 13.9   < > 13.1*   < >  --    INR  --   --   --  1.06   < >  --    WBC 10.1 6.0   < > 6.1   < >  --    A1C  --   --   --   --   --  5.5    < > = values in this interval not displayed.     Nutrition requirements were discussed with patient today.  Vitals:  BP (!) 140/76 (BP Location: Right arm, Patient Position: Chair, Cuff Size: Adult Large)   Pulse (!) 122   Temp 98.7  F (37.1  C) (Temporal)   Resp 17   Wt 130.4 kg (287 lb 6.4 oz)   BMI 37.93 kg/m    Wound:   Wound (used by OP [facility] only) 07/18/24 1505 sacral surgical (Active)   Thickness/Stage full thickness 05/08/25 1419   Base granulating 05/08/25 1419   Periwound intact 05/08/25 1419   Periwound Temperature warm 05/08/25 1419   Periwound Skin Turgor soft 05/08/25 1419   Edges open 05/08/25 1419   Length (cm) 1.7 05/08/25 1419   Width (cm) 3.1 05/08/25 1419   Depth (cm) 7.3 05/08/25 1419   Wound (cm^2) 5.27 cm^2 05/08/25 1419   Wound Volume (cm^3) 38.47 cm^3 05/08/25 1419   Wound healing % 64.87 05/08/25 1419   Tunneling [Depth (cm)/Location] 8 o'clock / 2cm; 10 o'clock / 2cm 05/08/25 1419   Undermining [Depth (cm)/Location] 5 o'clock / 1cm; 05/08/25 1419   Drainage Characteristics/Odor serosanguineous 05/08/25 1419   Drainage Amount large 05/08/25 1419   Care, Wound non-select wound debridement performed. 05/08/25 1419      Photo:         Further instructions from your care team         05/08/2025   Saqib Bishop   1980    A DME order was not completed because the supplies are ordered by home care or at a care facility    Dressing changes  outside of clinic are being performed by Home Care    [facility]: Ph: [phone] Fax: [phone] (3 times/week)    Plan 05/08/2025   Home Care: PLEASE make sure you are bridging out to the lateral or anterior hip!!! If patient sits on the trac pad (bridged posteriorly) it can cause pressure and make the wound worse. If needed - call [facility] and ask for additional black foam for bridging.  Please submit your pictures and measurements from last month to [facility] for insurance to continue covering your wound vac  You do not need to change the dressing on the days you are coming to the Wound Clinic     Wound Dressing Change: Sacrum   - Wash your hands with soap and water before you begin your dressing change and prepare a clean surface for dressings.  - Remove old dressing and ensure all black and white foam is removed  - Cleanse with mild unscented soap (such as Cetaphil, Cerave or Dove) and water.  - Apply small amount of VASHE on gauze, lay into wound bed, let sit for 10 minutes.  - Stick your FINGER in the wound and WIPE the wound base with the gauze to clean it out well. Then remove the gauze.  - Cleanse periwound skin with wound spray, pat dry and apply No Sting Barrier film.   -Primary dressing: Cut WHITE FOAM to fill pockets: 2 cm at 8 o'clock and 10 o'clock / undermining at 5 o'clock  - USE YOUR FINGER TO FEEL IF THE FOAM IS TOUCHING THE POCKETS MAKE YOUR FINGER LIKE A FISH HOOK TO ACCESS THE POCKETS.   -Secondary dressing: utilize BLACK FOAM to make a bridge to the lateral or anterior hip (ensure not lying on the connector) - Alternate hips for skin integrity.  - Cover wound with VAC dressing tape to seal the foam, cut appropriate size opening for the TRAC pad.  - Connect TRAC pad and connect to pump; NPWT -150 mmHg Continuous, ensure no leaks  - Change canister when alarms full or weekly  Change dressing three times a week  and as needed for saturation/soilage/leakage    Document on the outside of the  dressing the number of sponges used and the date of the dressing  If dressing is compromised for greater than 2 hours then please remove entire dressing and change or tuck moist Vashe gauze into wound until new dressing can be applied.   Use ostomy paste for divots and crevices as needed to maintain seal.     Repositioning:  Bed: Reposition MINIMALLY every 1-2 hours in bed to relieve pressure and promote perfusion to tissue.  Chair: When up to the chair, sit on a chair cushion when up to the chair. Do not sit for longer than one hour total before returning to bed for at least 60 minutes to relieve pressure and promote perfusion to the tissue.  Completely recline/tilt for 15 minutes each hour.    Protein:   A diet high in protein is important for wound healing, we recommend getting 90 grams of protein per day.   Taking protein shakes or bars are a good way to get extra protein in your diet.      Good sources of protein:  Pork 26g per 3 oz  Whey protein powder - 24g per scoop (on average)  Greek yogurt - 23g per 8oz   Chicken or Turkey - 23g per 3oz  Fish - 20-25g per 3oz  Beef - 18-23g per 3oz  Tofu - 10g per 1/2 cup  Navy beans - 20g per cup  Cottage cheese - 14g per 1/2 cup   Lentils - 13g per 1/4 cup  Beef jerky 13g per 1oz  2% milk - 8g per cup  Peanut butter - 8g per 2 tablespoons  Eggs - 6g per egg  Mixed nuts - 6g per 2oz            Main Provider: Jayashree Zavala M.D. May 8, 2025

## 2025-05-08 NOTE — TELEPHONE ENCOUNTER
Tried to submit PA renewal in [facility] but getting a rejection too soon to fill  Called insurance ([phone]) spoke to Amelie she is sending me the PA form.     Will need patients current weight and  BMI

## 2025-05-08 NOTE — DISCHARGE INSTRUCTIONS
05/08/2025   Saqib Danielsmary   1980    A DME order was not completed because the supplies are ordered by home care or at a care facility    Dressing changes outside of clinic are being performed by Home Care    [facility] Val[facility]: : [phone] Fax: [phone] (3 times/week)    Plan 05/08/2025   Home Care: PLEASE make sure you are bridging out to the lateral or anterior hip!!! If patient sits on the trac pad (bridged posteriorly) it can cause pressure and make the wound worse. If needed - call [facility] and ask for additional black foam for bridging.  Please submit your pictures and measurements from last month to [facility] for insurance to continue covering your wound vac  You do not need to change the dressing on the days you are coming to the Wound Clinic     Wound Dressing Change: Sacrum   - Wash your hands with soap and water before you begin your dressing change and prepare a clean surface for dressings.  - Remove old dressing and ensure all black and white foam is removed  - Cleanse with mild unscented soap (such as Cetaphil, Cerave or Dove) and water.  - Apply small amount of VASHE on gauze, lay into wound bed, let sit for 10 minutes.  - Stick your FINGER in the wound and WIPE the wound base with the gauze to clean it out well. Then remove the gauze.  - Cleanse periwound skin with wound spray, pat dry and apply No Sting Barrier film.   -Primary dressing: Cut WHITE FOAM to fill pockets: 2 cm at 8 o'clock and 10 o'clock / undermining at 5 o'clock  - USE YOUR FINGER TO FEEL IF THE FOAM IS TOUCHING THE POCKETS MAKE YOUR FINGER LIKE A FISH HOOK TO ACCESS THE POCKETS.   -Secondary dressing: utilize BLACK FOAM to make a bridge to the lateral or anterior hip (ensure not lying on the connector) - Alternate hips for skin integrity.  - Cover wound with VAC dressing tape to seal the foam, cut appropriate size opening for the TRAC pad.  - Connect TRAC pad and connect to pump; NPWT -150 mmHg Continuous, ensure no leaks  -  Change canister when alarms full or weekly  Change dressing three times a week  and as needed for saturation/soilage/leakage    Document on the outside of the dressing the number of sponges used and the date of the dressing  If dressing is compromised for greater than 2 hours then please remove entire dressing and change or tuck moist Vashe gauze into wound until new dressing can be applied.   Use ostomy paste for divots and crevices as needed to maintain seal.     Repositioning:  Bed: Reposition MINIMALLY every 1-2 hours in bed to relieve pressure and promote perfusion to tissue.  Chair: When up to the chair, sit on a chair cushion when up to the chair. Do not sit for longer than one hour total before returning to bed for at least 60 minutes to relieve pressure and promote perfusion to the tissue.  Completely recline/tilt for 15 minutes each hour.    Protein:   A diet high in protein is important for wound healing, we recommend getting 90 grams of protein per day.   Taking protein shakes or bars are a good way to get extra protein in your diet.      Good sources of protein:  Pork 26g per 3 oz  Whey protein powder - 24g per scoop (on average)  Greek yogurt - 23g per 8oz   Chicken or Turkey - 23g per 3oz  Fish - 20-25g per 3oz  Beef - 18-23g per 3oz  Tofu - 10g per 1/2 cup  Navy beans - 20g per cup  Cottage cheese - 14g per 1/2 cup   Lentils - 13g per 1/4 cup  Beef jerky 13g per 1oz  2% milk - 8g per cup  Peanut butter - 8g per 2 tablespoons  Eggs - 6g per egg  Mixed nuts - 6g per 2oz            Main Provider: Jayashree Zavala M.D. May 8, 2025    Call us at [phone] if you have any questions about your wounds, if you have redness or swelling around your wound, have a fever of 101 degrees Fahrenheit or greater or if you have any other problems or concerns. We answer the phone Monday through Friday 8 am to 4 pm, please leave a message as we check the voicemail frequently throughout the day. If you have a  concern over the weekend, please leave a message and we will return your call Monday. If the need is urgent, go to the ER or urgent care.    If you had a positive experience please indicate that on your patient satisfaction survey form that [facility] will be sending you.    It was a pleasure meeting with you today.  Thank you for allowing me and my team the privilege of caring for you today.  YOU are the reason we are here, and I truly hope we provided you with the excellent service you deserve.  Please let us know if there is anything else we can do for you so that we can be sure you are leaving completely satisfied with your care experience.      If you have any billing related questions please call the [facility] Business office at [phone]. The clinic staff does not handle billing related matters.    If you are scheduled to have a follow up appointment, you will receive a reminder call the day before your visit. On the appointment day please arrive 15 minutes prior to your appointment time. If you are unable to keep that appointment, please call the clinic to cancel or reschedule. If you are more than 10 minutes late or greater for your scheduled appointment time, the clinic policy is that you may be asked to reschedule.

## 2025-05-08 NOTE — TELEPHONE ENCOUNTER
"Estimated body mass index is 37.93 kg/m  as calculated from the following:    Height as of this encounter: 1.854 m (6' 0.99\").    Weight as of this encounter: 130.4 kg (287 lb 6.4 oz).    "

## 2025-05-09 NOTE — TELEPHONE ENCOUNTER
PRIOR AUTHORIZATION DENIED    Medication: WEGOVY 1.7 MG/0.75ML SC SOAJ  Insurance Company: [employer] - Phone [phone] Fax [phone]  Denial Date: 5/8/2025  Denial Reason(s):   Appeal Information:   Patient Notified: clinic to discuss with pt

## 2025-06-05 ENCOUNTER — HOSPITAL ENCOUNTER (OUTPATIENT)
Dept: WOUND CARE | Facility: CLINIC | Age: 45
End: 2025-06-05
Attending: SURGERY
Payer: MEDICARE

## 2025-06-05 DIAGNOSIS — T81.328D DEHISCENCE OF CLOSURE OF MUSCLE OR MUSCLE FLAP, SUBSEQUENT ENCOUNTER: Primary | ICD-10-CM

## 2025-06-05 PROCEDURE — 11043 DBRDMT MUSC&/FSCA 1ST 20/<: CPT | Performed by: SURGERY

## 2025-06-05 NOTE — DISCHARGE INSTRUCTIONS
06/05/2025   Saqib Danielsmary   1980    Plan 06/05/2025     A DME order was not completed because the supplies are ordered by home care or at a care facility     Dressing changes outside of clinic are being performed by Home Care     [facility]: : [phone] Fax: [phone] (3 times/week)     Plan 06/05/2025   -Return the wound vac from the hospital and keep the 3M wound vac.   -Call around to see if there are closer locations who will take you. If you cannot find anyplace close please call us for a follow up.   -Today we utilized the BOVI and opened the opening larger to have better access for dressings   Home Care: PLEASE make sure you are bridging out to the lateral or anterior hip!!! If patient sits on the trac pad (bridged posteriorly) it can cause pressure and make the wound worse. [facility] for bridging.  Please [facility] your pictures and measurements from [facility] to continue covering your wound vac  You do not need to change the dressing on the days you are coming to the Wound Clinic     Wound Dressing Change: Sacrum   - Wash your hands with soap and water before you begin your dressing change and prepare a clean surface for dressings.  - Remove old dressing and ensure all black and white foam is removed  - Cleanse with mild unscented soap (such as Cetaphil, Cerave or Dove) and water.  - Apply small amount of VASHE on gauze, lay into wound bed, let sit for 10 minutes.  - Stick your FINGER in the wound and WIPE the wound base with the gauze to clean it out well. Then remove the gauze.  - Cleanse periwound skin with wound spray, pat dry and apply No Sting Barrier film.   -USE YOUR FINGER TO FEEL IF THE FOAM IS TOUCHING THE POCKETS MAKE YOUR FINGER LIKE A FISH HOOK TO ACCESS THE POCKET. Marker used today 6/5/25 to outline this for you as well. Undermining from 5-9 o'clock and depth of 8cm. (Cut the black foam into a cinnamon roll  and fill the depth in an accordion like fashion to fill the space and fill the undermining.  -utilize BLACK FOAM to make a bridge to the lateral or anterior hip (ensure not lying on the connector) - Alternate hips for skin integrity.  - Cover wound with VAC dressing tape to seal the foam, cut appropriate size opening for the TRAC pad.  - Connect TRAC pad and connect to pump; NPWT -150 mmHg Continuous, ensure no leaks  - Change canister when alarms full or weekly  Change dressing three times a week  and as needed for saturation/soilage/leakage     Document on the outside of the dressing the number of sponges used and the date of the dressing  If dressing is compromised for greater than 2 hours then please remove entire dressing and change or tuck moist Vashe gauze into wound until new dressing can be applied.   Use ostomy paste for divots and crevices as needed to maintain seal.     Repositioning:  Bed: Reposition MINIMALLY every 1-2 hours in bed to relieve pressure and promote perfusion to tissue.  Chair: When up to the chair, sit on a chair cushion when up to the chair. Do not sit for longer than one hour total before returning to bed for at least 60 minutes to relieve pressure and promote perfusion to the tissue.  Completely recline/tilt for 15 minutes each hour.     Protein:   A diet high in protein is important for wound healing, we recommend getting 90 grams of protein per day.   Taking protein shakes or bars are a good way to get extra protein in your diet.      Good sources of protein:  Pork 26g per 3 oz  Whey protein powder - 24g per scoop (on average)  Greek yogurt - 23g per 8oz   Chicken or Turkey - 23g per 3oz  Fish - 20-25g per 3oz  Beef - 18-23g per 3oz  Tofu - 10g per 1/2 cup  Navy beans - 20g per cup  Cottage cheese - 14g per 1/2 cup   Lentils - 13g per 1/4 cup  Beef jerky 13g per 1oz  2% milk - 8g per cup  Peanut butter - 8g per 2 tablespoons  Eggs - 6g per egg  Mixed nuts - 6g per 2oz     Main  Provider: Jayashree Zavala M.D. June 5, 2025    Call us at [phone] if you have any questions about your wounds, if you have redness or swelling around your wound, have a fever of 101 degrees Fahrenheit or greater or if you have any other problems or concerns. We answer the phone Monday through Friday 8 am to 4 pm, please leave a message as we check the voicemail frequently throughout the day. If you have a concern over the weekend, please leave a message and we will return your call Monday. If the need is urgent, go to the ER or urgent care.    If you had a positive experience please indicate that on your patient satisfaction survey form that [facility] will be sending you.    It was a pleasure meeting with you today.  Thank you for allowing our team the privilege of caring for you today.  YOU are the reason we are here, and we truly hope we provided you with the excellent service you deserve.  Please let us know if there is anything else we can do for you so that we can be sure you are leaving completely satisfied with your care experience.      If you have any billing related questions please call the [facility] Business office at [phone]. The clinic staff does not handle billing related matters.    If you are scheduled to have a follow up appointment, you will receive a reminder call the day before your visit. On the appointment day please arrive 15 minutes prior to your appointment time. If you are unable to keep that appointment, please call the clinic to cancel or reschedule. If you are more than 10 minutes late or greater for your scheduled appointment time, the clinic policy is that you may be asked to reschedule.

## 2025-06-05 NOTE — PROGRESS NOTES
Patient Active Problem List   Diagnosis    Removal of pin, plate, kristi, or screw    Pressure ulcer of sacral region, unspecified stage    S/P below knee amputation, left (H)    Adjustment disorder with mixed anxiety and depressed mood    Allergic rhinitis, unspecified    Anxiety    Band-shaped keratopathy    Burst fracture of thoracic vertebra (H)    Cataract    Chorioretinal scar    Chronic, continuous use of opioids    Colostomy present (H)    Decubitus ulcer of coccygeal region, stage 4 (H)    Degeneration of intervertebral disc of lumbar region    Diplopia    Disorder of macula of retina    Compression fracture of vertebra (H)    Fracture of cervical vertebra (H)    Gastro-esophageal reflux disease without esophagitis    Posttraumatic stress disorder    History of DVT (deep vein thrombosis)    Hypertension    Insomnia    Iris adhesion, posterior    Mild episode of recurrent major depressive disorder    Neurogenic bladder    Narrowing of intervertebral disc space    Muscle weakness (generalized)    Neurogenic pain    Neuropathic pain of both legs    Numbness of hand    Class 1 obesity with serious comorbidity and body mass index (BMI) of 32.0 to 32.9 in adult, unspecified obesity type    Osteopenia    Chronic pain disorder    Panuveitis    Paraplegia, unspecified (H)    Restless legs syndrome    Sacral osteomyelitis (H)    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    Sarcoidosis    Secondary hypocortisolism    Sinus tachycardia    Vertebral subluxation complex    Vitamin D deficiency    Wound dehiscence, surgical, initial encounter    Cutaneous abscess, unspecified    Hernia, ventral    T12 spinal cord injury (H)    Parastomal hernia without obstruction or gangrene     Past Medical History:   Diagnosis Date    Brain injury with brief loss of consciousness (H) 12/14/2015    Candida esophagitis (H) 08/03/2022    Decubitus ulcer of left ischial area, stage IV (H) 07/18/2019    Degenerative joint disease      Gastro-oesophageal reflux disease     Hypercalcemia 08/03/2022    Hypokalemia 08/03/2022    Hypomagnesemia 08/03/2022    Infection of amputation stump of left lower extremity (H) 11/07/2023    Necrotizing fasciitis of lower leg (H) 08/27/2023     Labs:   Recent Labs   Lab Test 06/04/24  0536 11/18/23  0805 11/16/23  0749 11/15/23  0552 09/07/23  0423 09/06/23  0815   ALBUMIN  --  3.6   < > 3.3*   < >  --    HGB 12.7* 13.9   < > 13.1*   < >  --    INR  --   --   --  1.06   < >  --    WBC 10.1 6.0   < > 6.1   < >  --    A1C  --   --   --   --   --  5.5    < > = values in this interval not displayed.     Nutrition requirements were discussed with patient today.  Vitals:  There were no vitals taken for this visit.  Wound:   Wound (used by OP [facility] only) 07/18/24 1505 sacral surgical (Active)   Thickness/Stage full thickness 06/05/25 1450   Base granulating 06/05/25 1450   Periwound intact;macerated 06/05/25 1450   Periwound Temperature warm 06/05/25 1450   Periwound Skin Turgor soft 06/05/25 1450   Edges open 06/05/25 1450   Length (cm) 1.9 06/05/25 1500   Width (cm) 3 06/05/25 1500   Depth (cm) 8 06/05/25 1500   Wound (cm^2) 5.7 cm^2 06/05/25 1500   Wound Volume (cm^3) 45.6 cm^3 06/05/25 1500   Wound healing % 62 06/05/25 1500   Tunneling [Depth (cm)/Location] 8 o'clock / 2cm; 10 o'clock / 2cm 05/08/25 1419   Undermining [Depth (cm)/Location] 5-9 o'clock / 5cm 06/05/25 1500   Drainage Characteristics/Odor serosanguineous 06/05/25 1450   Drainage Amount large 06/05/25 1450   Care, Wound other (see comments) 06/05/25 1500      Photo:           Further instructions from your care team         06/05/2025   Saqib Bishop   1980    Plan 06/05/2025     A DME order was not completed because the supplies are ordered by home care or at a care facility     Dressing changes outside of clinic are being performed by Home Care     [facility]: Ph: [phone] Fax: [phone] (3 times/week)     Plan 06/05/2025   -Return  the wound vac from the hospital and keep the 3M wound vac.   -Call around to see if there are closer locations who will take you. If you cannot find anyplace close please call us for a follow up.   -Today we utilized the BOVI and opened the opening larger to have better access for dressings   Home Care: PLEASE make sure you are bridging out to the lateral or anterior hip!!! If patient sits on the trac pad (bridged posteriorly) it can cause pressure and make the wound worse. If needed - call [facility] and ask for additional black foam for bridging.  Please submit your pictures and measurements from last month to [facility] for insurance to continue covering your wound vac  You do not need to change the dressing on the days you are coming to the Wound Clinic     Wound Dressing Change: Sacrum   - Wash your hands with soap and water before you begin your dressing change and prepare a clean surface for dressings.  - Remove old dressing and ensure all black and white foam is removed  - Cleanse with mild unscented soap (such as Cetaphil, Cerave or Dove) and water.  - Apply small amount of VASHE on gauze, lay into wound bed, let sit for 10 minutes.  - Stick your FINGER in the wound and WIPE the wound base with the gauze to clean it out well. Then remove the gauze.  - Cleanse periwound skin with wound spray, pat dry and apply No Sting Barrier film.   -USE YOUR FINGER TO FEEL IF THE FOAM IS TOUCHING THE POCKETS MAKE YOUR FINGER LIKE A FISH HOOK TO ACCESS THE POCKET. Marker used today 6/5/25 to outline this for you as well. Undermining from 5-9 o'clock and depth of 8cm. (Cut the black foam into a cinnamon roll and fill the depth in an accordion like fashion to fill the space and fill the undermining.  -utilize BLACK FOAM to make a bridge to the lateral or anterior hip (ensure not lying on the connector) - Alternate hips for skin integrity.  - Cover wound with VAC dressing tape to seal the foam, cut appropriate size opening for the TRAC  pad.  - Connect TRAC pad and connect to pump; NPWT -150 mmHg Continuous, ensure no leaks  - Change canister when alarms full or weekly  Change dressing three times a week  and as needed for saturation/soilage/leakage     Document on the outside of the dressing the number of sponges used and the date of the dressing  If dressing is compromised for greater than 2 hours then please remove entire dressing and change or tuck moist Vashe gauze into wound until new dressing can be applied.   Use ostomy paste for divots and crevices as needed to maintain seal.     Repositioning:  Bed: Reposition MINIMALLY every 1-2 hours in bed to relieve pressure and promote perfusion to tissue.  Chair: When up to the chair, sit on a chair cushion when up to the chair. Do not sit for longer than one hour total before returning to bed for at least 60 minutes to relieve pressure and promote perfusion to the tissue.  Completely recline/tilt for 15 minutes each hour.     Protein:   A diet high in protein is important for wound healing, we recommend getting 90 grams of protein per day.   Taking protein shakes or bars are a good way to get extra protein in your diet.      Good sources of protein:  Pork 26g per 3 oz  Whey protein powder - 24g per scoop (on average)  Greek yogurt - 23g per 8oz   Chicken or Turkey - 23g per 3oz  Fish - 20-25g per 3oz  Beef - 18-23g per 3oz  Tofu - 10g per 1/2 cup  Navy beans - 20g per cup  Cottage cheese - 14g per 1/2 cup   Lentils - 13g per 1/4 cup  Beef jerky 13g per 1oz  2% milk - 8g per cup  Peanut butter - 8g per 2 tablespoons  Eggs - 6g per egg  Mixed nuts - 6g per 2oz     Main Provider: Jayashree Zavala M.D. June 5, 2025    Call us at [phone] if you have any questions about your wounds, if you have redness or swelling around your wound, have a fever of 101 degrees Fahrenheit or greater or if you have any other problems or concerns. We answer the phone Monday through Friday 8 am to 4 pm, please  leave a message as we check the voicemail frequently throughout the day. If you have a concern over the weekend, please leave a message and we will return your call Monday. If the need is urgent, go to the ER or urgent care.    If you had a positive experience please indicate that on your patient satisfaction survey form that [facility] will be sending you.    It was a pleasure meeting with you today.  Thank you for allowing our team the privilege of caring for you today.  YOU are the reason we are here, and we truly hope we provided you with the excellent service you deserve.  Please let us know if there is anything else we can do for you so that we can be sure you are leaving completely satisfied with your care experience.      If you have any billing related questions please call the [facility] Business office at [phone]. The clinic staff does not handle billing related matters.    If you are scheduled to have a follow up appointment, you will receive a reminder call the day before your visit. On the appointment day please arrive 15 minutes prior to your appointment time. If you are unable to keep that appointment, please call the clinic to cancel or reschedule. If you are more than 10 minutes late or greater for your scheduled appointment time, the clinic policy is that you may be asked to reschedule.
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
## 2025-06-23 ENCOUNTER — VIRTUAL VISIT (OUTPATIENT)
Dept: PHARMACY | Facility: CLINIC | Age: 45
End: 2025-06-23
Payer: MEDICARE

## 2025-06-23 ENCOUNTER — ANCILLARY PROCEDURE (OUTPATIENT)
Dept: CT IMAGING | Facility: CLINIC | Age: 45
End: 2025-06-23
Attending: SURGERY
Payer: MEDICARE

## 2025-06-23 ENCOUNTER — TELEPHONE (OUTPATIENT)
Dept: ENDOCRINOLOGY | Facility: CLINIC | Age: 45
End: 2025-06-23

## 2025-06-23 ENCOUNTER — OFFICE VISIT (OUTPATIENT)
Dept: SURGERY | Facility: CLINIC | Age: 45
End: 2025-06-23
Payer: MEDICARE

## 2025-06-23 ENCOUNTER — TELEPHONE (OUTPATIENT)
Dept: WOUND CARE | Facility: CLINIC | Age: 45
End: 2025-06-23

## 2025-06-23 VITALS
RESPIRATION RATE: 16 BRPM | WEIGHT: 295.9 LBS | HEIGHT: 73 IN | OXYGEN SATURATION: 98 % | DIASTOLIC BLOOD PRESSURE: 71 MMHG | HEART RATE: 100 BPM | SYSTOLIC BLOOD PRESSURE: 105 MMHG | BODY MASS INDEX: 39.22 KG/M2

## 2025-06-23 VITALS — BODY MASS INDEX: 38.43 KG/M2 | HEIGHT: 73 IN | WEIGHT: 290 LBS

## 2025-06-23 DIAGNOSIS — L89.154 PRESSURE INJURY OF SACRAL REGION, STAGE 4 (H): ICD-10-CM

## 2025-06-23 DIAGNOSIS — E66.812 CLASS 2 OBESITY DUE TO EXCESS CALORIES IN ADULT, UNSPECIFIED BMI, UNSPECIFIED WHETHER SERIOUS COMORBIDITY PRESENT: Primary | ICD-10-CM

## 2025-06-23 DIAGNOSIS — E66.09 CLASS 2 OBESITY DUE TO EXCESS CALORIES IN ADULT, UNSPECIFIED BMI, UNSPECIFIED WHETHER SERIOUS COMORBIDITY PRESENT: Primary | ICD-10-CM

## 2025-06-23 DIAGNOSIS — R06.02 SOB (SHORTNESS OF BREATH): Primary | ICD-10-CM

## 2025-06-23 DIAGNOSIS — K43.5 PARASTOMAL HERNIA WITHOUT OBSTRUCTION OR GANGRENE: ICD-10-CM

## 2025-06-23 PROCEDURE — 71260 CT THORAX DX C+: CPT | Mod: GC | Performed by: STUDENT IN AN ORGANIZED HEALTH CARE EDUCATION/TRAINING PROGRAM

## 2025-06-23 PROCEDURE — 1125F AMNT PAIN NOTED PAIN PRSNT: CPT | Performed by: SURGERY

## 2025-06-23 PROCEDURE — 3078F DIAST BP <80 MM HG: CPT | Performed by: SURGERY

## 2025-06-23 PROCEDURE — 74177 CT ABD & PELVIS W/CONTRAST: CPT | Mod: GC | Performed by: STUDENT IN AN ORGANIZED HEALTH CARE EDUCATION/TRAINING PROGRAM

## 2025-06-23 PROCEDURE — 99214 OFFICE O/P EST MOD 30 MIN: CPT | Performed by: SURGERY

## 2025-06-23 PROCEDURE — 3074F SYST BP LT 130 MM HG: CPT | Performed by: SURGERY

## 2025-06-23 RX ORDER — IOPAMIDOL 755 MG/ML
141 INJECTION, SOLUTION INTRAVASCULAR ONCE
Status: COMPLETED | OUTPATIENT
Start: 2025-06-23 | End: 2025-06-23

## 2025-06-23 RX ADMIN — IOPAMIDOL 141 ML: 755 INJECTION, SOLUTION INTRAVASCULAR at 09:15

## 2025-06-23 ASSESSMENT — PAIN SCALES - GENERAL: PAINLEVEL_OUTOF10: MODERATE PAIN (5)

## 2025-06-23 NOTE — TELEPHONE ENCOUNTER
Left patient a vm and asked him to call the clinic if scheduling closer to home was not working out and he was in need of being scheduled at the [facility].

## 2025-06-23 NOTE — TELEPHONE ENCOUNTER
"Prior Authorization Retail Medication Request    Medication/Dose: Zepbound  Diagnosis and ICD code (if different than what is on RX):    New/renewal/insurance change PA/secondary ins. PA:  Previously Tried and Failed:  diet and exercise for at least 3 months without clinically effective sustainable weight loss. Wegovy: tried/failed.   Rationale: Saqib Bishop is a 44 year old male with a diagnosis of Obesity (BMI at least 30 kg/m2). Looking to get coverage for Zepbound. Saxenda is on long term FDA shortage list, therefore is not available. Phentermine, Qsymia, Contrave, and other stimulants are contraindicated due to tachycardia. Qsymia, topiramate are contraindicated due to current TBI. Orlistat     Estimated body mass index is 38.26 kg/m  as calculated from the following:    Height as of an earlier encounter on 6/23/25: 6' 1\" (1.854 m).    Weight as of an earlier encounter on 6/23/25: 290 lb (131.5 kg).     Insurance   Primary: Medicare   Insurance ID:      Secondary (if applicable):  Insurance ID:  Medicaid     Pharmacy Information (if different than what is on RX)  Name:    Phone:    Fax:    Was able to get Wegovy approved for obesity/Weight Management this year, through Secondary medicaid insurance.   Clinic Information  Preferred routing pool for dept communication:   Lauren Bloch, RicaD, BCACP   Medication Therapy Management Pharmacist   [facility]        "
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
## 2025-06-23 NOTE — Clinical Note
Had visit with him today, he has been off Wegovy for 1 month due to lack of coverage. Dr. Mancilla pulled me into his visit to [facility] next steps as he really needs to get this hernia repaired. Will try for Zepbound.

## 2025-06-23 NOTE — NURSING NOTE
Current patient location: [address] [place] [id number]    Is the patient currently in the state of [place]? YES    Visit mode: VIDEO    If the visit is dropped, the patient can be reconnected by:VIDEO VISIT: Text to cell phone:   Telephone Information:   Mobile [phone]       Will anyone else be joining the visit? NO  (If patient encounters technical issues they should call [phone]    Are changes needed to the allergy or medication list? No    Are refills needed on medications prescribed by this physician? NO    Rooming Documentation:  Questionnaire(s) completed    Reason for visit: Medication Therapy Management    Tova CARTAGENA

## 2025-06-23 NOTE — PATIENT INSTRUCTIONS
You met with Dr. Nii Mancilla.      Today's visit instructions:    Return to the Surgery Clinic to see Dr. Mancilla when you have reached your weight loss goal to rediscuss hernia repair.     Meet with the MTM (Pharmacist) as recommended.     A referral has been placed for you to consult with Pulmonology.  They will call you directly to arrange this visit.    If you have questions please contact Yamilet RN or Ann RN during regular clinic hours, Monday through Friday 7:30 AM - 4:00 PM, or you can contact us via [employer] at anytime.       If you have urgent needs after-hours, weekends, or holidays please call the hospital at [phone] and ask to speak with our on-call General Surgery Team.    Appointment [phone]  Nurse Advice (Yamilet or Ann): [phone]   Surgery Scheduler (Caity): [phone]  Billing Customer Service:  [phone]  Fax: [phone]

## 2025-06-23 NOTE — TELEPHONE ENCOUNTER
PA Initiation    Medication: ZEPBOUND 2.5 MG/0.5ML SC SOAJ  Insurance Company: [place] Medicaid ([facility]) - Phone [phone] Fax [phone]  Pharmacy Filling the Rx: [facility] - [place], [place] - [address]  Filling Pharmacy Phone: [phone]  Filling Pharmacy Fax: [phone]  Start Date: 6/23/2025    Key: [id number]

## 2025-06-23 NOTE — DISCHARGE INSTRUCTIONS

## 2025-06-23 NOTE — PROGRESS NOTES
"General Surgery Clinic Staff:     The patient is a 43 year old man who present t the surgery clinic with has parents.  The patient is a 43-year-old man who is well known to the general surgery service. The patient has a symptomatic peristomal hernia. The hernia continues to enlarge and there are regular issue with the pouching and leaking related to the hernia size.  Blood has also been present at the ostomy and the ostomy has a small metal staple that is visible under the mucosa. The patient continues to have an open decubitus wound however the plastic surgeon who has been caring for him is no longer caring for \"wounds.\"  The patient was able to meet with the medical weight loss pharmacist today and discuss issues related to pharmacy approval for his weight loss medications.  The patient will meet again with the weight loss pharmacist to assure continued weight loss.  The patient has a large and symptomatic parastomal hernia.  The patient will need to reach his goal weight of 244 pounds prior to surgery.  The patient was successful in losing approximately 30 pounds with the use of Wegovy.  The patient will now attempt to gain approval for a second weight loss medication.  The patient notes significant shortness of breath with exertion.  I have recommended the patient be seen by a [facility] pulmonologist.    PAST MEDICAL HISTORY:  Past Medical History:   Diagnosis Date    Brain injury with brief loss of consciousness (H) 12/14/2015    Candida esophagitis (H) 08/03/2022    Decubitus ulcer of left ischial area, stage IV (H) 07/18/2019    Degenerative joint disease     Gastro-oesophageal reflux disease     Hypercalcemia 08/03/2022    Hypokalemia 08/03/2022    Hypomagnesemia 08/03/2022    Infection of amputation stump of left lower extremity (H) 11/07/2023    Necrotizing fasciitis of lower leg (H) 08/27/2023        PAST SURGICAL HISTORY:  Past Surgical History:   Procedure Laterality Date    AMPUTATE LEG " BELOW KNEE Left 8/27/2023    Procedure: Left below knee Guillotine Amputation;  Surgeon: Sesar Barth MD;  Location: [facility]    AMPUTATE LEG BELOW KNEE Left 9/2/2023    Procedure: Irrigation and Debridement leg below knee, wound vac application, Left;  Surgeon: Juan Rehman MD;  Location: [facility]    AMPUTATE LEG BELOW KNEE Left 9/7/2023    Procedure: Below Left Knee Amputation;  Surgeon: Semaj Gates MD;  Location: [facility]    BACK SURGERY      lumbar fusion    EXPLORE SPINE, REMOVE HARDWARE, COMBINED  1/13/2012    Procedure:COMBINED EXPLORE SPINE, REMOVE HARDWARE; EXPLORE SPINE, REMOVE HARDWARE L4-S1; Surgeon:FAM GONZALEZ; Location:[facility] OR    IR PICC PLACEMENT > 5 YRS OF AGE  10/10/2017    IR PICC PLACEMENT > 5 YRS OF AGE  4/9/2018    IRRIGATION AND DEBRIDEMENT DECUBITUS WITH FLAP CLOSURE, COMBINED Left 6/3/2024    Procedure: LEFT IRRIGATION AND DEBRIDEMENT, GLUTEAL FASCIOCUTANEOUS ROTATIONAL FLAP CLOSURE;  Surgeon: Ne Zavala MD;  Location[facility]    IRRIGATION AND DEBRIDEMENT FOOT, COMBINED Left 11/8/2023    Procedure: LEFT BELOW KNEE AMPUTATION STUMP IRRIGATION AND DEBRIDEMENT, VANCOMYCIN BEAD PLACEMENT;  Surgeon: Etienne Maier MD;  [facility]    IRRIGATION AND DEBRIDEMENT LOWER EXTREMITY, COMBINED Left 8/30/2023    Procedure: Irrigation and debridement lower extremity, combined and wound vac exchange;  Surgeon: Etienne Maier MD;  Location: [facility] OR    IRRIGATION AND DEBRIDEMENT LOWER EXTREMITY, COMBINED Left 9/7/2023    Procedure: IRRIGATION AND DEBRIDEMENT, LEFT LOWER EXTREMITY WITH WOUND VAC Removal;  Surgeon: Semaj Gates MD;  [facility]    IRRIGATION AND DEBRIDEMENT LOWER EXTREMITY, COMBINED Left 11/15/2023    Procedure: IRRIGATION AND DEBRIDEMENT, LOWER EXTREMITY LEFT LEG, ANTIBIOTIC BEAD REMOVAL AND PRIMARY CLOSURE;  Surgeon: Etienne Maier MD;  Location: [facility]    ORTHOPEDIC SURGERY      right foot surgery        MEDICATIONS:  Current  Outpatient Medications   Medication Sig Dispense Refill    acetaminophen (TYLENOL) 325 MG tablet Take 2 tablets (650 mg) by mouth every 4 hours as needed for other (For optimal non-opioid multimodal pain management to improve pain control.)      clindamycin (CLEOCIN T) 1 % external solution Apply topically daily as needed (to face and scalp for acne and folliculutis)      Cranberry 400 MG CAPS Take 400 mg by mouth daily      cyclobenzaprine (FLEXERIL) 10 MG tablet Take 10 mg by mouth 3 times daily as needed for muscle spasms      DULoxetine (CYMBALTA) 60 MG capsule Take 60 mg by mouth 2 times daily      fluticasone (FLONASE) 50 MCG/ACT nasal spray Spray 1 spray into both nostrils daily      hydroxychloroquine (PLAQUENIL) 200 MG tablet Take 400 mg by mouth daily      hydrOXYzine (ATARAX) 25 MG tablet Take 1 tablet (25 mg) by mouth every 6 hours as needed for other (adjuvant pain)      loratadine (CLARITIN) 10 MG tablet Take 1 tablet by mouth daily      losartan-hydrochlorothiazide (HYZAAR) 50-12.5 MG tablet Take 0.5 tablets by mouth daily (One-half tablet)      melatonin 5 MG tablet Take 1 tablet (5 mg) by mouth nightly as needed for sleep      omeprazole (PRILOSEC) 40 MG DR capsule Take 40 mg by mouth 2 times daily.      Ostomy Supplies MISC 1 each daily. 1 each 11    Ostomy Supplies MISC 1 each daily. 20 each 11    oxyCODONE (OXYCONTIN) 20 MG 12 hr tablet Take 1 tablet (20 mg) by mouth every 12 hours 20 tablet 0    oxyCODONE IR (ROXICODONE) 10 MG tablet Take 10 mg by mouth every 6 hours as needed for moderate to severe pain.      polyethylene glycol (MIRALAX) 17 GM/Dose powder Take 17 g by mouth 2 times daily as needed for constipation 510 g     polyethylene glycol-propylene glycol (SYSTANE ULTRA) 0.4-0.3 % SOLN ophthalmic solution Apply 1 drop to eye 4 times daily as needed for dry eyes      prednisoLONE acetate (PRED FORTE) 1 % ophthalmic suspension Place 1 drop into both eyes 2 times daily Per taper       pregabalin (LYRICA) 150 MG capsule Take 2 capsules (300 mg) by mouth 2 times daily (Patient taking differently: Take 150 mg by mouth 4 times daily.)      Semaglutide-Weight Management (WEGOVY) 0.5 MG/0.5ML pen Inject 0.5 mg subcutaneously once a week. For 4 weeks, after completing 4 weeks of 0.25mg dose 2 mL 0    Semaglutide-Weight Management (WEGOVY) 1.7 MG/0.75ML pen Inject 1.7 mg subcutaneously once a week. 3 mL 3    senna-docusate (SENOKOT-S/PERICOLACE) 8.6-50 MG tablet Take 1 tablet by mouth daily       No current facility-administered medications for this visit.        ALLERGIES:  Allergies   Allergen Reactions    Adhesive Tape Hives     From tape from surgery    Benzoin Hives and Rash    Droperidol Anaphylaxis    Vitamin D (Calciferol) Rash     flairs up his sarcoidosis        SOCIAL HISTORY:  Social History     Socioeconomic History    Marital status: Single     Spouse name: None    Number of children: None    Years of education: None    Highest education level: None   Tobacco Use    Smoking status: Former     Current packs/day: 0.00     Types: Cigarettes     Quit date: 2011     Years since quittin.6    Smokeless tobacco: Never   Vaping Use    Vaping status: Never Used   Substance and Sexual Activity    Alcohol use: No    Drug use: No     Social Drivers of Health     Financial Resource Strain: Not at Risk (2025)    Received from [employer]    Financial Resource Strain     Financial Resource Strain: 1   Food Insecurity: Not at Risk (2025)    Received from [employer]    Food Insecurity     Food: 1   Transportation Needs: No Transportation Needs (2025)    [facility] from [employer]    [facility]     Lack of Transportation (Medical): No     Lack of Transportation (Non-Medical): No   Physical Activity: At Risk (2025)    Received from [employer]    Physical Activity     Physical Activity: 2   Stress: At Risk (2025)    Received from [employer]    Stress     Stress: 2   Social Connections: Not  "at Risk (5/31/2025)    Received from [employer]    Social Connections     Connectedness: 1   Interpersonal Safety: Low Risk  (6/5/2025)    Interpersonal Safety     Do you feel physically and emotionally safe where you currently live?: Yes     Within the past 12 months, have you been hit, slapped, kicked or otherwise physically hurt by someone?: No     Within the past 12 months, have you been humiliated or emotionally abused in other ways by your partner or ex-partner?: No   Housing Stability: High Risk (5/31/2025)    Received from [employer]    Housing Stability Vital Sign     Unable to Pay for Housing in the Last Year: No     Number of Times Moved in the Last Year: 2     Homeless in the Last Year: No     FAMILY HISTORY:  No family history on file.     PHYSICAL EXAM:  Vital Signs: /71 (BP Location: Right arm, Patient Position: Sitting, Cuff Size: Adult Large)   Pulse 100   Resp 16   Ht 1.854 m (6' 1\")   Wt 134.2 kg (295 lb 14.4 oz)   SpO2 98%   BMI 39.04 kg/m    ABD: soft and non-tender. The patient has a non-reducible large parastomal hernia in the left lower quadrant of the abdomen. There is no erythema, and there are no other hernias.      EXT: left BKA. Excellent muscle mass in both the upper and lower extremities.       A/P: The patient was reminded about possible obstructive symptoms or strangulation related to the peristomal hernia and the patient is knowledgeable about incarceration or stand strangulation.     The patient will call return should he develop symptoms of incarceration obstruction of the peristomal hernia. The patient will call after he finds out if the new weight loss medication is approved.  I have made a referral for the patient be seen by a [facility] pulmonologist.      Nii Mancilla M.D., Ph.D.      "

## 2025-06-23 NOTE — LETTER
"6/23/2025       RE: Saqib Bishop  [address] [facility] [address]     Dear Colleague,    Thank you for referring your patient, Saqib Bishop, to the [facility] GENERAL SURGERY [facility]INIC [place] [facility]. Please see a copy of my visit note below.    General Surgery Clinic Staff:     The patient is a 43 year old man who present t the surgery clinic with has parents.  The patient is a 43-year-old man who is well known to the general surgery service. The patient has a symptomatic peristomal hernia. The hernia continues to enlarge and there are regular issue with the pouching and leaking related to the hernia size.  Blood has also been present at the ostomy and the ostomy has a small metal staple that is visible under the mucosa. The patient continues to have an open decubitus wound however the plastic surgeon who has been caring for him is no longer caring for \"wounds.\"  The patient was able to meet with the medical weight loss pharmacist today and discuss issues related to pharmacy approval for his weight loss medications.  The patient will meet again with the weight loss pharmacist to assure continued weight loss.  The patient has a large and symptomatic parastomal hernia.  The patient will need to reach his goal weight of 244 pounds prior to surgery.  The patient was successful in losing approximately 30 pounds with the use of Wegovy.  The patient will now attempt to gain approval for a second weight loss medication.  The patient notes significant shortness of breath with exertion.  I have recommended the patient be seen by a [facility] pulmonologist.    PAST MEDICAL HISTORY:  Past Medical History:   Diagnosis Date     Brain injury with brief loss of consciousness (H) 12/14/2015     Candida esophagitis (H) 08/03/2022     Decubitus ulcer of left ischial area, stage IV (H) 07/18/2019     Degenerative joint disease      Gastro-oesophageal " reflux disease      Hypercalcemia 08/03/2022     Hypokalemia 08/03/2022     Hypomagnesemia 08/03/2022     Infection of amputation stump of left lower extremity (H) 11/07/2023     Necrotizing fasciitis of lower leg (H) 08/27/2023        PAST SURGICAL HISTORY:  Past Surgical History:   Procedure Laterality Date     AMPUTATE LEG BELOW KNEE Left 8/27/2023    Procedure: Left below knee Guillotine Amputation;  Surgeon: Sesar Barth MD;  Location: [facility] OR     AMPUTATE LEG BELOW KNEE Left 9/2/2023    Procedure: Irrigation and Debridement leg below knee, wound vac application, Left;  Surgeon: Juan Rehman MD;  [facility]: [facility] OR     AMPUTATE LEG BELOW KNEE Left 9/7/2023    Procedure: Below Left Knee Amputation;  Surgeon: Semaj Gates MD;  [facility] OR     BACK SURGERY      lumbar fusion     EXPLORE SPINE, REMOVE HARDWARE, COMBINED  1/13/2012    Procedure:COMBINED EXPLORE SPINE, REMOVE HARDWARE; EXPLORE SPINE, REMOVE HARDWARE L4-S1; Surgeon:FAM GONZALEZ; Location:[facility] OR     IR PICC PLACEMENT > 5 YRS OF AGE  10/10/2017     IR PICC PLACEMENT > 5 YRS OF AGE  4/9/2018     IRRIGATION AND DEBRIDEMENT DECUBITUS WITH FLAP CLOSURE, COMBINED Left 6/3/2024    Procedure: LEFT IRRIGATION AND DEBRIDEMENT, GLUTEAL FASCIOCUTANEOUS ROTATIONAL FLAP CLOSURE;  Surgeon: Ne Zavala MD;  [facility] OR     IRRIGATION AND DEBRIDEMENT FOOT, COMBINED Left 11/8/2023    Procedure: LEFT BELOW KNEE AMPUTATION STUMP IRRIGATION AND DEBRIDEMENT, VANCOMYCIN BEAD PLACEMENT;  Surgeon: Etienne Maier MD;  [facility]     IRRIGATION AND DEBRIDEMENT LOWER EXTREMITY, COMBINED Left 8/30/2023    Procedure: Irrigation and debridement lower extremity, combined and wound vac exchange;  Surgeon: Etienne Maier MD;  Location: [facility] OR     IRRIGATION AND DEBRIDEMENT LOWER EXTREMITY, COMBINED Left 9/7/2023    Procedure: IRRIGATION AND DEBRIDEMENT, LEFT LOWER EXTREMITY WITH WOUND VAC Removal;  Surgeon: Semaj Gates  MD Chele;  [facility]     IRRIGATION AND DEBRIDEMENT LOWER EXTREMITY, COMBINED Left 11/15/2023    Procedure: IRRIGATION AND DEBRIDEMENT, LOWER EXTREMITY LEFT LEG, ANTIBIOTIC BEAD REMOVAL AND PRIMARY CLOSURE;  Surgeon: Etienne Maier MD;  [facility] OR     ORTHOPEDIC SURGERY      right foot surgery        MEDICATIONS:  Current Outpatient Medications   Medication Sig Dispense Refill     acetaminophen (TYLENOL) 325 MG tablet Take 2 tablets (650 mg) by mouth every 4 hours as needed for other (For optimal non-opioid multimodal pain management to improve pain control.)       clindamycin (CLEOCIN T) 1 % external solution Apply topically daily as needed (to face and scalp for acne and folliculutis)       Cranberry 400 MG CAPS Take 400 mg by mouth daily       cyclobenzaprine (FLEXERIL) 10 MG tablet Take 10 mg by mouth 3 times daily as needed for muscle spasms       DULoxetine (CYMBALTA) 60 MG capsule Take 60 mg by mouth 2 times daily       fluticasone (FLONASE) 50 MCG/ACT nasal spray Spray 1 spray into both nostrils daily       hydroxychloroquine (PLAQUENIL) 200 MG tablet Take 400 mg by mouth daily       hydrOXYzine (ATARAX) 25 MG tablet Take 1 tablet (25 mg) by mouth every 6 hours as needed for other (adjuvant pain)       loratadine (CLARITIN) 10 MG tablet Take 1 tablet by mouth daily       losartan-hydrochlorothiazide (HYZAAR) 50-12.5 MG tablet Take 0.5 tablets by mouth daily (One-half tablet)       melatonin 5 MG tablet Take 1 tablet (5 mg) by mouth nightly as needed for sleep       omeprazole (PRILOSEC) 40 MG DR capsule Take 40 mg by mouth 2 times daily.       Ostomy Supplies MISC 1 each daily. 1 each 11     Ostomy Supplies MISC 1 each daily. 20 each 11     oxyCODONE (OXYCONTIN) 20 MG 12 hr tablet Take 1 tablet (20 mg) by mouth every 12 hours 20 tablet 0     oxyCODONE IR (ROXICODONE) 10 MG tablet Take 10 mg by mouth every 6 hours as needed for moderate to severe pain.       polyethylene glycol (MIRALAX) 17  GM/Dose powder Take 17 g by mouth 2 times daily as needed for constipation 510 g      polyethylene glycol-propylene glycol (SYSTANE ULTRA) 0.4-0.3 % SOLN ophthalmic solution Apply 1 drop to eye 4 times daily as needed for dry eyes       prednisoLONE acetate (PRED FORTE) 1 % ophthalmic suspension Place 1 drop into both eyes 2 times daily Per taper       pregabalin (LYRICA) 150 MG capsule Take 2 capsules (300 mg) by mouth 2 times daily (Patient taking differently: Take 150 mg by mouth 4 times daily.)       Semaglutide-Weight Management (WEGOVY) 0.5 MG/0.5ML pen Inject 0.5 mg subcutaneously once a week. For 4 weeks, after completing 4 weeks of 0.25mg dose 2 mL 0     Semaglutide-Weight Management (WEGOVY) 1.7 MG/0.75ML pen Inject 1.7 mg subcutaneously once a week. 3 mL 3     senna-docusate (SENOKOT-S/PERICOLACE) 8.6-50 MG tablet Take 1 tablet by mouth daily       No current facility-administered medications for this visit.        ALLERGIES:  Allergies   Allergen Reactions     Adhesive Tape Hives     From tape from surgery     Benzoin Hives and Rash     Droperidol Anaphylaxis     Vitamin D (Calciferol) Rash     flairs up his sarcoidosis        SOCIAL HISTORY:  Social History     Socioeconomic History     Marital status: Single     Spouse name: None     Number of children: None     Years of education: None     Highest education level: None   Tobacco Use     Smoking status: Former     Current packs/day: 0.00     Types: Cigarettes     Quit date: 2011     Years since quittin.6     Smokeless tobacco: Never   Vaping Use     Vaping status: Never Used   Substance and Sexual Activity     Alcohol use: No     Drug use: No     Social Drivers of Health     Financial Resource Strain: Not at Risk (2025)    Received from [facility]    Financial Resource Strain      Financial Resource Strain: 1   Food Insecurity: Not at Risk (2025)    Received from [facility]    Food Insecurity      Food: 1   Transportation Needs: No  "Transportation Needs (5/31/2025)    Received from [facility]    [place] - Transportation      Lack of Transportation (Medical): No      Lack of Transportation (Non-Medical): No   Physical Activity: At Risk (5/31/2025)    Received from [facility]    Physical Activity      Physical Activity: 2   Stress: At Risk (5/31/2025)    Received from [facility]    Stress      Stress: 2   Social Connections: Not at Risk (5/31/2025)    Received from [facility]    Social Connections      Connectedness: 1   Interpersonal Safety: Low Risk  (6/5/2025)    Interpersonal Safety      Do you feel physically and emotionally safe where you currently live?: Yes      Within the past 12 months, have you been hit, slapped, kicked or otherwise physically hurt by someone?: No      Within the past 12 months, have you been humiliated or emotionally abused in other ways by your partner or ex-partner?: No   Housing Stability: High Risk (5/31/2025)    Received from [facility]    Housing Stability Vital Sign      Unable to Pay for Housing in the Last Year: No      Number of Times Moved in the Last Year: 2      Homeless in the Last Year: No     FAMILY HISTORY:  No family history on file.     PHYSICAL EXAM:  Vital Signs: /71 (BP Location: Right arm, Patient Position: Sitting, Cuff Size: Adult Large)   Pulse 100   Resp 16   Ht 1.854 m (6' 1\")   Wt 134.2 kg (295 lb 14.4 oz)   SpO2 98%   BMI 39.04 kg/m    ABD: soft and non-tender. The patient has a non-reducible large parastomal hernia in the left lower quadrant of the abdomen. There is no erythema, and there are no other hernias.      EXT: left BKA. Excellent muscle mass in both the upper and lower extremities.       A/P: The patient was reminded about possible obstructive symptoms or strangulation related to the peristomal hernia and the patient is knowledgeable about incarceration or stand strangulation.     The patient will call return should he develop symptoms of incarceration obstruction of the peristomal hernia. " The patient will call after he finds out if the new weight loss medication is approved.  I have made a referral for the patient be [facility] by [facility].      Nii Mancilla M.D., Ph.D.      Again, thank you for allowing me to participate in the care of your patient.      Sincerely,    Nii Mancilla MD

## 2025-06-23 NOTE — PATIENT INSTRUCTIONS
"Recommendations from today's MTM visit:                                                       Pharmacist to work on Prior Authorization for Zepbound.   If approved, start Zepbound 2.5 mg weekly for 4 weeks, then if tolerating without issues can increase to 5 mg weekly thereafter.   Follow up with Viv Traylor PA-C at [facility] 9/11/2025 as planned.     Follow-up: Return in about 4 months (around 10/23/2025) for Medication Therapy Management Pharmacist Visit. Comprehensive review of medications due at follow up    It was great speaking with you today.  I value your experience and would be very thankful for your time in providing feedback in our clinic survey. In the next few days, you may receive an email or text message from [facility] with a link to a survey related to your  clinical pharmacist.\"     To schedule another MTM appointment, please call the clinic directly or you may call the MTM scheduling line at [phone] or toll-free at [phone].     My Clinical Pharmacist's contact information:                                                      Please feel free to contact me with any questions or concerns you have.      Lauren Bloch, PharmD  Medication Therapy Management Pharmacist   [facility] [place]    Zepbound Dosing:   Start Zepbound: It is a subcutaneous injection that you inject once weekly and titrate the dose slowly over time. Make sure inject the same time each day of the week, for example Monday evenings.  Each pen is single use.  In each prescription, you will get 4 pens in each box.  You will need a new prescription for each strength of the medication.  Week 1-4: Inject 2.5 mg once weekly  Week 5-8: If tolerating, can increase to 5 mg once weekly if necessary  Week 9-12: If tolerating, can increase to 7.5 mg once weekly if necessary  Week 13-16: If tolerating, can increase to 10 mg once weekly if necessary  Week 17-20: If " tolerating, can increase to 12.5 mg once weekly if necessary  Week 21 and on: if tolerating, can increase to 15 mg once weekly if necessary    The goal is to get to a dose that is well tolerated and effective for you. You do not have to go up on the dose each month or get to maximum dose if getting an effective response with minimal side effects.     *If you are having some nausea or other side effects to where you are hesitant to move up to the next dose, stay at the same dose you are on for an additional week to see if side effect(s) improves/resolves. Make sure to take this time to hydrate and ensure you are drinking at least 64 oz water per day. If you are wanting to stay at the same dose and do not have additional refills on that prescription please reach out to the clinic.    Zepbound Administration Video: Video that was created by the .  https://www.[employer]epbSPI [employer]/how-to-use    Zepbound Copay Card:  https://www.zePlanG.com/coverage-savings    Zepbound Storage and Stability:   Make sure that when you get the prescription that you store the prescription in the refrigerator until it is time to use the Zepbound pen.  Once it is time to use the Zepbound pen, you can keep the pen at room temperature and it is good for up to 21 days at room temperature.     Zepbound Common Side Effects:   Nausea, diarrhea, constipation, headache, tiredness (fatigue), dizziness, stomach upset/pain. Less commonly, Zepbound can cause low blood sugar (symptoms: shaky, dizzy, sweaty, agitation). Please reach out to the care team should you feel like this is occurring. It is important to ensure that you are eating consistent meals and not skipping meals. Ensure you are getting at least 64 oz water daily.

## 2025-06-23 NOTE — NURSING NOTE
"Chief Complaint   Patient presents with    RECHECK     Parastomal hernia follow-up.       Vitals:    06/23/25 0949   BP: 105/71   BP Location: Right arm   Patient Position: Sitting   Cuff Size: Adult Large   Pulse: 100   Resp: 16   SpO2: 98%   Height: 6' 1\"       Body mass index is 37.92 kg/m .    Patient reports moderate pain (5/10).    Michelet Norton, EMT    "

## 2025-06-23 NOTE — TELEPHONE ENCOUNTER
Prior Authorization Approval    Medication: ZEPBOUND 2.5 MG/0.5ML SC SOAJ  Authorization Effective Date: 6/23/2025  Authorization Expiration Date: 12/20/2025  Approved Dose/Quantity: 2ml per 28 days  Reference #: Key: [id number]   Insurance Company: [place] Medicaid ([facility]) - Phone [phone] Fax [phone]  Expected CoPay: $ 0  CoPay Card Available: No    Financial Assistance Needed: no  Which Pharmacy is filling the prescription: [facility] - [place], [place] - [address]  Pharmacy Notified: no  Patient Notified: no renewal

## 2025-06-23 NOTE — TELEPHONE ENCOUNTER
Consult received via Workqueue from     Nii Mancilla MD in [facility] GENERAL SURGERY    for wound of the buttocks.    Patient was instructed to look into wound clinics closer to patients home for follow up. If unable to find closer clinic, then he is okay to schedule with one of the following providers...    Please schedule as a RETURN PATIENT with Lis Wynn NP, Michelet Flannery M.D., or Chintan Carbajal M.D. at [facility] for next available appointment.    **For all providers, Vanesa Guerrero PA-C, Dr. Vazquez, Lis Wynn NP or Dr. Carbajal, please schedule a follow up 2-3 weeks after initial appointment to follow up 4 weeks after initial appt**  --If unable to schedule within 2-3 weeks then please place on cancellation list--    Is the patient able to make their own medical decisions? Yes    Can the patient be scheduled on a Wednesday ([place]) or Thursday ([place])? NEEDS BED    Is patient a RONDA lift? PLEASE INQUIRE WHEN MAKING THE APPOINTMENT AND PUT IN APPOINTMENT NOTES    Routing to  Wound Healing Scheduling.

## 2025-06-24 ENCOUNTER — RESULTS FOLLOW-UP (OUTPATIENT)
Dept: SURGERY | Facility: CLINIC | Age: 45
End: 2025-06-24

## 2025-06-24 ENCOUNTER — PATIENT OUTREACH (OUTPATIENT)
Dept: SURGERY | Facility: CLINIC | Age: 45
End: 2025-06-24
Payer: MEDICARE

## 2025-06-24 DIAGNOSIS — R59.0 AXILLARY LYMPHADENOPATHY: ICD-10-CM

## 2025-06-24 DIAGNOSIS — I88.9 AXILLARY LYMPHADENITIS: Primary | ICD-10-CM

## 2025-06-24 RX ORDER — CEFAZOLIN SODIUM IN 0.9 % NACL 3 G/100 ML
3 INTRAVENOUS SOLUTION, PIGGYBACK (ML) INTRAVENOUS
OUTPATIENT
Start: 2025-06-24

## 2025-06-24 RX ORDER — CEFAZOLIN SODIUM IN 0.9 % NACL 3 G/100 ML
3 INTRAVENOUS SOLUTION, PIGGYBACK (ML) INTRAVENOUS SEE ADMIN INSTRUCTIONS
OUTPATIENT
Start: 2025-06-24

## 2025-06-24 NOTE — PATIENT INSTRUCTIONS
Reminder:  Surgery Requirements  Your surgery will be at [address] [place] [address].  You will need to arrive 2 hours early.  You will need someone to drive you home (over 18 years old) and stay with you for 24 hours after the procedure.  You will need a preop physical with [facility] ([facility]) within 30 days of surgery- closer is always better.  Stop any blood thinners, vitamins, minerals, or herbal supplements 5 days before surgery.  If you are taking a prescribed blood thinner or weight loss medication please let us know for specific instructions. Do not take Zepbound for 7 days prior to surgery.  Fasting- a nurse from [facility] will call you 1-2 days before surgery to confirm your procedure and tell you when to stop eating and drinking. If you had you preoperative physical with the Anesthesia Team/PAC, you do not get this call as it is discussed at the time of your visit.  Wash with Hibiclens (can be purchased at any pharmacy) or or you can stop at any [facility] and they will give you a bottle to use  the night before surgery and morning of surgery. See instructions in the Surgery Packet.  If you would like a procedure estimate please call Cost of Care at [phone] during the hours of 8 AM - 3 PM (option #2 for on-line request and option #3 for a representative).   Billing Customer Service:  [phone]     After Your Lymph Node Removal      Incision care   You may take a shower the day after surgery. Carefully wash your incision with soap and water. Do not submerge yourself in water (bath, whirlpool, hot tub, pool, lake) for 14 days after surgery.   Remove the gauze bandage 1-2 days after surgery but leave the medical tape (Steri-Strips) or glue in place. These will loosen and fall off on their own 1-2 weeks after surgery.    Always wash your hands before touching your incisions or removing bandages.   It is not unusual to form a collection of fluid or blood under your  incision that may feel firm or squishy- it can take several weeks to months for your body to reabsorb it.  At times, it may even drain.  If that should happen keep the area clean with soap, water,  and cover with a clean gauze dressing. You can change this daily or as needed.     Other medicines   Wait to start aspirin or blood thinners until the day after surgery. You can continue your regular medicines at your normal time the day after surgery.   Your pain medicine may cause constipation (hard, dry stools). To help with this, take the stool softener your doctor gave you or an over-the-counter stool softener or laxative. You can stop taking this when you are no longer taking pain medicine and your bowel movements are back to normal.      For pain or discomfort   Take the narcotic pain medicine your doctor gave you as needed and as instructed on the bottle. If you prefer to use over-the-counter medication, use acetaminophen (Tylenol) or ibuprofen (Advil, Motrin) as instructed on the box. Do not take Tylenol if it is in your narcotic pain medication.    Use an ice pack on your surgical cut (incision) for 20 minutes at a time as needed for the first 24 hours. Be sure to protect your skin by putting a cloth between the ice pack and your skin.      Activities   No driving until you feel it s safe to do so. Don t drive while taking narcotic pain medicine.      Diet   You can eat your regular meals after surgery.      Results   You should know any test results about 5-7 business days after surgery from your referring provider.     When to call the doctor   Call your doctor if you have:   A fever above 101 F (38.3 C) (taken under the tongue), or a fever or chills lasting more than a day.   Redness at the incision site.   Any fluid or blood draining from the incision, especially if it smells bad.    Severe pain that doesn t improve with pain medicine.      We will call you 2 to 4 days after surgery to review this handout,  answer questions and help arrange after-surgery care. If you have questions or concerns, please call [phone] during regular office hours. If you need to call after business hours, call [phone] and ask to page the surgeon on-call.

## 2025-06-24 NOTE — PROGRESS NOTES
06/24/25    1:38 PM     Attempted to reach the patient to discuss surgical logistics and post op teaching information with no answer. LM on VM to call office.  [employer] message also sent.    Dr. Mancilla  Excision right axillary LN, [place]  PAC, General  Hold Zepbound for 7 days prior to surgery

## 2025-06-25 NOTE — PROGRESS NOTES
Pre and Post op Patient Education/Teaching Flowsheet  Relevant Diagnosis:  LN biopsy  Teaching Topic:  Pre and post op teaching  Person(s) Involved in teaching:  Patient     Motivation Level:  Asks Questions:  Yes  Eager to Learn:  Yes  Cooperative:  Yes  Receptive (willing/able to accept information):  Yes  Any cultural factors/Sabianism beliefs that may influence understanding or compliance?  No    Patient/caregiver/family demonstrates understanding of the following:  Reason for the appointment, diagnosis, and treatment plan:  Yes  Patient demonstrates understanding of the following:  Pre-op bowel prep:  No  Post-op pain management recommendations (medications, ice compress, binder/athletic supporter (if applicable), etc.:  Yes  Inguinal hernia patients:  Post-op urinary retention- discussed signs/symptoms and visit to ER for Dyer catheter placement and to stay in place for at least 48 hours:  NA  Restrictions:  NA  Medications to take the day of surgery:  Per PCP. Hold Zepbound for 7 days prior to surgery.  Blood thinner medications discussed and when to stop (if applicable):  Yes  Wound care:  Yes  Diabetes medication management (if applicable):  Per PCP  Which situations necessitate calling provider and whom to contact:  Discussed how to contact the hospital, nurse, and clinic scheduling staff if necessary    Infection Prevention: Patient demonstrates understanding of the following:  Patient instructed on hand hygiene:  Yes  Surgical procedure site care will be taught and will be reviewed at the time of discharge  Signs and symptoms of infection taught:  Yes  Wound care reviewed and will be taught at the time of discharge  Central venous catheter care will be taught at the time of discharge (if applicable)    Post-op follow-up:  Instructional materials used/given/mailed:  Surgical logistics, post op teaching sheet, and surgical scrub    Logistics:  Date and time of surgery:  TBD  Location of surgery:  [address] [place] [address]  History and Physical and any other testing necessary prior to surgery:  Yes  Required time line for completion of History and Physical and any pre-op testing:  Yes  Discuss need for someone to drive patient home and stay with them for 24 hours:  Yes  Pre-op showering/scrub information with Surgical Scrub:  Yes  NPO Guidelines:  NPO per Anesthesia Guidelines

## 2025-06-30 NOTE — CONFIDENTIAL NOTE
FUTURE VISIT INFORMATION      SURGERY INFORMATION:  Date: 7.3.25   Location: [facility] OR  Surgeon:  Nii Mancilla MD   Anesthesia Type:  General   Procedure: LYMPHADENECTOMY, AXILLARY     RECORDS REQUESTED FROM:       Primary Care Provider:   Zeina Larios MD   Pertinent Medical History:  Axillary lymphadenopathy   Most recent EKG+ Tracin23   Most recent ECHO:  23

## 2025-07-02 ENCOUNTER — ANESTHESIA EVENT (OUTPATIENT)
Dept: SURGERY | Facility: CLINIC | Age: 45
End: 2025-07-02
Payer: MEDICARE

## 2025-07-02 ENCOUNTER — VIRTUAL VISIT (OUTPATIENT)
Dept: SURGERY | Facility: CLINIC | Age: 45
End: 2025-07-02
Payer: MEDICARE

## 2025-07-02 ENCOUNTER — PRE VISIT (OUTPATIENT)
Dept: SURGERY | Facility: CLINIC | Age: 45
End: 2025-07-02

## 2025-07-02 VITALS — WEIGHT: 295 LBS | HEIGHT: 73 IN | BODY MASS INDEX: 39.1 KG/M2

## 2025-07-02 DIAGNOSIS — R59.0 AXILLARY LYMPHADENOPATHY: ICD-10-CM

## 2025-07-02 DIAGNOSIS — Z01.818 PRE-OP EVALUATION: Primary | ICD-10-CM

## 2025-07-02 ASSESSMENT — COPD QUESTIONNAIRES
COPD: 0
COPD: 0

## 2025-07-02 ASSESSMENT — LIFESTYLE VARIABLES
TOBACCO_USE: 1
TOBACCO_USE: 1

## 2025-07-02 ASSESSMENT — ENCOUNTER SYMPTOMS
SEIZURES: 0
SEIZURES: 0

## 2025-07-02 NOTE — PROGRESS NOTES
Saqib is a 44 year old who is being evaluated via a billable video visit.      How would you like to obtain your AVS? Leyda Brand   Saqib is a 44 year old, presenting for the following health issues:  Pre-Op Exam            IHSAN Harrell LPN

## 2025-07-02 NOTE — H&P
Pre-Operative H & P     CC:  Preoperative exam to assess for increased cardiopulmonary risk while undergoing surgery and anesthesia.    Date of Encounter: 7/2/2025  Primary Care Physician:       Reason for visit:   Encounter Diagnoses   Name Primary?    Pre-op evaluation Yes    Axillary lymphadenopathy        HPI  Saqib Bishop is a 44 year old male who presents for pre-operative H & P in preparation for  Procedure Information       Case: [id number] Date/Time: 07/03/25 0730    Procedure: LYMPHADENECTOMY, AXILLARY (Right: Axilla)    Anesthesia type: General    Diagnosis: Axillary lymphadenopathy [R59.0]    Pre-op diagnosis: Axillary lymphadenopathy [R59.0]    [facility]: [facility]    Providers: Nii Mancilla MD            Patient is being evaluated for comorbid conditions of s/p BKA, decubitus ulcers, GERD, anxiety, colostomy present- hernia, h/o DVT, PTSD, obesity, RLS    Mr. Bishop has known symptomatic peristomal hernia. He follows with Dr. Mancilla. He has been working on weight loss. Dr. Mancilla ordered imaging that revealed diffuse lymphadenopathy in bilateral axillae and throughout the abdomen and pelvis. He is now scheduled for the above procedure.     History is obtained from the patient and chart review    Hx of abnormal bleeding or anti-platelet use: denies      Past Medical History  Past Medical History:   Diagnosis Date    Brain injury with brief loss of consciousness (H) 12/14/2015    Candida esophagitis (H) 08/03/2022    Decubitus ulcer of left ischial area, stage IV (H) 07/18/2019    Degenerative joint disease     Gastro-oesophageal reflux disease     Hypercalcemia 08/03/2022    Hypokalemia 08/03/2022    Hypomagnesemia 08/03/2022    Infection of amputation stump of left lower extremity (H) 11/07/2023    Necrotizing fasciitis of lower leg (H) 08/27/2023       Past Surgical History  Past Surgical History:   Procedure Laterality Date    AMPUTATE LEG BELOW KNEE Left 8/27/2023    Procedure:  Left below knee Guillotine Amputation;  Surgeon: Sesar Barth MD;  [facility]    AMPUTATE LEG BELOW KNEE Left 9/2/2023    Procedure: Irrigation and Debridement leg below knee, wound vac application, Left;  Surgeon: Juan Rehman MD;  [facility]    AMPUTATE LEG BELOW KNEE Left 9/7/2023    Procedure: Below Left Knee Amputation;  Surgeon: Semaj Gates MD;  [facility]    BACK SURGERY      lumbar fusion    EXPLORE SPINE, REMOVE HARDWARE, COMBINED  1/13/2012    Procedure:COMBINED EXPLORE SPINE, REMOVE HARDWARE; EXPLORE SPINE, REMOVE HARDWARE L4-S1; Surgeon:FAM GONZALEZ; Location:[facility]    IR PICC PLACEMENT > 5 YRS OF AGE  10/10/2017    IR PICC PLACEMENT > 5 YRS OF AGE  4/9/2018    IRRIGATION AND DEBRIDEMENT DECUBITUS WITH FLAP CLOSURE, COMBINED Left 6/3/2024    Procedure: LEFT IRRIGATION AND DEBRIDEMENT, GLUTEAL FASCIOCUTANEOUS ROTATIONAL FLAP CLOSURE;  Surgeon: Ne Zavala MD;  [facility]    IRRIGATION AND DEBRIDEMENT FOOT, COMBINED Left 11/8/2023    Procedure: LEFT BELOW KNEE AMPUTATION STUMP IRRIGATION AND DEBRIDEMENT, VANCOMYCIN BEAD PLACEMENT;  Surgeon: Etienne Maier MD;  [facility]    IRRIGATION AND DEBRIDEMENT LOWER EXTREMITY, COMBINED Left 8/30/2023    Procedure: Irrigation and debridement lower extremity, combined and wound vac exchange;  Surgeon: Etienne Maier MD;  [facility]    IRRIGATION AND DEBRIDEMENT LOWER EXTREMITY, COMBINED Left 9/7/2023    Procedure: IRRIGATION AND DEBRIDEMENT, LEFT LOWER EXTREMITY WITH WOUND VAC Removal;  Surgeon: Semaj Gates MD;  [facility]    IRRIGATION AND DEBRIDEMENT LOWER EXTREMITY, COMBINED Left 11/15/2023    Procedure: IRRIGATION AND DEBRIDEMENT, LOWER EXTREMITY LEFT LEG, ANTIBIOTIC BEAD REMOVAL AND PRIMARY CLOSURE;  Surgeon: Etienne Maier MD;  [facility]    ORTHOPEDIC SURGERY      right foot surgery       Prior to Admission Medications  Current Outpatient Medications    Medication Sig Dispense Refill    acetaminophen (TYLENOL) 325 MG tablet Take 2 tablets (650 mg) by mouth every 4 hours as needed for other (For optimal non-opioid multimodal pain management to improve pain control.)      cyclobenzaprine (FLEXERIL) 10 MG tablet Take 10 mg by mouth 3 times daily as needed for muscle spasms      DULoxetine (CYMBALTA) 60 MG capsule Take 60 mg by mouth daily.      fluticasone (FLONASE) 50 MCG/ACT nasal spray Spray 1 spray into both nostrils as needed.      hydrOXYzine (ATARAX) 25 MG tablet Take 1 tablet (25 mg) by mouth every 6 hours as needed for other (adjuvant pain)      loratadine (CLARITIN) 10 MG tablet Take 1 tablet by mouth as needed.      losartan-hydrochlorothiazide (HYZAAR) 50-12.5 MG tablet Take 0.5 tablets by mouth every morning. (One-half tablet)      melatonin 5 MG tablet Take 1 tablet (5 mg) by mouth nightly as needed for sleep      omeprazole (PRILOSEC) 40 MG DR capsule Take 40 mg by mouth 2 times daily.      oxyCODONE (OXYCONTIN) 20 MG 12 hr tablet Take 1 tablet (20 mg) by mouth every 12 hours 20 tablet 0    polyethylene glycol (MIRALAX) 17 GM/Dose powder Take 17 g by mouth 2 times daily as needed for constipation 510 g     pregabalin (LYRICA) 150 MG capsule Take 2 capsules (300 mg) by mouth 2 times daily (Patient taking differently: Take 150 mg by mouth 4 times daily.)      senna-docusate (SENOKOT-S/PERICOLACE) 8.6-50 MG tablet Take 1 tablet by mouth daily      clindamycin (CLEOCIN T) 1 % external solution Apply topically daily as needed (to face and scalp for acne and folliculutis)      Cranberry 400 MG CAPS Take 400 mg by mouth daily (Patient not taking: Reported on 7/2/2025)      Ostomy Supplies MISC 1 each daily. 1 each 11    Ostomy Supplies MISC 1 each daily. 20 each 11    oxyCODONE IR (ROXICODONE) 10 MG tablet Take 10 mg by mouth every 6 hours as needed for moderate to severe pain.      polyethylene glycol-propylene glycol (SYSTANE ULTRA) 0.4-0.3 % SOLN  ophthalmic solution Apply 1 drop to eye 4 times daily as needed for dry eyes      prednisoLONE acetate (PRED FORTE) 1 % ophthalmic suspension Place 1 drop into both eyes 2 times daily Per taper      tirzepatide-Weight Management (ZEPBOUND) 2.5 MG/0.5ML prefilled pen Inject 0.5 mLs (2.5 mg) subcutaneously every 7 days. (Patient not taking: Reported on 2025) 2 mL 0    tirzepatide-Weight Management (ZEPBOUND) 5 MG/0.5ML prefilled pen Inject 0.5 mLs (5 mg) subcutaneously every 7 days. To start after completing 2.5 mg once weekly for 4 weeks. (Patient not taking: Reported on 2025) 2 mL 1       Allergies  Allergies   Allergen Reactions    Adhesive Tape Hives     From tape from surgery    Benzoin Hives and Rash    Droperidol Anaphylaxis    Vitamin D (Calciferol) Rash     flairs up his sarcoidosis       Social History  Social History     Socioeconomic History    Marital status: Single     Spouse name: Not on file    Number of children: Not on file    Years of education: Not on file    Highest education level: Not on file   Occupational History    Not on file   Tobacco Use    Smoking status: Former     Current packs/day: 0.00     Types: Cigarettes     Quit date: 2011     Years since quittin.6     Passive exposure: Past    Smokeless tobacco: Never   Vaping Use    Vaping status: Never Used   Substance and Sexual Activity    Alcohol use: No    Drug use: No    Sexual activity: Not on file   Other Topics Concern    Not on file   Social History Narrative    Not on file     Social Drivers of Health     Financial Resource Strain: Not at Risk (2025)    Received from [facility]    Financial Resource Strain     How hard is it for you to pay for the very basics like food, housing, heating, medical care, and medications?: 1   Food Insecurity: Not at Risk (2025)    Received from [facility]    Food Insecurity     Within the past 12 months, you worried that your food would run out before you got money to buy more.: 1    Transportation Needs: No Transportation Needs (5/31/2025)    Received from [employer]    PRAVANDANA - Transportation     Lack of Transportation (Medical): No     Lack of Transportation (Non-Medical): No   Physical Activity: At Risk (5/31/2025)    Received from [employer]    Physical Activity     On average, how many minutes do you engage in exercise at this level?: 2   Stress: At Risk (5/31/2025)    Received from [employer]    Stress     Do you feel these kinds of stress these days?: 2   Social Connections: Not at Risk (5/31/2025)    Received from [employer]    Social Connections     How often do you see or talk to people that you care about and feel close to? (For example: talking to friends on phone, visiting friends or family, going to Zoro[employer] or club meetings): 1   Interpersonal Safety: Low Risk  (6/5/2025)    Interpersonal Safety     Do you feel physically and emotionally safe where you currently live?: Yes     Within the past 12 months, have you been hit, slapped, kicked or otherwise physically hurt by someone?: No     Within the past 12 months, have you been humiliated or emotionally abused in other ways by your partner or ex-partner?: No   Housing Stability: High Risk (5/31/2025)    Received from [employer]    Housing Stability Vital Sign     Unable to Pay for Housing in the Last Year: No     Number of Times Moved in the Last Year: 2     Homeless in the Last Year: No       Family History  Family History   Problem Relation Age of Onset    Anesthesia Reaction No family hx of     Deep Vein Thrombosis (DVT) No family hx of        Review of Systems  The complete review of systems is negative other than noted in the HPI or here.   Anesthesia Evaluation   Pt has had prior anesthetic.         ROS/MED HX  ENT/Pulmonary:     (+)     IAN risk factors,   obese,        tobacco use, Past use,                    (-) asthma and COPD   Neurologic:     (+)                     Spinal cord injury, year sustained: 2015, level of injury: T12,      (-) no  seizures and no CVA   Cardiovascular:     (+)  hypertension- -   -  - -                                 Previous cardiac testing   Echo: Date: 2023 Results:  Interpretation Summary  Left ventricular size, wall motion and function are normal. The ejection  fraction is 60-65%.  Right ventricular function, chamber size, wall motion, and thickness are  normal.  IVC diameter >2.1 cm collapsing <50% with sniff suggests a high RA pressure  estimated at 15 mmHg or greater.  No pericardial effusion is present.     There is no prior study for direct comparison.    Stress Test:  Date: Results:    ECG Reviewed:  Date: 2023 Results:  Sinus tachycardia   Otherwise normal ECG   Cath:  Date: Results:   (-) taking anticoagulants/antiplatelets   METS/Exercise Tolerance:  Comment: Using manual wheelchair. Reports he can transfer himself from chair to bed (depending on bed height). Denies SHRESTHA or CP   Hematologic:     (+) History of blood clots,    pt is not anticoagulated,  history of blood transfusion, no previous transfusion reaction,        Musculoskeletal:       GI/Hepatic: Comment: Ostomy present, hernia at ostomy site    (+) GERD,                   Renal/Genitourinary:       Endo:     (+)               Obesity,       Psychiatric/Substance Use:       Infectious Disease:       Malignancy:       Other:            Virtual visit -  No vitals were obtained    Physical Exam  Constitutional: Awake, alert, cooperative, no apparent distress, and appears stated age.  HENT: Normocephalic  Respiratory: non labored breathing   Neurologic: Awake, alert, oriented to name, place and time.   Neuropsychiatric: Calm, cooperative. Normal affect.      Prior Labs/Diagnostic Studies   All labs and imaging pertinent to the visit personally reviewed     EKG/ stress test - if available please see in ROS above   Echo result w/o MOPS: Interpretation SummaryLeft ventricular size, wall motion and function are normal. The ejectionfraction is 60-65%.Right  ventricular function, chamber size, wall motion, and thickness arenormal.IVC diameter >2.1 cm collapsing <50% with sniff suggests a high RA pressureestimated at 15 mmHg or greater.No pericardial effusion is present. There is no prior study for direct comparison.           No data to display                  The patient's records and results pertinent to the visit personally reviewed by this provider.     Outside records reviewed from: Care Everywhere      Assessment    Saqib Bishop is a 44 year old male seen as a PAC referral for risk assessment and optimization for anesthesia.    Plan/Recommendations  Pt will be optimized for the proposed procedure.  See below for details on the assessment, risk, and preoperative recommendations    NEUROLOGY  - No history of TIA, CVA or seizure  -Post Op delirium risk factors:  No risk identified    ENT  - No current airway concerns.  Will need to be reassessed day of surgery.  Mallampati: Unable to assess  TM: Unable to assess    CARDIAC  - No history of CAD and Afib  -hypertension using hyzaar  -denies cardiac history or symptoms. Uses manual wheelchair and able to transfer self (depending on bed height). Denies CP or SHRESTHA with those activities   - METS (Metabolic Equivalents)  Patient CANNOT perform 4 METS exercise without symptoms             Total Score: 1    Functional Capacity: Unable to complete 4 METS      RCRI-Very low risk: Class 1 0.4% complication rate             Total Score: 0        PULMONARY  IAN Medium Risk             Total Score: 3    IAN: Hypertension    IAN: BMI over 35 kg/m2    IAN: Male      -per general surgery note, made pulmonary referral due to SHRESTHA. Discussed this with patient. He denies SHRESTHA. He reports he endorsed some SOB to his surgeon when his hernia is in certain positions and pressing up toward his chest. He denies current symptoms. As above, he reports he uses manual wheelchair and is able to transfer himself without exertional symptoms .  -  "Tobacco History    History   Smoking Status    Former    Types: Cigarettes   Smokeless Tobacco    Never       GI  GERD, controlled  -ostomy present. Current hernia at ostomy site   PONV Medium Risk  Total Score: 2           1 AN PONV: Patient is not a current smoker    1 AN PONV: Intended Post Op Opioids        /RENAL  - Baseline Creatinine WNL    ENDOCRINE    - BMI: Estimated body mass index is 38.92 kg/m  as calculated from the following:    Height as of this encounter: 1.854 m (6' 1\").    Weight as of this encounter: 133.8 kg (295 lb).  Obesity (BMI >30)  - No history of Diabetes Mellitus  -stopped GLP-1 about a month ago. Reports he has now been prescribed zepbound and will start after surgery.     HEME  VTE Medium Risk 1.8%             Total Score: 5    VTE: Male    VTE: Pt history of VTE      - No history of abnormal bleeding or antiplatelet use.  -diffuse lymphadenopathy. Above procedure planned     MSK  Patient is NOT Frail             Total Score: 0      -PM&R referral not indicated     Different anesthesia methods/types have been discussed with the patient, but they are aware that the final plan will be decided by the assigned anesthesia provider on the date of service.    The patient is optimized for their procedure. AVS with information on surgery time/arrival time, meds and NPO status given by nursing staff. No further diagnostic testing indicated.    Please refer to the physical examination documented by the anesthesiologist in the anesthesia record on the day of surgery.    Video-Visit Details    Type of service:  Video Visit    Provider received verbal consent for a Video Visit from the patient? Yes     Originating Location (pt. Location): Home    Distant Location (provider location):  Off-site  Mode of Communication:  Video Conference via [employer]    44 minutes were spent on the date of the encounter performing chart review, history and exam, documentation and/or discussion with other providers " about the issues documented above.    Barbara Katz PA-C  Preoperative Assessment Center  [facility]  Clinic and Surgery Center  Phone: [phone]  Fax: [phone]

## 2025-07-02 NOTE — PATIENT INSTRUCTIONS
Preparing for Your Surgery      Name:  Saqib Bishop   MRN:  [id number]   :  1980   Today's Date:  2025     The [place] [employer] [address] Construction Project                                Timeline 2025 -2025    This project will affect travel to the [facility] and [facility], as well as the [facility].      Please check the [facility] [address] project website for the most up to date information and give yourself additional time to reach your destination.        Arriving for surgery:  Surgery date:  7/3/25  Arrival time:  5:30 am  Surgery time: 7:30 am    Please come to:     Please come to:       [facility]    [address] [place]  [address]     The [facility] [place] Patient/Visitor Ramp is at [address]. Patients and visitors who self-park will receive the reduced hospital parking rate. If the Patient /Visitor Ramp is full, please follow the signs to the [employer] car park located at the [facility] entrance.       parking is available (24 hours/ 7 days a week)      Discounted parking pass options are available for patients and visitors. They can be purchased at the [employer] desk at the [facility] entrance.     -    Stop at the security desk and they will direct surgery patients to the Surgery Check in and Family Lounge. [phone]        - If you need directions, a wheelchair or an escort please stop at the Information/security desk in the lobby.     What can I eat or drink?  -  You may eat and drink normally up to 8 hours prior to arrival time. (Until 9:30 pm on 25)  -  You may have clear liquids until 2 hours prior to arrival time. (Until 3:30 am on 7/3/25)    Examples of clear liquids:  Water  Clear broth  Juices (apple, white grape, white cranberry  and cider) without pulp  Noncarbonated,  powder based beverages  (lemonade and Jas-Aid)  Sodas (Sprite, 7-Up, ginger ale and seltzer)  Coffee or tea (without milk or cream)  Gatorade    -  No Alcohol or cannabis products for at least 24 hours before surgery.     Which medicines can I take?    Hold Aspirin for 7 days before surgery.   Hold Multivitamins for 7 days before surgery.  Hold Supplements for 7 days before surgery.  Hold Ibuprofen (Advil, Motrin) for 1 day(s) before surgery--unless otherwise directed by surgeon.  Hold Naproxen (Aleve) for 4 days before surgery.    -  DO NOT take these medications the day of surgery:  Topical medications  Hydroxyzine (Atarax)  Losartan-Hydrochlorothiazide (Hyzaar)  Miralax  Senokot    -  PLEASE TAKE these medications the day of surgery:  Tylenol if needed  Flexeril if needed  Duloxetine (Cymbalta)  Flonase nasal spray  Loratadine (Claritin) if needed  Melatonin the night before surgery  Omeprazole (Prilosec)  Oxycodone  Systane eye drops if needed  Pred Forte eye drops  Pregabalin (Lyrica)        How do I prepare myself?  - Please take 2 showers (one the night prior to surgery and one the morning of surgery) using Scrubcare or Hibiclens soap.    Use this soap only from the neck to your toes. Avoid genital area      Leave the soap on your skin for one minute--then rinse thoroughly.      You may use your own shampoo and conditioner. No other hair products.   - Please remove all jewelry and body piercings.  - No lotions, deodorants or fragrance.  - No makeup or fingernail polish.   - Bring your ID and insurance card.    -For patients being admitted to the [facility] [place] [place]  Family members are to take the patient belongings with them and place them in the lockers provided in the Family Lounge.  Please limit the items you bring to 1 bag as the lockers are small.      -If you use a CPAP machine, please bring the CPAP machine, tubing, and mask to hospital.    -If you have a Deep Brain Stimulator, Spinal Cord Stimulator,  or any Neuro Stimulator device---you must bring the remote control to the hospital.      ALL PATIENTS GOING HOME THE SAME DAY OF SURGERY ARE REQUIRED TO HAVE A RESPONSIBLE ADULT TO DRIVE AND BE IN ATTENDANCE WITH THEM FOR 24 HOURS FOLLOWING SURGERY.    Covid testing policy as of 12/06/2022  Your surgeon will notify and schedule you for a COVID test if one is needed before surgery--please direct any questions or COVID symptoms to your surgeon      Questions or Concerns:    - For any questions regarding the day of surgery or your hospital stay, please contact the Pre Admission Nursing Office at [phone].       - If you have health changes between today and your surgery, please call your surgeon.       - For questions after surgery, please call your surgeons office.           Current Visitor Guidelines    2 adult visitors for adult patients in the pre op area    If additional visitors come (beyond a patient care attendant or a group home caregiver), the additional visitors will be asked to wait in the main lobby of the hospital    Visiting hours: 8 a.m. to 8:30 p.m.    Patients confirmed or suspected to have symptoms of COVID 19 or flu:     No visitors allowed for adult patients.   Children (under age 18) can have 1 named visitor.     People who are sick or showing symptoms of COVID 19 or flu:    Are not allowed to visit patients--we can only make exceptions in special situations.       Please follow these guidelines for your visit:          Please maintain social distance          Masking is optional--however at times you may be asked to wear a mask for the safety of yourself and others     Clean your hands with alcohol hand . Do this when you arrive at and leave the building and patient room,    And again after you touch your mask or anything in the room.     Go directly to and from the room you are visiting.     Stay in the patient s room during your visit. Limit going to other places in the hospital as  much as possible     Leave bags and jackets at home or in the car.     For everyone s health, please don t come and go during your visit. That includes for smoking   during your visit.

## 2025-07-03 ENCOUNTER — HOSPITAL ENCOUNTER (OUTPATIENT)
Facility: CLINIC | Age: 45
Discharge: HOME | End: 2025-07-03
Attending: SURGERY | Admitting: SURGERY
Payer: MEDICARE

## 2025-07-03 ENCOUNTER — ANESTHESIA (OUTPATIENT)
Dept: SURGERY | Facility: CLINIC | Age: 45
End: 2025-07-03
Payer: MEDICARE

## 2025-07-03 VITALS
BODY MASS INDEX: 36.73 KG/M2 | SYSTOLIC BLOOD PRESSURE: 137 MMHG | HEART RATE: 95 BPM | TEMPERATURE: 97.7 F | HEIGHT: 73 IN | OXYGEN SATURATION: 94 % | WEIGHT: 277.12 LBS | DIASTOLIC BLOOD PRESSURE: 90 MMHG | RESPIRATION RATE: 14 BRPM

## 2025-07-03 DIAGNOSIS — G89.18 POST-OP PAIN: Primary | ICD-10-CM

## 2025-07-03 LAB
GLUCOSE BLDC GLUCOMTR-MCNC: 128 MG/DL (ref 70–99)
PATH REPORT.COMMENTS IMP SPEC: NORMAL
PATH REPORT.FINAL DX SPEC: NORMAL
PATH REPORT.MICROSCOPIC SPEC OTHER STN: NORMAL
PATH REPORT.RELEVANT HX SPEC: NORMAL

## 2025-07-03 PROCEDURE — 258N000003 HC RX IP 258 OP 636: Performed by: NURSE ANESTHETIST, CERTIFIED REGISTERED

## 2025-07-03 PROCEDURE — 88239 TISSUE CULTURE TUMOR: CPT | Performed by: SURGERY

## 2025-07-03 PROCEDURE — 250N000013 HC RX MED GY IP 250 OP 250 PS 637: Performed by: ANESTHESIOLOGY

## 2025-07-03 PROCEDURE — 88280 CHROMOSOME KARYOTYPE STUDY: CPT | Performed by: SURGERY

## 2025-07-03 PROCEDURE — 88341 IMHCHEM/IMCYTCHM EA ADD ANTB: CPT | Mod: 26 | Performed by: STUDENT IN AN ORGANIZED HEALTH CARE EDUCATION/TRAINING PROGRAM

## 2025-07-03 PROCEDURE — 88185 FLOWCYTOMETRY/TC ADD-ON: CPT | Performed by: SURGERY

## 2025-07-03 PROCEDURE — 88189 FLOWCYTOMETRY/READ 16 & >: CPT | Performed by: STUDENT IN AN ORGANIZED HEALTH CARE EDUCATION/TRAINING PROGRAM

## 2025-07-03 PROCEDURE — 250N000009 HC RX 250: Performed by: SURGERY

## 2025-07-03 PROCEDURE — 88313 SPECIAL STAINS GROUP 2: CPT | Mod: 26 | Performed by: STUDENT IN AN ORGANIZED HEALTH CARE EDUCATION/TRAINING PROGRAM

## 2025-07-03 PROCEDURE — 999N000141 HC STATISTIC PRE-PROCEDURE NURSING ASSESSMENT: Performed by: SURGERY

## 2025-07-03 PROCEDURE — 250N000025 HC SEVOFLURANE, PER MIN: Performed by: SURGERY

## 2025-07-03 PROCEDURE — 360N000075 HC SURGERY LEVEL 2, PER MIN: Performed by: SURGERY

## 2025-07-03 PROCEDURE — 710N000011 HC RECOVERY PHASE 1, LEVEL 3, PER MIN: Performed by: SURGERY

## 2025-07-03 PROCEDURE — 710N000012 HC RECOVERY PHASE 2, PER MINUTE: Performed by: SURGERY

## 2025-07-03 PROCEDURE — 88184 FLOWCYTOMETRY/ TC 1 MARKER: CPT | Performed by: STUDENT IN AN ORGANIZED HEALTH CARE EDUCATION/TRAINING PROGRAM

## 2025-07-03 PROCEDURE — 88305 TISSUE EXAM BY PATHOLOGIST: CPT | Mod: 26 | Performed by: STUDENT IN AN ORGANIZED HEALTH CARE EDUCATION/TRAINING PROGRAM

## 2025-07-03 PROCEDURE — 250N000011 HC RX IP 250 OP 636: Performed by: NURSE ANESTHETIST, CERTIFIED REGISTERED

## 2025-07-03 PROCEDURE — 370N000017 HC ANESTHESIA TECHNICAL FEE, PER MIN: Performed by: SURGERY

## 2025-07-03 PROCEDURE — 82962 GLUCOSE BLOOD TEST: CPT

## 2025-07-03 PROCEDURE — 88341 IMHCHEM/IMCYTCHM EA ADD ANTB: CPT | Mod: TC | Performed by: SURGERY

## 2025-07-03 PROCEDURE — 250N000011 HC RX IP 250 OP 636: Performed by: SURGERY

## 2025-07-03 PROCEDURE — 38525 BIOPSY/REMOVAL LYMPH NODES: CPT | Performed by: SURGERY

## 2025-07-03 PROCEDURE — 272N000001 HC OR GENERAL SUPPLY STERILE: Performed by: SURGERY

## 2025-07-03 PROCEDURE — 88342 IMHCHEM/IMCYTCHM 1ST ANTB: CPT | Mod: 26 | Performed by: STUDENT IN AN ORGANIZED HEALTH CARE EDUCATION/TRAINING PROGRAM

## 2025-07-03 PROCEDURE — 250N000009 HC RX 250: Performed by: NURSE ANESTHETIST, CERTIFIED REGISTERED

## 2025-07-03 RX ORDER — ACETAMINOPHEN 325 MG/1
650 TABLET ORAL
Status: DISCONTINUED | OUTPATIENT
Start: 2025-07-03 | End: 2025-07-03 | Stop reason: HOSPADM

## 2025-07-03 RX ORDER — ONDANSETRON 2 MG/ML
INJECTION INTRAMUSCULAR; INTRAVENOUS PRN
Status: DISCONTINUED | OUTPATIENT
Start: 2025-07-03 | End: 2025-07-03

## 2025-07-03 RX ORDER — OXYCODONE HYDROCHLORIDE 10 MG/1
10 TABLET ORAL EVERY 6 HOURS PRN
Qty: 12 TABLET | Refills: 0 | Status: SHIPPED | OUTPATIENT
Start: 2025-07-03 | End: 2025-07-06

## 2025-07-03 RX ORDER — DEXAMETHASONE SODIUM PHOSPHATE 4 MG/ML
INJECTION, SOLUTION INTRA-ARTICULAR; INTRALESIONAL; INTRAMUSCULAR; INTRAVENOUS; SOFT TISSUE PRN
Status: DISCONTINUED | OUTPATIENT
Start: 2025-07-03 | End: 2025-07-03

## 2025-07-03 RX ORDER — OXYCODONE HYDROCHLORIDE 5 MG/1
5 TABLET ORAL
Status: DISCONTINUED | OUTPATIENT
Start: 2025-07-03 | End: 2025-07-03 | Stop reason: HOSPADM

## 2025-07-03 RX ORDER — SODIUM CHLORIDE, SODIUM LACTATE, POTASSIUM CHLORIDE, CALCIUM CHLORIDE 600; 310; 30; 20 MG/100ML; MG/100ML; MG/100ML; MG/100ML
INJECTION, SOLUTION INTRAVENOUS CONTINUOUS PRN
Status: DISCONTINUED | OUTPATIENT
Start: 2025-07-03 | End: 2025-07-03

## 2025-07-03 RX ORDER — AMOXICILLIN 250 MG
1-2 CAPSULE ORAL 2 TIMES DAILY
Qty: 30 TABLET | Refills: 0 | Status: SHIPPED | OUTPATIENT
Start: 2025-07-03

## 2025-07-03 RX ORDER — FENTANYL CITRATE 50 UG/ML
INJECTION, SOLUTION INTRAMUSCULAR; INTRAVENOUS PRN
Status: DISCONTINUED | OUTPATIENT
Start: 2025-07-03 | End: 2025-07-03

## 2025-07-03 RX ORDER — OXYCODONE HYDROCHLORIDE 10 MG/1
10 TABLET ORAL
Status: COMPLETED | OUTPATIENT
Start: 2025-07-03 | End: 2025-07-03

## 2025-07-03 RX ORDER — ONDANSETRON 4 MG/1
4 TABLET, ORALLY DISINTEGRATING ORAL EVERY 30 MIN PRN
Status: DISCONTINUED | OUTPATIENT
Start: 2025-07-03 | End: 2025-07-03 | Stop reason: HOSPADM

## 2025-07-03 RX ORDER — HYDROMORPHONE HCL IN WATER/PF 6 MG/30 ML
0.2 PATIENT CONTROLLED ANALGESIA SYRINGE INTRAVENOUS EVERY 5 MIN PRN
Status: DISCONTINUED | OUTPATIENT
Start: 2025-07-03 | End: 2025-07-03 | Stop reason: HOSPADM

## 2025-07-03 RX ORDER — NALOXONE HYDROCHLORIDE 0.4 MG/ML
0.1 INJECTION, SOLUTION INTRAMUSCULAR; INTRAVENOUS; SUBCUTANEOUS
Status: DISCONTINUED | OUTPATIENT
Start: 2025-07-03 | End: 2025-07-03 | Stop reason: HOSPADM

## 2025-07-03 RX ORDER — OXYCODONE HYDROCHLORIDE 5 MG/1
5-10 TABLET ORAL EVERY 4 HOURS PRN
Qty: 6 TABLET | Refills: 0 | Status: SHIPPED | OUTPATIENT
Start: 2025-07-03

## 2025-07-03 RX ORDER — ONDANSETRON 2 MG/ML
4 INJECTION INTRAMUSCULAR; INTRAVENOUS EVERY 30 MIN PRN
Status: DISCONTINUED | OUTPATIENT
Start: 2025-07-03 | End: 2025-07-03 | Stop reason: HOSPADM

## 2025-07-03 RX ORDER — CEFAZOLIN SODIUM/WATER 3 G/30 ML
3 SYRINGE (ML) INTRAVENOUS
Status: COMPLETED | OUTPATIENT
Start: 2025-07-03 | End: 2025-07-03

## 2025-07-03 RX ORDER — FENTANYL CITRATE 50 UG/ML
25 INJECTION, SOLUTION INTRAMUSCULAR; INTRAVENOUS EVERY 5 MIN PRN
Status: DISCONTINUED | OUTPATIENT
Start: 2025-07-03 | End: 2025-07-03 | Stop reason: HOSPADM

## 2025-07-03 RX ORDER — DEXAMETHASONE SODIUM PHOSPHATE 4 MG/ML
4 INJECTION, SOLUTION INTRA-ARTICULAR; INTRALESIONAL; INTRAMUSCULAR; INTRAVENOUS; SOFT TISSUE
Status: DISCONTINUED | OUTPATIENT
Start: 2025-07-03 | End: 2025-07-03 | Stop reason: HOSPADM

## 2025-07-03 RX ORDER — KETAMINE HYDROCHLORIDE 10 MG/ML
INJECTION INTRAMUSCULAR; INTRAVENOUS PRN
Status: DISCONTINUED | OUTPATIENT
Start: 2025-07-03 | End: 2025-07-03

## 2025-07-03 RX ORDER — HYDROMORPHONE HCL IN WATER/PF 6 MG/30 ML
0.4 PATIENT CONTROLLED ANALGESIA SYRINGE INTRAVENOUS EVERY 5 MIN PRN
Status: DISCONTINUED | OUTPATIENT
Start: 2025-07-03 | End: 2025-07-03 | Stop reason: HOSPADM

## 2025-07-03 RX ORDER — PROPOFOL 10 MG/ML
INJECTION, EMULSION INTRAVENOUS PRN
Status: DISCONTINUED | OUTPATIENT
Start: 2025-07-03 | End: 2025-07-03

## 2025-07-03 RX ORDER — CEFAZOLIN SODIUM/WATER 3 G/30 ML
3 SYRINGE (ML) INTRAVENOUS SEE ADMIN INSTRUCTIONS
Status: DISCONTINUED | OUTPATIENT
Start: 2025-07-03 | End: 2025-07-03 | Stop reason: HOSPADM

## 2025-07-03 RX ORDER — FENTANYL CITRATE 50 UG/ML
50 INJECTION, SOLUTION INTRAMUSCULAR; INTRAVENOUS EVERY 5 MIN PRN
Status: DISCONTINUED | OUTPATIENT
Start: 2025-07-03 | End: 2025-07-03 | Stop reason: HOSPADM

## 2025-07-03 RX ORDER — SODIUM CHLORIDE, SODIUM LACTATE, POTASSIUM CHLORIDE, CALCIUM CHLORIDE 600; 310; 30; 20 MG/100ML; MG/100ML; MG/100ML; MG/100ML
INJECTION, SOLUTION INTRAVENOUS CONTINUOUS
Status: DISCONTINUED | OUTPATIENT
Start: 2025-07-03 | End: 2025-07-03 | Stop reason: HOSPADM

## 2025-07-03 RX ORDER — LIDOCAINE HYDROCHLORIDE 20 MG/ML
INJECTION, SOLUTION INFILTRATION; PERINEURAL PRN
Status: DISCONTINUED | OUTPATIENT
Start: 2025-07-03 | End: 2025-07-03

## 2025-07-03 RX ADMIN — DEXMEDETOMIDINE HYDROCHLORIDE 4 MCG: 100 INJECTION, SOLUTION INTRAVENOUS at 09:44

## 2025-07-03 RX ADMIN — Medication 100 MG: at 10:25

## 2025-07-03 RX ADMIN — PHENYLEPHRINE HYDROCHLORIDE 100 MCG: 10 INJECTION INTRAVENOUS at 08:51

## 2025-07-03 RX ADMIN — Medication 20 MG: at 09:17

## 2025-07-03 RX ADMIN — DEXMEDETOMIDINE HYDROCHLORIDE 8 MCG: 100 INJECTION, SOLUTION INTRAVENOUS at 10:09

## 2025-07-03 RX ADMIN — ONDANSETRON 4 MG: 2 INJECTION INTRAMUSCULAR; INTRAVENOUS at 09:53

## 2025-07-03 RX ADMIN — Medication 30 MG: at 08:46

## 2025-07-03 RX ADMIN — PHENYLEPHRINE HYDROCHLORIDE 100 MCG: 10 INJECTION INTRAVENOUS at 08:29

## 2025-07-03 RX ADMIN — PHENYLEPHRINE HYDROCHLORIDE 200 MCG: 10 INJECTION INTRAVENOUS at 08:57

## 2025-07-03 RX ADMIN — MIDAZOLAM 2 MG: 1 INJECTION INTRAMUSCULAR; INTRAVENOUS at 07:54

## 2025-07-03 RX ADMIN — FENTANYL CITRATE 100 MCG: 50 INJECTION INTRAMUSCULAR; INTRAVENOUS at 08:11

## 2025-07-03 RX ADMIN — DEXAMETHASONE SODIUM PHOSPHATE 4 MG: 4 INJECTION, SOLUTION INTRAMUSCULAR; INTRAVENOUS at 08:32

## 2025-07-03 RX ADMIN — LIDOCAINE HYDROCHLORIDE 80 MG: 20 INJECTION, SOLUTION INFILTRATION; PERINEURAL at 08:11

## 2025-07-03 RX ADMIN — DEXMEDETOMIDINE HYDROCHLORIDE 8 MCG: 100 INJECTION, SOLUTION INTRAVENOUS at 08:36

## 2025-07-03 RX ADMIN — Medication 10 MG: at 10:07

## 2025-07-03 RX ADMIN — SODIUM CHLORIDE, SODIUM LACTATE, POTASSIUM CHLORIDE, AND CALCIUM CHLORIDE: .6; .31; .03; .02 INJECTION, SOLUTION INTRAVENOUS at 07:54

## 2025-07-03 RX ADMIN — PROPOFOL 200 MG: 10 INJECTION, EMULSION INTRAVENOUS at 08:11

## 2025-07-03 RX ADMIN — PHENYLEPHRINE HYDROCHLORIDE 0.5 MCG/KG/MIN: 10 INJECTION INTRAVENOUS at 09:03

## 2025-07-03 RX ADMIN — FENTANYL CITRATE 50 MCG: 50 INJECTION INTRAMUSCULAR; INTRAVENOUS at 10:20

## 2025-07-03 RX ADMIN — Medication 3 G: at 08:18

## 2025-07-03 RX ADMIN — PHENYLEPHRINE HYDROCHLORIDE 100 MCG: 10 INJECTION INTRAVENOUS at 09:03

## 2025-07-03 RX ADMIN — PHENYLEPHRINE HYDROCHLORIDE 200 MCG: 10 INJECTION INTRAVENOUS at 08:54

## 2025-07-03 RX ADMIN — OXYCODONE HYDROCHLORIDE 10 MG: 10 TABLET ORAL at 11:15

## 2025-07-03 RX ADMIN — Medication 50 MG: at 08:11

## 2025-07-03 RX ADMIN — PROPOFOL 50 MG: 10 INJECTION, EMULSION INTRAVENOUS at 10:18

## 2025-07-03 RX ADMIN — Medication 30 MG: at 08:50

## 2025-07-03 ASSESSMENT — ACTIVITIES OF DAILY LIVING (ADL)
ADLS_ACUITY_SCORE: 57
ADLS_ACUITY_SCORE: 55
ADLS_ACUITY_SCORE: 57

## 2025-07-03 NOTE — ANESTHESIA PREPROCEDURE EVALUATION
Anesthesia Pre-Procedure Evaluation    Patient: Saqib Bishop   MRN: [id number] : 1980          Procedure : Procedure(s):  LYMPHADENECTOMY, AXILLARY         Past Medical History:   Diagnosis Date    Brain injury with brief loss of consciousness (H) 2015    Candida esophagitis (H) 2022    Decubitus ulcer of left ischial area, stage IV (H) 2019    Degenerative joint disease     Gastro-oesophageal reflux disease     Hypercalcemia 2022    Hypokalemia 2022    Hypomagnesemia 2022    Infection of amputation stump of left lower extremity (H) 2023    Necrotizing fasciitis of lower leg (H) 2023      Past Surgical History:   Procedure Laterality Date    AMPUTATE LEG BELOW KNEE Left 2023    Procedure: Left below knee Guillotine Amputation;  Surgeon: Sesar Barth MD;  Location: [facility]    AMPUTATE LEG BELOW KNEE Left 2023    Procedure: Irrigation and Debridement leg below knee, wound vac application, Left;  Surgeon: Jaun Rehman MD;  Location: [facility]    AMPUTATE LEG BELOW KNEE Left 2023    Procedure: Below Left Knee Amputation;  Surgeon: Semaj Gates MD;  Location: [facility]    BACK SURGERY      lumbar fusion    EXPLORE SPINE, REMOVE HARDWARE, COMBINED  2012    Procedure:COMBINED EXPLORE SPINE, REMOVE HARDWARE; EXPLORE SPINE, REMOVE HARDWARE L4-S1; Surgeon:FAM GONZALEZ; Location:[facility] OR    IR PICC PLACEMENT > 5 YRS OF AGE  10/10/2017    IR PICC PLACEMENT > 5 YRS OF AGE  2018    IRRIGATION AND DEBRIDEMENT DECUBITUS WITH FLAP CLOSURE, COMBINED Left 6/3/2024    Procedure: LEFT IRRIGATION AND DEBRIDEMENT, GLUTEAL FASCIOCUTANEOUS ROTATIONAL FLAP CLOSURE;  Surgeon: Ne Zavala MD;  [facility]    IRRIGATION AND DEBRIDEMENT FOOT, COMBINED Left 2023    Procedure: LEFT BELOW KNEE AMPUTATION STUMP IRRIGATION AND DEBRIDEMENT, VANCOMYCIN BEAD PLACEMENT;  Surgeon: Etienne Maier MD;  [facility]     IRRIGATION AND DEBRIDEMENT LOWER EXTREMITY, COMBINED Left 2023    Procedure: Irrigation and debridement lower extremity, combined and wound vac exchange;  Surgeon: Etienne Maier MD;  Location: [facility]    IRRIGATION AND DEBRIDEMENT LOWER EXTREMITY, COMBINED Left 2023    Procedure: IRRIGATION AND DEBRIDEMENT, LEFT LOWER EXTREMITY WITH WOUND VAC Removal;  Surgeon: Semaj Gates MD;  [facility]    IRRIGATION AND DEBRIDEMENT LOWER EXTREMITY, COMBINED Left 11/15/2023    Procedure: IRRIGATION AND DEBRIDEMENT, LOWER EXTREMITY LEFT LEG, ANTIBIOTIC BEAD REMOVAL AND PRIMARY CLOSURE;  Surgeon: Etienne Maier MD;  Location[facility]    ORTHOPEDIC SURGERY      right foot surgery      Allergies   Allergen Reactions    Adhesive Tape Hives     From tape from surgery    Benzoin Hives and Rash    Droperidol Anaphylaxis    Vitamin D (Calciferol) Rash     flairs up his sarcoidosis      Social History     Tobacco Use    Smoking status: Former     Current packs/day: 0.00     Types: Cigarettes     Quit date: 2011     Years since quittin.6     Passive exposure: Past    Smokeless tobacco: Never   Substance Use Topics    Alcohol use: No      Wt Readings from Last 1 Encounters:   25 133.8 kg (295 lb)        Anesthesia Evaluation   Pt has had prior anesthetic.         ROS/MED HX  ENT/Pulmonary:     (+)     IAN risk factors,   obese,        tobacco use, Past use,                    (-) asthma and COPD   Neurologic: Comment: RLS    (+)                     Spinal cord injury, year sustained: , level of injury: T12,      (-) no seizures and no CVA   Cardiovascular:     (+)  hypertension- -   -  - -                                 Previous cardiac testing   Echo: Date:  Results:  Interpretation Summary  Left ventricular size, wall motion and function are normal. The ejection  fraction is 60-65%.  Right ventricular function, chamber size, wall motion, and thickness are  normal.  IVC diameter >2.1 cm  collapsing <50% with sniff suggests a high RA pressure  estimated at 15 mmHg or greater.  No pericardial effusion is present.     There is no prior study for direct comparison.    Stress Test:  Date: Results:    ECG Reviewed:  Date: 2023 Results:  Sinus tachycardia   Otherwise normal ECG   Cath:  Date: Results:   (-) taking anticoagulants/antiplatelets   METS/Exercise Tolerance:  Comment: Using manual wheelchair. Reports he can transfer himself from chair to bed (depending on bed height). Denies SHRESTHA or CP   Hematologic:     (+) History of blood clots,    pt is not anticoagulated,  history of blood transfusion, no previous transfusion reaction,        Musculoskeletal: Comment: Decubitus ulcer with wound vac in place      GI/Hepatic: Comment: Ostomy present, hernia at ostomy site    (+) GERD,                   Renal/Genitourinary:     (+) renal disease,             Endo:     (+)  type II DM,             Obesity,       Psychiatric/Substance Use:       Infectious Disease:    (-) Recent Fever   Malignancy:       Other:              Physical Exam  Airway  Mallampati: III  Neck ROM: full  Mouth opening: >= 4 cm    Cardiovascular   Rhythm: regular  Rate: normal rate     Dental   (+) Minor Abnormalities - some fillings, tiny chips      Pulmonary - normal exam      Neurological - normal exam  He appears awake, alert and oriented x3.    Other Findings       OUTSIDE LABS:  CBC:   Lab Results   Component Value Date    WBC 10.1 06/04/2024    WBC 6.0 11/18/2023    HGB 12.7 (L) 06/04/2024    HGB 13.9 11/18/2023    HCT 41.6 06/04/2024    HCT 45.7 11/18/2023     06/04/2024     11/18/2023     BMP:   Lab Results   Component Value Date     06/04/2024     11/18/2023    POTASSIUM 4.0 06/04/2024    POTASSIUM 4.0 11/18/2023    CHLORIDE 101 06/04/2024    CHLORIDE 100 11/18/2023    CO2 27 06/04/2024    CO2 28 11/18/2023    BUN 24.1 (H) 06/04/2024    BUN 18.4 11/18/2023    CR 0.81 06/04/2024    CR 0.99 06/03/2024     " (H) 06/04/2024     (H) 06/03/2024     COAGS:   Lab Results   Component Value Date    PTT 30 11/15/2023    INR 1.06 11/15/2023    FIBR 863 (H) 08/27/2023     POC: No results found for: \"BGM\", \"HCG\", \"HCGS\"  HEPATIC:   Lab Results   Component Value Date    ALBUMIN 3.6 11/18/2023    PROTTOTAL 8.2 11/18/2023    ALT 18 11/18/2023    AST 18 11/18/2023    ALKPHOS 122 11/18/2023    BILITOTAL 0.2 11/18/2023     OTHER:   Lab Results   Component Value Date    PH 7.23 (L) 08/28/2023    LACT 0.5 (L) 08/28/2023    A1C 5.5 09/06/2023    HILDA 8.3 (L) 06/04/2024    PHOS 3.4 09/14/2023    MAG 1.6 (L) 09/14/2023    SED 46 (H) 11/07/2023       Anesthesia Plan    ASA Status:  3      NPO Status: Will be NPO Appropriate at ...   Anesthesia Type: General.  Airway: oral.  Induction: intravenous.  Maintenance: Balanced.   Techniques and Equipment:       - Monitoring Plan: standard ASA monitoring, train of four monitoring, processed EEG monitor     Consents    Anesthesia Plan(s) and associated risks, benefits, and realistic alternatives discussed. Questions answered and patient/representative(s) expressed understanding.     - Discussed: CRNA, surgeon     - Discussed with:  Patient        - Pt is DNR/DNI Status: no DNR     Blood Consent:      - Discussed with: patient.     - Consented: consented to blood products     Postoperative Care    Pain management: non-narcotic analgesics, plan for postoperative opioid use, multimodal analgesia.     Comments:    Other Comments: Ketamine and precedex  Surgical team needs to decide on laterality, specify in the consent and lisa the patient               Mitul Mclean MD    I have reviewed the pertinent notes and labs in the chart from the past 30 days and (re)examined the patient.  Any updates or changes from those notes are reflected in this note.    Clinically Significant Risk Factors Present on Admission                   # Hypertension: Noted on problem list           # Obesity: " "Estimated body mass index is 38.92 kg/m  as calculated from the following:    Height as of 7/2/25: 1.854 m (6' 1\").    Weight as of 7/2/25: 133.8 kg (295 lb).       # Financial/Environmental Concerns:                 "

## 2025-07-03 NOTE — BRIEF OP NOTE
[facility]    Brief Operative Note    Pre-operative diagnosis: Axillary lymphadenopathy [R59.0]  Post-operative diagnosis Same as pre-operative diagnosis    Procedure: Left Axillary Lymph Node Biopsy, Left - Axilla    Surgeon: Surgeons and Role:     * Nii Mancilla MD - Primary  Anesthesia: General   Estimated Blood Loss: 10 mL from 7/3/2025  7:54 AM to 7/3/2025 10:36 AM      Drains: None  Specimens:   ID Type Source Tests Collected by Time Destination   1 : Left axillary lymph node biopsy, Lymphoma workup Tissue Lymph Node(s), Axillary, Left SURGICAL PATHOLOGY EXAM Nii Mancilla MD 7/3/2025  9:33 AM      Findings:   >1cm left axillary pigmented lymph node.  Complications: None.  Implants: * No implants in log *    Plan:  Discharge from PACU    Resident/Fellow Attestation   I, Tonie Scott MD, was present with the medical/DIANE student who participated in the service and in the documentation of the note.  I have verified the history and personally performed the physical exam and medical decision making.  I agree with the assessment and plan of care as documented in the note.      Updated the above note to reflect the current plan.     Tonie Scott MD  PGY1  Date of Service (when I saw the patient): 07/03/25

## 2025-07-03 NOTE — DISCHARGE INSTRUCTIONS
[facility], [facility]  Anesthesia Discharge Instructions      After Anesthesia (Sleep Medicine)  What should I do after anesthesia?  You should rest and relax for the next 24 hours. Avoid risky or difficult (strenuous) activity. A responsible adult should stay with you overnight.  Don't drive or use any heavy equipment for 24 hours. Even if you feel normal, your reactions may be affected by the sleep medicine given to you.  Don't drink alcohol or make any important decisions for 24 hours.  Slowly get back to your regular diet, as you feel able.    How should I expect to feel?  It's normal to feel dizzy, light-headed, or faint for up to a full day after anesthesia or while taking pain medicine. If this happens:   Sit down for a few minutes before standing.  Have someone help you when you get up to walk or use the bathroom.  If you have nausea (feel sick to your stomach) or vomit (throw up):   Drink clear liquids (such as apple juice, ginger ale, broth, or 7UP) until you feel better. Stay hydrated.  If you feel sick to your stomach, or you keep vomiting for 24 hours, please call the doctor.    What else should I know?  You might have a dry mouth, sore throat, muscle aches, or trouble sleeping. These should go away after 24 hours.  Please contact your doctor if you have any other symptoms that concern you, such as fever, pain, bleeding, fluid drainage, swelling, or headache, or if it's been over 8 to 10 hours and you still aren't able to pee (urinate).  If you have a history of sleep apnea, it's very important to use your CPAP machine for the next 24 hours when you nap or sleep.     To contact a doctor, call Dr. Mancilla at General surgery clinic at [phone] OR :  Call [phone] for the [facility], and ask for the resident on call for General Surgery.  For any emergencies, please go to the emergency room.

## 2025-07-03 NOTE — OP NOTE
Operative Note     Pre-operative diagnosis:         Axillary lymphadenopathy [R59.0]  Post-operative diagnosis        Same as pre-operative diagnosis     Procedure:      Left Axillary Lymph Node Biopsy, Left - Axilla     Surgeon:         Surgeons and Role:     * Nii Mancilla MD - Primary  Anesthesia:     General             Estimated Blood Loss: 10 mL from 7/3/2025  7:54 AM to 7/3/2025 10:36 AM  Drains: None  Specimens:       ID Type Source Tests Collected by Time Destination   1 : Left axillary lymph node biopsy, Lymphoma workup Tissue Lymph Node(s), Axillary, Left SURGICAL PATHOLOGY EXAM Nii Mancilla MD 7/3/2025  9:33 AM        Findings: >1cm left axillary pigmented lymph node.  Complications: None noted.  Implants:  No implants.    Clinical History.    The patient is a 44 year-old man who is well known to the Surgery service.  The patient presents with diffuse marked lymphadenopathy on CT scan. The patient has lost approximately 60 pounds in the last half year. This has been intentional weight loss. The CT radiology report suggests that the patient's lymphadenopathy is unlikely to be benign. The patient has many tattoos and it is possible that the lymph nodes are related to the tattoo dye reaction.  I have reviewed the risks and benefits of lymph node biopsy with the patient, including the potential for infection, bleeding and lymphocele, with the potential need for multiple procedures for drainage. I  also reviewed with the patient the risks of the general anesthetic, including the possible risk of myocardial infarction, pulmonary embolus, stroke, and even death in the OR; the patient understood these risks and he wish to proceed.    Procedure:    The patient was taken to the main operating room and placed under general anesthesia. The patient was positioned in the reverse Trendelenburg position and airplaned left side up and right side down the left arm was cushion and suspended in a sling. All  pressure points were carefully examined and cushioned. The patient was prepped and draped in a sterile fashion.  Following the appropriate timeout procedure to assure patient identification and procedure, a 50:50 mixture 1% lidocaine and 0.25% Marcaine and Scott was instilled into the incision area of the left axilla. The incision was made with a %15 blade and taken down to the level of the axillary fat using electrocautery. An enlarged pigmented lymph node was noted that was greater than 1 cm in the left axillary fat. The left axillary fat was carefully examined for additional pathological lymph nodes and none were identified. The pigmented lymph node was secured using an O silk suture and brought up into the wound.  All of the afferent and efferent lymph channels were controlled using surgical clips. The enlarged pigmented lymph node was submitted to pathology for a lymphoma workup. Attention was then directed to hemostasis which was achieved with electrocautery. The wound was irrigated with sterile normal saline, which returned clear. The skin edges were re-approximated with interrupted 3-0 Vicryl suture, running 4-0 Monocryl suture, and skin sealant.  Needle and sponge counts were correct times 2.  The patient tolerated the procedure well; and the patient was returned to post-operative recovery area in good condition.     Nii Mancilla M.D., Ph.D.

## 2025-07-03 NOTE — ANESTHESIA CARE TRANSFER NOTE
Patient: Saqib Bishop    Procedure: Procedure(s):  Left Axillary Lymph Node Biopsy       Diagnosis: Axillary lymphadenopathy [R59.0]  Diagnosis Additional Information: No value filed.    Anesthesia Type:   General     Note:    Oropharynx: oropharynx clear of all foreign objects and spontaneously breathing  Level of Consciousness: drowsy  Oxygen Supplementation: face mask  Level of Supplemental Oxygen (L/min / FiO2): 8  Independent Airway: airway patency satisfactory and stable  Dentition: dentition unchanged  Vital Signs Stable: post-procedure vital signs reviewed and stable  Report to RN Given: handoff report given  Patient transferred to: PACU    Handoff Report: Identifed the Patient, Identified the Reponsible Provider, Reviewed the pertinent medical history, Discussed the surgical course, Reviewed Intra-OP anesthesia mangement and issues during anesthesia, Set expectations for post-procedure period and Allowed opportunity for questions and acknowledgement of understanding      Vitals:  Vitals Value Taken Time   /77 07/03/25 10:39   Temp     Pulse 102 07/03/25 10:43   Resp 20 07/03/25 10:43   SpO2 94 % 07/03/25 10:43   Vitals shown include unfiled device data.    Electronically Signed By: RL Anderson CRNA  July 3, 2025  10:44 AM

## 2025-07-03 NOTE — ANESTHESIA POSTPROCEDURE EVALUATION
Patient: Saqib Bishop    Procedure: Procedure(s):  Left Axillary Lymph Node Biopsy       Anesthesia Type:  General    Note:  Disposition: Outpatient   Postop Pain Control: Uneventful            Sign Out: Well controlled pain   PONV: No   Neuro/Psych: Uneventful            Sign Out: Acceptable/Baseline neuro status   Airway/Respiratory: Uneventful            Sign Out: Acceptable/Baseline resp. status   CV/Hemodynamics: Uneventful            Sign Out: Acceptable CV status; No obvious hypovolemia; No obvious fluid overload   Other NRE: NONE   DID A NON-ROUTINE EVENT OCCUR? No           Last vitals:  Vitals Value Taken Time   /75 07/03/25 11:00   Temp 37.2  C (99  F) 07/03/25 10:40   Pulse 100 07/03/25 11:11   Resp 11 07/03/25 11:10   SpO2 93 % 07/03/25 11:11   Vitals shown include unfiled device data.    Electronically Signed By: Ann Peralta MD  July 3, 2025  11:11 AM

## 2025-07-03 NOTE — ANESTHESIA PROCEDURE NOTES
Airway       Patient location during procedure: OR       Procedure Start/Stop Times: 7/3/2025 8:16 AM  Staff -        CRNA: Frannie Lucia APRN CRNA       Performed By: CRNA  Consent for Airway        Urgency: elective  Indications and Patient Condition       Indications for airway management: ivette-procedural       Induction type:intravenous       Mask difficulty assessment: 2 - vent by mask + OA or adjuvant +/- NMBA    Final Airway Details       Final airway type: endotracheal airway       Successful airway: ETT - single  Endotracheal Airway Details        ETT size (mm): 7.5       Cuffed: yes       Successful intubation technique: video laryngoscopy       VL Blade Size: Glidescope 4       Grade View of Cords: 1       Adjucts: stylet       Position: Right       Measured from: lips       Secured at (cm): 24       Bite block used: None    Post intubation assessment        Placement verified by: capnometry, equal breath sounds and chest rise        Number of attempts at approach: 1       Number of other approaches attempted: 0       Secured with: tape       Ease of procedure: easy       Dentition: Intact and Unchanged    Medication(s) Administered   Medication Administration Time: 7/3/2025 8:16 AM

## 2025-07-03 NOTE — ANESTHESIA POSTPROCEDURE EVALUATION
Patient: Saqib Bishop    Procedure: Procedure(s):  Left Axillary Lymph Node Biopsy       Anesthesia Type:  General    Note:  Disposition: Outpatient   Postop Pain Control: Uneventful            Sign Out: Well controlled pain   PONV: No   Neuro/Psych: Uneventful            Sign Out: Acceptable/Baseline neuro status   Airway/Respiratory: Uneventful            Sign Out: Acceptable/Baseline resp. status   CV/Hemodynamics: Uneventful            Sign Out: Acceptable CV status; No obvious hypovolemia; No obvious fluid overload   Other NRE: NONE   DID A NON-ROUTINE EVENT OCCUR? No           Last vitals:  Vitals Value Taken Time   /75 07/03/25 11:00   Temp 37.2  C (99  F) 07/03/25 10:40   Pulse 105 07/03/25 11:10   Resp 11 07/03/25 11:10   SpO2 98 % 07/03/25 11:10   Vitals shown include unfiled device data.    Electronically Signed By: Mitul Mclean MD  July 3, 2025  11:11 AM

## 2025-07-07 ENCOUNTER — PATIENT OUTREACH (OUTPATIENT)
Dept: SURGERY | Facility: CLINIC | Age: 45
End: 2025-07-07
Payer: MEDICARE

## 2025-07-07 NOTE — PROGRESS NOTES
RN Post-Op/Post-Discharge Care Coordination Note    Mr. Saqib Bishop is a 44 year old male who underwent axillary LN biopsy on 7/3 with  Dr. Nii Mancilla.  Spoke with Patient.    Support  Patient able to care for self independently     Health Status  Nausea/Vomiting: Patient denies nausea/vomiting.  Eating/drinking: Patient is able to eat and drink without any complaints.  Diet:  Regular  Bowel habits: Patient reports having a normal bowel movement.  Drains (DEANDRE): N/A  Fevers/chills: Patient denies any fever or chills.  Incisions: Patient denies any signs and symptoms of infection..  Wound closure:  Skin Sealant  Pain: None  New Medications:  Oxycodone and Senna    Activity/Restrictions  Discussed importance of early mobility after surgery. Suggested getting up and walking and at a minimum walking room to room every hour.    Adherence of the following restrictions:   No restrictions    Equipment  None    Pathology reviewed with patient:  No, not yet available    Forms/Letters  No    All of his questions were answered including reviewing restrictions and wound care.  He will call this office if he has any further questions and/or concerns.      RTC as previously planned with Dr. Mancilla.    A copy of this note was routed to the primary surgeon.      Whom and When to Call  Patient acknowledges understanding of how to manage any medication changes and   when to seek medical care.     Patient advised that if after hour medical concerns arise to please call [phone] and choose option 4 to speak to the physician on call.

## 2025-07-09 ENCOUNTER — PATIENT OUTREACH (OUTPATIENT)
Dept: SURGERY | Facility: CLINIC | Age: 45
End: 2025-07-09
Payer: MEDICARE

## 2025-07-09 LAB
PATH REPORT.COMMENTS IMP SPEC: NORMAL
PATH REPORT.FINAL DX SPEC: NORMAL
PATH REPORT.GROSS SPEC: NORMAL
PATH REPORT.MICROSCOPIC SPEC OTHER STN: NORMAL
PATH REPORT.RELEVANT HX SPEC: NORMAL
PHOTO IMAGE: NORMAL

## 2025-07-09 NOTE — PROGRESS NOTES
07/09/25    1:24 PM     Attempted to reach the patient with pathology results. No answer. LM on VM to call office.  Patient should continue to lose weight and once at goal parastomal hernia repair can be discussed.    Pathology:          Resulting Agency   Case Report   Surgical Pathology Report                         Case: [id number]                                   Authorizing Provider:  Nii Mancilla MD     Collected:           07/03/2025 09:33 AM           Ordering Location:      MAIN OR                 Received:            07/03/2025 09:57 AM           Pathologist:           Magali Lawrence MD                                                         Specimen:    Lymph Node(s), Axillary, Left, Left axillary lymph node biopsy, Lymphoma workup            Final Diagnosis   Lymph node, left axillary, excisional biopsy:  - No evidence of lymphoma   - See comment   Electronically signed by Magali Lawrence MD on 7/9/2025 at 1317 CDT   Comment  [id number]   The morphologic and immunophenotypic findings are consistent with a reactive granulomatous process with abundant intracellular pigment, likely tattoo-related lymphadenopathy. There is no evidence of a lymphoproliferative disorder in this lymph node.       07/10/25    9:25 AM     07/10/25    9:26 AM     Attempted to contact patient x 2 to discuss pathology results. LM on VM to call office-contact information provided.

## 2025-07-14 LAB
CULTURE HARVEST COMPLETE DATE: NORMAL
INTERPRETATION: NORMAL

## 2025-07-29 ENCOUNTER — TELEPHONE (OUTPATIENT)
Dept: WOUND CARE | Facility: CLINIC | Age: 45
End: 2025-07-29
Payer: MEDICARE

## 2025-07-29 NOTE — TELEPHONE ENCOUNTER
Returned call to patient and discussed that [facility] will not be able to assist with getting wound VAC supplies ordered until he is seen by Dr. Carbajal on 8/6/25. The patient was last seen 6/5/25 and at that time [facility] was not going to continue authorizing the wound VAC. Dr. Carbajal will not sign any orders or supply orders until after he sees the patient next week. Patient verbalized understanding. No further questions or concerns.

## 2025-08-06 ENCOUNTER — OFFICE VISIT (OUTPATIENT)
Dept: WOUND CARE | Facility: CLINIC | Age: 45
End: 2025-08-06
Attending: FAMILY MEDICINE
Payer: MEDICARE

## 2025-08-06 VITALS — TEMPERATURE: 98.9 F

## 2025-08-06 DIAGNOSIS — L89.154 DECUBITUS ULCER OF COCCYGEAL REGION, STAGE 4 (H): Primary | ICD-10-CM

## 2025-08-06 DIAGNOSIS — L89.154 PRESSURE INJURY OF SACRAL REGION, STAGE 4 (H): ICD-10-CM

## 2025-08-06 PROBLEM — L89.159 PRESSURE ULCER OF SACRAL REGION, UNSPECIFIED STAGE: Status: RESOLVED | Noted: 2017-09-22 | Resolved: 2025-08-06

## 2025-08-06 PROCEDURE — G0463 HOSPITAL OUTPT CLINIC VISIT: HCPCS | Performed by: FAMILY MEDICINE

## 2025-08-11 ENCOUNTER — MEDICAL CORRESPONDENCE (OUTPATIENT)
Dept: HEALTH INFORMATION MANAGEMENT | Facility: CLINIC | Age: 45
End: 2025-08-11
Payer: MEDICARE

## (undated) DEVICE — SUCTION MANIFOLD NEPTUNE 2 SYS 4 PORT 0702-020-000

## (undated) DEVICE — CAST PADDING 4" STERILE 9044S

## (undated) DEVICE — BLADE SAW SAGITTAL STRK 25X90X1.19MM HD SYS 6 6125-119-090

## (undated) DEVICE — BLADE KNIFE SURG 20 371120

## (undated) DEVICE — SU SILK 3-0 TIE 12X30" A304H

## (undated) DEVICE — GLOVE PROTEXIS BLUE W/NEU-THERA 7.5  2D73EB75

## (undated) DEVICE — SUCTION TIP YANKAUER W/O VENT K86

## (undated) DEVICE — PACKING IODOFORM STRIP 1/2" 7832

## (undated) DEVICE — PREP POVIDONE-IODINE 7.5% SCRUB 4OZ BOTTLE MDS093945

## (undated) DEVICE — SU VICRYL 2-0 SH 27" UND J417H

## (undated) DEVICE — Device

## (undated) DEVICE — DRAPE SHEET REV FOLD 3/4 9349

## (undated) DEVICE — DRSG DRAIN 4X4" 7086

## (undated) DEVICE — CAST PADDING 6" STERILE 9046S

## (undated) DEVICE — PREP POVIDONE-IODINE 10% SOLUTION 4OZ BOTTLE MDS093944

## (undated) DEVICE — GLOVE PROTEXIS POWDER FREE ORANGE 7.5  2D72PT75X

## (undated) DEVICE — SU VICRYL 3-0 FS-1 27" J442H

## (undated) DEVICE — SU SILK 2-0 TIE 12X30" A305H

## (undated) DEVICE — GLOVE BIOGEL PI ULTRATOUCH G SZ 7.5 42175

## (undated) DEVICE — STRAP KNEE/BODY 31143004

## (undated) DEVICE — PREP DURAPREP 26ML APL 8630

## (undated) DEVICE — DRSG ABDOMINAL 12X16"

## (undated) DEVICE — BLADE CLIPPER SGL USE 9680

## (undated) DEVICE — BONE CLEANING TIP INTERPULSE  0210-010-000

## (undated) DEVICE — ESU ELEC BLADE HEX-LOCKING 2.5" E1450X

## (undated) DEVICE — LINEN TOWEL PACK X6 WHITE 5487

## (undated) DEVICE — TUBING IRR TUR Y TYPE 2C4041

## (undated) DEVICE — GOWN XLG DISP 9545

## (undated) DEVICE — PREP CHLORAPREP 26ML TINTED HI-LITE ORANGE 930815

## (undated) DEVICE — CUP AND LID 2PK 2OZ STERILE  SSK9006A

## (undated) DEVICE — DRAIN JACKSON PRATT CHANNEL 19FR ROUND HUBLESS SIL JP-2230

## (undated) DEVICE — TOURNIQUET CUFF 34" REPRO BROWN 60-7070-106

## (undated) DEVICE — DRSG KERLIX 4 1/2"X4YDS ROLL 6715

## (undated) DEVICE — TRAY PREP DRY SKIN SCRUB 067

## (undated) DEVICE — DRAPE STOCKINETTE IMPERVIOUS 12" 1587

## (undated) DEVICE — GLOVE BIOGEL PI SZ 8.0 40880

## (undated) DEVICE — BLADE KNIFE SURG 15 371115

## (undated) DEVICE — GLOVE BIOGEL PI MICRO SZ 6.5 48565

## (undated) DEVICE — SU VICRYL 0 CT-1 36" J946H

## (undated) DEVICE — SU ETHILON 2-0 FS 18" 664H

## (undated) DEVICE — SYR BULB IRRIG DOVER 60 ML LATEX FREE 67000

## (undated) DEVICE — SYR 20ML LL W/O NDL 302830

## (undated) DEVICE — SU ETHILON 3-0 PS-1 18" 1663H

## (undated) DEVICE — ESU GROUND PAD ADULT W/CORD E7507

## (undated) DEVICE — GLOVE SURG PI ULTRA TOUCH M SZ 6-1/2 LF

## (undated) DEVICE — LINEN ORTHO PACK 5446

## (undated) DEVICE — LINEN TOWEL PACK X30 5481

## (undated) DEVICE — ESU PENCIL W/SMOKE EVAC NEPTUNE STRYKER 0703-046-000

## (undated) DEVICE — BLADE KNIFE SURG 10 371110

## (undated) DEVICE — CLIP HORIZON MED BLUE 002200

## (undated) DEVICE — TEST TUBE W/SCREW CAP 17361

## (undated) DEVICE — DRAPE SHEET MED 44X70" 9355

## (undated) DEVICE — SU VICRYL+ 3-0 27IN SH UND VCP416H

## (undated) DEVICE — ESU GROUND PAD UNIVERSAL W/O CORD

## (undated) DEVICE — NDL COUNTER 40CT  31142311

## (undated) DEVICE — SU VICRYL 0 CT-1 27" UND J260H

## (undated) DEVICE — DRSG ABDOMINAL 07 1/2X8" 7197D

## (undated) DEVICE — STRAP UNIVERSAL POSITIONING 2-PIECE 4X47X76" 91-287

## (undated) DEVICE — CAST PADDING 6" UNSTERILE 9046

## (undated) DEVICE — SPONGE LAP 18X18" X8435

## (undated) DEVICE — DRSG ADAPTIC 3X8" 6113

## (undated) DEVICE — SUCTION IRR SYSTEM W/O TIP INTERPULSE HANDPIECE 0210-100-000

## (undated) DEVICE — DRAIN JACKSON PRATT RESERVOIR 100ML SU130-1305

## (undated) DEVICE — SOL NACL 0.9% IRRIG 1000ML BOTTLE 2F7124

## (undated) DEVICE — SU ETHILON 3-0 FS-1 18" 663G

## (undated) DEVICE — SU SILK 3-0 SH CR 8X18" C013D

## (undated) DEVICE — LINEN GOWN XLG 5407

## (undated) DEVICE — DRSG ADAPTIC 3X3" 6112

## (undated) DEVICE — SPONGE RAY-TEC 4X8" 7318

## (undated) DEVICE — PAD CHUX UNDERPAD 30X36" P3036C

## (undated) DEVICE — SU ETHILON 2-0 PS 18" 585H

## (undated) DEVICE — SOL WATER IRRIG 1000ML BOTTLE 2F7114

## (undated) DEVICE — DRAPE CONVERTORS U-DRAPE 60X72" 8476

## (undated) DEVICE — GLOVE BIOGEL PI MICRO INDICATOR UNDERGLOVE SZ 8.0 48980

## (undated) DEVICE — CANISTER WOUND VAC W/GEL 500ML M8275063/5

## (undated) DEVICE — DRSG WOUND VAC SPONGE MED BLACK M8275052/5

## (undated) DEVICE — DRAPE STERI TOWEL SM 1000

## (undated) DEVICE — TAPE MEDIPORE 4"X2YD 2864

## (undated) DEVICE — BNDG ELASTIC 4" DBL LENGTH UNSTERILE 6611-14

## (undated) DEVICE — DRAPE SHEET HALF 40X60" 9358

## (undated) DEVICE — DRAPE IOBAN INCISE 23X17" 6650EZ

## (undated) DEVICE — SU SILK 2-0 SH CR 8X18" C012D

## (undated) DEVICE — TUBING IRRIG CYSTO/BLADDER SET 81" LF 2C4040

## (undated) DEVICE — DRSG TEGADERM 4X4 3/4" 1626W

## (undated) DEVICE — BLADE SAW SAGITTAL STRK 18X90X0.89MM  6118-089-090

## (undated) DEVICE — SU PROLENE 2-0 FS 18" 8685H

## (undated) DEVICE — SPECIMEN CONTAINER 5OZ STERILE 2600SA

## (undated) DEVICE — GOWN IMPERVIOUS BREATHABLE SMART XLG 89045

## (undated) DEVICE — LIGHT HANDLE X2

## (undated) DEVICE — PACK LOWER EXTREMITY RIVERSIDE SOP32LEFSX

## (undated) DEVICE — SU PROLENE 5-0 RB-2DA 30" 8710H

## (undated) DEVICE — SU SILK 0 SH 30" K834H

## (undated) DEVICE — DRSG KERLIX 4 1/2"X4YDS ROLL 6730

## (undated) DEVICE — CONTAINER SPECIMEN 4OZ STERILE 17099

## (undated) DEVICE — PREP DURAPREP REMOVER 4OZ 8611

## (undated) DEVICE — POSITIONER ARMBOARD FOAM 1PAIR LF FP-ARMB1

## (undated) DEVICE — SOLUTION WOUND VASHE BOTTLE 16 FL OZ. (475 ML)  00317

## (undated) DEVICE — ESU PENCIL SMOKE EVAC W/ROCKER SWITCH 0703-047-000

## (undated) DEVICE — SU SILK 0 TIE 6X30" A306H

## (undated) DEVICE — POSITIONER HEAD FRAME FOAM LF FP-HEADSF

## (undated) DEVICE — SOL WOUND IRRIG PRONTOSAN 350 ML BOTTLE 400441

## (undated) DEVICE — SUCTION TIP YANKAUER STR K87

## (undated) DEVICE — APPLICATORS COTTON TIP 6"X2 STERILE LF C15053-006

## (undated) DEVICE — WIRE SAW GIGL 20" LOOP END SS NL851

## (undated) DEVICE — SOL NACL 0.9% IRRIG 3000ML BAG 2B7477

## (undated) DEVICE — POSITIONER HEAD DONUT FOAM 9" LF FP-HEAD9

## (undated) DEVICE — SU MONOCRYL 3-0 PS-1 27" Y936H

## (undated) DEVICE — NDL 18GA 1.5" 305196

## (undated) DEVICE — DRSG STERI STRIP 1/2X4" R1547

## (undated) DEVICE — DRSG WOUND VAC SENSATRAC PAD AND DRAPE SM M8275068/5

## (undated) DEVICE — SU VICRYL 1 CT-1 CR 8X18" J741D

## (undated) DEVICE — GOWN IMPERVIOUS SPECIALTY XLG/XLONG 32474

## (undated) DEVICE — DRSG GAUZE 4X8" NON21842

## (undated) DEVICE — CLIP HORIZON SM RED WIDE SLOT 001201

## (undated) DEVICE — GEL ULTRASOUND AQUASONIC 20GM 01-01

## (undated) DEVICE — SU MONOCRYL 4-0 PS-2 27" UND Y426H

## (undated) DEVICE — BNDG ELASTIC 6"X5YDS STERILE 6611-6S

## (undated) DEVICE — CANISTER WOUND VAC W/GEL 1000ML M8275093/5

## (undated) DEVICE — STRAP STIRRUP W/SLIP 30187-030

## (undated) DEVICE — PREP SKIN SCRUB TRAY 4461A

## (undated) DEVICE — ESU ELEC BLADE 2.75" COATED/INSULATED E1455

## (undated) DEVICE — GLOVE BIOGEL PI MICRO INDICATOR UNDERGLOVE SZ 7.0 48970

## (undated) DEVICE — BNDG ELASTIC 6" DBL LENGTH UNSTERILE 6611-16

## (undated) DEVICE — DRSG XEROFORM 1X8"

## (undated) DEVICE — STPL SKIN 35W ROTATING HEAD PRW35

## (undated) DEVICE — SU PROLENE 3-0 PS-1 18" 8663G

## (undated) DEVICE — LINEN TOWEL PACK X5 5464

## (undated) DEVICE — BONE WAX 2.5GM W31G

## (undated) DEVICE — SU VICRYL 2-0 CT-1 CR 8X18" UND J839D

## (undated) DEVICE — KIT CULTURE TRANSPORT SYS A.C.T. II DUAL ANEROBE R124022

## (undated) DEVICE — TUBING SUCTION 10'X3/16" N510

## (undated) DEVICE — SU DERMABOND ADVANCED .7ML DNX12

## (undated) DEVICE — SUTURE VICRYL+ 2-0 CT-2 27" UND VCP269H

## (undated) DEVICE — PAD CHUX UNDERPAD 23X24" 7136

## (undated) DEVICE — SYR 30ML LL W/O NDL 302832

## (undated) DEVICE — SU VICRYL 2-0 CT-2 CR 8X18" J726D

## (undated) DEVICE — TUBE CULTURE AEROBIC/ANAEROBIC W/O SWABS A.C.T.I.1. 12401

## (undated) DEVICE — NDL 30GA 0.5" 305106

## (undated) DEVICE — MARKING PEN REG/FINE DUALT TIP WITH RULER 1437SR-100

## (undated) DEVICE — FILTER HEPA FLUID TRAP NEPTUNE 0703-040-001

## (undated) DEVICE — NDL BLUNT 18GA 1.5" FILTER 305211

## (undated) DEVICE — GLOVE BIOGEL PI ULTRATOUCH SZ 7.5 41175

## (undated) RX ORDER — LIDOCAINE HYDROCHLORIDE 10 MG/ML
INJECTION, SOLUTION EPIDURAL; INFILTRATION; INTRACAUDAL; PERINEURAL
Status: DISPENSED
Start: 2025-07-03

## (undated) RX ORDER — DEXAMETHASONE SODIUM PHOSPHATE 4 MG/ML
INJECTION, SOLUTION INTRA-ARTICULAR; INTRALESIONAL; INTRAMUSCULAR; INTRAVENOUS; SOFT TISSUE
Status: DISPENSED
Start: 2024-06-03

## (undated) RX ORDER — FENTANYL CITRATE 50 UG/ML
INJECTION, SOLUTION INTRAMUSCULAR; INTRAVENOUS
Status: DISPENSED
Start: 2023-09-02

## (undated) RX ORDER — CEFAZOLIN SODIUM/WATER 2 G/20 ML
SYRINGE (ML) INTRAVENOUS
Status: DISPENSED
Start: 2024-06-03

## (undated) RX ORDER — HYDROMORPHONE HYDROCHLORIDE 1 MG/ML
INJECTION, SOLUTION INTRAMUSCULAR; INTRAVENOUS; SUBCUTANEOUS
Status: DISPENSED
Start: 2023-09-02

## (undated) RX ORDER — FENTANYL CITRATE 50 UG/ML
INJECTION, SOLUTION INTRAMUSCULAR; INTRAVENOUS
Status: DISPENSED
Start: 2025-07-03

## (undated) RX ORDER — PROPOFOL 10 MG/ML
INJECTION, EMULSION INTRAVENOUS
Status: DISPENSED
Start: 2025-07-03

## (undated) RX ORDER — PROPOFOL 10 MG/ML
INJECTION, EMULSION INTRAVENOUS
Status: DISPENSED
Start: 2023-08-30

## (undated) RX ORDER — SODIUM CHLORIDE, SODIUM LACTATE, POTASSIUM CHLORIDE, CALCIUM CHLORIDE 600; 310; 30; 20 MG/100ML; MG/100ML; MG/100ML; MG/100ML
INJECTION, SOLUTION INTRAVENOUS
Status: DISPENSED
Start: 2023-09-02

## (undated) RX ORDER — HYDROMORPHONE HYDROCHLORIDE 1 MG/ML
INJECTION, SOLUTION INTRAMUSCULAR; INTRAVENOUS; SUBCUTANEOUS
Status: DISPENSED
Start: 2023-09-07

## (undated) RX ORDER — FENTANYL CITRATE 50 UG/ML
INJECTION, SOLUTION INTRAMUSCULAR; INTRAVENOUS
Status: DISPENSED
Start: 2023-08-27

## (undated) RX ORDER — PROPOFOL 10 MG/ML
INJECTION, EMULSION INTRAVENOUS
Status: DISPENSED
Start: 2023-11-08

## (undated) RX ORDER — HYDROMORPHONE HYDROCHLORIDE 1 MG/ML
INJECTION, SOLUTION INTRAMUSCULAR; INTRAVENOUS; SUBCUTANEOUS
Status: DISPENSED
Start: 2023-11-15

## (undated) RX ORDER — FENTANYL CITRATE 50 UG/ML
INJECTION, SOLUTION INTRAMUSCULAR; INTRAVENOUS
Status: DISPENSED
Start: 2023-08-30

## (undated) RX ORDER — ONDANSETRON 2 MG/ML
INJECTION INTRAMUSCULAR; INTRAVENOUS
Status: DISPENSED
Start: 2023-09-07

## (undated) RX ORDER — FENTANYL CITRATE-0.9 % NACL/PF 10 MCG/ML
PLASTIC BAG, INJECTION (ML) INTRAVENOUS
Status: DISPENSED
Start: 2023-08-30

## (undated) RX ORDER — OXYCODONE HCL 10 MG/1
TABLET, FILM COATED, EXTENDED RELEASE ORAL
Status: DISPENSED
Start: 2024-06-03

## (undated) RX ORDER — CEFAZOLIN SODIUM 1 G/3ML
INJECTION, POWDER, FOR SOLUTION INTRAMUSCULAR; INTRAVENOUS
Status: DISPENSED
Start: 2023-09-07

## (undated) RX ORDER — SODIUM CHLORIDE 9 MG/ML
INJECTION, SOLUTION INTRAVENOUS
Status: DISPENSED
Start: 2023-11-15

## (undated) RX ORDER — EPHEDRINE SULFATE 50 MG/ML
INJECTION, SOLUTION INTRAMUSCULAR; INTRAVENOUS; SUBCUTANEOUS
Status: DISPENSED
Start: 2023-11-08

## (undated) RX ORDER — PROPOFOL 10 MG/ML
INJECTION, EMULSION INTRAVENOUS
Status: DISPENSED
Start: 2023-09-02

## (undated) RX ORDER — DEXAMETHASONE SODIUM PHOSPHATE 4 MG/ML
INJECTION, SOLUTION INTRA-ARTICULAR; INTRALESIONAL; INTRAMUSCULAR; INTRAVENOUS; SOFT TISSUE
Status: DISPENSED
Start: 2025-07-03

## (undated) RX ORDER — VANCOMYCIN HYDROCHLORIDE 1 G/20ML
INJECTION, POWDER, LYOPHILIZED, FOR SOLUTION INTRAVENOUS
Status: DISPENSED
Start: 2023-08-30

## (undated) RX ORDER — EPHEDRINE SULFATE 50 MG/ML
INJECTION, SOLUTION INTRAMUSCULAR; INTRAVENOUS; SUBCUTANEOUS
Status: DISPENSED
Start: 2023-09-07

## (undated) RX ORDER — ONDANSETRON 2 MG/ML
INJECTION INTRAMUSCULAR; INTRAVENOUS
Status: DISPENSED
Start: 2025-07-03

## (undated) RX ORDER — ACETAMINOPHEN 325 MG/1
TABLET ORAL
Status: DISPENSED
Start: 2024-06-03

## (undated) RX ORDER — ACETAMINOPHEN 325 MG/1
TABLET ORAL
Status: DISPENSED
Start: 2023-09-02

## (undated) RX ORDER — FENTANYL CITRATE 50 UG/ML
INJECTION, SOLUTION INTRAMUSCULAR; INTRAVENOUS
Status: DISPENSED
Start: 2024-06-03

## (undated) RX ORDER — FENTANYL CITRATE-0.9 % NACL/PF 10 MCG/ML
PLASTIC BAG, INJECTION (ML) INTRAVENOUS
Status: DISPENSED
Start: 2023-09-07

## (undated) RX ORDER — OXYCODONE HYDROCHLORIDE 10 MG/1
TABLET ORAL
Status: DISPENSED
Start: 2023-09-07

## (undated) RX ORDER — FENTANYL CITRATE 50 UG/ML
INJECTION, SOLUTION INTRAMUSCULAR; INTRAVENOUS
Status: DISPENSED
Start: 2023-11-15

## (undated) RX ORDER — VANCOMYCIN HYDROCHLORIDE 1 G/20ML
INJECTION, POWDER, LYOPHILIZED, FOR SOLUTION INTRAVENOUS
Status: DISPENSED
Start: 2023-11-15

## (undated) RX ORDER — CEFAZOLIN SODIUM 1 G/3ML
INJECTION, POWDER, FOR SOLUTION INTRAMUSCULAR; INTRAVENOUS
Status: DISPENSED
Start: 2024-06-03

## (undated) RX ORDER — BUPIVACAINE HYDROCHLORIDE 5 MG/ML
INJECTION, SOLUTION PERINEURAL
Status: DISPENSED
Start: 2023-09-02

## (undated) RX ORDER — ACETAMINOPHEN 325 MG/1
TABLET ORAL
Status: DISPENSED
Start: 2023-09-07

## (undated) RX ORDER — GLYCOPYRROLATE 0.2 MG/ML
INJECTION, SOLUTION INTRAMUSCULAR; INTRAVENOUS
Status: DISPENSED
Start: 2024-06-03

## (undated) RX ORDER — CEFAZOLIN SODIUM 1 G/3ML
INJECTION, POWDER, FOR SOLUTION INTRAMUSCULAR; INTRAVENOUS
Status: DISPENSED
Start: 2025-07-03

## (undated) RX ORDER — FENTANYL CITRATE 50 UG/ML
INJECTION, SOLUTION INTRAMUSCULAR; INTRAVENOUS
Status: DISPENSED
Start: 2023-11-08

## (undated) RX ORDER — OXYCODONE HYDROCHLORIDE 10 MG/1
TABLET ORAL
Status: DISPENSED
Start: 2023-11-08

## (undated) RX ORDER — CEFAZOLIN SODIUM/WATER 3 G/30 ML
SYRINGE (ML) INTRAVENOUS
Status: DISPENSED
Start: 2025-07-03

## (undated) RX ORDER — ONDANSETRON 2 MG/ML
INJECTION INTRAMUSCULAR; INTRAVENOUS
Status: DISPENSED
Start: 2024-06-03

## (undated) RX ORDER — PROPOFOL 10 MG/ML
INJECTION, EMULSION INTRAVENOUS
Status: DISPENSED
Start: 2024-06-03

## (undated) RX ORDER — BUPIVACAINE HYDROCHLORIDE 2.5 MG/ML
INJECTION, SOLUTION EPIDURAL; INFILTRATION; INTRACAUDAL; PERINEURAL
Status: DISPENSED
Start: 2025-07-03

## (undated) RX ORDER — OXYCODONE HYDROCHLORIDE 5 MG/1
TABLET ORAL
Status: DISPENSED
Start: 2023-09-02

## (undated) RX ORDER — ONDANSETRON 2 MG/ML
INJECTION INTRAMUSCULAR; INTRAVENOUS
Status: DISPENSED
Start: 2023-11-08

## (undated) RX ORDER — ONDANSETRON 2 MG/ML
INJECTION INTRAMUSCULAR; INTRAVENOUS
Status: DISPENSED
Start: 2023-09-02

## (undated) RX ORDER — FENTANYL CITRATE 50 UG/ML
INJECTION, SOLUTION INTRAMUSCULAR; INTRAVENOUS
Status: DISPENSED
Start: 2023-09-07

## (undated) RX ORDER — ONDANSETRON 2 MG/ML
INJECTION INTRAMUSCULAR; INTRAVENOUS
Status: DISPENSED
Start: 2023-08-30

## (undated) RX ORDER — EPHEDRINE SULFATE 50 MG/ML
INJECTION, SOLUTION INTRAMUSCULAR; INTRAVENOUS; SUBCUTANEOUS
Status: DISPENSED
Start: 2024-06-03

## (undated) RX ORDER — OXYCODONE HYDROCHLORIDE 10 MG/1
TABLET ORAL
Status: DISPENSED
Start: 2025-07-03